# Patient Record
Sex: FEMALE | Race: WHITE | NOT HISPANIC OR LATINO | Employment: OTHER | URBAN - NONMETROPOLITAN AREA
[De-identification: names, ages, dates, MRNs, and addresses within clinical notes are randomized per-mention and may not be internally consistent; named-entity substitution may affect disease eponyms.]

---

## 2017-01-06 ENCOUNTER — OFFICE VISIT (OUTPATIENT)
Dept: FAMILY MEDICINE | Facility: OTHER | Age: 77
End: 2017-01-06
Payer: COMMERCIAL

## 2017-01-06 VITALS
SYSTOLIC BLOOD PRESSURE: 102 MMHG | WEIGHT: 136.5 LBS | OXYGEN SATURATION: 97 % | TEMPERATURE: 97.2 F | BODY MASS INDEX: 21.37 KG/M2 | DIASTOLIC BLOOD PRESSURE: 78 MMHG | HEART RATE: 69 BPM

## 2017-01-06 DIAGNOSIS — Z71.89 ADVANCED DIRECTIVES, COUNSELING/DISCUSSION: Primary | ICD-10-CM

## 2017-01-06 DIAGNOSIS — J01.01 ACUTE RECURRENT MAXILLARY SINUSITIS: ICD-10-CM

## 2017-01-06 PROCEDURE — 99213 OFFICE O/P EST LOW 20 MIN: CPT | Performed by: INTERNAL MEDICINE

## 2017-01-06 RX ORDER — AMLODIPINE BESYLATE 5 MG/1
1 TABLET ORAL DAILY
COMMUNITY
Start: 2016-12-07 | End: 2017-02-28

## 2017-01-06 RX ORDER — AZITHROMYCIN 250 MG/1
TABLET, FILM COATED ORAL
Qty: 6 TABLET | Refills: 0 | Status: SHIPPED | OUTPATIENT
Start: 2017-01-06 | End: 2017-02-17

## 2017-01-06 RX ORDER — LORATADINE 10 MG/1
10 TABLET ORAL DAILY
Qty: 30 TABLET | Refills: 1 | Status: SHIPPED | OUTPATIENT
Start: 2017-01-06 | End: 2017-02-17

## 2017-01-06 NOTE — NURSING NOTE
"Chief Complaint   Patient presents with     Sinus Problem       Initial /78 mmHg  Pulse 69  Temp(Src) 97.2  F (36.2  C) (Temporal)  Wt 136 lb 8 oz (61.916 kg)  SpO2 97% Estimated body mass index is 21.37 kg/(m^2) as calculated from the following:    Height as of 9/23/16: 5' 7\" (1.702 m).    Weight as of this encounter: 136 lb 8 oz (61.916 kg).  BP completed using cuff size: ulises ORTIZ      "

## 2017-01-06 NOTE — PROGRESS NOTES
SUBJECTIVE:                                                    Unique Ward is a 76 year old female who presents to clinic today for the following health issues:    RESPIRATORY SYMPTOMS      Duration: 3 weeks    Description  nasal congestion, facial pain/pressure and gum pain    Severity: mild    Accompanying signs and symptoms: None    History (predisposing factors):  none    Precipitating or alleviating factors: None    Therapies tried and outcome:  rest and fluids         Problem list and histories reviewed & adjusted, as indicated.  Additional history: none      CHIEF COMPLAINT:    The patient is a pleasant 76-year-old female who presents today with a 3 week history of nasal congestion, posterior nasal drainage, and upper maxillary discomfort on the right more so than the left. She has some twitching in her right eye and symptoms consistent with a acute sinus infection. She's had a low-grade fever but has not measured it. She's had no shaking chills. She is taking food and fluids adequately at this time.                         PAST, FAMILY,SOCIAL HISTORY:     Medical  History:   has no past medical history on file.     Surgical History:   has no past surgical history on file.     Social History:   reports that she has quit smoking. She has never used smokeless tobacco. She reports that she does not drink alcohol or use illicit drugs.     Family History:  family history is not on file.            MEDICATIONS  Current Outpatient Prescriptions   Medication Sig Dispense Refill     amLODIPine (NORVASC) 5 MG tablet Take 1 tablet by mouth daily       azithromycin (ZITHROMAX) 250 MG tablet Two tablets first day, then one tablet daily for four days. 6 tablet 0     loratadine (CLARITIN) 10 MG tablet Take 1 tablet (10 mg) by mouth daily 30 tablet 1     MELATONIN PO Take 3 mg by mouth       hydrochlorothiazide (HYDRODIURIL) 25 MG tablet Take 1 tablet (25 mg) by mouth daily 90 tablet 3          --------------------------------------------------------------------------------------------------------------------                          REVIEW OF SYSTEMS:         LUNGS: Pt denies: cough,excess sputum, hemoptysis, or shortness of breath.   HEART: Pt denies: chest pain, arrythmia, syncope, tachy or bradyarrhythmia or excess edema.   GI: Pt denies: nausea, vomitting, diarrhea, constipation, melena, or hematochezia.   NEURO: Pt denies: seizures, strokes, diplopia, weakness, paraesthesias, or paralysis.                          EXAMINATION:          Vital Signs:  B/P: 102/78, T: 97.2, P: 69, R: Data Unavailable, BMI: Body mass index is 21.37 kg/(m^2).   Constitutional: The patient appears to be in no acute distress. The patient appears to be adequately hydrated. No acute respiratory or hemodynamic distress is noted at this time.   LUNGS: clear bilaterally, airflow is brisk, no intercostal retraction or stridor is noted. No coughing is noted during visit.   HEART:  regular without rubs, clicks, gallops, or murmurs. PMI is nondisplaced. Upstrokes are brisk. S1,S2 are heard.   GI: Abdomen is soft, without rebound, guarding or tenderness. Bowel sounds are appropriate. No renal bruits are heard.               ENT: Nasal mucosa is Markedly edematous and erythematous, Yellowexudates are seen. Incompleteobstruction is present. Pharynx is clear of exudates, no significant cervical adenopathy is present. Tympanic membranes show no infection or evidence of perforation. Thyroid is not nodular or enlarged.                        DECISION MAKING:       #1 acute Maxillary sinusitis    recommendZithromax in the usual Z-Hany fashion   Recommend Claritin 10 mg daily  #2 mild insomnia   Recommend at bedtime Benadryl                           FOLLOW UP    I have asked the patient to make an appointment for follow up with me in 1 week if not markedly improved. Otherwise, she'll follow up with her primary care provider    I have  carefully explained the diagnosis and treatment options with the patient. The patient has displayed an understanding of the above, and all subsequent questions were answered.       DO RONDA Dai    Portions of this note were produced using [employer]  Although every attempt at real-time proof reading has been made, occasional grammar/syntax errors may have been missed.

## 2017-02-17 ENCOUNTER — OFFICE VISIT (OUTPATIENT)
Dept: FAMILY MEDICINE | Facility: OTHER | Age: 77
End: 2017-02-17
Payer: COMMERCIAL

## 2017-02-17 VITALS
WEIGHT: 138 LBS | RESPIRATION RATE: 20 BRPM | BODY MASS INDEX: 21.66 KG/M2 | SYSTOLIC BLOOD PRESSURE: 130 MMHG | HEART RATE: 84 BPM | HEIGHT: 67 IN | DIASTOLIC BLOOD PRESSURE: 66 MMHG | TEMPERATURE: 97.8 F | OXYGEN SATURATION: 97 %

## 2017-02-17 DIAGNOSIS — F51.01 PRIMARY INSOMNIA: Primary | ICD-10-CM

## 2017-02-17 PROCEDURE — 99213 OFFICE O/P EST LOW 20 MIN: CPT | Performed by: NURSE PRACTITIONER

## 2017-02-17 RX ORDER — TRAZODONE HYDROCHLORIDE 50 MG/1
25 TABLET, FILM COATED ORAL
Qty: 30 TABLET | Refills: 1 | Status: SHIPPED | OUTPATIENT
Start: 2017-02-17 | End: 2017-02-28

## 2017-02-17 NOTE — NURSING NOTE
"Chief Complaint   Patient presents with     Sleep Problem       Initial /66 (BP Location: Left arm, Cuff Size: Adult Regular)  Pulse 84  Temp 97.8  F (36.6  C) (Tympanic)  Resp 20  Ht 5' 7\" (1.702 m)  Wt 138 lb (62.6 kg)  SpO2 97%  Breastfeeding? No  BMI 21.61 kg/m2 Estimated body mass index is 21.61 kg/(m^2) as calculated from the following:    Height as of this encounter: 5' 7\" (1.702 m).    Weight as of this encounter: 138 lb (62.6 kg).  Medication Reconciliation: complete   ................Ronnie Song LPN,   February 17, 2017,      2:29 PM,   [facility]    "

## 2017-02-17 NOTE — PROGRESS NOTES
SUBJECTIVE:                                                    Unique Ward is a 76 year old female who presents to clinic today for the following health issues:      Insomnia      Duration: 1 year    Description  Frequency of insomnia:  several times a week  Time to fall asleep: 15 minutes  Middle of night awakening:  nightly  Early morning awakening:  occasionally    Accompanying signs and symptoms:  fatigue    History  Similar episodes in past:  YES- x1 year  Previous evaluation/sleep study:  no     Precipitating or alleviating factors:  New stressful situation: no   Caffeine intake after lunchtime: no   OTC decongestants: no   Any new medications: YES- trying Benadryl, not helpful    Therapies tried and outcome: caffeine avoidance, Benadryl, melatonin -  not effective     Has consistent sleep/wake times.  No caffeine use after noon and only 1 cup in the morning.   Falls asleep okay, but wakes often during the night.   Denies specific anxiety symptoms, but has a history of this.   Getting about 2-3 hours of sleep a night only.       Problem list and histories reviewed & adjusted, as indicated.  Additional history: none    Patient Active Problem List   Diagnosis     Essential hypertension     Personal history of urinary tract infection     Advanced directives, counseling/discussion     No past surgical history on file.    Social History   Substance Use Topics     Smoking status: Former Smoker     Smokeless tobacco: Never Used     Alcohol use No     No family history on file.      Current Outpatient Prescriptions   Medication Sig Dispense Refill     traZODone (DESYREL) 50 MG tablet Take 0.5 tablets (25 mg) by mouth nightly as needed for sleep 30 tablet 1     amLODIPine (NORVASC) 5 MG tablet Take 1 tablet by mouth daily       MELATONIN PO Take 3 mg by mouth       hydrochlorothiazide (HYDRODIURIL) 25 MG tablet Take 1 tablet (25 mg) by mouth daily 90 tablet 3     Allergies   Allergen Reactions     No Clinical  "Screening - See Comments      Eye drop antibiotic     Codeine      Gentamicin Hives     Per Unique this medication gives hives on arms and legs. Bev Lo LSXO,  ....................  7/15/2014   1:15 PM     Influenza Virus Vaccine H5n1      Paxil [Paroxetine]      Typhoid Vaccine-Strain Ty21a Other (See Comments)     Comment: , Description:      Typhoid Vaccines      BP Readings from Last 3 Encounters:   02/17/17 130/66   01/06/17 102/78   09/23/16 136/68    Wt Readings from Last 3 Encounters:   02/17/17 138 lb (62.6 kg)   01/06/17 136 lb 8 oz (61.9 kg)   09/23/16 138 lb 12.8 oz (63 kg)                    ROS:  C: NEGATIVE for fever, chills, change in weight.  POSITIVE for trouble sleeping as above   E/M: NEGATIVE for ear, mouth and throat problems  R: NEGATIVE for significant cough or SOB  CV: NEGATIVE for chest pain, palpitations or peripheral edema  GI: NEGATIVE for nausea, abdominal pain, heartburn, or change in bowel habits  PSYCHIATRIC: NEGATIVE for changes in mood or affect    OBJECTIVE:                                                    /66 (BP Location: Left arm, Cuff Size: Adult Regular)  Pulse 84  Temp 97.8  F (36.6  C) (Tympanic)  Resp 20  Ht 5' 7\" (1.702 m)  Wt 138 lb (62.6 kg)  SpO2 97%  Breastfeeding? No  BMI 21.61 kg/m2  Body mass index is 21.61 kg/(m^2).  GENERAL: alert, no distress and elderly  NECK: no adenopathy, no asymmetry, masses, or scars and thyroid normal to palpation  RESP: lungs clear to auscultation - no rales, rhonchi or wheezes  CV: regular rate and rhythm, normal S1 S2, no S3 or S4, no murmur, click or rub, no peripheral edema and peripheral pulses strong  ABDOMEN: soft, nontender, no hepatosplenomegaly, no masses and bowel sounds normal  MS: no gross musculoskeletal defects noted, no edema    Diagnostic Test Results:  none      ASSESSMENT/PLAN:                                                        1. Primary insomnia  Discussed good sleep hygiene with her.  Given " her age, I am reluctant to use sleep aids, but she has tried and failed on Melatonin and Benadryl already and would like something.  Will try very low dose Trazodone.  I discussed possible side effects with her.  She has an appointment with her PCP next week for a physical, will check how this is working at that time, assess side effects and continue if this is working well.    - traZODone (DESYREL) 50 MG tablet; Take 0.5 tablets (25 mg) by mouth nightly as needed for sleep  Dispense: 30 tablet; Refill: 1    See Patient Instructions    MAYRA [facility]

## 2017-02-17 NOTE — PATIENT INSTRUCTIONS
Try the Trazodone - take 1/2 tablet 30 minutes before bed.     Follow up with Dr. Lee at your physical to recheck how this is working.  He can refill it if needed.         Patient Education    Trazodone Hydrochloride Oral tablet    Trazodone Hydrochloride Oral tablet, extended release 24 hour  Trazodone Hydrochloride Oral tablet  What is this medicine?  TRAZODONE (TRAZ oh done) is used to treat depression.  This medicine may be used for other purposes; ask your health care provider or pharmacist if you have questions.  What should I tell my health care provider before I take this medicine?  They need to know if you have any of these conditions:    attempted suicide or thinking about it    bipolar disorder    bleeding problems    glaucoma    heart disease, or previous heart attack    irregular heart beat    kidney or liver disease    low levels of sodium in the blood    an unusual or allergic reaction to trazodone, other medicines, foods, dyes or preservatives    pregnant or trying to get pregnant    breast-feeding  How should I use this medicine?  Take this medicine by mouth with a glass of water. Follow the directions on the prescription label. Take this medicine shortly after a meal or a light snack. Take your medicine at regular intervals. Do not take your medicine more often than directed. Do not stop taking this medicine suddenly except upon the advice of your doctor. Stopping this medicine too quickly may cause serious side effects or your condition may worsen.  A special MedGuide will be given to you by the pharmacist with each prescription and refill. Be sure to read this information carefully each time.  Talk to your pediatrician regarding the use of this medicine in children. Special care may be needed.  Overdosage: If you think you have taken too much of this medicine contact a poison control center or emergency room at once.  NOTE: This medicine is only for you. Do not share this medicine with  others.  What if I miss a dose?  If you miss a dose, take it as soon as you can. If it is almost time for your next dose, take only that dose. Do not take double or extra doses.  What may interact with this medicine?  Do not take this medicine with any of the following medications:    cisapride    dofetilide    dronedarone    fluconazole    linezolid    MAOIs like Carbex, Eldepryl, Marplan, Nardil, and Parnate    mesoridazine    methylene blue (injected into a vein)    pimozide    posaconazole    saquinavir    thioridazine    ziprasidone  This medicine may also interact with the following medications:    alcohol    antiviral medicines for HIV or AIDS    aspirin and aspirin-like medicines    barbiturates such as phenobarbital    certain medicines that treat or prevent blood clots like warfarin, enoxaparin, and dalteparin    certain medicines for blood pressure, heart disease, irregular heart beat    certain medicines for depression, anxiety, or psychotic disturbances    certain medicines for migraine headache like almotriptan, eletriptan, frovatriptan, naratriptan, rizatriptan, sumatriptan, zolmitriptan    certain medicines for sleep    digoxin    fentanyl    ketoconazole    lithium    medicines for seizures like carbamazepine and phenytoin    NSAIDS, medicines for pain and inflammation, like ibuprofen or naproxen    rasagiline    supplements like Smith's wort, kava kava, valerian    tramadol    tryptophan  This list may not describe all possible interactions. Give your health care provider a list of all the medicines, herbs, non-prescription drugs, or dietary supplements you use. Also tell them if you smoke, drink alcohol, or use illegal drugs. Some items may interact with your medicine.  What should I watch for while using this medicine?  Tell your doctor if your symptoms do not get better or if they get worse. Visit your doctor or health care professional for regular checks on your progress. Because it may take  several weeks to see the full effects of this medicine, it is important to continue your treatment as prescribed by your doctor.  Patients and their families should watch out for new or worsening thoughts of suicide or depression. Also watch out for sudden changes in feelings such as feeling anxious, agitated, panicky, irritable, hostile, aggressive, impulsive, severely restless, overly excited and hyperactive, or not being able to sleep. If this happens, especially at the beginning of treatment or after a change in dose, call your health care professional.  You may get drowsy or dizzy. Do not drive, use machinery, or do anything that needs mental alertness until you know how this medicine affects you. Do not stand or sit up quickly, especially if you are an older patient. This reduces the risk of dizzy or fainting spells. Alcohol may interfere with the effect of this medicine. Avoid alcoholic drinks.  This medicine may cause dry eyes and blurred vision. If you wear contact lenses you may feel some discomfort. Lubricating drops may help. See your eye doctor if the problem does not go away or is severe.  Your mouth may get dry. Chewing sugarless gum, sucking hard candy and drinking plenty of water may help. Contact your doctor if the problem does not go away or is severe.  What side effects may I notice from receiving this medicine?  Side effects that you should report to your doctor or health care professional as soon as possible:    allergic reactions like skin rash, itching or hives, swelling of the face, lips, or tongue    fast, irregular heartbeat    feeling faint or lightheaded, falls    painful erections or other sexual dysfunction    suicidal thoughts or other mood changes    trembling  Side effects that usually do not require medical attention (report to your doctor or health care professional if they continue or are bothersome):    constipation    headache    muscle aches or pains    nausea,  vomiting    unusually weak or tired  This list may not describe all possible side effects. Call your doctor for medical advice about side effects. You may report side effects to FDA at 3-606-HPG-1082.  Where should I keep my medicine?  Keep out of the reach of children.  Store at room temperature between 15 and 30 degrees C (59 to 86 degrees F). Protect from light. Keep container tightly closed. Throw away any unused medicine after the expiration date.  NOTE:This sheet is a summary. It may not cover all possible information. If you have questions about this medicine, talk to your doctor, pharmacist, or health care provider. Copyright  2016 [employer]            Insomnia  Insomnia refers to a difficulty going to sleep or staying asleep, or both. Insomnia has many causes, including anxiety, stress, depression, chronic pain, sleeping cycles out of balance due to working night shifts or excess napping during the day, and a condition called  sleep apnea . Insomnia can be a side effect from stimulant medicines such as decongestants, asthma inhalers and pills, diet pills, and illegal drugs such as speed, crank, crack, and PCP.  Home Care:  1. Review your medicines with your doctor or pharmacist to find out if they can cause insomnia.  2. Caffeine, smoking and alcohol also affect sleep. Limit your daily use and do not use these before bedtime. Alcohol may make you sleepy at first, but as its effects wear off, you may awaken a few hours later and have trouble returning to sleep.  3. Do not exercise, eat or drink large amounts of liquid within 2 hours of your bedtime.  4. Improve your sleep habits. Have a fixed bed and wake-up time. Try to keep noise, light and heat in your bedroom at a comfortable level. Try using earplugs or eyeshades if needed.   5. Avoid watching TV in bed.  6. If you do not fall asleep within 30 minutes, try to relax by reading or listening to soft music.  7. Limit daytime napping to one 30 minute  period, early in the day.  8. Get regular exercise. Find other ways to lessen your stress level.  9. If a medicine was prescribed to help reset your sleep patterns, take it as directed. Sleeping pills are intended for short-term use, only. If taken for too long, the effect wears off while the risk of physical addiction and psychological dependence increases.  Follow-Up  with your doctor or as directed by our staff if you feel that your insomnia is not responding to the above measures.  Get Prompt Medical Attention  if any of the following occur:    Extreme restlessness or irritability    Confusion or hallucinations (seeing or hearing things that are not there)    Anxiety, depression    Several days without sleeping    7412-8748 [employer]. [address] [place] [address], [place] [address]. All rights reserved. This information is not intended as a substitute for professional medical care. Always follow your healthcare professional's instructions.

## 2017-02-17 NOTE — MR AVS SNAPSHOT
After Visit Summary   2/17/2017    Unique Ward    MRN: [id number]           Patient Information     Date Of Birth          1940        Visit Information        Provider Department      2/17/2017 2:20 PM Sonja Hartley APRN [facility]        Today's Diagnoses     Primary insomnia    -  1      Care Instructions    Try the Trazodone - take 1/2 tablet 30 minutes before bed.     Follow up with Dr. Lee at your physical to recheck how this is working.  He can refill it if needed.         Patient Education    Trazodone Hydrochloride Oral tablet    Trazodone Hydrochloride Oral tablet, extended release 24 hour  Trazodone Hydrochloride Oral tablet  What is this medicine?  TRAZODONE (TRAZ oh done) is used to treat depression.  This medicine may be used for other purposes; ask your health care provider or pharmacist if you have questions.  What should I tell my health care provider before I take this medicine?  They need to know if you have any of these conditions:    attempted suicide or thinking about it    bipolar disorder    bleeding problems    glaucoma    heart disease, or previous heart attack    irregular heart beat    kidney or liver disease    low levels of sodium in the blood    an unusual or allergic reaction to trazodone, other medicines, foods, dyes or preservatives    pregnant or trying to get pregnant    breast-feeding  How should I use this medicine?  Take this medicine by mouth with a glass of water. Follow the directions on the prescription label. Take this medicine shortly after a meal or a light snack. Take your medicine at regular intervals. Do not take your medicine more often than directed. Do not stop taking this medicine suddenly except upon the advice of your doctor. Stopping this medicine too quickly may cause serious side effects or your condition may worsen.  A special MedGuide will be given to you by the pharmacist with each prescription and refill. Be  sure to read this information carefully each time.  Talk to your pediatrician regarding the use of this medicine in children. Special care may be needed.  Overdosage: If you think you have taken too much of this medicine contact a poison control center or emergency room at once.  NOTE: This medicine is only for you. Do not share this medicine with others.  What if I miss a dose?  If you miss a dose, take it as soon as you can. If it is almost time for your next dose, take only that dose. Do not take double or extra doses.  What may interact with this medicine?  Do not take this medicine with any of the following medications:    cisapride    dofetilide    dronedarone    fluconazole    linezolid    MAOIs like Carbex, Eldepryl, Marplan, Nardil, and Parnate    mesoridazine    methylene blue (injected into a vein)    pimozide    posaconazole    saquinavir    thioridazine    ziprasidone  This medicine may also interact with the following medications:    alcohol    antiviral medicines for HIV or AIDS    aspirin and aspirin-like medicines    barbiturates such as phenobarbital    certain medicines that treat or prevent blood clots like warfarin, enoxaparin, and dalteparin    certain medicines for blood pressure, heart disease, irregular heart beat    certain medicines for depression, anxiety, or psychotic disturbances    certain medicines for migraine headache like almotriptan, eletriptan, frovatriptan, naratriptan, rizatriptan, sumatriptan, zolmitriptan    certain medicines for sleep    digoxin    fentanyl    ketoconazole    lithium    medicines for seizures like carbamazepine and phenytoin    NSAIDS, medicines for pain and inflammation, like ibuprofen or naproxen    rasagiline    supplements like Caraway's wort, kava kava, valerian    tramadol    tryptophan  This list may not describe all possible interactions. Give your health care provider a list of all the medicines, herbs, non-prescription drugs, or dietary  supplements you use. Also tell them if you smoke, drink alcohol, or use illegal drugs. Some items may interact with your medicine.  What should I watch for while using this medicine?  Tell your doctor if your symptoms do not get better or if they get worse. Visit your doctor or health care professional for regular checks on your progress. Because it may take several weeks to see the full effects of this medicine, it is important to continue your treatment as prescribed by your doctor.  Patients and their families should watch out for new or worsening thoughts of suicide or depression. Also watch out for sudden changes in feelings such as feeling anxious, agitated, panicky, irritable, hostile, aggressive, impulsive, severely restless, overly excited and hyperactive, or not being able to sleep. If this happens, especially at the beginning of treatment or after a change in dose, call your health care professional.  You may get drowsy or dizzy. Do not drive, use machinery, or do anything that needs mental alertness until you know how this medicine affects you. Do not stand or sit up quickly, especially if you are an older patient. This reduces the risk of dizzy or fainting spells. Alcohol may interfere with the effect of this medicine. Avoid alcoholic drinks.  This medicine may cause dry eyes and blurred vision. If you wear contact lenses you may feel some discomfort. Lubricating drops may help. See your eye doctor if the problem does not go away or is severe.  Your mouth may get dry. Chewing sugarless gum, sucking hard candy and drinking plenty of water may help. Contact your doctor if the problem does not go away or is severe.  What side effects may I notice from receiving this medicine?  Side effects that you should report to your doctor or health care professional as soon as possible:    allergic reactions like skin rash, itching or hives, swelling of the face, lips, or tongue    fast, irregular heartbeat    feeling  faint or lightheaded, falls    painful erections or other sexual dysfunction    suicidal thoughts or other mood changes    trembling  Side effects that usually do not require medical attention (report to your doctor or health care professional if they continue or are bothersome):    constipation    headache    muscle aches or pains    nausea, vomiting    unusually weak or tired  This list may not describe all possible side effects. Call your doctor for medical advice about side effects. You may report side effects to FDA at 5-138-LJD-8345.  Where should I keep my medicine?  Keep out of the reach of children.  Store at room temperature between 15 and 30 degrees C (59 to 86 degrees F). Protect from light. Keep container tightly closed. Throw away any unused medicine after the expiration date.  NOTE:This sheet is a summary. It may not cover all possible information. If you have questions about this medicine, talk to your doctor, pharmacist, or health care provider. Copyright  2016 Gold Standard            Insomnia  Insomnia refers to a difficulty going to sleep or staying asleep, or both. Insomnia has many causes, including anxiety, stress, depression, chronic pain, sleeping cycles out of balance due to working night shifts or excess napping during the day, and a condition called  sleep apnea . Insomnia can be a side effect from stimulant medicines such as decongestants, asthma inhalers and pills, diet pills, and illegal drugs such as speed, crank, crack, and PCP.  Home Care:  1. Review your medicines with your doctor or pharmacist to find out if they can cause insomnia.  2. Caffeine, smoking and alcohol also affect sleep. Limit your daily use and do not use these before bedtime. Alcohol may make you sleepy at first, but as its effects wear off, you may awaken a few hours later and have trouble returning to sleep.  3. Do not exercise, eat or drink large amounts of liquid within 2 hours of your bedtime.  4. Improve your  sleep habits. Have a fixed bed and wake-up time. Try to keep noise, light and heat in your bedroom at a comfortable level. Try using earplugs or eyeshades if needed.   5. Avoid watching TV in bed.  6. If you do not fall asleep within 30 minutes, try to relax by reading or listening to soft music.  7. Limit daytime napping to one 30 minute period, early in the day.  8. Get regular exercise. Find other ways to lessen your stress level.  9. If a medicine was prescribed to help reset your sleep patterns, take it as directed. Sleeping pills are intended for short-term use, only. If taken for too long, the effect wears off while the risk of physical addiction and psychological dependence increases.  Follow-Up  with your doctor or as directed by our staff if you feel that your insomnia is not responding to the above measures.  Get Prompt Medical Attention  if any of the following occur:    Extreme restlessness or irritability    Confusion or hallucinations (seeing or hearing things that are not there)    Anxiety, depression    Several days without sleeping    2253-3942 [employer]. [address] [place] [address]. All rights reserved. This information is not intended as a substitute for professional medical care. Always follow your healthcare professional's instructions.              Follow-ups after your visit        Your next 10 appointments already scheduled     Feb 28, 2017 10:40 AM CST   PHYSICAL with Tyler Lee MD   [facility] ([address] [address] [phone]            Feb 28, 2017 11:20 AM CST   SHORT with Tyler Lee MD   [address] [address]   [phone]              Who to contact     If you have questions or need follow up information about today's clinic visit or your schedule please contact [facility] directly at [phone].  Normal or non-critical lab and imaging results will be communicated to you by [employer], letter or phone within 4 business days after the clinic has received the results. If you do not hear from us within 7 days, please contact the clinic through [employer] or phone. If you have a critical or abnormal lab result, we will notify you by phone as soon as possible.  Submit refill requests through [employer] or call your pharmacy and they will forward the refill request to us. Please allow 3 business days for your refill to be completed.          Additional Information About Your Visit        [employer] Information     [employer] gives you secure access to your electronic health record. If you see a primary care provider, you can also send messages to your care team and make appointments. If you have questions, please call your primary care clinic.  If you do not have a primary care provider, please call [phone] and they will assist you.        Care EveryWhere ID     This is your Care EveryWhere ID. This could be used by other organizations to access your [place] medical records  [id number]        Your Vitals Were     Pulse Temperature Respirations Height Pulse Oximetry Breastfeeding?    84 97.8  F (36.6  C) (Tympanic) 20 5' 7\" (1.702 m) 97% No    BMI (Body Mass Index)                   21.61 kg/m2            Blood Pressure from Last 3 Encounters:   02/17/17 130/66   01/06/17 102/78   09/23/16 136/68    Weight from Last 3 Encounters:   02/17/17 138 lb (62.6 kg)   01/06/17 136 lb 8 oz (61.9 kg)   09/23/16 138 lb 12.8 oz (63 kg)              Today, you had the following     No orders found for display         Today's Medication Changes          These changes are accurate as of: 2/17/17  2:50 PM.  If you have any questions, ask your nurse or doctor.               Start taking these medicines.        Dose/Directions    traZODone 50 MG tablet   Commonly known as:  DESYREL   Used for:  Primary " insomnia   Started by:  Sonja Hartley APRN CNP        Dose:  25 mg   Take 0.5 tablets (25 mg) by mouth nightly as needed for sleep   Quantity:  30 tablet   Refills:  1            Where to get your medicines      These medications were sent [place] [facility] [place] [address]  [address] , [facility] [address]     Phone:  [phone]     traZODone 50 MG tablet                Primary Care Provider Office Phone # Fax #    Kimpanchito Gopi Lee [phone]       [address]   [facility] [address]        Thank you!     Thank you for choosing [facility]  for your care. Our goal is always to provide you with excellent care. Hearing back from our patients is one way we can continue to improve our services. Please take a few minutes to complete the written survey that you may receive in the mail after your visit with us. Thank you!             Your Updated Medication List - Protect others around you: Learn how to safely use, store and throw away your medicines at www.disposemymeds.org.          This list is accurate as of: 2/17/17  2:50 PM.  Always use your most recent med list.                   Brand Name Dispense Instructions for use    amLODIPine 5 MG tablet    NORVASC     Take 1 tablet by mouth daily       hydrochlorothiazide 25 MG tablet    HYDRODIURIL    90 tablet    Take 1 tablet (25 mg) by mouth daily       MELATONIN PO      Take 3 mg by mouth       traZODone 50 MG tablet    DESYREL    30 tablet    Take 0.5 tablets (25 mg) by mouth nightly as needed for sleep

## 2017-02-28 ENCOUNTER — OFFICE VISIT (OUTPATIENT)
Dept: FAMILY MEDICINE | Facility: CLINIC | Age: 77
End: 2017-02-28
Payer: COMMERCIAL

## 2017-02-28 ENCOUNTER — TELEPHONE (OUTPATIENT)
Dept: FAMILY MEDICINE | Facility: CLINIC | Age: 77
End: 2017-02-28

## 2017-02-28 ENCOUNTER — HOSPITAL ENCOUNTER (OUTPATIENT)
Dept: BONE DENSITY | Facility: CLINIC | Age: 77
Discharge: HOME | End: 2017-02-28
Attending: FAMILY MEDICINE | Admitting: FAMILY MEDICINE
Payer: MEDICARE

## 2017-02-28 VITALS
TEMPERATURE: 97.4 F | WEIGHT: 138.2 LBS | OXYGEN SATURATION: 97 % | HEART RATE: 80 BPM | DIASTOLIC BLOOD PRESSURE: 82 MMHG | RESPIRATION RATE: 20 BRPM | SYSTOLIC BLOOD PRESSURE: 138 MMHG | BODY MASS INDEX: 21.65 KG/M2

## 2017-02-28 DIAGNOSIS — I10 ESSENTIAL HYPERTENSION: Primary | ICD-10-CM

## 2017-02-28 DIAGNOSIS — Z13.220 LIPID SCREENING: ICD-10-CM

## 2017-02-28 DIAGNOSIS — E55.9 VITAMIN D DEFICIENCY: ICD-10-CM

## 2017-02-28 DIAGNOSIS — R53.83 FATIGUE, UNSPECIFIED TYPE: ICD-10-CM

## 2017-02-28 DIAGNOSIS — Z53.9 NO SHOW: ICD-10-CM

## 2017-02-28 DIAGNOSIS — M85.832 OTHER SPECIFIED DISORDERS OF BONE DENSITY AND STRUCTURE, LEFT FOREARM: ICD-10-CM

## 2017-02-28 DIAGNOSIS — I10 ESSENTIAL HYPERTENSION: ICD-10-CM

## 2017-02-28 DIAGNOSIS — Z00.00 ANNUAL PHYSICAL EXAM: Primary | ICD-10-CM

## 2017-02-28 DIAGNOSIS — S62.102S: ICD-10-CM

## 2017-02-28 DIAGNOSIS — L60.0 IGTN (INGROWING TOE NAIL): ICD-10-CM

## 2017-02-28 DIAGNOSIS — F51.01 PRIMARY INSOMNIA: ICD-10-CM

## 2017-02-28 PROBLEM — M79.7 FIBROMYALGIA: Status: ACTIVE | Noted: 2017-02-28

## 2017-02-28 PROBLEM — Z85.828 HISTORY OF BASAL CELL CARCINOMA: Status: ACTIVE | Noted: 2017-02-28

## 2017-02-28 PROBLEM — Z80.8 FH: MELANOMA: Status: ACTIVE | Noted: 2017-02-28

## 2017-02-28 LAB
ANION GAP SERPL CALCULATED.3IONS-SCNC: 6 MMOL/L (ref 3–14)
BUN SERPL-MCNC: 13 MG/DL (ref 7–30)
CALCIUM SERPL-MCNC: 9.1 MG/DL (ref 8.5–10.1)
CHLORIDE SERPL-SCNC: 93 MMOL/L (ref 94–109)
CHOLEST SERPL-MCNC: 236 MG/DL
CO2 SERPL-SCNC: 32 MMOL/L (ref 20–32)
CREAT SERPL-MCNC: 0.68 MG/DL (ref 0.52–1.04)
ERYTHROCYTE [DISTWIDTH] IN BLOOD BY AUTOMATED COUNT: 13.6 % (ref 10–15)
GFR SERPL CREATININE-BSD FRML MDRD: 84 ML/MIN/1.7M2
GLUCOSE SERPL-MCNC: 94 MG/DL (ref 70–99)
HCT VFR BLD AUTO: 35.9 % (ref 35–47)
HDLC SERPL-MCNC: 104 MG/DL
HGB BLD-MCNC: 12 G/DL (ref 11.7–15.7)
LDLC SERPL CALC-MCNC: 118 MG/DL
MCH RBC QN AUTO: 29.5 PG (ref 26.5–33)
MCHC RBC AUTO-ENTMCNC: 33.4 G/DL (ref 31.5–36.5)
MCV RBC AUTO: 88 FL (ref 78–100)
NONHDLC SERPL-MCNC: 132 MG/DL
PLATELET # BLD AUTO: 325 10E9/L (ref 150–450)
POTASSIUM SERPL-SCNC: 3.7 MMOL/L (ref 3.4–5.3)
RBC # BLD AUTO: 4.07 10E12/L (ref 3.8–5.2)
SODIUM SERPL-SCNC: 131 MMOL/L (ref 133–144)
TRIGL SERPL-MCNC: 71 MG/DL
TSH SERPL DL<=0.005 MIU/L-ACNC: 2.13 MU/L (ref 0.4–4)
WBC # BLD AUTO: 7.1 10E9/L (ref 4–11)

## 2017-02-28 PROCEDURE — 85027 COMPLETE CBC AUTOMATED: CPT | Performed by: FAMILY MEDICINE

## 2017-02-28 PROCEDURE — 77080 DXA BONE DENSITY AXIAL: CPT

## 2017-02-28 PROCEDURE — 80061 LIPID PANEL: CPT | Performed by: FAMILY MEDICINE

## 2017-02-28 PROCEDURE — 80048 BASIC METABOLIC PNL TOTAL CA: CPT | Performed by: FAMILY MEDICINE

## 2017-02-28 PROCEDURE — 84443 ASSAY THYROID STIM HORMONE: CPT | Performed by: FAMILY MEDICINE

## 2017-02-28 PROCEDURE — 99397 PER PM REEVAL EST PAT 65+ YR: CPT | Mod: 25 | Performed by: FAMILY MEDICINE

## 2017-02-28 PROCEDURE — 36415 COLL VENOUS BLD VENIPUNCTURE: CPT | Performed by: FAMILY MEDICINE

## 2017-02-28 PROCEDURE — 99213 OFFICE O/P EST LOW 20 MIN: CPT | Mod: 25 | Performed by: FAMILY MEDICINE

## 2017-02-28 PROCEDURE — 82306 VITAMIN D 25 HYDROXY: CPT | Performed by: FAMILY MEDICINE

## 2017-02-28 RX ORDER — AMLODIPINE BESYLATE 5 MG/1
5 TABLET ORAL DAILY
Qty: 90 TABLET | Refills: 3 | Status: SHIPPED | OUTPATIENT
Start: 2017-02-28 | End: 2018-01-26

## 2017-02-28 RX ORDER — TRAZODONE HYDROCHLORIDE 50 MG/1
50 TABLET, FILM COATED ORAL
Qty: 30 TABLET | Refills: 1 | COMMUNITY
Start: 2017-02-28 | End: 2017-03-22

## 2017-02-28 RX ORDER — AMLODIPINE BESYLATE 5 MG/1
5 TABLET ORAL DAILY
Qty: 90 TABLET | Refills: 3 | Status: CANCELLED | OUTPATIENT
Start: 2017-02-28

## 2017-02-28 ASSESSMENT — PAIN SCALES - GENERAL: PAINLEVEL: NO PAIN (0)

## 2017-02-28 NOTE — MR AVS SNAPSHOT
After Visit Summary   2/28/2017    Unique Ward    MRN: [id number]           Patient Information     Date Of Birth          1940        Visit Information        Provider Department      2/28/2017 10:40 AM Tyler Lee MD [facility]        Today's Diagnoses     Annual physical exam    -  1    Fracture of wrist, left, sequela        Vitamin D deficiency        Lipid screening        Fatigue, unspecified type        Other specified disorders of bone density and structure, left forearm         Primary insomnia        Essential hypertension        IGTN (ingrowing toe nail)          Care Instructions      Preventive Health Recommendations  Female Ages 65 +    Yearly exam:     See your health care provider every year in order to  o Review health changes.   o Discuss preventive care.    o Review your medicines if your doctor has prescribed any.      You no longer need a yearly Pap test unless you've had an abnormal Pap test in the past 10 years. If you have vaginal symptoms, such as bleeding or discharge, be sure to talk with your provider about a Pap test.      Every 1 to 2 years, have a mammogram.  If you are over 69, talk with your health care provider about whether or not you want to continue having screening mammograms.      Every 10 years, have a colonoscopy. Or, have a yearly FIT test (stool test). These exams will check for colon cancer.       Have a cholesterol test every 5 years, or more often if your doctor advises it.       Have a diabetes test (fasting glucose) every three years. If you are at risk for diabetes, you should have this test more often.       At age 65, have a bone density scan (DEXA) to check for osteoporosis (brittle bone disease).    Shots:    Get a flu shot each year.    Get a tetanus shot every 10 years.    Talk to your doctor about your pneumonia vaccines. There are now two you should receive - Pneumovax (PPSV 23) and Prevnar (PCV 13).    Talk to  your doctor about the shingles vaccine.    Talk to your doctor about the hepatitis B vaccine.    Nutrition:     Eat at least 5 servings of fruits and vegetables each day.      Eat whole-grain bread, whole-wheat pasta and brown rice instead of white grains and rice.      Talk to your provider about Calcium and Vitamin D.     Lifestyle    Exercise at least 150 minutes a week (30 minutes a day, 5 days a week). This will help you control your weight and prevent disease.      Limit alcohol to one drink per day.      No smoking.       Wear sunscreen to prevent skin cancer.       See your dentist twice a year for an exam and cleaning.      See your eye doctor every 1 to 2 years to screen for conditions such as glaucoma, macular degeneration, cataracts, etc         Follow-ups after your visit        Your next 10 appointments already scheduled     Feb 28, 2017 12:30 PM CST   DX HIP/PELVIS/SPINE with PHDX1   [facility] ([facility])    [address] [place] [address]   [phone]           Please do not take any of the following 48 hours prior to your exam: vitamins, calcium tablets, antacids.              Future tests that were ordered for you today     Open Future Orders        Priority Expected Expires Ordered    DX Hip/Pelvis/Spine Routine  2/28/2018 2/28/2017            Who to contact     If you have questions or need follow up information about today's clinic visit or your schedule please contact [facility] directly at [phone].  Normal or non-critical lab and imaging results will be communicated to you by MyChart, letter or phone within 4 business days after the clinic has received the results. If you do not hear from us within 7 days, please contact the clinic through MyChart or phone. If you have a critical or abnormal lab result, we will notify you by phone as soon as possible.  Submit refill requests through [email or web address] or call your pharmacy and they  will forward the refill request to us. Please allow 3 business days for your refill to be completed.          Additional Information About Your Visit        [employer] Information     [employer] gives you secure access to your electronic health record. If you see a primary care provider, you can also send messages to your care team and make appointments. If you have questions, please call your primary care clinic.  If you do not have a primary care provider, please call [phone] and they will assist you.        Care EveryWhere ID     This is your Care EveryWhere ID. This could be used by other organizations to access your [place] medical records  [id number]        Your Vitals Were     Pulse Temperature Respirations Pulse Oximetry BMI (Body Mass Index)       80 97.4  F (36.3  C) (Temporal) 20 97% 21.65 kg/m2        Blood Pressure from Last 3 Encounters:   02/28/17 138/82   02/17/17 130/66   01/06/17 102/78    Weight from Last 3 Encounters:   02/28/17 138 lb 3.2 oz (62.7 kg)   02/17/17 138 lb (62.6 kg)   01/06/17 136 lb 8 oz (61.9 kg)              We Performed the Following     Basic metabolic panel     CBC with platelets     Lipid panel reflex to direct LDL     TSH with free T4 reflex     Vitamin D Deficiency          Today's Medication Changes          These changes are accurate as of: 2/28/17 11:56 AM.  If you have any questions, ask your nurse or doctor.               These medicines have changed or have updated prescriptions.        Dose/Directions    traZODone 50 MG tablet   Commonly known as:  DESYREL   This may have changed:  how much to take   Used for:  Primary insomnia   Changed by:  Tyler Lee MD        Dose:  50 mg   Take 1 tablet (50 mg) by mouth nightly as needed for sleep   Quantity:  30 tablet   Refills:  1            Where to get your medicines      These medications were sent to [facility] - [place], [place] - [address]   [address], [facility] [address]      Phone:  [phone]     amLODIPine 5 MG tablet                Primary Care Provider Office Phone # Fax #    Kimpanchito Gopi Lee [phone]       [address] [place] [address]        Thank you!     Thank you for choosing [facility]  for your care. Our goal is always to provide you with excellent care. Hearing back from our patients is one way we can continue to improve our services. Please take a few minutes to complete the written survey that you may receive in the mail after your visit with us. Thank you!             Your Updated Medication List - Protect others around you: Learn how to safely use, store and throw away your medicines at www.disposemymeds.org.          This list is accurate as of: 2/28/17 11:56 AM.  Always use your most recent med list.                   Brand Name Dispense Instructions for use    amLODIPine 5 MG tablet    NORVASC    90 tablet    Take 1 tablet (5 mg) by mouth daily       hydrochlorothiazide 25 MG tablet    HYDRODIURIL    90 tablet    Take 1 tablet (25 mg) by mouth daily       traZODone 50 MG tablet    DESYREL    30 tablet    Take 1 tablet (50 mg) by mouth nightly as needed for sleep

## 2017-02-28 NOTE — MR AVS SNAPSHOT
After Visit Summary   2/28/2017    Unique Ward    MRN: [id number]           Patient Information     Date Of Birth          1940        Visit Information        Provider Department      2/28/2017 11:20 AM Tyler Lee MD [facility]        Today's Diagnoses     Essential hypertension    -  1    NO SHOW           Follow-ups after your visit        Your next 10 appointments already scheduled     Mar 24, 2017 10:00 AM CDT   LAB with NL LAB [facility]   [facility]    [address] [place] [id number]   [phone]           Patient must bring picture ID.  Patient should be prepared to give a urine specimen  Please do not eat 10-12 hours before your appointment if you are coming in fasting for labs on lipids, cholesterol, or glucose (sugar).  Pregnant women should follow their Care Team instructions. Water with medications is okay. Do not drink coffee or other fluids.   If you have concerns about taking  your medications, please ask at office or if scheduling via [employer], send a message by clicking on Secure Messaging, Message Your Care Team.              Who to contact     If you have questions or need follow up information about today's clinic visit or your schedule please contact [facility] directly at [phone].  Normal or non-critical lab and imaging results will be communicated to you by MyChart, letter or phone within 4 business days after the clinic has received the results. If you do not hear from us within 7 days, please contact the clinic through [employer] or phone. If you have a critical or abnormal lab result, we will notify you by phone as soon as possible.  Submit refill requests through [employer] or call your pharmacy and they will forward the refill request to us. Please allow 3 business days for your refill to be completed.          Additional Information About Your Visit        [facility]  Information     [employer] gives you secure access to your electronic health record. If you see a primary care provider, you can also send messages to your care team and make appointments. If you have questions, please call your primary care clinic.  If you do not have a primary care provider, please call [phone] and they will assist you.        Care EveryWhere ID     This is your Care EveryWhere ID. This could be used by other organizations to access your [place] medical records  [id number]         Blood Pressure from Last 3 Encounters:   02/28/17 138/82   02/17/17 130/66   01/06/17 102/78    Weight from Last 3 Encounters:   02/28/17 138 lb 3.2 oz (62.7 kg)   02/17/17 138 lb (62.6 kg)   01/06/17 136 lb 8 oz (61.9 kg)              Today, you had the following     No orders found for display         Today's Medication Changes          These changes are accurate as of: 2/28/17 11:59 PM.  If you have any questions, ask your nurse or doctor.               These medicines have changed or have updated prescriptions.        Dose/Directions    traZODone 50 MG tablet   Commonly known as:  DESYREL   This may have changed:  how much to take   Used for:  Primary insomnia   Changed by:  Tyler Lee MD        Dose:  50 mg   Take 1 tablet (50 mg) by mouth nightly as needed for sleep   Quantity:  30 tablet   Refills:  1            Where to get your medicines      These medications were sent to [facility] - [facility] [address]  [address], [facility] [address]     Phone:  [phone]     amLODIPine 5 MG tablet                Primary Care Provider Office Phone # Fax #    Tyler Lee [phone]       [address] [place] [address]        Thank you!     Thank you for choosing [facility]  for your care. Our goal is always to provide you with excellent care. Hearing back from our patients is one way we  can continue to improve our services. Please take a few minutes to complete the written survey that you may receive in the mail after your visit with us. Thank you!             Your Updated Medication List - Protect others around you: Learn how to safely use, store and throw away your medicines at www.disposemymeds.org.          This list is accurate as of: 2/28/17 11:59 PM.  Always use your most recent med list.                   Brand Name Dispense Instructions for use    amLODIPine 5 MG tablet    NORVASC    90 tablet    Take 1 tablet (5 mg) by mouth daily       hydrochlorothiazide 25 MG tablet    HYDRODIURIL    90 tablet    Take 1 tablet (25 mg) by mouth daily       traZODone 50 MG tablet    DESYREL    30 tablet    Take 1 tablet (50 mg) by mouth nightly as needed for sleep

## 2017-02-28 NOTE — NURSING NOTE
"Chief Complaint   Patient presents with     Physical       Initial /82 (BP Location: Right arm, Patient Position: Chair, Cuff Size: Adult Regular)  Pulse 80  Temp 97.4  F (36.3  C) (Temporal)  Resp 20  Wt 138 lb 3.2 oz (62.7 kg)  SpO2 97%  BMI 21.65 kg/m2 Estimated body mass index is 21.65 kg/(m^2) as calculated from the following:    Height as of 2/17/17: 5' 7\" (1.702 m).    Weight as of this encounter: 138 lb 3.2 oz (62.7 kg).  Medication Reconciliation: complete   Paulina Vasques CMA     "

## 2017-02-28 NOTE — TELEPHONE ENCOUNTER
Left a message to call the clinic back.  When the patient calls back please relay this message per Dr. Lee.     Labs look good. She does have a low sodium. I think this is coming from her hydrochlorothiazide. If agreeable, she should stop this medication for the next month and then recheck her sodium.     No osteoporosis, but osteopenia. Should take 1000mg vit d, and 500 mg calcium daily, in addition to dietary sources.     If she has any questions please send her back to Dr. Lee's nurse.    Paulina Vasques, CMA

## 2017-02-28 NOTE — PROGRESS NOTES
SUBJECTIVE:                                                            Unique Ward is a 76 year old female who presents for Preventive Visit.    Returns for an annual and a number of items. She has an ingrowing toenail on her right great toe that is bothersome. No swelling or infection. She has ongoing left low back pain. It radiates into the buttock. She has a history of skin cancer and would like a recheck. She's been having more than usual fatigue and some sleep issues. Wondering about increasing her trazodone. She carries a history of vitamin D deficiency, right wrist fracture, abnormal DEXA scan. She does not recall the results of that. She is not measuring her home blood pressures.  Are you in the first 12 months of your Medicare coverage?  No    Physical   Annual:     Getting at least 3 servings of Calcium per day::  NO    Bi-annual eye exam::  NO    Dental care twice a year::  Yes    Sleep apnea or symptoms of sleep apnea::  Daytime drowsiness    Diet::  Low salt and Vegetarian/vegan    Frequency of exercise::  None    Taking medications regularly::  Yes    Medication side effects::  None    Additional concerns today::  YES      COGNITIVE SCREEN  1) Repeat 3 items (Banana, Sunrise, Chair)    2) Clock draw: NORMAL  3) 3 item recall: Recalls 3 objects  Results: NORMAL clock, 1-2 items recalled: COGNITIVE IMPAIRMENT LESS LIKELY    Mini-CogTM Copyright RUBEN Amador. Licensed by the author for use in [facility]; reprinted with permission (flaquito@.[facility]). All rights reserved.        Reviewed and updated as needed this visit by clinical staff  Tobacco  Allergies         Reviewed and updated as needed this visit by Provider        Social History   Substance Use Topics     Smoking status: Former Smoker     Packs/day: 0.75     Years: 10.00     Types: Cigarettes     Start date: 1/1/1955     Quit date: 1/1/1965     Smokeless tobacco: Never Used     Alcohol use No       The patient does not drink >3 drinks  "per day nor >7 drinks per week.            Today's PHQ-2 Score:   PHQ-2 ( 1999 [employer]) 2/27/2017   Q1: Little interest or pleasure in doing things -   Q2: Feeling down, depressed or hopeless -   PHQ-2 Score -   Little interest or pleasure in doing things More than half the days   Feeling down, depressed or hopeless More than half the days   PHQ-2 Score 4       Do you feel safe in your environment - Yes    Do you have a Health Care Directive?: No: Advance care planning reviewed with patient; information given to patient to review.    Current providers sharing in care for this patient include:   Patient Care Team:  Tyler Lee MD as PCP - General (Family Practice)      Hearing impairment: No    Ability to successfully perform activities of daily living: Yes, no assistance needed     Fall risk:  Fallen 2 or more times in the past year?: No  Any fall with injury in the past year?: No    Home safety:  none identified      The following health maintenance items are reviewed in Epic and correct as of today:  Health Maintenance   Topic Date Due     LIPID SCREEN Q5 YR FEMALE (SYSTEM ASSIGNED)  07/12/1984     DEXA SCAN SCREENING (SYSTEM ASSIGNED)  07/12/2004     PNEUMOCOCCAL (1 of 2 - PCV13) 07/12/2004     INFLUENZA VACCINE (SYSTEM ASSIGNED)  09/01/2016     FALL RISK ASSESSMENT  03/23/2017     ADVANCE DIRECTIVE PLANNING Q5 YRS (NO INBASKET)  01/06/2022     TETANUS IMMUNIZATION (SYSTEM ASSIGNED)  06/01/2024              ROS:  Constitutional, HEENT, cardiovascular, pulmonary, gi and gu systems are negative, except as otherwise noted.      OBJECTIVE:                                                            /82 (BP Location: Right arm, Patient Position: Chair, Cuff Size: Adult Regular)  Pulse 80  Temp 97.4  F (36.3  C) (Temporal)  Resp 20  Wt 138 lb 3.2 oz (62.7 kg)  SpO2 97%  BMI 21.65 kg/m2 Estimated body mass index is 21.65 kg/(m^2) as calculated from the following:    Height as of 2/17/17: 5' 7\" (1.702 " m).    Weight as of this encounter: 138 lb 3.2 oz (62.7 kg).  EXAM:   GENERAL: healthy, alert and no distress  EYES: Eyes grossly normal to inspection, PERRL and conjunctivae and sclerae normal  HENT: ear canals and TM's normal, nose and mouth without ulcers or lesions  NECK: no adenopathy, no asymmetry, masses, or scars and thyroid normal to palpation  RESP: lungs clear to auscultation - no rales, rhonchi or wheezes  CV: regular rate and rhythm, normal S1 S2, no S3 or S4, no murmur, click or rub, no peripheral edema and peripheral pulses strong  ABDOMEN: soft, nontender, no hepatosplenomegaly, no masses and bowel sounds normal  MS: no gross musculoskeletal defects noted, no edema  SKIN: no suspicious lesions or rashes  NEURO: Normal strength and tone, mentation intact and speech normal    ASSESSMENT / PLAN:                                                                ICD-10-CM    1. Annual physical exam Z00.00 Lipid panel reflex to direct LDL     CBC with platelets   2. Fracture of wrist, left, sequela S62.102S DX Hip/Pelvis/Spine   3. Vitamin D deficiency E55.9 Vitamin D Deficiency     DX Hip/Pelvis/Spine   4. Lipid screening Z13.220    5. Fatigue, unspecified type R53.83 TSH with free T4 reflex   6. Other specified disorders of bone density and structure, left forearm  M85.832 DX Hip/Pelvis/Spine   7. Primary insomnia F51.01 traZODone (DESYREL) 50 MG tablet   8. Essential hypertension I10 Basic metabolic panel     amLODIPine (NORVASC) 5 MG tablet   9. IGTN (ingrowing toe nail) L60.0       lots of items addressed. Outside of the usual physical exam topics, we will recheck a DEXA scan and vitamin D level. Due to her fatigue and recheck thyroid. We will increase her trazodone to 50 mg nightly. Hopefully with better sleep, her fatigue might improve. Refills provided for her stable high blood pressure, and routine labs ordered. Return any time for ingrown toenail on the right great toe for removal.      End of Life  "Planning:  Patient currently has an advanced directive:     COUNSELING:  Reviewed preventive health counseling, as reflected in patient instructions        Estimated body mass index is 21.65 kg/(m^2) as calculated from the following:    Height as of 2/17/17: 5' 7\" (1.702 m).    Weight as of this encounter: 138 lb 3.2 oz (62.7 kg).     reports that she quit smoking about 52 years ago. Her smoking use included Cigarettes. She started smoking about 62 years ago. She has a 7.50 pack-year smoking history. She has never used smokeless tobacco.      Appropriate preventive services were discussed with this patient, including applicable screening as appropriate for cardiovascular disease, diabetes, osteopenia/osteoporosis, and glaucoma.  As appropriate for age/gender, discussed screening for colorectal cancer, prostate cancer, breast cancer, and cervical cancer. Checklist reviewing preventive services available has been given to the patient.    Reviewed patients plan of care and provided an AVS. The Basic Care Plan (routine screening as documented in Health Maintenance) for Unique meets the Care Plan requirement. This Care Plan has been established and reviewed with the Patient.    Counseling Resources:  ATP IV Guidelines  Pooled Cohorts Equation Calculator  Breast Cancer Risk Calculator  FRAX Risk Assessment  ICSI Preventive Guidelines  Dietary Guidelines for Americans, 2010  USDA's MyPlate  ASA Prophylaxis  Lung CA Screening    Tyler Lee MD  [facility]  "

## 2017-03-01 LAB — DEPRECATED CALCIDIOL+CALCIFEROL SERPL-MC: 30 UG/L (ref 20–75)

## 2017-03-01 NOTE — TELEPHONE ENCOUNTER
Patient called back and info was given. She has a few questions about it and would like to communicate with Dr. Lee via [employer]fe[employer]. She will be sending him a message.  Thank you,  [place]   for [facility]

## 2017-03-13 ENCOUNTER — MYC MEDICAL ADVICE (OUTPATIENT)
Dept: FAMILY MEDICINE | Facility: CLINIC | Age: 77
End: 2017-03-13

## 2017-03-13 DIAGNOSIS — E87.1 LOW SODIUM LEVELS: Primary | ICD-10-CM

## 2017-03-17 NOTE — TELEPHONE ENCOUNTER
i think its her HCTZ, more likely. But she may stop either and lets recheck in 3-4 wks with bmp. thanks

## 2017-03-22 DIAGNOSIS — F51.01 PRIMARY INSOMNIA: ICD-10-CM

## 2017-03-22 NOTE — TELEPHONE ENCOUNTER
Unique is requesting a refill of Trazodone 50mg (one whole tablet nightly)  It looks like it was entered historical on 02/28/2017: therefore pharmacy never received prescription.     Thanks  Isis Pringle [facility]   [phone]

## 2017-03-23 RX ORDER — TRAZODONE HYDROCHLORIDE 50 MG/1
50 TABLET, FILM COATED ORAL
Qty: 30 TABLET | Refills: 1 | Status: SHIPPED | OUTPATIENT
Start: 2017-03-23 | End: 2017-06-29

## 2017-03-24 DIAGNOSIS — E87.1 LOW SODIUM LEVELS: ICD-10-CM

## 2017-03-24 LAB
ANION GAP SERPL CALCULATED.3IONS-SCNC: 7 MMOL/L (ref 3–14)
BUN SERPL-MCNC: 22 MG/DL (ref 7–30)
CALCIUM SERPL-MCNC: 9 MG/DL (ref 8.5–10.1)
CHLORIDE SERPL-SCNC: 101 MMOL/L (ref 94–109)
CO2 SERPL-SCNC: 30 MMOL/L (ref 20–32)
CREAT SERPL-MCNC: 0.78 MG/DL (ref 0.52–1.04)
GFR SERPL CREATININE-BSD FRML MDRD: 71 ML/MIN/1.7M2
GLUCOSE SERPL-MCNC: 127 MG/DL (ref 70–99)
POTASSIUM SERPL-SCNC: 4 MMOL/L (ref 3.4–5.3)
SODIUM SERPL-SCNC: 138 MMOL/L (ref 133–144)

## 2017-03-24 PROCEDURE — 36415 COLL VENOUS BLD VENIPUNCTURE: CPT | Performed by: FAMILY MEDICINE

## 2017-03-24 PROCEDURE — 80048 BASIC METABOLIC PNL TOTAL CA: CPT | Performed by: FAMILY MEDICINE

## 2017-04-28 ENCOUNTER — OFFICE VISIT (OUTPATIENT)
Dept: FAMILY MEDICINE | Facility: OTHER | Age: 77
End: 2017-04-28
Payer: COMMERCIAL

## 2017-04-28 ENCOUNTER — TELEPHONE (OUTPATIENT)
Dept: FAMILY MEDICINE | Facility: OTHER | Age: 77
End: 2017-04-28

## 2017-04-28 VITALS
SYSTOLIC BLOOD PRESSURE: 136 MMHG | HEART RATE: 70 BPM | RESPIRATION RATE: 20 BRPM | DIASTOLIC BLOOD PRESSURE: 70 MMHG | TEMPERATURE: 96.4 F | WEIGHT: 137 LBS | BODY MASS INDEX: 21.46 KG/M2

## 2017-04-28 DIAGNOSIS — B00.9 HSV (HERPES SIMPLEX VIRUS) INFECTION: ICD-10-CM

## 2017-04-28 DIAGNOSIS — R21 RASH AND NONSPECIFIC SKIN ERUPTION: Primary | ICD-10-CM

## 2017-04-28 PROCEDURE — 99213 OFFICE O/P EST LOW 20 MIN: CPT | Performed by: NURSE PRACTITIONER

## 2017-04-28 RX ORDER — SILVER SULFADIAZINE 1 %
CREAM (GRAM) TOPICAL
COMMUNITY
Start: 2017-04-05 | End: 2017-05-31

## 2017-04-28 RX ORDER — TRIAMCINOLONE ACETONIDE 5 MG/G
CREAM TOPICAL
Qty: 30 G | Refills: 0 | Status: SHIPPED | OUTPATIENT
Start: 2017-04-28 | End: 2017-05-31

## 2017-04-28 RX ORDER — ACYCLOVIR 50 MG/G
CREAM TOPICAL
Qty: 5 G | Refills: 3 | Status: SHIPPED | OUTPATIENT
Start: 2017-04-28 | End: 2017-05-31

## 2017-04-28 NOTE — NURSING NOTE
"Chief Complaint   Patient presents with     Derm Problem     many places, bilateral sides of body       Initial /70  Pulse 70  Temp 96.4  F (35.8  C) (Tympanic)  Resp 20  Wt 137 lb (62.1 kg)  Breastfeeding? No  BMI 21.46 kg/m2 Estimated body mass index is 21.46 kg/(m^2) as calculated from the following:    Height as of 2/17/17: 5' 7\" (1.702 m).    Weight as of this encounter: 137 lb (62.1 kg).  Medication Reconciliation: complete   ................Ronnie Song LPN,   April 28, 2017,      7:28 AM,   [facility]    "

## 2017-04-28 NOTE — PATIENT INSTRUCTIONS
Use the creams as prescribed. One is a steroid and one is an antiviral medication.     Follow up if symptoms fail to resolve as expected.

## 2017-04-28 NOTE — MR AVS SNAPSHOT
After Visit Summary   4/28/2017    Unique Ward    MRN: [id number]           Patient Information     Date Of Birth          1940        Visit Information        Provider Department      4/28/2017 7:20 AM Sonja Hartley APRN CNP [facility]        Today's Diagnoses     Rash and nonspecific skin eruption    -  1      Care Instructions    Use the creams as prescribed. One is a steroid and one is an antiviral medication.     Follow up if symptoms fail to resolve as expected.             Follow-ups after your visit        Who to contact     If you have questions or need follow up information about today's clinic visit or your schedule please contact [facility] directly at [phone].  Normal or non-critical lab and imaging results will be communicated to you by MyChart, letter or phone within 4 business days after the clinic has received the results. If you do not hear from us within 7 days, please contact the clinic through [employer] or phone. If you have a critical or abnormal lab result, we will notify you by phone as soon as possible.  Submit refill requests through [employer] or call your pharmacy and they will forward the refill request to us. Please allow 3 business days for your refill to be completed.          Additional Information About Your Visit        MyChart Information     [employer] gives you secure access to your electronic health record. If you see a primary care provider, you can also send messages to your care team and make appointments. If you have questions, please call your primary care clinic.  If you do not have a primary care provider, please call [phone] and they will assist you.        Care EveryWhere ID     This is your Care EveryWhere ID. This could be used by other organizations to access your [place] medical records  [id number]        Your Vitals Were     Pulse Temperature Respirations Breastfeeding? BMI (Body Mass Index)       70 96.4  F  (35.8  C) (Tympanic) 20 No 21.46 kg/m2        Blood Pressure from Last 3 Encounters:   04/28/17 136/70   02/28/17 138/82   02/17/17 130/66    Weight from Last 3 Encounters:   04/28/17 137 lb (62.1 kg)   02/28/17 138 lb 3.2 oz (62.7 kg)   02/17/17 138 lb (62.6 kg)              Today, you had the following     No orders found for display         Today's Medication Changes          These changes are accurate as of: 4/28/17  7:49 AM.  If you have any questions, ask your nurse or doctor.               Start taking these medicines.        Dose/Directions    acyclovir 5 % cream   Commonly known as:  ZOVIRAX   Used for:  Rash and nonspecific skin eruption   Started by:  Sonja Hartley APRN CNP        Apply topically 5 times daily   Quantity:  5 g   Refills:  3       triamcinolone 0.5 % cream   Commonly known as:  KENALOG   Used for:  Rash and nonspecific skin eruption   Started by:  Sonja Hartley APRN CNP        Apply sparingly to affected area three times daily.   Quantity:  30 g   Refills:  0            Where to get your medicines      These medications were sent to [id number] - [place], [place] - [address] [place] [address]  [address] [place] [address]    Hours:  M-F 8:30-6:30; Sat 9-4; closed Sunday Phone:  [phone]     acyclovir 5 % cream    triamcinolone 0.5 % cream                Primary Care Provider Office Phone # Fax #    Tyler Lee [phone]       [address]   [facility] [address]        Thank you!     Thank you for choosing [facility]  for your care. Our goal is always to provide you with excellent care. Hearing back from our patients is one way we can continue to improve our services. Please take a few minutes to complete the written survey that you may receive in the mail after your visit with us. Thank you!             Your Updated Medication List - Protect others around you: Learn how to safely use, store and throw away your  medicines at www.disposemymeds.org.          This list is accurate as of: 4/28/17  7:49 AM.  Always use your most recent med list.                   Brand Name Dispense Instructions for use    acyclovir 5 % cream    ZOVIRAX    5 g    Apply topically 5 times daily       amLODIPine 5 MG tablet    NORVASC    90 tablet    Take 1 tablet (5 mg) by mouth daily       SSD 1 % cream   Generic drug:  silver sulfADIAZINE          traZODone 50 MG tablet    DESYREL    30 tablet    Take 1 tablet (50 mg) by mouth nightly as needed for sleep       triamcinolone 0.5 % cream    KENALOG    30 g    Apply sparingly to affected area three times daily.

## 2017-04-28 NOTE — TELEPHONE ENCOUNTER
I am not aware of any alternative topical treatments.  Can we check with the pharmacy?  She has either herpes simplex or herpes zoster and does not want to take oral medications.     Electronically signed by Sonja Hartley CNP.

## 2017-04-28 NOTE — PROGRESS NOTES
SUBJECTIVE:                                                    Unique Ward is a 76 year old female who presents to clinic today for the following health issues:      Rash      Duration: 1.5-2 weeks    Description  Location: all over, bilaterally  Itching: moderate    Intensity:  moderate    Accompanying signs and symptoms: spreading    History (similar episodes/previous evaluation): sensitive skin, had a fungus 3 yrs ago    Precipitating or alleviating factors:  New exposures:  medication - cream for toe  Recent travel: no      Therapies tried and outcome: hydrocortisone cream -  not effective     She has a rash on her right knee as well as a few other lesions on her right arm and left hand.  Started on the right knee 2 weeks ago.  Getting worse.  It is itchy and mildly painful.      No new exposures, no fevers/chills. She is otherwise feeling well.    No history of chronic skin issues.  She had what sounds like a fungal infection a few years ago, but states this is not similar to that at all.      Problem list and histories reviewed & adjusted, as indicated.  Additional history: none    Patient Active Problem List   Diagnosis     Essential hypertension     Personal history of urinary tract infection     Advanced directives, counseling/discussion     History of basal cell carcinoma- face and scalp     FH: melanoma- son     Dysthymia     Vitamin D deficiency     Fibromyalgia     Past Surgical History:   Procedure Laterality Date     BIOPSY  11/01/2014     COLONOSCOPY  01.01/1979    Two routine,  one for chronic diarrhea     EYE SURGERY  1/1/2014    first    cataracts both eyes.  second a few months later     GENITOURINARY SURGERY       GYN SURGERY  01.01.1977    TL     HEAD & NECK SURGERY  01.01.1997    basal cell X2   Moh's on scalp.  other on bridge of nose     SOFT TISSUE SURGERY  01.01.1990    Ganglion Cyst   Left inner wrist       Social History   Substance Use Topics     Smoking status: Former Smoker      Packs/day: 0.75     Years: 10.00     Types: Cigarettes     Start date: 1/1/1955     Quit date: 1/1/1965     Smokeless tobacco: Never Used     Alcohol use No     Family History   Problem Relation Age of Onset     DIABETES Maternal Grandmother      Coronary Artery Disease Sister      error     Coronary Artery Disease Brother      error     Hypertension Brother      Hyperlipidemia Brother      CEREBROVASCULAR DISEASE Brother      Hypertension Sister      Hypertension Sister      Hyperlipidemia Sister      Other Cancer Sister      cervical     Breast Cancer Sister      Other Cancer Sister      kidney     MENTAL ILLNESS Mother      paranoid/schizophrenic/suicide     MENTAL ILLNESS Sister      ???  Had shock treatments         Current Outpatient Prescriptions   Medication Sig Dispense Refill     SSD 1 % cream        traZODone (DESYREL) 50 MG tablet Take 1 tablet (50 mg) by mouth nightly as needed for sleep 30 tablet 1     amLODIPine (NORVASC) 5 MG tablet Take 1 tablet (5 mg) by mouth daily 90 tablet 3     Allergies   Allergen Reactions     No Clinical Screening - See Comments      Eye drop antibiotic.     Codeine      Gentamicin Hives     Per Unique this medication gives hives on arms and legs. Bev Lo LSXO,  ....................  7/15/2014   1:15 PM     Influenza Virus Vaccine H5n1      Paxil [Paroxetine]      Typhoid Vaccine-Strain Ty21a Other (See Comments)     Comment: , Description:      Typhoid Vaccines      BP Readings from Last 3 Encounters:   04/28/17 136/70   02/28/17 138/82   02/17/17 130/66    Wt Readings from Last 3 Encounters:   04/28/17 137 lb (62.1 kg)   02/28/17 138 lb 3.2 oz (62.7 kg)   02/17/17 138 lb (62.6 kg)                    Reviewed and updated as needed this visit by clinical staff  Tobacco  Allergies  Meds  Med Hx  Surg Hx  Fam Hx  Soc Hx      Reviewed and updated as needed this visit by Provider         ROS:  C: NEGATIVE for fever, chills, change in weight  INTEGUMENTARY/SKIN:  as above   E/M: NEGATIVE for ear, mouth and throat problems  R: NEGATIVE for significant cough or SOB  CV: NEGATIVE for chest pain, palpitations or peripheral edema    OBJECTIVE:                                                    /70  Pulse 70  Temp 96.4  F (35.8  C) (Tympanic)  Resp 20  Wt 137 lb (62.1 kg)  Breastfeeding? No  BMI 21.46 kg/m2  Body mass index is 21.46 kg/(m^2).  GENERAL: healthy, alert and no distress  NECK: no adenopathy, no asymmetry, masses, or scars and thyroid normal to palpation  RESP: lungs clear to auscultation - no rales, rhonchi or wheezes  CV: regular rate and rhythm, normal S1 S2, no S3 or S4, no murmur, click or rub, no peripheral edema and peripheral pulses strong  MS: no gross musculoskeletal defects noted, no edema  SKIN: she has a linear, vesicular rash on her right knee.  No drainage.  She has one small, raised lesion on her left hand. It is not vesicular and looks like dry skin. She has one raised, red papule on her right arm. It is not vesicular.     Diagnostic Test Results:  none      ASSESSMENT/PLAN:                                                        1. Rash and nonspecific skin eruption  I am not certain the etiology of this, but given how the rash looks, it is likely herpes zoster or herpes simplex.  She has lesions on both arms well which make zoster much less likely, but I am not certain these lesions are the same thing as they are very small and don't look similar.  She does not want to take oral therapy as she has reactions to many medications.  Will prescribe topical acyclovir in the even that this is HSV and a topical steroid.  If it is herpes zoster, it should resolve on it's own, and she declines any oral medications today.   - acyclovir (ZOVIRAX) 5 % cream; Apply topically 5 times daily  Dispense: 5 g; Refill: 3  - triamcinolone (KENALOG) 0.5 % cream; Apply sparingly to affected area three times daily.  Dispense: 30 g; Refill: 0    2. HSV (herpes  simplex virus) infection  As above   - acyclovir (ZOVIRAX) 5 % cream; Apply topically 5 times daily  Dispense: 5 g; Refill: 3    FUTURE APPOINTMENTS:       - Follow up if symptoms fail to resolve as expected.   See Patient Instructions    MAYRA Peterson [facility]

## 2017-04-28 NOTE — TELEPHONE ENCOUNTER
PA submitted and DENIED.  Patient will need new Rx.  Pharmacy notified, information scanned.    Nicole Landry XRO/

## 2017-05-01 NOTE — TELEPHONE ENCOUNTER
Capsaisin could be used for pain for shingles but it wouldn't shorten the length of time for the rash itself.  No other meds other than oral treatment for herpes simplex.  ................Ronnie Song LPN,   May 1, 2017,      12:02 PM,   [facility]

## 2017-05-01 NOTE — TELEPHONE ENCOUNTER
Please call patient and advise her no topical medication is covered by insurance.  I would recommend oral Valcyclovir.  Please see if she would like to try that.     Electronically signed by Sonja Hartley CNP.

## 2017-05-03 ENCOUNTER — MYC MEDICAL ADVICE (OUTPATIENT)
Dept: FAMILY MEDICINE | Facility: OTHER | Age: 77
End: 2017-05-03

## 2017-05-03 NOTE — TELEPHONE ENCOUNTER
Left msg informing pt of providers advice on taking an oral med. Stated for pt to either call us or send a msg via [facility].  ................Ronnie Song LPN,   May 3, 2017,      11:58 AM,   [facility]

## 2017-05-04 ENCOUNTER — DOCUMENTATION ONLY (OUTPATIENT)
Dept: OTHER | Facility: CLINIC | Age: 77
End: 2017-05-04

## 2017-05-04 DIAGNOSIS — Z71.89 ADVANCED DIRECTIVES, COUNSELING/DISCUSSION: Chronic | ICD-10-CM

## 2017-05-31 ENCOUNTER — OFFICE VISIT (OUTPATIENT)
Dept: FAMILY MEDICINE | Facility: OTHER | Age: 77
End: 2017-05-31
Payer: COMMERCIAL

## 2017-05-31 VITALS
OXYGEN SATURATION: 94 % | RESPIRATION RATE: 16 BRPM | DIASTOLIC BLOOD PRESSURE: 64 MMHG | TEMPERATURE: 97 F | BODY MASS INDEX: 21.25 KG/M2 | HEART RATE: 72 BPM | SYSTOLIC BLOOD PRESSURE: 132 MMHG | WEIGHT: 135.7 LBS

## 2017-05-31 DIAGNOSIS — M79.7 FIBROMYALGIA: ICD-10-CM

## 2017-05-31 DIAGNOSIS — N30.01 ACUTE CYSTITIS WITH HEMATURIA: ICD-10-CM

## 2017-05-31 DIAGNOSIS — M54.17 LEFT LUMBOSACRAL RADICULOPATHY: ICD-10-CM

## 2017-05-31 DIAGNOSIS — R30.0 DYSURIA: Primary | ICD-10-CM

## 2017-05-31 LAB
ALBUMIN UR-MCNC: ABNORMAL MG/DL
APPEARANCE UR: ABNORMAL
BACTERIA #/AREA URNS HPF: ABNORMAL /HPF
BILIRUB UR QL STRIP: NEGATIVE
COLOR UR AUTO: YELLOW
GLUCOSE UR STRIP-MCNC: NEGATIVE MG/DL
HGB UR QL STRIP: ABNORMAL
KETONES UR STRIP-MCNC: NEGATIVE MG/DL
LEUKOCYTE ESTERASE UR QL STRIP: ABNORMAL
NITRATE UR QL: NEGATIVE
NON-SQ EPI CELLS #/AREA URNS LPF: ABNORMAL /LPF
PH UR STRIP: 6.5 PH (ref 5–7)
RBC #/AREA URNS AUTO: ABNORMAL /HPF (ref 0–2)
SP GR UR STRIP: 1.01 (ref 1–1.03)
URN SPEC COLLECT METH UR: ABNORMAL
UROBILINOGEN UR STRIP-ACNC: 0.2 EU/DL (ref 0.2–1)
WBC #/AREA URNS AUTO: >100 /HPF (ref 0–2)

## 2017-05-31 PROCEDURE — 87088 URINE BACTERIA CULTURE: CPT | Performed by: PHYSICIAN ASSISTANT

## 2017-05-31 PROCEDURE — 99213 OFFICE O/P EST LOW 20 MIN: CPT | Performed by: PHYSICIAN ASSISTANT

## 2017-05-31 PROCEDURE — 81001 URINALYSIS AUTO W/SCOPE: CPT | Performed by: PHYSICIAN ASSISTANT

## 2017-05-31 PROCEDURE — 87186 SC STD MICRODIL/AGAR DIL: CPT | Performed by: PHYSICIAN ASSISTANT

## 2017-05-31 PROCEDURE — 87086 URINE CULTURE/COLONY COUNT: CPT | Performed by: PHYSICIAN ASSISTANT

## 2017-05-31 RX ORDER — SULFAMETHOXAZOLE/TRIMETHOPRIM 800-160 MG
1 TABLET ORAL 2 TIMES DAILY
Qty: 20 TABLET | Refills: 0 | Status: SHIPPED | OUTPATIENT
Start: 2017-05-31 | End: 2017-06-29

## 2017-05-31 ASSESSMENT — PAIN SCALES - GENERAL: PAINLEVEL: NO PAIN (0)

## 2017-05-31 NOTE — NURSING NOTE
"Chief Complaint   Patient presents with     UTI     burning with urination, started last night       Initial /64 (BP Location: Left arm, Patient Position: Chair, Cuff Size: Adult Regular)  Pulse 72  Temp 97  F (36.1  C) (Oral)  Resp 16  Wt 135 lb 11.2 oz (61.6 kg)  SpO2 94%  BMI 21.25 kg/m2 Estimated body mass index is 21.25 kg/(m^2) as calculated from the following:    Height as of 2/17/17: 5' 7\" (1.702 m).    Weight as of this encounter: 135 lb 11.2 oz (61.6 kg).  Medication Reconciliation: complete       Prabha LUCAS LPN      "

## 2017-05-31 NOTE — MR AVS SNAPSHOT
"              After Visit Summary   5/31/2017    Unique Ward    MRN: [id number]           Patient Information     Date Of Birth          1940        Visit Information        Provider Department      5/31/2017 9:40 AM Alan Mcintosh PA-C [facility]        Today's Diagnoses     Dysuria    -  1    Acute cystitis with hematuria        Left lumbosacral radiculopathy        Fibromyalgia           Follow-ups after your visit        Additional Services     PHYSICAL THERAPY REFERRAL       *This therapy referral will be filtered to a centralized scheduling office at [facility] and the patient will receive a call to schedule an appointment at a [place] location most convenient for them. *   [phone]  [facility] provides Physical Therapy evaluation and treatment and many specialty services across the [place] system.  If requesting a specialty program, please choose from the list below.    If you have not heard from the scheduling office within 2 business days, please call [phone] for all locations, with the exception of [place], please call [phone].  Treatment: Evaluation & Treatment  Special Instructions/Modalities: lumbar back pain left radicular  Special Programs: as needed    Please be aware that coverage of these services is subject to the terms and limitations of your health insurance plan.  Call member services at your health plan with any benefit or coverage questions.      **Note to Provider:  If you are referring outside of [place] for the therapy appointment, please list the name of the location in the \"special instructions\" above, print the referral and give to the patient to schedule the appointment.            RHEUMATOLOGY REFERRAL       Your provider has referred you to: FMG: [facility] [phone]   [email or web address]/  FMG: [facility] [phone]   [email or web address]/  UMP: [facility] [phone]   [email or web address]/[email or web address]/[facility]/  [facility] [place] [phone]   [email or web address]    Please be aware that coverage of these services is subject to the terms and limitations of your health insurance plan.  Call member services at your health plan with any benefit or coverage questions.      Please bring the following with you to your appointment:    (1) Any X-Rays, CTs or MRIs which have been performed.  Contact the facility where they were done to arrange for  prior to your scheduled appointment.    (2) List of current medications   (3) This referral request   (4) Any documents/labs given to you for this referral                  Your next 10 appointments already scheduled     Jun 06, 2017 10:20 AM CDT   [facility]    [address] [place] [id number] [phone]           Bring a current list of meds and any records pertaining to this visit.  For Physicals, please bring immunization records and any forms needing to be filled out.  Please arrive 10 minutes early to complete paperwork.              Who to contact     If you have questions or need follow up information about today's clinic visit or your schedule please contact [facility] directly at [phone].  Normal or non-critical lab and imaging results will be communicated to you by MyChart, letter or phone within 4 business days after the clinic has received the results. If you do not hear from us within 7 days, please contact the clinic through MyChart or phone. If you have a critical or abnormal lab result, we will notify you by phone as soon as possible.  Submit refill requests through [place] or call your pharmacy and they will  forward the refill request to us. Please allow 3 business days for your refill to be completed.          Additional Information About Your Visit        [employer] Information     [employer] gives you secure access to your electronic health record. If you see a primary care provider, you can also send messages to your care team and make appointments. If you have questions, please call your primary care clinic.  If you do not have a primary care provider, please call [phone] and they will assist you.        Care EveryWhere ID     This is your Care EveryWhere ID. This could be used by other organizations to access your [facility] medical records  [id number]        Your Vitals Were     Pulse Temperature Respirations Pulse Oximetry BMI (Body Mass Index)       72 97  F (36.1  C) (Oral) 16 94% 21.25 kg/m2        Blood Pressure from Last 3 Encounters:   05/31/17 132/64   04/28/17 136/70   02/28/17 138/82    Weight from Last 3 Encounters:   05/31/17 135 lb 11.2 oz (61.6 kg)   04/28/17 137 lb (62.1 kg)   02/28/17 138 lb 3.2 oz (62.7 kg)              We Performed the Following     *UA reflex to Microscopic and Culture ([place] [facility] (except [place] and [place])     PHYSICAL THERAPY REFERRAL     RHEUMATOLOGY REFERRAL     Urine Culture Aerobic Bacterial     Urine Microscopic          Today's Medication Changes          These changes are accurate as of: 5/31/17 10:11 AM.  If you have any questions, ask your nurse or doctor.               Start taking these medicines.        Dose/Directions    sulfamethoxazole-trimethoprim 800-160 MG per tablet   Commonly known as:  BACTRIM DS/SEPTRA DS   Used for:  Dysuria, Acute cystitis with hematuria   Started by:  Alan Mcintosh PA-C        Dose:  1 tablet   Take 1 tablet by mouth 2 times daily   Quantity:  20 tablet   Refills:  0            Where to get your medicines      These medications were sent to [id number] - [place], [place] - [address] [place] [address],  [place] [address]    Hours:  M-F 8:30-6:30; Sat 9-4; closed Sunday Phone:  [phone]     sulfamethoxazole-trimethoprim 800-160 MG per tablet                Primary Care Provider Office Phone # Fax #    Tyler Lee [phone]       [address] [place] [address]        Thank you!     Thank you for choosing [facility]  for your care. Our goal is always to provide you with excellent care. Hearing back from our patients is one way we can continue to improve our services. Please take a few minutes to complete the written survey that you may receive in the mail after your visit with us. Thank you!             Your Updated Medication List - Protect others around you: Learn how to safely use, store and throw away your medicines at www.disposemymeds.org.          This list is accurate as of: 5/31/17 10:11 AM.  Always use your most recent med list.                   Brand Name Dispense Instructions for use    amLODIPine 5 MG tablet    NORVASC    90 tablet    Take 1 tablet (5 mg) by mouth daily       sulfamethoxazole-trimethoprim 800-160 MG per tablet    BACTRIM DS/SEPTRA DS    20 tablet    Take 1 tablet by mouth 2 times daily       traZODone 50 MG tablet    DESYREL    30 tablet    Take 1 tablet (50 mg) by mouth nightly as needed for sleep

## 2017-05-31 NOTE — PROGRESS NOTES
SUBJECTIVE:                                                    Unique Ward is a 76 year old female who presents to clinic today for the following health issues:      URINARY TRACT SYMPTOMS     Onset: started last night    Description:   Painful urination (Dysuria): no   Blood in urine (Hematuria): no   Delay in urine (Hesitency): no     Intensity: mild, moderate    Progression of Symptoms:  waxing and waning    Accompanying Signs & Symptoms:  Fever/chills: no   Flank pain no   Nausea and vomiting: no   Any vaginal symptoms: none  Abdominal/Pelvic Pain: no    History:   History of frequent UTI's: YES  History of kidney stones: no   Sexually Active: no   Possibility of pregnancy: No    Precipitating factors:   UNK         Therapies Tried and outcome: Increase fluid intake    Problem list and histories reviewed & adjusted, as indicated.  Additional history: as documented    Patient Active Problem List   Diagnosis     Essential hypertension     Personal history of urinary tract infection     Advance Care Planning     History of basal cell carcinoma- face and scalp     FH: melanoma- son     Dysthymia     Vitamin D deficiency     Fibromyalgia     Left lumbosacral radiculopathy     Acute cystitis with hematuria     Past Surgical History:   Procedure Laterality Date     BIOPSY  11/01/2014     COLONOSCOPY  01.01/1979    Two routine,  one for chronic diarrhea     EYE SURGERY  1/1/2014    first    cataracts both eyes.  second a few months later     GENITOURINARY SURGERY       GYN SURGERY  01.01.1977    TL     HEAD & NECK SURGERY  01.01.1997    basal cell X2   Moh's on scalp.  other on bridge of nose     SOFT TISSUE SURGERY  01.01.1990    Ganglion Cyst   Left inner wrist       Social History   Substance Use Topics     Smoking status: Former Smoker     Packs/day: 0.75     Years: 10.00     Types: Cigarettes     Start date: 1/1/1955     Quit date: 1/1/1965     Smokeless tobacco: Never Used     Alcohol use No     Family  History   Problem Relation Age of Onset     DIABETES Maternal Grandmother      Coronary Artery Disease Sister      error     Coronary Artery Disease Brother      error     Hypertension Brother      Hyperlipidemia Brother      CEREBROVASCULAR DISEASE Brother      Hypertension Sister      Hypertension Sister      Hyperlipidemia Sister      Other Cancer Sister      cervical     Breast Cancer Sister      Other Cancer Sister      kidney     MENTAL ILLNESS Mother      paranoid/schizophrenic/suicide     MENTAL ILLNESS Sister      ???  Had shock treatments           Reviewed and updated as needed this visit by clinical staff  Tobacco  Allergies  Med Hx  Surg Hx  Fam Hx  Soc Hx      Reviewed and updated as needed this visit by Provider         ROS:  Constitutional, HEENT, cardiovascular, pulmonary, gi and gu systems are negative, except as otherwise noted.    OBJECTIVE:                                                    /64 (BP Location: Left arm, Patient Position: Chair, Cuff Size: Adult Regular)  Pulse 72  Temp 97  F (36.1  C) (Oral)  Resp 16  Wt 135 lb 11.2 oz (61.6 kg)  SpO2 94%  BMI 21.25 kg/m2  Body mass index is 21.25 kg/(m^2).  GENERAL: healthy, alert and no distress  RESP: lungs clear to auscultation - no rales, rhonchi or wheezes  CV: regular rate and rhythm, normal S1 S2, no S3 or S4, no murmur, click or rub, no peripheral edema and peripheral pulses strong  ABDOMEN: soft, nontender, no hepatosplenomegaly, no masses and bowel sounds normal  MS: no gross musculoskeletal defects noted, no edema  NEURO: Normal strength and tone, mentation intact and speech normal  PSYCH: mentation appears normal, affect normal/bright    Diagnostic Test Results:  Results for orders placed or performed in visit on 05/31/17 (from the past 24 hour(s))   *UA reflex to Microscopic and Culture ([facility] (except [place] and [place])   Result Value Ref Range    Color Urine Yellow     Appearance Urine  Cloudy     Glucose Urine Negative NEG mg/dL    Bilirubin Urine Negative NEG    Ketones Urine Negative NEG mg/dL    Specific Gravity Urine 1.015 1.003 - 1.035    Blood Urine Small (A) NEG    pH Urine 6.5 5.0 - 7.0 pH    Protein Albumin Urine Trace (A) NEG mg/dL    Urobilinogen Urine 0.2 0.2 - 1.0 EU/dL    Nitrite Urine Negative NEG    Leukocyte Esterase Urine Large (A) NEG    Source Unspecified Urine    Urine Microscopic   Result Value Ref Range    WBC Urine >100 (A) 0 - 2 /HPF    RBC Urine 10-25 (A) 0 - 2 /HPF    Squamous Epithelial /LPF Urine Many (A) FEW /LPF    Bacteria Urine Many (A) NEG /HPF        ASSESSMENT/PLAN:                                                    1. Dysuria  2. Acute cystitis with hematuria  Noted and treat as directed - ROV PRN  May need to see urology  - *UA reflex to Microscopic and Culture ([place] and [facility] (except [place] and [place])  - Urine Culture Aerobic Bacterial  - Urine Microscopic  - sulfamethoxazole-trimethoprim (BACTRIM DS/SEPTRA DS) 800-160 MG per tablet; Take 1 tablet by mouth 2 times daily  Dispense: 20 tablet; Refill: 0    3. Left lumbosacral radiculopathy  Reported by history - referral granted  - PHYSICAL THERAPY REFERRAL  - RHEUMATOLOGY REFERRAL    4. Fibromyalgia  Reported historical, referral granted.  - RHEUMATOLOGY REFERRAL    Alan Parsons PA-C  [facility]

## 2017-06-02 LAB
BACTERIA SPEC CULT: ABNORMAL
MICRO REPORT STATUS: ABNORMAL
MICROORGANISM SPEC CULT: ABNORMAL
SPECIMEN SOURCE: ABNORMAL

## 2017-06-06 ENCOUNTER — OFFICE VISIT (OUTPATIENT)
Dept: FAMILY MEDICINE | Facility: CLINIC | Age: 77
End: 2017-06-06
Payer: COMMERCIAL

## 2017-06-06 VITALS
DIASTOLIC BLOOD PRESSURE: 76 MMHG | HEART RATE: 66 BPM | SYSTOLIC BLOOD PRESSURE: 128 MMHG | BODY MASS INDEX: 20.94 KG/M2 | WEIGHT: 133.7 LBS | OXYGEN SATURATION: 99 % | RESPIRATION RATE: 20 BRPM | TEMPERATURE: 98 F

## 2017-06-06 DIAGNOSIS — I10 ESSENTIAL HYPERTENSION: ICD-10-CM

## 2017-06-06 DIAGNOSIS — G47.19 EXCESSIVE DAYTIME SLEEPINESS: ICD-10-CM

## 2017-06-06 DIAGNOSIS — G47.00 INSOMNIA, UNSPECIFIED TYPE: Primary | ICD-10-CM

## 2017-06-06 PROCEDURE — 99213 OFFICE O/P EST LOW 20 MIN: CPT | Performed by: FAMILY MEDICINE

## 2017-06-06 NOTE — MR AVS SNAPSHOT
After Visit Summary   6/6/2017    Unique Ward    MRN: [id number]           Patient Information     Date Of Birth          1940        Visit Information        Provider Department      6/6/2017 10:20 AM Tyler Lee MD [facility]        Today's Diagnoses     Insomnia, unspecified type    -  1    Excessive daytime sleepiness        Essential hypertension           Follow-ups after your visit        Additional Services     SLEEP EVALUATION & MANAGEMENT REFERRAL - ADULT       Please be aware that coverage of these services is subject to the terms and limitations of your health insurance plan.  Call member services at your health plan with any benefit or coverage questions.      Please bring the following to your appointment:    >>   List of current medications   >>   This referral request   >>   Any documents/labs given to you for this referral    [facility]   [phone]  (Age 13 if over 100 lbs and up)                  Your next 10 appointments already scheduled     Jun 28, 2017 11:30 AM CDT   [facility]tho Treatment with Christy Lyon, PT   [facility]    [address] [facility] [id number] [phone]            Jun 30, 2017  8:30 AM CDT   New Sleep Patient with Louie Connors PA-C   [facility] ([facility])    [address] [facility] [id number] [phone]            Jul 05, 2017 11:30 AM CDT   Ortho Treatment with Christy Lyon, PT   [facility]    [address] [facility] [id number] [phone]              Who to contact     If you have questions or need follow up information about today's clinic visit or your schedule please contact [facility] directly at [phone].  Normal or non-critical lab and imaging results will be communicated to you by MyChart, letter or phone within 4 business  days after the clinic has received the results. If you do not hear from us within 7 days, please contact the clinic through Musicshake or phone. If you have a critical or abnormal lab result, we will notify you by phone as soon as possible.  Submit refill requests through Mus[employer]hake or call your pharmacy and they will forward the refill request to us. Please allow 3 business days for your refill to be completed.          Additional Information About Your Visit        YouFetchharcFares Information     Mus[employer] gives you secure access to your electronic health record. If you see a primary care provider, you can also send messages to your care team and make appointments. If you have questions, please call your primary care clinic.  If you do not have a primary care provider, please call [phone] and they will assist you.        Care EveryWhere ID     This is your Care EveryWhere ID. This could be used by other organizations to access your [facility] medical records  [id number]        Your Vitals Were     Pulse Temperature Respirations Pulse Oximetry BMI (Body Mass Index)       66 98  F (36.7  C) (Temporal) 20 99% 20.94 kg/m2        Blood Pressure from Last 3 Encounters:   06/06/17 128/76   05/31/17 132/64   04/28/17 136/70    Weight from Last 3 Encounters:   06/06/17 133 lb 11.2 oz (60.6 kg)   05/31/17 135 lb 11.2 oz (61.6 kg)   04/28/17 137 lb (62.1 kg)               Primary Care Provider Office Phone # Fax #    Kjyo Gopi Lee [phone]       [address]   [facility] [address]        Equal Access to Services     [facility]: Hadii aad ku hadasho Soomaali, waaxda luqadaha, qaybta kaalmada adeegyaisaac, praveen daley. So [facility] [phone].    ATENCIÓN: Si habla español, tiene a reynoso disposición servicios gratuitos de asistencia lingüística. Llame al [phone].    We comply with applicable federal civil rights laws and [place] laws. We do not  discriminate on the basis of race, color, national origin, age, disability sex, sexual orientation or gender identity.            Thank you!     Thank you for choosing [facility]  for your care. Our goal is always to provide you with excellent care. Hearing back from our patients is one way we can continue to improve our services. Please take a few minutes to complete the written survey that you may receive in the mail after your visit with us. Thank you!             Your Updated Medication List - Protect others around you: Learn how to safely use, store and throw away your medicines at www.disposemymeds.org.          This list is accurate as of: 6/6/17 11:59 PM.  Always use your most recent med list.                   Brand Name Dispense Instructions for use Diagnosis    amLODIPine 5 MG tablet    NORVASC    90 tablet    Take 1 tablet (5 mg) by mouth daily    Essential hypertension       sulfamethoxazole-trimethoprim 800-160 MG per tablet    BACTRIM DS/SEPTRA DS    20 tablet    Take 1 tablet by mouth 2 times daily    Dysuria, Acute cystitis with hematuria       traZODone 50 MG tablet    DESYREL    30 tablet    Take 1 tablet (50 mg) by mouth nightly as needed for sleep    Primary insomnia

## 2017-06-06 NOTE — PROGRESS NOTES
SUBJECTIVE:                                                    Unique Ward is a 76 year old female who presents to clinic today for the following health issues:    Chief Complaint   Patient presents with     Hypertension     recheck        Hypertension Follow-up      Outpatient blood pressures are being checked at home.  Results are 130/70's.    Low Salt Diet: low salt       Amount of exercise or physical activity: 2-3 days/week for an average of 30-45 minutes    Problems taking medications regularly: No    Medication side effects: none    Diet: low salt    Returns discussed hypertension. Historically well controlled. Blood pressures at home look good. She also has long-standing insomnia. She has great difficulty falling and staying asleep. This has been managed with trazodone historically, but that is not working well anymore. No apnea. A lot of daytime fatigue. No known snoring.    ROS:  Constitutional, HEENT, cardiovascular, pulmonary, gi and gu systems are negative, except as otherwise noted.    OBJECTIVE:                                                    /76 (BP Location: Left arm, Patient Position: Chair, Cuff Size: Adult Regular)  Pulse 66  Temp 98  F (36.7  C) (Temporal)  Resp 20  Wt 133 lb 11.2 oz (60.6 kg)  SpO2 99%  BMI 20.94 kg/m2  Body mass index is 20.94 kg/(m^2).    GENERAL: healthy, alert and no distress  NECK: no adenopathy, no asymmetry, masses, or scars and thyroid normal to palpation  RESP: lungs clear to auscultation - no rales, rhonchi or wheezes  CV: regular rate and rhythm, normal S1 S2, no S3 or S4, no murmur, click or rub, no peripheral edema and peripheral pulses strong  ABDOMEN: soft, nontender, no hepatosplenomegaly, no masses and bowel sounds normal  MS: no gross musculoskeletal defects noted, no edema    Diagnostic Test Results:  none      ASSESSMENT/PLAN:                                                        ICD-10-CM    1. Insomnia, unspecified type G47.00 SLEEP  EVALUATION & MANAGEMENT REFERRAL - ADULT   2. Excessive daytime sleepiness G47.19 SLEEP EVALUATION & MANAGEMENT REFERRAL - ADULT   3. Essential hypertension I10        Stable hypertension. Continue same medications. Not due for labs yet. Return for recheck in 4-5 months. We'll send sleep referral for management of insomnia. She was urged to strongly consider counseling, instead of medications.    Tyler Lee MD  [facility]

## 2017-06-06 NOTE — NURSING NOTE
"Chief Complaint   Patient presents with     Hypertension     recheck       Initial /76 (BP Location: Left arm, Patient Position: Chair, Cuff Size: Adult Regular)  Pulse 66  Temp 98  F (36.7  C) (Temporal)  Resp 20  Wt 133 lb 11.2 oz (60.6 kg)  SpO2 99%  BMI 20.94 kg/m2 Estimated body mass index is 20.94 kg/(m^2) as calculated from the following:    Height as of 2/17/17: 5' 7\" (1.702 m).    Weight as of this encounter: 133 lb 11.2 oz (60.6 kg).  Medication Reconciliation: complete   Paulina Vasques CMA     "

## 2017-06-07 ENCOUNTER — HOSPITAL ENCOUNTER (OUTPATIENT)
Dept: PHYSICAL THERAPY | Facility: OTHER | Age: 77
Setting detail: THERAPIES SERIES
End: 2017-06-07
Attending: PHYSICIAN ASSISTANT
Payer: MEDICARE

## 2017-06-07 PROCEDURE — 40000718 ZZHC STATISTIC PT DEPARTMENT ORTHO VISIT: Performed by: PHYSICAL THERAPIST

## 2017-06-07 PROCEDURE — 97161 PT EVAL LOW COMPLEX 20 MIN: CPT | Mod: GP | Performed by: PHYSICAL THERAPIST

## 2017-06-07 PROCEDURE — G8978 MOBILITY CURRENT STATUS: HCPCS | Mod: GP,CI | Performed by: PHYSICAL THERAPIST

## 2017-06-07 PROCEDURE — 97530 THERAPEUTIC ACTIVITIES: CPT | Mod: GP | Performed by: PHYSICAL THERAPIST

## 2017-06-07 PROCEDURE — G8979 MOBILITY GOAL STATUS: HCPCS | Mod: GP,CH | Performed by: PHYSICAL THERAPIST

## 2017-06-07 NOTE — PROGRESS NOTES
06/07/17 1430   General Information   Type of Visit Initial OP Ortho PT Evaluation   Start of Care Date 06/07/17   Referring Physician Alan Mcintosh PA-C   Patient/Family Goals Statement Wants to get the ROM back in the L LE and not have the tightness, be able to garden without pain as much as possible.   Orders Evaluate and Treat   Date of Order 05/31/17   Insurance Type Medicare;[employer] Cross   Medical Diagnosis Left lumbosacral radiculopathy   Precautions/Limitations no known precautions/limitations   Weight-Bearing Status - LUE full weight-bearing   Weight-Bearing Status - RUE full weight-bearing   Weight-Bearing Status - LLE full weight-bearing   Weight-Bearing Status - RLE full weight-bearing       Present No   Body Part(s)   Body Part(s) Lumbar Spine/SI   Presentation and Etiology   Pertinent history of current problem (include personal factors and/or comorbidities that impact the POC) About 4-5 years ago she was diagnosed with a pinched nerve in the low back and was doing therapy which really helped.  However she has not been good with compliancy of HEP since and noticing increase in back issues.  Having increased symptoms with carrying heavy objects, bending over, stairs, waking up in the morning she has stiffness, and standing for periods of time.  Having radicular symptoms on the L side lateral leg down to the foot.     Impairments A. Pain   Functional Limitations perform activities of daily living   Symptom Location L side of back down lateral aspect of the L LE.   How/Where did it occur From insidious onset   Onset date of current episode/exacerbation 02/07/17  (Gradually the past year)   Chronicity Recurrent   Pain rating (0-10 point scale) Best (/10);Worst (/10)   Best (/10) 0/10   Worst (/10) 3-4/10   Pain quality C. Aching   Frequency of pain/symptoms C. With activity   Pain/symptoms are: Worse in the morning   Pain/symptoms exacerbated by B. Walking;C. Lifting;D. Carrying;K.  Home tasks;I. Bending   Pain/symptoms eased by K. Other   Pain eased by comment MSM, ice   Progression of symptoms since onset: Unchanged   Prior Level of Function   Prior Level of Function-Mobility Independent   Prior Level of Function-ADLs Independent   Current Level of Function   Current Community Support Family/friend caregiver   Patient role/employment history F. Retired   [facility]   Home/community accessibility 4 steps from ground to the door with a 3 patio variation, 22 steps to basement    Current equipment-Gait/Locomotion None   Current equipment-ADL None   Fall Risk Screen   Fall screen completed by PT   Per patient - Fall 2 or more times in past year? No   Per patient - Fall with injury in past year? Yes   Is patient a fall risk? No   Fall screen comments Fell wall pushing wheelbarrow on hill and it tipped on her.   System Outcome Measures   Outcome Measures Low Back Pain (see [facility] and [place])   Lumbar Spine/SI Objective Findings   Observation No acute distress   Posture Slight scoliosis S curve to thoracic lumbar spine.  Forward head.   Gait/Locomotion Normal   Balance/Proprioception (Single Leg Stance) Challenged standing on R LE.   Flexion ROM WNL   Extension ROM WNL, pain at end range   Right Side Bending ROM WNL   Left Side Bending ROM WNL   Repeated Extension-Standing ROM Negative   Repeated Flexion-Standing ROM Negative   Hip Screen WNL   Transversus Abdominus Strength (Feliciano Leg Lowering-deg) Slight ability to engage.   Hip Flexion (L2) Strength R 5/5, L 4+/5   Hip Abduction Strength R 5/5, L 4+/5   Hip Extension Strength 4/5 B   Knee Flexion Strength 5/5 B   Knee Extension (L3) Strength 5/5 B   Ankle Dorsiflexion (L4) Strength 5/5 B   Hamstring Flexibility Tightness L > R   Hip Flexor Flexibility Tightness L > R   Piriformis Flexibility Tightness L > R   SLR Negative   Alberto Test Negative   Crossover SLR Negative   Repeated Extension Prone Negative   Slump Test  Negative   Segmental Mobility Decreased PA mobility with lumbar spine.  Decreased sacral flexion and extension.   Palpation Increased rightness to L thoracic and lumbar musculature.  Increased QL and Lats dorsi musculature on L.   Planned Therapy Interventions   Planned Therapy Interventions balance training;joint mobilization;manual therapy;neuromuscular re-education;ROM;strengthening;stretching   Planned Therapy Interventions Comment Skilled serivces to improve overall strength and mobility.   Clinical Impression   Criteria for Skilled Therapeutic Interventions Met yes, treatment indicated   PT Diagnosis Low back and L LE pain secondary to weakness of LE, compensation of R LE, and tightness in back slightly due to scoliotic changes.   Influenced by the following impairments Pain, weakness   Functional limitations due to impairments Gardening, walking   Clinical Presentation Stable/Uncomplicated   Clinical Presentation Rationale Pt is a 76 year old female with complaints of back and L LE pain gradually coming on over the year.  Pt states that she is noticing more difficulty with gardening, walking for longer distances, and doing yardwork.  Pt overall healthy lady minus some arthritis.  Pt will benefit from skilled PT services to improve overall strength, mobility, and stability in to return to PLOF.   Clinical Decision Making (Complexity) Low complexity   Therapy Frequency 1 time/week   Predicted Duration of Therapy Intervention (days/wks) 6 weeks   Risk & Benefits of therapy have been explained Yes   Patient, Family & other staff in agreement with plan of care Yes   Education Assessment   Preferred Learning Style Listening;Reading;Demonstration;Pictures/video   Barriers to Learning No barriers   ORTHO GOALS   PT Ortho Eval Goals 1;2;3   Ortho Goal 1   Goal Identifier #1   Goal Description Pt will be able to return to gardening with no pain in the low back using proper body mechanics for 3 days out of the week.    Target Date 07/22/17   Ortho Goal 2   Goal Identifier #2   Goal Description Pt will be able to walk for 1/4 of mile without pain in the low back in order to get onto a more consistent walking program 3 days a week.   Target Date 07/22/17   Ortho Goal 3   Goal Identifier #3   Goal Description Pt will demonstrate compliancy with HEP 5 days a week in order to manage her symptoms independently at home.   Target Date 07/22/17   Total Evaluation Time   Total Evaluation Time 30   Therapy Certification   Certification date from 06/07/17   Certification date to 07/22/17   Medical Diagnosis Left lumbosacral radiculopathy     Thank you for your referral.    Jacquelyn Su, PT, DPT  [facility]  [phone]

## 2017-06-07 NOTE — PROGRESS NOTES
[facility]    OUTPATIENT PHYSICAL THERAPY ORTHOPEDIC EVALUATION  PLAN OF TREATMENT FOR OUTPATIENT REHABILITATION  (COMPLETE FOR INITIAL CLAIMS ONLY)  Patient's Last Name, First Name, M.I.  YOB: 1940  EdUnique  R    Provider s Name:  [facility]   Medical Record No.  [id number]   Start of Care Date:  06/07/17   Onset Date:  02/07/17 (Gradually the past year)   Type:     _X__PT   ___OT   ___SLP Medical Diagnosis:  Left lumbosacral radiculopathy     PT Diagnosis:  Low back and L LE pain secondary to weakness of LE, compensation of R LE, and tightness in back slightly due to scoliotic changes.   Visits from SOC:  1      _________________________________________________________________________________  Plan of Treatment/Functional Goals:  balance training, joint mobilization, manual therapy, neuromuscular re-education, ROM, strengthening, stretching  Skilled serivces to improve overall strength and mobility.        Goals  Goal Identifier: #1  Goal Description: Pt will be able to return to gardening with no pain in the low back using proper body mechanics for 3 days out of the week.  Target Date: 07/22/17    Goal Identifier: #2  Goal Description: Pt will be able to walk for 1/4 of mile without pain in the low back in order to get onto a more consistent walking program 3 days a week.  Target Date: 07/22/17    Goal Identifier: #3  Goal Description: Pt will demonstrate compliancy with HEP 5 days a week in order to manage her symptoms independently at home.  Target Date: 07/22/17    Therapy Frequency:  1 time/week  Predicted Duration of Therapy Intervention:  6 weeks    Jacquelyn Su, PT                 I CERTIFY THE NEED FOR THESE SERVICES FURNISHED UNDER        THIS PLAN OF TREATMENT AND WHILE UNDER MY CARE     (Physician co-signature of this document indicates review and certification of the therapy plan).                       Certification Date From:  06/07/17    Certification Date To:  07/22/17    Referring Provider:  Alan Mcintosh PA-C    Initial Assessment        See Epic Evaluation Start of Care Date: 06/07/17             Thank you for your referral.    Jacquelyn Su, PT, DPT  [facility]  [phone]

## 2017-06-21 ENCOUNTER — HOSPITAL ENCOUNTER (OUTPATIENT)
Dept: PHYSICAL THERAPY | Facility: OTHER | Age: 77
Setting detail: THERAPIES SERIES
End: 2017-06-21
Attending: PHYSICIAN ASSISTANT
Payer: MEDICARE

## 2017-06-21 PROCEDURE — 97110 THERAPEUTIC EXERCISES: CPT | Mod: GP | Performed by: PHYSICAL THERAPIST

## 2017-06-21 PROCEDURE — 40000718 ZZHC STATISTIC PT DEPARTMENT ORTHO VISIT: Performed by: PHYSICAL THERAPIST

## 2017-06-21 NOTE — PROGRESS NOTES
06/21/17 1100   The [facility] STarT Back Screening Tool (  [facility] 01/08/07, Funded by [employer]) Used with permission   1  My back pain has spread down my leg(s) at some time in the last 2 weeks (!) 1   2  I have had pain in the shoulder or neck at some time in the last 2 weeks (!) 1   3  I have only walked short distances because of my back pain 0   4  In the last 2 weeks, I have dressed more slowly than usual because of back pain 0   5  It s not really safe for a person with a condition like mine to be physically active 0   6  Worrying thoughts have been going through my mind a lot of the time 0   7  I feel that my back pain is terrible and it s never going to get any better 0   8  In general I have not enjoyed all the things I used to enjoy 0   9.  Overall, how bothersome has your back pain been in the last 2 weeks? 0.0   The [place] STarT Back Tool Scores    Sub-Score (Q5-9) 0   Total Score (all 9) 2   Risk: LOW

## 2017-06-28 ENCOUNTER — HOSPITAL ENCOUNTER (OUTPATIENT)
Dept: PHYSICAL THERAPY | Facility: OTHER | Age: 77
Setting detail: THERAPIES SERIES
End: 2017-06-28
Attending: PHYSICIAN ASSISTANT
Payer: MEDICARE

## 2017-06-28 PROCEDURE — 97110 THERAPEUTIC EXERCISES: CPT | Mod: GP | Performed by: PHYSICAL THERAPIST

## 2017-06-28 PROCEDURE — 40000718 ZZHC STATISTIC PT DEPARTMENT ORTHO VISIT: Performed by: PHYSICAL THERAPIST

## 2017-06-29 ENCOUNTER — OFFICE VISIT (OUTPATIENT)
Dept: FAMILY MEDICINE | Facility: OTHER | Age: 77
End: 2017-06-29
Payer: COMMERCIAL

## 2017-06-29 VITALS
RESPIRATION RATE: 16 BRPM | HEART RATE: 62 BPM | SYSTOLIC BLOOD PRESSURE: 134 MMHG | DIASTOLIC BLOOD PRESSURE: 70 MMHG | BODY MASS INDEX: 21.61 KG/M2 | WEIGHT: 138 LBS | TEMPERATURE: 97 F

## 2017-06-29 DIAGNOSIS — R82.90 NONSPECIFIC FINDING ON EXAMINATION OF URINE: ICD-10-CM

## 2017-06-29 DIAGNOSIS — R30.0 DYSURIA: ICD-10-CM

## 2017-06-29 DIAGNOSIS — N30.01 ACUTE CYSTITIS WITH HEMATURIA: Primary | ICD-10-CM

## 2017-06-29 LAB
ALBUMIN UR-MCNC: 30 MG/DL
APPEARANCE UR: ABNORMAL
BACTERIA #/AREA URNS HPF: ABNORMAL /HPF
BILIRUB UR QL STRIP: NEGATIVE
COLOR UR AUTO: YELLOW
GLUCOSE UR STRIP-MCNC: NEGATIVE MG/DL
HGB UR QL STRIP: ABNORMAL
KETONES UR STRIP-MCNC: NEGATIVE MG/DL
LEUKOCYTE ESTERASE UR QL STRIP: ABNORMAL
NITRATE UR QL: NEGATIVE
PH UR STRIP: 7 PH (ref 5–7)
RBC #/AREA URNS AUTO: ABNORMAL /HPF (ref 0–2)
SP GR UR STRIP: 1.01 (ref 1–1.03)
URN SPEC COLLECT METH UR: ABNORMAL
UROBILINOGEN UR STRIP-ACNC: 0.2 EU/DL (ref 0.2–1)
WBC #/AREA URNS AUTO: >100 /HPF (ref 0–2)

## 2017-06-29 PROCEDURE — 87086 URINE CULTURE/COLONY COUNT: CPT | Performed by: NURSE PRACTITIONER

## 2017-06-29 PROCEDURE — 99213 OFFICE O/P EST LOW 20 MIN: CPT | Performed by: NURSE PRACTITIONER

## 2017-06-29 PROCEDURE — 81001 URINALYSIS AUTO W/SCOPE: CPT | Performed by: NURSE PRACTITIONER

## 2017-06-29 PROCEDURE — 87088 URINE BACTERIA CULTURE: CPT | Performed by: NURSE PRACTITIONER

## 2017-06-29 PROCEDURE — 87186 SC STD MICRODIL/AGAR DIL: CPT | Performed by: NURSE PRACTITIONER

## 2017-06-29 RX ORDER — SULFAMETHOXAZOLE/TRIMETHOPRIM 800-160 MG
1 TABLET ORAL 2 TIMES DAILY
Qty: 14 TABLET | Refills: 0 | Status: SHIPPED | OUTPATIENT
Start: 2017-06-29 | End: 2017-07-06

## 2017-06-29 NOTE — MR AVS SNAPSHOT
After Visit Summary   6/29/2017    Unique Ward    MRN: [id number]           Patient Information     Date Of Birth          1940        Visit Information        Provider Department      6/29/2017 8:20 AM Sonja Hartley APRN CNP [facility]        Today's Diagnoses     Acute cystitis with hematuria    -  1    Dysuria        Nonspecific finding on examination of urine          Care Instructions    Take the Bactrim as prescribed for 1 week.     If these are happening more often, you may need to see Urology.    Follow up if symptoms fail to resolve as expected.                    Follow-ups after your visit        Your next 10 appointments already scheduled     Jun 30, 2017  8:30 AM CDT   New Sleep Patient with Louie Connors PA-C   [facility] ([facility])    [address] [facility] [id number] [phone]            Jul 05, 2017 11:30 AM CDT   [facility]tho Treatment with Christy Lyon, CARLY   [facility]    [address] [facility] [address] [phone]              Who to contact     If you have questions or need follow up information about today's clinic visit or your schedule please contact [facility] directly at [phone].  Normal or non-critical lab and imaging results will be communicated to you by Netmagic Solutionshart, letter or phone within 4 business days after the clinic has received the results. If you do not hear from us within 7 days, please contact the clinic through [employer] or phone. If you have a critical or abnormal lab result, we will notify you by phone as soon as possible.  Submit refill requests through [employer]Well[employer] or call your pharmacy and they will forward the refill request to us. Please allow 3 business days for your refill to be completed.          Additional Information About Your Visit        Netmagic Solutionsharmyseekit Information     [employer]Wellness gives you secure access to your  electronic health record. If you see a primary care provider, you can also send messages to your care team and make appointments. If you have questions, please call your primary care clinic.  If you do not have a primary care provider, please call [phone] and they will assist you.        Care EveryWhere ID     This is your Care EveryWhere ID. This could be used by other organizations to access your [place] medical records  [id number]        Your Vitals Were     Pulse Temperature Respirations Breastfeeding? BMI (Body Mass Index)       62 97  F (36.1  C) (Tympanic) 16 No 21.61 kg/m2        Blood Pressure from Last 3 Encounters:   06/29/17 134/70   06/06/17 128/76   05/31/17 132/64    Weight from Last 3 Encounters:   06/29/17 138 lb (62.6 kg)   06/06/17 133 lb 11.2 oz (60.6 kg)   05/31/17 135 lb 11.2 oz (61.6 kg)              We Performed the Following     *UA reflex to Microscopic and Culture ([place] [facility] (except [place] and [place])     Urine Culture Aerobic Bacterial     Urine Microscopic          Today's Medication Changes          These changes are accurate as of: 6/29/17  8:47 AM.  If you have any questions, ask your nurse or doctor.               Start taking these medicines.        Dose/Directions    sulfamethoxazole-trimethoprim 800-160 MG per tablet   Commonly known as:  BACTRIM DS/SEPTRA DS   Used for:  Acute cystitis with hematuria   Started by:  Sonja Hartley APRN CNP        Dose:  1 tablet   Take 1 tablet by mouth 2 times daily for 7 days   Quantity:  14 tablet   Refills:  0            Where to get your medicines      These medications were sent to [id number] - [place], [place] - [address] [place] [address]  [address] [place] [address]    Hours:  M-F 8:30-6:30; Sat 9-4; closed Sunday Phone:  [phone]     sulfamethoxazole-trimethoprim 800-160 MG per tablet                Primary Care Provider Office Phone # Fax #    Tyler Lee [phone]        [address] [place] [address]        Equal Access to Services     YONINEGRO GIGI : Hadii aad ku hadsarafranchesca Soomaali, waaxda luqadaha, qaybta kaalmada jacintaoskarisaac, praveen hinsonkenanmeek daley. So [facility] [phone].    ATENCIÓN: Si habla español, tiene a reynoso disposición servicios gratuitos de asistencia lingüística. Llame al [phone].    We comply with applicable federal civil rights laws and [place] laws. We do not discriminate on the basis of race, color, national origin, age, disability sex, sexual orientation or gender identity.            Thank you!     Thank you for choosing [facility]  for your care. Our goal is always to provide you with excellent care. Hearing back from our patients is one way we can continue to improve our services. Please take a few minutes to complete the written survey that you may receive in the mail after your visit with us. Thank you!             Your Updated Medication List - Protect others around you: Learn how to safely use, store and throw away your medicines at www.disposemymeds.org.          This list is accurate as of: 6/29/17  8:47 AM.  Always use your most recent med list.                   Brand Name Dispense Instructions for use Diagnosis    amLODIPine 5 MG tablet    NORVASC    90 tablet    Take 1 tablet (5 mg) by mouth daily    Essential hypertension       sulfamethoxazole-trimethoprim 800-160 MG per tablet    BACTRIM DS/SEPTRA DS    14 tablet    Take 1 tablet by mouth 2 times daily for 7 days    Acute cystitis with hematuria

## 2017-06-29 NOTE — PROGRESS NOTES
SUBJECTIVE:                                                    Unique Ward is a 76 year old female who presents to clinic today for the following health issues:      URINARY TRACT SYMPTOMS      Duration: 4 days    Description  dysuria and urgency    Intensity:  mild    Accompanying signs and symptoms:  Fever/chills: no   Flank pain no   Nausea and vomiting: no   Vaginal symptoms: none  Abdominal/Pelvic Pain: no     History  History of frequent UTI's: YES  History of kidney stones: no   Sexually Active: no     Precipitating or alleviating factors: doesn't drink enough water    Therapies tried and outcome: increase fluid intake   Outcome: helped    Previous UTI 1 month ago.  Culture grew out Klebsiella oxytoca, sensitive to Bactrim- treated with Bactrim and symptoms resolved and started again 4 days ago.       Problem list and histories reviewed & adjusted, as indicated.  Additional history: none    Patient Active Problem List   Diagnosis     Essential hypertension     Personal history of urinary tract infection     Advance Care Planning     History of basal cell carcinoma- face and scalp     FH: melanoma- son     Dysthymia     Vitamin D deficiency     Fibromyalgia     Left lumbosacral radiculopathy     Acute cystitis with hematuria     Past Surgical History:   Procedure Laterality Date     BIOPSY  11/01/2014     COLONOSCOPY  01.01/1979    Two routine,  one for chronic diarrhea     EYE SURGERY  1/1/2014    first    cataracts both eyes.  second a few months later     GENITOURINARY SURGERY       GYN SURGERY  01.01.1977    TL     HEAD & NECK SURGERY  01.01.1997    basal cell X2   Moh's on scalp.  other on bridge of nose     SOFT TISSUE SURGERY  01.01.1990    Ganglion Cyst   Left inner wrist       Social History   Substance Use Topics     Smoking status: Former Smoker     Packs/day: 0.75     Years: 10.00     Types: Cigarettes     Start date: 1/1/1955     Quit date: 1/1/1965     Smokeless tobacco: Never Used      Alcohol use No     Family History   Problem Relation Age of Onset     DIABETES Maternal Grandmother      Coronary Artery Disease Sister      error     Coronary Artery Disease Brother      error     Hypertension Brother      Hyperlipidemia Brother      CEREBROVASCULAR DISEASE Brother      Hypertension Sister      Hypertension Sister      Hyperlipidemia Sister      Other Cancer Sister      cervical     Breast Cancer Sister      Other Cancer Sister      kidney     MENTAL ILLNESS Mother      paranoid/schizophrenic/suicide     MENTAL ILLNESS Sister      ???  Had shock treatments         Current Outpatient Prescriptions   Medication Sig Dispense Refill     amLODIPine (NORVASC) 5 MG tablet Take 1 tablet (5 mg) by mouth daily 90 tablet 3     Allergies   Allergen Reactions     No Clinical Screening - See Comments      Eye drop antibiotic.     Codeine      Gentamicin Hives     Per Unique this medication gives hives on arms and legs. Bev Lo LSXO,  ....................  7/15/2014   1:15 PM     Influenza Virus Vaccine H5n1      Paxil [Paroxetine]      Typhoid Vaccine-Strain Ty21a Other (See Comments)     Comment: , Description:      Typhoid Vaccines      BP Readings from Last 3 Encounters:   06/29/17 134/70   06/06/17 128/76   05/31/17 132/64    Wt Readings from Last 3 Encounters:   06/29/17 138 lb (62.6 kg)   06/06/17 133 lb 11.2 oz (60.6 kg)   05/31/17 135 lb 11.2 oz (61.6 kg)           Reviewed and updated as needed this visit by clinical staff  Tobacco  Allergies  Meds  Soc Hx      Reviewed and updated as needed this visit by Provider         ROS:  C: NEGATIVE for fever, chills, change in weight  E/M: NEGATIVE for ear, mouth and throat problems  R: NEGATIVE for significant cough or SOB  CV: NEGATIVE for chest pain, palpitations or peripheral edema  : as above     OBJECTIVE:     /70  Pulse 62  Temp 97  F (36.1  C) (Tympanic)  Resp 16  Wt 138 lb (62.6 kg)  Breastfeeding? No  BMI 21.61 kg/m2  Body  mass index is 21.61 kg/(m^2).  GENERAL: healthy, alert and no distress  RESP: lungs clear to auscultation - no rales, rhonchi or wheezes  CV: regular rate and rhythm, normal S1 S2, no S3 or S4, no murmur, click or rub, no peripheral edema and peripheral pulses strong  ABDOMEN: soft, nontender, no hepatosplenomegaly, no masses and bowel sounds normal  MS: no gross musculoskeletal defects noted, no edema    Diagnostic Test Results:  Office Visit on 06/29/2017   Component Date Value Ref Range Status     Color Urine 06/29/2017 Yellow   Final     Appearance Urine 06/29/2017 Cloudy   Final     Glucose Urine 06/29/2017 Negative  NEG mg/dL Final     Bilirubin Urine 06/29/2017 Negative  NEG Final     Ketones Urine 06/29/2017 Negative  NEG mg/dL Final     Specific Gravity Urine 06/29/2017 1.015  1.003 - 1.035 Final     Blood Urine 06/29/2017 Moderate* NEG Final     pH Urine 06/29/2017 7.0  5.0 - 7.0 pH Final     Protein Albumin Urine 06/29/2017 30* NEG mg/dL Final     Urobilinogen Urine 06/29/2017 0.2  0.2 - 1.0 EU/dL Final     Nitrite Urine 06/29/2017 Negative  NEG Final     Leukocyte Esterase Urine 06/29/2017 Large* NEG Final     Source 06/29/2017 Midstream Urine   Final     WBC Urine 06/29/2017 >100* 0 - 2 /HPF Final     RBC Urine 06/29/2017 2-5* 0 - 2 /HPF Final     Bacteria Urine 06/29/2017 Moderate* NEG /HPF Final       ASSESSMENT/PLAN:         1. Acute cystitis with hematuria  - sulfamethoxazole-trimethoprim (BACTRIM DS/SEPTRA DS) 800-160 MG per tablet; Take 1 tablet by mouth 2 times daily for 7 days  Dispense: 14 tablet; Refill: 0    2. Dysuria  - *UA reflex to Microscopic and Culture ([place] and [facility] (except [place] and [place])  - Urine Microscopic    3. Nonspecific finding on examination of urine  - Urine Culture Aerobic Bacterial    FUTURE APPOINTMENTS:       - Follow-up for annual visit or as needed  See Patient Instructions    MAYRA [facility]

## 2017-06-29 NOTE — NURSING NOTE
"Chief Complaint   Patient presents with     Urinary Problem     x4 days       Initial /70  Pulse 62  Temp 97  F (36.1  C) (Tympanic)  Resp 16  Wt 138 lb (62.6 kg)  Breastfeeding? No  BMI 21.61 kg/m2 Estimated body mass index is 21.61 kg/(m^2) as calculated from the following:    Height as of 2/17/17: 5' 7\" (1.702 m).    Weight as of this encounter: 138 lb (62.6 kg).  Medication Reconciliation: complete   ................Ronnie Song LPN,   June 29, 2017,      8:37 AM,   [facility]     "

## 2017-06-29 NOTE — PATIENT INSTRUCTIONS
Take the Bactrim as prescribed for 1 week.     If these are happening more often, you may need to see Urology.    Follow up if symptoms fail to resolve as expected.

## 2017-06-30 ENCOUNTER — OFFICE VISIT (OUTPATIENT)
Dept: SLEEP MEDICINE | Facility: CLINIC | Age: 77
End: 2017-06-30
Attending: FAMILY MEDICINE
Payer: COMMERCIAL

## 2017-06-30 VITALS
SYSTOLIC BLOOD PRESSURE: 149 MMHG | WEIGHT: 136 LBS | HEIGHT: 67 IN | DIASTOLIC BLOOD PRESSURE: 67 MMHG | BODY MASS INDEX: 21.35 KG/M2 | HEART RATE: 65 BPM | RESPIRATION RATE: 16 BRPM | OXYGEN SATURATION: 99 %

## 2017-06-30 DIAGNOSIS — G47.19 EXCESSIVE DAYTIME SLEEPINESS: ICD-10-CM

## 2017-06-30 DIAGNOSIS — G47.00 INSOMNIA, UNSPECIFIED TYPE: ICD-10-CM

## 2017-06-30 PROCEDURE — 99204 OFFICE O/P NEW MOD 45 MIN: CPT | Performed by: PHYSICIAN ASSISTANT

## 2017-06-30 NOTE — PROGRESS NOTES
Sleep Consultation:    Date on this visit: 6/30/2017    Unique Ward is sent by Tyler Lee for a sleep consultation regarding insomnia, sleep onset and sleep maintenance.     Primary Physician: Tyler Lee     Unique Ward reports frequent difficulty sleeping for the last 6-7months.     Unique goes to sleep at 9:00 PM during the week. She wakes up at 4:30 AM without an alarm. She falls asleep in varies 2-3 hours sometimes.  Unique has difficulty falling asleep.  She wakes up 2-4 times a night for 30 minutes 2-3 hours  before falling back to sleep.  Unique wakes up to uncertain reasons.  On weekends, Unique goes to sleep at 9:00 PM.  She wakes up at 5:00 AM without an alarm. She falls asleep in 30 minutes-2-3 hours.  Patient gets an average of 4 hours of sleep per night.     Patient does read in bed.     Unique does not do shift work.      Unique does not snore. Patient does not have a regular bed partner. There is report of kicking and poor quality of sleep.  She does not have witnessed apneas. Patient sleeps on her back and side. She has occasional morning dry mouth and restless legs,. Unique has occasional bruxism.    She denies sleep walking, sleep talking and sleep terrors as a child.  Unique has difficulty breathing through her nose, claustrophobia, heartburn and depression.      Unique has gained 0-5 pounds in the last year.  Patient describes themself as a morning person.  She would prefer to go to sleep at 9:00 PM and wake up at 5:00 AM.  Patient's [place] Sleepiness score 0/24 inconsistent with excessive  daytime sleepiness.      Unique naps 1-2 times per week for  minutes, feels refreshed after naps. She takes no inadvertant naps.  She admits closing eyes and dozing while driving. The most recent episode was 2 year(s) ago.  Patient was counseled on the importance of driving while alert, to pull over if drowsy, or nap before getting into the vehicle if sleepy.  She  uses 1 cups/day of coffee. Last caffeine intake is usually before noon.    Allergies:    Allergies   Allergen Reactions     No Clinical Screening - See Comments      Eye drop antibiotic.     Codeine      Gentamicin Hives     Per Unique this medication gives hives on arms and legs. Bev Lo LSXO,  ....................  7/15/2014   1:15 PM     Influenza Virus Vaccine H5n1      Paxil [Paroxetine]      Typhoid Vaccine-Strain Ty21a Other (See Comments)     Comment: , Description:      Typhoid Vaccines        Medications:    Current Outpatient Prescriptions   Medication Sig Dispense Refill     sulfamethoxazole-trimethoprim (BACTRIM DS/SEPTRA DS) 800-160 MG per tablet Take 1 tablet by mouth 2 times daily for 7 days 14 tablet 0     amLODIPine (NORVASC) 5 MG tablet Take 1 tablet (5 mg) by mouth daily 90 tablet 3       Problem List:  Patient Active Problem List    Diagnosis Date Noted     Left lumbosacral radiculopathy 05/31/2017     Priority: Medium     Acute cystitis with hematuria 05/31/2017     Priority: Medium     History of basal cell carcinoma- face and scalp 02/28/2017     Priority: Medium     FH: melanoma- son 02/28/2017     Priority: Medium     Fibromyalgia 02/28/2017     Priority: Medium     Personal history of urinary tract infection 06/18/2016     Priority: Medium     Essential hypertension 03/23/2016     Priority: Medium     Vitamin D deficiency 03/30/2013     Priority: Medium     Dysthymia 04/01/2005     Priority: Medium     Advance Care Planning 01/06/2017     Advance Care Planning 5/4/2017: ACP Facilitation Session:    Unique Ward presented for ACP Facilitation session at a group class. She was accompanied by daughter-in-law. [employer] information provided and resources reviewed. She wishes to give additional consideration to ACP.  She currently has the following questions or concerns about Advance Care Planning: none known. Follow-up meeting: not needed/applicable. Added by Unique  DIONTE Uribe, Advance Care Planning Liaison.  Advance Care Planning 1/6/17: Info Saray Glover MA            Past Medical/Surgical History:  Past Medical History:   Diagnosis Date     Arthritis 01.01.1980     Cancer (H) 01.01.1993     scalp    Basal Cell X2  head      nose bridge 1996     Depressive disorder 01.01.1977    anxiety     Hypertension 03.16.2016     Past Surgical History:   Procedure Laterality Date     BIOPSY  11/01/2014     COLONOSCOPY  01.01/1979    Two routine,  one for chronic diarrhea     EYE SURGERY  1/1/2014    first    cataracts both eyes.  second a few months later     GENITOURINARY SURGERY       GYN SURGERY  01.01.1977    TL     HEAD & NECK SURGERY  01.01.1997    basal cell X2   Moh's on scalp.  other on bridge of nose     SOFT TISSUE SURGERY  01.01.1990    Ganglion Cyst   Left inner wrist       Social History:  Social History     Social History     Marital status:      Spouse name: N/A     Number of children: N/A     Years of education: N/A     Occupational History     Not on file.     Social History Main Topics     Smoking status: Former Smoker     Packs/day: 0.75     Years: 10.00     Types: Cigarettes     Start date: 1/1/1955     Quit date: 1/1/1965     Smokeless tobacco: Never Used     Alcohol use No     Drug use: No     Sexual activity: No     Other Topics Concern     Parent/Sibling W/ Cabg, Mi Or Angioplasty Before 65f 55m? No     Social History Narrative       Family History:  Family History   Problem Relation Age of Onset     DIABETES Maternal Grandmother      Coronary Artery Disease Sister      error     Coronary Artery Disease Brother      error     Hypertension Brother      Hyperlipidemia Brother      CEREBROVASCULAR DISEASE Brother      Hypertension Sister      Hypertension Sister      Hyperlipidemia Sister      Other Cancer Sister      cervical     Breast Cancer Sister      Other Cancer Sister      kidney     MENTAL ILLNESS Mother       "paranoid/schizophrenic/suicide     MENTAL ILLNESS Sister      ???  Had shock treatments       Review of Systems:  A complete review of systems reviewed by me is negative with the exeption of what has been mentioned in the history of present illness.  CONSTITUTIONAL: NEGATIVE for weight gain/loss, fever, chills, sweats or night sweats, drug allergies.  EYES: NEGATIVE for changes in vision, blind spots, double vision.  ENT: NEGATIVE for ear pain, sore throat, sinus pain, post-nasal drip, runny nose, bloody nose  CARDIAC:  POSITIVE for  htn  NEUROLOGIC: NEGATIVE headaches, weakness or numbness in the arms or legs.  DERMATOLOGIC: NEGATIVE for rashes, new moles or change in mole(s)  PULMONARY:  POSITIVE for  SOB with activity  GASTROINTESTINAL: NEGATIVE for nausea or vomitting, loose or watery stools, fat or grease in stools, constipation, abdominal pain, bowel movements black in color or blood noted.  GENITOURINARY:  POSITIVE for  Painful urination due to UTI  MUSCULOSKELETAL:  POSITIVE for  muscle pain, bone or joint pain and swollen joints  ENDOCRINE: NEGATIVE for increased thirst or urination, diabetes.  LYMPHATIC: NEGATIVE for swollen lymph nodes, lumps or bumps in the breasts or nipple discharge.    Physical Examination:  Vitals: /67  Pulse 65  Resp 16  Ht 1.702 m (5' 7\")  Wt 61.7 kg (136 lb)  SpO2 99%  BMI 21.3 kg/m2  BMI= Body mass index is 21.3 kg/(m^2).    Neck Cir (cm): 33 cm    No flowsheet data found.    GENERAL APPEARANCE: healthy, alert and no distress  HENT: nose and mouth without ulcers or lesions, oropharynx clear and normal cephalic/atraumatic  NECK: no adenopathy, no asymmetry, masses, or scars, thyroid normal to palpation and trachea midline and normal to palpation  RESP: lungs clear to auscultation - no rales, rhonchi or wheezes  CV: regular rates and rhythm, normal S1 S2, no S3 or S4 and no murmur, click or rub  MS: extremities normal- no gross deformities noted  PSYCH: mentation " appears normal and affect normal/bright  Mallampati Class: II.  Tonsillar Stage: 1  hidden by pillars.    Impression/Plan:  Sleep onset and sleep maintenance insomnia. She is information on sleep hygiene/Insomnia. She will implement suggestions and follow up as needed. She is also given info about CBTand suggested she see Ty Holden    Literature provided regarding sleep hygiene and insomnia.      She will follow up with me in approximately two weeks after her sleep study has been competed to review the results and discuss plan of care.       Polysomnography reviewed.  Obstructive sleep apnea reviewed.  Complications of untreated sleep apnea were reviewed.    CC: Tyler Lee

## 2017-06-30 NOTE — MR AVS SNAPSHOT
After Visit Summary   6/30/2017    Unique Ward    MRN: [id number]           Patient Information     Date Of Birth          1940        Visit Information        Provider Department      6/30/2017 8:30 AM Louie Connors PA-C [facility]        Today's Diagnoses     Insomnia, unspecified type        Excessive daytime sleepiness          Care Instructions    Insomnia - Stimulus Control  When someone lays awake in bed over many nights, your body can actually learn to be awake in bed, mainly because that is what has happened so many times.  Your body has actually been  conditioned  or trained to be awake during the night because it has happened so often.  To break this habit you should try to follow these steps to improve your insomnia:    Set a strict bedtime and rise time to keep every day of the week, including weekends    Go to bed at the set time, but only if you are sleepy (not tired or fatigued but drowsy)    Don t lay in bed for more than 15-30 minutes if you can t sleep.  Get up and go do something relaxing like reading or watching TV until you get drowsy again     Get up at the same time every day regardless of how much sleep you get    Use the bedroom only for sleep and sex.  Do NOT watch TV, read, use the computer, play on your cell phone or do work while in bed      Do not take naps during the daytime and avoid any situations where you might get drowsy or fall asleep unintentionally especially in the evening.     Stress, tension, and anxiety can be big factors contributing to insomnia.  If you can t relax your mind and body, then you won t be able to sleep.  You would likely benefit from trying some of the relaxation exercises that we ve assembled below.  These are all internet based links.  If you don t have or use a computer, you may benefit from one of the stress management books listed below that you can get at your public library or at a local bookstore  for a relatively low price.      Copy and paste into web browser address window   https://www.youAudiamube.com/watch?v=Mxqc1lkVoKe    Progressive Muscle Relaxation (PMR):     http://www.iGo/progressive-muscle-relaxation-exercise.html     http://studentsupport.[facility]/counseling/resources/self-help/relaxation-and-stress-management/   Deep Breathing Exercises:    http://www.iGo/breathing-awareness.html     Meditation:     wwwDragonWave    www.Green Vision SystemsguidedDataVotemeditation-site.com You may have to pay for some of these resources.    Guided Imagery:    http://www.iGo/guided-imagery-scripts.html     http://Webalo/library/bsexsmahsx-vquybl-sqfttza/    [place] site under construction : http://www.[employer].org/Services/SleepMedicine/Audio/index.htm    Sometimes insomnia can be made worse by our own habits or situation.  You should consider making some of the following changes to help improve your sleep:     If there is excessive noise in your house / neighborhood use a fan or similar device to make a quiet background noise that can drown out other noises    If your room is too bright early in the morning, hang a blanket or extra curtain over the windows to keep the light out or use a sleeping mask to cover your eyes    If your room is too warm during the night, set the temperature in the house down a few degrees for the night    Spend a little time before bedtime trying to relax.  Don t work right up until bedtime.  Take 30-60 minutes to relax and unwind before bedtime with a book or watching TV    Do not drink anything with alcohol or caffeine in them for at least 4-5 hours before bedtime    Do not smoke right before bedtime or during the night    No strenuous exercise for 2-3 hours before bedtime    A common problem for people with insomnia is spending too much time in bed  trying  to sleep.  You really should only be in bed for no more than 7-8  hours per night.  Spending too much time in bed can lead to being awake and having an  overactive  mind.  One effective way to address this problem is reducing how much time you spend in bed each night.  Be careful with driving or other dangerous activities when trying these strategeis however.  We recommend that you follow these steps to improve your insomnia:    Set a new sleep schedule where you reduce the time you spend in bed by 1-2 hours, e.g. Go to bed at 10 and get up at 5    Keep this sleep schedule every day of the week including the weekends for two weeks.    After two weeks you can add 30-60 minutes more time in bed if you have been sleeping more soundly.    If you still aren t sleeping well, reduce the time you spend in bed by another 30-60 but not less than 5 hours per night.    Please keep a log or diary during this time to track your sleep pattern            Follow-ups after your visit        Follow-up notes from your care team     Return in about 3 months (around 9/30/2017).      Your next 10 appointments already scheduled     Jul 05, 2017 11:30 AM CDT   Ortho Treatment with Christy Lyon, PT   [facility]    [address] [facility] [address] [phone]              Who to contact     If you have questions or need follow up information about today's clinic visit or your schedule please contact [facility] directly at [phone].  Normal or non-critical lab and imaging results will be communicated to you by MyChart, letter or phone within 4 business days after the clinic has received the results. If you do not hear from us within 7 days, please contact the clinic through MyChart or phone. If you have a critical or abnormal lab result, we will notify you by phone as soon as possible.  Submit refill requests through [employer] or call your pharmacy and they will forward the refill request to us. Please allow 3 business days for your  "refill to be completed.          Additional Information About Your Visit        [facility] Information     [employer] gives you secure access to your electronic health record. If you see a primary care provider, you can also send messages to your care team and make appointments. If you have questions, please call your primary care clinic.  If you do not have a primary care provider, please call [phone] and they will assist you.        Care EveryWhere ID     This is your Care EveryWhere ID. This could be used by other organizations to access your [facility] medical records  [id number]        Your Vitals Were     Pulse Respirations Height Pulse Oximetry BMI (Body Mass Index)       65 16 1.702 m (5' 7\") 99% 21.3 kg/m2        Blood Pressure from Last 3 Encounters:   06/30/17 149/67   06/29/17 134/70   06/06/17 128/76    Weight from Last 3 Encounters:   06/30/17 61.7 kg (136 lb)   06/29/17 62.6 kg (138 lb)   06/06/17 60.6 kg (133 lb 11.2 oz)              We Performed the Following     SLEEP EVALUATION & MANAGEMENT REFERRAL - ADULT        Primary Care Provider Office Phone # Fax #    Tyler Lee [phone]       [address]   [facility] [address]        Equal Access to Services     ASHA YU : Hadii keiko ku hadasho Soomaali, waaxda luqadaha, qaybta kaalmada adeegyada, waxmatthew anthonyin haymalloryn jacinta daley. So [facility] [phone].    ATENCIÓN: Si habla español, tiene a reynoso disposición servicios gratuitos de asistencia lingüística. Llame al [phone].    We comply with applicable federal civil rights laws and [place] laws. We do not discriminate on the basis of race, color, national origin, age, disability sex, sexual orientation or gender identity.            Thank you!     Thank you for choosing [facility]  for your care. Our goal is always to provide you with excellent care. Hearing back from our patients is one way we can continue to " improve our services. Please take a few minutes to complete the written survey that you may receive in the mail after your visit with us. Thank you!             Your Updated Medication List - Protect others around you: Learn how to safely use, store and throw away your medicines at www.disposemymeds.org.          This list is accurate as of: 6/30/17  9:12 AM.  Always use your most recent med list.                   Brand Name Dispense Instructions for use Diagnosis    amLODIPine 5 MG tablet    NORVASC    90 tablet    Take 1 tablet (5 mg) by mouth daily    Essential hypertension       sulfamethoxazole-trimethoprim 800-160 MG per tablet    BACTRIM DS/SEPTRA DS    14 tablet    Take 1 tablet by mouth 2 times daily for 7 days    Acute cystitis with hematuria

## 2017-07-05 ENCOUNTER — HOSPITAL ENCOUNTER (OUTPATIENT)
Dept: PHYSICAL THERAPY | Facility: OTHER | Age: 77
Setting detail: THERAPIES SERIES
End: 2017-07-05
Attending: PHYSICIAN ASSISTANT
Payer: MEDICARE

## 2017-07-05 PROCEDURE — G8979 MOBILITY GOAL STATUS: HCPCS | Mod: GP,CH | Performed by: PHYSICAL THERAPIST

## 2017-07-05 PROCEDURE — G8978 MOBILITY CURRENT STATUS: HCPCS | Mod: GP,CH | Performed by: PHYSICAL THERAPIST

## 2017-07-05 PROCEDURE — 40000718 ZZHC STATISTIC PT DEPARTMENT ORTHO VISIT: Performed by: PHYSICAL THERAPIST

## 2017-07-05 PROCEDURE — G8980 MOBILITY D/C STATUS: HCPCS | Mod: GP,CH | Performed by: PHYSICAL THERAPIST

## 2017-07-05 PROCEDURE — 97110 THERAPEUTIC EXERCISES: CPT | Mod: GP | Performed by: PHYSICAL THERAPIST

## 2017-07-05 NOTE — PROGRESS NOTES
07/05/17 1100   The [facility] STarT Back Screening Tool (  [facility] 01/08/07, Funded by [employer]) Used with permission   1  My back pain has spread down my leg(s) at some time in the last 2 weeks (!) 1   2  I have had pain in the shoulder or neck at some time in the last 2 weeks (!) 1   3  I have only walked short distances because of my back pain 0   4  In the last 2 weeks, I have dressed more slowly than usual because of back pain 0   5  It s not really safe for a person with a condition like mine to be physically active 0   6  Worrying thoughts have been going through my mind a lot of the time 0   7  I feel that my back pain is terrible and it s never going to get any better 0   8  In general I have not enjoyed all the things I used to enjoy 0   9.  Overall, how bothersome has your back pain been in the last 2 weeks? 0.0   The [place] STarT Back Tool Scores    Sub-Score (Q5-9) 0   Total Score (all 9) 2   Risk: LOW

## 2017-07-05 NOTE — PROGRESS NOTES
07/05/17 1100   Oswestry Disability Index (MAGGIE   [employer] 1980 Version 2.1a, All rights reserved)   Section 1 - Pain intensity 0   Section 2 - Personal care (washing, dressing, etc.)  0   Section 3 - Lifting 1   Section 4 - Walking 2   Section 5 - Sitting 0   Section 6 - Standing 1   Section 7 - Sleeping 0   Section 8 - Sex life (if applicable) -1   Section 9 - Social life 0   Section 10 - Traveling 0   Sum 4   Count 9   Oswestry Score (%) 8.89 %

## 2017-07-10 NOTE — PROGRESS NOTES
Outpatient Physical Therapy Discharge Note     Patient: Unique Ward  : 1940    Beginning/End Dates of Reporting Period:  2017 to 7/10/2017    Referring Provider: troy DALY    Therapy Diagnosis: back pain      Client Self Report:      Objective Measurements:  Objective Measure: MAGGIE 8.89 at eval 17.78  Details: pain level at eval 0-4/10 currently 0-4/10 with atleast 50%less 4/10      patient seen for 4 Rx sessions for instruction in HEP at last Rx completing the following   does supine towel roll extension stretch 5 minutes , right sidelying with pillow to open left trunk  , 90/90 hold 10 x 5 , bridges , SLR right 25 left 25, hip abduction , hands/knees recipical flexion/extension , prone knee flexion 20 goal 30 , biodex x 15 , standing knee flexion and hands/knees recipical movement tolerated better , bosu mod support 3 minutes , , walking forward big steps and backwards/ laterial keeping balance       Outcome Measures (most recent score):  [place] STarT Sub-Score (Q5-9): 0  [place] STarT Total Score (all 9): 2  Oswestry Score (%): 8.89 %    Goals:  Goal Identifier #1   Goal Description Pt will be able to return to gardening with no pain in the low back using proper body mechanics for 3 days out of the week. (has been gardening daily )   Target Date 17   Date Met      Progress:     Goal Identifier #2   Goal Description Pt will be able to walk for 1/4 of mile without pain in the low back in order to get onto a more consistent walking program 3 days a week. (walks dog 2x day  )   Target Date 17   Date Met      Progress:     Goal Identifier #3   Goal Description Pt will demonstrate compliancy with HEP 5 days a week in order to manage her symptoms independently at home. (patient is very compliant with HEP )   Target Date 17   Date Met      Progress:       Progress Toward Goals:   Progress this reporting period: patient is very compliant with HEP and is noting improvement with  improved tolerance to activity and overall decrease in the sharp pains   Expect her to continue doing well on HEP           Plan:  Discharge from therapy.    Discharge:    Reason for Discharge: Patient has met all goals.    Equipment Issued: none    Discharge Plan: Patient to continue home program.

## 2017-07-10 NOTE — ADDENDUM NOTE
Encounter addended by: Christy Lyon, PT on: 7/10/2017  9:17 AM<BR>     Actions taken: Episode resolved, Flowsheet data copied forward, Charge Capture section accepted, Flowsheet accepted, Sign clinical note

## 2017-08-21 ENCOUNTER — OFFICE VISIT (OUTPATIENT)
Dept: FAMILY MEDICINE | Facility: OTHER | Age: 77
End: 2017-08-21
Payer: COMMERCIAL

## 2017-08-21 VITALS
HEART RATE: 60 BPM | BODY MASS INDEX: 22.37 KG/M2 | TEMPERATURE: 96.1 F | RESPIRATION RATE: 16 BRPM | DIASTOLIC BLOOD PRESSURE: 78 MMHG | SYSTOLIC BLOOD PRESSURE: 134 MMHG | WEIGHT: 142.8 LBS

## 2017-08-21 DIAGNOSIS — F34.1 DYSTHYMIA: ICD-10-CM

## 2017-08-21 DIAGNOSIS — R30.0 DYSURIA: Primary | ICD-10-CM

## 2017-08-21 DIAGNOSIS — N30.00 ACUTE CYSTITIS WITHOUT HEMATURIA: ICD-10-CM

## 2017-08-21 DIAGNOSIS — R35.0 URINARY FREQUENCY: ICD-10-CM

## 2017-08-21 LAB
ALBUMIN UR-MCNC: NEGATIVE MG/DL
APPEARANCE UR: CLEAR
BILIRUB UR QL STRIP: NEGATIVE
COLOR UR AUTO: YELLOW
GLUCOSE UR STRIP-MCNC: NEGATIVE MG/DL
HGB UR QL STRIP: NEGATIVE
KETONES UR STRIP-MCNC: NEGATIVE MG/DL
LEUKOCYTE ESTERASE UR QL STRIP: ABNORMAL
NITRATE UR QL: NEGATIVE
NON-SQ EPI CELLS #/AREA URNS LPF: ABNORMAL /LPF
PH UR STRIP: 6 PH (ref 5–7)
RBC #/AREA URNS AUTO: ABNORMAL /HPF
SOURCE: ABNORMAL
SP GR UR STRIP: <=1.005 (ref 1–1.03)
UROBILINOGEN UR STRIP-ACNC: 0.2 EU/DL (ref 0.2–1)
WBC #/AREA URNS AUTO: ABNORMAL /HPF

## 2017-08-21 PROCEDURE — 81001 URINALYSIS AUTO W/SCOPE: CPT | Performed by: PHYSICIAN ASSISTANT

## 2017-08-21 PROCEDURE — 99213 OFFICE O/P EST LOW 20 MIN: CPT | Performed by: PHYSICIAN ASSISTANT

## 2017-08-21 RX ORDER — CIPROFLOXACIN 250 MG/1
250 TABLET, FILM COATED ORAL 2 TIMES DAILY
Qty: 6 TABLET | Refills: 0 | Status: SHIPPED | OUTPATIENT
Start: 2017-08-21 | End: 2018-02-09

## 2017-08-21 ASSESSMENT — PATIENT HEALTH QUESTIONNAIRE - PHQ9: SUM OF ALL RESPONSES TO PHQ QUESTIONS 1-9: 7

## 2017-08-21 ASSESSMENT — PAIN SCALES - GENERAL: PAINLEVEL: NO PAIN (0)

## 2017-08-21 NOTE — NURSING NOTE
"Chief Complaint   Patient presents with     UTI     pain  and frequency with urination, on and off for 1 week       Initial /78 (BP Location: Left arm, Patient Position: Chair, Cuff Size: Adult Regular)  Pulse 60  Temp 96.1  F (35.6  C) (Oral)  Resp 16  Wt 142 lb 12.8 oz (64.8 kg)  BMI 22.37 kg/m2 Estimated body mass index is 22.37 kg/(m^2) as calculated from the following:    Height as of 6/30/17: 5' 7\" (1.702 m).    Weight as of this encounter: 142 lb 12.8 oz (64.8 kg).  Medication Reconciliation: complete       Prabha LUCAS LPN    "

## 2017-08-21 NOTE — PROGRESS NOTES
SUBJECTIVE:   Unique Ward is a 77 year old female who presents to clinic today for the following health issues:      URINARY TRACT SYMPTOMS  Onset: on and off for 1 week    Description:   Painful urination (Dysuria): YES  Blood in urine (Hematuria): no   Delay in urine (Hesitency): no     Intensity: moderate    Progression of Symptoms:  same    Accompanying Signs & Symptoms:  Fever/chills: YES  Flank pain no   Nausea and vomiting: no   Any vaginal symptoms: none  Abdominal/Pelvic Pain: no     History:   History of frequent UTI's: YES  History of kidney stones: no   Sexually Active: no   Possibility of pregnancy: No    Precipitating factors:   Lack of fluid intake    Therapies Tried and outcome: nothing    Problem list and histories reviewed & adjusted, as indicated.  Additional history: as documented    Patient Active Problem List   Diagnosis     Essential hypertension     Personal history of urinary tract infection     Advance Care Planning     History of basal cell carcinoma- face and scalp     FH: melanoma- son     Dysthymia     Vitamin D deficiency     Fibromyalgia     Left lumbosacral radiculopathy     Acute cystitis with hematuria     Past Surgical History:   Procedure Laterality Date     BIOPSY  11/01/2014     COLONOSCOPY  01.01/1979    Two routine,  one for chronic diarrhea     EYE SURGERY  1/1/2014    first    cataracts both eyes.  second a few months later     GENITOURINARY SURGERY       GYN SURGERY  01.01.1977    TL     HEAD & NECK SURGERY  01.01.1997    basal cell X2   Moh's on scalp.  other on bridge of nose     SOFT TISSUE SURGERY  01.01.1990    Ganglion Cyst   Left inner wrist       Social History   Substance Use Topics     Smoking status: Former Smoker     Packs/day: 0.75     Years: 10.00     Types: Cigarettes     Start date: 1/1/1955     Quit date: 1/1/1965     Smokeless tobacco: Never Used     Alcohol use No     Family History   Problem Relation Age of Onset     DIABETES Maternal Grandmother       Coronary Artery Disease Sister      error     Coronary Artery Disease Brother      error     Hypertension Brother      Hyperlipidemia Brother      CEREBROVASCULAR DISEASE Brother      Hypertension Sister      Hypertension Sister      Hyperlipidemia Sister      Other Cancer Sister      cervical     Breast Cancer Sister      Other Cancer Sister      kidney     MENTAL ILLNESS Mother      paranoid/schizophrenic/suicide     MENTAL ILLNESS Sister      ???  Had shock treatments             Reviewed and updated as needed this visit by clinical staffTobacco  Allergies  Meds  Med Hx  Surg Hx  Fam Hx  Soc Hx      Reviewed and updated as needed this visit by Provider         ROS:  Constitutional, HEENT, cardiovascular, pulmonary, GI, , musculoskeletal, neuro, skin, endocrine and psych systems are negative, except as otherwise noted.      OBJECTIVE:   /78 (BP Location: Left arm, Patient Position: Chair, Cuff Size: Adult Regular)  Pulse 60  Temp 96.1  F (35.6  C) (Oral)  Resp 16  Wt 142 lb 12.8 oz (64.8 kg)  BMI 22.37 kg/m2  Body mass index is 22.37 kg/(m^2).  GENERAL: healthy, alert and no distress  NECK: no adenopathy, no asymmetry, masses, or scars and thyroid normal to palpation  RESP: lungs clear to auscultation - no rales, rhonchi or wheezes  CV: regular rate and rhythm, normal S1 S2, no S3 or S4, no murmur, click or rub, no peripheral edema and peripheral pulses strong  ABDOMEN: soft, nontender, no hepatosplenomegaly, no masses and bowel sounds normal  MS: no gross musculoskeletal defects noted, no edema    Diagnostic Test Results:  Results for orders placed or performed in visit on 08/21/17 (from the past 24 hour(s))   *UA reflex to Microscopic and Culture ([place] and [facility] (except [place] and [place])   Result Value Ref Range    Color Urine Yellow     Appearance Urine Clear     Glucose Urine Negative NEG^Negative mg/dL    Bilirubin Urine Negative NEG^Negative    Ketones Urine Negative  NEG^Negative mg/dL    Specific Gravity Urine <=1.005 1.003 - 1.035    Blood Urine Negative NEG^Negative    pH Urine 6.0 5.0 - 7.0 pH    Protein Albumin Urine Negative NEG^Negative mg/dL    Urobilinogen Urine 0.2 0.2 - 1.0 EU/dL    Nitrite Urine Negative NEG^Negative    Leukocyte Esterase Urine Trace (A) NEG^Negative    Source Unspecified Urine    Urine Microscopic   Result Value Ref Range    WBC Urine 2-5 (A) OTO2^O - 2 /HPF    RBC Urine O - 2 OTO2^O - 2 /HPF    Squamous Epithelial /LPF Urine Few FEW^Few /LPF       ASSESSMENT/PLAN:     1. Dysuria  3. Urinary frequency  4. Acute cystitis without hematuria  - *UA reflex to Microscopic and Culture ([place] and [facility] (except [place] and [place])  - Urine Microscopic  - ciprofloxacin (CIPRO) 250 MG tablet; Take 1 tablet (250 mg) by mouth 2 times daily  Dispense: 6 tablet; Refill: 0  - *UA reflex to Microscopic and Culture ([facility] (except [place] and [place]); Future  ROV with PCP of choice soon if this does not resolve.    2. Dysthymia  Noted - Advised ROV with PCP.  - DEPRESSION ACTION PLAN (DAP)  - ciprofloxacin (CIPRO) 250 MG tablet; Take 1 tablet (250 mg) by mouth 2 times daily  Dispense: 6 tablet; Refill: 0  - *UA reflex to Microscopic and Culture ([place] and [facility] (except [place] and [place]); Future    ROV with PCP PRN in the near future.    Alan Parsons PA-C  [facility]

## 2017-08-21 NOTE — MR AVS SNAPSHOT
After Visit Summary   8/21/2017    Unique Ward    MRN: [id number]           Patient Information     Date Of Birth          1940        Visit Information        Provider Department      8/21/2017 8:40 AM Alan Mcintosh PA-C [facility]        Today's Diagnoses     Dysuria    -  1    Dysthymia        Urinary frequency        Acute cystitis without hematuria           Follow-ups after your visit        Future tests that were ordered for you today     Open Future Orders        Priority Expected Expires Ordered    *UA reflex to Microscopic and Culture ([place] and [facility] (except [place] and [place]) Routine 9/4/2017 9/11/2017 8/21/2017            Who to contact     If you have questions or need follow up information about today's clinic visit or your schedule please contact [facility] directly at [phone].  Normal or non-critical lab and imaging results will be communicated to you by [employer], letter or phone within 4 business days after the clinic has received the results. If you do not hear from us within 7 days, please contact the clinic through [employer] or phone. If you have a critical or abnormal lab result, we will notify you by phone as soon as possible.  Submit refill requests through Dashbell or call your pharmacy and they will forward the refill request to us. Please allow 3 business days for your refill to be completed.          Additional Information About Your Visit        MyChart Information     [employer] gives you secure access to your electronic health record. If you see a primary care provider, you can also send messages to your care team and make appointments. If you have questions, please call your primary care clinic.  If you do not have a primary care provider, please call [phone] and they will assist you.        Care EveryWhere ID     This is your Care EveryWhere ID. This could be used by other organizations to access your [place]  medical records  [id number]        Your Vitals Were     Pulse Temperature Respirations BMI (Body Mass Index)          60 96.1  F (35.6  C) (Oral) 16 22.37 kg/m2         Blood Pressure from Last 3 Encounters:   08/21/17 134/78   06/30/17 149/67   06/29/17 134/70    Weight from Last 3 Encounters:   08/21/17 142 lb 12.8 oz (64.8 kg)   06/30/17 136 lb (61.7 kg)   06/29/17 138 lb (62.6 kg)              We Performed the Following     *UA reflex to Microscopic and Culture ([place] [facility] (except [place] and [place])     DEPRESSION ACTION PLAN (DAP)     Urine Microscopic          Today's Medication Changes          These changes are accurate as of: 8/21/17  9:14 AM.  If you have any questions, ask your nurse or doctor.               Start taking these medicines.        Dose/Directions    ciprofloxacin 250 MG tablet   Commonly known as:  CIPRO   Used for:  Urinary frequency, Dysthymia, Dysuria, Acute cystitis without hematuria   Started by:  Alan Mcintosh PA-C        Dose:  250 mg   Take 1 tablet (250 mg) by mouth 2 times daily   Quantity:  6 tablet   Refills:  0            Where to get your medicines      These medications were sent to [id number] - [place], [place] - [address] [place] [id number]    Hours:  M-F 8:30-6:30; Sat 9-4; closed Sunday Phone:  [phone]     ciprofloxacin 250 MG tablet                Primary Care Provider Office Phone # Fax #    Tyler Lee [phone] [address] [place] [address]        Equal Access to Services     [facility] AH: Hadii aad ku hadashfranchesca Somason, waaxda luqadaha, qaybta kaalmada adeolga lidia, praveen daley. So [facility] [phone].    ATENCIÓN: Si habla español, tiene a reynoso disposición servicios gratuitos de asistencia lingüística. Llame al [phone].    We comply with applicable federal civil rights laws and [place] laws. We do not discriminate on the basis of race,  color, national origin, age, disability sex, sexual orientation or gender identity.            Thank you!     Thank you for choosing [facility]  for your care. Our goal is always to provide you with excellent care. Hearing back from our patients is one way we can continue to improve our services. Please take a few minutes to complete the written survey that you may receive in the mail after your visit with us. Thank you!             Your Updated Medication List - Protect others around you: Learn how to safely use, store and throw away your medicines at www.disposemymeds.org.          This list is accurate as of: 8/21/17  9:14 AM.  Always use your most recent med list.                   Brand Name Dispense Instructions for use Diagnosis    amLODIPine 5 MG tablet    NORVASC    90 tablet    Take 1 tablet (5 mg) by mouth daily    Essential hypertension       ciprofloxacin 250 MG tablet    CIPRO    6 tablet    Take 1 tablet (250 mg) by mouth 2 times daily    Urinary frequency, Dysthymia, Dysuria, Acute cystitis without hematuria

## 2017-09-07 DIAGNOSIS — R30.0 DYSURIA: ICD-10-CM

## 2017-09-07 DIAGNOSIS — R35.0 URINARY FREQUENCY: ICD-10-CM

## 2017-09-07 DIAGNOSIS — F34.1 DYSTHYMIA: ICD-10-CM

## 2017-09-07 DIAGNOSIS — N30.00 ACUTE CYSTITIS WITHOUT HEMATURIA: ICD-10-CM

## 2017-09-07 LAB
ALBUMIN UR-MCNC: NEGATIVE MG/DL
APPEARANCE UR: CLEAR
BILIRUB UR QL STRIP: NEGATIVE
COLOR UR AUTO: YELLOW
GLUCOSE UR STRIP-MCNC: NEGATIVE MG/DL
HGB UR QL STRIP: NEGATIVE
KETONES UR STRIP-MCNC: NEGATIVE MG/DL
LEUKOCYTE ESTERASE UR QL STRIP: NEGATIVE
NITRATE UR QL: NEGATIVE
PH UR STRIP: 7.5 PH (ref 5–7)
SOURCE: ABNORMAL
SP GR UR STRIP: 1.01 (ref 1–1.03)
UROBILINOGEN UR STRIP-ACNC: 0.2 EU/DL (ref 0.2–1)

## 2017-09-07 PROCEDURE — 81003 URINALYSIS AUTO W/O SCOPE: CPT | Performed by: PHYSICIAN ASSISTANT

## 2017-10-18 ENCOUNTER — OFFICE VISIT (OUTPATIENT)
Dept: FAMILY MEDICINE | Facility: CLINIC | Age: 77
End: 2017-10-18
Payer: COMMERCIAL

## 2017-10-18 VITALS
DIASTOLIC BLOOD PRESSURE: 72 MMHG | BODY MASS INDEX: 21.46 KG/M2 | WEIGHT: 141.6 LBS | HEART RATE: 71 BPM | TEMPERATURE: 98.3 F | OXYGEN SATURATION: 97 % | SYSTOLIC BLOOD PRESSURE: 126 MMHG | HEIGHT: 68 IN

## 2017-10-18 DIAGNOSIS — R30.0 DYSURIA: Primary | ICD-10-CM

## 2017-10-18 LAB
ALBUMIN UR-MCNC: 30 MG/DL
APPEARANCE UR: ABNORMAL
BACTERIA #/AREA URNS HPF: ABNORMAL /HPF
BILIRUB UR QL STRIP: NEGATIVE
COLOR UR AUTO: YELLOW
GLUCOSE UR STRIP-MCNC: NEGATIVE MG/DL
HGB UR QL STRIP: NEGATIVE
KETONES UR STRIP-MCNC: NEGATIVE MG/DL
LEUKOCYTE ESTERASE UR QL STRIP: ABNORMAL
MUCOUS THREADS #/AREA URNS LPF: PRESENT /LPF
NITRATE UR QL: NEGATIVE
PH UR STRIP: 7 PH (ref 5–7)
RBC #/AREA URNS AUTO: 6 /HPF (ref 0–2)
SOURCE: ABNORMAL
SP GR UR STRIP: 1.01 (ref 1–1.03)
UROBILINOGEN UR STRIP-MCNC: 0 MG/DL (ref 0–2)
WBC #/AREA URNS AUTO: >182 /HPF (ref 0–2)
WBC CLUMPS #/AREA URNS HPF: PRESENT /HPF

## 2017-10-18 PROCEDURE — 99213 OFFICE O/P EST LOW 20 MIN: CPT | Performed by: FAMILY MEDICINE

## 2017-10-18 PROCEDURE — 87086 URINE CULTURE/COLONY COUNT: CPT | Performed by: FAMILY MEDICINE

## 2017-10-18 PROCEDURE — 81001 URINALYSIS AUTO W/SCOPE: CPT | Performed by: FAMILY MEDICINE

## 2017-10-18 RX ORDER — SULFAMETHOXAZOLE/TRIMETHOPRIM 800-160 MG
1 TABLET ORAL 2 TIMES DAILY
Qty: 14 TABLET | Refills: 0 | Status: SHIPPED | OUTPATIENT
Start: 2017-10-18 | End: 2018-02-09

## 2017-10-18 NOTE — MR AVS SNAPSHOT
"              After Visit Summary   10/18/2017    Unique Ward    MRN: [id number]           Patient Information     Date Of Birth          1940        Visit Information        Provider Department      10/18/2017 9:00 AM Faheem Banks MD [facility]        Today's Diagnoses     Dysuria    -  1       Follow-ups after your visit        Who to contact     If you have questions or need follow up information about today's clinic visit or your schedule please contact [facility] directly at [phone].  Normal or non-critical lab and imaging results will be communicated to you by [employer], letter or phone within 4 business days after the clinic has received the results. If you do not hear from us within 7 days, please contact the clinic through [employer] or phone. If you have a critical or abnormal lab result, we will notify you by phone as soon as possible.  Submit refill requests through Meetyl or call your pharmacy and they will forward the refill request to us. Please allow 3 business days for your refill to be completed.          Additional Information About Your Visit        MyChart Information     [employer] gives you secure access to your electronic health record. If you see a primary care provider, you can also send messages to your care team and make appointments. If you have questions, please call your primary care clinic.  If you do not have a primary care provider, please call [phone] and they will assist you.        Care EveryWhere ID     This is your Care EveryWhere ID. This could be used by other organizations to access your [employer] medical records  [id number]        Your Vitals Were     Pulse Temperature Height Pulse Oximetry BMI (Body Mass Index)       71 98.3  F (36.8  C) (Tympanic) 5' 8.1\" (1.73 m) 97% 21.47 kg/m2        Blood Pressure from Last 3 Encounters:   10/18/17 126/72   08/21/17 134/78   06/30/17 149/67    Weight from Last 3 Encounters:   10/18/17 " 141 lb 9.6 oz (64.2 kg)   08/21/17 142 lb 12.8 oz (64.8 kg)   06/30/17 136 lb (61.7 kg)              We Performed the Following     *UA reflex to Microscopic and Culture ([place]; [facility]; [facility]; [facility]; [place]; [place])     Urine Culture Aerobic Bacterial          Today's Medication Changes          These changes are accurate as of: 10/18/17 11:59 PM.  If you have any questions, ask your nurse or doctor.               Start taking these medicines.        Dose/Directions    sulfamethoxazole-trimethoprim 800-160 MG per tablet   Commonly known as:  BACTRIM DS/SEPTRA DS   Used for:  Dysuria   Started by:  Faheem Banks MD        Dose:  1 tablet   Take 1 tablet by mouth 2 times daily   Quantity:  14 tablet   Refills:  0            Where to get your medicines      These medications were sent to [facility] [address]   [address] [place] [address] , [facility] [address]     Phone:  [phone]     sulfamethoxazole-trimethoprim 800-160 MG per tablet                Primary Care Provider Office Phone # Fax #    Ouvz Gopi Lee [phone] [address]   [facility] [address]        Equal Access to Services     [facility] AH: Hadii keiko ku hadasho Soomaali, waaxda luqadaha, qaybta kaalmada adeegyada, waxay idiin haymalloryn jacinta daley. So [facility] [phone].    ATENCIÓN: Si habla español, tiene a reynoso disposición servicios gratuitos de asistencia lingüística. Llame al [phone].    We comply with applicable federal civil rights laws and [place] laws. We do not discriminate on the basis of race, color, national origin, age, disability, sex, sexual orientation, or gender identity.            Thank you!     Thank you for choosing [facility]  for your care. Our goal is always to provide you with excellent care. Hearing back from our patients is one way we can continue to improve our  services. Please take a few minutes to complete the written survey that you may receive in the mail after your visit with us. Thank you!             Your Updated Medication List - Protect others around you: Learn how to safely use, store and throw away your medicines at www.disposemymeds.org.          This list is accurate as of: 10/18/17 11:59 PM.  Always use your most recent med list.                   Brand Name Dispense Instructions for use Diagnosis    amLODIPine 5 MG tablet    NORVASC    90 tablet    Take 1 tablet (5 mg) by mouth daily    Essential hypertension       ciprofloxacin 250 MG tablet    CIPRO    6 tablet    Take 1 tablet (250 mg) by mouth 2 times daily    Urinary frequency, Dysthymia, Dysuria, Acute cystitis without hematuria       sulfamethoxazole-trimethoprim 800-160 MG per tablet    BACTRIM DS/SEPTRA DS    14 tablet    Take 1 tablet by mouth 2 times daily    Dysuria

## 2017-10-18 NOTE — PROGRESS NOTES
SUBJECTIVE:   Unique Ward is a 77 year old female who presents to clinic today for the following health issues:  Patient was recently treated for a UTI 08/21/17 by Gamal DALY.  Patient noticed that her urine was murky and cloudy on Monday. No abd pain or painful urination.  Has a Hx of frequent UTI's.   URINARY TRACT SYMPTOMS  Onset: 10/16/2017    Description:   Painful urination (Dysuria): no   Blood in urine (Hematuria): no   Delay in urine (Hesitency): no     Intensity: none    Progression of Symptoms:  worsening    Accompanying Signs & Symptoms:  Fever/chills: YES  Flank pain no   Nausea and vomiting: no   Any vaginal symptoms: none  Abdominal/Pelvic Pain: no     History:   History of frequent UTI's: YES  History of kidney stones: no   Sexually Active: no   Possibility of pregnancy: No    Precipitating factors:       Therapies Tried and outcome: Increased fluid intake           Problem list and histories reviewed & adjusted, as indicated.  Additional history:         Reviewed and updated as needed this visit by clinical staff     Reviewed and updated as needed this visit by Provider        SUBJECTIVE:  Unique  is a 77 year old female who presents for:  Bev urinary tract infection. She states her urine is cloudy and off smelling. She's noticed this for just a couple days. Was treated for a urinary tract infection 2 months ago. No fevers or chills or back pain.    Past Medical History:   Diagnosis Date     Arthritis 01.01.1980     Cancer (H) 01.01.1993     scalp    Basal Cell X2  head      nose bridge 1996     Depressive disorder 01.01.1977    anxiety     Hypertension 03.16.2016     Past Surgical History:   Procedure Laterality Date     BIOPSY  11/01/2014     COLONOSCOPY  01.01/1979    Two routine,  one for chronic diarrhea     EYE SURGERY  1/1/2014    first    cataracts both eyes.  second a few months later     GENITOURINARY SURGERY       GYN SURGERY  01.01.1977    TL     HEAD & NECK SURGERY   "01.01.1997    basal cell X2   Moh's on scalp.  other on bridge of nose     SOFT TISSUE SURGERY  01.01.1990    Ganglion Cyst   Left inner wrist     Social History   Substance Use Topics     Smoking status: Former Smoker     Packs/day: 0.75     Years: 10.00     Types: Cigarettes     Start date: 1/1/1955     Quit date: 1/1/1965     Smokeless tobacco: Never Used     Alcohol use No     Current Outpatient Prescriptions   Medication Sig Dispense Refill     sulfamethoxazole-trimethoprim (BACTRIM DS/SEPTRA DS) 800-160 MG per tablet Take 1 tablet by mouth 2 times daily 14 tablet 0     amLODIPine (NORVASC) 5 MG tablet Take 1 tablet (5 mg) by mouth daily 90 tablet 3     ciprofloxacin (CIPRO) 250 MG tablet Take 1 tablet (250 mg) by mouth 2 times daily (Patient not taking: Reported on 10/18/2017) 6 tablet 0       REVIEW OF SYSTEMS:   5 point ROS negative except as noted above in HPI, including Gen., Resp, CV, GI &  system review.     OBJECTIVE:  Vitals: /72 (BP Location: Right arm, Patient Position: Chair, Cuff Size: Adult Regular)  Pulse 71  Temp 98.3  F (36.8  C) (Tympanic)  Ht 5' 8.1\" (1.73 m)  Wt 141 lb 9.6 oz (64.2 kg)  SpO2 97%  BMI 21.47 kg/m2  BMI= Body mass index is 21.47 kg/(m^2).  Alert appears in no distress. Abdomen soft bowel sounds present no tenderness. No flank tenderness. Urinalysis shows pyuria.    ASSESSMENT:  UTI    PLAN:  Bactrim DS twice a day for a week. She prefers to stay away from the fluoroquinolones from her readings. Drink extra fluids follow up if not improving.        Faheem Banks MD  [facility]              "

## 2017-10-18 NOTE — NURSING NOTE
"Chief Complaint   Patient presents with     Dysuria     Possible UTI        Initial /72 (BP Location: Right arm, Patient Position: Chair, Cuff Size: Adult Regular)  Pulse 71  Temp 98.3  F (36.8  C) (Tympanic)  Ht 5' 8.1\" (1.73 m)  Wt 141 lb 9.6 oz (64.2 kg)  SpO2 97%  BMI 21.47 kg/m2 Estimated body mass index is 21.47 kg/(m^2) as calculated from the following:    Height as of this encounter: 5' 8.1\" (1.73 m).    Weight as of this encounter: 141 lb 9.6 oz (64.2 kg).  Medication Reconciliation: complete    "

## 2017-10-19 LAB
BACTERIA SPEC CULT: NORMAL
Lab: NORMAL
SPECIMEN SOURCE: NORMAL

## 2018-01-26 ENCOUNTER — MYC MEDICAL ADVICE (OUTPATIENT)
Dept: FAMILY MEDICINE | Facility: CLINIC | Age: 78
End: 2018-01-26

## 2018-01-26 DIAGNOSIS — I10 ESSENTIAL HYPERTENSION: ICD-10-CM

## 2018-01-26 NOTE — TELEPHONE ENCOUNTER
"Requested Prescriptions   Pending Prescriptions Disp Refills     amLODIPine (NORVASC) 5 MG tablet 90 tablet 3     Sig: Take 1 tablet (5 mg) by mouth daily    Calcium Channel Blockers Protocol  Passed    1/26/2018  8:02 AM       Passed - Blood pressure under 140/90    BP Readings from Last 3 Encounters:   10/18/17 126/72   08/21/17 134/78   06/30/17 149/67                Passed - Recent or future visit with authorizing provider    Patient had office visit in the last year or has a visit in the next 30 days with authorizing provider.  See \"Patient Info\" tab in inbasket, or \"Choose Columns\" in Meds & Orders section of the refill encounter.            Passed - Patient is age 18 or older       Passed - No active pregnancy on record       Passed - Normal serum creatinine on file in past 12 months    Recent Labs   Lab Test  03/24/17   1007   CR  0.78            Passed - No positive pregnancy test in past 12 months          Last Written Prescription Date:  2/28/17  Last Fill Quantity: 90,  # refills: 3   Last Office Visit with [facility], P or [facility] prescribing provider:  6/6/17   Future Office Visit:       "

## 2018-01-30 RX ORDER — AMLODIPINE BESYLATE 5 MG/1
5 TABLET ORAL DAILY
Qty: 90 TABLET | Refills: 1 | Status: SHIPPED | OUTPATIENT
Start: 2018-01-30 | End: 2018-08-09

## 2018-01-30 NOTE — TELEPHONE ENCOUNTER
Prescription approved per [facility] Refill Protocol.    Nancy Strickland RN

## 2018-02-09 ENCOUNTER — OFFICE VISIT (OUTPATIENT)
Dept: FAMILY MEDICINE | Facility: OTHER | Age: 78
End: 2018-02-09
Payer: COMMERCIAL

## 2018-02-09 VITALS
RESPIRATION RATE: 16 BRPM | SYSTOLIC BLOOD PRESSURE: 158 MMHG | BODY MASS INDEX: 21.92 KG/M2 | HEART RATE: 78 BPM | OXYGEN SATURATION: 98 % | DIASTOLIC BLOOD PRESSURE: 70 MMHG | WEIGHT: 144.6 LBS | TEMPERATURE: 98.9 F

## 2018-02-09 DIAGNOSIS — R30.0 DYSURIA: Primary | ICD-10-CM

## 2018-02-09 DIAGNOSIS — R82.90 NONSPECIFIC FINDING ON EXAMINATION OF URINE: ICD-10-CM

## 2018-02-09 LAB
ALBUMIN UR-MCNC: NEGATIVE MG/DL
APPEARANCE UR: CLEAR
BACTERIA #/AREA URNS HPF: ABNORMAL /HPF
BILIRUB UR QL STRIP: NEGATIVE
COLOR UR AUTO: YELLOW
GLUCOSE UR STRIP-MCNC: NEGATIVE MG/DL
HGB UR QL STRIP: ABNORMAL
KETONES UR STRIP-MCNC: NEGATIVE MG/DL
LEUKOCYTE ESTERASE UR QL STRIP: ABNORMAL
NITRATE UR QL: NEGATIVE
PH UR STRIP: 6.5 PH (ref 5–7)
RBC #/AREA URNS AUTO: ABNORMAL /HPF
SOURCE: ABNORMAL
SP GR UR STRIP: 1.01 (ref 1–1.03)
UROBILINOGEN UR STRIP-ACNC: 0.2 EU/DL (ref 0.2–1)
WBC #/AREA URNS AUTO: ABNORMAL /HPF

## 2018-02-09 PROCEDURE — 87186 SC STD MICRODIL/AGAR DIL: CPT | Performed by: FAMILY MEDICINE

## 2018-02-09 PROCEDURE — 99213 OFFICE O/P EST LOW 20 MIN: CPT | Performed by: FAMILY MEDICINE

## 2018-02-09 PROCEDURE — 87086 URINE CULTURE/COLONY COUNT: CPT | Performed by: FAMILY MEDICINE

## 2018-02-09 PROCEDURE — 87088 URINE BACTERIA CULTURE: CPT | Performed by: FAMILY MEDICINE

## 2018-02-09 PROCEDURE — 81001 URINALYSIS AUTO W/SCOPE: CPT | Performed by: FAMILY MEDICINE

## 2018-02-09 RX ORDER — CIPROFLOXACIN 250 MG/1
250 TABLET, FILM COATED ORAL 2 TIMES DAILY
Qty: 6 TABLET | Refills: 0 | Status: SHIPPED | OUTPATIENT
Start: 2018-02-09 | End: 2018-08-09

## 2018-02-09 ASSESSMENT — PAIN SCALES - GENERAL: PAINLEVEL: NO PAIN (0)

## 2018-02-09 NOTE — MR AVS SNAPSHOT
After Visit Summary   2/9/2018    Unique Ward    MRN: [id number]           Patient Information     Date Of Birth          1940        Visit Information        Provider Department      2/9/2018 3:30 PM Julito South MD [facility]        Today's Diagnoses     Dysuria    -  1    Nonspecific finding on examination of urine           Follow-ups after your visit        Follow-up notes from your care team     Return if symptoms worsen or fail to improve.      Who to contact     If you have questions or need follow up information about today's clinic visit or your schedule please contact [facility] directly at [phone].  Normal or non-critical lab and imaging results will be communicated to you by [employer], letter or phone within 4 business days after the clinic has received the results. If you do not hear from us within 7 days, please contact the clinic through [employer]One[employer] or phone. If you have a critical or abnormal lab result, we will notify you by phone as soon as possible.  Submit refill requests through [employer] or call your pharmacy and they will forward the refill request to us. Please allow 3 business days for your refill to be completed.          Additional Information About Your Visit        MyChart Information     [employer] gives you secure access to your electronic health record. If you see a primary care provider, you can also send messages to your care team and make appointments. If you have questions, please call your primary care clinic.  If you do not have a primary care provider, please call [phone] and they will assist you.        Care EveryWhere ID     This is your Care EveryWhere ID. This could be used by other organizations to access your [facility] medical records  [id number]        Your Vitals Were     Pulse Temperature Respirations Pulse Oximetry BMI (Body Mass Index)       78 98.9  F (37.2  C) (Temporal) 16 98% 21.92 kg/m2        Blood Pressure  from Last 3 Encounters:   02/09/18 158/70   10/18/17 126/72   08/21/17 134/78    Weight from Last 3 Encounters:   02/09/18 144 lb 9.6 oz (65.6 kg)   10/18/17 141 lb 9.6 oz (64.2 kg)   08/21/17 142 lb 12.8 oz (64.8 kg)              We Performed the Following     *UA reflex to Microscopic and Culture ([place] [facility] (except [place] and [place])     Urine Culture Aerobic Bacterial     Urine Microscopic          Today's Medication Changes          These changes are accurate as of 2/9/18  4:07 PM.  If you have any questions, ask your nurse or doctor.               Start taking these medicines.        Dose/Directions    ciprofloxacin 250 MG tablet   Commonly known as:  CIPRO   Used for:  Dysuria   Started by:  Julito South MD        Dose:  250 mg   Take 1 tablet (250 mg) by mouth 2 times daily   Quantity:  6 tablet   Refills:  0            Where to get your medicines      These medications were sent to [id number] - [place], [place] - [address] [place] [address]  [address] [place] [id number]    Hours:  M-F 8:30-6:30; Sat 9-4; closed Sunday Phone:  [phone]     ciprofloxacin 250 MG tablet                Primary Care Provider Office Phone # Fax #    Kimpanchito Gopi Lee [phone] [address] [place] [address]        Equal Access to Services     ASHA YU AH: Hadii keiko mendoza hadsarao Somason, waaxda luqadaha, qaybta kaalmada feliberto, praveen daley. So [facility] [phone].    ATENCIÓN: Si habla español, tiene a reynoso disposición servicios gratuitos de asistencia lingüística. Subhash al [phone].    We comply with applicable federal civil rights laws and [place] laws. We do not discriminate on the basis of race, color, national origin, age, disability, sex, sexual orientation, or gender identity.            Thank you!     Thank you for choosing [facility]  for your care. Our goal is always to provide you with excellent care. Hearing  back from our patients is one way we can continue to improve our services. Please take a few minutes to complete the written survey that you may receive in the mail after your visit with us. Thank you!             Your Updated Medication List - Protect others around you: Learn how to safely use, store and throw away your medicines at www.disposemymeds.org.          This list is accurate as of 2/9/18  4:07 PM.  Always use your most recent med list.                   Brand Name Dispense Instructions for use Diagnosis    amLODIPine 5 MG tablet    NORVASC    90 tablet    Take 1 tablet (5 mg) by mouth daily    Essential hypertension       ciprofloxacin 250 MG tablet    CIPRO    6 tablet    Take 1 tablet (250 mg) by mouth 2 times daily    Dysuria

## 2018-02-09 NOTE — NURSING NOTE
"Chief Complaint   Patient presents with     UTI       Initial /70 (BP Location: Right arm, Patient Position: Chair, Cuff Size: Adult Regular)  Pulse 78  Temp 98.9  F (37.2  C) (Temporal)  Resp 16  Wt 144 lb 9.6 oz (65.6 kg)  SpO2 98%  BMI 21.92 kg/m2 Estimated body mass index is 21.92 kg/(m^2) as calculated from the following:    Height as of 10/18/17: 5' 8.1\" (1.73 m).    Weight as of this encounter: 144 lb 9.6 oz (65.6 kg).  Medication Reconciliation: complete     Sara Davison MA 2/9/2018  3:37 PM          "

## 2018-02-09 NOTE — PROGRESS NOTES
SUBJECTIVE:   Unique Ward is a 77 year old female who presents to clinic today for the following health issues:        URINARY TRACT SYMPTOMS  Onset: 4 to 5 days    Description:   Painful urination (Dysuria): YES  Blood in urine (Hematuria): no   Delay in urine (Hesitency): no     Intensity: moderate    Progression of Symptoms:  worsening    Accompanying Signs & Symptoms:  Fever/chills: no   Flank pain no   Nausea and vomiting: no   Any vaginal symptoms: none  Abdominal/Pelvic Pain: no     History:   History of frequent UTI's: YES  History of kidney stones: no   Sexually Active: no   Possibility of pregnancy: No    Precipitating factors:   Frequency and current temp elevated more than normal.     Therapies Tried and outcome: Increase fluid intake    Problem list and histories reviewed & adjusted, as indicated.  Additional history: as documented    Patient Active Problem List   Diagnosis     Essential hypertension     Personal history of urinary tract infection     Advance Care Planning     History of basal cell carcinoma- face and scalp     FH: melanoma- son     Dysthymia     Vitamin D deficiency     Fibromyalgia     Left lumbosacral radiculopathy     Acute cystitis with hematuria     Past Surgical History:   Procedure Laterality Date     BIOPSY  11/01/2014     COLONOSCOPY  01.01/1979    Two routine,  one for chronic diarrhea     EYE SURGERY  1/1/2014    first    cataracts both eyes.  second a few months later     GENITOURINARY SURGERY       GYN SURGERY  01.01.1977    TL     HEAD & NECK SURGERY  01.01.1997    basal cell X2   Moh's on scalp.  other on bridge of nose     SOFT TISSUE SURGERY  01.01.1990    Ganglion Cyst   Left inner wrist       Social History   Substance Use Topics     Smoking status: Former Smoker     Packs/day: 0.75     Years: 10.00     Types: Cigarettes     Start date: 1/1/1955     Quit date: 1/1/1965     Smokeless tobacco: Never Used     Alcohol use No     Family History   Problem Relation Age  of Onset     DIABETES Maternal Grandmother      Coronary Artery Disease Sister      error     Coronary Artery Disease Brother      error     Hypertension Brother      Hyperlipidemia Brother      CEREBROVASCULAR DISEASE Brother      Hypertension Sister      Hypertension Sister      Hyperlipidemia Sister      Other Cancer Sister      cervical     Breast Cancer Sister      Other Cancer Sister      kidney     MENTAL ILLNESS Mother      paranoid/schizophrenic/suicide     MENTAL ILLNESS Sister      ???  Had shock treatments         Current Outpatient Prescriptions   Medication Sig Dispense Refill     amLODIPine (NORVASC) 5 MG tablet Take 1 tablet (5 mg) by mouth daily 90 tablet 1     Allergies   Allergen Reactions     No Clinical Screening - See Comments      Eye drop antibiotic.     Codeine      Gentamicin Hives     Per Unique this medication gives hives on arms and legs. Bev Lo LSXO,  ....................  7/15/2014   1:15 PM     Influenza Virus Vaccine H5n1      Paxil [Paroxetine]      Typhoid Vaccine-Strain Ty21a Other (See Comments)     Comment: , Description:      Typhoid Vaccines      Recent Labs   Lab Test  03/24/17   1007  02/28/17   1151  03/09/15 10/28/14 10/25/14   LDL   --   118*   --    --    --    --    HDL   --   104   --    --    --    --    TRIG   --   71   --    --    --    --    ALT   --    --    --    --   49  46   CR  0.78  0.68   < >   --   0.80  0.90   GFRESTIMATED  71  84   < >   --   >60  >60   GFRESTBLACK  86  >90   GFR Calc     < >   --   >60  >60   POTASSIUM  4.0  3.7   < >   --   4.3  4.1   TSH   --   2.13   --   1.94   --    --     < > = values in this interval not displayed.      BP Readings from Last 3 Encounters:   02/09/18 158/70   10/18/17 126/72   08/21/17 134/78    Wt Readings from Last 3 Encounters:   02/09/18 144 lb 9.6 oz (65.6 kg)   10/18/17 141 lb 9.6 oz (64.2 kg)   08/21/17 142 lb 12.8 oz (64.8 kg)                  Labs reviewed in EPIC    Reviewed  and updated as needed this visit by clinical staff  Tobacco  Allergies  Meds  Med Hx  Surg Hx  Fam Hx  Soc Hx      Reviewed and updated as needed this visit by Provider  Meds         ROS:  C: NEGATIVE for fever, chills, change in weight  E/M: NEGATIVE for ear, mouth and throat problems  R: NEGATIVE for significant cough or SOB  CV: NEGATIVE for chest pain, palpitations or peripheral edema  : dysuria, incontinence and urinary tract infection  ROS otherwise negative    OBJECTIVE:     /70 (BP Location: Right arm, Patient Position: Chair, Cuff Size: Adult Regular)  Pulse 78  Temp 98.9  F (37.2  C) (Temporal)  Resp 16  Wt 144 lb 9.6 oz (65.6 kg)  SpO2 98%  BMI 21.92 kg/m2  Body mass index is 21.92 kg/(m^2).  GENERAL: healthy, alert and no distress  NECK: no adenopathy, no asymmetry, masses, or scars and thyroid normal to palpation  RESP: lungs clear to auscultation - no rales, rhonchi or wheezes  CV: regular rate and rhythm, normal S1 S2, no S3 or S4, no murmur, click or rub, no peripheral edema and peripheral pulses strong  ABDOMEN: soft, nontender, no hepatosplenomegaly, no masses and bowel sounds normal  MS: no gross musculoskeletal defects noted, no edema    Diagnostic Test Results:  Results for orders placed or performed in visit on 02/09/18 (from the past 24 hour(s))   *UA reflex to Microscopic and Culture ([place] [facility] (except [place] and [place])   Result Value Ref Range    Color Urine Yellow     Appearance Urine Clear     Glucose Urine Negative NEG^Negative mg/dL    Bilirubin Urine Negative NEG^Negative    Ketones Urine Negative NEG^Negative mg/dL    Specific Gravity Urine 1.010 1.003 - 1.035    Blood Urine Trace (A) NEG^Negative    pH Urine 6.5 5.0 - 7.0 pH    Protein Albumin Urine Negative NEG^Negative mg/dL    Urobilinogen Urine 0.2 0.2 - 1.0 EU/dL    Nitrite Urine Negative NEG^Negative    Leukocyte Esterase Urine Moderate (A) NEG^Negative    Source Unspecified Urine     Urine Microscopic   Result Value Ref Range    WBC Urine 5-10 (A) OTO2^O - 2 /HPF    RBC Urine O - 2 OTO2^O - 2 /HPF    Bacteria Urine Few (A) NEG^Negative /HPF       ASSESSMENT/PLAN:       1. Dysuria  Recurrent UTI. Prior Urine Cx E. Coli. Will start Cipro. Patient was instructed to drink plenty of fluids, urinate frequently and to contact the office promptly should she notice fever greater than 102, increase in discomfort, skin rash, lack of improvement after 2 days of treatment, or the appearance of new symptoms.   - *UA reflex to Microscopic and Culture ([place] and [facility] (except [place] and [place])  - Urine Microscopic  - ciprofloxacin (CIPRO) 250 MG tablet; Take 1 tablet (250 mg) by mouth 2 times daily  Dispense: 6 tablet; Refill: 0    2. Nonspecific finding on examination of urine  - Urine Culture Aerobic Bacterial    FUTURE APPOINTMENTS:       - Make appointment with nurse to check blood pressure in 2-3 weeks    Julito South MD  [facility]

## 2018-02-11 LAB
BACTERIA SPEC CULT: ABNORMAL
Lab: ABNORMAL
SPECIMEN SOURCE: ABNORMAL

## 2018-07-18 ENCOUNTER — TELEPHONE (OUTPATIENT)
Dept: FAMILY MEDICINE | Facility: OTHER | Age: 78
End: 2018-07-18

## 2018-07-18 NOTE — TELEPHONE ENCOUNTER
Panel Management Review      Patient has the following on her problem list:       Composite cancer screening  Chart review shows that this patient is due/due soon for the following   Summary:    Patient is due/failing the following:   Blood pressure check, depression questions    Action needed:   Patient needs to do PHQ9. and Patient needs nurse only appointment.    Type of outreach:    Sent [employer] message.    Questions for provider review:    None                                                                                                                                    Prabha LUCAS LPN       Chart routed to Prabha LUCAS LPN

## 2018-08-09 ENCOUNTER — OFFICE VISIT (OUTPATIENT)
Dept: FAMILY MEDICINE | Facility: OTHER | Age: 78
End: 2018-08-09
Payer: COMMERCIAL

## 2018-08-09 VITALS
OXYGEN SATURATION: 99 % | TEMPERATURE: 97 F | RESPIRATION RATE: 18 BRPM | DIASTOLIC BLOOD PRESSURE: 72 MMHG | WEIGHT: 132 LBS | HEIGHT: 68 IN | BODY MASS INDEX: 20 KG/M2 | SYSTOLIC BLOOD PRESSURE: 126 MMHG | HEART RATE: 72 BPM

## 2018-08-09 DIAGNOSIS — I10 ESSENTIAL HYPERTENSION: Primary | ICD-10-CM

## 2018-08-09 PROCEDURE — 99213 OFFICE O/P EST LOW 20 MIN: CPT | Performed by: NURSE PRACTITIONER

## 2018-08-09 RX ORDER — AMLODIPINE BESYLATE 5 MG/1
5 TABLET ORAL DAILY
Qty: 90 TABLET | Refills: 3 | Status: SHIPPED | OUTPATIENT
Start: 2018-08-09 | End: 2019-02-14

## 2018-08-09 NOTE — LETTER
My Depression Action Plan  Name: Unique Ward   Date of Birth 1940  Date: 8/9/2018    My doctor: Tyler Lee   My clinic: [place] [address] [facility] [address] [phone]          GREEN    ZONE   Good Control    What it looks like:     Things are going generally well. You have normal up s and down s. You may even feel depressed from time to time, but bad moods usually last less than a day.   What you need to do:  1. Continue to care for yourself (see self care plan)  2. Check your depression survival kit and update it as needed  3. Follow your physician s recommendations including any medication.  4. Do not stop taking medication unless you consult with your physician first.           YELLOW         ZONE Getting Worse    What it looks like:     Depression is starting to interfere with your life.     It may be hard to get out of bed; you may be starting to isolate yourself from others.    Symptoms of depression are starting to last most all day and this has happened for several days.     You may have suicidal thoughts but they are not constant.   What you need to do:     1. Call your care team, your response to treatment will improve if you keep your care team informed of your progress. Yellow periods are signs an adjustment may need to be made.     2. Continue your self-care, even if you have to fake it!    3. Talk to someone in your support network    4. Open up your depression survival kit           RED    ZONE Medical Alert - Get Help    What it looks like:     Depression is seriously interfering with your life.     You may experience these or other symptoms: You can t get out of bed most days, can t work or engage in other necessary activities, you have trouble taking care of basic hygiene, or basic responsibilities, thoughts of suicide or death that will not go away, self-injurious behavior.     What you need to do:  1. Call your care team and request  a same-day appointment. If they are not available (weekends or after hours) call your local crisis line, emergency room or 911.            Depression Self Care Plan / Survival Kit    Self-Care for Depression  Here s the deal. Your body and mind are really not as separate as most people think.  What you do and think affects how you feel and how you feel influences what you do and think. This means if you do things that people who feel good do, it will help you feel better.  Sometimes this is all it takes.  There is also a place for medication and therapy depending on how severe your depression is, so be sure to consult with your medical provider and/ or Behavioral Health Consultant if your symptoms are worsening or not improving.     In order to better manage my stress, I will:    Exercise  Get some form of exercise, every day. This will help reduce pain and release endorphins, the  feel good  chemicals in your brain. This is almost as good as taking antidepressants!  This is not the same as joining a gym and then never going! (they count on that by the way ) It can be as simple as just going for a walk or doing some gardening, anything that will get you moving.      Hygiene   Maintain good hygiene (Get out of bed in the morning, Make your bed, Brush your teeth, Take a shower, and Get dressed like you were going to work, even if you are unemployed).  If your clothes don't fit try to get ones that do.    Diet  I will strive to eat foods that are good for me, drink plenty of water, and avoid excessive sugar, caffeine, alcohol, and other mood-altering substances.  Some foods that are helpful in depression are: complex carbohydrates, B vitamins, flaxseed, fish or fish oil, fresh fruits and vegetables.    Psychotherapy  I agree to participate in Individual Therapy (if recommended).    Medication  If prescribed medications, I agree to take them.  Missing doses can result in serious side effects.  I understand that drinking  alcohol, or other illicit drug use, may cause potential side effects.  I will not stop my medication abruptly without first discussing it with my provider.    Staying Connected With Others  I will stay in touch with my friends, family members, and my primary care provider/team.    Use your imagination  Be creative.  We all have a creative side; it doesn t matter if it s oil painting, sand castles, or mud pies! This will also kick up the endorphins.    Witness Beauty  (AKA stop and smell the roses) Take a look outside, even in mid-winter. Notice colors, textures. Watch the squirrels and birds.     Service to others  Be of service to others.  There is always someone else in need.  By helping others we can  get out of ourselves  and remember the really important things.  This also provides opportunities for practicing all the other parts of the program.    Humor  Laugh and be silly!  Adjust your TV habits for less news and crime-drama and more comedy.    Control your stress  Try breathing deep, massage therapy, biofeedback, and meditation. Find time to relax each day.     My support system    Clinic Contact:  Phone number:    Contact 1:  Phone number:    Contact 2:  Phone number:    Yazdanism/:  Phone number:    Therapist:  Phone number:    Local crisis center:    Phone number:    Other community support:  Phone number:

## 2018-08-09 NOTE — PROGRESS NOTES
SUBJECTIVE:   Unique Ward is a 78 year old female who presents to clinic today for the following health issues:      Hypertension Follow-up      Outpatient blood pressures are not being checked.    Low Salt Diet: low salt    Refill needed today: YES        Problem list and histories reviewed & adjusted, as indicated.  Additional history: as documented    Patient Active Problem List   Diagnosis     Essential hypertension     Personal history of urinary tract infection     Advance Care Planning     History of basal cell carcinoma- face and scalp     FH: melanoma- son     Dysthymia     Vitamin D deficiency     Fibromyalgia     Left lumbosacral radiculopathy     Acute cystitis with hematuria     Past Surgical History:   Procedure Laterality Date     BIOPSY  11/01/2014     COLONOSCOPY  01.01/1979    Two routine,  one for chronic diarrhea     EYE SURGERY  1/1/2014    first    cataracts both eyes.  second a few months later     GENITOURINARY SURGERY       GYN SURGERY  01.01.1977    TL     HEAD & NECK SURGERY  01.01.1997    basal cell X2   Moh's on scalp.  other on bridge of nose     SOFT TISSUE SURGERY  01.01.1990    Ganglion Cyst   Left inner wrist       Social History   Substance Use Topics     Smoking status: Former Smoker     Packs/day: 0.75     Years: 10.00     Types: Cigarettes     Start date: 1/1/1955     Quit date: 1/1/1965     Smokeless tobacco: Never Used     Alcohol use No     Family History   Problem Relation Age of Onset     Diabetes Maternal Grandmother      Coronary Artery Disease Sister      error     Coronary Artery Disease Brother      error     Hypertension Brother      Hyperlipidemia Brother      Cerebrovascular Disease Brother      Hypertension Sister      Hypertension Sister      Hyperlipidemia Sister      Other Cancer Sister      cervical     Breast Cancer Sister      Other Cancer Sister      kidney     Mental Illness Mother      paranoid/schizophrenic/suicide     Mental Illness Sister      ???   "Had shock treatments         Current Outpatient Prescriptions   Medication Sig Dispense Refill     amLODIPine (NORVASC) 5 MG tablet Take 1 tablet (5 mg) by mouth daily 90 tablet 1     Allergies   Allergen Reactions     No Clinical Screening - See Comments      Eye drop antibiotic.     Codeine      Gentamicin Hives     Per Unique this medication gives hives on arms and legs. Bev Lo LSXO,  ....................  7/15/2014   1:15 PM     Influenza Virus Vaccine H5n1      Paxil [Paroxetine]      Typhoid Vaccine-Strain Ty21a Other (See Comments)     Comment: , Description:      Typhoid Vaccines      BP Readings from Last 3 Encounters:   08/09/18 126/72   02/09/18 158/70   10/18/17 126/72    Wt Readings from Last 3 Encounters:   08/09/18 132 lb (59.9 kg)   02/09/18 144 lb 9.6 oz (65.6 kg)   10/18/17 141 lb 9.6 oz (64.2 kg)                    Reviewed and updated as needed this visit by clinical staff  Tobacco  Allergies  Meds  Med Hx  Surg Hx  Fam Hx  Soc Hx      Reviewed and updated as needed this visit by Provider         ROS:  Constitutional, HEENT, cardiovascular, pulmonary, gi and gu systems are negative, except as otherwise noted.    OBJECTIVE:     /72  Pulse 72  Temp 97  F (36.1  C) (Tympanic)  Resp 18  Ht 5' 7.5\" (1.715 m)  Wt 132 lb (59.9 kg)  LMP  (LMP Unknown)  SpO2 99%  Breastfeeding? No  BMI 20.37 kg/m2  Body mass index is 20.37 kg/(m^2).  GENERAL: healthy, alert and no distress  NECK: no adenopathy, no asymmetry, masses, or scars and thyroid normal to palpation  RESP: lungs clear to auscultation - no rales, rhonchi or wheezes  CV: regular rate and rhythm, normal S1 S2, no S3 or S4, no murmur, click or rub, no peripheral edema and peripheral pulses strong  ABDOMEN: soft, nontender, no hepatosplenomegaly, no masses and bowel sounds normal  MS: no gross musculoskeletal defects noted, no edema    Diagnostic Test Results:  none     ASSESSMENT/PLAN:     Hypertension; " controlled   Associated with the following complications:    None   Plan:  No changes in the patient's current treatment plan  - amLODIPine (NORVASC) 5 MG tablet; Take 1 tablet (5 mg) by mouth daily  Dispense: 90 tablet; Refill: 3    FUTURE APPOINTMENTS:       - Follow-up for annual visit or as needed  See Patient Instructions    MAYRA Peterson [facility]

## 2018-08-09 NOTE — MR AVS SNAPSHOT
"              After Visit Summary   8/9/2018    Unique Ward    MRN: [id number]           Patient Information     Date Of Birth          1940        Visit Information        Provider Department      8/9/2018 8:40 AM Sonja Hartley APRN CNP [facility]        Today's Diagnoses     Essential hypertension    -  1      Care Instructions    I refilled your medication for 1 year.               Follow-ups after your visit        Who to contact     If you have questions or need follow up information about today's clinic visit or your schedule please contact [facility] directly at [phone].  Normal or non-critical lab and imaging results will be communicated to you by [employer]ful Movers[employer], letter or phone within 4 business days after the clinic has received the results. If you do not hear from us within 7 days, please contact the clinic through [employer] or phone. If you have a critical or abnormal lab result, we will notify you by phone as soon as possible.  Submit refill requests through [employer] or call your pharmacy and they will forward the refill request to us. Please allow 3 business days for your refill to be completed.          Additional Information About Your Visit        MyChart Information     [employer] gives you secure access to your electronic health record. If you see a primary care provider, you can also send messages to your care team and make appointments. If you have questions, please call your primary care clinic.  If you do not have a primary care provider, please call [phone] and they will assist you.        Care EveryWhere ID     This is your Care EveryWhere ID. This could be used by other organizations to access your [facility] medical records  [id number]        Your Vitals Were     Pulse Temperature Respirations Height Last Period Pulse Oximetry    72 97  F (36.1  C) (Tympanic) 18 5' 7.5\" (1.715 m) (LMP Unknown) 99%    Breastfeeding? BMI (Body Mass Index)                No " 20.37 kg/m2           Blood Pressure from Last 3 Encounters:   08/09/18 126/72   02/09/18 158/70   10/18/17 126/72    Weight from Last 3 Encounters:   08/09/18 132 lb (59.9 kg)   02/09/18 144 lb 9.6 oz (65.6 kg)   10/18/17 141 lb 9.6 oz (64.2 kg)              Today, you had the following     No orders found for display         Where to get your medicines      These medications were sent to [facility] - [place], [place] - [address]  [address], [facility] [address]     Phone:  [phone]     amLODIPine 5 MG tablet          Primary Care Provider Office Phone # Fax #    Kimpanchito Gopi Lee [phone]       [address] [place] [address]        Equal Access to Services     : Hadii keiko mendoza hadasho Soomaali, waaxda luqadaha, qaybta kaalmada adeegyada, praveen savage . So [facility] [phone].    ATENCIÓN: Si habla español, tiene a reynoso disposición servicios gratuitos de asistencia lingüística. Ciriloame al [phone].    We comply with applicable federal civil rights laws and [place] laws. We do not discriminate on the basis of race, color, national origin, age, disability, sex, sexual orientation, or gender identity.            Thank you!     Thank you for choosing [facility]  for your care. Our goal is always to provide you with excellent care. Hearing back from our patients is one way we can continue to improve our services. Please take a few minutes to complete the written survey that you may receive in the mail after your visit with us. Thank you!             Your Updated Medication List - Protect others around you: Learn how to safely use, store and throw away your medicines at www.disposemymeds.org.          This list is accurate as of 8/9/18  9:00 AM.  Always use your most recent med list.                   Brand Name Dispense Instructions for use Diagnosis    amLODIPine 5 MG tablet    NORVASC    90 tablet     Take 1 tablet (5 mg) by mouth daily    Essential hypertension

## 2018-08-10 ASSESSMENT — PATIENT HEALTH QUESTIONNAIRE - PHQ9: SUM OF ALL RESPONSES TO PHQ QUESTIONS 1-9: 1

## 2018-08-16 ENCOUNTER — OFFICE VISIT (OUTPATIENT)
Dept: FAMILY MEDICINE | Facility: CLINIC | Age: 78
End: 2018-08-16
Payer: COMMERCIAL

## 2018-08-16 VITALS
HEART RATE: 60 BPM | TEMPERATURE: 97 F | DIASTOLIC BLOOD PRESSURE: 70 MMHG | RESPIRATION RATE: 16 BRPM | OXYGEN SATURATION: 95 % | SYSTOLIC BLOOD PRESSURE: 136 MMHG | WEIGHT: 133.4 LBS | BODY MASS INDEX: 20.59 KG/M2

## 2018-08-16 DIAGNOSIS — H61.23 BILATERAL IMPACTED CERUMEN: ICD-10-CM

## 2018-08-16 DIAGNOSIS — N39.0 URINARY TRACT INFECTION WITHOUT HEMATURIA, SITE UNSPECIFIED: Primary | ICD-10-CM

## 2018-08-16 LAB
ALBUMIN UR-MCNC: NEGATIVE MG/DL
APPEARANCE UR: ABNORMAL
BACTERIA #/AREA URNS HPF: ABNORMAL /HPF
BILIRUB UR QL STRIP: NEGATIVE
COLOR UR AUTO: YELLOW
GLUCOSE UR STRIP-MCNC: NEGATIVE MG/DL
HGB UR QL STRIP: ABNORMAL
KETONES UR STRIP-MCNC: NEGATIVE MG/DL
LEUKOCYTE ESTERASE UR QL STRIP: ABNORMAL
NITRATE UR QL: NEGATIVE
PH UR STRIP: 8 PH (ref 5–7)
RBC #/AREA URNS AUTO: 2 /HPF (ref 0–2)
SOURCE: ABNORMAL
SP GR UR STRIP: 1 (ref 1–1.03)
UROBILINOGEN UR STRIP-MCNC: 0 MG/DL (ref 0–2)
WBC #/AREA URNS AUTO: >182 /HPF (ref 0–5)
WBC CLUMPS #/AREA URNS HPF: PRESENT /HPF

## 2018-08-16 PROCEDURE — 87086 URINE CULTURE/COLONY COUNT: CPT | Performed by: FAMILY MEDICINE

## 2018-08-16 PROCEDURE — 81001 URINALYSIS AUTO W/SCOPE: CPT | Performed by: FAMILY MEDICINE

## 2018-08-16 PROCEDURE — 99213 OFFICE O/P EST LOW 20 MIN: CPT | Mod: 25 | Performed by: FAMILY MEDICINE

## 2018-08-16 PROCEDURE — 87088 URINE BACTERIA CULTURE: CPT | Performed by: FAMILY MEDICINE

## 2018-08-16 PROCEDURE — 69209 REMOVE IMPACTED EAR WAX UNI: CPT | Performed by: FAMILY MEDICINE

## 2018-08-16 PROCEDURE — 87186 SC STD MICRODIL/AGAR DIL: CPT | Performed by: FAMILY MEDICINE

## 2018-08-16 RX ORDER — SULFAMETHOXAZOLE/TRIMETHOPRIM 800-160 MG
1 TABLET ORAL 2 TIMES DAILY
Qty: 10 TABLET | Refills: 0 | Status: SHIPPED | OUTPATIENT
Start: 2018-08-16 | End: 2019-02-04

## 2018-08-16 ASSESSMENT — PAIN SCALES - GENERAL: PAINLEVEL: NO PAIN (0)

## 2018-08-16 NOTE — PROGRESS NOTES
SUBJECTIVE:   Unique Ward is a 78 year old female who presents to clinic today for the following health issues:    URINARY TRACT SYMPTOMS  Onset: 2 days    Description:   Painful urination (Dysuria): no   Blood in urine (Hematuria): no   Delay in urine (Hesitency): YES    Intensity: mild    Progression of Symptoms:  same    Accompanying Signs & Symptoms:  Fever/chills: no   Flank pain no   Nausea and vomiting: no   Any vaginal symptoms: none  Abdominal/Pelvic Pain: no     History:   History of frequent UTI's: YES  History of kidney stones: no   Sexually Active: no   Possibility of pregnancy: No    Precipitating factors:   none    Therapies Tried and outcome: Increase fluid intake    Nursing notes above reviewed and confirmed with patient.  Known to have recurrent UTI, the last UTI was in February 2018.  Been feeling a lot of pressure in the bladder with some urgency in the last 2 days.  No dysuria or obvious hematuria.  Also has urine frequency but she been drinking more water.  No abnormal vaginal discharge.  No lower back pain.  No nausea, vomiting, diarrhea or constipation.  No fever or chills.  No other concern.    Problem list and histories reviewed & adjusted, as indicated.  Additional history: as documented    Current Outpatient Prescriptions   Medication Sig Dispense Refill     amLODIPine (NORVASC) 5 MG tablet Take 1 tablet (5 mg) by mouth daily 90 tablet 3     sulfamethoxazole-trimethoprim (BACTRIM DS/SEPTRA DS) 800-160 MG per tablet Take 1 tablet by mouth 2 times daily for 5 days 10 tablet 0     Allergies   Allergen Reactions     No Clinical Screening - See Comments      Eye drop antibiotic.     Codeine Other (See Comments)     Comment: , Description:      Gentamicin Hives     Per Unique this medication gives hives on arms and legs. Bev Lo LSXO,  ....................  7/15/2014   1:15 PM     Influenza Vaccines Other (See Comments)     Comment: , Description:      Influenza Virus Vaccine  H5n1      Paxil [Paroxetine] Other (See Comments)     Comment: , Description:      Typhoid Vaccine-Strain Ty21a Other (See Comments)     Comment: , Description:   Comment: , Description:      Typhoid Vaccines        Reviewed and updated as needed this visit by clinical staff  Tobacco  Allergies  Soc Hx      Reviewed and updated as needed this visit by Provider         ROS:  Constitutional, HEENT, cardiovascular, pulmonary, gi and gu systems are negative, except as otherwise noted.    OBJECTIVE:     /70 (BP Location: Right arm, Patient Position: Sitting, Cuff Size: Adult Regular)  Pulse 60  Temp 97  F (36.1  C) (Temporal)  Resp 16  Wt 133 lb 6.4 oz (60.5 kg)  LMP  (LMP Unknown)  SpO2 95%  Breastfeeding? No  BMI 20.59 kg/m2  Body mass index is 20.59 kg/(m^2).   GENERAL: healthy, alert and no distress  HEENT: TMs are impacted with cerumen bilaterally.  Nares are noncongested.  Oropharynx is pink and moist.  No tonsillar hypertrophy, exudate or erythema.  No tender with palpation to the sinuses.  RESP: lungs clear to auscultation - no rales, rhonchi or wheezes  CV: regular rate and rhythm, no murmur, no peripheral edema and peripheral pulses strong  ABDOMEN: soft, no CVA tenderness and bowel sounds normal.  Mild tender with palpation to the suprapelvic area with no guarding or rebound.      Diagnostic Test Results:  Results for orders placed or performed in visit on 08/16/18 (from the past 24 hour(s))   *UA reflex to Microscopic and Culture ([place]; [facility]; [facility]; [facility]; [facility]; [facility]; [place]; [place])   Result Value Ref [place]    Color Urine Yellow     Appearance Urine Slightly Cloudy     Glucose Urine Negative NEG^Negative mg/dL    Bilirubin Urine Negative NEG^Negative    Ketones Urine Negative NEG^Negative mg/dL    Specific Gravity Urine 1.003 1.003 - 1.035    Blood Urine Moderate (A) NEG^Negative    pH Urine 8.0 (H) 5.0 - 7.0 pH    Protein Albumin Urine  Negative NEG^Negative mg/dL    Urobilinogen mg/dL 0.0 0.0 - 2.0 mg/dL    Nitrite Urine Negative NEG^Negative    Leukocyte Esterase Urine Large (A) NEG^Negative    Source Unspecified Urine     RBC Urine 2 0 - 2 /HPF    WBC Urine >182 (H) 0 - 5 /HPF    WBC Clumps Present (A) NEG^Negative /HPF    Bacteria Urine Few (A) NEG^Negative /HPF       ASSESSMENT/PLAN:     1. Urinary tract infection without hematuria, site unspecified  No sign of pyelonephritis.  Tolerates PO intake well.  Discussed with her about the nature of the condition. Encourage to drink a lot of water.  Start Bactrim twice a days for 5 days.  She responded well to the Bactrim in the past..  Pending urine culture - will call her for the results.  Call in if symptoms persist or worse.  All of her questions were answered.    - *UA reflex to Microscopic and Culture ([place]; [facility]; [facility]; [facility]; [facility]; [facility]; [place]; [place])  - Urine Culture Aerobic Bacterial  - sulfamethoxazole-trimethoprim (BACTRIM DS/SEPTRA DS) 800-160 MG per tablet; Take 1 tablet by mouth 2 times daily for 5 days  Dispense: 10 tablet; Refill: 0    2. Bilateral impacted cerumen  Discussed with Unique about the nature of the condition.  Both ears were successfully irrigated in the office today with no complication.  She tolerated the procedure well.  Avoid Q-Tip.  Inform her that her ear canal is irritated from the irrigation.  Call in if increase pain, develop drainage or if has any concern.      - HC REMOVAL IMPACTED CERUMEN IRRIGATION/LVG UNILAT      Lashell Cameron Mai, MD  [facility]

## 2018-08-16 NOTE — NURSING NOTE
Chief Complaint   Patient presents with     UTI     x2 days     Health Maintenance Due   Topic Date Due     PNEUMOCOCCAL (1 of 2 - PCV13) 07/12/2004     Sailaja Huertas, CMA

## 2018-08-16 NOTE — MR AVS SNAPSHOT
After Visit Summary   8/16/2018    Unique Ward    MRN: [id number]           Patient Information     Date Of Birth          1940        Visit Information        Provider Department      8/16/2018 9:00 AM Lashell Orellana MD [facility]        Today's Diagnoses     Urinary tract infection without hematuria, site unspecified    -  1    Bilateral impacted cerumen           Follow-ups after your visit        Follow-up notes from your care team     Return if symptoms worsen or fail to improve.      Who to contact     If you have questions or need follow up information about today's clinic visit or your schedule please contact [facility] directly at [phone].  Normal or non-critical lab and imaging results will be communicated to you by MyChart, letter or phone within 4 business days after the clinic has received the results. If you do not hear from us within 7 days, please contact the clinic through [employer] or phone. If you have a critical or abnormal lab result, we will notify you by phone as soon as possible.  Submit refill requests through [employer] or call your pharmacy and they will forward the refill request to us. Please allow 3 business days for your refill to be completed.          Additional Information About Your Visit        MyChart Information     [employer] gives you secure access to your electronic health record. If you see a primary care provider, you can also send messages to your care team and make appointments. If you have questions, please call your primary care clinic.  If you do not have a primary care provider, please call [phone] and they will assist you.        Care EveryWhere ID     This is your Care EveryWhere ID. This could be used by other organizations to access your [facility] medical records  [id number]        Your Vitals Were     Pulse Temperature Respirations Last Period Pulse Oximetry Breastfeeding?    60 97  F (36.1  C) (Temporal) 16  (LMP Unknown) 95% No    BMI (Body Mass Index)                   20.59 kg/m2            Blood Pressure from Last 3 Encounters:   08/16/18 136/70   08/09/18 126/72   02/09/18 158/70    Weight from Last 3 Encounters:   08/16/18 133 lb 6.4 oz (60.5 kg)   08/09/18 132 lb (59.9 kg)   02/09/18 144 lb 9.6 oz (65.6 kg)              We Performed the Following     *UA reflex to Microscopic and Culture ([place]; [facility]; [facility]; [facility]; [facility]; [facility]; [place]; [place])     HC REMOVAL IMPACTED CERUMEN IRRIGATION/LVG UNILAT     Urine Culture Aerobic Bacterial          Today's Medication Changes          These changes are accurate as of 8/16/18 12:33 PM.  If you have any questions, ask your nurse or doctor.               Start taking these medicines.        Dose/Directions    sulfamethoxazole-trimethoprim 800-160 MG per tablet   Commonly known as:  BACTRIM DS/SEPTRA DS   Used for:  Urinary tract infection without hematuria, site unspecified   Started by:  Lashell Orellana MD        Dose:  1 tablet   Take 1 tablet by mouth 2 times daily for 5 days   Quantity:  10 tablet   Refills:  0            Where to get your medicines      These medications were sent to [id number] - [place] - [address] [place] [address]  [address] [place] [id number]    Hours:  M-F 8:30-6:30; Sat 9-4; closed Sunday Phone:  [phone]     sulfamethoxazole-trimethoprim 800-160 MG per tablet                Primary Care Provider Office Phone # Fax #    Tyler Lee [phone] [address] [place] [address]        Equal Access to Services     [facility] GIGI AH: Lucie Delgado, johnathan luseveriano, qabobta kaalmada feliberto, praveen daley. So [facility] [phone].    ATENCIÓN: Si habla español, tiene a reynoso disposición servicios gratuitos de asistencia lingüística. Llame al [phone].    We comply with applicable federal civil rights laws and  [place] laws. We do not discriminate on the basis of race, color, national origin, age, disability, sex, sexual orientation, or gender identity.            Thank you!     Thank you for choosing [facility]  for your care. Our goal is always to provide you with excellent care. Hearing back from our patients is one way we can continue to improve our services. Please take a few minutes to complete the written survey that you may receive in the mail after your visit with us. Thank you!             Your Updated Medication List - Protect others around you: Learn how to safely use, store and throw away your medicines at www.disposemymeds.org.          This list is accurate as of 8/16/18 12:33 PM.  Always use your most recent med list.                   Brand Name Dispense Instructions for use Diagnosis    amLODIPine 5 MG tablet    NORVASC    90 tablet    Take 1 tablet (5 mg) by mouth daily    Essential hypertension       sulfamethoxazole-trimethoprim 800-160 MG per tablet    BACTRIM DS/SEPTRA DS    10 tablet    Take 1 tablet by mouth 2 times daily for 5 days    Urinary tract infection without hematuria, site unspecified

## 2018-08-18 LAB
BACTERIA SPEC CULT: ABNORMAL
Lab: ABNORMAL
SPECIMEN SOURCE: ABNORMAL

## 2018-09-07 ENCOUNTER — MYC MEDICAL ADVICE (OUTPATIENT)
Dept: FAMILY MEDICINE | Facility: OTHER | Age: 78
End: 2018-09-07

## 2018-09-07 ENCOUNTER — TELEPHONE (OUTPATIENT)
Dept: FAMILY MEDICINE | Facility: CLINIC | Age: 78
End: 2018-09-07

## 2018-09-07 ENCOUNTER — TELEPHONE (OUTPATIENT)
Dept: FAMILY MEDICINE | Facility: OTHER | Age: 78
End: 2018-09-07

## 2018-09-07 ENCOUNTER — OFFICE VISIT (OUTPATIENT)
Dept: OBGYN | Facility: OTHER | Age: 78
End: 2018-09-07
Payer: COMMERCIAL

## 2018-09-07 VITALS
BODY MASS INDEX: 21.02 KG/M2 | SYSTOLIC BLOOD PRESSURE: 122 MMHG | WEIGHT: 136.2 LBS | HEART RATE: 60 BPM | DIASTOLIC BLOOD PRESSURE: 80 MMHG

## 2018-09-07 DIAGNOSIS — R39.9 UTI SYMPTOMS: ICD-10-CM

## 2018-09-07 DIAGNOSIS — N30.01 ACUTE CYSTITIS WITH HEMATURIA: Primary | ICD-10-CM

## 2018-09-07 DIAGNOSIS — R82.90 NONSPECIFIC FINDING ON EXAMINATION OF URINE: ICD-10-CM

## 2018-09-07 LAB
ALBUMIN UR-MCNC: 30 MG/DL
APPEARANCE UR: CLEAR
BACTERIA #/AREA URNS HPF: ABNORMAL /HPF
BILIRUB UR QL STRIP: NEGATIVE
COLOR UR AUTO: YELLOW
GLUCOSE UR STRIP-MCNC: NEGATIVE MG/DL
HGB UR QL STRIP: ABNORMAL
KETONES UR STRIP-MCNC: NEGATIVE MG/DL
LEUKOCYTE ESTERASE UR QL STRIP: ABNORMAL
NITRATE UR QL: POSITIVE
PH UR STRIP: 8 PH (ref 5–7)
RBC #/AREA URNS AUTO: ABNORMAL /HPF
SOURCE: ABNORMAL
SP GR UR STRIP: 1.01 (ref 1–1.03)
UROBILINOGEN UR STRIP-ACNC: 0.2 EU/DL (ref 0.2–1)
WBC #/AREA URNS AUTO: ABNORMAL /HPF
WBC CLUMPS #/AREA URNS HPF: PRESENT /HPF

## 2018-09-07 PROCEDURE — 81001 URINALYSIS AUTO W/SCOPE: CPT | Performed by: OBSTETRICS & GYNECOLOGY

## 2018-09-07 PROCEDURE — 99213 OFFICE O/P EST LOW 20 MIN: CPT | Performed by: OBSTETRICS & GYNECOLOGY

## 2018-09-07 PROCEDURE — 87086 URINE CULTURE/COLONY COUNT: CPT | Performed by: OBSTETRICS & GYNECOLOGY

## 2018-09-07 RX ORDER — NITROFURANTOIN 25; 75 MG/1; MG/1
100 CAPSULE ORAL 2 TIMES DAILY
Qty: 14 CAPSULE | Refills: 0 | Status: SHIPPED | OUTPATIENT
Start: 2018-09-07 | End: 2019-01-11

## 2018-09-07 NOTE — MR AVS SNAPSHOT
After Visit Summary   9/7/2018    Unique Ward    MRN: [id number]           Patient Information     Date Of Birth          1940        Visit Information        Provider Department      9/7/2018 2:30 PM Kaley Rodriguez MD [facility]        Today's Diagnoses     Acute cystitis with hematuria    -  1    Nonspecific finding on examination of urine        UTI symptoms           Follow-ups after your visit        Follow-up notes from your care team     Return if symptoms worsen or fail to improve.      Future tests that were ordered for you today     Open Future Orders        Priority Expected Expires Ordered    *UA reflex to Microscopic and Culture ([place] and [facility] (except [place] and [place]) Routine  11/7/2018 9/7/2018            Who to contact     If you have questions or need follow up information about today's clinic visit or your schedule please contact [facility] directly at [phone].  Normal or non-critical lab and imaging results will be communicated to you by MyChart, letter or phone within 4 business days after the clinic has received the results. If you do not hear from us within 7 days, please contact the clinic through [employer] or phone. If you have a critical or abnormal lab result, we will notify you by phone as soon as possible.  Submit refill requests through [employer] or call your pharmacy and they will forward the refill request to us. Please allow 3 business days for your refill to be completed.          Additional Information About Your Visit        MyChart Information     [employer] gives you secure access to your electronic health record. If you see a primary care provider, you can also send messages to your care team and make appointments. If you have questions, please call your primary care clinic.  If you do not have a primary care provider, please call [phone] and they will assist you.        Care EveryWhere ID     This  is your Care EveryWhere ID. This could be used by other organizations to access your [facility] medical records  [id number]        Your Vitals Were     Pulse Last Period BMI (Body Mass Index)             60 (LMP Unknown) 21.02 kg/m2          Blood Pressure from Last 3 Encounters:   09/07/18 122/80   08/16/18 136/70   08/09/18 126/72    Weight from Last 3 Encounters:   09/07/18 136 lb 3.2 oz (61.8 kg)   08/16/18 133 lb 6.4 oz (60.5 kg)   08/09/18 132 lb (59.9 kg)              We Performed the Following     *UA reflex to Microscopic and Culture ([place] and [facility] (except [place] and [place])     Urine Culture Aerobic Bacterial     Urine Microscopic          Today's Medication Changes          These changes are accurate as of 9/7/18  3:00 PM.  If you have any questions, ask your nurse or doctor.               Start taking these medicines.        Dose/Directions    nitroFURantoin (macrocrystal-monohydrate) 100 MG capsule   Commonly known as:  MACROBID   Used for:  UTI symptoms   Started by:  Kaley Rodriguez MD        Dose:  100 mg   Take 1 capsule (100 mg) by mouth 2 times daily   Quantity:  14 capsule   Refills:  0            Where to get your medicines      These medications were sent to [id number] - [place], [place] - [address] [place] [address]  [address] [place] [id number]    Hours:  M-F 8:30-6:30; Sat 9-4; closed Sunday Phone:  [phone]     nitroFURantoin (macrocrystal-monohydrate) 100 MG capsule                Primary Care Provider Office Phone # Fax #    Tyler Lee [phone] [address] [place] [address]        Equal Access to Services     [facility] GIGI AH: Hadii keiko Delgado, wamartinda luqadaha, qaybta kaalmada adeegyada, praveen daley. So [facility] [phone].    ATENCIÓN: Si habla español, tiene a reynoso disposición servicios gratuitos de asistencia lingüística. Llame al [phone].    We comply with applicable federal  civil rights laws and [place] laws. We do not discriminate on the basis of race, color, national origin, age, disability, sex, sexual orientation, or gender identity.            Thank you!     Thank you for choosing [facility]  for your care. Our goal is always to provide you with excellent care. Hearing back from our patients is one way we can continue to improve our services. Please take a few minutes to complete the written survey that you may receive in the mail after your visit with us. Thank you!             Your Updated Medication List - Protect others around you: Learn how to safely use, store and throw away your medicines at www.disposemymeds.org.          This list is accurate as of 9/7/18  3:00 PM.  Always use your most recent med list.                   Brand Name Dispense Instructions for use Diagnosis    amLODIPine 5 MG tablet    NORVASC    90 tablet    Take 1 tablet (5 mg) by mouth daily    Essential hypertension       nitroFURantoin (macrocrystal-monohydrate) 100 MG capsule    MACROBID    14 capsule    Take 1 capsule (100 mg) by mouth 2 times daily    UTI symptoms

## 2018-09-07 NOTE — TELEPHONE ENCOUNTER
LMTC, when patient calls back please see if she would like to see our OB Dr Kaley Rodriguez instead of seeing Alan Mcintosh. She is scheduled with Alan Mcintosh today 9/7/2018 at 4:20  [employer] message has also been sent   Thanks  Saray Ospina RT (R)

## 2018-09-07 NOTE — PROGRESS NOTES
SUBJECTIVE:                                                   Unique Ward is a 78 year old female who presents to clinic today for the following health issue(s):  Patient presents with:  Urinary Problem: lower abdominal fullness, denies dysuria, denies hematuria      HPI:  This is a breonna 78 y.o.  who thinks she may have a uti because of lower pelvic fullness.  Otherwise, without symptoms. No f/c/n/v/d. No back pain.    No LMP recorded (lmp unknown). Patient is postmenopausal..   Patient is not sexually active, .  Using menopause for contraception.    reports that she quit smoking about 53 years ago. Her smoking use included Cigarettes. She started smoking about 63 years ago. She has a 7.50 pack-year smoking history. She has never used smokeless tobacco.    STD testing offered?  Declined - not sexually active      Problem list and histories reviewed & adjusted, as indicated.  Additional history: as documented.    Patient Active Problem List   Diagnosis     Essential hypertension     Personal history of urinary tract infection     Advance Care Planning     History of basal cell carcinoma- face and scalp     FH: melanoma- son     Dysthymia     Vitamin D deficiency     Fibromyalgia     Left lumbosacral radiculopathy     Acute cystitis with hematuria     Past Surgical History:   Procedure Laterality Date     BIOPSY  2014     COLONOSCOPY      Two routine,  one for chronic diarrhea     EYE SURGERY  2014    first    cataracts both eyes.  second a few months later     GENITOURINARY SURGERY       GYN SURGERY  1977    TL     HEAD & NECK SURGERY  1997    basal cell X2   Moh's on scalp.  other on bridge of nose     SOFT TISSUE SURGERY  1990    Ganglion Cyst   Left inner wrist      Social History   Substance Use Topics     Smoking status: Former Smoker     Packs/day: 0.75     Years: 10.00     Types: Cigarettes     Start date: 1955     Quit date: 1965     Smokeless  tobacco: Never Used     Alcohol use No      Problem (# of Occurrences) Relation (Name,Age of Onset)    Breast Cancer (1) Sister (2 sisters    #3 & 4)    Cerebrovascular Disease (1) Brother (error)    Coronary Artery Disease (2) Sister (error): error, Brother (error): error    Diabetes (1) Maternal Grandmother (Denise)    Hyperlipidemia (2) Brother (error), Sister (#4)    Hypertension (3) Brother (error), Sister (2 sisters   #1 & 3), Sister (2 sisters   #4 & 5)    Mental Illness (2) Mother (mother): paranoid/schizophrenic/suicide, Sister (# 1): ???  Had shock treatments    Other Cancer (2) Sister (#4): cervical, Sister (#2): kidney            Current Outpatient Prescriptions   Medication Sig     amLODIPine (NORVASC) 5 MG tablet Take 1 tablet (5 mg) by mouth daily     nitroFURantoin, macrocrystal-monohydrate, (MACROBID) 100 MG capsule Take 1 capsule (100 mg) by mouth 2 times daily     No current facility-administered medications for this visit.      Allergies   Allergen Reactions     No Clinical Screening - See Comments      Eye drop antibiotic.     Codeine Other (See Comments)     Comment: , Description:      Gentamicin Hives     Per Unique this medication gives hives on arms and legs. Bev Lo LSXO,  ....................  7/15/2014   1:15 PM     Influenza Vaccines Other (See Comments)     Comment: , Description:      Influenza Virus Vaccine H5n1      Paxil [Paroxetine] Other (See Comments)     Comment: , Description:      Typhoid Vaccine-Strain Ty21a Other (See Comments)     Comment: , Description:   Comment: , Description:      Typhoid Vaccines        ROS:  12 point review of systems negative other than symptoms noted below.    OBJECTIVE:     /80 (BP Location: Right arm, Cuff Size: Adult Regular)  Pulse 60  Wt 136 lb 3.2 oz (61.8 kg)  LMP  (LMP Unknown)  BMI 21.02 kg/m2  Body mass index is 21.02 kg/(m^2).    Exam:  Well appearing female appearing stated age  Remainder of exam  deferred    In-Clinic Test Results:  Results for orders placed or performed in visit on 09/07/18 (from the past 24 hour(s))   *UA reflex to Microscopic and Culture ([place] and [facility] (except [place] and [place])   Result Value Ref Range    Color Urine Yellow     Appearance Urine Clear     Glucose Urine Negative NEG^Negative mg/dL    Bilirubin Urine Negative NEG^Negative    Ketones Urine Negative NEG^Negative mg/dL    Specific Gravity Urine 1.015 1.003 - 1.035    Blood Urine Small (A) NEG^Negative    pH Urine 8.0 (H) 5.0 - 7.0 pH    Protein Albumin Urine 30 (A) NEG^Negative mg/dL    Urobilinogen Urine 0.2 0.2 - 1.0 EU/dL    Nitrite Urine Positive (A) NEG^Negative    Leukocyte Esterase Urine Large (A) NEG^Negative    Source Unspecified Urine    Urine Microscopic   Result Value Ref Range    WBC Urine  (A) OTO5^0 - 5 /HPF    RBC Urine 5-10 (A) OTO2^O - 2 /HPF    WBC Clumps Present (A) NEG^Negative /HPF    Bacteria Urine Many (A) NEG^Negative /HPF       ASSESSMENT/PLAN:                                                        ICD-10-CM    1. Acute cystitis with hematuria N30.01    2. Nonspecific finding on examination of urine R82.90 Urine Culture Aerobic Bacterial   3. UTI symptoms R39.9 *UA reflex to Microscopic and Culture ([place] and [facility] (except [place] and [place])     Urine Microscopic     nitroFURantoin, macrocrystal-monohydrate, (MACROBID) 100 MG capsule     *UA reflex to Microscopic and Culture ([facility] (except [facility])     Review of urine indicates uti. rx for macrobid sent. ASHLEE in 2 weeks. RTC prn.    I spent 15 minutes with patient, greater than one half devoted to coordination of care for diagnosis and plan above.        Kaley Rodriguez MD  [employer]

## 2018-09-08 LAB
BACTERIA SPEC CULT: NORMAL
Lab: NORMAL
SPECIMEN SOURCE: NORMAL

## 2018-09-21 DIAGNOSIS — R82.90 NONSPECIFIC FINDING ON EXAMINATION OF URINE: Primary | ICD-10-CM

## 2018-09-21 DIAGNOSIS — R39.9 UTI SYMPTOMS: ICD-10-CM

## 2018-09-21 LAB
ALBUMIN UR-MCNC: NEGATIVE MG/DL
APPEARANCE UR: ABNORMAL
BACTERIA #/AREA URNS HPF: ABNORMAL /HPF
BILIRUB UR QL STRIP: NEGATIVE
COLOR UR AUTO: YELLOW
GLUCOSE UR STRIP-MCNC: NEGATIVE MG/DL
HGB UR QL STRIP: ABNORMAL
KETONES UR STRIP-MCNC: NEGATIVE MG/DL
LEUKOCYTE ESTERASE UR QL STRIP: ABNORMAL
NITRATE UR QL: NEGATIVE
PH UR STRIP: 7 PH (ref 5–7)
RBC #/AREA URNS AUTO: ABNORMAL /HPF
SOURCE: ABNORMAL
SP GR UR STRIP: 1.02 (ref 1–1.03)
UROBILINOGEN UR STRIP-ACNC: 0.2 EU/DL (ref 0.2–1)
WBC #/AREA URNS AUTO: >100 /HPF

## 2018-09-21 PROCEDURE — 81001 URINALYSIS AUTO W/SCOPE: CPT | Performed by: OBSTETRICS & GYNECOLOGY

## 2018-09-21 PROCEDURE — 87086 URINE CULTURE/COLONY COUNT: CPT | Performed by: OBSTETRICS & GYNECOLOGY

## 2018-09-21 PROCEDURE — 87088 URINE BACTERIA CULTURE: CPT | Performed by: OBSTETRICS & GYNECOLOGY

## 2018-09-21 PROCEDURE — 87186 SC STD MICRODIL/AGAR DIL: CPT | Performed by: OBSTETRICS & GYNECOLOGY

## 2018-09-23 LAB
BACTERIA SPEC CULT: ABNORMAL
Lab: ABNORMAL
SPECIMEN SOURCE: ABNORMAL

## 2018-09-24 ENCOUNTER — MYC MEDICAL ADVICE (OUTPATIENT)
Dept: OBGYN | Facility: CLINIC | Age: 78
End: 2018-09-24

## 2018-09-24 RX ORDER — AMOXICILLIN 250 MG/1
250 CAPSULE ORAL 3 TIMES DAILY
Qty: 21 CAPSULE | Refills: 0 | Status: SHIPPED | OUTPATIENT
Start: 2018-09-24 | End: 2019-01-11

## 2018-09-26 NOTE — TELEPHONE ENCOUNTER
Called and spoke with PT to see if she started the medication, PT has and then was informed to let us know if there is anything else that is needed.  Tanika Grimes, CMA

## 2018-09-27 ENCOUNTER — TELEPHONE (OUTPATIENT)
Dept: OBGYN | Facility: OTHER | Age: 78
End: 2018-09-27

## 2018-09-27 DIAGNOSIS — R39.9 UTI SYMPTOMS: Primary | ICD-10-CM

## 2018-09-27 NOTE — TELEPHONE ENCOUNTER
Spoke with pt.  Let her know to make a lab only appt when she is done with her second round of abx for UTI to give another UA for test of cure.  Advised pt to complete the full course of abx and drink lots of water.  Pt verbalized understanding and agreed to plan.    Charlene Markham RN

## 2018-09-27 NOTE — TELEPHONE ENCOUNTER
Attempted to reach pt to relay result note below to her.    Notes Recorded by Kaley Rodriguez MD on 9/24/2018 at 9:47 AM  Charlene jocy's urine is still infected.  I am giving her a different antibiotic but she needs a test of cure urine again with culture.  Please follow up on this and make sure she sees the [employer] message.  Thanks,    Left message for pt to return call to clinic.  Placed test of cure orders for a UA when pt is finished with her abx.    Charlene Markham RN

## 2018-10-04 DIAGNOSIS — R82.90 NONSPECIFIC FINDING ON EXAMINATION OF URINE: Primary | ICD-10-CM

## 2018-10-04 DIAGNOSIS — R39.9 UTI SYMPTOMS: ICD-10-CM

## 2018-10-04 LAB
ALBUMIN UR-MCNC: NEGATIVE MG/DL
APPEARANCE UR: ABNORMAL
BACTERIA #/AREA URNS HPF: ABNORMAL /HPF
BILIRUB UR QL STRIP: NEGATIVE
COLOR UR AUTO: YELLOW
GLUCOSE UR STRIP-MCNC: NEGATIVE MG/DL
HGB UR QL STRIP: ABNORMAL
KETONES UR STRIP-MCNC: NEGATIVE MG/DL
LEUKOCYTE ESTERASE UR QL STRIP: ABNORMAL
NITRATE UR QL: NEGATIVE
PH UR STRIP: 6.5 PH (ref 5–7)
RBC #/AREA URNS AUTO: ABNORMAL /HPF
SOURCE: ABNORMAL
SP GR UR STRIP: 1.01 (ref 1–1.03)
UROBILINOGEN UR STRIP-ACNC: 0.2 EU/DL (ref 0.2–1)
WBC #/AREA URNS AUTO: >100 /HPF

## 2018-10-04 PROCEDURE — 81001 URINALYSIS AUTO W/SCOPE: CPT | Performed by: OBSTETRICS & GYNECOLOGY

## 2018-10-04 PROCEDURE — 87086 URINE CULTURE/COLONY COUNT: CPT | Performed by: OBSTETRICS & GYNECOLOGY

## 2018-10-05 LAB
BACTERIA SPEC CULT: NORMAL
Lab: NORMAL
SPECIMEN SOURCE: NORMAL

## 2019-01-11 ENCOUNTER — OFFICE VISIT (OUTPATIENT)
Dept: FAMILY MEDICINE | Facility: OTHER | Age: 79
End: 2019-01-11
Payer: COMMERCIAL

## 2019-01-11 VITALS
WEIGHT: 132.9 LBS | RESPIRATION RATE: 20 BRPM | DIASTOLIC BLOOD PRESSURE: 64 MMHG | TEMPERATURE: 96.4 F | OXYGEN SATURATION: 96 % | HEART RATE: 72 BPM | SYSTOLIC BLOOD PRESSURE: 112 MMHG | BODY MASS INDEX: 21.36 KG/M2 | HEIGHT: 66 IN

## 2019-01-11 DIAGNOSIS — R30.0 DYSURIA: Primary | ICD-10-CM

## 2019-01-11 LAB
ALBUMIN UR-MCNC: NEGATIVE MG/DL
APPEARANCE UR: CLEAR
BACTERIA #/AREA URNS HPF: ABNORMAL /HPF
BILIRUB UR QL STRIP: NEGATIVE
COLOR UR AUTO: YELLOW
GLUCOSE UR STRIP-MCNC: NEGATIVE MG/DL
HGB UR QL STRIP: NEGATIVE
KETONES UR STRIP-MCNC: NEGATIVE MG/DL
LEUKOCYTE ESTERASE UR QL STRIP: ABNORMAL
NITRATE UR QL: NEGATIVE
NON-SQ EPI CELLS #/AREA URNS LPF: ABNORMAL /LPF
PH UR STRIP: 7 PH (ref 5–7)
RBC #/AREA URNS AUTO: ABNORMAL /HPF
SOURCE: ABNORMAL
SP GR UR STRIP: 1.01 (ref 1–1.03)
UROBILINOGEN UR STRIP-ACNC: 0.2 EU/DL (ref 0.2–1)
WBC #/AREA URNS AUTO: ABNORMAL /HPF

## 2019-01-11 PROCEDURE — 87186 SC STD MICRODIL/AGAR DIL: CPT | Performed by: PHYSICIAN ASSISTANT

## 2019-01-11 PROCEDURE — 87088 URINE BACTERIA CULTURE: CPT | Performed by: PHYSICIAN ASSISTANT

## 2019-01-11 PROCEDURE — 81001 URINALYSIS AUTO W/SCOPE: CPT | Performed by: PHYSICIAN ASSISTANT

## 2019-01-11 PROCEDURE — 87086 URINE CULTURE/COLONY COUNT: CPT | Performed by: PHYSICIAN ASSISTANT

## 2019-01-11 PROCEDURE — 99213 OFFICE O/P EST LOW 20 MIN: CPT | Performed by: PHYSICIAN ASSISTANT

## 2019-01-11 RX ORDER — SULFAMETHOXAZOLE/TRIMETHOPRIM 800-160 MG
1 TABLET ORAL 2 TIMES DAILY
Qty: 6 TABLET | Refills: 0 | Status: SHIPPED | OUTPATIENT
Start: 2019-01-11 | End: 2019-02-04

## 2019-01-11 ASSESSMENT — MIFFLIN-ST. JEOR: SCORE: 1098.55

## 2019-01-11 ASSESSMENT — PAIN SCALES - GENERAL: PAINLEVEL: NO PAIN (0)

## 2019-01-11 ASSESSMENT — PATIENT HEALTH QUESTIONNAIRE - PHQ9: SUM OF ALL RESPONSES TO PHQ QUESTIONS 1-9: 3

## 2019-01-11 NOTE — PROGRESS NOTES
"  SUBJECTIVE:   Unique Ward is a 78 year old female who presents to clinic today for the following health issues:      URINARY TRACT SYMPTOMS  Onset: 1 week    Description:   Painful urination (Dysuria): YES  Blood in urine (Hematuria): no   Delay in urine (Hesitency): no     Intensity: moderate    Progression of Symptoms:  same    Accompanying Signs & Symptoms:  Fever/chills: YES  Flank pain no   Nausea and vomiting: no   Any vaginal symptoms: none  Abdominal/Pelvic Pain: no     History:   History of frequent UTI's: YES  History of kidney stones: no   Sexually Active: no   Possibility of pregnancy: No    Precipitating factors:   Not drinking enough water    Therapies Tried and outcome: nothing    Patient is a 78 year old female who present with 1 week of pain urination. Patient said that she has noticed her urine is murky and that she has had more difficulty getting things going when trying to urinate over the past week. She also reports low energy and some constipation. Patient has history of UTIs, we will check urine today and treat accordingly.     Problem list and histories reviewed & adjusted, as indicated.  Additional history: as documented    Reviewed and updated as needed this visit by clinical staff  Tobacco  Allergies  Meds  Med Hx  Surg Hx  Fam Hx  Soc Hx      Reviewed and updated as needed this visit by Provider         ROS:  Constitutional, HEENT, cardiovascular, pulmonary, gi and gu systems are negative, except as otherwise noted.    OBJECTIVE:     /64 (BP Location: Left arm, Patient Position: Chair, Cuff Size: Adult Regular)   Pulse 72   Temp 96.4  F (35.8  C) (Oral)   Resp 20   Ht 1.683 m (5' 6.25\")   Wt 60.3 kg (132 lb 14.4 oz)   LMP  (LMP Unknown)   SpO2 96%   BMI 21.29 kg/m    Body mass index is 21.29 kg/m .  GENERAL: healthy, alert and no distress  RESP: lungs clear to auscultation - no rales, rhonchi or wheezes  CV: regular rate and rhythm, normal S1 S2, no S3 or S4, no " murmur, click or rub, no peripheral edema and peripheral pulses strong  ABDOMEN: soft, nontender, no hepatosplenomegaly, no masses and bowel sounds normal  MS: no gross musculoskeletal defects noted, no edema  NEURO: Normal strength and tone, mentation intact and speech normal  PSYCH: mentation appears normal, affect normal/bright    Diagnostic Test Results:  Results for orders placed or performed in visit on 01/11/19   *UA reflex to Microscopic and Culture ([place] and [facility] (except [place] and [place])   Result Value Ref Range    Color Urine Yellow     Appearance Urine Clear     Glucose Urine Negative NEG^Negative mg/dL    Bilirubin Urine Negative NEG^Negative    Ketones Urine Negative NEG^Negative mg/dL    Specific Gravity Urine 1.015 1.003 - 1.035    Blood Urine Negative NEG^Negative    pH Urine 7.0 5.0 - 7.0 pH    Protein Albumin Urine Negative NEG^Negative mg/dL    Urobilinogen Urine 0.2 0.2 - 1.0 EU/dL    Nitrite Urine Negative NEG^Negative    Leukocyte Esterase Urine Moderate (A) NEG^Negative    Source Unspecified Urine    Urine Microscopic   Result Value Ref Range    WBC Urine 25-50 (A) OTO5^0 - 5 /HPF    RBC Urine 2-5 (A) OTO2^O - 2 /HPF    Squamous Epithelial /LPF Urine Few FEW^Few /LPF    Bacteria Urine Many (A) NEG^Negative /HPF   Urine Culture Aerobic Bacterial   Result Value Ref Range    Specimen Description Unspecified Urine     Special Requests Specimen received in preservative     Culture Micro >100,000 colonies/mL  Escherichia coli   (A)        Susceptibility    Escherichia coli - TIA     AMPICILLIN >=32 Resistant ug/mL     CEFAZOLIN* <=4 Sensitive ug/mL      * Cefazolin TIA breakpoints are for the treatment of uncomplicated urinary tract infections.  For the treatment of systemic infections, please contact the laboratory for additional testing.     CEFOXITIN <=4 Sensitive ug/mL     CEFTAZIDIME <=1 Sensitive ug/mL     CEFTRIAXONE <=1 Sensitive ug/mL     CIPROFLOXACIN <=0.25 Sensitive  ug/mL     GENTAMICIN <=1 Sensitive ug/mL     LEVOFLOXACIN <=0.12 Sensitive ug/mL     NITROFURANTOIN 64 Intermediate ug/mL     TOBRAMYCIN <=1 Sensitive ug/mL     Trimethoprim/Sulfa <=1/19 Sensitive ug/mL     AMPICILLIN/SULBACTAM 16 Intermediate ug/mL     Piperacillin/Tazo <=4 Sensitive ug/mL     CEFEPIME <=1 Sensitive ug/mL       ASSESSMENT/PLAN:     1. Dysuria  Discussed with patient evidence for infection and started patient on bactrim for next 3 days. She will call clinic if not improving as expected. Encouraged good hygiene and adequate fluid intake. If constipation persists patient may try stool softeners or fiber supplementation.   - *UA reflex to Microscopic and Culture ([place] and [facility] (except [place] and [place])  - sulfamethoxazole-trimethoprim (BACTRIM DS/SEPTRA DS) 800-160 MG tablet; Take 1 tablet by mouth 2 times daily for 3 days  Dispense: 6 tablet; Refill: 0  - Urine Microscopic  - Urine Culture Aerobic Bacterial    Follow up with clinic as needed or sooner if conditions change, worsen or fail to improve as expected.      Jabari Moore PA-C  [facility]

## 2019-01-11 NOTE — NURSING NOTE
Health Maintenance Due   Topic Date Due     ZOSTER IMMUNIZATION (1 of 2) 07/12/1989     INFLUENZA VACCINE (1) 09/01/2018     PHQ-9 Q6 MONTHS  02/09/2019     Prabha LUCAS LPN

## 2019-01-13 LAB
BACTERIA SPEC CULT: ABNORMAL
Lab: ABNORMAL
SPECIMEN SOURCE: ABNORMAL

## 2019-01-14 ENCOUNTER — TELEPHONE (OUTPATIENT)
Dept: FAMILY MEDICINE | Facility: OTHER | Age: 79
End: 2019-01-14

## 2019-01-14 NOTE — TELEPHONE ENCOUNTER
I left a call back message.    I am calling with the following per Jabari Moore PA-C:the culture returned with growth of E. Coli. This is a common cause of urinary tract infections. Please check to make sure that her symptoms have improved. Encourage regular water consumption and wiping front to back after urinating.

## 2019-01-14 NOTE — TELEPHONE ENCOUNTER
----- Message from Jabari Moore PA-C sent at 1/13/2019  9:52 AM CST -----  Please call with results. Please inform patient that the culture returned with growth of E. Coli. This is a common cause of urinary tract infections. Please check to make sure that her symptoms have improved. Encourage regular water consumption and wiping front to back after urinating.      Jabari Moore PA-C

## 2019-01-15 ENCOUNTER — OFFICE VISIT (OUTPATIENT)
Dept: FAMILY MEDICINE | Facility: OTHER | Age: 79
End: 2019-01-15
Payer: COMMERCIAL

## 2019-01-15 VITALS
RESPIRATION RATE: 18 BRPM | OXYGEN SATURATION: 94 % | TEMPERATURE: 98 F | HEIGHT: 66 IN | WEIGHT: 135 LBS | HEART RATE: 69 BPM | BODY MASS INDEX: 21.69 KG/M2 | SYSTOLIC BLOOD PRESSURE: 120 MMHG | DIASTOLIC BLOOD PRESSURE: 70 MMHG

## 2019-01-15 DIAGNOSIS — E03.9 HYPOTHYROIDISM, UNSPECIFIED TYPE: ICD-10-CM

## 2019-01-15 DIAGNOSIS — N30.01 ACUTE CYSTITIS WITH HEMATURIA: ICD-10-CM

## 2019-01-15 DIAGNOSIS — Z86.2 HISTORY OF ANEMIA: ICD-10-CM

## 2019-01-15 DIAGNOSIS — E55.9 VITAMIN D DEFICIENCY: ICD-10-CM

## 2019-01-15 DIAGNOSIS — Z00.00 ANNUAL PHYSICAL EXAM: ICD-10-CM

## 2019-01-15 DIAGNOSIS — N39.0 RECURRENT UTI: ICD-10-CM

## 2019-01-15 DIAGNOSIS — I10 ESSENTIAL HYPERTENSION: Primary | ICD-10-CM

## 2019-01-15 DIAGNOSIS — R79.89 LOW TSH LEVEL: ICD-10-CM

## 2019-01-15 LAB
ANION GAP SERPL CALCULATED.3IONS-SCNC: 6 MMOL/L (ref 3–14)
BUN SERPL-MCNC: 17 MG/DL (ref 7–30)
CALCIUM SERPL-MCNC: 8.8 MG/DL (ref 8.5–10.1)
CHLORIDE SERPL-SCNC: 101 MMOL/L (ref 94–109)
CO2 SERPL-SCNC: 27 MMOL/L (ref 20–32)
CREAT SERPL-MCNC: 0.87 MG/DL (ref 0.52–1.04)
DEPRECATED CALCIDIOL+CALCIFEROL SERPL-MC: 27 UG/L (ref 20–75)
ERYTHROCYTE [DISTWIDTH] IN BLOOD BY AUTOMATED COUNT: 13.5 % (ref 10–15)
GFR SERPL CREATININE-BSD FRML MDRD: 63 ML/MIN/{1.73_M2}
GLUCOSE SERPL-MCNC: 91 MG/DL (ref 70–99)
HCT VFR BLD AUTO: 38.6 % (ref 35–47)
HGB BLD-MCNC: 12.7 G/DL (ref 11.7–15.7)
IRON SATN MFR SERPL: 45 % (ref 15–46)
IRON SERPL-MCNC: 162 UG/DL (ref 35–180)
MCH RBC QN AUTO: 29.7 PG (ref 26.5–33)
MCHC RBC AUTO-ENTMCNC: 32.9 G/DL (ref 31.5–36.5)
MCV RBC AUTO: 90 FL (ref 78–100)
PLATELET # BLD AUTO: 306 10E9/L (ref 150–450)
POTASSIUM SERPL-SCNC: 4.3 MMOL/L (ref 3.4–5.3)
RBC # BLD AUTO: 4.28 10E12/L (ref 3.8–5.2)
SODIUM SERPL-SCNC: 134 MMOL/L (ref 133–144)
TIBC SERPL-MCNC: 363 UG/DL (ref 240–430)
TSH SERPL DL<=0.005 MIU/L-ACNC: 1.55 MU/L (ref 0.4–4)
WBC # BLD AUTO: 4.5 10E9/L (ref 4–11)

## 2019-01-15 PROCEDURE — 85027 COMPLETE CBC AUTOMATED: CPT | Performed by: FAMILY MEDICINE

## 2019-01-15 PROCEDURE — 82306 VITAMIN D 25 HYDROXY: CPT | Performed by: FAMILY MEDICINE

## 2019-01-15 PROCEDURE — 84443 ASSAY THYROID STIM HORMONE: CPT | Performed by: FAMILY MEDICINE

## 2019-01-15 PROCEDURE — 83550 IRON BINDING TEST: CPT | Performed by: FAMILY MEDICINE

## 2019-01-15 PROCEDURE — 83540 ASSAY OF IRON: CPT | Performed by: FAMILY MEDICINE

## 2019-01-15 PROCEDURE — 36415 COLL VENOUS BLD VENIPUNCTURE: CPT | Performed by: FAMILY MEDICINE

## 2019-01-15 PROCEDURE — G0438 PPPS, INITIAL VISIT: HCPCS | Performed by: FAMILY MEDICINE

## 2019-01-15 PROCEDURE — 99213 OFFICE O/P EST LOW 20 MIN: CPT | Mod: 25 | Performed by: FAMILY MEDICINE

## 2019-01-15 PROCEDURE — 80048 BASIC METABOLIC PNL TOTAL CA: CPT | Performed by: FAMILY MEDICINE

## 2019-01-15 ASSESSMENT — ENCOUNTER SYMPTOMS
PALPITATIONS: 0
SORE THROAT: 0
DYSURIA: 0
HEADACHES: 0
SHORTNESS OF BREATH: 0
HEMATURIA: 0
ABDOMINAL PAIN: 0
NERVOUS/ANXIOUS: 0
DIZZINESS: 0
HEMATOCHEZIA: 0
ARTHRALGIAS: 1
MYALGIAS: 1
HEARTBURN: 0
JOINT SWELLING: 1
NAUSEA: 0
CHILLS: 0
WEAKNESS: 0
COUGH: 0
BREAST MASS: 0
DIARRHEA: 0
FEVER: 0
EYE PAIN: 0
CONSTIPATION: 0
PARESTHESIAS: 0
FREQUENCY: 0

## 2019-01-15 ASSESSMENT — PAIN SCALES - GENERAL: PAINLEVEL: NO PAIN (0)

## 2019-01-15 ASSESSMENT — MIFFLIN-ST. JEOR: SCORE: 1108.08

## 2019-01-15 ASSESSMENT — ACTIVITIES OF DAILY LIVING (ADL): CURRENT_FUNCTION: NO ASSISTANCE NEEDED

## 2019-01-15 NOTE — PROGRESS NOTES
"SUBJECTIVE:   Unique Ward is a 78 year old female who presents for Preventive Visit.    Feeling fatigued, usually every winter. Has history of hypothyroid, not recently checked. Also history of anemia, but no dyspnea noted. Just more napping. Dysregulated sleep. Only 2-3 hrs at a time. Saw sleep last year. Not helpful. Thumbs also cathcing bilaterally.  Are you in the first 12 months of your Medicare coverage?  No    Annual Wellness Visit     In general, how would you rate your overall health?  Good    Frequency of exercise:  None    Do you usually eat at least 4 servings of fruit and vegetables a day, include whole grains    & fiber and avoid regularly eating high fat or \"junk\" foods?  Yes    Taking medications regularly:  Yes    Medication side effects:  None    Ability to successfully perform activities of daily living:  No assistance needed    Home Safety:  Lack of grab bars in the bathroom    Hearing Impairment:  Difficult to understand a speaker at a public meeting or [employer] service    In the past 6 months, have you been bothered by leaking of urine? Yes    In general, how would you rate your overall mental or emotional health?  Good    PHQ-2 Total Score: 2    Additional concerns today:  Yes    Do you feel safe in your environment? Yes    Do you have a Health Care Directive? Yes: Patient states has Advance Directive and will bring in a copy to clinic.      Fall risk    Cognitive Screening   1) Repeat 3 items (Leader, Season, Table)    2) Clock draw: NORMAL  3) 3 item recall: Recalls 3 objects  Results: 3 items recalled: COGNITIVE IMPAIRMENT LESS LIKELY    Mini-CogTM Copyright RUBEN Amador. Licensed by the author for use in [facility]; reprinted with permission (flaquito@.[email or web address]). All rights reserved.      Do you have sleep apnea, excessive snoring or daytime drowsiness?: no    Reviewed and updated as needed this visit by clinical staff  Tobacco  Allergies  Meds         Reviewed and updated as " needed this visit by Provider        Social History     Tobacco Use     Smoking status: Former Smoker     Packs/day: 0.75     Years: 10.00     Pack years: 7.50     Types: Cigarettes     Start date: 1955     Last attempt to quit: 1965     Years since quittin.0     Smokeless tobacco: Never Used   Substance Use Topics     Alcohol use: No     Alcohol/week: 0.0 oz       Alcohol Use 1/15/2019   If you drink alcohol do you typically have greater than 3 drinks per day OR greater than 7 drinks per week? Not Applicable   No flowsheet data found.            Current providers sharing in care for this patient include:   Patient Care Team:  Tyler Lee MD as PCP - General (Family Practice)  Lashell Orellana MD as PCP - Assigned PCP    The following health maintenance items are reviewed in Epic and correct as of today:  Health Maintenance   Topic Date Due     ZOSTER IMMUNIZATION (1 of 2) 1989     INFLUENZA VACCINE (1) 2018     PHQ-9 Q6 MONTHS  2019     FALL RISK ASSESSMENT  2019     LIPID SCREEN Q5 YR FEMALE (SYSTEM ASSIGNED)  2022     ADVANCE DIRECTIVE PLANNING Q5 YRS  2022     DTAP/TDAP/TD IMMUNIZATION (3 - Td) 2024     DEXA SCAN SCREENING (SYSTEM ASSIGNED)  Completed     DEPRESSION ACTION PLAN  Completed     IPV IMMUNIZATION  Aged Out     MENINGITIS IMMUNIZATION  Aged Out           Review of Systems   Constitutional: Negative for chills and fever.   HENT: Negative for congestion, ear pain, hearing loss and sore throat.    Eyes: Negative for pain and visual disturbance.   Respiratory: Negative for cough and shortness of breath.    Cardiovascular: Negative for chest pain, palpitations and peripheral edema.   Gastrointestinal: Negative for abdominal pain, constipation, diarrhea, heartburn, hematochezia and nausea.   Breasts:  Negative for tenderness, breast mass and discharge.   Genitourinary: Negative for dysuria, frequency, genital sores, hematuria, pelvic pain,  "urgency, vaginal bleeding and vaginal discharge.   Musculoskeletal: Positive for arthralgias, joint swelling and myalgias.   Skin: Negative for rash.   Neurological: Negative for dizziness, weakness, headaches and paresthesias.   Psychiatric/Behavioral: Negative for mood changes. The patient is not nervous/anxious.          OBJECTIVE:   /70   Pulse 69   Temp 98  F (36.7  C) (Temporal)   Resp 18   Ht 1.683 m (5' 6.25\")   Wt 61.2 kg (135 lb)   LMP  (LMP Unknown)   SpO2 94%   BMI 21.63 kg/m   Estimated body mass index is 21.63 kg/m  as calculated from the following:    Height as of this encounter: 1.683 m (5' 6.25\").    Weight as of this encounter: 61.2 kg (135 lb).  Physical Exam  GENERAL: healthy, alert and no distress  EYES: Eyes grossly normal to inspection, PERRL and conjunctivae and sclerae normal  HENT: ear canals and TM's normal, nose and mouth without ulcers or lesions  NECK: no adenopathy, no asymmetry, masses, or scars and thyroid normal to palpation  RESP: lungs clear to auscultation - no rales, rhonchi or wheezes  CV: regular rate and rhythm, normal S1 S2, no S3 or S4, no murmur, click or rub, no peripheral edema, and peripheral pulses strong  ABDOMEN: soft, nontender, no hepatosplenomegaly, no masses and bowel sounds normal  MS: no gross musculoskeletal defects noted, no edema  SKIN: no suspicious lesions or rashes  NEURO: Normal strength and tone, mentation intact and speech normal  PSYCH: mentation appears normal, affect normal/bright  Thumbs bilaterally have some catching in the flexor mechanism laterally  Diagnostic Test Results:  none     ASSESSMENT / PLAN:       ICD-10-CM    1. Essential hypertension I10 Basic metabolic panel   2. Vitamin D deficiency E55.9 Vitamin D Deficiency   3. History of anemia Z86.2 CBC with platelets     Iron and iron binding capacity   4. Low TSH level R79.89    5. Hypothyroidism, unspecified type E03.9 TSH with free T4 reflex   6. Acute cystitis with " "hematuria N30.01    7. Recurrent UTI N39.0 **UA reflex to Microscopic FUTURE anytime   8. Annual physical exam Z00.00      Fatigue likely a combination of disrupted sleep and some dysthymia plus mild seasonal affective disorder.  May use a light.  We will check vitamin D, CBC, thyroid.  Discussed her relatively recurrent cystitis, may call for a trial of vaginal estrogen if desired.  Other routine annual topics covered.  Declines flu vaccine.  Stable hypertension.    End of Life Planning:  Patient currently has an advanced directive: Yes.  Practitioner is supportive of decision.    COUNSELING:  Reviewed preventive health counseling, as reflected in patient instructions    BP Readings from Last 1 Encounters:   01/15/19 120/70     Estimated body mass index is 21.63 kg/m  as calculated from the following:    Height as of this encounter: 1.683 m (5' 6.25\").    Weight as of this encounter: 61.2 kg (135 lb).           reports that she quit smoking about 54 years ago. Her smoking use included cigarettes. She started smoking about 64 years ago. She has a 7.50 pack-year smoking history. she has never used smokeless tobacco.      Appropriate preventive services were discussed with this patient, including applicable screening as appropriate for cardiovascular disease, diabetes, osteopenia/osteoporosis, and glaucoma.  As appropriate for age/gender, discussed screening for colorectal cancer, prostate cancer, breast cancer, and cervical cancer. Checklist reviewing preventive services available has been given to the patient.    Reviewed patients plan of care and provided an AVS. The Basic Care Plan (routine screening as documented in Health Maintenance) for Unique meets the Care Plan requirement. This Care Plan has been established and reviewed with the Patient.    Counseling Resources:  ATP IV Guidelines  Pooled Cohorts Equation Calculator  Breast Cancer Risk Calculator  FRAX Risk Assessment  ICSI Preventive Guidelines  Dietary " Guidelines for Americans, 2010  USDA's MyPlate  ASA Prophylaxis  Lung CA Screening    Tyler Lee MD  [facility]

## 2019-01-15 NOTE — PATIENT INSTRUCTIONS
Preventive Health Recommendations    See your health care provider every year to    Review health changes.     Discuss preventive care.      Review your medicines if your doctor has prescribed any.      You no longer need a yearly Pap test unless you've had an abnormal Pap test in the past 10 years. If you have vaginal symptoms, such as bleeding or discharge, be sure to talk with your provider about a Pap test.      Every 1 to 2 years, have a mammogram.  If you are over 69, talk with your health care provider about whether or not you want to continue having screening mammograms.      Every 10 years, have a colonoscopy. Or, have a yearly FIT test (stool test). These exams will check for colon cancer.       Have a cholesterol test every 5 years, or more often if your doctor advises it.       Have a diabetes test (fasting glucose) every three years. If you are at risk for diabetes, you should have this test more often.       At age 65, have a bone density scan (DEXA) to check for osteoporosis (brittle bone disease).    Shots:    Get a flu shot each year.    Get a tetanus shot every 10 years.    Talk to your doctor about your pneumonia vaccines. There are now two you should receive - Pneumovax (PPSV 23) and Prevnar (PCV 13).    Talk to your pharmacist about the shingles vaccine.    Talk to your doctor about the hepatitis B vaccine.    Nutrition:     Eat at least 5 servings of fruits and vegetables each day.      Eat whole-grain bread, whole-wheat pasta and brown rice instead of white grains and rice.      Get adequate about Calcium and Vitamin D.     Lifestyle    Exercise at least 150 minutes a week (30 minutes a day, 5 days a week). This will help you control your weight and prevent disease.      Limit alcohol to one drink per day.      No smoking.       Wear sunscreen to prevent skin cancer.       See your dentist twice a year for an exam and cleaning.      See your eye doctor every 1 to 2 years to screen for  conditions such as glaucoma, macular degeneration, cataracts, etc.    Personalized Prevention Plan  You are due for the preventive services outlined below.  Your care team is available to assist you in scheduling these services.  If you have already completed any of these items, please share that information with your care team to update in your medical record.    Health Maintenance Due   Topic Date Due     Zoster (Chicken Pox) Vaccine (1 of 2) 07/12/1989     Flu Vaccine (1) 09/01/2018

## 2019-02-04 ENCOUNTER — OFFICE VISIT (OUTPATIENT)
Dept: FAMILY MEDICINE | Facility: OTHER | Age: 79
End: 2019-02-04
Payer: COMMERCIAL

## 2019-02-04 VITALS
HEART RATE: 64 BPM | BODY MASS INDEX: 21.49 KG/M2 | OXYGEN SATURATION: 99 % | DIASTOLIC BLOOD PRESSURE: 70 MMHG | SYSTOLIC BLOOD PRESSURE: 134 MMHG | RESPIRATION RATE: 16 BRPM | WEIGHT: 134.13 LBS | TEMPERATURE: 97.3 F

## 2019-02-04 DIAGNOSIS — I10 ESSENTIAL HYPERTENSION: ICD-10-CM

## 2019-02-04 DIAGNOSIS — J01.00 ACUTE NON-RECURRENT MAXILLARY SINUSITIS: Primary | ICD-10-CM

## 2019-02-04 PROCEDURE — 99213 OFFICE O/P EST LOW 20 MIN: CPT | Performed by: INTERNAL MEDICINE

## 2019-02-04 RX ORDER — AZITHROMYCIN 250 MG/1
TABLET, FILM COATED ORAL
Qty: 6 TABLET | Refills: 0 | Status: SHIPPED | OUTPATIENT
Start: 2019-02-04 | End: 2019-04-23

## 2019-02-04 NOTE — PROGRESS NOTES
"gmed    SUBJECTIVE:   Unique Ward is a 78 year old female who presents to clinic today for the following health issues:  RESPIRATORY SYMPTOMS      Duration: 10 days     Description  nasal congestion, rhinorrhea, sore throat, facial pain/pressure, fever, chills, headache, fatigue/malaise and off balance     Severity: moderate    Accompanying signs and symptoms: None    History (predisposing factors):  Had one a year ago     Precipitating or alleviating factors: None    Therapies tried and outcome:  rest and fluids oral decongestant antihistamine OTC NSAID used benadryl twice.     HPI:   Patient presents with 2 week history of rhinorrhea, sinus pressure, and mild dull persistent headache/facial pain. Patient is unable to specifically localize her pain, pointing to the frontal and maxillary sinuses, the temporal area, and the jaw.  Patient denies pain with chewing or facial movements. She denies vision changes. She is unable to note anything that worsens or improves her pain. She denies sore throat and ear pain but states that her ears do feel \"plugged\".    PMHx:  Patient Active Problem List   Diagnosis     Essential hypertension     Personal history of urinary tract infection     Advance Care Planning     History of basal cell carcinoma- face and scalp     FH: melanoma- son     Dysthymia     Vitamin D deficiency     Fibromyalgia     Left lumbosacral radiculopathy     Acute cystitis with hematuria     Past Surgical History:   Procedure Laterality Date     BIOPSY  11/01/2014     COLONOSCOPY  01.01/1979    Two routine,  one for chronic diarrhea     EYE SURGERY  1/1/2014    first    cataracts both eyes.  second a few months later     GENITOURINARY SURGERY       GYN SURGERY  01.01.1977    TL     HEAD & NECK SURGERY  01.01.1997    basal cell X2   Moh's on scalp.  other on bridge of nose     SOFT TISSUE SURGERY  01.01.1990    Ganglion Cyst   Left inner wrist       Social History     Tobacco Use     Smoking status: Former " Smoker     Packs/day: 0.75     Years: 10.00     Pack years: 7.50     Types: Cigarettes     Start date: 1955     Last attempt to quit: 1965     Years since quittin.1     Smokeless tobacco: Never Used   Substance Use Topics     Alcohol use: No     Alcohol/week: 0.0 oz     Family History   Problem Relation Age of Onset     Diabetes Maternal Grandmother      Coronary Artery Disease Sister         error     Coronary Artery Disease Brother         error     Hypertension Brother      Hyperlipidemia Brother      Cerebrovascular Disease Brother      Hypertension Sister      Hypertension Sister      Hyperlipidemia Sister      Other Cancer Sister         cervical     Breast Cancer Sister      Other Cancer Sister         kidney     Mental Illness Mother         paranoid/schizophrenic/suicide     Mental Illness Sister         ???  Had shock treatments         Current Outpatient Medications   Medication Sig Dispense Refill     amLODIPine (NORVASC) 5 MG tablet Take 1 tablet (5 mg) by mouth daily 90 tablet 3     Allergies   Allergen Reactions     No Clinical Screening - See Comments      Eye drop antibiotic.     Codeine Other (See Comments)     Comment: , Description:      Gentamicin Hives     Per Unique this medication gives hives on arms and legs. Bev Lo LSXO,  ....................  7/15/2014   1:15 PM     Influenza Vaccines Other (See Comments)     Comment: , Description:      Influenza Virus Vaccine H5n1      Paxil [Paroxetine] Other (See Comments)     Comment: , Description:      Typhoid Vaccine-Strain Ty21a Other (See Comments)     Comment: , Description:   Comment: , Description:      Typhoid Vaccines      Recent Labs   Lab Test 01/15/19  1018 17  1007 17  1151  10/28/14 10/25/14   LDL  --   --  118*  --   --   --    HDL  --   --  104  --   --   --    TRIG  --   --  71  --   --   --    ALT  --   --   --   --  49 46   CR 0.87 0.78 0.68   < > 0.80 0.90   GFRESTIMATED 63 71 84   < > >60 >60  "  GFRESTBLACK 73 86 >90   GFR Calc     < > >60 >60   POTASSIUM 4.3 4.0 3.7   < > 4.3 4.1   TSH 1.55  --  2.13   < >  --   --     < > = values in this interval not displayed.      BP Readings from Last 3 Encounters:   02/04/19 134/70   01/15/19 120/70   01/11/19 112/64    Wt Readings from Last 3 Encounters:   02/04/19 60.8 kg (134 lb 2 oz)   01/15/19 61.2 kg (135 lb)   01/11/19 60.3 kg (132 lb 14.4 oz)          Reviewed and updated as needed this visit by clinical staff  Tobacco  Allergies  Meds       Reviewed and updated as needed this visit by Provider       ROS:  CONSTITUTIONAL: NEGATIVE for fever, chills, change in weight.  INTEGUMENTARY/SKIN: NEGATIVE for worrisome rashes, moles or lesions.  EYES: NEGATIVE for vision changes or irritation.  HENT/MOUTH: POSITIVE for pain/pressure over frontal/maxillary sinuses and mild persistent dull headache. POSITIVE for mild pain over temporal area and bilateral jaw. NEGATIVE for pain with chewing or facial movements.  POSITIVE for \"plugged\" sensation in both ears but no pain. NEGATIVE for mouth and throat problems.  RESP: NEGATIVE for significant cough or SOB.  CV: NEGATIVE for chest pain, palpitations or peripheral edema  GI: NEGATIVE for nausea, abdominal pain, heartburn, or change in bowel habits  NEURO: NEGATIVE for weakness, dizziness or paresthesias  PSYCHIATRIC: NEGATIVE for changes in mood or affect    OBJECTIVE:     /70 (BP Location: Right arm, Patient Position: Sitting, Cuff Size: Adult Regular)   Pulse 64   Temp 97.3  F (36.3  C) (Temporal)   Resp 16   Wt 60.8 kg (134 lb 2 oz)   LMP  (LMP Unknown)   SpO2 99%   Breastfeeding? No   BMI 21.49 kg/m    Body mass index is 21.49 kg/m .    GENERAL: healthy, alert and no distress  EYES: Eyes grossly normal to inspection, PERRL and conjunctivae and sclerae normal.  HENT: Ears with bilateral cerumen impaction, unable to visualize TMs. Nose and mouth without ulcers or lesions. Oropharynx is " erythematous with some post-nasal drip. No pain with palpation of the temporal arteries or the sinuses. No visible pulsation of temporal arteries.  NECK: no adenopathy, no asymmetry, masses, or scars and thyroid normal to palpation  RESP: lungs clear to auscultation - no rales, rhonchi or wheezes  CV: regular rate and rhythm, normal S1 S2, no S3 or S4, no murmur, click or rub, no peripheral edema and peripheral pulses strong  NEURO: Normal strength and tone, mentation intact and speech normal  PSYCH: mentation appears normal, affect normal/bright    ASSESSMENT/PLAN:     1. Acute non-recurrent maxillary sinusitis  Tylenol, rest, fluids  - azithromycin (ZITHROMAX) 250 MG tablet; Take 2 tablets (500 mg) by mouth daily for 1 day, THEN 1 tablet (250 mg) daily for 4 days.  Dispense: 6 tablet; Refill: 0    2. Essential hypertension  Continue Norvasc                               FOLLOW UP   I have asked the patient to make an appointment for followup with me in 4 months        I have carefully explained the diagnosis and treatment options to the patient.  The patient has displayed an understanding of the above, and all subsequent questions were answered.            Chico Quiroz, [facility]

## 2019-02-14 DIAGNOSIS — I10 ESSENTIAL HYPERTENSION: ICD-10-CM

## 2019-02-14 NOTE — TELEPHONE ENCOUNTER
Reason for Call:  Medication or medication refill:    Do you use a [facility]?  Name of the pharmacy and phone number for the current request:  [facility] [place] - [phone]    Name of the medication requested: BP medication     Other request: Can you send prescription to [facility] in [place] instead of [facility]? Unique says she is done with them, it's a big hassle. Unique has about 2 weeks left. Please call pt and advise on this.     Can we leave a detailed message on this number? YES    Phone number patient can be reached at: Home number on file [phone] (home)    Best Time: anytime    Call taken on 2/14/2019 at 2:03 PM by Mirlande Degroot

## 2019-02-15 RX ORDER — AMLODIPINE BESYLATE 5 MG/1
5 TABLET ORAL DAILY
Qty: 90 TABLET | Refills: 2 | Status: SHIPPED | OUTPATIENT
Start: 2019-02-15 | End: 2019-11-25

## 2019-03-22 DIAGNOSIS — R30.0 DYSURIA: Primary | ICD-10-CM

## 2019-03-22 DIAGNOSIS — N39.0 RECURRENT UTI: ICD-10-CM

## 2019-03-22 LAB
ALBUMIN UR-MCNC: NEGATIVE MG/DL
APPEARANCE UR: CLEAR
BACTERIA #/AREA URNS HPF: ABNORMAL /HPF
BILIRUB UR QL STRIP: NEGATIVE
COLOR UR AUTO: YELLOW
GLUCOSE UR STRIP-MCNC: NEGATIVE MG/DL
HGB UR QL STRIP: NEGATIVE
KETONES UR STRIP-MCNC: NEGATIVE MG/DL
LEUKOCYTE ESTERASE UR QL STRIP: ABNORMAL
NITRATE UR QL: NEGATIVE
PH UR STRIP: 7 PH (ref 5–7)
RBC #/AREA URNS AUTO: ABNORMAL /HPF
SOURCE: ABNORMAL
SP GR UR STRIP: 1.01 (ref 1–1.03)
UROBILINOGEN UR STRIP-ACNC: 0.2 EU/DL (ref 0.2–1)
WBC #/AREA URNS AUTO: ABNORMAL /HPF

## 2019-03-22 PROCEDURE — 81001 URINALYSIS AUTO W/SCOPE: CPT | Performed by: FAMILY MEDICINE

## 2019-03-22 RX ORDER — AMOXICILLIN 500 MG/1
500 CAPSULE ORAL 2 TIMES DAILY
Qty: 14 CAPSULE | Refills: 0 | Status: SHIPPED | OUTPATIENT
Start: 2019-03-22 | End: 2019-04-23

## 2019-04-14 ENCOUNTER — APPOINTMENT (OUTPATIENT)
Dept: GENERAL RADIOLOGY | Facility: CLINIC | Age: 79
End: 2019-04-14
Attending: EMERGENCY MEDICINE
Payer: MEDICARE

## 2019-04-14 ENCOUNTER — HOSPITAL ENCOUNTER (EMERGENCY)
Facility: CLINIC | Age: 79
Discharge: HOME | End: 2019-04-14
Attending: EMERGENCY MEDICINE | Admitting: EMERGENCY MEDICINE
Payer: MEDICARE

## 2019-04-14 VITALS
WEIGHT: 136 LBS | TEMPERATURE: 98.8 F | OXYGEN SATURATION: 96 % | HEART RATE: 69 BPM | BODY MASS INDEX: 21.79 KG/M2 | RESPIRATION RATE: 17 BRPM | SYSTOLIC BLOOD PRESSURE: 125 MMHG | DIASTOLIC BLOOD PRESSURE: 85 MMHG

## 2019-04-14 DIAGNOSIS — I48.0 PAROXYSMAL ATRIAL FIBRILLATION (H): ICD-10-CM

## 2019-04-14 LAB
ALBUMIN SERPL-MCNC: 3.6 G/DL (ref 3.4–5)
ALP SERPL-CCNC: 45 U/L (ref 40–150)
ALT SERPL W P-5'-P-CCNC: 19 U/L (ref 0–50)
ANION GAP SERPL CALCULATED.3IONS-SCNC: 6 MMOL/L (ref 3–14)
AST SERPL W P-5'-P-CCNC: 30 U/L (ref 0–45)
BASOPHILS # BLD AUTO: 0.1 10E9/L (ref 0–0.2)
BASOPHILS NFR BLD AUTO: 1.1 %
BILIRUB SERPL-MCNC: 0.3 MG/DL (ref 0.2–1.3)
BUN SERPL-MCNC: 23 MG/DL (ref 7–30)
CALCIUM SERPL-MCNC: 8.6 MG/DL (ref 8.5–10.1)
CHLORIDE SERPL-SCNC: 102 MMOL/L (ref 94–109)
CO2 SERPL-SCNC: 27 MMOL/L (ref 20–32)
CREAT SERPL-MCNC: 0.67 MG/DL (ref 0.52–1.04)
D DIMER PPP FEU-MCNC: 0.4 UG/ML FEU (ref 0–0.5)
DIFFERENTIAL METHOD BLD: NORMAL
EOSINOPHIL NFR BLD AUTO: 1.5 %
ERYTHROCYTE [DISTWIDTH] IN BLOOD BY AUTOMATED COUNT: 13.1 % (ref 10–15)
GFR SERPL CREATININE-BSD FRML MDRD: 83 ML/MIN/{1.73_M2}
GLUCOSE SERPL-MCNC: 190 MG/DL (ref 70–99)
HCT VFR BLD AUTO: 36.6 % (ref 35–47)
HGB BLD-MCNC: 12.2 G/DL (ref 11.7–15.7)
IMM GRANULOCYTES # BLD: 0 10E9/L (ref 0–0.4)
IMM GRANULOCYTES NFR BLD: 0.2 %
LYMPHOCYTES # BLD AUTO: 1.9 10E9/L (ref 0.8–5.3)
LYMPHOCYTES NFR BLD AUTO: 35.4 %
MCH RBC QN AUTO: 29.7 PG (ref 26.5–33)
MCHC RBC AUTO-ENTMCNC: 33.3 G/DL (ref 31.5–36.5)
MCV RBC AUTO: 89 FL (ref 78–100)
MONOCYTES # BLD AUTO: 0.4 10E9/L (ref 0–1.3)
MONOCYTES NFR BLD AUTO: 7.8 %
NEUTROPHILS # BLD AUTO: 2.9 10E9/L (ref 1.6–8.3)
NEUTROPHILS NFR BLD AUTO: 54 %
NRBC # BLD AUTO: 0 10*3/UL
NRBC BLD AUTO-RTO: 0 /100
NT-PROBNP SERPL-MCNC: 158 PG/ML (ref 0–1800)
PLATELET # BLD AUTO: 290 10E9/L (ref 150–450)
POTASSIUM SERPL-SCNC: 3.8 MMOL/L (ref 3.4–5.3)
PROT SERPL-MCNC: 6.9 G/DL (ref 6.8–8.8)
RBC # BLD AUTO: 4.11 10E12/L (ref 3.8–5.2)
SODIUM SERPL-SCNC: 135 MMOL/L (ref 133–144)
TROPONIN I SERPL-MCNC: <0.015 UG/L (ref 0–0.04)
TSH SERPL DL<=0.005 MIU/L-ACNC: 1.78 MU/L (ref 0.4–4)
WBC # BLD AUTO: 5.3 10E9/L (ref 4–11)

## 2019-04-14 PROCEDURE — 84443 ASSAY THYROID STIM HORMONE: CPT | Performed by: EMERGENCY MEDICINE

## 2019-04-14 PROCEDURE — 85379 FIBRIN DEGRADATION QUANT: CPT | Performed by: EMERGENCY MEDICINE

## 2019-04-14 PROCEDURE — 93010 ELECTROCARDIOGRAM REPORT: CPT | Mod: 76 | Performed by: EMERGENCY MEDICINE

## 2019-04-14 PROCEDURE — 93005 ELECTROCARDIOGRAM TRACING: CPT | Performed by: EMERGENCY MEDICINE

## 2019-04-14 PROCEDURE — 83880 ASSAY OF NATRIURETIC PEPTIDE: CPT | Performed by: EMERGENCY MEDICINE

## 2019-04-14 PROCEDURE — 99285 EMERGENCY DEPT VISIT HI MDM: CPT | Mod: 25 | Performed by: EMERGENCY MEDICINE

## 2019-04-14 PROCEDURE — 84484 ASSAY OF TROPONIN QUANT: CPT | Performed by: EMERGENCY MEDICINE

## 2019-04-14 PROCEDURE — 93005 ELECTROCARDIOGRAM TRACING: CPT | Mod: 76 | Performed by: EMERGENCY MEDICINE

## 2019-04-14 PROCEDURE — 93010 ELECTROCARDIOGRAM REPORT: CPT | Mod: Z6 | Performed by: EMERGENCY MEDICINE

## 2019-04-14 PROCEDURE — 71046 X-RAY EXAM CHEST 2 VIEWS: CPT

## 2019-04-14 PROCEDURE — 80053 COMPREHEN METABOLIC PANEL: CPT | Performed by: EMERGENCY MEDICINE

## 2019-04-14 PROCEDURE — 85025 COMPLETE CBC W/AUTO DIFF WBC: CPT | Performed by: EMERGENCY MEDICINE

## 2019-04-14 NOTE — ED PROVIDER NOTES
"  History     Chief Complaint   Patient presents with     Tachycardia     The history is provided by the patient.     Unique Ward is a 78 year old female who presents to the emergency department with concerns of tachycardia and an irregular heart beat. The patient ate some chinese food for lunch today and then went home to take a nap. When she woke up from her nap, she immediately noticed that her heart was racing. She checked her pulse and measured her heart rate at 130. The patient has a slight headache, but no other pain anywhere. She notes that she is feeling fatigued today. Patient denies any chest pain, soreness of breath, fever, nausea, vomiting, or diarrhea. She had a sinus infection two months ago, but has not been sick with a cough or cold since then. The patient says she feels \"bits and pieces\" of an irregular heart beat, but it subsides after a short amount of time. She typically gets this sensation when laying down to go to bed at night. Her brother has a history of atrial fibrillation. Patient does not take a daily Aspirin. The only medication she is on is Amlodipine and has not had any recent changes in her dosage. Patient denies any pain or swelling in her legs. Normal bowel and bladder. Patient is not a smoker and does not drink alcohol. She has no history of thyroid problems.     Allergies:  Allergies   Allergen Reactions     No Clinical Screening - See Comments      Eye drop antibiotic.     Codeine Other (See Comments)     Comment: , Description:      Gentamicin Hives     Per Unique this medication gives hives on arms and legs. Bev Lo LSXO,  ....................  7/15/2014   1:15 PM     Influenza Vaccines Other (See Comments)     Comment: , Description:      Influenza Virus Vaccine H5n1      Paxil [Paroxetine] Other (See Comments)     Comment: , Description:      Typhoid Vaccine-Strain Ty21a Other (See Comments)     Comment: , Description:   Comment: , Description:      Typhoid " Vaccines        Problem List:    Patient Active Problem List    Diagnosis Date Noted     Left lumbosacral radiculopathy 05/31/2017     Priority: Medium     Acute cystitis with hematuria 05/31/2017     Priority: Medium     History of basal cell carcinoma- face and scalp 02/28/2017     Priority: Medium     FH: melanoma- son 02/28/2017     Priority: Medium     Fibromyalgia 02/28/2017     Priority: Medium     Advance Care Planning 01/06/2017     Priority: Medium     Advance Care Planning 5/4/2017: ACP Facilitation Session:    Unique Ward presented for ACP Facilitation session at a group class. She was accompanied by daughter-in-law. [employer] information provided and resources reviewed. She wishes to give additional consideration to ACP.  She currently has the following questions or concerns about Advance Care Planning: none known. Follow-up meeting: not needed/applicable. Added by Unique Uribe RN, Advance Care Planning Liaison.  Advance Care Planning 1/6/17: Info given   Saray Greer MA         Personal history of urinary tract infection 06/18/2016     Priority: Medium     Essential hypertension 03/23/2016     Priority: Medium     Vitamin D deficiency 03/30/2013     Priority: Medium     Dysthymia 04/01/2005     Priority: Medium        Past Medical History:    Past Medical History:   Diagnosis Date     Arthritis 01.01.1980     Cancer (H) 01.01.1993     scalp     Depressive disorder 01.01.1977     Hypertension 03.16.2016       Past Surgical History:    Past Surgical History:   Procedure Laterality Date     BIOPSY  11/01/2014     COLONOSCOPY  01.01/1979    Two routine,  one for chronic diarrhea     EYE SURGERY  1/1/2014    first    cataracts both eyes.  second a few months later     GENITOURINARY SURGERY       GYN SURGERY  01.01.1977    TL     HEAD & NECK SURGERY  01.01.1997    basal cell X2   Moh's on scalp.  other on bridge of nose     SOFT TISSUE SURGERY  01.01.1990    Ganglion Cyst   Left inner wrist        Family History:    Family History   Problem Relation Age of Onset     Diabetes Maternal Grandmother      Coronary Artery Disease Sister         error     Coronary Artery Disease Brother         error     Hypertension Brother      Hyperlipidemia Brother      Cerebrovascular Disease Brother      Hypertension Sister      Hypertension Sister      Hyperlipidemia Sister      Other Cancer Sister         cervical     Breast Cancer Sister      Other Cancer Sister         kidney     Mental Illness Mother         paranoid/schizophrenic/suicide     Mental Illness Sister         ???  Had shock treatments       Social History:  Marital Status:   [2]  Social History     Tobacco Use     Smoking status: Former Smoker     Packs/day: 0.75     Years: 10.00     Pack years: 7.50     Types: Cigarettes     Start date: 1955     Last attempt to quit: 1965     Years since quittin.3     Smokeless tobacco: Never Used   Substance Use Topics     Alcohol use: No     Alcohol/week: 0.0 oz     Drug use: No        Medications:      amLODIPine (NORVASC) 5 MG tablet     Review of Systems   All other ROS reviewed and are negative or non-contributory except as stated in HPI.    Physical Exam   BP: 151/85  Pulse: 114  Heart Rate: 116  Temp: 98.8  F (37.1  C)  Resp: 16  Weight: 61.7 kg (136 lb)  SpO2: 98 %      Physical Exam   Constitutional: She appears well-developed and well-nourished.   Tall, thin, healthy-appearing older female lying in the bed   HENT:   Head: Normocephalic.   Right Ear: External ear normal.   Left Ear: External ear normal.   Nose: Nose normal.   Mouth/Throat: Oropharynx is clear and moist.   Eyes: Pupils are equal, round, and reactive to light. Conjunctivae and EOM are normal.   Neck: Normal range of motion. Neck supple.   Cardiovascular: Normal heart sounds and intact distal pulses.   Irregularly irregular, tachycardia   Pulmonary/Chest: Effort normal and breath sounds normal.   Abdominal: Soft. There is no  tenderness.   Musculoskeletal: Normal range of motion. She exhibits no edema or tenderness.   Neurological: She is alert. She exhibits normal muscle tone.   Skin: Skin is warm and dry. She is not diaphoretic.   Psychiatric: She has a normal mood and affect. Her behavior is normal.   Vitals reviewed.      ED Course (with Medical Decision Making)    Pt seen and examined by me.  RN and EPIC notes reviewed.      Patient with sensation of heart racing, palpitations.  On exam, she is healthy-appearing.  She looks to have an irregularly irregular with tachycardia.  I believe she is in rapid A. fib.    EKG was done prior to my examination of the patient.  This did show evidence for atrial fibrillation with a rate of 113, occasional ventricular beats.  Old EKG with no atrial fibrillation.    Although patient is just feeling these sensations right now, I cannot be sure that she has not had a rate controlled atrial fibrillation before this.  I am going to check basic labs, thyroid.  Labs are normal except for elevated glucose.  Chest x-ray was also done which is unremarkable.    While patient was in the ED, she was noted to have slowing of her heart rate and what appears to be sinus rhythm with occasional ventricular beats on the monitor.  Second EKG was done.  Her EKG shows possible atrial fibrillation versus undifferentiated irregular heart rate with PACs/PVCs.  At this point, she is rate controlled.  She has good blood pressure.  I recommend that she start on a baby aspirin.  I am going to send a message to her primary care provider.  She may need echocardiogram and repeat EKG.  In addition, I would like her to get a fasting blood glucose and hemoglobin A1c.  Appointment made for 4/23.  If she has any worsening, changes or concerns return at anytime to the ED.        Procedures               EKG Interpretation:      Interpreted by Peggy oK  Time reviewed: 1532  Symptoms at time of EKG: Palpitations  Rhythm: atrial  fibrillation - rapid  Rate: 113  Axis: Normal  Ectopy: premature ventricular contractions (infrequent)  Conduction: Poor R wave progression  ST Segments/ T Waves: Non-specific ST-T wave changes  Q Waves: none  Comparison to prior: Previous EKG with normal sinus rhythm    Clinical Impression: Atrial fibrillation with rapid ventricular rate, not previously seen           EKG Interpretation:      Interpreted by Peggy Ko  Time reviewed:1630   Symptoms at time of EKG: None   Rhythm: Possible atrial fibrillation  Rate: 75  Axis: Normal  Ectopy: None and Premature ventricular contractions (infrequent)  Conduction: Poor R wave progression  ST Segments/ T Waves: No ST-T wave changes  Q Waves: None  Comparison to prior: Now with slower rate, possible P waves seen    Clinical Impression: non-specific EKG    Results for orders placed or performed during the hospital encounter of 04/14/19 (from the past 24 hour(s))   CBC with platelets differential   Result Value Ref Range    WBC 5.3 4.0 - 11.0 10e9/L    RBC Count 4.11 3.8 - 5.2 10e12/L    Hemoglobin 12.2 11.7 - 15.7 g/dL    Hematocrit 36.6 35.0 - 47.0 %    MCV 89 78 - 100 fl    MCH 29.7 26.5 - 33.0 pg    MCHC 33.3 31.5 - 36.5 g/dL    RDW 13.1 10.0 - 15.0 %    Platelet Count 290 150 - 450 10e9/L    Diff Method Automated Method     % Neutrophils 54.0 %    % Lymphocytes 35.4 %    % Monocytes 7.8 %    % Eosinophils 1.5 %    % Basophils 1.1 %    % Immature Granulocytes 0.2 %    Nucleated RBCs 0 0 /100    Absolute Neutrophil 2.9 1.6 - 8.3 10e9/L    Absolute Lymphocytes 1.9 0.8 - 5.3 10e9/L    Absolute Monocytes 0.4 0.0 - 1.3 10e9/L    Absolute Basophils 0.1 0.0 - 0.2 10e9/L    Abs Immature Granulocytes 0.0 0 - 0.4 10e9/L    Absolute Nucleated RBC 0.0    D dimer quantitative   Result Value Ref Range    D Dimer 0.4 0.0 - 0.50 ug/ml FEU   Comprehensive metabolic panel   Result Value Ref Range    Sodium 135 133 - 144 mmol/L    Potassium 3.8 3.4 - 5.3 mmol/L    Chloride 102 94 -  109 mmol/L    Carbon Dioxide 27 20 - 32 mmol/L    Anion Gap 6 3 - 14 mmol/L    Glucose 190 (H) 70 - 99 mg/dL    Urea Nitrogen 23 7 - 30 mg/dL    Creatinine 0.67 0.52 - 1.04 mg/dL    GFR Estimate 83 >60 mL/min/[1.73_m2]    GFR Estimate If Black >90 >60 mL/min/[1.73_m2]    Calcium 8.6 8.5 - 10.1 mg/dL    Bilirubin Total 0.3 0.2 - 1.3 mg/dL    Albumin 3.6 3.4 - 5.0 g/dL    Protein Total 6.9 6.8 - 8.8 g/dL    Alkaline Phosphatase 45 40 - 150 U/L    ALT 19 0 - 50 U/L    AST 30 0 - 45 U/L   Troponin I   Result Value Ref Range    Troponin I ES <0.015 0.000 - 0.045 ug/L   TSH with free T4 reflex   Result Value Ref Range    TSH 1.78 0.40 - 4.00 mU/L   Nt probnp inpatient (BNP)   Result Value Ref Range    N-Terminal Pro BNP Inpatient 158 0 - 1,800 pg/mL   XR Chest 2 Views    Narrative    CHEST TWO VIEWS  4/14/2019 4:09 PM     HISTORY: Palpitations.    COMPARISON: None.      Impression    IMPRESSION: No evidence for active cardiopulmonary disease. There is  some apical thickening bilaterally and some mild prominence of  interstitial markings. No discrete infiltrates or effusions. Scoliosis  noted.     KAE SALES MD       Medications - No data to display    Assessments & Plan   I have reviewed the findings, diagnosis, plan and need for follow up with the patient.          Medication List      ASK your doctor about these medications    amoxicillin 500 MG capsule  Commonly known as:  AMOXIL  500 mg, Oral, 2 TIMES DAILY  Ask about: Should I take this medication?     azithromycin 250 MG tablet  Commonly known as:  ZITHROMAX  Take 2 tablets (500 mg) by mouth daily for 1 day, THEN 1 tablet (250 mg) daily for 4 days.  Start taking on:  2/4/2019  Ask about: Should I take this medication?            Final diagnoses:   Paroxysmal atrial fibrillation (H)     Disposition: Patient discharged home in stable condition.  Plan as above.  Return for concerns.       This document serves as a record of services personally performed by Stefani  Peggy ALEXANDRE. It was created on their behalf by Janett Campos, a trained medical scribe. The creation of this record is based on the provider's personal observations and the statements of the patient. This document has been checked and approved by the attending provider.  Note: Chart documentation done in part with Dragon Voice Recognition software. Although reviewed after completion, some word and grammatical errors may remain.    4/14/2019   [facility] EMERGENCY DEPARTMENT     Peggy Ko MD  04/15/19 0019

## 2019-04-14 NOTE — DISCHARGE INSTRUCTIONS
I recommend starting baby aspirin daily.    Follow-up for recheck of your blood sugar and a repeat EKG at your appointment with Dr. Lee.    Return at any time for concerns.    I hope your dentist appointment goes well!!

## 2019-04-14 NOTE — ED TRIAGE NOTES
"Presents with complaints of rapid heart rate. States she felt ok before she ate chinese food and took a nap. States she awoke with a heart rate of 130. \"It has never been that high before\". Reports she checked her pulse because she didn't feel right.  "

## 2019-04-14 NOTE — ED AVS SNAPSHOT
[facility] Emergency Department  [address] [place] [address]  Phone:  [phone]  Fax:  [phone]                                    Unique Ward   MRN: [id number]    Department:  [facility] Emergency Department   Date of Visit:  4/14/2019           After Visit Summary Signature Page    I have received my discharge instructions, and my questions have been answered. I have discussed any challenges I see with this plan with the nurse or doctor.    ..........................................................................................................................................  Patient/Patient Representative Signature      ..........................................................................................................................................  Patient Representative Print Name and Relationship to Patient    ..................................................               ................................................  Date                                   Time    ..........................................................................................................................................  Reviewed by Signature/Title    ...................................................              ..............................................  Date                                               Time          22EPIC Rev 08/18

## 2019-04-23 ENCOUNTER — OFFICE VISIT (OUTPATIENT)
Dept: FAMILY MEDICINE | Facility: OTHER | Age: 79
End: 2019-04-23
Payer: COMMERCIAL

## 2019-04-23 VITALS
BODY MASS INDEX: 21.47 KG/M2 | HEART RATE: 72 BPM | SYSTOLIC BLOOD PRESSURE: 140 MMHG | RESPIRATION RATE: 18 BRPM | OXYGEN SATURATION: 97 % | TEMPERATURE: 97.3 F | DIASTOLIC BLOOD PRESSURE: 82 MMHG | WEIGHT: 134 LBS

## 2019-04-23 DIAGNOSIS — R73.9 HYPERGLYCEMIA: ICD-10-CM

## 2019-04-23 DIAGNOSIS — I48.0 PAROXYSMAL ATRIAL FIBRILLATION (H): Primary | ICD-10-CM

## 2019-04-23 LAB
GLUCOSE SERPL-MCNC: 87 MG/DL (ref 70–99)
HBA1C MFR BLD: 5.7 % (ref 0–5.6)

## 2019-04-23 PROCEDURE — 99214 OFFICE O/P EST MOD 30 MIN: CPT | Performed by: FAMILY MEDICINE

## 2019-04-23 PROCEDURE — 36415 COLL VENOUS BLD VENIPUNCTURE: CPT | Performed by: FAMILY MEDICINE

## 2019-04-23 PROCEDURE — 83036 HEMOGLOBIN GLYCOSYLATED A1C: CPT | Performed by: FAMILY MEDICINE

## 2019-04-23 PROCEDURE — 82947 ASSAY GLUCOSE BLOOD QUANT: CPT | Performed by: FAMILY MEDICINE

## 2019-04-23 PROCEDURE — 93000 ELECTROCARDIOGRAM COMPLETE: CPT | Performed by: FAMILY MEDICINE

## 2019-04-23 RX ORDER — ASPIRIN 81 MG/1
81 TABLET ORAL DAILY
COMMUNITY
End: 2019-06-05

## 2019-04-23 ASSESSMENT — PAIN SCALES - GENERAL: PAINLEVEL: NO PAIN (0)

## 2019-04-23 NOTE — PROGRESS NOTES
SUBJECTIVE:                                                      Chief Complaint   Patient presents with     Hospital F/U     ER follow up          ED/UC Followup:    Facility:  [facility]  Date of visit: 4/14/2019  Reason for visit: Paroxysmal atrial fibrillation   Current Status: rain Calhoun is a 78 year old female who comes to clinic noted atrial fibrillation.  She has had no recurrence.  Sent back to me from the ED for follow-up.  She feels well.    ROS:  Constitutional, HEENT, cardiovascular, pulmonary, gi and gu systems are negative, except as otherwise noted.    OBJECTIVE:                                                    /82   Pulse 72   Temp 97.3  F (36.3  C) (Temporal)   Resp 18   Wt 60.8 kg (134 lb)   LMP  (LMP Unknown)   SpO2 97%   BMI 21.47 kg/m    Body mass index is 21.47 kg/m .    GENERAL: healthy, alert and no distress  NECK: no adenopathy, no asymmetry, masses, or scars and thyroid normal to palpation  RESP: lungs clear to auscultation - no rales, rhonchi or wheezes  CV: regular rate and rhythm, normal S1 S2, no S3 or S4, no murmur, click or rub, no peripheral edema and peripheral pulses strong  ABDOMEN: soft, nontender, no hepatosplenomegaly, no masses and bowel sounds normal  MS: no gross musculoskeletal defects noted, no edema    EKG shows normal sinus rhythm with a rate of 72     ASSESSMENT/PLAN:                                                        ICD-10-CM    1. Paroxysmal atrial fibrillation (H) I48.0 EKG 12-lead complete w/read - Clinics     Echocardiogram Complete   2. Hyperglycemia R73.9 Hemoglobin A1c     Glucose       Back in sinus rhythm.  She does know her symptoms.  She will call if it recurs.  May need a Holter at that time.  Discussed blood thinners, but she would like to stay on aspirin.  Chads score of 2 for age and hypertension.  Check echo.  See how she does over the next month and return             Tyler Lee MD  [facility]

## 2019-04-26 ENCOUNTER — HOSPITAL ENCOUNTER (OUTPATIENT)
Dept: CARDIOLOGY | Facility: CLINIC | Age: 79
Discharge: HOME | End: 2019-04-26
Attending: FAMILY MEDICINE | Admitting: FAMILY MEDICINE
Payer: MEDICARE

## 2019-04-26 DIAGNOSIS — I48.0 PAROXYSMAL ATRIAL FIBRILLATION (H): ICD-10-CM

## 2019-04-26 PROCEDURE — 93306 TTE W/DOPPLER COMPLETE: CPT | Mod: 26 | Performed by: INTERNAL MEDICINE

## 2019-04-26 PROCEDURE — 93306 TTE W/DOPPLER COMPLETE: CPT

## 2019-05-06 ENCOUNTER — MYC MEDICAL ADVICE (OUTPATIENT)
Dept: FAMILY MEDICINE | Facility: OTHER | Age: 79
End: 2019-05-06

## 2019-05-06 DIAGNOSIS — R93.1 ABNORMAL ECHOCARDIOGRAM: Primary | ICD-10-CM

## 2019-05-24 ENCOUNTER — DOCUMENTATION ONLY (OUTPATIENT)
Dept: LAB | Facility: OTHER | Age: 79
End: 2019-05-24

## 2019-05-24 DIAGNOSIS — Z87.440 PERSONAL HISTORY OF URINARY TRACT INFECTION: Primary | ICD-10-CM

## 2019-05-24 DIAGNOSIS — Z87.440 PERSONAL HISTORY OF URINARY TRACT INFECTION: ICD-10-CM

## 2019-05-24 DIAGNOSIS — R82.90 NONSPECIFIC FINDING ON EXAMINATION OF URINE: Primary | ICD-10-CM

## 2019-05-24 DIAGNOSIS — R30.0 DYSURIA: ICD-10-CM

## 2019-05-24 LAB
ALBUMIN UR-MCNC: NEGATIVE MG/DL
APPEARANCE UR: CLEAR
BACTERIA #/AREA URNS HPF: ABNORMAL /HPF
BILIRUB UR QL STRIP: NEGATIVE
COLOR UR AUTO: YELLOW
GLUCOSE UR STRIP-MCNC: NEGATIVE MG/DL
HGB UR QL STRIP: ABNORMAL
KETONES UR STRIP-MCNC: NEGATIVE MG/DL
LEUKOCYTE ESTERASE UR QL STRIP: ABNORMAL
NITRATE UR QL: NEGATIVE
PH UR STRIP: 7.5 PH (ref 5–7)
RBC #/AREA URNS AUTO: ABNORMAL /HPF
SOURCE: ABNORMAL
SP GR UR STRIP: 1.01 (ref 1–1.03)
UROBILINOGEN UR STRIP-ACNC: 0.2 EU/DL (ref 0.2–1)
WBC #/AREA URNS AUTO: ABNORMAL /HPF

## 2019-05-24 PROCEDURE — 87086 URINE CULTURE/COLONY COUNT: CPT | Performed by: FAMILY MEDICINE

## 2019-05-24 PROCEDURE — 87186 SC STD MICRODIL/AGAR DIL: CPT | Performed by: FAMILY MEDICINE

## 2019-05-24 PROCEDURE — 81001 URINALYSIS AUTO W/SCOPE: CPT | Performed by: FAMILY MEDICINE

## 2019-05-24 PROCEDURE — 87088 URINE BACTERIA CULTURE: CPT | Performed by: FAMILY MEDICINE

## 2019-05-24 RX ORDER — AMOXICILLIN 500 MG/1
500 CAPSULE ORAL 2 TIMES DAILY
Qty: 14 CAPSULE | Refills: 0 | Status: SHIPPED | OUTPATIENT
Start: 2019-05-24 | End: 2019-06-25

## 2019-05-24 NOTE — PROGRESS NOTES
Patient came in with a urine but there are no orders for her. She said there should be. Please review and future what ua order is needed.  Thank you!  [facility]

## 2019-05-26 LAB
BACTERIA SPEC CULT: ABNORMAL
Lab: ABNORMAL
SPECIMEN SOURCE: ABNORMAL

## 2019-06-05 ENCOUNTER — OFFICE VISIT (OUTPATIENT)
Dept: CARDIOLOGY | Facility: CLINIC | Age: 79
End: 2019-06-05
Payer: COMMERCIAL

## 2019-06-05 ENCOUNTER — TELEPHONE (OUTPATIENT)
Dept: CARDIOLOGY | Facility: CLINIC | Age: 79
End: 2019-06-05

## 2019-06-05 VITALS
BODY MASS INDEX: 21.41 KG/M2 | HEIGHT: 66 IN | HEART RATE: 72 BPM | DIASTOLIC BLOOD PRESSURE: 70 MMHG | SYSTOLIC BLOOD PRESSURE: 136 MMHG | OXYGEN SATURATION: 98 % | WEIGHT: 133.2 LBS

## 2019-06-05 DIAGNOSIS — I48.0 PAROXYSMAL ATRIAL FIBRILLATION (H): Primary | ICD-10-CM

## 2019-06-05 PROCEDURE — 99204 OFFICE O/P NEW MOD 45 MIN: CPT | Performed by: INTERNAL MEDICINE

## 2019-06-05 RX ORDER — WARFARIN SODIUM 5 MG/1
5 TABLET ORAL DAILY
Qty: 30 TABLET | Refills: 11 | Status: SHIPPED | OUTPATIENT
Start: 2019-06-05 | End: 2019-06-20

## 2019-06-05 ASSESSMENT — MIFFLIN-ST. JEOR: SCORE: 1099.91

## 2019-06-05 NOTE — LETTER
"6/5/2019    Tyler Lee MD  [address] [place] [address]    RE: Unique Ward       Dear Colleague,    I had the pleasure of seeing Unique Ward in the [facility].    CARDIOLOGY CONSULT    REASON FOR CONSULT: paroxysmal atrial fibrillation    PRIMARY CARE PHYSICIAN:  Tyler Lee    HISTORY OF PRESENT ILLNESS:  Ms. Ward is a pleasant 78-year-old woman with past medical history significant for hypertension who presents for the evaluation of new onset paroxysmal atrial fibrillation.  She was recently seen in the emergency department on April 14, 2019 with symptoms of palpitations.  She states that she had gone to [place] that morning and was in her usual state of health.  She got takeout Chinese for lunch and laid down to take a nap.  She woke up with symptoms of heart racing and palpitations.  She checked her pulse and her heart rate was 130.  She went to the emergency department and she was noted to be in atrial fibrillation with RVR.  She spontaneously converted to normal sinus rhythm with occasional PVCs.  She was instructed to take an aspirin.  An echocardiogram was obtained which demonstrated normal LV function.  She does have moderate tricuspid regurgitation without other significant valve abnormalities.    In follow-up today, Unique states that she has not had any further episodes similar to what brought her to the emergency department.  She does continue to note palpitations that occur occasionally and are \"mild\".  These have been long-standing and have been attributed to premature beats.  She lives in her own home and also manages the yard.  She denies any exertional chest pain, chest discomfort or shortness of breath.  She denies any orthopnea, PND or lower extremity edema.  She denies any lightheadedness or dizziness.  She is otherwise without complaints.    PAST MEDICAL HISTORY:  1.  Paroxysmal atrial fibrillation  2.  Hypertension  3.  Arthritis  4.  " Depression and anxiety    MEDICATIONS:  Current Outpatient Medications   Medication     amLODIPine (NORVASC) 5 MG tablet     cholecalciferol (VITAMIN D3) 5000 units TABS tablet     magnesium 250 MG tablet     rivaroxaban ANTICOAGULANT (XARELTO) 20 MG TABS tablet     No current facility-administered medications for this visit.        ALLERGIES:  Allergies   Allergen Reactions     No Clinical Screening - See Comments      Eye drop antibiotic.     Codeine Other (See Comments)     Comment: , Description:      Gentamicin Hives     Per Unique this medication gives hives on arms and legs. Bev Lo LSXO,  ....................  7/15/2014   1:15 PM     Influenza Vaccines Other (See Comments)     Comment: , Description:      Influenza Virus Vaccine H5n1      Paxil [Paroxetine] Other (See Comments)     Comment: , Description:      Typhoid Vaccine-Strain Ty21a Other (See Comments)     Comment: , Description:   Comment: , Description:      Typhoid Vaccines        SOCIAL HISTORY:  Prior tobacco abuse.  No significant alcohol.    FAMILY HISTORY:  Brother with atrial fibrillation    REVIEW OF SYSTEMS:  A complete review of systems was obtained and the pertinent positives outlined in the history of present illness above.  The remainder of systems is negative.    PHYSICAL EXAM:      BP: 136/70 Pulse: 72     SpO2: 98 %      Vital Signs with Ranges  Pulse:  [72] 72  BP: (136)/(70) 136/70  SpO2:  [98 %] 98 %  133 lbs 3.2 oz    Constitutional: awake, alert, no distress  Eyes: PERRL, sclera nonicteric  ENT: trachea midline  Respiratory: Clear to auscultation bilaterally   Cardiovascular: Irregular consistent with ventricular ectopy without murmurs rubs or gallops, no JVD  GI: nondistended, nontender, bowel sounds present  Lymph/Hematologic: no lymphadenopathy  Skin: dry, no rash  Musculoskeletal: good muscle tone, strength 5/5 in upper and lower extremities, no edema bilaterally  Neurologic: no focal deficits  Neuropsychiatric:  appropriate affact    DATA:  Echocardiogram report dated April 26, 2019: Normal LV function with an ejection fraction of 55 to 60%, moderate right atrial dilatation and moderate tricuspid regurgitation.    ECGs dated April 23, 2019: Reviewed, initial rhythm was atrial fibrillation with rapid ventricular rate, subsequent ECGs with normal sinus rhythm with occasional PACs and PVCs    ASSESSMENT:  1.  Paroxysmal atrial fibrillation: Currently in sinus rhythm with PACs/PACs.  She has a CHADSVASC score of 4.  2.  Hypertension  3.  Moderate tricuspid regurgitation     PLAN/RECOMMENDATIONS:  1.  We reviewed the pathophysiology of atrial fibrillation, treatment options and thromboembolic risk.  She has a chads vas score of 4 and anticoagulation is indicated.  We reviewed the options of Coumadin versus the NOACS; she would like to start Xarelto 20 mg p.o. daily.  2.  We reviewed rate versus rhythm control strategy and a rate control approach would be reasonable.  I have suggested starting metoprolol both for her paroxysmal atrial fibrillation along with her frequent PACs and PVCs.  She is hesitant to start an anticoagulant along with another new medication.  We will defer starting a beta-blocker to her follow-up appointment.  3.  Plan for 2 weeks follow-up at which time we can start a beta-blocker.  If symptoms become more frequent, would suggest an event monitor to further assess atrial fibrillation burden.    Christin Sanchez MD  Cardiology - [facility]  Pager:  [phone]  June 5, 2019    Thank you for allowing me to participate in the care of your patient.      Sincerely,     Christin Sanchez MD     [facility]    cc:   Tyler Lee MD  [address] [place] [address] [place] [address]

## 2019-06-05 NOTE — PROGRESS NOTES
"CARDIOLOGY CONSULT    REASON FOR CONSULT: paroxysmal atrial fibrillation    PRIMARY CARE PHYSICIAN:  Tyler Lee    HISTORY OF PRESENT ILLNESS:  Ms. Ward is a pleasant 78-year-old woman with past medical history significant for hypertension who presents for the evaluation of new onset paroxysmal atrial fibrillation.  She was recently seen in the emergency department on April 14, 2019 with symptoms of palpitations.  She states that she had gone to [employer] that morning and was in her usual state of health.  She got takeout Chinese for lunch and laid down to take a nap.  She woke up with symptoms of heart racing and palpitations.  She checked her pulse and her heart rate was 130.  She went to the emergency department and she was noted to be in atrial fibrillation with RVR.  She spontaneously converted to normal sinus rhythm with occasional PVCs.  She was instructed to take an aspirin.  An echocardiogram was obtained which demonstrated normal LV function.  She does have moderate tricuspid regurgitation without other significant valve abnormalities.    In follow-up today, Unique states that she has not had any further episodes similar to what brought her to the emergency department.  She does continue to note palpitations that occur occasionally and are \"mild\".  These have been long-standing and have been attributed to premature beats.  She lives in her own home and also manages the yard.  She denies any exertional chest pain, chest discomfort or shortness of breath.  She denies any orthopnea, PND or lower extremity edema.  She denies any lightheadedness or dizziness.  She is otherwise without complaints.    PAST MEDICAL HISTORY:  1.  Paroxysmal atrial fibrillation  2.  Hypertension  3.  Arthritis  4.  Depression and anxiety    MEDICATIONS:  Current Outpatient Medications   Medication     amLODIPine (NORVASC) 5 MG tablet     cholecalciferol (VITAMIN D3) 5000 units TABS tablet     magnesium 250 MG tablet     " rivaroxaban ANTICOAGULANT (XARELTO) 20 MG TABS tablet     No current facility-administered medications for this visit.        ALLERGIES:  Allergies   Allergen Reactions     No Clinical Screening - See Comments      Eye drop antibiotic.     Codeine Other (See Comments)     Comment: , Description:      Gentamicin Hives     Per Unique this medication gives hives on arms and legs. Bev Lo LSXO,  ....................  7/15/2014   1:15 PM     Influenza Vaccines Other (See Comments)     Comment: , Description:      Influenza Virus Vaccine H5n1      Paxil [Paroxetine] Other (See Comments)     Comment: , Description:      Typhoid Vaccine-Strain Ty21a Other (See Comments)     Comment: , Description:   Comment: , Description:      Typhoid Vaccines        SOCIAL HISTORY:  Prior tobacco abuse.  No significant alcohol.    FAMILY HISTORY:  Brother with atrial fibrillation    REVIEW OF SYSTEMS:  A complete review of systems was obtained and the pertinent positives outlined in the history of present illness above.  The remainder of systems is negative.    PHYSICAL EXAM:      BP: 136/70 Pulse: 72     SpO2: 98 %      Vital Signs with Ranges  Pulse:  [72] 72  BP: (136)/(70) 136/70  SpO2:  [98 %] 98 %  133 lbs 3.2 oz    Constitutional: awake, alert, no distress  Eyes: PERRL, sclera nonicteric  ENT: trachea midline  Respiratory: Clear to auscultation bilaterally   Cardiovascular: Irregular consistent with ventricular ectopy without murmurs rubs or gallops, no JVD  GI: nondistended, nontender, bowel sounds present  Lymph/Hematologic: no lymphadenopathy  Skin: dry, no rash  Musculoskeletal: good muscle tone, strength 5/5 in upper and lower extremities, no edema bilaterally  Neurologic: no focal deficits  Neuropsychiatric: appropriate affact    DATA:  Echocardiogram report dated April 26, 2019: Normal LV function with an ejection fraction of 55 to 60%, moderate right atrial dilatation and moderate tricuspid regurgitation.    ECGs  dated April 23, 2019: Reviewed, initial rhythm was atrial fibrillation with rapid ventricular rate, subsequent ECGs with normal sinus rhythm with occasional PACs and PVCs    ASSESSMENT:  1.  Paroxysmal atrial fibrillation: Currently in sinus rhythm with PACs/PACs.  She has a CHADSVASC score of 4.  2.  Hypertension  3.  Moderate tricuspid regurgitation     PLAN/RECOMMENDATIONS:  1.  We reviewed the pathophysiology of atrial fibrillation, treatment options and thromboembolic risk.  She has a chads vas score of 4 and anticoagulation is indicated.  We reviewed the options of Coumadin versus the NOACS; she would like to start Xarelto 20 mg p.o. daily.  2.  We reviewed rate versus rhythm control strategy and a rate control approach would be reasonable.  I have suggested starting metoprolol both for her paroxysmal atrial fibrillation along with her frequent PACs and PVCs.  She is hesitant to start an anticoagulant along with another new medication.  We will defer starting a beta-blocker to her follow-up appointment.  3.  Plan for 2 weeks follow-up at which time we can start a beta-blocker.  If symptoms become more frequent, would suggest an event monitor to further assess atrial fibrillation burden.    Christin Sanchez MD  Cardiology - [facility]  Pager:  [phone]  June 5, 2019

## 2019-06-05 NOTE — LETTER
"6/5/2019    Tyler Lee MD  [address] [place] [address]    RE: Unique Ward       Dear Colleague,    I had the pleasure of seeing Unique Ward in the [facility].    CARDIOLOGY CONSULT    REASON FOR CONSULT: paroxysmal atrial fibrillation    PRIMARY CARE PHYSICIAN:  Tyler Lee    HISTORY OF PRESENT ILLNESS:  Ms. Ward is a pleasant 78-year-old woman with past medical history significant for hypertension who presents for the evaluation of new onset paroxysmal atrial fibrillation.  She was recently seen in the emergency department on April 14, 2019 with symptoms of palpitations.  She states that she had gone to [employer] that morning and was in her usual state of health.  She got takeout Chinese for lunch and laid down to take a nap.  She woke up with symptoms of heart racing and palpitations.  She checked her pulse and her heart rate was 130.  She went to the emergency department and she was noted to be in atrial fibrillation with RVR.  She spontaneously converted to normal sinus rhythm with occasional PVCs.  She was instructed to take an aspirin.  An echocardiogram was obtained which demonstrated normal LV function.  She does have moderate tricuspid regurgitation without other significant valve abnormalities.    In follow-up today, Unique states that she has not had any further episodes similar to what brought her to the emergency department.  She does continue to note palpitations that occur occasionally and are \"mild\".  These have been long-standing and have been attributed to premature beats.  She lives in her own home and also manages the yard.  She denies any exertional chest pain, chest discomfort or shortness of breath.  She denies any orthopnea, PND or lower extremity edema.  She denies any lightheadedness or dizziness.  She is otherwise without complaints.    PAST MEDICAL HISTORY:  1.  Paroxysmal atrial fibrillation  2.  Hypertension  3.  Arthritis  4.  " Depression and anxiety    MEDICATIONS:  Current Outpatient Medications   Medication     amLODIPine (NORVASC) 5 MG tablet     cholecalciferol (VITAMIN D3) 5000 units TABS tablet     magnesium 250 MG tablet     rivaroxaban ANTICOAGULANT (XARELTO) 20 MG TABS tablet     No current facility-administered medications for this visit.        ALLERGIES:  Allergies   Allergen Reactions     No Clinical Screening - See Comments      Eye drop antibiotic.     Codeine Other (See Comments)     Comment: , Description:      Gentamicin Hives     Per Unique this medication gives hives on arms and legs. Bev Lo LSXO,  ....................  7/15/2014   1:15 PM     Influenza Vaccines Other (See Comments)     Comment: , Description:      Influenza Virus Vaccine H5n1      Paxil [Paroxetine] Other (See Comments)     Comment: , Description:      Typhoid Vaccine-Strain Ty21a Other (See Comments)     Comment: , Description:   Comment: , Description:      Typhoid Vaccines        SOCIAL HISTORY:  Prior tobacco abuse.  No significant alcohol.    FAMILY HISTORY:  Brother with atrial fibrillation    REVIEW OF SYSTEMS:  A complete review of systems was obtained and the pertinent positives outlined in the history of present illness above.  The remainder of systems is negative.    PHYSICAL EXAM:      BP: 136/70 Pulse: 72     SpO2: 98 %      Vital Signs with Ranges  Pulse:  [72] 72  BP: (136)/(70) 136/70  SpO2:  [98 %] 98 %  133 lbs 3.2 oz    Constitutional: awake, alert, no distress  Eyes: PERRL, sclera nonicteric  ENT: trachea midline  Respiratory: Clear to auscultation bilaterally   Cardiovascular: Irregular consistent with ventricular ectopy without murmurs rubs or gallops, no JVD  GI: nondistended, nontender, bowel sounds present  Lymph/Hematologic: no lymphadenopathy  Skin: dry, no rash  Musculoskeletal: good muscle tone, strength 5/5 in upper and lower extremities, no edema bilaterally  Neurologic: no focal deficits  Neuropsychiatric:  appropriate affact    DATA:  Echocardiogram report dated April 26, 2019: Normal LV function with an ejection fraction of 55 to 60%, moderate right atrial dilatation and moderate tricuspid regurgitation.    ECGs dated April 23, 2019: Reviewed, initial rhythm was atrial fibrillation with rapid ventricular rate, subsequent ECGs with normal sinus rhythm with occasional PACs and PVCs    ASSESSMENT:  1.  Paroxysmal atrial fibrillation: Currently in sinus rhythm with PACs/PACs.  She has a CHADSVASC score of 4.  2.  Hypertension  3.  Moderate tricuspid regurgitation     PLAN/RECOMMENDATIONS:  1.  We reviewed the pathophysiology of atrial fibrillation, treatment options and thromboembolic risk.  She has a chads vas score of 4 and anticoagulation is indicated.  We reviewed the options of Coumadin versus the NOACS; she would like to start Xarelto 20 mg p.o. daily.  2.  We reviewed rate versus rhythm control strategy and a rate control approach would be reasonable.  I have suggested starting metoprolol both for her paroxysmal atrial fibrillation along with her frequent PACs and PVCs.  She is hesitant to start an anticoagulant along with another new medication.  We will defer starting a beta-blocker to her follow-up appointment.  3.  Plan for 2 weeks follow-up at which time we can start a beta-blocker.  If symptoms become more frequent, would suggest an event monitor to further assess atrial fibrillation burden.    Christin Sanchez MD  Cardiology - [facility]  Pager:  [phone]  June 5, 2019    Thank you for allowing me to participate in the care of your patient.    Sincerely,     Christin Sanchez MD     [facility]

## 2019-06-05 NOTE — TELEPHONE ENCOUNTER
Pt was called and scheduled with the [facility] for Friday 6/7. She will try to  the RX tonight. I let her know that she should take one tablet (5 mg) in the evenings. She reported understanding  Neelima Higgins RN

## 2019-06-05 NOTE — TELEPHONE ENCOUNTER
Patient called stating xeralto would cost her $431 dollars a month. Patient is willing to take coumadin. Writer huddled with Dr. Sanchez, will discontinue xeralto and start coumadin 5mg and get patient set up to be seen in coumadin clinic. Writer will message coumadin nurses.

## 2019-06-07 ENCOUNTER — ANTICOAGULATION THERAPY VISIT (OUTPATIENT)
Dept: ANTICOAGULATION | Facility: OTHER | Age: 79
End: 2019-06-07
Payer: COMMERCIAL

## 2019-06-07 DIAGNOSIS — I48.91 ATRIAL FIBRILLATION (H): ICD-10-CM

## 2019-06-07 LAB — INR POINT OF CARE: 1 (ref 0.86–1.14)

## 2019-06-07 PROCEDURE — 36416 COLLJ CAPILLARY BLOOD SPEC: CPT

## 2019-06-07 PROCEDURE — 99207 ZZC NO CHARGE NURSE ONLY: CPT

## 2019-06-07 PROCEDURE — 85610 PROTHROMBIN TIME: CPT | Mod: QW

## 2019-06-07 NOTE — PROGRESS NOTES
ANTICOAGULATION INITIAL CLINIC VISIT    Patient Name:  Unique Ward  Date:  2019  Referred by: Dr Jesus MD  Contact Type:  Face to Face    SUBJECTIVE:  Coumadin education was completed today.  Topics covered include:  -Introduction to coumadin  -Proper Administration  -INR Testing  -Sign/Symptoms of Bleeding  -Signs/Symptoms of Clot Formation or Stroke  -Dietary Intake of Vitamin K  -Drug Interactions  -Anticoagulation Identification (bracelet, necklace or wallet card)  -Future Surgery  -Effects of Alcohol, Tobacco, and Exercise on Coumadin    Coumadin Education Booklet and Coumadin Identification Wallet Card were given to the patient.    Patient Findings     Comments:   New consult for A-fib.         Clinical Outcomes     Comments:   New consult for A-fib.           OBJECTIVE    INR Protime   Date Value Ref Range Status   2019 1.0 0.86 - 1.14 Final       ASSESSMENT / PLAN  INR assessment SUB    Recheck INR In: 3 DAYS    INR Location Clinic      Anticoagulation Summary  As of 2019    INR goal:   2.0-3.0   TTR:   --   INR used for dosin.0! (2019)   Warfarin maintenance plan:   No maintenance plan   Full warfarin instructions:   6/7: 7.5 mg; 6/8: 7.5 mg; 6/9: 7.5 mg; Otherwise No maintenance plan   Next INR check:   6/10/2019   Target end date:       Indications    Atrial fibrillation (H) [I48.91]             Anticoagulation Episode Summary     INR check location:       Preferred lab:       Send INR reminders to:   TON ELIZALDE    Comments:         Anticoagulation Care Providers     Provider Role Specialty Phone number    Tyler Lee MD [facility] [phone]            See the Encounter Report to view Anticoagulation Flowsheet and Dosing Calendar (Go to Encounters tab in chart review, and find the Anticoagulation Therapy Visit)    Dosage adjustment made based on physician directed care plan.    Veronika Campbell RN

## 2019-06-10 ENCOUNTER — ANTICOAGULATION THERAPY VISIT (OUTPATIENT)
Dept: ANTICOAGULATION | Facility: OTHER | Age: 79
End: 2019-06-10
Payer: COMMERCIAL

## 2019-06-10 DIAGNOSIS — I48.91 ATRIAL FIBRILLATION (H): ICD-10-CM

## 2019-06-10 LAB — INR POINT OF CARE: 1.5 (ref 0.86–1.14)

## 2019-06-10 PROCEDURE — 36416 COLLJ CAPILLARY BLOOD SPEC: CPT

## 2019-06-10 PROCEDURE — 85610 PROTHROMBIN TIME: CPT | Mod: QW

## 2019-06-10 PROCEDURE — 99207 ZZC NO CHARGE NURSE ONLY: CPT

## 2019-06-10 NOTE — PROGRESS NOTES
ANTICOAGULATION FOLLOW-UP CLINIC VISIT    Patient Name:  Unique Ward  Date:  6/10/2019  Contact Type:  Face to Face    SUBJECTIVE:  Patient Findings     Comments:   New start.         Clinical Outcomes     Comments:   New start.            OBJECTIVE    INR Protime   Date Value Ref Range Status   06/10/2019 1.5 (A) 0.86 - 1.14 Final       ASSESSMENT / PLAN  INR assessment SUB    Recheck INR In: 3 DAYS    INR Location Clinic      Anticoagulation Summary  As of 6/10/2019    INR goal:   2.0-3.0   TTR:   --   INR used for dosin.5! (6/10/2019)   Warfarin maintenance plan:   No maintenance plan   Full warfarin instructions:   6/10: 10 mg; : 7.5 mg; : 10 mg; Otherwise No maintenance plan   Next INR check:   2019   Target end date:       Indications    Atrial fibrillation (H) [I48.91]             Anticoagulation Episode Summary     INR check location:       Preferred lab:       Send INR reminders to:   TON ELIZALDE    Comments:         Anticoagulation Care Providers     Provider Role Specialty Phone number    Tyler Lee MD [facility] [phone]            See the Encounter Report to view Anticoagulation Flowsheet and Dosing Calendar (Go to Encounters tab in chart review, and find the Anticoagulation Therapy Visit)    Dosage adjustment made based on physician directed care plan.    Veronika Campbell RN

## 2019-06-13 ENCOUNTER — ANTICOAGULATION THERAPY VISIT (OUTPATIENT)
Dept: ANTICOAGULATION | Facility: OTHER | Age: 79
End: 2019-06-13
Payer: COMMERCIAL

## 2019-06-13 DIAGNOSIS — I48.91 ATRIAL FIBRILLATION (H): ICD-10-CM

## 2019-06-13 LAB — INR POINT OF CARE: 2.3 (ref 0.86–1.14)

## 2019-06-13 PROCEDURE — 99207 ZZC NO CHARGE NURSE ONLY: CPT

## 2019-06-13 PROCEDURE — 36416 COLLJ CAPILLARY BLOOD SPEC: CPT

## 2019-06-13 PROCEDURE — 85610 PROTHROMBIN TIME: CPT | Mod: QW

## 2019-06-13 NOTE — PROGRESS NOTES
ANTICOAGULATION FOLLOW-UP CLINIC VISIT    Patient Name:  Unique Ward  Date:  2019  Contact Type:  Face to Face    SUBJECTIVE:  Patient Findings     Comments:   New start    The patient was assessed for diet, medication, and activity level changes, missed or extra doses, bruising or bleeding, with no problem findings.          Clinical Outcomes     Negatives:   Major bleeding event, Thromboembolic event, Anticoagulation-related hospital admission, Anticoagulation-related ED visit, Anticoagulation-related fatality    Comments:   New start    The patient was assessed for diet, medication, and activity level changes, missed or extra doses, bruising or bleeding, with no problem findings.             OBJECTIVE    INR Protime   Date Value Ref Range Status   2019 2.3 (A) 0.86 - 1.14 Final       ASSESSMENT / PLAN  INR assessment THER    Recheck INR In: 1 WEEK    INR Location Clinic      Anticoagulation Summary  As of 2019    INR goal:   2.0-3.0   TTR:   --   INR used for dosin.3 (2019)   Warfarin maintenance plan:   10 mg (5 mg x 2) every Mon, Fri; 7.5 mg (5 mg x 1.5) all other days   Full warfarin instructions:   10 mg every Mon, Fri; 7.5 mg all other days   Weekly warfarin total:   57.5 mg   Plan last modified:   Veronika Campbell RN (2019)   Next INR check:   2019   Target end date:       Indications    Atrial fibrillation (H) [I48.91]             Anticoagulation Episode Summary     INR check location:       Preferred lab:       Send INR reminders to:   TON ELIZALDE    Comments:         Anticoagulation Care Providers     Provider Role Specialty Phone number    Tyler Lee MD [facility] [phone]            See the Encounter Report to view Anticoagulation Flowsheet and Dosing Calendar (Go to Encounters tab in chart review, and find the Anticoagulation Therapy Visit)    Dosage adjustment made based on physician directed care plan.    Veronika Campbell RN

## 2019-06-20 ENCOUNTER — ANTICOAGULATION THERAPY VISIT (OUTPATIENT)
Dept: ANTICOAGULATION | Facility: OTHER | Age: 79
End: 2019-06-20
Payer: COMMERCIAL

## 2019-06-20 DIAGNOSIS — I48.91 ATRIAL FIBRILLATION (H): ICD-10-CM

## 2019-06-20 DIAGNOSIS — I48.0 PAROXYSMAL ATRIAL FIBRILLATION (H): ICD-10-CM

## 2019-06-20 LAB — INR POINT OF CARE: 2.5 (ref 0.86–1.14)

## 2019-06-20 PROCEDURE — 99207 ZZC NO CHARGE NURSE ONLY: CPT

## 2019-06-20 PROCEDURE — 36416 COLLJ CAPILLARY BLOOD SPEC: CPT

## 2019-06-20 PROCEDURE — 85610 PROTHROMBIN TIME: CPT | Mod: QW

## 2019-06-20 RX ORDER — WARFARIN SODIUM 5 MG/1
TABLET ORAL
Qty: 145 TABLET | Refills: 0 | Status: SHIPPED | OUTPATIENT
Start: 2019-06-20 | End: 2019-09-11

## 2019-06-20 NOTE — PROGRESS NOTES
ANTICOAGULATION FOLLOW-UP CLINIC VISIT    Patient Name:  Unique Ward  Date:  2019  Contact Type:  Face to Face    SUBJECTIVE:  Patient Findings     Comments:   The patient was assessed for diet, medication, and activity level changes, missed or extra doses, bruising or bleeding, with no problem findings.          Clinical Outcomes     Negatives:   Major bleeding event, Thromboembolic event, Anticoagulation-related hospital admission, Anticoagulation-related ED visit, Anticoagulation-related fatality    Comments:   The patient was assessed for diet, medication, and activity level changes, missed or extra doses, bruising or bleeding, with no problem findings.             OBJECTIVE    INR Protime   Date Value Ref Range Status   2019 2.5 (A) 0.86 - 1.14 Final       ASSESSMENT / PLAN  INR assessment THER    Recheck INR In: 10 DAYS    INR Location Clinic      Anticoagulation Summary  As of 2019    INR goal:   2.0-3.0   TTR:   100.0 % (3 d)   INR used for dosin.5 (2019)   Warfarin maintenance plan:   10 mg (5 mg x 2) every Mon, Fri; 7.5 mg (5 mg x 1.5) all other days   Full warfarin instructions:   10 mg every Mon, Fri; 7.5 mg all other days   Weekly warfarin total:   57.5 mg   No change documented:   Veronika Campbell, RN   Plan last modified:   Veronika Campbell RN (2019)   Next INR check:   2019   Target end date:       Indications    Atrial fibrillation (H) [I48.91]             Anticoagulation Episode Summary     INR check location:       Preferred lab:       Send INR reminders to:   TON ELIZALDE    Comments:         Anticoagulation Care Providers     Provider Role Specialty Phone number    Tyler Lee MD [facility] [phone]            See the Encounter Report to view Anticoagulation Flowsheet and Dosing Calendar (Go to Encounters tab in chart review, and find the Anticoagulation Therapy Visit)    Dosage adjustment made based on physician directed care  plan.    Veronika Campbell RN

## 2019-06-25 ENCOUNTER — HOSPITAL ENCOUNTER (OUTPATIENT)
Dept: CARDIOLOGY | Facility: CLINIC | Age: 79
Discharge: HOME | End: 2019-06-25
Attending: NURSE PRACTITIONER | Admitting: NURSE PRACTITIONER
Payer: MEDICARE

## 2019-06-25 ENCOUNTER — OFFICE VISIT (OUTPATIENT)
Dept: CARDIOLOGY | Facility: CLINIC | Age: 79
End: 2019-06-25
Payer: COMMERCIAL

## 2019-06-25 VITALS
SYSTOLIC BLOOD PRESSURE: 134 MMHG | DIASTOLIC BLOOD PRESSURE: 72 MMHG | BODY MASS INDEX: 21.29 KG/M2 | HEART RATE: 55 BPM | OXYGEN SATURATION: 97 % | WEIGHT: 132.5 LBS | HEIGHT: 66 IN

## 2019-06-25 DIAGNOSIS — I36.1 NON-RHEUMATIC TRICUSPID VALVE INSUFFICIENCY: ICD-10-CM

## 2019-06-25 DIAGNOSIS — I48.91 ATRIAL FIBRILLATION, UNSPECIFIED TYPE (H): Primary | ICD-10-CM

## 2019-06-25 PROCEDURE — 93005 ELECTROCARDIOGRAM TRACING: CPT | Performed by: REHABILITATION PRACTITIONER

## 2019-06-25 PROCEDURE — 93010 ELECTROCARDIOGRAM REPORT: CPT | Performed by: NURSE PRACTITIONER

## 2019-06-25 PROCEDURE — 99214 OFFICE O/P EST MOD 30 MIN: CPT | Mod: 25 | Performed by: NURSE PRACTITIONER

## 2019-06-25 ASSESSMENT — MIFFLIN-ST. JEOR: SCORE: 1096.74

## 2019-06-25 NOTE — PROGRESS NOTES
"Cardiology Clinic Progress Note  Unique Ward MRN# [id number]   YOB: 1940 Age: 78 year old          Assessment and Plan:     1. Paroxysmal atrial fibrillation    [id number] score of 4 (age, gender, hypertension)    Anticoagulated with Coumadin     EKG showed sinus bradycardia at 55 bpm    Will not initiate beta blocker due to bradycardia and patient preference    Follow up in 1 year or sooner if needed    2. Hypertension    Well controlled    Continue amlodipine 5 mg daily    3. Moderate tricuspid regurgitation    Repeat echocardiogram in 1 year         History of Presenting Illness:    Unique Ward is a very pleasant 78 year old patient of Dr. Sanchez who presents today for a follow up for new onset paroxysmal atrial fibrillation. She has a past medical history notable for hypertension, arthritis, depression and anxiety.     She presented to the ED at [facility] on 4/14/19 with palpitations. She was found to the in atrial fibrillation with RVR and spontaneously converted to normal sinus rhythm with occasional PVCs. An echocardiogram showed normal LV function with moderate tricuspid regurgitation. She was instructed to take aspirin.    On June 5, 2019 she was evaluated by Dr. Sanchez and anticoagulation was recommended given [id number] score of 4. Initially, Xarelto was prescribed, but was cost prohibitive and she was then started on Warfarin.    Her EKG today showed sinus bradycardia at 55 bpm. She denies recurrent symptoms or palpations and states the \"extra beats\" have decreased as well. She is hesitant to start a beta blocker due to side effect or fatigue.                Review of Systems:   Review of Systems:  Skin:  Negative     Eyes:  Positive for glasses  ENT:  Negative    Respiratory:  Negative    Cardiovascular:  Negative for;chest pain;edema;lightheadedness;dizziness Positive for;palpitations;fatigue  Gastroenterology: Negative    Genitourinary:  Negative  " "  Musculoskeletal:  Positive for arthritis;joint pain  Neurologic:  Negative    Psychiatric:  Negative    Heme/Lymph/Imm:  Positive for allergies  Endocrine:  Negative              Physical Exam:     Vitals: /72 (BP Location: Right arm, Patient Position: Fowlers, Cuff Size: Adult Regular)   Pulse 55   Ht 1.683 m (5' 6.25\")   Wt 60.1 kg (132 lb 8 oz)   LMP  (LMP Unknown)   SpO2 97%   BMI 21.22 kg/m    Constitutional:  cooperative;well developed thin      Skin:  warm and dry to the touch        Head:  normocephalic        Eyes:  pupils equal and round        ENT:  dentition good        Neck:  no stiffness        Chest:  clear to auscultation;normal symmetry        Cardiac: regular rhythm;normal S1 and S2     no presence of murmur            Abdomen:  abdomen soft        Vascular: pulses full and equal                                      Extremities and Back:  no edema        Neurological:  no gross motor deficits               Medications:     Current Outpatient Medications   Medication Sig Dispense Refill     amLODIPine (NORVASC) 5 MG tablet Take 1 tablet (5 mg) by mouth daily 90 tablet 2     cholecalciferol (VITAMIN D3) 5000 units TABS tablet Take by mouth daily       magnesium 250 MG tablet Take 1 tablet by mouth daily       warfarin (COUMADIN) 5 MG tablet Take 10 mg Mon, Fri and 7.5 mg all other days or as directed by the coumadin clinic. 145 tablet 0       Family History   Problem Relation Age of Onset     Diabetes Maternal Grandmother      Coronary Artery Disease Sister         error     Coronary Artery Disease Brother         error     Hypertension Brother      Hyperlipidemia Brother      Cerebrovascular Disease Brother      Hypertension Sister      Hypertension Sister      Hyperlipidemia Sister      Other Cancer Sister         cervical     Breast Cancer Sister      Other Cancer Sister         kidney     Mental Illness Mother         paranoid/schizophrenic/suicide     Mental Illness Sister         " ???  Had shock treatments       Social History     Socioeconomic History     Marital status:      Spouse name: Not on file     Number of children: Not on file     Years of education: Not on file     Highest education level: Not on file   Occupational History     Not on file   Social Needs     Financial resource strain: Not on file     Food insecurity:     Worry: Not on file     Inability: Not on file     Transportation needs:     Medical: Not on file     Non-medical: Not on file   Tobacco Use     Smoking status: Former Smoker     Packs/day: 0.75     Years: 10.00     Pack years: 7.50     Types: Cigarettes     Start date: 1955     Last attempt to quit: 1965     Years since quittin.5     Smokeless tobacco: Never Used   Substance and Sexual Activity     Alcohol use: No     Alcohol/week: 0.0 oz     Drug use: No     Sexual activity: Never   Lifestyle     Physical activity:     Days per week: Not on file     Minutes per session: Not on file     Stress: Not on file   Relationships     Social connections:     Talks on phone: Not on file     Gets together: Not on file     Attends Anabaptist service: Not on file     Active member of club or organization: Not on file     Attends meetings of clubs or organizations: Not on file     Relationship status: Not on file     Intimate partner violence:     Fear of current or ex partner: Not on file     Emotionally abused: Not on file     Physically abused: Not on file     Forced sexual activity: Not on file   Other Topics Concern     Parent/sibling w/ CABG, MI or angioplasty before 65F 55M? No   Social History Narrative     Not on file            Past Medical History:     Past Medical History:   Diagnosis Date     Arthritis 1980     Atrial fibrillation (H) 2019     Cancer (H) 1993     scalp    Basal Cell X2  head      nose bridge 1996     Depressive disorder 1977    anxiety     Hypertension 2016              Past Surgical History:     Past  Surgical History:   Procedure Laterality Date     BIOPSY  11/01/2014     COLONOSCOPY  01.01/1979    Two routine,  one for chronic diarrhea     EYE SURGERY  1/1/2014    first    cataracts both eyes.  second a few months later     GENITOURINARY SURGERY       GYN SURGERY  01.01.1977    TL     HEAD & NECK SURGERY  01.01.1997    basal cell X2   Moh's on scalp.  other on bridge of nose     SOFT TISSUE SURGERY  01.01.1990    Ganglion Cyst   Left inner wrist              Allergies:   No clinical screening - see comments; Codeine; Gentamicin; Influenza vaccines; Influenza virus vaccine h5n1; Paxil [paroxetine]; Typhoid vaccine-strain ty21a; and Typhoid vaccines       Data:   All laboratory data reviewed:    Recent Labs   Lab Test 04/14/19  1600 01/15/19  1018 02/28/17  1151   LDL  --   --  118*   HDL  --   --  104   NHDL  --   --  132*   CHOL  --   --  236*   TRIG  --   --  71   TSH 1.78 1.55 2.13   IRON  --  162  --    FEB  --  363  --    IRONSAT  --  45  --        Lab Results   Component Value Date    WBC 5.3 04/14/2019    RBC 4.11 04/14/2019    HGB 12.2 04/14/2019    HCT 36.6 04/14/2019    MCV 89 04/14/2019    MCH 29.7 04/14/2019    MCHC 33.3 04/14/2019    RDW 13.1 04/14/2019     04/14/2019       Lab Results   Component Value Date     04/14/2019    POTASSIUM 3.8 04/14/2019    CHLORIDE 102 04/14/2019    CO2 27 04/14/2019    ANIONGAP 6 04/14/2019    GLC 87 04/23/2019    BUN 23 04/14/2019    CR 0.67 04/14/2019    GFRESTIMATED 83 04/14/2019    GFRESTBLACK >90 04/14/2019    GLORIA 8.6 04/14/2019      Lab Results   Component Value Date    AST 30 04/14/2019    ALT 19 04/14/2019       Lab Results   Component Value Date    A1C 5.7 (H) 04/23/2019       Lab Results   Component Value Date    INR 2.5 (A) 06/20/2019    INR 2.3 (A) 06/13/2019         ANDREI CASTRO, APRN, CNP  [facility] [place]

## 2019-06-25 NOTE — LETTER
"6/25/2019    Tyler Lee MD  [address] [place] [address]    RE: Unique Ward       Dear Colleague,    I had the pleasure of seeing Unique Ward in the [facility].    Cardiology Clinic Progress Note  Unique Ward MRN# [id number]   YOB: 1940 Age: 78 year old          Assessment and Plan:     1. Paroxysmal atrial fibrillation    [id number] score of 4 (age, gender, hypertension)    Anticoagulated with Coumadin     EKG showed sinus bradycardia at 55 bpm    Will not initiate beta blocker due to bradycardia and patient preference    Follow up in 1 year or sooner if needed    2. Hypertension    Well controlled    Continue amlodipine 5 mg daily    3. Moderate tricuspid regurgitation    Repeat echocardiogram in 1 year         History of Presenting Illness:    Unique Ward is a very pleasant 78 year old patient of Dr. Sanchez who presents today for a follow up for new onset paroxysmal atrial fibrillation. She has a past medical history notable for hypertension, arthritis, depression and anxiety.     She presented to the ED at [facility] on 4/14/19 with palpitations. She was found to the in atrial fibrillation with RVR and spontaneously converted to normal sinus rhythm with occasional PVCs. An echocardiogram showed normal LV function with moderate tricuspid regurgitation. She was instructed to take aspirin.    On June 5, 2019 she was evaluated by Dr. Sanchez and anticoagulation was recommended given [id number] score of 4. Initially, Xarelto was prescribed, but was cost prohibitive and she was then started on Warfarin.    Her EKG today showed sinus bradycardia at 55 bpm. She denies recurrent symptoms or palpations and states the \"extra beats\" have decreased as well. She is hesitant to start a beta blocker due to side effect or fatigue.                Review of Systems:   Review of Systems:  Skin:  Negative     Eyes:  Positive for glasses  ENT:  " "Negative    Respiratory:  Negative    Cardiovascular:  Negative for;chest pain;edema;lightheadedness;dizziness Positive for;palpitations;fatigue  Gastroenterology: Negative    Genitourinary:  Negative    Musculoskeletal:  Positive for arthritis;joint pain  Neurologic:  Negative    Psychiatric:  Negative    Heme/Lymph/Imm:  Positive for allergies  Endocrine:  Negative              Physical Exam:     Vitals: /72 (BP Location: Right arm, Patient Position: Fowlers, Cuff Size: Adult Regular)   Pulse 55   Ht 1.683 m (5' 6.25\")   Wt 60.1 kg (132 lb 8 oz)   LMP  (LMP Unknown)   SpO2 97%   BMI 21.22 kg/m     Constitutional:  cooperative;well developed thin      Skin:  warm and dry to the touch        Head:  normocephalic        Eyes:  pupils equal and round        ENT:  dentition good        Neck:  no stiffness        Chest:  clear to auscultation;normal symmetry        Cardiac: regular rhythm;normal S1 and S2     no presence of murmur            Abdomen:  abdomen soft        Vascular: pulses full and equal                                      Extremities and Back:  no edema        Neurological:  no gross motor deficits               Medications:     Current Outpatient Medications   Medication Sig Dispense Refill     amLODIPine (NORVASC) 5 MG tablet Take 1 tablet (5 mg) by mouth daily 90 tablet 2     cholecalciferol (VITAMIN D3) 5000 units TABS tablet Take by mouth daily       magnesium 250 MG tablet Take 1 tablet by mouth daily       warfarin (COUMADIN) 5 MG tablet Take 10 mg Mon, Fri and 7.5 mg all other days or as directed by the coumadin clinic. 145 tablet 0       Family History   Problem Relation Age of Onset     Diabetes Maternal Grandmother      Coronary Artery Disease Sister         error     Coronary Artery Disease Brother         error     Hypertension Brother      Hyperlipidemia Brother      Cerebrovascular Disease Brother      Hypertension Sister      Hypertension Sister      Hyperlipidemia Sister  "     Other Cancer Sister         cervical     Breast Cancer Sister      Other Cancer Sister         kidney     Mental Illness Mother         paranoid/schizophrenic/suicide     Mental Illness Sister         ???  Had shock treatments       Social History     Socioeconomic History     Marital status:      Spouse name: Not on file     Number of children: Not on file     Years of education: Not on file     Highest education level: Not on file   Occupational History     Not on file   Social Needs     Financial resource strain: Not on file     Food insecurity:     Worry: Not on file     Inability: Not on file     Transportation needs:     Medical: Not on file     Non-medical: Not on file   Tobacco Use     Smoking status: Former Smoker     Packs/day: 0.75     Years: 10.00     Pack years: 7.50     Types: Cigarettes     Start date: 1955     Last attempt to quit: 1965     Years since quittin.5     Smokeless tobacco: Never Used   Substance and Sexual Activity     Alcohol use: No     Alcohol/week: 0.0 oz     Drug use: No     Sexual activity: Never   Lifestyle     Physical activity:     Days per week: Not on file     Minutes per session: Not on file     Stress: Not on file   Relationships     Social connections:     Talks on phone: Not on file     Gets together: Not on file     Attends Rastafari service: Not on file     Active member of club or organization: Not on file     Attends meetings of clubs or organizations: Not on file     Relationship status: Not on file     Intimate partner violence:     Fear of current or ex partner: Not on file     Emotionally abused: Not on file     Physically abused: Not on file     Forced sexual activity: Not on file   Other Topics Concern     Parent/sibling w/ CABG, MI or angioplasty before 65F 55M? No   Social History Narrative     Not on file            Past Medical History:     Past Medical History:   Diagnosis Date     Arthritis 1980     Atrial fibrillation (H)  6/7/2019     Cancer (H) 01.01.1993     scalp    Basal Cell X2  head      nose bridge 1996     Depressive disorder 01.01.1977    anxiety     Hypertension 03.16.2016              Past Surgical History:     Past Surgical History:   Procedure Laterality Date     BIOPSY  11/01/2014     COLONOSCOPY  01.01/1979    Two routine,  one for chronic diarrhea     EYE SURGERY  1/1/2014    first    cataracts both eyes.  second a few months later     GENITOURINARY SURGERY       GYN SURGERY  01.01.1977    TL     HEAD & NECK SURGERY  01.01.1997    basal cell X2   Moh's on scalp.  other on bridge of nose     SOFT TISSUE SURGERY  01.01.1990    Ganglion Cyst   Left inner wrist              Allergies:   No clinical screening - see comments; Codeine; Gentamicin; Influenza vaccines; Influenza virus vaccine h5n1; Paxil [paroxetine]; Typhoid vaccine-strain ty21a; and Typhoid vaccines       Data:   All laboratory data reviewed:    Recent Labs   Lab Test 04/14/19  1600 01/15/19  1018 02/28/17  1151   LDL  --   --  118*   HDL  --   --  104   NHDL  --   --  132*   CHOL  --   --  236*   TRIG  --   --  71   TSH 1.78 1.55 2.13   IRON  --  162  --    FEB  --  363  --    IRONSAT  --  45  --        Lab Results   Component Value Date    WBC 5.3 04/14/2019    RBC 4.11 04/14/2019    HGB 12.2 04/14/2019    HCT 36.6 04/14/2019    MCV 89 04/14/2019    MCH 29.7 04/14/2019    MCHC 33.3 04/14/2019    RDW 13.1 04/14/2019     04/14/2019       Lab Results   Component Value Date     04/14/2019    POTASSIUM 3.8 04/14/2019    CHLORIDE 102 04/14/2019    CO2 27 04/14/2019    ANIONGAP 6 04/14/2019    GLC 87 04/23/2019    BUN 23 04/14/2019    CR 0.67 04/14/2019    GFRESTIMATED 83 04/14/2019    GFRESTBLACK >90 04/14/2019    GLORIA 8.6 04/14/2019      Lab Results   Component Value Date    AST 30 04/14/2019    ALT 19 04/14/2019       Lab Results   Component Value Date    A1C 5.7 (H) 04/23/2019       Lab Results   Component Value Date    INR 2.5 (A) 06/20/2019     INR 2.3 (A) 06/13/2019         MAYRA POLK, CNP  [facility]    Thank you for allowing me to participate in the care of your patient.      Sincerely,     MAYRA POLK CNP     [facility]    cc:   No referring provider defined for this encounter.

## 2019-06-25 NOTE — PATIENT INSTRUCTIONS
TODAY'S RECOMMENDATIONS    1. Will not add beta blocker due to low heart rate    2. Follow up in 1 year with echocardiogram    3. Call if recurrent symptoms and can always be seen sooner    If you have questions or concerns please call clinic at [phone] for DIONTE Lucas.    Please call [phone] for scheduling.      It was a pleasure seeing you today!

## 2019-06-25 NOTE — LETTER
"6/25/2019    Tyler Lee MD  [address] [place] [address]    RE: Unique Ward       Dear Colleague,    I had the pleasure of seeing Unique Ward in the [facility].    Cardiology Clinic Progress Note  Unique Ward MRN# [id number]   YOB: 1940 Age: 78 year old          Assessment and Plan:     1. Paroxysmal atrial fibrillation    [id number] score of 4 (age, gender, hypertension)    Anticoagulated with Coumadin     EKG showed sinus bradycardia at 55 bpm    Will not initiate beta blocker due to bradycardia and patient preference    Follow up in 1 year or sooner if needed    2. Hypertension    Well controlled    Continue amlodipine 5 mg daily    3. Moderate tricuspid regurgitation    Repeat echocardiogram in 1 year         History of Presenting Illness:    Unique Ward is a very pleasant 78 year old patient of Dr. Sanchez who presents today for a follow up for new onset paroxysmal atrial fibrillation. She has a past medical history notable for hypertension, arthritis, depression and anxiety.     She presented to the ED at [facility] on 4/14/19 with palpitations. She was found to the in atrial fibrillation with RVR and spontaneously converted to normal sinus rhythm with occasional PVCs. An echocardiogram showed normal LV function with moderate tricuspid regurgitation. She was instructed to take aspirin.    On June 5, 2019 she was evaluated by Dr. Sanchez and anticoagulation was recommended given [id number] score of 4. Initially, Xarelto was prescribed, but was cost prohibitive and she was then started on Warfarin.    Her EKG today showed sinus bradycardia at 55 bpm. She denies recurrent symptoms or palpations and states the \"extra beats\" have decreased as well. She is hesitant to start a beta blocker due to side effect or fatigue.                Review of Systems:   Review of Systems:  Skin:  Negative     Eyes:  Positive for glasses  ENT:  " "Negative    Respiratory:  Negative    Cardiovascular:  Negative for;chest pain;edema;lightheadedness;dizziness Positive for;palpitations;fatigue  Gastroenterology: Negative    Genitourinary:  Negative    Musculoskeletal:  Positive for arthritis;joint pain  Neurologic:  Negative    Psychiatric:  Negative    Heme/Lymph/Imm:  Positive for allergies  Endocrine:  Negative              Physical Exam:     Vitals: /72 (BP Location: Right arm, Patient Position: Fowlers, Cuff Size: Adult Regular)   Pulse 55   Ht 1.683 m (5' 6.25\")   Wt 60.1 kg (132 lb 8 oz)   LMP  (LMP Unknown)   SpO2 97%   BMI 21.22 kg/m     Constitutional:  cooperative;well developed thin      Skin:  warm and dry to the touch        Head:  normocephalic        Eyes:  pupils equal and round        ENT:  dentition good        Neck:  no stiffness        Chest:  clear to auscultation;normal symmetry        Cardiac: regular rhythm;normal S1 and S2     no presence of murmur            Abdomen:  abdomen soft        Vascular: pulses full and equal                                      Extremities and Back:  no edema        Neurological:  no gross motor deficits               Medications:     Current Outpatient Medications   Medication Sig Dispense Refill     amLODIPine (NORVASC) 5 MG tablet Take 1 tablet (5 mg) by mouth daily 90 tablet 2     cholecalciferol (VITAMIN D3) 5000 units TABS tablet Take by mouth daily       magnesium 250 MG tablet Take 1 tablet by mouth daily       warfarin (COUMADIN) 5 MG tablet Take 10 mg Mon, Fri and 7.5 mg all other days or as directed by the coumadin clinic. 145 tablet 0       Family History   Problem Relation Age of Onset     Diabetes Maternal Grandmother      Coronary Artery Disease Sister         error     Coronary Artery Disease Brother         error     Hypertension Brother      Hyperlipidemia Brother      Cerebrovascular Disease Brother      Hypertension Sister      Hypertension Sister      Hyperlipidemia Sister  "     Other Cancer Sister         cervical     Breast Cancer Sister      Other Cancer Sister         kidney     Mental Illness Mother         paranoid/schizophrenic/suicide     Mental Illness Sister         ???  Had shock treatments       Social History     Socioeconomic History     Marital status:      Spouse name: Not on file     Number of children: Not on file     Years of education: Not on file     Highest education level: Not on file   Occupational History     Not on file   Social Needs     Financial resource strain: Not on file     Food insecurity:     Worry: Not on file     Inability: Not on file     Transportation needs:     Medical: Not on file     Non-medical: Not on file   Tobacco Use     Smoking status: Former Smoker     Packs/day: 0.75     Years: 10.00     Pack years: 7.50     Types: Cigarettes     Start date: 1955     Last attempt to quit: 1965     Years since quittin.5     Smokeless tobacco: Never Used   Substance and Sexual Activity     Alcohol use: No     Alcohol/week: 0.0 oz     Drug use: No     Sexual activity: Never   Lifestyle     Physical activity:     Days per week: Not on file     Minutes per session: Not on file     Stress: Not on file   Relationships     Social connections:     Talks on phone: Not on file     Gets together: Not on file     Attends [employer]ism service: Not on file     Active member of club or organization: Not on file     Attends meetings of clubs or organizations: Not on file     Relationship status: Not on file     Intimate partner violence:     Fear of current or ex partner: Not on file     Emotionally abused: Not on file     Physically abused: Not on file     Forced sexual activity: Not on file   Other Topics Concern     Parent/sibling w/ CABG, MI or angioplasty before 65F 55M? No   Social History Narrative     Not on file            Past Medical History:     Past Medical History:   Diagnosis Date     Arthritis 1980     Atrial fibrillation (H)  6/7/2019     Cancer (H) 01.01.1993     scalp    Basal Cell X2  head      nose bridge 1996     Depressive disorder 01.01.1977    anxiety     Hypertension 03.16.2016              Past Surgical History:     Past Surgical History:   Procedure Laterality Date     BIOPSY  11/01/2014     COLONOSCOPY  01.01/1979    Two routine,  one for chronic diarrhea     EYE SURGERY  1/1/2014    first    cataracts both eyes.  second a few months later     GENITOURINARY SURGERY       GYN SURGERY  01.01.1977    TL     HEAD & NECK SURGERY  01.01.1997    basal cell X2   Moh's on scalp.  other on bridge of nose     SOFT TISSUE SURGERY  01.01.1990    Ganglion Cyst   Left inner wrist              Allergies:   No clinical screening - see comments; Codeine; Gentamicin; Influenza vaccines; Influenza virus vaccine h5n1; Paxil [paroxetine]; Typhoid vaccine-strain ty21a; and Typhoid vaccines       Data:   All laboratory data reviewed:    Recent Labs   Lab Test 04/14/19  1600 01/15/19  1018 02/28/17  1151   LDL  --   --  118*   HDL  --   --  104   NHDL  --   --  132*   CHOL  --   --  236*   TRIG  --   --  71   TSH 1.78 1.55 2.13   IRON  --  162  --    FEB  --  363  --    IRONSAT  --  45  --        Lab Results   Component Value Date    WBC 5.3 04/14/2019    RBC 4.11 04/14/2019    HGB 12.2 04/14/2019    HCT 36.6 04/14/2019    MCV 89 04/14/2019    MCH 29.7 04/14/2019    MCHC 33.3 04/14/2019    RDW 13.1 04/14/2019     04/14/2019       Lab Results   Component Value Date     04/14/2019    POTASSIUM 3.8 04/14/2019    CHLORIDE 102 04/14/2019    CO2 27 04/14/2019    ANIONGAP 6 04/14/2019    GLC 87 04/23/2019    BUN 23 04/14/2019    CR 0.67 04/14/2019    GFRESTIMATED 83 04/14/2019    GFRESTBLACK >90 04/14/2019    GLORIA 8.6 04/14/2019      Lab Results   Component Value Date    AST 30 04/14/2019    ALT 19 04/14/2019       Lab Results   Component Value Date    A1C 5.7 (H) 04/23/2019       Lab Results   Component Value Date    INR 2.5 (A) 06/20/2019     INR 2.3 (A) 06/13/2019           Thank you for allowing me to participate in the care of your patient.    Sincerely,     MAYRA POLK [facility]

## 2019-07-01 ENCOUNTER — ANTICOAGULATION THERAPY VISIT (OUTPATIENT)
Dept: ANTICOAGULATION | Facility: OTHER | Age: 79
End: 2019-07-01
Payer: COMMERCIAL

## 2019-07-01 DIAGNOSIS — I48.91 ATRIAL FIBRILLATION, UNSPECIFIED TYPE (H): ICD-10-CM

## 2019-07-01 LAB — INR POINT OF CARE: 2.2 (ref 0.86–1.14)

## 2019-07-01 PROCEDURE — 85610 PROTHROMBIN TIME: CPT | Mod: QW

## 2019-07-01 PROCEDURE — 99207 ZZC NO CHARGE NURSE ONLY: CPT

## 2019-07-01 PROCEDURE — 36416 COLLJ CAPILLARY BLOOD SPEC: CPT

## 2019-07-01 NOTE — PROGRESS NOTES
ANTICOAGULATION FOLLOW-UP CLINIC VISIT    Patient Name:  Unique Ward  Date:  2019  Contact Type:  Face to Face    SUBJECTIVE:  Patient Findings     Comments:   The patient was assessed for diet, medication, and activity level changes, missed or extra doses, bruising or bleeding, with no problem findings.          Clinical Outcomes     Negatives:   Major bleeding event, Thromboembolic event, Anticoagulation-related hospital admission, Anticoagulation-related ED visit, Anticoagulation-related fatality    Comments:   The patient was assessed for diet, medication, and activity level changes, missed or extra doses, bruising or bleeding, with no problem findings.             OBJECTIVE    INR Protime   Date Value Ref Range Status   2019 2.2 (A) 0.86 - 1.14 Final       ASSESSMENT / PLAN  INR assessment THER    Recheck INR In: 2 WEEKS    INR Location Clinic      Anticoagulation Summary  As of 2019    INR goal:   2.0-3.0   TTR:   100.0 % (2 wk)   INR used for dosin.2 (2019)   Warfarin maintenance plan:   10 mg (5 mg x 2) every Mon, Fri; 7.5 mg (5 mg x 1.5) all other days   Full warfarin instructions:   10 mg every Mon, Fri; 7.5 mg all other days   Weekly warfarin total:   57.5 mg   No change documented:   Veronika Campbell, RN   Plan last modified:   Veronika Campbell RN (2019)   Next INR check:   7/15/2019   Target end date:       Indications    Atrial fibrillation (H) [I48.91]             Anticoagulation Episode Summary     INR check location:       Preferred lab:       Send INR reminders to:   [facility]    Comments:   5 mg tabs, PM dose, print out      Anticoagulation Care Providers     Provider Role Specialty Phone number    Tyler Lee MD [facility] [phone]            See the Encounter Report to view Anticoagulation Flowsheet and Dosing Calendar (Go to Encounters tab in chart review, and find the Anticoagulation Therapy Visit)    Dosage adjustment  made based on physician directed care plan.    Veronika Campbell RN

## 2019-07-15 ENCOUNTER — ANTICOAGULATION THERAPY VISIT (OUTPATIENT)
Dept: ANTICOAGULATION | Facility: OTHER | Age: 79
End: 2019-07-15
Payer: COMMERCIAL

## 2019-07-15 DIAGNOSIS — I48.91 ATRIAL FIBRILLATION, UNSPECIFIED TYPE (H): ICD-10-CM

## 2019-07-15 LAB — INR POINT OF CARE: 2.1 (ref 0.86–1.14)

## 2019-07-15 PROCEDURE — 85610 PROTHROMBIN TIME: CPT | Mod: QW

## 2019-07-15 PROCEDURE — 36416 COLLJ CAPILLARY BLOOD SPEC: CPT

## 2019-07-15 PROCEDURE — 99207 ZZC NO CHARGE NURSE ONLY: CPT

## 2019-07-15 NOTE — PROGRESS NOTES
ANTICOAGULATION FOLLOW-UP CLINIC VISIT    Patient Name:  Unique Ward  Date:  7/15/2019  Contact Type:  Face to Face    SUBJECTIVE:  Patient Findings     Comments:   Pt would like to eat more greens. I have increased her dose slightly due to this and will recheck in 2 weeks. Veronika Campbell RN, BSN          Clinical Outcomes     Negatives:   Major bleeding event, Thromboembolic event, Anticoagulation-related hospital admission, Anticoagulation-related ED visit, Anticoagulation-related fatality    Comments:   Pt would like to eat more greens. I have increased her dose slightly due to this and will recheck in 2 weeks. Veronika Campbell RN, BSN             OBJECTIVE    INR Protime   Date Value Ref Range Status   07/15/2019 2.1 (A) 0.86 - 1.14 Final       ASSESSMENT / PLAN  INR assessment THER    Recheck INR In: 2 WEEKS    INR Location Clinic      Anticoagulation Summary  As of 7/15/2019    INR goal:   2.0-3.0   TTR:   100.0 % (4 wk)   INR used for dosin.1 (7/15/2019)   Warfarin maintenance plan:   10 mg (5 mg x 2) every Mon, Wed, Fri; 7.5 mg (5 mg x 1.5) all other days   Full warfarin instructions:   10 mg every Mon, Wed, Fri; 7.5 mg all other days   Weekly warfarin total:   60 mg   Plan last modified:   Veronika Campbell RN (7/15/2019)   Next INR check:   2019   Target end date:       Indications    Atrial fibrillation (H) [I48.91]             Anticoagulation Episode Summary     INR check location:       Preferred lab:       Send INR reminders to:   [facility]    Comments:   5 mg tabs, PM dose, print out      Anticoagulation Care Providers     Provider Role Specialty Phone number    Tyler Lee MD [facility] [phone]            See the Encounter Report to view Anticoagulation Flowsheet and Dosing Calendar (Go to Encounters tab in chart review, and find the Anticoagulation Therapy Visit)    Dosage adjustment made based on physician directed care plan.    Veronika Campbell,  RN

## 2019-07-29 ENCOUNTER — ANTICOAGULATION THERAPY VISIT (OUTPATIENT)
Dept: ANTICOAGULATION | Facility: OTHER | Age: 79
End: 2019-07-29
Payer: COMMERCIAL

## 2019-07-29 DIAGNOSIS — I48.91 ATRIAL FIBRILLATION, UNSPECIFIED TYPE (H): ICD-10-CM

## 2019-07-29 LAB — INR POINT OF CARE: 2.8 (ref 0.86–1.14)

## 2019-07-29 PROCEDURE — 85610 PROTHROMBIN TIME: CPT | Mod: QW

## 2019-07-29 PROCEDURE — 99207 ZZC NO CHARGE NURSE ONLY: CPT

## 2019-07-29 PROCEDURE — 36416 COLLJ CAPILLARY BLOOD SPEC: CPT

## 2019-07-29 NOTE — PROGRESS NOTES
ANTICOAGULATION FOLLOW-UP CLINIC VISIT    Patient Name:  Unique Ward  Date:  2019  Contact Type:  Face to Face    SUBJECTIVE:  Patient Findings     Comments:   The patient was assessed for diet, medication, and activity level changes, missed or extra doses, bruising or bleeding, with no problem findings.          Clinical Outcomes     Negatives:   Major bleeding event, Thromboembolic event, Anticoagulation-related hospital admission, Anticoagulation-related ED visit, Anticoagulation-related fatality    Comments:   The patient was assessed for diet, medication, and activity level changes, missed or extra doses, bruising or bleeding, with no problem findings.             OBJECTIVE    INR Protime   Date Value Ref Range Status   2019 2.8 (A) 0.86 - 1.14 Final       ASSESSMENT / PLAN  INR assessment THER    Recheck INR In: 3 WEEKS    INR Location Clinic      Anticoagulation Summary  As of 2019    INR goal:   2.0-3.0   TTR:   100.0 % (1.4 mo)   INR used for dosin.8 (2019)   Warfarin maintenance plan:   10 mg (5 mg x 2) every Mon, Wed, Fri; 7.5 mg (5 mg x 1.5) all other days   Full warfarin instructions:   10 mg every Mon, Wed, Fri; 7.5 mg all other days   Weekly warfarin total:   60 mg   No change documented:   Veronika Campbell, RN   Plan last modified:   Veronika Campbell RN (7/15/2019)   Next INR check:   2019   Target end date:       Indications    Atrial fibrillation (H) [I48.91]             Anticoagulation Episode Summary     INR check location:       Preferred lab:       Send INR reminders to:   [facility]    Comments:   5 mg tabs, PM dose, print out      Anticoagulation Care Providers     Provider Role Specialty Phone number    Tyler Lee MD [facility] [phone]            See the Encounter Report to view Anticoagulation Flowsheet and Dosing Calendar (Go to Encounters tab in chart review, and find the Anticoagulation Therapy Visit)    Dosage  adjustment made based on physician directed care plan.    Veronika Campbell RN

## 2019-08-19 ENCOUNTER — ANTICOAGULATION THERAPY VISIT (OUTPATIENT)
Dept: ANTICOAGULATION | Facility: OTHER | Age: 79
End: 2019-08-19
Payer: COMMERCIAL

## 2019-08-19 DIAGNOSIS — I48.91 ATRIAL FIBRILLATION, UNSPECIFIED TYPE (H): ICD-10-CM

## 2019-08-19 LAB — INR POINT OF CARE: 2.1 (ref 0.86–1.14)

## 2019-08-19 PROCEDURE — 99207 ZZC NO CHARGE NURSE ONLY: CPT

## 2019-08-19 PROCEDURE — 85610 PROTHROMBIN TIME: CPT | Mod: QW

## 2019-08-19 PROCEDURE — 36416 COLLJ CAPILLARY BLOOD SPEC: CPT

## 2019-08-19 NOTE — PROGRESS NOTES
ANTICOAGULATION FOLLOW-UP CLINIC VISIT    Patient Name:  Unique Ward  Date:  2019  Contact Type:  Face to Face    SUBJECTIVE:  Patient Findings     Comments:   The patient was assessed for diet, medication, and activity level changes, missed or extra doses, bruising or bleeding, with no problem findings.          Clinical Outcomes     Negatives:   Major bleeding event, Thromboembolic event, Anticoagulation-related hospital admission, Anticoagulation-related ED visit, Anticoagulation-related fatality    Comments:   The patient was assessed for diet, medication, and activity level changes, missed or extra doses, bruising or bleeding, with no problem findings.             OBJECTIVE    INR Protime   Date Value Ref Range Status   2019 2.1 (A) 0.86 - 1.14 Final       ASSESSMENT / PLAN  INR assessment THER    Recheck INR In: 4 WEEKS    INR Location Clinic      Anticoagulation Summary  As of 2019    INR goal:   2.0-3.0   TTR:   100.0 % (2.1 mo)   INR used for dosin.1 (2019)   Warfarin maintenance plan:   10 mg (5 mg x 2) every Mon, Wed, Fri; 7.5 mg (5 mg x 1.5) all other days   Full warfarin instructions:   10 mg every Mon, Wed, Fri; 7.5 mg all other days   Weekly warfarin total:   60 mg   No change documented:   Veronika Campbell, RN   Plan last modified:   Veronika Campbell RN (7/15/2019)   Next INR check:   2019   Target end date:       Indications    Atrial fibrillation (H) [I48.91]             Anticoagulation Episode Summary     INR check location:       Preferred lab:       Send INR reminders to:   [facility]    Comments:   5 mg tabs, PM dose, print out      Anticoagulation Care Providers     Provider Role Specialty Phone number    Tyler Lee MD [facility] [phone]            See the Encounter Report to view Anticoagulation Flowsheet and Dosing Calendar (Go to Encounters tab in chart review, and find the Anticoagulation Therapy Visit)    Dosage  adjustment made based on physician directed care plan.    Veronika Campbell RN

## 2019-09-11 ENCOUNTER — MYC REFILL (OUTPATIENT)
Dept: FAMILY MEDICINE | Facility: OTHER | Age: 79
End: 2019-09-11

## 2019-09-11 DIAGNOSIS — I48.0 PAROXYSMAL ATRIAL FIBRILLATION (H): ICD-10-CM

## 2019-09-11 RX ORDER — WARFARIN SODIUM 5 MG/1
TABLET ORAL
Qty: 145 TABLET | Refills: 0 | Status: SHIPPED | OUTPATIENT
Start: 2019-09-11 | End: 2019-12-09

## 2019-09-12 ENCOUNTER — MYC MEDICAL ADVICE (OUTPATIENT)
Dept: FAMILY MEDICINE | Facility: OTHER | Age: 79
End: 2019-09-12

## 2019-09-19 ENCOUNTER — ANTICOAGULATION THERAPY VISIT (OUTPATIENT)
Dept: ANTICOAGULATION | Facility: OTHER | Age: 79
End: 2019-09-19
Payer: COMMERCIAL

## 2019-09-19 DIAGNOSIS — I48.91 ATRIAL FIBRILLATION, UNSPECIFIED TYPE (H): ICD-10-CM

## 2019-09-19 LAB — INR POINT OF CARE: 2.3 (ref 0.86–1.14)

## 2019-09-19 PROCEDURE — 99207 ZZC NO CHARGE NURSE ONLY: CPT

## 2019-09-19 PROCEDURE — 85610 PROTHROMBIN TIME: CPT | Mod: QW

## 2019-09-19 PROCEDURE — 36416 COLLJ CAPILLARY BLOOD SPEC: CPT

## 2019-09-19 NOTE — PROGRESS NOTES
ANTICOAGULATION FOLLOW-UP CLINIC VISIT    Patient Name:  Unique Ward  Date:  2019  Contact Type:  Face to Face    SUBJECTIVE:  Patient Findings     Comments:   The patient was assessed for diet, medication, and activity level changes, missed or extra doses, bruising or bleeding, with no problem findings.          Clinical Outcomes     Negatives:   Major bleeding event, Thromboembolic event, Anticoagulation-related hospital admission, Anticoagulation-related ED visit, Anticoagulation-related fatality    Comments:   The patient was assessed for diet, medication, and activity level changes, missed or extra doses, bruising or bleeding, with no problem findings.             OBJECTIVE    INR Protime   Date Value Ref Range Status   2019 2.3 (A) 0.86 - 1.14 Final       ASSESSMENT / PLAN  INR assessment THER    Recheck INR In: 4 WEEKS    INR Location Clinic      Anticoagulation Summary  As of 2019    INR goal:   2.0-3.0   TTR:   100.0 % (3.1 mo)   INR used for dosin.3 (2019)   Warfarin maintenance plan:   10 mg (5 mg x 2) every Mon, Wed, Fri; 7.5 mg (5 mg x 1.5) all other days   Full warfarin instructions:   10 mg every Mon, Wed, Fri; 7.5 mg all other days   Weekly warfarin total:   60 mg   No change documented:   Veronika Campbell, RN   Plan last modified:   Veronika Campbell RN (7/15/2019)   Next INR check:   10/14/2019   Target end date:       Indications    Atrial fibrillation (H) [I48.91]             Anticoagulation Episode Summary     INR check location:       Preferred lab:       Send INR reminders to:   [facility]    Comments:   5 mg tabs, PM dose, print out      Anticoagulation Care Providers     Provider Role Specialty Phone number    Tyler Lee MD [facility] [phone]            See the Encounter Report to view Anticoagulation Flowsheet and Dosing Calendar (Go to Encounters tab in chart review, and find the Anticoagulation Therapy Visit)    Dosage  adjustment made based on physician directed care plan.    Veronika Campbell RN

## 2019-10-14 ENCOUNTER — ANTICOAGULATION THERAPY VISIT (OUTPATIENT)
Dept: ANTICOAGULATION | Facility: OTHER | Age: 79
End: 2019-10-14
Payer: COMMERCIAL

## 2019-10-14 DIAGNOSIS — I48.91 ATRIAL FIBRILLATION, UNSPECIFIED TYPE (H): ICD-10-CM

## 2019-10-14 LAB — INR POINT OF CARE: 2 (ref 0.86–1.14)

## 2019-10-14 PROCEDURE — 36416 COLLJ CAPILLARY BLOOD SPEC: CPT

## 2019-10-14 PROCEDURE — 99207 ZZC NO CHARGE NURSE ONLY: CPT

## 2019-10-14 PROCEDURE — 85610 PROTHROMBIN TIME: CPT | Mod: QW

## 2019-10-14 NOTE — PROGRESS NOTES
ANTICOAGULATION FOLLOW-UP CLINIC VISIT    Patient Name:  Unique Ward  Date:  10/14/2019  Contact Type:  Face to Face    SUBJECTIVE:  Patient Findings     Comments:   The patient was assessed for diet, medication, and activity level changes, missed or extra doses, bruising or bleeding, with no problem findings.          Clinical Outcomes     Negatives:   Major bleeding event, Thromboembolic event, Anticoagulation-related hospital admission, Anticoagulation-related ED visit, Anticoagulation-related fatality    Comments:   The patient was assessed for diet, medication, and activity level changes, missed or extra doses, bruising or bleeding, with no problem findings.             OBJECTIVE    INR Protime   Date Value Ref Range Status   10/14/2019 2.0 (A) 0.86 - 1.14 Final       ASSESSMENT / PLAN  INR assessment THER    Recheck INR In: 5 WEEKS    INR Location Clinic      Anticoagulation Summary  As of 10/14/2019    INR goal:   2.0-3.0   TTR:   100.0 % (4 mo)   INR used for dosin.0 (10/14/2019)   Warfarin maintenance plan:   10 mg (5 mg x 2) every Mon, Wed, Fri; 7.5 mg (5 mg x 1.5) all other days   Full warfarin instructions:   10 mg every Mon, Wed, Fri; 7.5 mg all other days   Weekly warfarin total:   60 mg   No change documented:   Veronika Campbell, RN   Plan last modified:   Veronika Campbell RN (7/15/2019)   Next INR check:   2019   Target end date:       Indications    Atrial fibrillation (H) [I48.91]             Anticoagulation Episode Summary     INR check location:       Preferred lab:       Send INR reminders to:   [facility]    Comments:   5 mg tabs, PM dose, print out      Anticoagulation Care Providers     Provider Role Specialty Phone number    Tyler Lee MD [facility] [phone]            See the Encounter Report to view Anticoagulation Flowsheet and Dosing Calendar (Go to Encounters tab in chart review, and find the Anticoagulation Therapy Visit)    Dosage  adjustment made based on physician directed care plan.    Veronika Campbell RN

## 2019-10-30 ENCOUNTER — NURSE TRIAGE (OUTPATIENT)
Dept: FAMILY MEDICINE | Facility: CLINIC | Age: 79
End: 2019-10-30

## 2019-10-30 ENCOUNTER — OFFICE VISIT (OUTPATIENT)
Dept: FAMILY MEDICINE | Facility: OTHER | Age: 79
End: 2019-10-30
Payer: COMMERCIAL

## 2019-10-30 VITALS
WEIGHT: 135.2 LBS | BODY MASS INDEX: 21.66 KG/M2 | HEART RATE: 74 BPM | OXYGEN SATURATION: 97 % | SYSTOLIC BLOOD PRESSURE: 126 MMHG | RESPIRATION RATE: 18 BRPM | TEMPERATURE: 99.7 F | DIASTOLIC BLOOD PRESSURE: 74 MMHG

## 2019-10-30 DIAGNOSIS — H66.003 NON-RECURRENT ACUTE SUPPURATIVE OTITIS MEDIA OF BOTH EARS WITHOUT SPONTANEOUS RUPTURE OF TYMPANIC MEMBRANES: ICD-10-CM

## 2019-10-30 DIAGNOSIS — I10 ESSENTIAL HYPERTENSION: ICD-10-CM

## 2019-10-30 DIAGNOSIS — I48.0 PAROXYSMAL ATRIAL FIBRILLATION (H): Primary | ICD-10-CM

## 2019-10-30 DIAGNOSIS — N39.0 URINARY TRACT INFECTION WITHOUT HEMATURIA, SITE UNSPECIFIED: ICD-10-CM

## 2019-10-30 LAB
ALBUMIN UR-MCNC: NEGATIVE MG/DL
APPEARANCE UR: CLEAR
BACTERIA #/AREA URNS HPF: ABNORMAL /HPF
BILIRUB UR QL STRIP: NEGATIVE
COLOR UR AUTO: YELLOW
GLUCOSE UR STRIP-MCNC: NEGATIVE MG/DL
HGB UR QL STRIP: ABNORMAL
KETONES UR STRIP-MCNC: NEGATIVE MG/DL
LEUKOCYTE ESTERASE UR QL STRIP: ABNORMAL
NITRATE UR QL: POSITIVE
PH UR STRIP: 7 PH (ref 5–7)
RBC #/AREA URNS AUTO: ABNORMAL /HPF
SOURCE: ABNORMAL
SP GR UR STRIP: 1.01 (ref 1–1.03)
UROBILINOGEN UR STRIP-ACNC: 0.2 EU/DL (ref 0.2–1)
WBC #/AREA URNS AUTO: ABNORMAL /HPF

## 2019-10-30 PROCEDURE — 81001 URINALYSIS AUTO W/SCOPE: CPT | Performed by: INTERNAL MEDICINE

## 2019-10-30 PROCEDURE — 99214 OFFICE O/P EST MOD 30 MIN: CPT | Performed by: INTERNAL MEDICINE

## 2019-10-30 PROCEDURE — 87088 URINE BACTERIA CULTURE: CPT | Performed by: INTERNAL MEDICINE

## 2019-10-30 PROCEDURE — 87186 SC STD MICRODIL/AGAR DIL: CPT | Performed by: INTERNAL MEDICINE

## 2019-10-30 PROCEDURE — 87086 URINE CULTURE/COLONY COUNT: CPT | Performed by: INTERNAL MEDICINE

## 2019-10-30 RX ORDER — CEFUROXIME AXETIL 250 MG/1
500 TABLET ORAL 2 TIMES DAILY
Qty: 14 TABLET | Refills: 0 | Status: SHIPPED | OUTPATIENT
Start: 2019-10-30 | End: 2020-01-15

## 2019-10-30 RX ORDER — MECLIZINE HYDROCHLORIDE 25 MG/1
25 TABLET ORAL 3 TIMES DAILY PRN
Qty: 21 TABLET | Refills: 0 | Status: SHIPPED | OUTPATIENT
Start: 2019-10-30 | End: 2021-08-04

## 2019-10-30 ASSESSMENT — PAIN SCALES - GENERAL: PAINLEVEL: NO PAIN (0)

## 2019-10-30 ASSESSMENT — PATIENT HEALTH QUESTIONNAIRE - PHQ9: SUM OF ALL RESPONSES TO PHQ QUESTIONS 1-9: 0

## 2019-10-30 NOTE — PROGRESS NOTES
Subjective     Unique Ward is a 79 year old female who presents to clinic today for the following health issues:    HPI   Chief Complaint   Patient presents with     Dizziness     started 12pm today- was triaged      UTI     1 day                     Chief Complaint         The patient is a pleasant 79-year-old female who presents today with intermittent dizziness that started earlier today and a 1 day history of frequency, urgency and hesitancy of urination.  She also has some dysuria.  She denies any hematuria or bowel changes.  With respect to her dizziness, she describes a very clear vertigo.  She is had no lightheadedness.  She does have a history of paroxysmal atrial fibrillation and notes that she can tell when this occurs and it has not been associated with her current vertiginous symptoms.  She also has a history of hypertension which is generally controlled with the use of amlodipine.  She is on chronic anticoagulation and any antibiotics for her symptoms of urinary infection should be ordered such that there is minimal interaction.                       PAST, FAMILY,SOCIAL HISTORY:     Medical  History:   has a past medical history of Arthritis (01.01.1980), Atrial fibrillation (H) (6/7/2019), Cancer (H) (01.01.1993     scalp), Depressive disorder (01.01.1977), and Hypertension (03.16.2016).     Surgical History:   has a past surgical history that includes biopsy (11/01/2014); colonoscopy (01.01/1979); Eye surgery (1/1/2014    first); Abdomen surgery; GYN surgery (01.01.1977); Head and neck surgery (01.01.1997); and Soft tissue surgery (01.01.1990).     Social History:   reports that she quit smoking about 54 years ago. Her smoking use included cigarettes. She started smoking about 64 years ago. She has a 7.50 pack-year smoking history. She has never used smokeless tobacco. She reports that she does not drink alcohol or use drugs.     Family History:  family history includes Breast Cancer in her  sister; Cerebrovascular Disease in her brother; Coronary Artery Disease in her brother and sister; Diabetes in her maternal grandmother; Hyperlipidemia in her brother and sister; Hypertension in her brother, sister, and sister; Mental Illness in her mother and sister; Other Cancer in her sister and sister.            MEDICATIONS  Current Outpatient Medications   Medication Sig Dispense Refill     cefuroxime (CEFTIN) 250 MG tablet Take 2 tablets (500 mg) by mouth 2 times daily 14 tablet 0     cholecalciferol (VITAMIN D3) 5000 units TABS tablet Take by mouth daily       magnesium 250 MG tablet Take 1 tablet by mouth daily       meclizine (ANTIVERT) 25 MG tablet Take 1 tablet (25 mg) by mouth 3 times daily as needed for dizziness 21 tablet 0     warfarin ANTICOAGULANT (COUMADIN ANTICOAGULANT) 5 MG tablet Take 10 mg Mon, Wed, Fri and 7.5 mg all other days or as directed by the coumadin clinic. 145 tablet 0     amLODIPine (NORVASC) 5 MG tablet TAKE 1 TABLET BY MOUTH ONCE DAILY 90 tablet 3         --------------------------------------------------------------------------------------------------------------------                              Review of Systems       LUNGS: Pt denies: cough, excess sputum, hemoptysis, or shortness of breath.   HEART: Pt denies: chest pain, arrhythmia, syncope, tachy or bradyarrhythmia.   GI: Pt denies: nausea, vomiting, diarrhea, constipation, melena, or hematochezia.   NEURO: Pt denies: seizures, strokes, diplopia, weakness, paraesthesias, or paralysis.   SKIN: Pt denies: itching, rashes, discoloration, or specific lesions of concern. Denies recent hair loss.   PSYCH: The patient denies significant depression, anxiety, mood imbalance. Specifically denies any suicidal ideation.                                     Examination    /74 (BP Location: Right arm, Patient Position: Sitting, Cuff Size: Adult Regular)   Pulse 74   Temp 99.7  F (37.6  C) (Temporal)   Resp 18   Wt 61.3 kg (135  lb 3.2 oz)   LMP  (LMP Unknown)   SpO2 97%   BMI 21.66 kg/m       Constitutional: The patient appears to be in no acute distress. The patient appears to be adequately hydrated. No acute respiratory or hemodynamic distress is noted at this time.   LUNGS: clear bilaterally, airflow is brisk, no intercostal retraction or stridor is noted. No coughing is noted during visit.   HEART:  regular without rubs, clicks, gallops, or murmurs. PMI is nondisplaced. Upstrokes are brisk. S1,S2 are heard.   GI: Abdomen is soft, without rebound, guarding or tenderness. Bowel sounds are appropriate. No renal bruits are heard.   NEURO: Pt is alert and appropriate. No neurologic lateralization is noted. Cranial nerves 2-12 are intact. Peripheral sensory and motor function are grossly normal.    SKIN:  warm and dry. No erythema, or rashes are noted. No specific lesions of concern are noted.    PSYCH: The patient appears grossly appropriate. Maintains good eye contact, does not have any jittery or atypical motion. Displays appropriate affect.   URINARY: Ivan's punch is negative.  Moderate suprapubic tenderness is noted with palpation.   ENT: Pharynx is non-erythemous, minimal PND, no significant nasal obstruction, TM's are quite red and retracted, hearing intact bilaterally. No carotid bruits are heard. No JVD seen. Thyroid is not nodular or enlarged.  Significant nystagmus is noted with head motion.  Patient is unable to stand with her eyes closed.                                        Decision Making    1. Paroxysmal atrial fibrillation (H)  Continue anticoagulation      2. Essential hypertension  Currently controlled, continue amlodipine    3. Urinary tract infection without hematuria, site unspecified  Await cultures, start Ceftin.  Increase fluid intake/output  - UA reflex to Microscopic and Culture  - cefuroxime (CEFTIN) 250 MG tablet; Take 2 tablets (500 mg) by mouth 2 times daily  Dispense: 14 tablet; Refill: 0  - Urine  Culture Aerobic Bacterial    4. Non-recurrent acute suppurative otitis media of both ears without spontaneous rupture of tympanic membranes  Antibiotic as above.  Meclizine for vertiginous symptoms to be used as needed  - cefuroxime (CEFTIN) 250 MG tablet; Take 2 tablets (500 mg) by mouth 2 times daily  Dispense: 14 tablet; Refill: 0  - meclizine (ANTIVERT) 25 MG tablet; Take 1 tablet (25 mg) by mouth 3 times daily as needed for dizziness  Dispense: 21 tablet; Refill: 0                           FOLLOW UP   I have asked the patient to make an appointment for followup with me in 1 week if not markedly improved.  Patient will follow-up with her regular provider for her routine health care and maintenance        I have carefully explained the diagnosis and treatment options to the patient.  The patient has displayed an understanding of the above, and all subsequent questions were answered.      DO RONDA Dai    Portions of this note were produced using [employer]  Although every attempt at real-time proof reading has been made, occasional grammar/syntax errors may have been missed.

## 2019-10-30 NOTE — TELEPHONE ENCOUNTER
Reason for call:  Patient reporting a symptom    Symptom or request: Patient has been very dizzy today and isn't sure why. No history of this. Has been drinking a lot of water to try to make it better and it is not going away. Is scheduled for an appt tomorrow.     Duration (how long have symptoms been present): 2 hours    Have you been treated for this before? No    Additional comments:     Phone Number patient can be reached at:  Home number on file [phone] (home)    Best Time:  any    Can we leave a detailed message on this number:  YES    Call taken on 10/30/2019 at 1:42 PM by Hortencia South CNA

## 2019-10-30 NOTE — TELEPHONE ENCOUNTER
"  Answer Assessment - Initial Assessment Questions  1. DESCRIPTION: \"Describe your dizziness.\"      Lightheaded, \" flakey\"  2. LIGHTHEADED: \"Do you feel lightheaded?\" (e.g., somewhat faint, woozy, weak upon standing)      Yes weak when standing  3. VERTIGO: \"Do you feel like either you or the room is spinning or tilting?\" (i.e. vertigo)      no  4. SEVERITY: \"How bad is it?\"  \"Do you feel like you are going to faint?\" \"Can you stand and walk?\"    - MILD - walking normally    - MODERATE - interferes with normal activities (e.g., work, school)     - SEVERE - unable to stand, requires support to walk, feels like passing out now.       Not faint but feels like she is unsteady  5. ONSET:  \"When did the dizziness begin?\"      2 hours  6. AGGRAVATING FACTORS: \"Does anything make it worse?\" (e.g., standing, change in head position)      When she first gets up  7. HEART RATE: \"Can you tell me your heart rate?\" \"How many beats in 15 seconds?\"  (Note: not all patients can do this)        64 irregular always irregular  8. CAUSE: \"What do you think is causing the dizziness?\"      unsure  9. RECURRENT SYMPTOM: \"Have you had dizziness before?\" If so, ask: \"When was the last time?\" \"What happened that time?\"        10. OTHER SYMPTOMS: \"Do you have any other symptoms?\" (e.g., fever, chest pain, vomiting, diarrhea, bleeding)        Burning with urine, murky  11. PREGNANCY: \"Is there any chance you are pregnant?\" \"When was your last menstrual period?\"        no    Protocols used: DIZZINESS-A-OH    "

## 2019-10-30 NOTE — TELEPHONE ENCOUNTER
"Patient reports being light headed and dizzy for the past 2 hours.  States she feels \"stumbly\". When up walking. She is drinking fluids and voiding and has eaten.  She reports her urine as being \"murky and burns when she is dry\".  She denies fever or any other symptoms. Has a history of being diagnosed with A fib 6 months ago. Pulse is 64 irregular, but she reports it is always irregular. Patient has an appointment tomorrow morning.  Appointment rescheduled for today. Patient aware.  Laura Shirley RN BSN    "

## 2019-11-01 LAB
BACTERIA SPEC CULT: ABNORMAL
Lab: ABNORMAL
SPECIMEN SOURCE: ABNORMAL

## 2019-11-05 NOTE — RESULT ENCOUNTER NOTE
Dear Unique, your recent test results are attached.    The urine sample demonstrates E. coli.  It is sensitive to the antibiotic upon which you are started.  Please repeat the urine sample when you are done with the antibiotics.  Feel free to contact me via the office or My Chart if you have any questions regarding the above.  Sincerely,  Chico Quiroz DO FACOI

## 2019-11-18 ENCOUNTER — ANTICOAGULATION THERAPY VISIT (OUTPATIENT)
Dept: ANTICOAGULATION | Facility: OTHER | Age: 79
End: 2019-11-18
Payer: COMMERCIAL

## 2019-11-18 DIAGNOSIS — I48.91 ATRIAL FIBRILLATION, UNSPECIFIED TYPE (H): ICD-10-CM

## 2019-11-18 LAB — INR POINT OF CARE: 3.7 (ref 0.86–1.14)

## 2019-11-18 PROCEDURE — 85610 PROTHROMBIN TIME: CPT | Mod: QW

## 2019-11-18 PROCEDURE — 99207 ZZC NO CHARGE NURSE ONLY: CPT

## 2019-11-18 PROCEDURE — 36416 COLLJ CAPILLARY BLOOD SPEC: CPT

## 2019-11-18 NOTE — PROGRESS NOTES
ANTICOAGULATION FOLLOW-UP CLINIC VISIT    Patient Name:  Unique Ward  Date:  11/18/2019  Contact Type:  Face to Face    SUBJECTIVE:  Patient Findings     Positives:   Change in diet/appetite (Pt was eating spinach most days and has now just switched to lettuce - she will add the spinach back in.)             OBJECTIVE    INR Protime   Date Value Ref Range Status   11/18/2019 3.7 (A) 0.86 - 1.14 Final       ASSESSMENT / PLAN  INR assessment SUPRA    Recheck INR In: 2 WEEKS    INR Location Clinic      Anticoagulation Summary  As of 11/18/2019    INR goal:   2.0-3.0   TTR:   90.6 % (5.1 mo)   INR used for dosing:   3.7! (11/18/2019)   Warfarin maintenance plan:   10 mg (5 mg x 2) every Mon, Wed, Fri; 7.5 mg (5 mg x 1.5) all other days   Full warfarin instructions:   11/18: 5 mg; Otherwise 10 mg every Mon, Wed, Fri; 7.5 mg all other days   Weekly warfarin total:   60 mg   Plan last modified:   Veronika Campbell RN (7/15/2019)   Next INR check:   12/3/2019   Priority:   High   Target end date:       Indications    Atrial fibrillation (H) [I48.91]             Anticoagulation Episode Summary     INR check location:       Preferred lab:       Send INR reminders to:   [facility]    Comments:   5 mg tabs, PM dose, print out      Anticoagulation Care Providers     Provider Role Specialty Phone number    Tyler Lee MD [facility] [phone]            See the Encounter Report to view Anticoagulation Flowsheet and Dosing Calendar (Go to Encounters tab in chart review, and find the Anticoagulation Therapy Visit)    Dosage adjustment made based on physician directed care plan.    Veronika Campbell RN

## 2019-11-25 DIAGNOSIS — I10 ESSENTIAL HYPERTENSION: ICD-10-CM

## 2019-11-25 RX ORDER — AMLODIPINE BESYLATE 5 MG/1
TABLET ORAL
Qty: 90 TABLET | Refills: 3 | Status: SHIPPED | OUTPATIENT
Start: 2019-11-25 | End: 2020-09-24

## 2019-11-25 NOTE — TELEPHONE ENCOUNTER
"Amlodipine  Last Written Prescription Date:  02/15/2019  Last Fill Quantity: 90,  # refills: 2   Last office visit: 10/30/2019 with prescribing provider:     Future Office Visit:      Requested Prescriptions   Pending Prescriptions Disp Refills     amLODIPine (NORVASC) 5 MG tablet [Pharmacy Med Name: AMLODIPINE 5MG TAB]  0     Sig: TAKE 1 TABLET BY MOUTH ONCE DAILY       Calcium Channel Blockers Protocol  Passed - 11/25/2019  5:30 AM        Passed - Blood pressure under 140/90 in past 12 months     BP Readings from Last 3 Encounters:   10/30/19 126/74   06/25/19 134/72   06/05/19 136/70                 Passed - Recent (12 mo) or future (30 days) visit within the authorizing provider's specialty     Patient has had an office visit with the authorizing provider or a provider within the authorizing providers department within the previous 12 mos or has a future within next 30 days. See \"Patient Info\" tab in inbasket, or \"Choose Columns\" in Meds & Orders section of the refill encounter.              Passed - Medication is active on med list        Passed - Patient is age 18 or older        Passed - No active pregnancy on record        Passed - Normal serum creatinine on file in past 12 months     Recent Labs   Lab Test 04/14/19  1600   CR 0.67             Passed - No positive pregnancy test in past 12 months          "

## 2019-12-03 ENCOUNTER — ANTICOAGULATION THERAPY VISIT (OUTPATIENT)
Dept: ANTICOAGULATION | Facility: OTHER | Age: 79
End: 2019-12-03
Payer: COMMERCIAL

## 2019-12-03 DIAGNOSIS — I48.91 ATRIAL FIBRILLATION, UNSPECIFIED TYPE (H): ICD-10-CM

## 2019-12-03 LAB — INR POINT OF CARE: 3.6 (ref 0.86–1.14)

## 2019-12-03 PROCEDURE — 85610 PROTHROMBIN TIME: CPT | Mod: QW

## 2019-12-03 PROCEDURE — 36416 COLLJ CAPILLARY BLOOD SPEC: CPT

## 2019-12-03 PROCEDURE — 99207 ZZC NO CHARGE NURSE ONLY: CPT

## 2019-12-03 NOTE — PROGRESS NOTES
ANTICOAGULATION FOLLOW-UP CLINIC VISIT    Patient Name:  Unique Ward  Date:  12/3/2019  Contact Type:  Face to Face    SUBJECTIVE:  Patient Findings     Comments:   I was unable to identify a reason for supratherapeutic INR.          Clinical Outcomes     Comments:   I was unable to identify a reason for supratherapeutic INR.             OBJECTIVE    INR Protime   Date Value Ref Range Status   12/03/2019 3.6 (A) 0.86 - 1.14 Final       ASSESSMENT / PLAN  INR assessment SUPRA    Recheck INR In: 6 DAYS    INR Location Clinic      Anticoagulation Summary  As of 12/3/2019    INR goal:   2.0-3.0   TTR:   82.6 % (5.6 mo)   INR used for dosing:   3.6! (12/3/2019)   Warfarin maintenance plan:   10 mg (5 mg x 2) every Mon, Fri; 7.5 mg (5 mg x 1.5) all other days   Full warfarin instructions:   12/3: 2.5 mg; Otherwise 10 mg every Mon, Fri; 7.5 mg all other days   Weekly warfarin total:   57.5 mg   Plan last modified:   Veronika Campbell RN (12/3/2019)   Next INR check:   12/9/2019   Priority:   High   Target end date:       Indications    Atrial fibrillation (H) [I48.91]             Anticoagulation Episode Summary     INR check location:       Preferred lab:       Send INR reminders to:   [facility]    Comments:   5 mg tabs, PM dose, print out      Anticoagulation Care Providers     Provider Role Specialty Phone number    Tyler Lee MD [facility] [phone]            See the Encounter Report to view Anticoagulation Flowsheet and Dosing Calendar (Go to Encounters tab in chart review, and find the Anticoagulation Therapy Visit)    Dosage adjustment made based on physician directed care plan.    Veronika Campbell RN

## 2019-12-09 DIAGNOSIS — I48.0 PAROXYSMAL ATRIAL FIBRILLATION (H): ICD-10-CM

## 2019-12-09 RX ORDER — WARFARIN SODIUM 5 MG/1
TABLET ORAL
Qty: 145 TABLET | Refills: 0 | Status: SHIPPED | OUTPATIENT
Start: 2019-12-09 | End: 2020-02-20

## 2019-12-12 ENCOUNTER — ANTICOAGULATION THERAPY VISIT (OUTPATIENT)
Dept: ANTICOAGULATION | Facility: OTHER | Age: 79
End: 2019-12-12
Payer: COMMERCIAL

## 2019-12-12 DIAGNOSIS — I48.91 ATRIAL FIBRILLATION, UNSPECIFIED TYPE (H): ICD-10-CM

## 2019-12-12 LAB — INR POINT OF CARE: 2.5 (ref 0.86–1.14)

## 2019-12-12 PROCEDURE — 99207 ZZC NO CHARGE NURSE ONLY: CPT

## 2019-12-12 PROCEDURE — 85610 PROTHROMBIN TIME: CPT | Mod: QW

## 2019-12-12 PROCEDURE — 36416 COLLJ CAPILLARY BLOOD SPEC: CPT

## 2019-12-12 NOTE — PROGRESS NOTES
ANTICOAGULATION FOLLOW-UP CLINIC VISIT    Patient Name:  Unique Ward  Date:  2019  Contact Type:  Face to Face    SUBJECTIVE:  Patient Findings     Comments:   The patient was assessed for diet, medication, and activity level changes, missed or extra doses, bruising or bleeding, with no problem findings.          Clinical Outcomes     Negatives:   Major bleeding event, Thromboembolic event, Anticoagulation-related hospital admission, Anticoagulation-related ED visit, Anticoagulation-related fatality    Comments:   The patient was assessed for diet, medication, and activity level changes, missed or extra doses, bruising or bleeding, with no problem findings.             OBJECTIVE    INR Protime   Date Value Ref Range Status   2019 2.5 (A) 0.86 - 1.14 Final       ASSESSMENT / PLAN  INR assessment THER    Recheck INR In: 2 WEEKS    INR Location Clinic      Anticoagulation Summary  As of 2019    INR goal:   2.0-3.0   TTR:   80.7 % (5.9 mo)   INR used for dosin.5 (2019)   Warfarin maintenance plan:   10 mg (5 mg x 2) every Mon, Fri; 7.5 mg (5 mg x 1.5) all other days   Full warfarin instructions:   10 mg every Mon, Fri; 7.5 mg all other days   Weekly warfarin total:   57.5 mg   No change documented:   Veronika Campbell, RN   Plan last modified:   Veronika Campbell RN (12/3/2019)   Next INR check:   2019   Priority:   High   Target end date:       Indications    Atrial fibrillation (H) [I48.91]             Anticoagulation Episode Summary     INR check location:       Preferred lab:       Send INR reminders to:   [facility]    Comments:   5 mg tabs, PM dose, print out      Anticoagulation Care Providers     Provider Role Specialty Phone number    Tyler Lee MD [facility] [phone]            See the Encounter Report to view Anticoagulation Flowsheet and Dosing Calendar (Go to Encounters tab in chart review, and find the Anticoagulation Therapy  Visit)    Dosage adjustment made based on physician directed care plan.    Veronika Campbell RN

## 2019-12-26 ENCOUNTER — ANTICOAGULATION THERAPY VISIT (OUTPATIENT)
Dept: ANTICOAGULATION | Facility: OTHER | Age: 79
End: 2019-12-26
Payer: COMMERCIAL

## 2019-12-26 DIAGNOSIS — I48.91 ATRIAL FIBRILLATION, UNSPECIFIED TYPE (H): ICD-10-CM

## 2019-12-26 LAB — INR POINT OF CARE: 2.1 (ref 0.86–1.14)

## 2019-12-26 PROCEDURE — 99207 ZZC NO CHARGE NURSE ONLY: CPT

## 2019-12-26 PROCEDURE — 85610 PROTHROMBIN TIME: CPT | Mod: QW

## 2019-12-26 PROCEDURE — 36416 COLLJ CAPILLARY BLOOD SPEC: CPT

## 2019-12-26 NOTE — PROGRESS NOTES
ANTICOAGULATION FOLLOW-UP CLINIC VISIT    Patient Name:  Unique Ward  Date:  2019  Contact Type:  Face to Face    SUBJECTIVE:  Patient Findings     Comments:   The patient was assessed for diet, medication, and activity level changes, missed or extra doses, bruising or bleeding, with no problem findings.          Clinical Outcomes     Negatives:   Major bleeding event, Thromboembolic event, Anticoagulation-related hospital admission, Anticoagulation-related ED visit, Anticoagulation-related fatality    Comments:   The patient was assessed for diet, medication, and activity level changes, missed or extra doses, bruising or bleeding, with no problem findings.             OBJECTIVE    INR Protime   Date Value Ref Range Status   2019 2.1 (A) 0.86 - 1.14 Final       ASSESSMENT / PLAN  INR assessment THER    Recheck INR In: 3 WEEKS    INR Location Clinic      Anticoagulation Summary  As of 2019    INR goal:   2.0-3.0   TTR:   82.1 % (6.4 mo)   INR used for dosin.1 (2019)   Warfarin maintenance plan:   10 mg (5 mg x 2) every Mon, Fri; 7.5 mg (5 mg x 1.5) all other days   Full warfarin instructions:   10 mg every Mon, Fri; 7.5 mg all other days   Weekly warfarin total:   57.5 mg   No change documented:   Veronika Campbell, RN   Plan last modified:   Veronika Campbell RN (12/3/2019)   Next INR check:   2020   Priority:   Maintenance   Target end date:       Indications    Atrial fibrillation (H) [I48.91]             Anticoagulation Episode Summary     INR check location:       Preferred lab:       Send INR reminders to:   [facility]    Comments:   5 mg tabs, PM dose, print out      Anticoagulation Care Providers     Provider Role Specialty Phone number    Tyler Lee MD [facility] [phone]            See the Encounter Report to view Anticoagulation Flowsheet and Dosing Calendar (Go to Encounters tab in chart review, and find the Anticoagulation  Therapy Visit)    Dosage adjustment made based on physician directed care plan.    Veronika Campbell RN

## 2020-01-15 ENCOUNTER — OFFICE VISIT (OUTPATIENT)
Dept: FAMILY MEDICINE | Facility: OTHER | Age: 80
End: 2020-01-15
Payer: COMMERCIAL

## 2020-01-15 VITALS
RESPIRATION RATE: 14 BRPM | SYSTOLIC BLOOD PRESSURE: 126 MMHG | WEIGHT: 137.1 LBS | HEART RATE: 88 BPM | OXYGEN SATURATION: 98 % | TEMPERATURE: 98.2 F | BODY MASS INDEX: 21.96 KG/M2 | DIASTOLIC BLOOD PRESSURE: 68 MMHG

## 2020-01-15 DIAGNOSIS — R30.0 DYSURIA: ICD-10-CM

## 2020-01-15 DIAGNOSIS — N39.0 URINARY TRACT INFECTION WITHOUT HEMATURIA, SITE UNSPECIFIED: Primary | ICD-10-CM

## 2020-01-15 LAB
ALBUMIN UR-MCNC: NEGATIVE MG/DL
APPEARANCE UR: CLEAR
BACTERIA #/AREA URNS HPF: ABNORMAL /HPF
BILIRUB UR QL STRIP: NEGATIVE
COLOR UR AUTO: YELLOW
GLUCOSE UR STRIP-MCNC: NEGATIVE MG/DL
HGB UR QL STRIP: ABNORMAL
KETONES UR STRIP-MCNC: NEGATIVE MG/DL
LEUKOCYTE ESTERASE UR QL STRIP: ABNORMAL
NITRATE UR QL: NEGATIVE
PH UR STRIP: 7 PH (ref 5–7)
RBC #/AREA URNS AUTO: ABNORMAL /HPF
SOURCE: ABNORMAL
SP GR UR STRIP: 1.01 (ref 1–1.03)
UROBILINOGEN UR STRIP-ACNC: 0.2 EU/DL (ref 0.2–1)
WBC #/AREA URNS AUTO: ABNORMAL /HPF
WBC CLUMPS #/AREA URNS HPF: PRESENT /HPF

## 2020-01-15 PROCEDURE — 87088 URINE BACTERIA CULTURE: CPT | Performed by: NURSE PRACTITIONER

## 2020-01-15 PROCEDURE — 87186 SC STD MICRODIL/AGAR DIL: CPT | Performed by: NURSE PRACTITIONER

## 2020-01-15 PROCEDURE — 87086 URINE CULTURE/COLONY COUNT: CPT | Performed by: NURSE PRACTITIONER

## 2020-01-15 PROCEDURE — 81001 URINALYSIS AUTO W/SCOPE: CPT | Performed by: NURSE PRACTITIONER

## 2020-01-15 PROCEDURE — 99213 OFFICE O/P EST LOW 20 MIN: CPT | Performed by: NURSE PRACTITIONER

## 2020-01-15 RX ORDER — CEFUROXIME AXETIL 250 MG/1
500 TABLET ORAL 2 TIMES DAILY
Qty: 14 TABLET | Refills: 0 | Status: SHIPPED | OUTPATIENT
Start: 2020-01-15 | End: 2020-02-12

## 2020-01-15 ASSESSMENT — PAIN SCALES - GENERAL: PAINLEVEL: NO PAIN (0)

## 2020-01-15 NOTE — LETTER
January 20, 2020      Unique Ward  [id number] [address] [place] [id number]        Dear ,    We are writing to inform you of your test results.    Your urine culture grew out bacteria that should be covered by the antibiotic I gave you.  Please finish that prescription and follow up if your symptoms do not resolve.         Resulted Orders   UA reflex to Microscopic   Result Value Ref Range    Color Urine Yellow     Appearance Urine Clear     Glucose Urine Negative NEG^Negative mg/dL    Bilirubin Urine Negative NEG^Negative    Ketones Urine Negative NEG^Negative mg/dL    Specific Gravity Urine 1.015 1.003 - 1.035    Blood Urine Trace (A) NEG^Negative    pH Urine 7.0 5.0 - 7.0 pH    Protein Albumin Urine Negative NEG^Negative mg/dL    Urobilinogen Urine 0.2 0.2 - 1.0 EU/dL    Nitrite Urine Negative NEG^Negative    Leukocyte Esterase Urine Moderate (A) NEG^Negative    Source Unspecified Urine    Urine Microscopic   Result Value Ref Range    WBC Urine 5-10 (A) OTO5^0 - 5 /HPF    RBC Urine O - 2 OTO2^O - 2 /HPF    WBC Clumps Present (A) NEG^Negative /HPF    Bacteria Urine Few (A) NEG^Negative /HPF   Urine Culture Aerobic Bacterial   Result Value Ref Range    Specimen Description Midstream Urine     Special Requests Specimen received in preservative     Culture Micro 10,000 to 50,000 colonies/mL  Escherichia coli   (A)        If you have any questions or concerns, please call the clinic at the number listed above.       Sincerely,        MAYRA Peterson CNP

## 2020-01-15 NOTE — NURSING NOTE
Health Maintenance Due   Topic Date Due     ZOSTER IMMUNIZATION (1 of 2) 07/12/1989     PNEUMOCOCCAL IMMUNIZATION 65+ LOW/MEDIUM RISK (1 of 2 - PCV13) 07/12/2004     INFLUENZA VACCINE (1) 09/01/2019     MEDICARE ANNUAL WELLNESS VISIT  01/15/2020     Maryam Ivan LPN........1/15/2020 9:10 AM

## 2020-01-15 NOTE — PATIENT INSTRUCTIONS
Ceftin twice a day for 7 days.     A urine culture is pending and they will only call if you need a different antibiotic.  If you don't hear from us, finish all the antibiotics you were given.      Follow up if symptoms fail to resolve as expected.     If you want a shingles vaccine check at the pharmacy, it will be cheaper there for you because of Medicare rules.

## 2020-01-15 NOTE — PROGRESS NOTES
Subjective     Unique Ward is a 79 year old female who presents to clinic today for the following health issues:    HPI   URINARY TRACT SYMPTOMS  Onset: x1 day     Description:   Painful urination (Dysuria): YES           Frequency: YES  Blood in urine (Hematuria): no   Delay in urine (Hesitency): no     Intensity: mild    Progression of Symptoms:  worsening    Accompanying Signs & Symptoms:  Fever/chills: YES -   Flank pain no   Nausea and vomiting: no   Any vaginal symptoms: none  Abdominal/Pelvic Pain: no     History:   History of frequent UTI's: YES  History of kidney stones: no   Sexually Active: no   Possibility of pregnancy: No    Therapies Tried and outcome: Increase fluid intake      Review of Systems   ROS COMP: Constitutional, HEENT, cardiovascular, pulmonary, gi and gu systems are negative, except as otherwise noted.      Objective    /68 (BP Location: Left arm, Patient Position: Sitting, Cuff Size: Adult Regular)   Pulse 88   Temp 98.2  F (36.8  C) (Temporal)   Resp 14   Wt 62.2 kg (137 lb 1.6 oz)   LMP  (LMP Unknown)   SpO2 98%   Breastfeeding No   BMI 21.96 kg/m    Body mass index is 21.96 kg/m .  Physical Exam   GENERAL: healthy, alert and no distress  NECK: no adenopathy, no asymmetry, masses, or scars and thyroid normal to palpation  RESP: lungs clear to auscultation - no rales, rhonchi or wheezes  CV: regular rate and rhythm, normal S1 S2, no S3 or S4, no murmur, click or rub, no peripheral edema and peripheral pulses strong  ABDOMEN: soft, nontender, no hepatosplenomegaly, no masses and bowel sounds normal  MS: no gross musculoskeletal defects noted, no edema    Diagnostic Test Results:  Results for orders placed or performed in visit on 01/15/20 (from the past 24 hour(s))   UA reflex to Microscopic   Result Value Ref Range    Color Urine Yellow     Appearance Urine Clear     Glucose Urine Negative NEG^Negative mg/dL    Bilirubin Urine Negative NEG^Negative    Ketones Urine  Negative NEG^Negative mg/dL    Specific Gravity Urine 1.015 1.003 - 1.035    Blood Urine Trace (A) NEG^Negative    pH Urine 7.0 5.0 - 7.0 pH    Protein Albumin Urine Negative NEG^Negative mg/dL    Urobilinogen Urine 0.2 0.2 - 1.0 EU/dL    Nitrite Urine Negative NEG^Negative    Leukocyte Esterase Urine Moderate (A) NEG^Negative    Source Unspecified Urine    Urine Microscopic   Result Value Ref Range    WBC Urine 5-10 (A) OTO5^0 - 5 /HPF    RBC Urine O - 2 OTO2^O - 2 /HPF    WBC Clumps Present (A) NEG^Negative /HPF    Bacteria Urine Few (A) NEG^Negative /HPF           Assessment & Plan     1. Urinary tract infection without hematuria, site unspecified  - cefuroxime (CEFTIN) 250 MG tablet; Take 2 tablets (500 mg) by mouth 2 times daily  Dispense: 14 tablet; Refill: 0  - Urine Culture Aerobic Bacterial    2. Dysuria  - UA reflex to Microscopic  - Urine Microscopic       See Patient Instructions    She is getting her INR checked this week.     Return in about 2 weeks (around 1/29/2020) for Recheck only if not improving.    MAYRA Peterson [facility]

## 2020-01-16 LAB
BACTERIA SPEC CULT: ABNORMAL
Lab: ABNORMAL
SPECIMEN SOURCE: ABNORMAL

## 2020-01-23 ENCOUNTER — ANTICOAGULATION THERAPY VISIT (OUTPATIENT)
Dept: ANTICOAGULATION | Facility: OTHER | Age: 80
End: 2020-01-23
Payer: COMMERCIAL

## 2020-01-23 DIAGNOSIS — I48.91 ATRIAL FIBRILLATION, UNSPECIFIED TYPE (H): ICD-10-CM

## 2020-01-23 LAB — INR POINT OF CARE: 2.4 (ref 0.86–1.14)

## 2020-01-23 PROCEDURE — 36416 COLLJ CAPILLARY BLOOD SPEC: CPT

## 2020-01-23 PROCEDURE — 99207 ZZC NO CHARGE NURSE ONLY: CPT

## 2020-01-23 PROCEDURE — 85610 PROTHROMBIN TIME: CPT | Mod: QW

## 2020-01-23 NOTE — PROGRESS NOTES
ANTICOAGULATION FOLLOW-UP CLINIC VISIT    Patient Name:  Unique Ward  Date:  2020  Contact Type:  Face to Face    SUBJECTIVE:  Patient Findings     Comments:   The patient was assessed for diet, medication, and activity level changes, missed or extra doses, bruising or bleeding, with no problem findings.          Clinical Outcomes     Negatives:   Major bleeding event, Thromboembolic event, Anticoagulation-related hospital admission, Anticoagulation-related ED visit, Anticoagulation-related fatality    Comments:   The patient was assessed for diet, medication, and activity level changes, missed or extra doses, bruising or bleeding, with no problem findings.             OBJECTIVE    INR Protime   Date Value Ref Range Status   2020 2.4 (A) 0.86 - 1.14 Final       ASSESSMENT / PLAN  INR assessment THER    Recheck INR In: 4 WEEKS    INR Location Clinic      Anticoagulation Summary  As of 2020    INR goal:   2.0-3.0   TTR:   84.4 % (7.3 mo)   INR used for dosin.4 (2020)   Warfarin maintenance plan:   10 mg (5 mg x 2) every Mon, Fri; 7.5 mg (5 mg x 1.5) all other days   Full warfarin instructions:   10 mg every Mon, Fri; 7.5 mg all other days   Weekly warfarin total:   57.5 mg   Plan last modified:   Veronika Campbell RN (2020)   Next INR check:   2020   Priority:   Maintenance   Target end date:       Indications    Atrial fibrillation (H) [I48.91]             Anticoagulation Episode Summary     INR check location:       Preferred lab:       Send INR reminders to:   [facility]    Comments:   5 mg tabs, PM dose, print out      Anticoagulation Care Providers     Provider Role Specialty Phone number    Tyler Lee MD [facility] [phone]            See the Encounter Report to view Anticoagulation Flowsheet and Dosing Calendar (Go to Encounters tab in chart review, and find the Anticoagulation Therapy Visit)    Dosage adjustment made based on  physician directed care plan.    Veronika Campbell RN

## 2020-02-12 ENCOUNTER — OFFICE VISIT (OUTPATIENT)
Dept: FAMILY MEDICINE | Facility: OTHER | Age: 80
End: 2020-02-12
Payer: COMMERCIAL

## 2020-02-12 VITALS
HEIGHT: 67 IN | BODY MASS INDEX: 21.03 KG/M2 | HEART RATE: 70 BPM | OXYGEN SATURATION: 100 % | RESPIRATION RATE: 16 BRPM | TEMPERATURE: 97.9 F | WEIGHT: 134 LBS | DIASTOLIC BLOOD PRESSURE: 70 MMHG | SYSTOLIC BLOOD PRESSURE: 122 MMHG

## 2020-02-12 DIAGNOSIS — I48.0 PAROXYSMAL ATRIAL FIBRILLATION (H): ICD-10-CM

## 2020-02-12 DIAGNOSIS — H66.003 NON-RECURRENT ACUTE SUPPURATIVE OTITIS MEDIA OF BOTH EARS WITHOUT SPONTANEOUS RUPTURE OF TYMPANIC MEMBRANES: Primary | ICD-10-CM

## 2020-02-12 PROCEDURE — 99213 OFFICE O/P EST LOW 20 MIN: CPT | Performed by: INTERNAL MEDICINE

## 2020-02-12 RX ORDER — DOXYCYCLINE 100 MG/1
100 CAPSULE ORAL 2 TIMES DAILY
Qty: 14 CAPSULE | Refills: 0 | Status: ON HOLD | OUTPATIENT
Start: 2020-02-12 | End: 2020-03-31

## 2020-02-12 ASSESSMENT — MIFFLIN-ST. JEOR: SCORE: 1110.45

## 2020-02-12 ASSESSMENT — PAIN SCALES - GENERAL: PAINLEVEL: NO PAIN (0)

## 2020-02-12 NOTE — PROGRESS NOTES
Subjective     Unique Ward is a 79 year old female who presents to clinic today for the following health issues:    HPI   RESPIRATORY SYMPTOMS      Duration: 1 week    Description  nasal congestion, cough, headache and jaw pain    History (predisposing factors):  [facility]ick [facility] at [employer]  [facility]    Precipitating or alleviating factors: None    Therapies tried and outcome:  Tylenol, is on warfarin, cannot take over the counter meds                      Chief Complaint         The patient is a pleasant 79-year-old female who presents today with nasal congestion, frontal headache, posterior nasal drainage, and ear pressure for the last week.  She was recently treated with Ceftin for urinary tract infection and those symptoms have resolved completely.  She notes that she has been with sick children and they had similar symptoms.  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  She is taking food and fluid adequately at this time.  She is on chronic anticoagulation for atrial fibrillation and is using warfarin.                       PAST, FAMILY,SOCIAL HISTORY:     Medical  History:   has a past medical history of Arthritis (01.01.1980), Atrial fibrillation (H) (6/7/2019), Cancer (H) (01.01.1993     scalp), Depressive disorder (01.01.1977), and Hypertension (03.16.2016).     Surgical History:   has a past surgical history that includes biopsy (11/01/2014); colonoscopy (01.01/1979); Eye surgery (1/1/2014    first); Abdomen surgery; GYN surgery (01.01.1977); Head and neck surgery (01.01.1997); and Soft tissue surgery (01.01.1990).     Social History:   reports that she quit smoking about 55 years ago. Her smoking use included cigarettes. She started smoking about 65 years ago. She has a 7.50 pack-year smoking history. She has never used smokeless tobacco. She reports that she does not drink alcohol or use drugs.     Family History:  family history includes Breast Cancer in her sister; Cerebrovascular Disease in her  "brother; Coronary Artery Disease in her brother and sister; Diabetes in her maternal grandmother; Hyperlipidemia in her brother and sister; Hypertension in her brother, sister, and sister; Mental Illness in her mother and sister; Other Cancer in her sister and sister.            MEDICATIONS  Current Outpatient Medications   Medication Sig Dispense Refill     amLODIPine (NORVASC) 5 MG tablet TAKE 1 TABLET BY MOUTH ONCE DAILY 90 tablet 3     cholecalciferol (VITAMIN D3) 5000 units TABS tablet Take by mouth daily       doxycycline hyclate (VIBRAMYCIN) 100 MG capsule Take 1 capsule (100 mg) by mouth 2 times daily 14 capsule 0     magnesium 250 MG tablet Take 1 tablet by mouth daily       meclizine (ANTIVERT) 25 MG tablet Take 1 tablet (25 mg) by mouth 3 times daily as needed for dizziness 21 tablet 0     warfarin ANTICOAGULANT (COUMADIN ANTICOAGULANT) 5 MG tablet Take 10 mg Mon, Wed, Fri and 7.5 mg all other days or as directed by the coumadin clinic. 145 tablet 0         --------------------------------------------------------------------------------------------------------------------                              Review of Systems       LUNGS: Pt denies: cough, excess sputum, hemoptysis, or shortness of breath.   HEART: Pt denies: chest pain, sensed arrhythmia, syncope, tachy or bradyarrhythmia.   GI: Pt denies: nausea, vomiting, diarrhea, constipation, melena, or hematochezia.   NEURO: Pt denies: seizures, strokes, diplopia, weakness, paraesthesias, or paralysis.   SKIN: Pt denies: itching, rashes, discoloration, or specific lesions of concern. Denies recent hair loss.   PSYCH: The patient denies significant depression, anxiety, mood imbalance. Specifically denies any suicidal ideation.                                     Examination    /70 (BP Location: Right arm, Patient Position: Sitting, Cuff Size: Adult Regular)   Pulse 70   Temp 97.9  F (36.6  C) (Temporal)   Resp 16   Ht 1.702 m (5' 7\")   Wt 60.8 kg " (134 lb)   LMP  (LMP Unknown)   SpO2 100%   BMI 20.99 kg/m       Constitutional: The patient appears to be in no acute distress. The patient appears to be adequately hydrated. No acute respiratory or hemodynamic distress is noted at this time.   LUNGS: clear bilaterally, airflow is brisk, no intercostal retraction or stridor is noted. No coughing is noted during visit.   HEART: Irregularly irregular without rubs, clicks, gallops, or murmurs. PMI is nondisplaced. Upstrokes are brisk. S1,S2 are heard.   GI: Abdomen is soft, without rebound, guarding or tenderness. Bowel sounds are appropriate. No renal bruits are heard.   NEURO: Pt is alert and appropriate. No neurologic lateralization is noted. Cranial nerves 2-12 are intact. Peripheral sensory and motor function are grossly normal.    SKIN:  warm and dry. No erythema, or rashes are noted. No specific lesions of concern are noted.    PSYCH: The patient appears grossly appropriate. Maintains good eye contact, does not have any jittery or atypical motion. Displays appropriate affect.   ENT: Pharynx is mildly-erythemous, moderate/yellow PND, there is significant nasal obstruction, TM's are red and retracted, hearing intact bilaterally. No carotid bruits are heard. No JVD seen. Thyroid is not nodular or enlarged.                                           Decision Making    1. Non-recurrent acute suppurative otitis media of both ears without spontaneous rupture of tympanic membranes  Recommend over-the-counter antihistamine such as Claritin, Zyrtec, Allegra.  Avoid decongestants  - doxycycline hyclate (VIBRAMYCIN) 100 MG capsule; Take 1 capsule (100 mg) by mouth 2 times daily  Dispense: 14 capsule; Refill: 0    2. Paroxysmal atrial fibrillation (H)  Continue anticoagulation                           FOLLOW UP   I have asked the patient to make an appointment for followup with me in 1 week if not markedly improved.          Chico Quiroz,  FACOI    Portions of  this note were produced using [employer]  Although every attempt at real-time proof reading has been made, occasional grammar/syntax errors may have been missed.

## 2020-02-20 ENCOUNTER — ANTICOAGULATION THERAPY VISIT (OUTPATIENT)
Dept: ANTICOAGULATION | Facility: OTHER | Age: 80
End: 2020-02-20
Payer: COMMERCIAL

## 2020-02-20 DIAGNOSIS — I48.0 PAROXYSMAL ATRIAL FIBRILLATION (H): ICD-10-CM

## 2020-02-20 DIAGNOSIS — I48.91 ATRIAL FIBRILLATION, UNSPECIFIED TYPE (H): ICD-10-CM

## 2020-02-20 LAB — INR POINT OF CARE: 2.6 (ref 0.86–1.14)

## 2020-02-20 PROCEDURE — 36416 COLLJ CAPILLARY BLOOD SPEC: CPT

## 2020-02-20 PROCEDURE — 85610 PROTHROMBIN TIME: CPT | Mod: QW

## 2020-02-20 PROCEDURE — 99207 ZZC NO CHARGE NURSE ONLY: CPT

## 2020-02-20 RX ORDER — WARFARIN SODIUM 5 MG/1
TABLET ORAL
Qty: 145 TABLET | Refills: 1 | Status: SHIPPED | OUTPATIENT
Start: 2020-02-20 | End: 2020-08-31

## 2020-02-20 NOTE — PROGRESS NOTES
ANTICOAGULATION FOLLOW-UP CLINIC VISIT    Patient Name:  Unique Ward  Date:  2020  Contact Type:  Face to Face    SUBJECTIVE:  Patient Findings     Comments:   Pt would like to start some Fish Oil. She will start this today and then recheck in 2 weeks as this may affect her INR.           Clinical Outcomes     Negatives:   Major bleeding event, Thromboembolic event, Anticoagulation-related hospital admission, Anticoagulation-related ED visit, Anticoagulation-related fatality    Comments:   Pt would like to start some Fish Oil. She will start this today and then recheck in 2 weeks as this may affect her INR.              OBJECTIVE    INR Protime   Date Value Ref Range Status   2020 2.6 (A) 0.86 - 1.14 Final       ASSESSMENT / PLAN  INR assessment THER    Recheck INR In: 2 WEEKS    INR Location Clinic      Anticoagulation Summary  As of 2020    INR goal:   2.0-3.0   TTR:   86.2 % (8.3 mo)   INR used for dosin.6 (2020)   Warfarin maintenance plan:   10 mg (5 mg x 2) every Mon, Fri; 7.5 mg (5 mg x 1.5) all other days   Full warfarin instructions:   10 mg every Mon, Fri; 7.5 mg all other days   Weekly warfarin total:   57.5 mg   No change documented:   Veronika Campbell, RN   Plan last modified:   Veronika Campbell RN (2020)   Next INR check:   3/5/2020   Priority:   Maintenance   Target end date:       Indications    Atrial fibrillation (H) [I48.91]             Anticoagulation Episode Summary     INR check location:       Preferred lab:       Send INR reminders to:   ALFREDITO SAVANNAHGRACE MELENDEZ    Comments:   5 mg tabs, PM dose, print out      Anticoagulation Care Providers     Provider Role Specialty Phone number    Tyler Lee MD [facility] [phone]            See the Encounter Report to view Anticoagulation Flowsheet and Dosing Calendar (Go to Encounters tab in chart review, and find the Anticoagulation Therapy Visit)    Dosage adjustment made based on physician  directed care plan.    Veronika Campbell RN

## 2020-03-10 ENCOUNTER — HEALTH MAINTENANCE LETTER (OUTPATIENT)
Age: 80
End: 2020-03-10

## 2020-03-16 ENCOUNTER — ANTICOAGULATION THERAPY VISIT (OUTPATIENT)
Dept: ANTICOAGULATION | Facility: OTHER | Age: 80
End: 2020-03-16
Payer: COMMERCIAL

## 2020-03-16 DIAGNOSIS — Z79.01 LONG TERM CURRENT USE OF ANTICOAGULANT THERAPY: Primary | ICD-10-CM

## 2020-03-16 DIAGNOSIS — I48.91 ATRIAL FIBRILLATION, UNSPECIFIED TYPE (H): ICD-10-CM

## 2020-03-16 LAB — INR POINT OF CARE: 2.5 (ref 0.86–1.14)

## 2020-03-16 PROCEDURE — 99207 ZZC NO CHARGE NURSE ONLY: CPT

## 2020-03-16 PROCEDURE — 85610 PROTHROMBIN TIME: CPT | Mod: QW

## 2020-03-16 PROCEDURE — 36416 COLLJ CAPILLARY BLOOD SPEC: CPT

## 2020-03-16 NOTE — PROGRESS NOTES
ANTICOAGULATION FOLLOW-UP CLINIC VISIT    Patient Name:  Unique Ward  Date:  3/16/2020  Contact Type:  Face to Face    SUBJECTIVE:  Patient Findings     Comments:   The patient was assessed for diet, medication, and activity level changes, missed or extra doses, bruising or bleeding, with no problem findings.          Clinical Outcomes     Negatives:   Major bleeding event, Thromboembolic event, Anticoagulation-related hospital admission, Anticoagulation-related ED visit, Anticoagulation-related fatality    Comments:   The patient was assessed for diet, medication, and activity level changes, missed or extra doses, bruising or bleeding, with no problem findings.             OBJECTIVE    INR Protime   Date Value Ref Range Status   2020 2.5 (A) 0.86 - 1.14 Final       ASSESSMENT / PLAN  INR assessment THER    Recheck INR In: 6 WEEKS    INR Location Clinic      Anticoagulation Summary  As of 3/16/2020    INR goal:   2.0-3.0   TTR:   87.4 % (9.1 mo)   INR used for dosin.5 (3/16/2020)   Warfarin maintenance plan:   10 mg (5 mg x 2) every Mon, Fri; 7.5 mg (5 mg x 1.5) all other days   Full warfarin instructions:   10 mg every Mon, Fri; 7.5 mg all other days   Weekly warfarin total:   57.5 mg   No change documented:   Veronika Campbell, RN   Plan last modified:   Veronika Campbell RN (2020)   Next INR check:   2020   Priority:   Maintenance   Target end date:       Indications    Atrial fibrillation (H) [I48.91]             Anticoagulation Episode Summary     INR check location:       Preferred lab:       Send INR reminders to:   [facility]    Comments:   5 mg tabs, PM dose, print out      Anticoagulation Care Providers     Provider Role Specialty Phone number    Tyler Lee MD [facility] [phone]            See the Encounter Report to view Anticoagulation Flowsheet and Dosing Calendar (Go to Encounters tab in chart review, and find the Anticoagulation Therapy  Visit)    Dosage adjustment made based on physician directed care plan.    Veronika Campbell RN

## 2020-03-30 ENCOUNTER — NURSE TRIAGE (OUTPATIENT)
Dept: FAMILY MEDICINE | Facility: CLINIC | Age: 80
End: 2020-03-30

## 2020-03-30 ENCOUNTER — HOSPITAL ENCOUNTER (EMERGENCY)
Facility: CLINIC | Age: 80
Discharge: HOME | End: 2020-03-30
Attending: EMERGENCY MEDICINE | Admitting: EMERGENCY MEDICINE
Payer: MEDICARE

## 2020-03-30 ENCOUNTER — APPOINTMENT (OUTPATIENT)
Dept: GENERAL RADIOLOGY | Facility: CLINIC | Age: 80
End: 2020-03-30
Attending: EMERGENCY MEDICINE
Payer: MEDICARE

## 2020-03-30 VITALS
TEMPERATURE: 98.6 F | DIASTOLIC BLOOD PRESSURE: 70 MMHG | SYSTOLIC BLOOD PRESSURE: 171 MMHG | WEIGHT: 140 LBS | OXYGEN SATURATION: 85 % | BODY MASS INDEX: 21.93 KG/M2 | RESPIRATION RATE: 11 BRPM | HEART RATE: 36 BPM

## 2020-03-30 DIAGNOSIS — I44.2 HEART BLOCK AV COMPLETE (H): ICD-10-CM

## 2020-03-30 LAB
ALBUMIN SERPL-MCNC: 3.5 G/DL (ref 3.4–5)
ALP SERPL-CCNC: 46 U/L (ref 40–150)
ALT SERPL W P-5'-P-CCNC: 27 U/L (ref 0–50)
ANION GAP SERPL CALCULATED.3IONS-SCNC: 6 MMOL/L (ref 3–14)
AST SERPL W P-5'-P-CCNC: 39 U/L (ref 0–45)
BASOPHILS # BLD AUTO: 0.1 10E9/L (ref 0–0.2)
BASOPHILS NFR BLD AUTO: 1.3 %
BILIRUB SERPL-MCNC: 0.2 MG/DL (ref 0.2–1.3)
BUN SERPL-MCNC: 28 MG/DL (ref 7–30)
CALCIUM SERPL-MCNC: 8.3 MG/DL (ref 8.5–10.1)
CHLORIDE SERPL-SCNC: 104 MMOL/L (ref 94–109)
CO2 SERPL-SCNC: 25 MMOL/L (ref 20–32)
CREAT SERPL-MCNC: 0.75 MG/DL (ref 0.52–1.04)
DIFFERENTIAL METHOD BLD: ABNORMAL
EOSINOPHIL NFR BLD AUTO: 2.2 %
ERYTHROCYTE [DISTWIDTH] IN BLOOD BY AUTOMATED COUNT: 13.5 % (ref 10–15)
GFR SERPL CREATININE-BSD FRML MDRD: 75 ML/MIN/{1.73_M2}
GLUCOSE SERPL-MCNC: 109 MG/DL (ref 70–99)
HCT VFR BLD AUTO: 34.2 % (ref 35–47)
HGB BLD-MCNC: 11.4 G/DL (ref 11.7–15.7)
IMM GRANULOCYTES # BLD: 0 10E9/L (ref 0–0.4)
IMM GRANULOCYTES NFR BLD: 0.2 %
INR PPP: 2.07 (ref 0.86–1.14)
LYMPHOCYTES # BLD AUTO: 1.5 10E9/L (ref 0.8–5.3)
LYMPHOCYTES NFR BLD AUTO: 31.4 %
MAGNESIUM SERPL-MCNC: 2.1 MG/DL (ref 1.6–2.3)
MCH RBC QN AUTO: 30 PG (ref 26.5–33)
MCHC RBC AUTO-ENTMCNC: 33.3 G/DL (ref 31.5–36.5)
MCV RBC AUTO: 90 FL (ref 78–100)
MONOCYTES # BLD AUTO: 0.5 10E9/L (ref 0–1.3)
MONOCYTES NFR BLD AUTO: 9.7 %
NEUTROPHILS # BLD AUTO: 2.6 10E9/L (ref 1.6–8.3)
NEUTROPHILS NFR BLD AUTO: 55.2 %
NRBC # BLD AUTO: 0 10*3/UL
NRBC BLD AUTO-RTO: 0 /100
PLATELET # BLD AUTO: 274 10E9/L (ref 150–450)
POTASSIUM SERPL-SCNC: 3.8 MMOL/L (ref 3.4–5.3)
PROT SERPL-MCNC: 6.7 G/DL (ref 6.8–8.8)
RBC # BLD AUTO: 3.8 10E12/L (ref 3.8–5.2)
SODIUM SERPL-SCNC: 135 MMOL/L (ref 133–144)
TROPONIN I SERPL-MCNC: <0.015 UG/L (ref 0–0.04)
WBC # BLD AUTO: 4.7 10E9/L (ref 4–11)

## 2020-03-30 PROCEDURE — 93010 ELECTROCARDIOGRAM REPORT: CPT | Mod: Z6 | Performed by: EMERGENCY MEDICINE

## 2020-03-30 PROCEDURE — 84484 ASSAY OF TROPONIN QUANT: CPT | Performed by: EMERGENCY MEDICINE

## 2020-03-30 PROCEDURE — 83735 ASSAY OF MAGNESIUM: CPT | Performed by: EMERGENCY MEDICINE

## 2020-03-30 PROCEDURE — 93005 ELECTROCARDIOGRAM TRACING: CPT | Performed by: EMERGENCY MEDICINE

## 2020-03-30 PROCEDURE — 71046 X-RAY EXAM CHEST 2 VIEWS: CPT | Mod: TC

## 2020-03-30 PROCEDURE — 80053 COMPREHEN METABOLIC PANEL: CPT | Performed by: EMERGENCY MEDICINE

## 2020-03-30 PROCEDURE — 99285 EMERGENCY DEPT VISIT HI MDM: CPT | Mod: 25 | Performed by: EMERGENCY MEDICINE

## 2020-03-30 PROCEDURE — 85025 COMPLETE CBC W/AUTO DIFF WBC: CPT | Performed by: EMERGENCY MEDICINE

## 2020-03-30 PROCEDURE — 85610 PROTHROMBIN TIME: CPT | Performed by: EMERGENCY MEDICINE

## 2020-03-30 NOTE — ED PROVIDER NOTES
History     Chief Complaint   Patient presents with     Bradycardia     The history is provided by the patient.     Unique Ward is a 79 year old female who presents to the emergency department for bradycardia. Patient reports having a low heart rate that started around 1445 today. She states she was on her way to her mailbox when her legs felt heavy so she went back in her house and laid down, checked her pulse and it was at 40.  She states this has happened in the past so she usually takes slow deep breath and it goes away on its own, today it did not go away with the deep breathing. She states she felt ok when she got up this morning. She denies any chest pain, dizziness, fever, chills, cold, cough, nausea or swelling in her legs. Patient states she only has shortness of breath when she has a low heart rate when going on stairs. Patient reports having palpitations when her heart rate is too fast or too slow. She did not take her amlodipine yet today.  She is not on any other blood pressure/rate control medications.  She is on Coumadin for history of atrial fibrillation.  She denies any new medications or changes to her medications.     Allergies:  Allergies   Allergen Reactions     No Clinical Screening - See Comments      Eye drop antibiotic.     Codeine Other (See Comments)     Comment: , Description:      Gentamicin Hives     Per Unique this medication gives hives on arms and legs. Bev Lo LSXO,  ....................  7/15/2014   1:15 PM     Influenza Vaccines Other (See Comments)     Comment: , Description:      Influenza Virus Vaccine H5n1      Paxil [Paroxetine] Other (See Comments)     Comment: , Description:      Typhoid Vaccine-Strain Ty21a Other (See Comments)     Comment: , Description:   Comment: , Description:      Typhoid Vaccines        Problem List:    Patient Active Problem List    Diagnosis Date Noted     Atrial fibrillation (H) 06/07/2019     Priority: Medium     Left lumbosacral  radiculopathy 05/31/2017     Priority: Medium     Acute cystitis with hematuria 05/31/2017     Priority: Medium     History of basal cell carcinoma- face and scalp 02/28/2017     Priority: Medium     FH: melanoma- son 02/28/2017     Priority: Medium     Fibromyalgia 02/28/2017     Priority: Medium     Advance Care Planning 01/06/2017     Priority: Medium     Advance Care Planning 5/4/2017: ACP Facilitation Session:    Unique GUZMAN Ward presented for ACP Facilitation session at a group class. She was accompanied by daughter-in-law. [employer] information provided and resources reviewed. She wishes to give additional consideration to ACP.  She currently has the following questions or concerns about Advance Care Planning: none known. Follow-up meeting: not needed/applicable. Added by Unique Uribe RN, Advance Care Planning Liaison.  Advance Care Planning 1/6/17: Info given   Saray Greer MA         Personal history of urinary tract infection 06/18/2016     Priority: Medium     Essential hypertension 03/23/2016     Priority: Medium     Vitamin D deficiency 03/30/2013     Priority: Medium     Dysthymia 04/01/2005     Priority: Medium        Past Medical History:    Past Medical History:   Diagnosis Date     Arthritis 01.01.1980     Atrial fibrillation (H) 6/7/2019     Cancer (H) 01.01.1993     scalp     Depressive disorder 01.01.1977     Hypertension 03.16.2016       Past Surgical History:    Past Surgical History:   Procedure Laterality Date     BIOPSY  11/01/2014     COLONOSCOPY  01.01/1979    Two routine,  one for chronic diarrhea     EYE SURGERY  1/1/2014    first    cataracts both eyes.  second a few months later     GENITOURINARY SURGERY       GYN SURGERY  01.01.1977    TL     HEAD & NECK SURGERY  01.01.1997    basal cell X2   Moh's on scalp.  other on bridge of nose     SOFT TISSUE SURGERY  01.01.1990    Ganglion Cyst   Left inner wrist       Family History:    Family History   Problem Relation Age of  Onset     Diabetes Maternal Grandmother      Coronary Artery Disease Sister         error     Coronary Artery Disease Brother         error     Hypertension Brother      Hyperlipidemia Brother      Cerebrovascular Disease Brother      Hypertension Sister      Hypertension Sister      Hyperlipidemia Sister      Other Cancer Sister         cervical     Breast Cancer Sister      Other Cancer Sister         kidney     Mental Illness Mother         paranoid/schizophrenic/suicide     Mental Illness Sister         ???  Had shock treatments       Social History:  Marital Status:   [2]  Social History     Tobacco Use     Smoking status: Former Smoker     Packs/day: 0.75     Years: 10.00     Pack years: 7.50     Types: Cigarettes     Start date: 1955     Last attempt to quit: 1965     Years since quittin.2     Smokeless tobacco: Never Used   Substance Use Topics     Alcohol use: No     Alcohol/week: 0.0 standard drinks     Drug use: No        Medications:    amLODIPine (NORVASC) 5 MG tablet  cholecalciferol (VITAMIN D3) 5000 units TABS tablet  doxycycline hyclate (VIBRAMYCIN) 100 MG capsule  magnesium 250 MG tablet  meclizine (ANTIVERT) 25 MG tablet  warfarin ANTICOAGULANT (COUMADIN ANTICOAGULANT) 5 MG PO tablet          Review of Systems   All other systems reviewed and are negative.      Physical Exam   BP: (!) 175/82  Pulse: (!) 46  Heart Rate: (!) 45  Temp: 98.6  F (37  C)  Resp: 16  Weight: 63.5 kg (140 lb)  SpO2: 96 %      Physical Exam  Vitals signs and nursing note reviewed.   Constitutional:       Appearance: Normal appearance. She is well-developed.      Comments: Very pleasant, healthy-appearing female sitting in the bed   HENT:      Head: Normocephalic.      Nose: Nose normal.      Mouth/Throat:      Mouth: Mucous membranes are moist.      Pharynx: Oropharynx is clear.   Eyes:      General: No scleral icterus.     Extraocular Movements: Extraocular movements intact.      Conjunctiva/sclera:  Conjunctivae normal.   Neck:      Musculoskeletal: Normal range of motion and neck supple.   Cardiovascular:      Rate and Rhythm: Regular rhythm. Bradycardia present.      Pulses: Normal pulses.      Heart sounds: Normal heart sounds.   Pulmonary:      Effort: Pulmonary effort is normal.      Breath sounds: Normal breath sounds.   Abdominal:      Palpations: Abdomen is soft.      Tenderness: There is no abdominal tenderness.   Musculoskeletal: Normal range of motion.      Right lower leg: No edema.      Left lower leg: No edema.   Skin:     General: Skin is warm and dry.      Coloration: Skin is not pale.   Neurological:      General: No focal deficit present.      Mental Status: She is alert and oriented to person, place, and time.      Motor: No abnormal muscle tone.   Psychiatric:         Mood and Affect: Mood normal.         Behavior: Behavior normal.         ED Course (with Medical Decision Making)    Pt seen and examined by me.  RN and EPIC notes reviewed.      Patient with symptoms of bradycardia.  No chest pain, syncope, shortness of breath.  She does feel a little weak/fatigued and has heavy legs.  EKG was done which showed significant bradycardia.  On the monitor she has heart rates in the 30s to 40s.  EKG with possible third-degree heart block with some captured beats.      I spoke with Dr. Rodas, cardiology at [facility].  He was able to review the EKG and believes this is third-degree heart block.  As we are in the midst of a Covid-19 pandemic, he gave the option to have the patient transferred to [facility] and await pacemaker placement tomorrow versus returning home and have an outpatient procedure for placement of a pacemaker tomorrow.  I spoke with the patient and she is very happy to return home.  She can have her daughter take her.  She was hypertensive in the ED and I asked her to take her amlodipine.  This did start to improve her blood pressure.  She is to hold her Coumadin  tonight and be n.p.o. after midnight.  If she has any worsening, changes or concerns overnight she should return to the ED at any time.  The cardiology  clinic will call her tomorrow to set up the procedure.        Procedures               EKG Interpretation:      Interpreted by Peggy Ko MD  Time reviewed: 1830  Symptoms at time of EKG: Weakness  Rhythm: 3 degree AV block  Rate: 45  Axis: Normal  Ectopy: none  Conduction: normal  ST Segments/ T Waves: No ST-T wave changes and No acute ischemic changes  Q Waves: none  Comparison to prior: Previous EKG with sinus bradycardia and prior to that atrial fibrillation    Clinical Impression: 3rd degree AV block    Results for orders placed or performed during the hospital encounter of 03/30/20 (from the past 24 hour(s))   CBC with platelets differential   Result Value Ref Range    WBC 4.7 4.0 - 11.0 10e9/L    RBC Count 3.80 3.8 - 5.2 10e12/L    Hemoglobin 11.4 (L) 11.7 - 15.7 g/dL    Hematocrit 34.2 (L) 35.0 - 47.0 %    MCV 90 78 - 100 fl    MCH 30.0 26.5 - 33.0 pg    MCHC 33.3 31.5 - 36.5 g/dL    RDW 13.5 10.0 - 15.0 %    Platelet Count 274 150 - 450 10e9/L    Diff Method Automated Method     % Neutrophils 55.2 %    % Lymphocytes 31.4 %    % Monocytes 9.7 %    % Eosinophils 2.2 %    % Basophils 1.3 %    % Immature Granulocytes 0.2 %    Nucleated RBCs 0 0 /100    Absolute Neutrophil 2.6 1.6 - 8.3 10e9/L    Absolute Lymphocytes 1.5 0.8 - 5.3 10e9/L    Absolute Monocytes 0.5 0.0 - 1.3 10e9/L    Absolute Basophils 0.1 0.0 - 0.2 10e9/L    Abs Immature Granulocytes 0.0 0 - 0.4 10e9/L    Absolute Nucleated RBC 0.0    Comprehensive metabolic panel   Result Value Ref Range    Sodium 135 133 - 144 mmol/L    Potassium 3.8 3.4 - 5.3 mmol/L    Chloride 104 94 - 109 mmol/L    Carbon Dioxide 25 20 - 32 mmol/L    Anion Gap 6 3 - 14 mmol/L    Glucose 109 (H) 70 - 99 mg/dL    Urea Nitrogen 28 7 - 30 mg/dL    Creatinine 0.75 0.52 - 1.04 mg/dL    GFR Estimate 75 >60 mL/min/[1.73_m2]     GFR Estimate If Black 87 >60 mL/min/[1.73_m2]    Calcium 8.3 (L) 8.5 - 10.1 mg/dL    Bilirubin Total 0.2 0.2 - 1.3 mg/dL    Albumin 3.5 3.4 - 5.0 g/dL    Protein Total 6.7 (L) 6.8 - 8.8 g/dL    Alkaline Phosphatase 46 40 - 150 U/L    ALT 27 0 - 50 U/L    AST 39 0 - 45 U/L   Magnesium   Result Value Ref Range    Magnesium 2.1 1.6 - 2.3 mg/dL   Troponin I   Result Value Ref Range    Troponin I ES <0.015 0.000 - 0.045 ug/L   INR   Result Value Ref Range    INR 2.07 (H) 0.86 - 1.14   Chest XR,  PA & LAT    Narrative    CHEST TWO VIEWS  3/30/2020 7:27 PM     HISTORY:  Palpitations.    COMPARISON: 4/14/2019.      Impression    IMPRESSION: Thoracic curve, convex right. Mild cardiac enlargement has  increased since the previous exam. Pulmonary vascularity is within  normal limits. Aortic calcification. Lungs clear. No pneumothorax. No  pleural effusions.     LILI LIND MD       Medications - No data to display    Assessments & Plan     I have reviewed the findings, diagnosis, plan and need for follow up with the patient.    Discharge Medication List as of 3/30/2020  7:55 PM          Final diagnoses:   Heart block AV complete (H)     Disposition: Patient discharged home in stable condition.  Plan as above.  Return for concerns.     This document serves as a record of services personally performed by Peggy Ko MD. It was created on their behalf by Consuelo Gill, a trained medical scribe. The creation of this record is based on the provider's personal observations and the statements of the patient. This document has been checked and approved by the attending provider.    Note: Chart documentation done in part with Dragon Voice Recognition software. Although reviewed after completion, some word and grammatical errors may remain.    3/30/2020   [facility] EMERGENCY DEPARTMENT     Peggy Ko MD  03/31/20 0052

## 2020-03-30 NOTE — TELEPHONE ENCOUNTER
"S-(situation): C/O very slow heart rate. RN had pt check while she had her on the phone  And it was 44 beats per minute. She said her legs started to feel weak when she went out to get her mail so she turned around and went back into the house and laid down.     B-(background): Hx of A. Fib 4/2019. \" My heart rate was in t he 130s then,but this is the opposite. It is real slow. I can feel the extra beats , too. My eyes hurt - like a pain behind t hem.     A-(assessment): bradycardiac of unknown etiology     R-(recommendations):  She needs to be seen in the ER. RN suggested she call 911 as she lives alone. She does NOT want to do that. \" I only live 2 miles away from the Hospital I will pull over If I feel faint. Then I will call 911.\"     RN notified the ER of her coming. I told pt to drive up under that car port and NOT park her car so she can go right in and hand her keys to the  and ask him to part her car for her. She said she will do that.   DIONTE Hobson      Reason for Disposition    Heart beating very slowly (e.g., < 50 / minute) (EXCEPTION: athlete)    Answer Assessment - Initial Assessment Questions  1. DESCRIPTION: \"Please describe your heart rate or heart beat that you are having\" (e.g., fast/slow, regular/irregular, skipped or extra beats, \"palpitations\")      C/O slow heart rate, running in the 40's and 50's. I tried walking out to the mailbox and I could feel my heart palpitations. Then I decided NOT to go as my legs felt weak. Driveway 1/5 mile long.  I went home and laid down for 15 minutes. She felt better then she got up and started doing things around the house. Then she felt OK.   2. ONSET: \"When did it start?\" (Minutes, hours or days)       Started at 2:45 PM   3. DURATION: \"How long does it last\" (e.g., seconds, minutes, hours)      IT is still doing it. \" MY heart is beating irregularly. It puts in an extra beat. \" Y  4. PATTERN \"Does it come and go, or has it been constant since " "it started?\"  Since it has started , it has been constantly low.     \"Does it get worse with exertion?\"   \"Are you feeling it now?\"      Feeling OK, Just feels really tired behind my eyes.   5. TAP: \"Using your hand, can you tap out what you are feeling on a chair or table in front of you, so that I can hear?\" (Note: not all patients can do this)         Hard for pt to do and nurse to hear over the phone.   6. HEART RATE: \"Can you tell me your heart rate?\" \"How many beats in 15 seconds?\"  (Note: not all patients can do this)        11 beats in 15 seconds = 44 beats per minute.   7. RECURRENT SYMPTOM: \"Have you ever had this before?\" If so, ask: \"When was the last time?\" and \"What happened that time?\"        HER LAST EPISODE OF LOW HEART RATE was in this past year. Dx last April with A. Fib. Heart rate was 130.    8. CAUSE: \"What do you think is causing the palpitations?\"      Etiology unknown.   9. CARDIAC HISTORY: \"Do you have any history of heart disease?\" (e.g., heart attack, angina, bypass surgery, angioplasty, arrhythmia)       Just Hx of A. Fib. April 2019  Pre-diabetic.   10. OTHER SYMPTOMS: \"Do you have any other symptoms?\" (e.g., dizziness, chest pain, sweating, difficulty breathing)        The first time it happened she was walking up her steps and she was SOB.    11. PREGNANCY: \"Is there any chance you are pregnant?\" \"When was your last menstrual period?\"        NA    Protocols used: HEART RATE AND HEARTBEAT [id number]    "

## 2020-03-30 NOTE — ED AVS SNAPSHOT
[facility] Emergency Department  [address] [place] [address]  Phone:  [phone]  Fax:  [phone]                                    Unique Ward   MRN: [id number]    Department:  [facility] Emergency Department   Date of Visit:  3/30/2020           After Visit Summary Signature Page    I have received my discharge instructions, and my questions have been answered. I have discussed any challenges I see with this plan with the nurse or doctor.    ..........................................................................................................................................  Patient/Patient Representative Signature      ..........................................................................................................................................  Patient Representative Print Name and Relationship to Patient    ..................................................               ................................................  Date                                   Time    ..........................................................................................................................................  Reviewed by Signature/Title    ...................................................              ..............................................  Date                                               Time          22EPIC Rev 08/18

## 2020-03-30 NOTE — TELEPHONE ENCOUNTER
Reason for call:  Patient reporting a symptom    Symptom or request: Pulse has been low 40s up to low 50s for the last 45 minutes.  She would like to talk to a nurse about this.  This happened before and she was told to call back if it ever happened again.  She asks that a nurse call her at home.    Duration (how long have symptoms been present): 45 minutes    Have you been treated for this before? Yes    Additional comments: please call    Phone Number patient can be reached at:  Home number on file [phone] (home)    Best Time:  any    Can we leave a detailed message on this number:  YES    Call taken on 3/30/2020 at 3:35 PM by Doug Goddard

## 2020-03-31 ENCOUNTER — HOSPITAL ENCOUNTER (OUTPATIENT)
Facility: CLINIC | Age: 80
Setting detail: OBSERVATION
Discharge: HOME | End: 2020-04-01
Admitting: INTERNAL MEDICINE
Payer: MEDICARE

## 2020-03-31 ENCOUNTER — SURGERY (OUTPATIENT)
Age: 80
End: 2020-03-31
Payer: COMMERCIAL

## 2020-03-31 DIAGNOSIS — Z95.0 PACEMAKER: Primary | ICD-10-CM

## 2020-03-31 DIAGNOSIS — I44.2 CHB (COMPLETE HEART BLOCK) (H): ICD-10-CM

## 2020-03-31 DIAGNOSIS — I44.2 CHB (COMPLETE HEART BLOCK) (H): Primary | ICD-10-CM

## 2020-03-31 LAB — INR BLD: 1.8 (ref 0.86–1.14)

## 2020-03-31 PROCEDURE — 25000132 ZZH RX MED GY IP 250 OP 250 PS 637

## 2020-03-31 PROCEDURE — 27210794 ZZH OR GENERAL SUPPLY STERILE: Performed by: INTERNAL MEDICINE

## 2020-03-31 PROCEDURE — 25800029 ZZH RX IP 258 OP 250: Performed by: INTERNAL MEDICINE

## 2020-03-31 PROCEDURE — 40000235 ZZH STATISTIC TELEMETRY

## 2020-03-31 PROCEDURE — 99152 MOD SED SAME PHYS/QHP 5/>YRS: CPT | Mod: 59 | Performed by: INTERNAL MEDICINE

## 2020-03-31 PROCEDURE — G0378 HOSPITAL OBSERVATION PER HR: HCPCS

## 2020-03-31 PROCEDURE — C1785 PMKR, DUAL, RATE-RESP: HCPCS | Performed by: INTERNAL MEDICINE

## 2020-03-31 PROCEDURE — 99152 MOD SED SAME PHYS/QHP 5/>YRS: CPT | Performed by: INTERNAL MEDICINE

## 2020-03-31 PROCEDURE — 33208 INSRT HEART PM ATRIAL & VENT: CPT | Mod: KX | Performed by: INTERNAL MEDICINE

## 2020-03-31 PROCEDURE — C1898 LEAD, PMKR, OTHER THAN TRANS: HCPCS | Performed by: INTERNAL MEDICINE

## 2020-03-31 PROCEDURE — 25000128 H RX IP 250 OP 636: Performed by: INTERNAL MEDICINE

## 2020-03-31 PROCEDURE — 80048 BASIC METABOLIC PNL TOTAL CA: CPT | Performed by: INTERNAL MEDICINE

## 2020-03-31 PROCEDURE — 99153 MOD SED SAME PHYS/QHP EA: CPT | Performed by: INTERNAL MEDICINE

## 2020-03-31 PROCEDURE — 40000852 ZZH STATISTIC HEART CATH LAB OR EP LAB

## 2020-03-31 PROCEDURE — 36415 COLL VENOUS BLD VENIPUNCTURE: CPT

## 2020-03-31 PROCEDURE — 85610 PROTHROMBIN TIME: CPT | Mod: QW | Performed by: INTERNAL MEDICINE

## 2020-03-31 PROCEDURE — C1894 INTRO/SHEATH, NON-LASER: HCPCS | Performed by: INTERNAL MEDICINE

## 2020-03-31 PROCEDURE — 25000125 ZZHC RX 250: Performed by: INTERNAL MEDICINE

## 2020-03-31 PROCEDURE — 99215 OFFICE O/P EST HI 40 MIN: CPT | Mod: 57 | Performed by: INTERNAL MEDICINE

## 2020-03-31 DEVICE — IMP LEAD PACING BIPOLAR CAPSUREFIX NOVUS 45CM 5076-45: Type: IMPLANTABLE DEVICE | Status: FUNCTIONAL

## 2020-03-31 DEVICE — PACEMAKER AZURE MRI XT DR: Type: IMPLANTABLE DEVICE | Status: FUNCTIONAL

## 2020-03-31 DEVICE — IMP LEAD PACING BIPOLAR CAPSUREFIX NOVUS 52CM 5076-52: Type: IMPLANTABLE DEVICE | Status: FUNCTIONAL

## 2020-03-31 RX ORDER — ACETAMINOPHEN 325 MG/1
650 TABLET ORAL EVERY 4 HOURS PRN
Status: DISCONTINUED | OUTPATIENT
Start: 2020-03-31 | End: 2020-04-01 | Stop reason: HOSPADM

## 2020-03-31 RX ORDER — NALOXONE HYDROCHLORIDE 0.4 MG/ML
.1-.4 INJECTION, SOLUTION INTRAMUSCULAR; INTRAVENOUS; SUBCUTANEOUS
Status: DISCONTINUED | OUTPATIENT
Start: 2020-03-31 | End: 2020-04-01 | Stop reason: HOSPADM

## 2020-03-31 RX ORDER — CEFAZOLIN SODIUM 1 G/3ML
1 INJECTION, POWDER, FOR SOLUTION INTRAMUSCULAR; INTRAVENOUS EVERY 8 HOURS
Status: DISCONTINUED | OUTPATIENT
Start: 2020-03-31 | End: 2020-04-01 | Stop reason: HOSPADM

## 2020-03-31 RX ORDER — OXYCODONE AND ACETAMINOPHEN 5; 325 MG/1; MG/1
1 TABLET ORAL EVERY 4 HOURS PRN
Status: DISCONTINUED | OUTPATIENT
Start: 2020-03-31 | End: 2020-04-01 | Stop reason: HOSPADM

## 2020-03-31 RX ORDER — CEFAZOLIN SODIUM 2 G/100ML
2 INJECTION, SOLUTION INTRAVENOUS
Status: CANCELLED | OUTPATIENT
Start: 2020-03-31

## 2020-03-31 RX ORDER — BUPIVACAINE HYDROCHLORIDE 2.5 MG/ML
INJECTION, SOLUTION EPIDURAL; INFILTRATION; INTRACAUDAL
Status: DISCONTINUED | OUTPATIENT
Start: 2020-03-31 | End: 2020-03-31 | Stop reason: HOSPADM

## 2020-03-31 RX ORDER — CEFAZOLIN SODIUM 1 G/3ML
1 INJECTION, POWDER, FOR SOLUTION INTRAMUSCULAR; INTRAVENOUS
Status: DISCONTINUED | OUTPATIENT
Start: 2020-03-31 | End: 2020-03-31 | Stop reason: HOSPADM

## 2020-03-31 RX ORDER — LIDOCAINE 40 MG/G
CREAM TOPICAL
Status: CANCELLED | OUTPATIENT
Start: 2020-03-31

## 2020-03-31 RX ORDER — LIDOCAINE 40 MG/G
CREAM TOPICAL
Status: DISCONTINUED | OUTPATIENT
Start: 2020-03-31 | End: 2020-03-31 | Stop reason: HOSPADM

## 2020-03-31 RX ORDER — HEPARIN SODIUM 200 [USP'U]/100ML
100-500 INJECTION, SOLUTION INTRAVENOUS CONTINUOUS PRN
Status: DISCONTINUED | OUTPATIENT
Start: 2020-03-31 | End: 2020-03-31 | Stop reason: HOSPADM

## 2020-03-31 RX ORDER — CEFAZOLIN SODIUM 2 G/100ML
2 INJECTION, SOLUTION INTRAVENOUS
Status: COMPLETED | OUTPATIENT
Start: 2020-03-31 | End: 2020-03-31

## 2020-03-31 RX ORDER — SODIUM CHLORIDE 450 MG/100ML
INJECTION, SOLUTION INTRAVENOUS CONTINUOUS
Status: DISCONTINUED | OUTPATIENT
Start: 2020-03-31 | End: 2020-03-31 | Stop reason: HOSPADM

## 2020-03-31 RX ORDER — HEPARIN SODIUM 200 [USP'U]/100ML
100-600 INJECTION, SOLUTION INTRAVENOUS CONTINUOUS PRN
Status: DISCONTINUED | OUTPATIENT
Start: 2020-03-31 | End: 2020-03-31 | Stop reason: HOSPADM

## 2020-03-31 RX ORDER — ACETAMINOPHEN 325 MG/1
TABLET ORAL
Status: COMPLETED
Start: 2020-03-31 | End: 2020-03-31

## 2020-03-31 RX ORDER — DOBUTAMINE HYDROCHLORIDE 200 MG/100ML
5-40 INJECTION INTRAVENOUS CONTINUOUS PRN
Status: DISCONTINUED | OUTPATIENT
Start: 2020-03-31 | End: 2020-03-31 | Stop reason: HOSPADM

## 2020-03-31 RX ORDER — SODIUM CHLORIDE 450 MG/100ML
INJECTION, SOLUTION INTRAVENOUS CONTINUOUS
Status: CANCELLED | OUTPATIENT
Start: 2020-03-31

## 2020-03-31 RX ORDER — FENTANYL CITRATE 50 UG/ML
INJECTION, SOLUTION INTRAMUSCULAR; INTRAVENOUS
Status: DISCONTINUED | OUTPATIENT
Start: 2020-03-31 | End: 2020-03-31 | Stop reason: HOSPADM

## 2020-03-31 RX ADMIN — BUPIVACAINE HYDROCHLORIDE 10 ML: 2.5 INJECTION, SOLUTION EPIDURAL; INFILTRATION; INTRACAUDAL; PERINEURAL at 13:52

## 2020-03-31 RX ADMIN — LIDOCAINE HYDROCHLORIDE 10 ML: 10 INJECTION, SOLUTION EPIDURAL; INFILTRATION; INTRACAUDAL; PERINEURAL at 13:52

## 2020-03-31 RX ADMIN — SODIUM CHLORIDE: 4.5 INJECTION, SOLUTION INTRAVENOUS at 12:04

## 2020-03-31 RX ADMIN — MIDAZOLAM 1 MG: 1 INJECTION INTRAMUSCULAR; INTRAVENOUS at 13:36

## 2020-03-31 RX ADMIN — CEFAZOLIN 1 G: 1 INJECTION, POWDER, FOR SOLUTION INTRAMUSCULAR; INTRAVENOUS at 20:04

## 2020-03-31 RX ADMIN — BACITRACIN 25000 UNITS: 5000 INJECTION, POWDER, FOR SOLUTION INTRAMUSCULAR at 14:10

## 2020-03-31 RX ADMIN — FENTANYL CITRATE 50 MCG: 50 INJECTION, SOLUTION INTRAMUSCULAR; INTRAVENOUS at 13:36

## 2020-03-31 RX ADMIN — CEFAZOLIN SODIUM 2 G: 2 INJECTION, SOLUTION INTRAVENOUS at 12:32

## 2020-03-31 RX ADMIN — ACETAMINOPHEN 650 MG: 325 TABLET, FILM COATED ORAL at 17:37

## 2020-03-31 ASSESSMENT — MIFFLIN-ST. JEOR
SCORE: 1135.39
SCORE: 1110.44

## 2020-03-31 NOTE — PROGRESS NOTES
1550- report given to [facility] RN and patient tansfered via wheelchair to room 253.

## 2020-03-31 NOTE — PROGRESS NOTES
1225  Skin prep completed.      Dr. Clark wants patient to stay overnight for monitoring post PPM.  Patient agreed.  Aaliyah called her daughter Juliette to let her know.

## 2020-03-31 NOTE — PROGRESS NOTES
Called patient with pre-procedure instructions for device implant:     Anticoagulation: Warfarin. Patient held dosing yesterday. Dr. Clark aware.   Oral diabetes meds: NA  Insulin: NA  Diuretic: NA  Contrast allergy: No known   Pt informed to be NPO at midnight  (if procedure scheduled 1pm or later, may have clear liquid breakfast before 8am)    Instructed pt to shower the morning of the procedure, and then put on a clean shirt in order to help prevent infection.     Pt has post-procedure transportation and 24 hours monitoring set up.   Pt aware of no driving for 24 hours post procedure due to sedation.     Pt aware of arrival time around 1100 on 03/31/2020 and location. Pt verbalized understanding of instructions.

## 2020-03-31 NOTE — PLAN OF CARE
Admitted Observation status for left pacemaker placement due to third degree heart block. VSS. RA. IV SL.CMS intact. Dressing CDI. Minimal pain, improved with tylenol. Voiding. Up independently in room. Plan for Xray in AM and than discharge home tomorrow.

## 2020-03-31 NOTE — PROGRESS NOTES
Care Suites Post Procedure Note    Patient Information  Name: Unique Ward  Age: 79 year old    Post Procedure  Procedure: PPM  Time patient returned to Care Suites: 1441  Concerns/abnormal assessment: None.  Left pacemaker site flat, soft, no swelling.  Dressing CDI.  C/O mild ache.    If abnormal assessment, provider notified: N/A  Plan/Other: Recover per protocol.  [facility] at 1600.    Mirlande Bueno RN     0754  Hand off report given to Isis Garcia RN

## 2020-03-31 NOTE — DISCHARGE INSTRUCTIONS
Hold your Coumadin tonight.    Do not eat any food or drink any beverages except water after midnight.  Stop drinking any water 2 hours before your procedure.    The cardiology group will call you tomorrow.  I spoke with Dr. Rodas, and he will send a message to his partner about you.  They will see you down at [facility] and give you directions.    Be sure to be careful when you are getting up and walking as sometimes you may feel dizzy with a slow heart rate.    If you have any concerns return at any time.    I hope that this all goes very smoothly!!!  It was very nice to see you again!

## 2020-03-31 NOTE — PROGRESS NOTES
Care Suites Admission Nursing Note    Patient Information  Name: Unique Ward  Age: 79 year old  Reason for admission: pacer  Care Suites arrival time: 1130    Patient Admission/Assessment   Pre-procedure assessment complete: Yes  If abnormal assessment/labs, provider notified:no  NPO: Yes  Medications held per instructions/orders: Yes  Consent:pending  If applicable, pregnancy test status: declined  Patient oriented to room: Yes  Education/questions answered: Yes  Plan/other: review labs and proceed    Discharge Planning  Accompanied by: self  Discharge name/phone number: Juliette scott - [phone]  Discharge location home  Abraham Mclean RN

## 2020-03-31 NOTE — CONSULTS
"Electrophysiology/ Cardiology- Consult Note         H&P and Plan:     Reason for consult: Symptomatic complete AV block.    History of present illness: 79-year-old lady with a history of depression, osteoarthritis, hypertension and paroxysmal atrial fibrillation, who presents with complete AV block and was referred for permanent pacemaker plantation.    Patient presented yesterday to the ED with complaints of fatigue and slow heart rates.  EKG done yesterday revealed complete AV block.  She denied lightheadedness, near-syncope or syncope.  She was not using any AV node agents.  Case was discussed with my colleague Dr. Rodas, who recommended pacemaker implantation.    Today, she informs she continues to do well.  She denies any symptoms such as chest pain or shortness of breath.  Labs done yesterday were within normal limits.  INR today is 1.8.    Previous studies:   -ECG: Normal sinus rhythm and complete AV block.  -Echocardiogram (04/2019): Normal LV function.  EF estimated at 55-60%.  There was moderate TR.     Plan:  1.  Complete AV block.  No reversible cause of identified.  Patient also has history of A. fib, and will require AV node agents in the future.  I agree with Dr. Rodas assessment and pacemaker recommendation.    I explained the procedure details.  Understand there is a 1-2% risk complications here procedure.  Consent was signed.    Pacemaker will be implanted today.  She will stay overnight for observation.    Erich Clark MD    Physical Exam:  Vitals: BP (!) 154/48 (BP Location: Left arm)   Pulse (!) 41   Temp 98  F (36.7  C) (Oral)   Resp 14   Ht 1.727 m (5' 8\")   Wt 61.7 kg (136 lb)   LMP  (LMP Unknown)   BMI 20.68 kg/m      No intake or output data in the 24 hours ending 03/31/20 1205  Vitals:    03/31/20 1138   Weight: 61.7 kg (136 lb)       Constitutional:  AAO x3.  Pt is in NAD.  HEAD: Normocephalic.  SKIN: Skin normal color, texture and turgor with no lesions or eruptions.  Eyes: PERRL, " EOMI.  ENT:  Supple, normal JVP. No lymphadenopathy or thyroid enlargement.  Chest:  CTAB.  Cardiac:  RRR, normal  S1 and S2.  No murmurs rubs or gallop.    Abdomen:  Normal BS.  Soft, non-tender and non-distended.  No rebound or guarding.    Extremities:  Pedious pulses palpable B/L.  No LE edema noticed.   Neurological: Strength and sensation grossly symmetric and intact throughout.         Review of Systems:  Complete review of system is otherwise negative with the exception of what was described above.     CURRENT MEDICATIONS:    ceFAZolin  2 g Intravenous Pre-Op/Pre-procedure x 1 dose     sodium chloride (PF)  3 mL Intracatheter Q8H     PRN Meds: lidocaine 4%, lidocaine (buffered or not buffered), sodium chloride (PF)    ALLERGIES     Allergies   Allergen Reactions     No Clinical Screening - See Comments      Eye drop antibiotic.     Codeine Other (See Comments)     Comment: , Description:      Gentamicin Hives     Per [facility] this medication gives hives on arms and legs. Bev Lo LSXO,  ....................  7/15/2014   1:15 PM     Influenza Vaccines Other (See Comments)     Comment: , Description:      Influenza Virus Vaccine H5n1      Paxil [Paroxetine] Other (See Comments)     Comment: , Description:      Typhoid Vaccine-Strain Ty21a Other (See Comments)     Comment: , Description:   Comment: , Description:      Typhoid Vaccines        PAST MEDICAL HISTORY:  Past Medical History:   Diagnosis Date     Arthritis 01.01.1980     Atrial fibrillation (H) 6/7/2019     Cancer (H) 01.01.1993     scalp    Basal Cell X2  head      nose bridge 1996     Depressive disorder 01.01.1977    anxiety     Hypertension 03.16.2016       PAST SURGICAL HISTORY:  Past Surgical History:   Procedure Laterality Date     BIOPSY  11/01/2014     COLONOSCOPY  01.01/1979    Two routine,  one for chronic diarrhea     EYE SURGERY  1/1/2014    first    cataracts both eyes.  second a few months later     GENITOURINARY SURGERY        GYN SURGERY  1977    TL     HEAD & NECK SURGERY  1997    basal cell X2   Moh's on scalp.  other on bridge of nose     SOFT TISSUE SURGERY  1990    Ganglion Cyst   Left inner wrist       FAMILY HISTORY:  Family History   Problem Relation Age of Onset     Diabetes Maternal Grandmother      Coronary Artery Disease Sister         error     Coronary Artery Disease Brother         error     Hypertension Brother      Hyperlipidemia Brother      Cerebrovascular Disease Brother      Hypertension Sister      Hypertension Sister      Hyperlipidemia Sister      Other Cancer Sister         cervical     Breast Cancer Sister      Other Cancer Sister         kidney     Mental Illness Mother         paranoid/schizophrenic/suicide     Mental Illness Sister         ???  Had shock treatments       SOCIAL HISTORY:  Social History     Socioeconomic History     Marital status:      Spouse name: Not on file     Number of children: Not on file     Years of education: Not on file     Highest education level: Not on file   Occupational History     Not on file   Social Needs     Financial resource strain: Not on file     Food insecurity     Worry: Not on file     Inability: Not on file     Transportation needs     Medical: Not on file     Non-medical: Not on file   Tobacco Use     Smoking status: Former Smoker     Packs/day: 0.75     Years: 10.00     Pack years: 7.50     Types: Cigarettes     Start date: 1955     Last attempt to quit: 1965     Years since quittin.2     Smokeless tobacco: Never Used   Substance and Sexual Activity     Alcohol use: No     Alcohol/week: 0.0 standard drinks     Drug use: No     Sexual activity: Not Currently   Lifestyle     Physical activity     Days per week: Not on file     Minutes per session: Not on file     Stress: Not on file   Relationships     Social connections     Talks on phone: Not on file     Gets together: Not on file     Attends Restoration service: Not on file      Active member of club or organization: Not on file     Attends meetings of clubs or organizations: Not on file     Relationship status: Not on file     Intimate partner violence     Fear of current or ex partner: Not on file     Emotionally abused: Not on file     Physically abused: Not on file     Forced sexual activity: Not on file   Other Topics Concern     Parent/sibling w/ CABG, MI or angioplasty before 65F 55M? No   Social History Narrative     Not on file         Recent Lab Results:  Recent Labs   Lab 03/30/20  1919 03/30/20  1837   WBC  --  4.7   HGB  --  11.4*   MCV  --  90   PLT  --  274   INR 2.07*  --    NA  --  135   POTASSIUM  --  3.8   CHLORIDE  --  104   CO2  --  25   BUN  --  28   CR  --  0.75   ANIONGAP  --  6   GLORIA  --  8.3*   GLC  --  109*   ALBUMIN  --  3.5   PROTTOTAL  --  6.7*   BILITOTAL  --  0.2   ALKPHOS  --  46   ALT  --  27   AST  --  39   TROPI  --  <0.015

## 2020-03-31 NOTE — PROVIDER NOTIFICATION
MD Notification    Notified Person: MD    Notified Person Name: Eduardo    Notification Date/Time: 3/31/20 1715    Notification Interaction: text paged    Purpose of Notification: Admission observation/outpatient order    Orders Received:  Provider placed order promptly    Comments:

## 2020-03-31 NOTE — PROVIDER NOTIFICATION
MD Notification    Notified Person: MD    Notified Person Name: Eduardo    Notification Date/Time: 1600    Notification Interaction: Paged    Purpose of Notification: Resuming warfarin tonight or tomorrow    Orders Received: provider called back and plan to restart tomorrow.    Comments:

## 2020-03-31 NOTE — PROGRESS NOTES
PATIENT WELLNESS SCREENING    Step 1: Answer all screening questions 1-3.    1. In the last month, have you been in contact with someone who was confirmed or suspected to have Coronavirus/COVID-19? No    2. Do you have the following symptoms?   Fever? No   Cough? No   Shortness of breath? No   Skin rash? No    3. Have you traveled internationally in the last month? No   If so, where? no    Step 2: Refer to logic grid below for actions    NO SYMPTOM(S)    ACTIONS:  1. Standard rooming process  2. Provider to assess per normal protocol    POSITIVE SYMPTOM(S)  If positive for ANY of the following symptoms: fever, cough, shortness of breath, rash    ACTIONS  1. Mask patient  2. Room patient as soon as possible  3. Don appropriate PPE when entering room  4. Provider evaluation

## 2020-03-31 NOTE — PRE-PROCEDURE
GENERAL PRE-PROCEDURE:   Procedure:  PPM implant  Date/Time:  3/31/2020 1:09 PM    Written consent obtained?: Yes    Risks and benefits: Risks, benefits and alternatives were discussed    Consent given by:  Patient  Patient states understanding of procedure being performed: Yes    Patient's understanding of procedure matches consent: Yes    Procedure consent matches procedure scheduled: Yes    Expected level of sedation:  Moderate  Appropriately NPO:  Yes  ASA Class:  Class 3- Severe systemic disease, definite functional limitations  Mallampati  :  Grade 2- soft palate, base of uvula, tonsillar pillars, and portion of posterior pharyngeal wall visible  Lungs:  Lungs clear with good breath sounds bilaterally  Heart:  Normal heart sounds and rate  History & Physical reviewed:  History and physical reviewed and no updates needed  Statement of review:  I have reviewed the lab findings, diagnostic data, medications, and the plan for sedation

## 2020-04-01 ENCOUNTER — APPOINTMENT (OUTPATIENT)
Dept: CARDIOLOGY | Facility: CLINIC | Age: 80
End: 2020-04-01
Payer: MEDICARE

## 2020-04-01 ENCOUNTER — APPOINTMENT (OUTPATIENT)
Dept: GENERAL RADIOLOGY | Facility: CLINIC | Age: 80
End: 2020-04-01
Attending: INTERNAL MEDICINE
Payer: MEDICARE

## 2020-04-01 VITALS
OXYGEN SATURATION: 100 % | WEIGHT: 130.9 LBS | HEIGHT: 68 IN | BODY MASS INDEX: 19.84 KG/M2 | SYSTOLIC BLOOD PRESSURE: 125 MMHG | DIASTOLIC BLOOD PRESSURE: 63 MMHG | RESPIRATION RATE: 16 BRPM | TEMPERATURE: 98.5 F | HEART RATE: 64 BPM

## 2020-04-01 DIAGNOSIS — Z95.0 CARDIAC PACEMAKER IN SITU: Primary | ICD-10-CM

## 2020-04-01 DIAGNOSIS — I44.2 CHB (COMPLETE HEART BLOCK) (H): ICD-10-CM

## 2020-04-01 PROCEDURE — 25000128 H RX IP 250 OP 636: Performed by: INTERNAL MEDICINE

## 2020-04-01 PROCEDURE — 40000986 XR CHEST 2 VW

## 2020-04-01 PROCEDURE — 93005 ELECTROCARDIOGRAM TRACING: CPT

## 2020-04-01 PROCEDURE — 93010 ELECTROCARDIOGRAM REPORT: CPT | Performed by: INTERNAL MEDICINE

## 2020-04-01 PROCEDURE — G0378 HOSPITAL OBSERVATION PER HR: HCPCS

## 2020-04-01 PROCEDURE — 40000065 ZZH STATISTIC EKG NON-CHARGEABLE

## 2020-04-01 PROCEDURE — 40000986 CARDIAC DEVICE CHECK - INPATIENT

## 2020-04-01 PROCEDURE — 99024 POSTOP FOLLOW-UP VISIT: CPT | Performed by: INTERNAL MEDICINE

## 2020-04-01 PROCEDURE — 25000132 ZZH RX MED GY IP 250 OP 250 PS 637: Mod: GY | Performed by: INTERNAL MEDICINE

## 2020-04-01 RX ORDER — MECLIZINE HYDROCHLORIDE 25 MG/1
25 TABLET ORAL 3 TIMES DAILY PRN
Status: DISCONTINUED | OUTPATIENT
Start: 2020-04-01 | End: 2020-04-01 | Stop reason: HOSPADM

## 2020-04-01 RX ORDER — NALOXONE HYDROCHLORIDE 0.4 MG/ML
.1-.4 INJECTION, SOLUTION INTRAMUSCULAR; INTRAVENOUS; SUBCUTANEOUS
Status: CANCELLED | OUTPATIENT
Start: 2020-04-01

## 2020-04-01 RX ORDER — OXYCODONE AND ACETAMINOPHEN 5; 325 MG/1; MG/1
1 TABLET ORAL EVERY 4 HOURS PRN
Status: CANCELLED | OUTPATIENT
Start: 2020-04-01

## 2020-04-01 RX ORDER — AMLODIPINE BESYLATE 5 MG/1
5 TABLET ORAL DAILY
Status: DISCONTINUED | OUTPATIENT
Start: 2020-04-01 | End: 2020-04-01 | Stop reason: HOSPADM

## 2020-04-01 RX ADMIN — ACETAMINOPHEN 650 MG: 325 TABLET, FILM COATED ORAL at 05:40

## 2020-04-01 RX ADMIN — ACETAMINOPHEN 650 MG: 325 TABLET, FILM COATED ORAL at 00:03

## 2020-04-01 RX ADMIN — CEFAZOLIN 1 G: 1 INJECTION, POWDER, FOR SOLUTION INTRAMUSCULAR; INTRAVENOUS at 05:35

## 2020-04-01 RX ADMIN — AMLODIPINE BESYLATE 5 MG: 5 TABLET ORAL at 08:39

## 2020-04-01 ASSESSMENT — MIFFLIN-ST. JEOR: SCORE: 1112.26

## 2020-04-01 NOTE — DISCHARGE SUMMARY
"EP- Cardiology Progress Note           Assessment and Plan:     79-yo F with Hx of depression, OA, HTN and paroxysmal atrial fibrillation, who presents with complete AV block.  No reversible causes were identified and she underwent PPM implantation yesterday.    Surgical wound is well approximated.  No hematoma noticed. Device interrogation showed good lead parameters.  Chest Xray confirmed good lead position and ruled out pneumothorax.       PLAN  1. Device care. Follow up in device clinic in 7-10 days.  2. Afib.  JESÚS.   3. Embolic prevention.  CHADS Vasc of 4.  Resume coumadin. Check INR in 2-3 days.    May go home today.      Physical Exam:  Vitals: /63 (BP Location: Right arm)   Pulse 64   Temp 98.5  F (36.9  C) (Oral)   Resp 16   Ht 1.727 m (5' 8\")   Wt 59.4 kg (130 lb 14.4 oz)   LMP  (LMP Unknown)   SpO2 100%   BMI 19.90 kg/m        Intake/Output Summary (Last 24 hours) at 4/1/2020 0852  Last data filed at 4/1/2020 0845  Gross per 24 hour   Intake 840 ml   Output --   Net 840 ml     Vitals:    03/31/20 1138 03/31/20 1552 04/01/20 0542   Weight: 61.7 kg (136 lb) 59.2 kg (130 lb 8 oz) 59.4 kg (130 lb 14.4 oz)       Constitutional:  AAO x3.  Pt is in NAD.  HEAD: Normocephalic.  SKIN: Skin normal color, texture and turgor with no lesions or eruptions.  Eyes: PERRL, EOMI.  ENT:  Supple, normal JVP. No lymphadenopathy or thyroid enlargement.  Chest:  CTAB  Cardiac: RRR, normal  S1 and S2.  No murmurs rubs or gallop.    Abdomen:  Normal BS.  Soft, non-tender and non-distended.  No rebound or guarding.    Extremities:  Pedious pulses palpable B/L.  No LE edema noticed.   Neurological: Strength and sensation grossly symmetric and intact throughout.                            Review of Systems:   As per subjective, otherwise 5 systems reviewed and negative.         Medications:          amLODIPine  5 mg Oral Daily     ceFAZolin  1 g Intravenous Q8H     PRN Meds: acetaminophen, HOLD MEDICATION, HOLD " MEDICATION, HOLD MEDICATION, meclizine, naloxone, naloxone, oxyCODONE-acetaminophen             Data:     Recent Labs   Lab 03/31/20  1157 03/30/20  1919 03/30/20  1837   WBC  --   --  4.7   HGB  --   --  11.4*   MCV  --   --  90   PLT  --   --  274   INR 1.8* 2.07*  --    NA  --   --  135   POTASSIUM  --   --  3.8   CHLORIDE  --   --  104   CO2  --   --  25   BUN  --   --  28   CR  --   --  0.75   ANIONGAP  --   --  6   GLORIA  --   --  8.3*   GLC  --   --  109*   ALBUMIN  --   --  3.5   PROTTOTAL  --   --  6.7*   BILITOTAL  --   --  0.2   ALKPHOS  --   --  46   ALT  --   --  27   AST  --   --  39   TROPI  --   --  <0.015

## 2020-04-01 NOTE — DISCHARGE INSTRUCTIONS
Discharge Instructions for Pacemaker Implantation  You have had a procedure to insert a pacemaker. Once inside your body, this small electronic device helps keep your heart from beating too slowly. A pacemaker can t fix existing heart problems. But it can help you feel better and have more energy. As you recover, follow all of the instructions you are given, including those below.  Activity    Follow the instructions you are given about limiting your activity.    Do not raise your arm on the incision side above shoulder level for 3 weeks. This gives the device lead wires time to attach securely inside your heart.    Ask your doctor when you can expect to return to work.    You can still exercise. It s good for your body and your heart. Talk with your doctor about an exercise plan.  Other Precautions    Follow your doctor's directions carefully for wound care. You may remove the outer dressing in 3 - 4 days (on Saturday, 4/4). Leave the steri-strips in place; instructions for these will be given at your 1 week follow-up. Never put any creams, lotions, or products like peroxide on an incision unless your doctor tells you to.     You may shower once the outer dressing has been removed.     Before you receive any treatment, tell all health care providers (including your dentist) that you have a pacemaker.    You will be given an ID card that contains information about your pacemaker. Always carry this card with you. You can show this card if your pacemaker sets off a metal detector. You should also show it to avoid screening with a hand-held security wand.    Keep your cell phone away from your pacemaker. Don t carry the phone in your shirt pocket, even when it s turned off.    Avoid strong magnets. Examples are those used in MRIs or in hand-held security wands.    Avoid strong electrical fields. Examples are those made by radio transmitting towers,  ham  radios, and heavy-duty electrical equipment.    Avoid leaning  over the open mak of a running car. A running engine creates an electrical field. Most household and yard appliances will not cause any problems. If you use any large power tools, such as an industrial , talk with your doctor.   Follow-Up    Follow up in the device clinic as scheduled. You have a remote device check scheduled for 4/10/20.  Your device check will be done remotely due to the current recommendations with the Coronavirus.  A device tech from the clinic will reach out to you to assist in sending a remote transmission.  Please call [phone] on the day of the transmission if you have any questions.      Make regular follow-up appointments with your device clinic. They will check the pacemaker to make sure it s working properly.  When to Call Your Device Clinic [phone]  Call your doctor immediately if you have any of the following:    Dizziness    Chest pain    Lack of energy    Fainting spells    Twitching chest muscles    Rapid pulse or pounding heartbeat    Shortness of breath    Pain around your pacemaker    Fever above 100.4 F (38 C) or other signs of infection (redness, swelling, drainage, or warmth at the incision site)    Hiccups that won t stop     3580-8037 [employer]. [address] [place] [address]. All rights reserved. This information is not intended as a substitute for professional medical care. Always follow your healthcare professional's instructions.    [facility] in [place]: [phone]  Device Clinic (Monday to Friday, 8am-4pm): [phone]

## 2020-04-01 NOTE — PROGRESS NOTES
Writer contacted device clinic at [facility] and clinic will contact patient regarding follow up.  Bedside RN will communicated to patient.  AVS updated with contact info for [facility] Device clinic.    Salina Garza RN  Care Coordinator  [facility]  [phone]

## 2020-04-01 NOTE — PROGRESS NOTES
Device Discharge      Per Dr. Clark's note from rounding:   Dressing:  Clean, dry, and intact  Chest XR reviewed:  Yes  Pneumo present?:  No  Lead Measurements per Device Rep:  WNL    Called patient and reviewed following information over the phone:     Education Provided:  Avoid raising left arm above the level of the shoulder for 3 weeks.  Do not shower until outer occlusive dressing has been removed.  Remove outer dressing in 3 - 4 days.  Leave steri-strips in place until they fall off or per instructions at 10 day follow-up.   Call Device Clinic with increased swelling, drainage, or fever > 101 degrees.   Follow-up with the Device Clinic as scheduled.     DIONTE Naranjo

## 2020-04-01 NOTE — PLAN OF CARE
VSS. Sating well on RA. Pt complained of some mild pain to incision, given tylenol PRN. Pt up independently in room. HR Vpaced.

## 2020-04-01 NOTE — PLAN OF CARE
VSS. Tele shows av and v paced. Pt denies any CP or SOB. Discharge instructions, medication indications/side effects, and follow up instructions discussed w/ pt. Handouts given. All questions answered. All pt belongings are accounted for and sent w/ pt. Pt left floor via wheelchair and was driven home by daughter.

## 2020-04-01 NOTE — PROGRESS NOTES
Case Management:  MD order for observation noted.  Explained observation status to patient and gave brochure.      Salina Garza RN  Care Coordinator  [facility]  [phone]

## 2020-04-02 ENCOUNTER — ANTICOAGULATION THERAPY VISIT (OUTPATIENT)
Dept: ANTICOAGULATION | Facility: OTHER | Age: 80
End: 2020-04-02

## 2020-04-02 DIAGNOSIS — I48.91 ATRIAL FIBRILLATION, UNSPECIFIED TYPE (H): ICD-10-CM

## 2020-04-02 DIAGNOSIS — Z79.01 LONG TERM CURRENT USE OF ANTICOAGULANT THERAPY: ICD-10-CM

## 2020-04-02 LAB
CAPILLARY BLOOD COLLECTION: NORMAL
INR BLD: 1.1 (ref 0.86–1.14)
INTERPRETATION ECG - MUSE: NORMAL

## 2020-04-02 PROCEDURE — 36416 COLLJ CAPILLARY BLOOD SPEC: CPT | Performed by: FAMILY MEDICINE

## 2020-04-02 PROCEDURE — 85610 PROTHROMBIN TIME: CPT | Mod: QW | Performed by: FAMILY MEDICINE

## 2020-04-02 PROCEDURE — 99207 ZZC NO CHARGE NURSE ONLY: CPT | Performed by: FAMILY MEDICINE

## 2020-04-02 NOTE — PROGRESS NOTES
ANTICOAGULATION FOLLOW-UP CLINIC VISIT    Patient Name:  Unique Ward  Date:  2020  Contact Type:  Telephone    SUBJECTIVE:  Patient Findings     Positives:   Emergency department visit (3/30/2020), Missed doses (Held due to a procedure)    Comments:             Clinical Outcomes     Comments:                OBJECTIVE    INR Point of Care   Date Value Ref Range Status   2020 1.1 0.86 - 1.14 Final     Comment:     This test is intended for monitoring Coumadin therapy.  Results are not   accurate in patients with prolonged INR due to factor deficiency.         ASSESSMENT / PLAN  INR assessment SUB    Recheck INR In: 2 WEEKS    INR Location Outside lab      Anticoagulation Summary  As of 2020    INR goal:   2.0-3.0   TTR:   87.3 % (9.7 mo)   INR used for dosin.1! (2020)   Warfarin maintenance plan:   10 mg (5 mg x 2) every Mon, Fri; 7.5 mg (5 mg x 1.5) all other days   Full warfarin instructions:   : 10 mg; Otherwise 10 mg every Mon, Fri; 7.5 mg all other days   Weekly warfarin total:   57.5 mg   Plan last modified:   Veronika Campbell RN (2020)   Next INR check:   2020   Priority:   Maintenance   Target end date:       Indications    Atrial fibrillation (H) [I48.91]             Anticoagulation Episode Summary     INR check location:       Preferred lab:       Send INR reminders to:   [facility] [place]    Comments:   5 mg tabs, PM dose, print out      Anticoagulation Care Providers     Provider Role Specialty Phone number    Tyler Lee MD [facility] [phone]            See the Encounter Report to view Anticoagulation Flowsheet and Dosing Calendar (Go to Encounters tab in chart review, and find the Anticoagulation Therapy Visit)    Dosage adjustment made based on physician directed care plan.    Veronika Campbell RN

## 2020-04-03 ENCOUNTER — TELEPHONE (OUTPATIENT)
Dept: FAMILY MEDICINE | Facility: CLINIC | Age: 80
End: 2020-04-03

## 2020-04-03 NOTE — TELEPHONE ENCOUNTER
"Hospital/TCU/ED for chronic condition Discharge Protocol    \"Hi, my name is Montserrat Lyon RN, a registered nurse, and I am calling from [facility].  I am calling to follow up and see how things are going for you after your recent emergency visit/hospital/TCU stay.\"    Tell me how you are doing now that you are home?\" Feeling OK now. I felt off earlier today but feeling OK now.\"      Discharge Instructions    \"Let's review your discharge instructions.  What is/are the follow-up recommendations?  Pt. Response: Follow up with PCP    \"Has an appointment with your primary care provider been scheduled?\"   wants to call back    \"When you see the provider, I would recommend that you bring your medications with you.\"    Medications    \"Tell me what changed about your medicines when you discharged?\"    Changes to chronic meds?    0-1    \"What questions do you have about your medications?\"    None     New diagnoses of heart failure, COPD, diabetes, or MI?    No          On warfarin: \"Were you given any recommendations for follow-up with the anticoagulation clinic?\" Yes - Anticoagulation clinic appointment is already scheduled at appropriate interval    Post Discharge Medication Reconciliation Status: discharge medications reconciled, continue medications without change.    Was MTM referral placed (*Make sure to put transitions as reason for referral)?   No    Call Summary    \"What questions or concerns do you have about your recent visit and your follow-up care?\"     none    \"If you have questions or things don't continue to improve, we encourage you contact us through the main clinic number (give number).  Even if the clinic is not open, triage nurses are available 24/7 to help you.     We would like you to know that our clinic has extended hours (provide information).  We also have urgent care (provide details on closest location and hours/contact info)\"      \"Thank you for your time and take care!\"    Montserrat " Sonali BSN, RN  [facility]

## 2020-04-03 NOTE — TELEPHONE ENCOUNTER
Patient called to schedule an appointment for a hospital follow-up or appeared on a report showing that they were recently discharged from the hospital.    [facility]  Discharged date: 04-  Reason for hospital admission:  Chb (Complete Heart Block) (H), Pacemaker  Does patient have future appointment scheduled with provider? NO  Date of future appointment:  na      This information will be used to help the care team plan for the patients upcoming visit.  The triage RN may determine that a follow up call is necessary and reach out to the patient via the phone number listed in the chart.     Please route this message on routine priority to the Triage RN pool.

## 2020-04-05 ENCOUNTER — APPOINTMENT (OUTPATIENT)
Dept: GENERAL RADIOLOGY | Facility: CLINIC | Age: 80
End: 2020-04-05
Attending: EMERGENCY MEDICINE
Payer: MEDICARE

## 2020-04-05 ENCOUNTER — NURSE TRIAGE (OUTPATIENT)
Dept: NURSING | Facility: CLINIC | Age: 80
End: 2020-04-05

## 2020-04-05 ENCOUNTER — HOSPITAL ENCOUNTER (EMERGENCY)
Facility: CLINIC | Age: 80
Discharge: HOME | End: 2020-04-05
Attending: EMERGENCY MEDICINE | Admitting: EMERGENCY MEDICINE
Payer: MEDICARE

## 2020-04-05 VITALS
TEMPERATURE: 98 F | RESPIRATION RATE: 18 BRPM | DIASTOLIC BLOOD PRESSURE: 97 MMHG | OXYGEN SATURATION: 98 % | SYSTOLIC BLOOD PRESSURE: 184 MMHG

## 2020-04-05 DIAGNOSIS — Z95.0 S/P PLACEMENT OF CARDIAC PACEMAKER: ICD-10-CM

## 2020-04-05 DIAGNOSIS — N30.01 ACUTE CYSTITIS WITH HEMATURIA: ICD-10-CM

## 2020-04-05 LAB
ALBUMIN UR-MCNC: 30 MG/DL
APPEARANCE UR: ABNORMAL
BACTERIA #/AREA URNS HPF: ABNORMAL /HPF
BILIRUB UR QL STRIP: NEGATIVE
COLOR UR AUTO: YELLOW
GLUCOSE UR STRIP-MCNC: NEGATIVE MG/DL
HGB UR QL STRIP: ABNORMAL
KETONES UR STRIP-MCNC: NEGATIVE MG/DL
LEUKOCYTE ESTERASE UR QL STRIP: ABNORMAL
MUCOUS THREADS #/AREA URNS LPF: PRESENT /LPF
NITRATE UR QL: NEGATIVE
PH UR STRIP: 7 PH (ref 5–7)
RBC #/AREA URNS AUTO: 2 /HPF (ref 0–2)
SOURCE: ABNORMAL
SP GR UR STRIP: 1.01 (ref 1–1.03)
UROBILINOGEN UR STRIP-MCNC: 0 MG/DL (ref 0–2)
WBC #/AREA URNS AUTO: 421 /HPF (ref 0–5)
WBC CLUMPS #/AREA URNS HPF: PRESENT /HPF

## 2020-04-05 PROCEDURE — 99284 EMERGENCY DEPT VISIT MOD MDM: CPT | Mod: 25 | Performed by: EMERGENCY MEDICINE

## 2020-04-05 PROCEDURE — 87088 URINE BACTERIA CULTURE: CPT | Performed by: EMERGENCY MEDICINE

## 2020-04-05 PROCEDURE — 87086 URINE CULTURE/COLONY COUNT: CPT | Performed by: EMERGENCY MEDICINE

## 2020-04-05 PROCEDURE — 87186 SC STD MICRODIL/AGAR DIL: CPT | Performed by: EMERGENCY MEDICINE

## 2020-04-05 PROCEDURE — 71046 X-RAY EXAM CHEST 2 VIEWS: CPT | Mod: TC

## 2020-04-05 PROCEDURE — 99284 EMERGENCY DEPT VISIT MOD MDM: CPT | Mod: Z6 | Performed by: EMERGENCY MEDICINE

## 2020-04-05 PROCEDURE — 25000132 ZZH RX MED GY IP 250 OP 250 PS 637: Performed by: EMERGENCY MEDICINE

## 2020-04-05 PROCEDURE — 81001 URINALYSIS AUTO W/SCOPE: CPT | Performed by: EMERGENCY MEDICINE

## 2020-04-05 RX ORDER — CEFUROXIME AXETIL 250 MG/1
500 TABLET ORAL ONCE
Status: COMPLETED | OUTPATIENT
Start: 2020-04-05 | End: 2020-04-05

## 2020-04-05 RX ORDER — CEFUROXIME AXETIL 500 MG/1
500 TABLET ORAL 2 TIMES DAILY
Qty: 13 TABLET | Refills: 0 | Status: SHIPPED | OUTPATIENT
Start: 2020-04-06 | End: 2020-04-08

## 2020-04-05 RX ADMIN — CEFUROXIME AXETIL 500 MG: 250 TABLET ORAL at 18:30

## 2020-04-05 ASSESSMENT — ENCOUNTER SYMPTOMS
DIARRHEA: 0
DYSURIA: 1
FREQUENCY: 1
CHILLS: 0
ABDOMINAL PAIN: 0
PALPITATIONS: 0
SHORTNESS OF BREATH: 0
FEVER: 0
FLANK PAIN: 0
DIZZINESS: 0
NAUSEA: 0
FACIAL SWELLING: 0
DIAPHORESIS: 0
CHEST TIGHTNESS: 0
WHEEZING: 0
VOMITING: 0

## 2020-04-05 NOTE — DISCHARGE INSTRUCTIONS
Your urine shows evidence of a urinary tract infection.  You were given a dose of antibiotics in the ER tonight, so you may start your antibiotic prescription tomorrow morning.  This was sent to [facility] for you to .  Take this medication twice a day for 1 week.    Follow-up with your cardiologist as initially indicated to follow your pacemaker.  The site looks good today, there were no signs of infection and it appears to be healing well    Call and follow-up with your primary provider if you continue to have symptoms, or if you have any worsening symptoms.  Return immediately to the ER if you have any difficulty breathing, any chest pain, develop a fever, or have any new or concerning symptoms

## 2020-04-05 NOTE — ED PROVIDER NOTES
"  History     Chief Complaint   Patient presents with     Rule out Urinary Tract Infection     HPI  Unique Ward is a 79 year old female who presents to the ER for concern of recurrent UTI.  She states that she had frequent urinary tract infections in the past, and has developed symptoms that are similar to previous.  She has burning with urination, frequency, and urgency.  This is been ongoing for the last day.  Denies running a fever.  She denies any abdominal pain, nausea, vomiting.  She does not have any flank pain.  He initially called nurse triage line hoping to get an antibiotic for this, and they directed her to the ER to be seen.  She brought her own urine sample from home today.    And is also status post pacemaker placement on 3/31/2020.  She is asking if I can look at the site as \"I am not sure what this is supposed to look like\".  She states that it is not painful, no drainage noted, she just wants somebody to look at it.  She does have follow-up with cardiology, and is currently following their recommendations on resuming her Coumadin that she has been on for atrial fibrillation.  She notes occasional\"awareness of my breathing\" when she was walking up a hill earlier today.  Does not describe it as a shortness of breath, but continues to say that \"she is aware of her breathing\" and feels like she is not taking a full inspiration. She denies any chest pain, she is not short of breath at rest, does not have any shortness of breath or difficulty breathing when lying flat.  She also notes some postnasal drainage that has been going on for the last few days.  She states that this has happened to her every year around this time, but this year it seems to be ongoing for a few extra days.  She has been outside doing chores frequently, but has not been around any sick contacts    Allergies:  Allergies   Allergen Reactions     No Clinical Screening - See Comments      Eye drop antibiotic.     Codeine Other (See " Comments)     Comment: , Description:      Gentamicin Hives     Per Unique this medication gives hives on arms and legs. Bev DRISCOLL Wally LSXO,  ....................  7/15/2014   1:15 PM     Influenza Vaccines Other (See Comments)     Comment: , Description:      Influenza Virus Vaccine H5n1      Paxil [Paroxetine] Other (See Comments)     Comment: , Description:      Typhoid Vaccine-Strain Ty21a Other (See Comments)     Comment: , Description:   Comment: , Description:      Typhoid Vaccines        Problem List:    Patient Active Problem List    Diagnosis Date Noted     CHB (complete heart block) (H) 03/31/2020     Priority: Medium     Added automatically from request for surgery [id number]       Pacemaker 03/31/2020     Priority: Medium     Atrial fibrillation (H) 06/07/2019     Priority: Medium     Left lumbosacral radiculopathy 05/31/2017     Priority: Medium     Acute cystitis with hematuria 05/31/2017     Priority: Medium     History of basal cell carcinoma- face and scalp 02/28/2017     Priority: Medium     FH: melanoma- son 02/28/2017     Priority: Medium     Fibromyalgia 02/28/2017     Priority: Medium     Advance Care Planning 01/06/2017     Priority: Medium     Advance Care Planning 5/4/2017: ACP Facilitation Session:    Unique Ward presented for ACP Facilitation session at a group class. She was accompanied by daughter-in-law. [employer] information provided and resources reviewed. She wishes to give additional consideration to ACP.  She currently has the following questions or concerns about Advance Care Planning: none known. Follow-up meeting: not needed/applicable. Added by Unique Uribe RN, Advance Care Planning Liaison.  Advance Care Planning 1/6/17: Info given   Saray Greer MA         Personal history of urinary tract infection 06/18/2016     Priority: Medium     Essential hypertension 03/23/2016     Priority: Medium     Vitamin D deficiency 03/30/2013     Priority: Medium      Dysthymia 2005     Priority: Medium        Past Medical History:    Past Medical History:   Diagnosis Date     Arthritis 1980     Atrial fibrillation (H) 2019     Cancer (H) 1993     scalp     Depressive disorder 1977     Hypertension 2016       Past Surgical History:    Past Surgical History:   Procedure Laterality Date     BIOPSY  2014     COLONOSCOPY      Two routine,  one for chronic diarrhea     EP PACEMAKER N/A 3/31/2020    Procedure: EP Pacemaker;  Surgeon: Erich Clark MD;  Location:  [facility]     EYE SURGERY  2014    first    cataracts both eyes.  second a few months later     GENITOURINARY SURGERY       GYN SURGERY  1977    TL     HEAD & NECK SURGERY  1997    basal cell X2   Moh's on scalp.  other on bridge of nose     SOFT TISSUE SURGERY  1990    Ganglion Cyst   Left inner wrist       Family History:    Family History   Problem Relation Age of Onset     Diabetes Maternal Grandmother      Coronary Artery Disease Sister         error     Coronary Artery Disease Brother         error     Hypertension Brother      Hyperlipidemia Brother      Cerebrovascular Disease Brother      Hypertension Sister      Hypertension Sister      Hyperlipidemia Sister      Other Cancer Sister         cervical     Breast Cancer Sister      Other Cancer Sister         kidney     Mental Illness Mother         paranoid/schizophrenic/suicide     Mental Illness Sister         ???  Had shock treatments       Social History:  Marital Status:   [2]  Social History     Tobacco Use     Smoking status: Former Smoker     Packs/day: 0.75     Years: 10.00     Pack years: 7.50     Types: Cigarettes     Start date: 1955     Last attempt to quit: 1965     Years since quittin.2     Smokeless tobacco: Never Used   Substance Use Topics     Alcohol use: No     Alcohol/week: 0.0 standard drinks     Drug use: No        Medications:     amLODIPine (NORVASC) 5 MG tablet  cholecalciferol (VITAMIN D3) 5000 units TABS tablet  magnesium 250 MG tablet  meclizine (ANTIVERT) 25 MG tablet  warfarin ANTICOAGULANT (COUMADIN ANTICOAGULANT) 5 MG PO tablet          Review of Systems   Constitutional: Negative for chills, diaphoresis and fever.   HENT: Negative for ear discharge, ear pain and facial swelling.    Respiratory: Negative for chest tightness, shortness of breath and wheezing.    Cardiovascular: Negative for chest pain, palpitations and leg swelling.   Gastrointestinal: Negative for abdominal pain, diarrhea, nausea and vomiting.   Genitourinary: Positive for dysuria, frequency and urgency. Negative for flank pain.   Neurological: Negative for dizziness and syncope.       Physical Exam          Physical Exam  Vitals signs and nursing note reviewed.   Constitutional:       General: She is not in acute distress.     Appearance: Normal appearance. She is not diaphoretic.   HENT:      Head: Normocephalic and atraumatic.      Right Ear: Tympanic membrane normal.      Left Ear: Ear canal normal. There is impacted cerumen.      Mouth/Throat:      Mouth: Mucous membranes are moist.      Pharynx: Oropharynx is clear. No oropharyngeal exudate.   Eyes:      Extraocular Movements: Extraocular movements intact.      Pupils: Pupils are equal, round, and reactive to light.   Neck:      Musculoskeletal: Normal range of motion and neck supple.   Cardiovascular:      Rate and Rhythm: Normal rate and regular rhythm.      Pulses: Normal pulses.   Pulmonary:      Effort: Pulmonary effort is normal. No respiratory distress.      Breath sounds: Normal breath sounds. No stridor. No wheezing or rhonchi.   Chest:          Comments: Area over left anterior chest wall and scribed by [place] above is yellow/green distant with healing ecchymosis.  Has linear incision as drawn above which is intact, with Steri-Strips overlying.  There is no tenderness of the area, no fluctuance, no  induration, no surrounding erythema.  There is no drainage under the Steri-Strips, incision appears intact.  Abdominal:      Palpations: Abdomen is soft.      Tenderness: There is no abdominal tenderness.   Musculoskeletal: Normal range of motion.   Skin:     General: Skin is warm and dry.   Neurological:      General: No focal deficit present.      Mental Status: She is alert and oriented to person, place, and time.   Psychiatric:         Mood and Affect: Mood normal.         Behavior: Behavior normal.         ED Course        Procedures               Critical Care time:  none               Results for orders placed or performed during the hospital encounter of 04/05/20 (from the past 24 hour(s))   UA reflex to Microscopic and Culture    Specimen: Midstream Urine   Result Value Ref Range    Color Urine Yellow     Appearance Urine Cloudy     Glucose Urine Negative NEG^Negative mg/dL    Bilirubin Urine Negative NEG^Negative    Ketones Urine Negative NEG^Negative mg/dL    Specific Gravity Urine 1.009 1.003 - 1.035    Blood Urine Large (A) NEG^Negative    pH Urine 7.0 5.0 - 7.0 pH    Protein Albumin Urine 30 (A) NEG^Negative mg/dL    Urobilinogen mg/dL 0.0 0.0 - 2.0 mg/dL    Nitrite Urine Negative NEG^Negative    Leukocyte Esterase Urine Large (A) NEG^Negative    Source Midstream Urine     RBC Urine 2 0 - 2 /HPF    WBC Urine 421 (H) 0 - 5 /HPF    WBC Clumps Present (A) NEG^Negative /HPF    Bacteria Urine Many (A) NEG^Negative /HPF    Mucous Urine Present (A) NEG^Negative /LPF   XR Chest 2 Views    Narrative    CHEST TWO VIEW   4/5/2020 5:54 PM     HISTORY: SOB, status post pacemaker placement.    COMPARISON: Chest x-rays dated 4/1/2020.    FINDINGS:  The lungs are persistently hyperaerated but are otherwise  grossly clear. Dual channel pacemaker and leads are stable in  appearance. Mild cardiac silhouette enlargement is stable. Mediastinum  and pulmonary vascularity are grossly within normal limits and are  unchanged.  No acute fracture, pneumothorax, or significant pleural  fluid collection is identified.      Impression    IMPRESSION: Findings of probable COPD are again noted. No other  evidence of acute cardiopulmonary disease is seen.       Medications   cefuroxime (CEFTIN) tablet 500 mg (has no administration in time range)       Assessments & Plan (with Medical Decision Making)  Dotty is an 79-year-old female with past medical history of recurrent UTIs, atrial fibrillation on long-term anticoagulation, 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  She has been having symptoms for the last day and try to call her primary provider to get a prescription for an antibiotic to treat, they recommended she come to the ER.  She had mentioned to the nurse triage line about occasional shortness of breath with exertion after her recent pacemaker placement, but she endorses today that she has very mild if any symptoms, and appears to be in no distress.  Strain physical exam as above  Pacemaker site was examined, appears to be healing well.  Chest x-ray was obtained and shows pacemaker in appropriate place but otherwise no significant changes.  Her urine sample that she provided from home shows evidence of urinary tract infection.  Per chart review and past urine cultures, see that she frequently grows pansensitive E. coli.  For her last UTI she was treated with Ceftin due to being on Coumadin at that time, to prevent major interactions, and she states that she tolerated that well.  We will give her a dose of Ceftin here in the ER and send prescription to her pharmacy to be picked up tomorrow to be continued twice daily for 7 days.  She understands and agrees.  Discussed reasons to return to the ER, otherwise recommended she follow-up closely with cardiology, Coumadin clinic, and her primary provider as she had previously planned.  Again she  is agreeable and is discharged in no acute distress.     I have reviewed the nursing notes.    I have reviewed the findings, diagnosis, plan and need for follow up with the patient.       New Prescriptions    CEFUROXIME (CEFTIN) 500 MG TABLET    Take 1 tablet (500 mg) by mouth 2 times daily for 7 days       Final diagnoses:   Acute cystitis with hematuria   S/P placement of cardiac pacemaker       4/5/2020   [facility] EMERGENCY DEPARTMENT     Daylin De Dios DO  04/05/20 [id number]

## 2020-04-05 NOTE — TELEPHONE ENCOUNTER
"\"I have a little shortness of breath on stairs, difficulty getting a really deep breath since last evening. Mucousy feeling in the top of my chest, heavy feeling. No pain. Occasional cough. Hx sinus infection 1 mo ago, I still have post nasal drip (mild) in the morning.\"  No nasal drainage. Phlegm is white or clear.Today cough was nonproductive.    \"I developed a UTI today. SX: burning, urgency with urination. Urine is cloudy. HX of recurrent UTIs. I am on Coumadin so they are careful what they give me. Rec'd pacemaker on Tuesday @ [facility].\"   PCP Dr Lee @ [facility]  Access to the internet: [email or web address] Reminder: but it does not treat >75 yrs old.   She has already obtained a urine specimen. (has containers)  Triaged to a disposition of See PCP within 24 hrs. UC in [place] closed. She intends to go to [place] ER.  ER notified @ 4:02pm.     Reason for Disposition    [1] MILD longstanding difficulty breathing AND [2]  SAME as normal    Age > 50 years    Additional Information    Negative: [1] Breathing stopped AND [2] hasn't returned    Negative: Choking on something    Negative: Severe difficulty breathing (e.g., struggling for each breath, speaks in single words)    Negative: Bluish (or gray) lips or face now    Negative: Difficult to awaken or acting confused (e.g., disoriented, slurred speech)    Negative: Passed out (i.e., lost consciousness, collapsed and was not responding)    Negative: Wheezing started suddenly after medicine, an allergic food or bee sting    Negative: Stridor    Negative: Slow, shallow and weak breathing    Negative: Sounds like a life-threatening emergency to the triager    Negative: Chest pain    Negative: [1] Wheezing (high pitched whistling sound) AND [2] previous asthma attacks or use of asthma medicines    Negative: [1] Difficulty breathing AND [2] only from stuffy or runny nose    Negative: [1] Difficulty breathing AND [2] only present when coughing    Negative: [1] MODERATE " "difficulty breathing (e.g., speaks in phrases, SOB even at rest, pulse 100-120) AND [2] NEW-onset or WORSE than normal    Negative: Wheezing can be heard across the room    Negative: Drooling or spitting out saliva (because can't swallow)    Negative: History of prior \"blood clot\" in leg or lungs (i.e., deep vein thrombosis, pulmonary embolism)    Negative: History of inherited increased risk of blood clots (e.g., Factor 5 Leiden, Anti-thrombin 3, Protein C or Protein S deficiency, Prothrombin mutation)    Negative: Recent illness requiring prolonged bedrest (i.e., immobilization)    Negative: Hip or leg fracture in past 2 months (e.g., had cast on leg or ankle)    Negative: Major surgery in the past month    Negative: Recent long-distance travel with prolonged time in car, bus, plane, or train (i.e., within past 2 weeks; 6 or  more hours duration)    Negative: Extra heart beats OR irregular heart beating   (i.e., \"palpitations\")    Negative: Fever > 103 F (39.4 C)    Negative: [1] Fever > 101 F (38.3 C) AND [2] age > 60    Negative: [1] Fever > 100.0 F (37.8 C) AND [2] bedridden (e.g., nursing home patient, CVA, chronic illness, recovering from surgery)    Negative: [1] Fever > 100.0 F (37.8 C) AND [2] diabetes mellitus or weak immune system (e.g., HIV positive, cancer chemo, splenectomy, chronic steroids)    Negative: [1] Periods where breathing stops and then resumes normally AND [2] bedridden (e.g., nursing home patient, CVA)    Negative: Pregnant or postpartum (< 1 month since delivery)    Negative: Patient sounds very sick or weak to the triager    Negative: [1] MILD difficulty breathing (e.g., minimal/no SOB at rest, SOB with walking, pulse <100) AND [2] NEW-onset or WORSE than normal    Negative: [1] Longstanding difficulty breathing (e.g., CHF, COPD, emphysema) AND [2] WORSE than normal    Negative: [1] Longstanding difficulty breathing AND [2] not responding to usual therapy    Negative: [1] Continuous " (nonstop) coughing AND [2] keeps from working or sleeping    Negative: [1] MODERATE longstanding difficulty breathing (e.g., speaks in phrases, SOB even at rest, pulse 100-120) AND [2] SAME as normal    Negative: Shock suspected (e.g., cold/pale/clammy skin, too weak to stand, low BP, rapid pulse)    Negative: Sounds like a life-threatening emergency to the triager    Negative: Followed a genital area injury    Negative: Taking antibiotic for urinary tract infection (UTI)    Negative: Pregnant    Negative: Postpartum < 1 month    Negative: Vomiting    Negative: [1] Unable to urinate (or only a few drops) > 4 hours AND [2] bladder feels very full (e.g., palpable bladder or strong urge to urinate)    Negative: Patient sounds very sick or weak to the triager    Negative: Fever > 100.5 F (38.1 C)    Negative: [1] SEVERE pain with urination  (e.g., excruciating) AND [2] not improved after 2 hours of pain medicine and Sitz bath    Negative: Side (flank) or lower back pain present    Negative: Diabetes mellitus or weak immune system (e.g., HIV positive, cancer chemotherapy, transplant patient)    Negative: Bedridden (e.g., nursing home patient, CVA, chronic illness, recovering from surgery)    Negative: Artificial heart valve or artificial joint    Negative: Unusual vaginal discharge (e.g., bad smelling, yellow, green, or foamy-white)    Negative: Patient is worried about sexually transmitted disease (STD)    Negative: Possibility of pregnancy    Negative: Blood in urine (red, pink, or tea-colored)    Protocols used: BREATHING DIFFICULTY-A-AH, URINATION PAIN - FEMALE-A-AH

## 2020-04-05 NOTE — ED AVS SNAPSHOT
[facility]  [address] [place] [address]  Phone:  [phone]  Fax:  [phone]                                    Unique Ward   MRN: [id number]    Department:  [facility]   Date of Visit:  4/5/2020           After Visit Summary Signature Page    I have received my discharge instructions, and my questions have been answered. I have discussed any challenges I see with this plan with the nurse or doctor.    ..........................................................................................................................................  Patient/Patient Representative Signature      ..........................................................................................................................................  Patient Representative Print Name and Relationship to Patient    ..................................................               ................................................  Date                                   Time    ..........................................................................................................................................  Reviewed by Signature/Title    ...................................................              ..............................................  Date                                               Time          22EPIC Rev 08/18

## 2020-04-08 ENCOUNTER — TELEPHONE (OUTPATIENT)
Dept: EMERGENCY MEDICINE | Facility: CLINIC | Age: 80
End: 2020-04-08

## 2020-04-08 DIAGNOSIS — N39.0 URINARY TRACT INFECTION: ICD-10-CM

## 2020-04-08 LAB
BACTERIA SPEC CULT: ABNORMAL
Lab: ABNORMAL
SPECIMEN SOURCE: ABNORMAL

## 2020-04-08 RX ORDER — CIPROFLOXACIN 500 MG/1
500 TABLET, FILM COATED ORAL 2 TIMES DAILY
Qty: 14 TABLET | Refills: 0 | Status: SHIPPED | OUTPATIENT
Start: 2020-04-08 | End: 2020-04-15

## 2020-04-08 NOTE — TELEPHONE ENCOUNTER
"[facility] Emergency Department Lab result notification [Adult-Female]    [place] ED lab result protocol used  Urine culture    Reason for call  Notify of lab results, assess symptoms,  review ED providers recommendations/discharge instructions (if necessary) and advise per ED lab result f/u protocol    Lab Result (including Rx patient on, if applicable)  Emergency Dept/Urgent Care discharge antibiotic prescribed: Cefuroxime (Ceftin) 500 mg PO tablet, 1 tablet (500 mg) by mouth 2 times daily for 7 days   #1. Bacteria, >100,000 colonies/mL Escherichia coli, is [RESISTANT] to antibiotic.   Change in treatment as per [facility] ED Lab result protocol.  Information table from ED Provider visit on 4/5/2020  Symptoms reported at ED visit (Chief complaint, HPI)  Rule out Urinary Tract Infection     HPI  Unique Ward is a 79 year old female who presents to the ER for concern of recurrent UTI.  She states that she had frequent urinary tract infections in the past, and has developed symptoms that are similar to previous.  She has burning with urination, frequency, and urgency.  This is been ongoing for the last day.  Denies running a fever.  She denies any abdominal pain, nausea, vomiting.  She does not have any flank pain.  He initially called nurse triage line hoping to get an antibiotic for this, and they directed her to the ER to be seen.  She brought her own urine sample from home today.     And is also status post pacemaker placement on 3/31/2020.  She is asking if I can look at the site as \"I am not sure what this is supposed to look like\".  She states that it is not painful, no drainage noted, she just wants somebody to look at it.  She does have follow-up with cardiology, and is currently following their recommendations on resuming her Coumadin that she has been on for atrial fibrillation.  She notes occasional\"awareness of my breathing\" when she was walking up a hill earlier today.  Does not " "describe it as a shortness of breath, but continues to say that \"she is aware of her breathing\" and feels like she is not taking a full inspiration. She denies any chest pain, she is not short of breath at rest, does not have any shortness of breath or difficulty breathing when lying flat.  She also notes some postnasal drainage that has been going on for the last few days.  She states that this has happened to her every year around this time, but this year it seems to be ongoing for a few extra days.  She has been outside doing chores frequently, but has not been around any sick contacts     Significant Medical hx, if applicable (i.e. CKD, diabetes) status post pacemaker placement on 3/31/2020.    Allergies Allergies   Allergen Reactions     No Clinical Screening - See Comments      Eye drop antibiotic.     Codeine Other (See Comments)     Comment: , Description:      Gentamicin Hives     Per Unique this medication gives hives on arms and legs. Bev Lo LSXO,  ....................  7/15/2014   1:15 PM     Influenza Vaccines Other (See Comments)     Comment: , Description:      Influenza Virus Vaccine H5n1      Paxil [Paroxetine] Other (See Comments)     Comment: , Description:      Typhoid Vaccine-Strain Ty21a Other (See Comments)     Comment: , Description:   Comment: , Description:      Typhoid Vaccines       Weight, if applicable Wt Readings from Last 2 Encounters:   04/01/20 59.4 kg (130 lb 14.4 oz)   03/30/20 63.5 kg (140 lb)      Coumadin/Warfarin [Yes /No] Yes   Creatinine Level (mg/dl) Creatinine   Date Value Ref Range Status   03/30/2020 0.75 0.52 - 1.04 mg/dL Final      Creatinine clearance (ml/min), if applicable Serum creatinine: 0.75 mg/dL 03/30/20 1837  Estimated creatinine clearance: 56.1 mL/min   Pregnant (Yes/No/NA) no   Breastfeeding (Yes/No/NA) no   ED providers Impression and Plan (applicable information) Dotty is an 79-year-old female with past medical history of recurrent UTIs, atrial " fibrillation on long-term anticoagulation, 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  She has been having symptoms for the last day and try to call her primary provider to get a prescription for an antibiotic to treat, they recommended she come to the ER.  She had mentioned to the nurse triage line about occasional shortness of breath with exertion after her recent pacemaker placement, but she endorses today that she has very mild if any symptoms, and appears to be in no distress.  Strain physical exam as above  Pacemaker site was examined, appears to be healing well.  Chest x-ray was obtained and shows pacemaker in appropriate place but otherwise no significant changes.  Her urine sample that she provided from home shows evidence of urinary tract infection.  Per chart review and past urine cultures, see that she frequently grows pansensitive E. coli.  For her last UTI she was treated with Ceftin due to being on Coumadin at that time, to prevent major interactions, and she states that she tolerated that well.  We will give her a dose of Ceftin here in the ER and send prescription to her pharmacy to be picked up tomorrow to be continued twice daily for 7 days.  She understands and agrees.  Discussed reasons to return to the ER, otherwise recommended she follow-up closely with cardiology, Coumadin clinic, and her primary provider as she had previously planned.  Again she is agreeable and is discharged in no acute distress.   ED diagnosis Acute cystitis with hematuria   S/P placement of cardiac pacemaker      ED provider Daylin De Dios,       RN Assessment (Patient s current Symptoms), include time called.  [Insert Left message here if message left]  11:05AM: Spoke with patient. She states she is doing well. Denies any further frequency, urgency or pain with urination. No blood noted in the urine. Taking in  "fluids well. States she had some diarrhea this morning \"but I had some dietary indiscretions yesterday.\" Denies any fever, abdominal pain, back pain, nausea or vomiting. Denies any breathing difficulty, \"I'm using a cool humidifier and I think that helps.\"     RN Recommendations/Instructions per [facility] ED lab result protocol  Patient notified of lab result and treatment recommendations.  Rx for Ciprofloxacin (Cipro) 500 mg tablet, 1 tablet (500 mg) by mouth 2 times daily for 7 days sent to [Pharmacy - [facility] in [place]].  RN reviewed information about UTI's.  Advised to continue to increase fluids, wipe front to back after urinating.  Advised to stop Ceftin and start Ciprofloxacin.  Advised to contact her PCP/clinic today to discuss with them if she should be adjusting her Warfarin dosing while on this antibiotic.  The patient is comfortable with this information and has no further questions.      Please Contact your PCP clinic or return to the Emergency department if your:    Symptoms worsen or other concerning symptom's.    PCP follow-up Questions asked: YES       [RN Name]  Pratima Marie RN  [employer] RN  Lung Nodule and ED Lab Result RN  Epic pool (ED late result f/u RN): P [id number]  [facility] INCIDENTAL RADIOLOGY F/U NURSES: P [id number]  # [phone]    Copy of Lab result   Urine Culture Aerobic Bacterial [[id number]] (Order [id number])   Order Requisition     Urine Culture Aerobic Bacterial (Order #[id number]) on 4/5/20    Exam Information     Exam Date  Exam Time  Accession #  Results     4/5/20   5:46 PM  [id number]     Component Results     Specimen Information:  Unspecified Urine          Component  Collected  Lab    Specimen Description  04/05/2020  5:46 PM  225    Unspecified Urine    Special Requests  04/05/2020  5:46 PM  225    Specimen received in preservative    Culture Micro Abnormal    04/05/2020  5:46 PM  225    >100,000 colonies/mL   Escherichia coli   This isolate does not meet the " criteria of an ESBL . A different resistance   mechanism may be present.    Susceptibility     Escherichia coli     Antibiotic  Interpretation  Sensitivity  Method  Status    AMPICILLIN  Resistant  >=32  ug/mL  TIA  Final    AMPICILLIN/SULBACTAM  Resistant  >=32  ug/mL  TIA  Final    CEFAZOLIN  Resistant  >=64  ug/mL  TIA  Final     Cefazolin TIA breakpoints are for the treatment of uncomplicated urinary tract    infections.  For the treatment of systemic infections, please contact the   laboratory for additional testing.    CEFEPIME  Sensitive  <=0.25  ug/mL  TIA  Final    CEFOXITIN  Resistant  >=64  ug/mL  TIA  Final    CEFTAZIDIME  Resistant  16.0  ug/mL  TIA  Final    CEFTRIAXONE  Resistant  8.0  ug/mL  TIA  Final    CIPROFLOXACIN  Sensitive  <=0.25  ug/mL  TIA  Final    GENTAMICIN  Sensitive  <=1  ug/mL  TIA  Final    LEVOFLOXACIN  Sensitive  <=0.12  ug/mL  TIA  Final    NITROFURANTOIN  Sensitive  <=16  ug/mL  TIA  Final    Piperacillin/Tazo  Sensitive  <=4  ug/mL  TIA  Final    TOBRAMYCIN  Sensitive  <=1  ug/mL  TIA  Final    Trimethoprim/Sulfa  Sensitive  <=1/19  ug/mL  TIA  Final

## 2020-04-10 ENCOUNTER — TELEPHONE (OUTPATIENT)
Dept: CARDIOLOGY | Facility: CLINIC | Age: 80
End: 2020-04-10

## 2020-04-10 ENCOUNTER — ANTICOAGULATION THERAPY VISIT (OUTPATIENT)
Dept: ANTICOAGULATION | Facility: OTHER | Age: 80
End: 2020-04-10

## 2020-04-10 DIAGNOSIS — I48.91 ATRIAL FIBRILLATION, UNSPECIFIED TYPE (H): ICD-10-CM

## 2020-04-10 DIAGNOSIS — Z79.01 LONG TERM CURRENT USE OF ANTICOAGULANT THERAPY: ICD-10-CM

## 2020-04-10 LAB
CAPILLARY BLOOD COLLECTION: NORMAL
INR BLD: 2.2 (ref 0.86–1.14)

## 2020-04-10 PROCEDURE — 36416 COLLJ CAPILLARY BLOOD SPEC: CPT | Performed by: FAMILY MEDICINE

## 2020-04-10 PROCEDURE — 99207 ZZC NO CHARGE NURSE ONLY: CPT | Performed by: FAMILY MEDICINE

## 2020-04-10 PROCEDURE — 85610 PROTHROMBIN TIME: CPT | Mod: QW | Performed by: FAMILY MEDICINE

## 2020-04-10 NOTE — TELEPHONE ENCOUNTER
Called patient to review 7-10 post pacemaker instructions. Asked to call clinic back for further instructions.

## 2020-04-14 ENCOUNTER — ANCILLARY PROCEDURE (OUTPATIENT)
Dept: CARDIOLOGY | Facility: CLINIC | Age: 80
End: 2020-04-14
Attending: INTERNAL MEDICINE
Payer: COMMERCIAL

## 2020-04-14 DIAGNOSIS — Z95.0 CARDIAC PACEMAKER IN SITU: ICD-10-CM

## 2020-04-14 DIAGNOSIS — I44.2 CHB (COMPLETE HEART BLOCK) (H): ICD-10-CM

## 2020-04-14 PROCEDURE — 93296 REM INTERROG EVL PM/IDS: CPT | Performed by: INTERNAL MEDICINE

## 2020-04-14 PROCEDURE — 93294 REM INTERROG EVL PM/LDLS PM: CPT | Performed by: INTERNAL MEDICINE

## 2020-04-15 DIAGNOSIS — Z95.0 CARDIAC PACEMAKER IN SITU: Primary | ICD-10-CM

## 2020-04-16 ENCOUNTER — ANTICOAGULATION THERAPY VISIT (OUTPATIENT)
Dept: ANTICOAGULATION | Facility: OTHER | Age: 80
End: 2020-04-16

## 2020-04-16 DIAGNOSIS — I48.91 ATRIAL FIBRILLATION, UNSPECIFIED TYPE (H): ICD-10-CM

## 2020-04-16 DIAGNOSIS — Z79.01 LONG TERM CURRENT USE OF ANTICOAGULANT THERAPY: ICD-10-CM

## 2020-04-16 LAB
CAPILLARY BLOOD COLLECTION: NORMAL
INR BLD: 2.2 (ref 0.86–1.14)
MDC_IDC_LEAD_IMPLANT_DT: NORMAL
MDC_IDC_LEAD_IMPLANT_DT: NORMAL
MDC_IDC_LEAD_LOCATION: NORMAL
MDC_IDC_LEAD_LOCATION: NORMAL
MDC_IDC_LEAD_LOCATION_DETAIL_1: NORMAL
MDC_IDC_LEAD_LOCATION_DETAIL_1: NORMAL
MDC_IDC_LEAD_MFG: NORMAL
MDC_IDC_LEAD_MFG: NORMAL
MDC_IDC_LEAD_MODEL: NORMAL
MDC_IDC_LEAD_MODEL: NORMAL
MDC_IDC_LEAD_POLARITY_TYPE: NORMAL
MDC_IDC_LEAD_POLARITY_TYPE: NORMAL
MDC_IDC_LEAD_SERIAL: NORMAL
MDC_IDC_LEAD_SERIAL: NORMAL
MDC_IDC_MSMT_BATTERY_DTM: NORMAL
MDC_IDC_MSMT_BATTERY_REMAINING_LONGEVITY: 115 MO
MDC_IDC_MSMT_BATTERY_RRT_TRIGGER: 2.62
MDC_IDC_MSMT_BATTERY_STATUS: NORMAL
MDC_IDC_MSMT_BATTERY_VOLTAGE: 3.18 V
MDC_IDC_MSMT_LEADCHNL_RA_IMPEDANCE_VALUE: 285 OHM
MDC_IDC_MSMT_LEADCHNL_RA_IMPEDANCE_VALUE: 418 OHM
MDC_IDC_MSMT_LEADCHNL_RA_SENSING_INTR_AMPL: 1.25 MV
MDC_IDC_MSMT_LEADCHNL_RA_SENSING_INTR_AMPL: 1.25 MV
MDC_IDC_MSMT_LEADCHNL_RV_IMPEDANCE_VALUE: 418 OHM
MDC_IDC_MSMT_LEADCHNL_RV_IMPEDANCE_VALUE: 570 OHM
MDC_IDC_MSMT_LEADCHNL_RV_PACING_THRESHOLD_AMPLITUDE: 0.75 V
MDC_IDC_MSMT_LEADCHNL_RV_PACING_THRESHOLD_PULSEWIDTH: 0.4 MS
MDC_IDC_MSMT_LEADCHNL_RV_SENSING_INTR_AMPL: 14.88 MV
MDC_IDC_MSMT_LEADCHNL_RV_SENSING_INTR_AMPL: 14.88 MV
MDC_IDC_PG_IMPLANT_DTM: NORMAL
MDC_IDC_PG_MFG: NORMAL
MDC_IDC_PG_MODEL: NORMAL
MDC_IDC_PG_SERIAL: NORMAL
MDC_IDC_PG_TYPE: NORMAL
MDC_IDC_SESS_CLINIC_NAME: NORMAL
MDC_IDC_SESS_DTM: NORMAL
MDC_IDC_SESS_TYPE: NORMAL
MDC_IDC_SET_BRADY_AT_MODE_SWITCH_RATE: 171 {BEATS}/MIN
MDC_IDC_SET_BRADY_HYSTRATE: NORMAL
MDC_IDC_SET_BRADY_LOWRATE: 60 {BEATS}/MIN
MDC_IDC_SET_BRADY_MAX_SENSOR_RATE: 130 {BEATS}/MIN
MDC_IDC_SET_BRADY_MAX_TRACKING_RATE: 130 {BEATS}/MIN
MDC_IDC_SET_BRADY_MODE: NORMAL
MDC_IDC_SET_BRADY_PAV_DELAY_LOW: 180 MS
MDC_IDC_SET_BRADY_SAV_DELAY_LOW: 150 MS
MDC_IDC_SET_LEADCHNL_RA_PACING_AMPLITUDE: 3.5 V
MDC_IDC_SET_LEADCHNL_RA_PACING_ANODE_ELECTRODE_1: NORMAL
MDC_IDC_SET_LEADCHNL_RA_PACING_ANODE_LOCATION_1: NORMAL
MDC_IDC_SET_LEADCHNL_RA_PACING_CAPTURE_MODE: NORMAL
MDC_IDC_SET_LEADCHNL_RA_PACING_CATHODE_ELECTRODE_1: NORMAL
MDC_IDC_SET_LEADCHNL_RA_PACING_CATHODE_LOCATION_1: NORMAL
MDC_IDC_SET_LEADCHNL_RA_PACING_POLARITY: NORMAL
MDC_IDC_SET_LEADCHNL_RA_PACING_PULSEWIDTH: 0.4 MS
MDC_IDC_SET_LEADCHNL_RA_SENSING_ANODE_ELECTRODE_1: NORMAL
MDC_IDC_SET_LEADCHNL_RA_SENSING_ANODE_LOCATION_1: NORMAL
MDC_IDC_SET_LEADCHNL_RA_SENSING_CATHODE_ELECTRODE_1: NORMAL
MDC_IDC_SET_LEADCHNL_RA_SENSING_CATHODE_LOCATION_1: NORMAL
MDC_IDC_SET_LEADCHNL_RA_SENSING_POLARITY: NORMAL
MDC_IDC_SET_LEADCHNL_RA_SENSING_SENSITIVITY: 0.3 MV
MDC_IDC_SET_LEADCHNL_RV_PACING_AMPLITUDE: 2 V
MDC_IDC_SET_LEADCHNL_RV_PACING_ANODE_ELECTRODE_1: NORMAL
MDC_IDC_SET_LEADCHNL_RV_PACING_ANODE_LOCATION_1: NORMAL
MDC_IDC_SET_LEADCHNL_RV_PACING_CAPTURE_MODE: NORMAL
MDC_IDC_SET_LEADCHNL_RV_PACING_CATHODE_ELECTRODE_1: NORMAL
MDC_IDC_SET_LEADCHNL_RV_PACING_CATHODE_LOCATION_1: NORMAL
MDC_IDC_SET_LEADCHNL_RV_PACING_POLARITY: NORMAL
MDC_IDC_SET_LEADCHNL_RV_PACING_PULSEWIDTH: 0.4 MS
MDC_IDC_SET_LEADCHNL_RV_SENSING_ANODE_ELECTRODE_1: NORMAL
MDC_IDC_SET_LEADCHNL_RV_SENSING_ANODE_LOCATION_1: NORMAL
MDC_IDC_SET_LEADCHNL_RV_SENSING_CATHODE_ELECTRODE_1: NORMAL
MDC_IDC_SET_LEADCHNL_RV_SENSING_CATHODE_LOCATION_1: NORMAL
MDC_IDC_SET_LEADCHNL_RV_SENSING_POLARITY: NORMAL
MDC_IDC_SET_LEADCHNL_RV_SENSING_SENSITIVITY: 0.9 MV
MDC_IDC_SET_ZONE_DETECTION_INTERVAL: 350 MS
MDC_IDC_SET_ZONE_DETECTION_INTERVAL: 400 MS
MDC_IDC_SET_ZONE_TYPE: NORMAL
MDC_IDC_STAT_BRADY_AP_VP_PERCENT: 65.91 %
MDC_IDC_STAT_BRADY_AP_VS_PERCENT: 0.33 %
MDC_IDC_STAT_BRADY_AS_VP_PERCENT: 32.1 %
MDC_IDC_STAT_BRADY_AS_VS_PERCENT: 1.66 %
MDC_IDC_STAT_BRADY_DTM_END: NORMAL
MDC_IDC_STAT_BRADY_DTM_START: NORMAL
MDC_IDC_STAT_BRADY_RA_PERCENT_PACED: 65.73 %
MDC_IDC_STAT_BRADY_RV_PERCENT_PACED: 98 %
MDC_IDC_STAT_EPISODE_RECENT_COUNT: 0
MDC_IDC_STAT_EPISODE_RECENT_COUNT_DTM_END: NORMAL
MDC_IDC_STAT_EPISODE_RECENT_COUNT_DTM_START: NORMAL
MDC_IDC_STAT_EPISODE_TOTAL_COUNT: 0
MDC_IDC_STAT_EPISODE_TOTAL_COUNT: 0
MDC_IDC_STAT_EPISODE_TOTAL_COUNT: 1
MDC_IDC_STAT_EPISODE_TOTAL_COUNT_DTM_END: NORMAL
MDC_IDC_STAT_EPISODE_TOTAL_COUNT_DTM_START: NORMAL
MDC_IDC_STAT_EPISODE_TYPE: NORMAL

## 2020-04-16 PROCEDURE — 99207 ZZC NO CHARGE NURSE ONLY: CPT | Performed by: FAMILY MEDICINE

## 2020-04-16 PROCEDURE — 85610 PROTHROMBIN TIME: CPT | Mod: QW | Performed by: FAMILY MEDICINE

## 2020-04-16 PROCEDURE — 36416 COLLJ CAPILLARY BLOOD SPEC: CPT | Performed by: FAMILY MEDICINE

## 2020-04-16 NOTE — PROGRESS NOTES
ANTICOAGULATION FOLLOW-UP CLINIC VISIT    Patient Name:  Unique Ward  Date:  2020  Contact Type:  Telephone/ LM with dosing instructions    SUBJECTIVE:  Patient Findings     Comments:   I left a detailed voicemail with the orders below. I have also requested a call back if there have been any missed doses, concerns, illness, fever, or if there have been any changes in medications, activity level, or diet          Clinical Outcomes     Negatives:   Major bleeding event, Thromboembolic event, Anticoagulation-related hospital admission, Anticoagulation-related ED visit, Anticoagulation-related fatality    Comments:   I left a detailed voicemail with the orders below. I have also requested a call back if there have been any missed doses, concerns, illness, fever, or if there have been any changes in medications, activity level, or diet             OBJECTIVE    INR Point of Care   Date Value Ref Range Status   2020 2.2 (H) 0.86 - 1.14 Final     Comment:     This test is intended for monitoring Coumadin therapy.  Results are not   accurate in patients with prolonged INR due to factor deficiency.         ASSESSMENT / PLAN  INR assessment THER    Recheck INR In: 4 WEEKS    INR Location Outside lab      Anticoagulation Summary  As of 2020    INR goal:   2.0-3.0   TTR:   85.8 % (10.1 mo)   INR used for dosin.2 (2020)   Warfarin maintenance plan:   10 mg (5 mg x 2) every Mon, Fri; 7.5 mg (5 mg x 1.5) all other days   Full warfarin instructions:   10 mg every Mon, Fri; 7.5 mg all other days   Weekly warfarin total:   57.5 mg   No change documented:   Veronika Campbell RN   Plan last modified:   Veronika Campbell RN (2020)   Next INR check:   2020   Priority:   Maintenance   Target end date:       Indications    Atrial fibrillation (H) [I48.91]             Anticoagulation Episode Summary     INR check location:       Preferred lab:       Send INR reminders to:   [facility]     Comments:   5 mg tabs, PM dose, print out      Anticoagulation Care Providers     Provider Role Specialty Phone number    Tyler Lee MD [facility] [phone]            See the Encounter Report to view Anticoagulation Flowsheet and Dosing Calendar (Go to Encounters tab in chart review, and find the Anticoagulation Therapy Visit)    Dosage adjustment made based on physician directed care plan.    Veronika Campbell RN

## 2020-04-23 ENCOUNTER — TELEPHONE (OUTPATIENT)
Dept: CARDIOLOGY | Facility: CLINIC | Age: 80
End: 2020-04-23

## 2020-04-23 DIAGNOSIS — I44.2 CHB (COMPLETE HEART BLOCK) (H): ICD-10-CM

## 2020-04-23 DIAGNOSIS — Z95.0 PACEMAKER: Primary | ICD-10-CM

## 2020-05-14 ENCOUNTER — ANCILLARY PROCEDURE (OUTPATIENT)
Dept: CARDIOLOGY | Facility: CLINIC | Age: 80
End: 2020-05-14
Attending: INTERNAL MEDICINE
Payer: COMMERCIAL

## 2020-05-14 DIAGNOSIS — I44.2 CHB (COMPLETE HEART BLOCK) (H): ICD-10-CM

## 2020-05-14 DIAGNOSIS — Z95.0 PACEMAKER: ICD-10-CM

## 2020-05-14 PROCEDURE — G2066 INTER DEVC REMOTE 30D: HCPCS | Performed by: INTERNAL MEDICINE

## 2020-05-14 PROCEDURE — 93298 REM INTERROG DEV EVAL SCRMS: CPT | Performed by: INTERNAL MEDICINE

## 2020-05-19 ENCOUNTER — ANTICOAGULATION THERAPY VISIT (OUTPATIENT)
Dept: ANTICOAGULATION | Facility: CLINIC | Age: 80
End: 2020-05-19

## 2020-05-19 DIAGNOSIS — Z79.01 LONG TERM CURRENT USE OF ANTICOAGULANT THERAPY: ICD-10-CM

## 2020-05-19 DIAGNOSIS — I48.91 ATRIAL FIBRILLATION, UNSPECIFIED TYPE (H): ICD-10-CM

## 2020-05-19 LAB
CAPILLARY BLOOD COLLECTION: NORMAL
INR BLD: 2.3 (ref 0.86–1.14)

## 2020-05-19 PROCEDURE — 36416 COLLJ CAPILLARY BLOOD SPEC: CPT | Performed by: FAMILY MEDICINE

## 2020-05-19 PROCEDURE — 85610 PROTHROMBIN TIME: CPT | Mod: QW | Performed by: FAMILY MEDICINE

## 2020-05-19 PROCEDURE — 99207 ZZC NO CHARGE NURSE ONLY: CPT | Performed by: FAMILY MEDICINE

## 2020-05-19 NOTE — PROGRESS NOTES
ANTICOAGULATION FOLLOW-UP CLINIC VISIT    Patient Name:  Unique Ward  Date:  2020  Contact Type:  Telephone    SUBJECTIVE:  Patient Findings     Comments:   The patient was assessed for diet, medication, and activity level changes, missed or extra doses, bruising or bleeding, with no problem findings.  Neelima Higgins RN          Clinical Outcomes     Negatives:   Major bleeding event, Thromboembolic event, Anticoagulation-related hospital admission, Anticoagulation-related ED visit, Anticoagulation-related fatality    Comments:   The patient was assessed for diet, medication, and activity level changes, missed or extra doses, bruising or bleeding, with no problem findings.  Neelima Higgins RN             OBJECTIVE    Recent labs: (last 7 days)     20  0925   INR 2.3*       ASSESSMENT / PLAN  INR assessment THER    Recheck INR In: 6 WEEKS    INR Location Outside lab      Anticoagulation Summary  As of 2020    INR goal:   2.0-3.0   TTR:   87.2 % (11.2 mo)   INR used for dosin.3 (2020)   Warfarin maintenance plan:   10 mg (5 mg x 2) every Mon, Fri; 7.5 mg (5 mg x 1.5) all other days   Full warfarin instructions:   10 mg every Mon, Fri; 7.5 mg all other days   Weekly warfarin total:   57.5 mg   No change documented:   Neelima Higgins RN   Plan last modified:   Veronika Campbell RN (2020)   Next INR check:   2020   Priority:   Maintenance   Target end date:       Indications    Atrial fibrillation (H) [I48.91]             Anticoagulation Episode Summary     INR check location:       Preferred lab:       Send INR reminders to:   [facility]    Comments:   5 mg tabs, PM dose, print out      Anticoagulation Care Providers     Provider Role Specialty Phone number    Tyler Lee MD [facility] [phone]            See the Encounter Report to view Anticoagulation Flowsheet and Dosing Calendar (Go to Encounters tab in chart review, and find  the Anticoagulation Therapy Visit)    Dosage adjustment made based on physician directed care plan.    Neelima Higgins RN

## 2020-05-21 LAB
MDC_IDC_EPISODE_DTM: NORMAL
MDC_IDC_EPISODE_DURATION: 152 S
MDC_IDC_EPISODE_DURATION: 1596 S
MDC_IDC_EPISODE_DURATION: 312 S
MDC_IDC_EPISODE_DURATION: 445 S
MDC_IDC_EPISODE_DURATION: 721 S
MDC_IDC_EPISODE_DURATION: 7867 S
MDC_IDC_EPISODE_DURATION: 854 S
MDC_IDC_EPISODE_DURATION: 9 S
MDC_IDC_EPISODE_DURATION: 945 S
MDC_IDC_EPISODE_ID: 10
MDC_IDC_EPISODE_ID: 2
MDC_IDC_EPISODE_ID: 3
MDC_IDC_EPISODE_ID: 4
MDC_IDC_EPISODE_ID: 5
MDC_IDC_EPISODE_ID: 6
MDC_IDC_EPISODE_ID: 7
MDC_IDC_EPISODE_ID: 8
MDC_IDC_EPISODE_ID: 9
MDC_IDC_EPISODE_TYPE: NORMAL
MDC_IDC_LEAD_IMPLANT_DT: NORMAL
MDC_IDC_LEAD_IMPLANT_DT: NORMAL
MDC_IDC_LEAD_LOCATION: NORMAL
MDC_IDC_LEAD_LOCATION: NORMAL
MDC_IDC_LEAD_LOCATION_DETAIL_1: NORMAL
MDC_IDC_LEAD_LOCATION_DETAIL_1: NORMAL
MDC_IDC_LEAD_MFG: NORMAL
MDC_IDC_LEAD_MFG: NORMAL
MDC_IDC_LEAD_MODEL: NORMAL
MDC_IDC_LEAD_MODEL: NORMAL
MDC_IDC_LEAD_POLARITY_TYPE: NORMAL
MDC_IDC_LEAD_POLARITY_TYPE: NORMAL
MDC_IDC_LEAD_SERIAL: NORMAL
MDC_IDC_LEAD_SERIAL: NORMAL
MDC_IDC_MSMT_BATTERY_DTM: NORMAL
MDC_IDC_MSMT_BATTERY_REMAINING_LONGEVITY: 96 MO
MDC_IDC_MSMT_BATTERY_RRT_TRIGGER: 2.62
MDC_IDC_MSMT_BATTERY_STATUS: NORMAL
MDC_IDC_MSMT_BATTERY_VOLTAGE: 3.16 V
MDC_IDC_MSMT_LEADCHNL_RA_IMPEDANCE_VALUE: 285 OHM
MDC_IDC_MSMT_LEADCHNL_RA_IMPEDANCE_VALUE: 342 OHM
MDC_IDC_MSMT_LEADCHNL_RA_PACING_THRESHOLD_AMPLITUDE: 2 V
MDC_IDC_MSMT_LEADCHNL_RA_PACING_THRESHOLD_PULSEWIDTH: 0.4 MS
MDC_IDC_MSMT_LEADCHNL_RA_SENSING_INTR_AMPL: 0.88 MV
MDC_IDC_MSMT_LEADCHNL_RA_SENSING_INTR_AMPL: 0.88 MV
MDC_IDC_MSMT_LEADCHNL_RV_IMPEDANCE_VALUE: 437 OHM
MDC_IDC_MSMT_LEADCHNL_RV_IMPEDANCE_VALUE: 551 OHM
MDC_IDC_MSMT_LEADCHNL_RV_PACING_THRESHOLD_AMPLITUDE: 0.88 V
MDC_IDC_MSMT_LEADCHNL_RV_PACING_THRESHOLD_PULSEWIDTH: 0.4 MS
MDC_IDC_MSMT_LEADCHNL_RV_SENSING_INTR_AMPL: 2.38 MV
MDC_IDC_MSMT_LEADCHNL_RV_SENSING_INTR_AMPL: 2.38 MV
MDC_IDC_PG_IMPLANT_DTM: NORMAL
MDC_IDC_PG_MFG: NORMAL
MDC_IDC_PG_MODEL: NORMAL
MDC_IDC_PG_SERIAL: NORMAL
MDC_IDC_PG_TYPE: NORMAL
MDC_IDC_SESS_CLINIC_NAME: NORMAL
MDC_IDC_SESS_DTM: NORMAL
MDC_IDC_SESS_TYPE: NORMAL
MDC_IDC_SET_BRADY_AT_MODE_SWITCH_RATE: 171 {BEATS}/MIN
MDC_IDC_SET_BRADY_HYSTRATE: NORMAL
MDC_IDC_SET_BRADY_LOWRATE: 60 {BEATS}/MIN
MDC_IDC_SET_BRADY_MAX_SENSOR_RATE: 130 {BEATS}/MIN
MDC_IDC_SET_BRADY_MAX_TRACKING_RATE: 130 {BEATS}/MIN
MDC_IDC_SET_BRADY_MODE: NORMAL
MDC_IDC_SET_BRADY_PAV_DELAY_LOW: 180 MS
MDC_IDC_SET_BRADY_SAV_DELAY_LOW: 150 MS
MDC_IDC_SET_LEADCHNL_RA_PACING_AMPLITUDE: 4 V
MDC_IDC_SET_LEADCHNL_RA_PACING_ANODE_ELECTRODE_1: NORMAL
MDC_IDC_SET_LEADCHNL_RA_PACING_ANODE_LOCATION_1: NORMAL
MDC_IDC_SET_LEADCHNL_RA_PACING_CAPTURE_MODE: NORMAL
MDC_IDC_SET_LEADCHNL_RA_PACING_CATHODE_ELECTRODE_1: NORMAL
MDC_IDC_SET_LEADCHNL_RA_PACING_CATHODE_LOCATION_1: NORMAL
MDC_IDC_SET_LEADCHNL_RA_PACING_POLARITY: NORMAL
MDC_IDC_SET_LEADCHNL_RA_PACING_PULSEWIDTH: 0.4 MS
MDC_IDC_SET_LEADCHNL_RA_SENSING_ANODE_ELECTRODE_1: NORMAL
MDC_IDC_SET_LEADCHNL_RA_SENSING_ANODE_LOCATION_1: NORMAL
MDC_IDC_SET_LEADCHNL_RA_SENSING_CATHODE_ELECTRODE_1: NORMAL
MDC_IDC_SET_LEADCHNL_RA_SENSING_CATHODE_LOCATION_1: NORMAL
MDC_IDC_SET_LEADCHNL_RA_SENSING_POLARITY: NORMAL
MDC_IDC_SET_LEADCHNL_RA_SENSING_SENSITIVITY: 0.3 MV
MDC_IDC_SET_LEADCHNL_RV_PACING_AMPLITUDE: 2 V
MDC_IDC_SET_LEADCHNL_RV_PACING_ANODE_ELECTRODE_1: NORMAL
MDC_IDC_SET_LEADCHNL_RV_PACING_ANODE_LOCATION_1: NORMAL
MDC_IDC_SET_LEADCHNL_RV_PACING_CAPTURE_MODE: NORMAL
MDC_IDC_SET_LEADCHNL_RV_PACING_CATHODE_ELECTRODE_1: NORMAL
MDC_IDC_SET_LEADCHNL_RV_PACING_CATHODE_LOCATION_1: NORMAL
MDC_IDC_SET_LEADCHNL_RV_PACING_POLARITY: NORMAL
MDC_IDC_SET_LEADCHNL_RV_PACING_PULSEWIDTH: 0.4 MS
MDC_IDC_SET_LEADCHNL_RV_SENSING_ANODE_ELECTRODE_1: NORMAL
MDC_IDC_SET_LEADCHNL_RV_SENSING_ANODE_LOCATION_1: NORMAL
MDC_IDC_SET_LEADCHNL_RV_SENSING_CATHODE_ELECTRODE_1: NORMAL
MDC_IDC_SET_LEADCHNL_RV_SENSING_CATHODE_LOCATION_1: NORMAL
MDC_IDC_SET_LEADCHNL_RV_SENSING_POLARITY: NORMAL
MDC_IDC_SET_LEADCHNL_RV_SENSING_SENSITIVITY: 0.9 MV
MDC_IDC_SET_ZONE_DETECTION_INTERVAL: 350 MS
MDC_IDC_SET_ZONE_DETECTION_INTERVAL: 400 MS
MDC_IDC_SET_ZONE_TYPE: NORMAL
MDC_IDC_STAT_BRADY_AP_VP_PERCENT: 70.65 %
MDC_IDC_STAT_BRADY_AP_VS_PERCENT: 1 %
MDC_IDC_STAT_BRADY_AS_VP_PERCENT: 24.6 %
MDC_IDC_STAT_BRADY_AS_VS_PERCENT: 3.75 %
MDC_IDC_STAT_BRADY_DTM_END: NORMAL
MDC_IDC_STAT_BRADY_DTM_START: NORMAL
MDC_IDC_STAT_BRADY_RA_PERCENT_PACED: 70.87 %
MDC_IDC_STAT_BRADY_RV_PERCENT_PACED: 95.19 %
MDC_IDC_STAT_EPISODE_RECENT_COUNT: 0
MDC_IDC_STAT_EPISODE_RECENT_COUNT: 0
MDC_IDC_STAT_EPISODE_RECENT_COUNT: 9
MDC_IDC_STAT_EPISODE_RECENT_COUNT_DTM_END: NORMAL
MDC_IDC_STAT_EPISODE_RECENT_COUNT_DTM_START: NORMAL
MDC_IDC_STAT_EPISODE_TOTAL_COUNT: 0
MDC_IDC_STAT_EPISODE_TOTAL_COUNT: 0
MDC_IDC_STAT_EPISODE_TOTAL_COUNT: 10
MDC_IDC_STAT_EPISODE_TOTAL_COUNT_DTM_END: NORMAL
MDC_IDC_STAT_EPISODE_TOTAL_COUNT_DTM_START: NORMAL
MDC_IDC_STAT_EPISODE_TYPE: NORMAL

## 2020-06-04 ENCOUNTER — NURSE TRIAGE (OUTPATIENT)
Dept: NURSING | Facility: CLINIC | Age: 80
End: 2020-06-04

## 2020-06-04 ENCOUNTER — APPOINTMENT (OUTPATIENT)
Dept: CT IMAGING | Facility: CLINIC | Age: 80
End: 2020-06-04
Attending: EMERGENCY MEDICINE
Payer: MEDICARE

## 2020-06-04 ENCOUNTER — HOSPITAL ENCOUNTER (EMERGENCY)
Facility: CLINIC | Age: 80
Discharge: HOME | End: 2020-06-04
Attending: EMERGENCY MEDICINE | Admitting: EMERGENCY MEDICINE
Payer: MEDICARE

## 2020-06-04 ENCOUNTER — ANTICOAGULATION THERAPY VISIT (OUTPATIENT)
Dept: ANTICOAGULATION | Facility: OTHER | Age: 80
End: 2020-06-04
Payer: COMMERCIAL

## 2020-06-04 VITALS
RESPIRATION RATE: 18 BRPM | DIASTOLIC BLOOD PRESSURE: 87 MMHG | SYSTOLIC BLOOD PRESSURE: 171 MMHG | TEMPERATURE: 98.5 F | WEIGHT: 135 LBS | HEART RATE: 68 BPM | OXYGEN SATURATION: 97 % | BODY MASS INDEX: 20.53 KG/M2

## 2020-06-04 DIAGNOSIS — I48.91 ATRIAL FIBRILLATION, UNSPECIFIED TYPE (H): ICD-10-CM

## 2020-06-04 DIAGNOSIS — Z79.01 ANTICOAGULATED ON COUMADIN: ICD-10-CM

## 2020-06-04 DIAGNOSIS — S09.90XA TRAUMATIC INJURY OF HEAD, INITIAL ENCOUNTER: ICD-10-CM

## 2020-06-04 DIAGNOSIS — R79.1 SUBTHERAPEUTIC INTERNATIONAL NORMALIZED RATIO (INR): ICD-10-CM

## 2020-06-04 LAB — INR PPP: 1.78 (ref 0.86–1.14)

## 2020-06-04 PROCEDURE — 99285 EMERGENCY DEPT VISIT HI MDM: CPT | Mod: 25 | Performed by: EMERGENCY MEDICINE

## 2020-06-04 PROCEDURE — 99284 EMERGENCY DEPT VISIT MOD MDM: CPT | Mod: Z6 | Performed by: EMERGENCY MEDICINE

## 2020-06-04 PROCEDURE — 85610 PROTHROMBIN TIME: CPT | Performed by: EMERGENCY MEDICINE

## 2020-06-04 PROCEDURE — 70450 CT HEAD/BRAIN W/O DYE: CPT

## 2020-06-04 PROCEDURE — 99207 ZZC NO CHARGE NURSE ONLY: CPT | Performed by: FAMILY MEDICINE

## 2020-06-04 PROCEDURE — 72125 CT NECK SPINE W/O DYE: CPT

## 2020-06-04 ASSESSMENT — ENCOUNTER SYMPTOMS
EYE PAIN: 0
DIZZINESS: 0
NECK PAIN: 1
VOMITING: 0
HEADACHES: 0
PHOTOPHOBIA: 0

## 2020-06-04 NOTE — ED AVS SNAPSHOT
[facility] Emergency Department  [address] [place] [address]  Phone:  [phone]  Fax:  [phone]                                    Unique Ward   MRN: [id number]    Department:  [facility] Emergency Department   Date of Visit:  6/4/2020           After Visit Summary Signature Page    I have received my discharge instructions, and my questions have been answered. I have discussed any challenges I see with this plan with the nurse or doctor.    ..........................................................................................................................................  Patient/Patient Representative Signature      ..........................................................................................................................................  Patient Representative Print Name and Relationship to Patient    ..................................................               ................................................  Date                                   Time    ..........................................................................................................................................  Reviewed by Signature/Title    ...................................................              ..............................................  Date                                               Time          22EPIC Rev 08/18

## 2020-06-05 ENCOUNTER — TELEPHONE (OUTPATIENT)
Dept: ANTICOAGULATION | Facility: OTHER | Age: 80
End: 2020-06-05

## 2020-06-05 NOTE — DISCHARGE INSTRUCTIONS
Your INR was just below therapeutic level at 1.78. Ideally, your INR will be kept between 2.0-3.0. You should increase your dose of warfarin to 10 mg tonight.  Then resume your normal dosing schedule of 10 mg tomorrow night, 7.5 mg after that, and so on and so forth.  You should contact your warfarin clinic to discuss when you need to follow-up to have your INR checked again    Your scan of your head and neck today looked fine, there were no broken bones or any bleeding in your brain    If you have any aches or pains, you can apply ice to the affected area or take Tylenol per bottle instructions    Return to the ER if you have any new or worsening symptoms

## 2020-06-05 NOTE — PROGRESS NOTES
ANTICOAGULATION FOLLOW-UP CLINIC VISIT    Patient Name:  Unique Ward  Date:  2020  Contact Type:  Telephone    SUBJECTIVE:  Patient Findings     Positives:   Emergency department visit    Comments:   ED notes recommend 10 mg . Pt will resume her maintenance dose and recheck her INR on 20 as previously planned. Patient verbalized understanding and agrees with plan of care. Pt had no further questions or concerns at this time.         Clinical Outcomes     Comments:   ED notes recommend 10 mg . Pt will resume her maintenance dose and recheck her INR on 20 as previously planned. Patient verbalized understanding and agrees with plan of care. Pt had no further questions or concerns at this time.            OBJECTIVE    Recent labs: (last 7 days)     20  2117   INR 1.78*       ASSESSMENT / PLAN  INR assessment SUB    Recheck INR In: 3 WEEKS    INR Location Outside lab      Anticoagulation Summary  As of 2020    INR goal:   2.0-3.0   TTR:   85.8 % (11.8 mo)   INR used for dosin.78! (2020)   Warfarin maintenance plan:   10 mg (5 mg x 2) every Mon, Fri; 7.5 mg (5 mg x 1.5) all other days   Full warfarin instructions:   : 10 mg; Otherwise 10 mg every Mon, Fri; 7.5 mg all other days   Weekly warfarin total:   57.5 mg   Plan last modified:   Veronika Campbell, RN (2020)   Next INR check:   2020   Priority:   Maintenance   Target end date:       Indications    Atrial fibrillation (H) [I48.91]             Anticoagulation Episode Summary     INR check location:       Preferred lab:       Send INR reminders to:   [facility]    Comments:   5 mg tabs, PM dose, print out      Anticoagulation Care Providers     Provider Role Specialty Phone number    Tyler Lee MD [facility] [phone]            See the Encounter Report to view Anticoagulation Flowsheet and Dosing Calendar (Go to Encounters tab in chart review, and find the  Anticoagulation Therapy Visit)    Dosage adjustment made based on physician directed care plan.    Veronika Campbell RN

## 2020-06-05 NOTE — ED PROVIDER NOTES
"  History     Chief Complaint   Patient presents with     Head Injury     HPI  Unique Ward is a 79 year old female who presents to ER after head trauma. Was moving chain link fence and a 6'x6' portion fell on her head. She did not lose consciousness.  She can feel a \"bump\" on her head, but it only hurts to touch.  She is not otherwise having a headache.  She is concerned because she is on anticoagulation, takes warfarin for atrial fibrillation.  Last INR was taken 2 weeks ago, was 2.3.  She has not missed any doses of her warfarin. Has not been vomiting, has not noticed any blood in stool, no blood in her urine.    Allergies:  Allergies   Allergen Reactions     No Clinical Screening - See Comments      Eye drop antibiotic.     Codeine Other (See Comments)     Comment: , Description:      Gentamicin Hives     Per Unique this medication gives hives on arms and legs. Bev Lo LSXO,  ....................  7/15/2014   1:15 PM     Influenza Vaccines Other (See Comments)     Comment: , Description:      Influenza Virus Vaccine H5n1      Paxil [Paroxetine] Other (See Comments)     Comment: , Description:      Typhoid Vaccine-Strain Ty21a Other (See Comments)     Comment: , Description:   Comment: , Description:      Typhoid Vaccines        Problem List:    Patient Active Problem List    Diagnosis Date Noted     CHB (complete heart block) (H) 03/31/2020     Priority: Medium     Added automatically from request for surgery [id number]       Pacemaker 03/31/2020     Priority: Medium     Atrial fibrillation (H) 06/07/2019     Priority: Medium     Left lumbosacral radiculopathy 05/31/2017     Priority: Medium     Acute cystitis with hematuria 05/31/2017     Priority: Medium     History of basal cell carcinoma- face and scalp 02/28/2017     Priority: Medium     FH: melanoma- son 02/28/2017     Priority: Medium     Fibromyalgia 02/28/2017     Priority: Medium     Advance Care Planning 01/06/2017     Priority: Medium     " Advance Care Planning 5/4/2017: ACP Facilitation Session:    Unique THOMAS Ward presented for ACP Facilitation session at a group class. She was accompanied by daughter-in-law. Hon[employer] Choices information provided and resources reviewed. She wishes to give additional consideration to ACP.  She currently has the following questions or concerns about Advance Care Planning: none known. Follow-up meeting: not needed/applicable. Added by Unique Uribe RN, Advance Care Planning Liaison.  Advance Care Planning 1/6/17: Info given   Saray Greer MA         Personal history of urinary tract infection 06/18/2016     Priority: Medium     Essential hypertension 03/23/2016     Priority: Medium     Vitamin D deficiency 03/30/2013     Priority: Medium     Dysthymia 04/01/2005     Priority: Medium        Past Medical History:    Past Medical History:   Diagnosis Date     Arthritis 01.01.1980     Atrial fibrillation (H) 6/7/2019     Cancer (H) 01.01.1993     scalp     Depressive disorder 01.01.1977     Hypertension 03.16.2016       Past Surgical History:    Past Surgical History:   Procedure Laterality Date     BIOPSY  11/01/2014     COLONOSCOPY  01.01/1979    Two routine,  one for chronic diarrhea     EP PACEMAKER N/A 3/31/2020    Procedure: EP Pacemaker;  Surgeon: Erich Clark MD;  Location:  [facility]     EYE SURGERY  1/1/2014    first    cataracts both eyes.  second a few months later     GENITOURINARY SURGERY       GYN SURGERY  01.01.1977    TL     HEAD & NECK SURGERY  01.01.1997    basal cell X2   Moh's on scalp.  other on bridge of nose     SOFT TISSUE SURGERY  01.01.1990    Ganglion Cyst   Left inner wrist       Family History:    Family History   Problem Relation Age of Onset     Diabetes Maternal Grandmother      Coronary Artery Disease Sister         error     Coronary Artery Disease Brother         error     Hypertension Brother      Hyperlipidemia Brother      Cerebrovascular Disease Brother       Hypertension Sister      Hypertension Sister      Hyperlipidemia Sister      Other Cancer Sister         cervical     Breast Cancer Sister      Other Cancer Sister         kidney     Mental Illness Mother         paranoid/schizophrenic/suicide     Mental Illness Sister         ???  Had shock treatments       Social History:  Marital Status:   [2]  Social History     Tobacco Use     Smoking status: Former Smoker     Packs/day: 0.75     Years: 10.00     Pack years: 7.50     Types: Cigarettes     Start date: 1955     Last attempt to quit: 1965     Years since quittin.4     Smokeless tobacco: Never Used   Substance Use Topics     Alcohol use: No     Alcohol/week: 0.0 standard drinks     Drug use: No        Medications:    amLODIPine (NORVASC) 5 MG tablet  cholecalciferol (VITAMIN D3) 5000 units TABS tablet  magnesium 250 MG tablet  meclizine (ANTIVERT) 25 MG tablet  warfarin ANTICOAGULANT (COUMADIN ANTICOAGULANT) 5 MG PO tablet          Review of Systems   Eyes: Negative for photophobia, pain and visual disturbance.   Gastrointestinal: Negative for vomiting.   Musculoskeletal: Positive for neck pain.   Neurological: Negative for dizziness, syncope and headaches.   All other systems reviewed and are negative.      Physical Exam   BP: (!) 157/86  Pulse: 68  Heart Rate: 72  Temp: 98.5  F (36.9  C)  Resp: 18  Weight: 61.2 kg (135 lb)  SpO2: 95 %      Physical Exam  Vitals signs and nursing note reviewed.   Constitutional:       General: She is not in acute distress.     Appearance: Normal appearance.   HENT:      Head:        Right Ear: Tympanic membrane normal.      Left Ear: Tympanic membrane normal.   Eyes:      Extraocular Movements: Extraocular movements intact.      Pupils: Pupils are equal, round, and reactive to light.   Neck:      Musculoskeletal: Normal range of motion. Muscular tenderness present. No neck rigidity.      Comments: Right paraspinal muscle tenderness, no midline or bony  tenderness  Cardiovascular:      Rate and Rhythm: Normal rate.      Pulses: Normal pulses.   Pulmonary:      Effort: Pulmonary effort is normal.      Breath sounds: Normal breath sounds.   Musculoskeletal: Normal range of motion.   Skin:     General: Skin is warm and dry.   Neurological:      General: No focal deficit present.      Mental Status: She is alert.   Psychiatric:         Mood and Affect: Mood normal.         Behavior: Behavior normal.         ED Course        Procedures               Critical Care time:  none               Results for orders placed or performed during the hospital encounter of 06/04/20 (from the past 24 hour(s))   INR   Result Value Ref Range    INR 1.78 (H) 0.86 - 1.14   Cervical spine CT w/o contrast    Narrative    CT CERVICAL SPINE WITHOUT CONTRAST   6/4/2020 9:43 PM     HISTORY: Neck pain s/p head trauma     TECHNIQUE: Axial images of the cervical spine were obtained without  intravenous contrast. Multiplanar reformations were performed.  Radiation dose for this scan was reduced using automated exposure  control, adjustment of the mA and/or kV according to patient size, or  iterative reconstruction technique.     COMPARISON: None.    FINDINGS: Sagittal images demonstrate minimal degenerative  spondylolisthesis at C4-C5 and minimal degenerative retrolisthesis at  C5-C6. Posterior alignment is otherwise normal. There is no evidence  for fracture. Multilevel mild-to-moderate degenerative changes are  present. There is no significant central canal stenosis. Multinodular  thyroid gland incidentally noted.      Impression    IMPRESSION: Degenerative changes. No evidence for fracture.    KAE SALES MD   CT Head w/o Contrast    Narrative    CT SCAN OF THE HEAD WITHOUT CONTRAST   6/4/2020 9:43 PM     HISTORY: Head trauma, anticoagulated    TECHNIQUE:  Axial images of the head and coronal reformations without  IV contrast material.  Radiation dose for this scan was reduced using  automated  exposure control, adjustment of the mA and/or kV according  to patient size, or iterative reconstruction technique.    COMPARISON: 3/16/2016    FINDINGS: There is some cerebral atrophy. Minimal nonspecific white  matter changes are present in both hemispheres without mass effect.  There is no evidence for intracranial hemorrhage, mass effect, acute  infarct, or skull fracture. Visualized paranasal sinuses and mastoid  air cells are clear. Incidental note is made of a small osteoma in the  outer table of the left parietal-occipital bone. This was present on  the 2016 study as well. Small incidental benign-appearing hemangioma  also noted in the left parietal bone.      Impression    IMPRESSION: Chronic changes. No evidence for intracranial hemorrhage  or any acute process.    KAE SALES MD       Medications - No data to display    Assessments & Plan (with Medical Decision Making)  Aaliyah is an 79-year-old female who presents to the ER after head trauma.  She was moving some sections of chain-link fence when one section fell on top of her head.  She noticed a bump on the back of her head, but she says it is only painful to touch.  She does endorse some mild right-sided neck pain.  She is concerned because she takes warfarin for atrial fibrillation.  Has not had any other symptoms.  She drove herself in tonight.  See history and physical exam as above  Well-appearing female, no acute distress.  She does have a small hematoma on her left posterior scalp, but no laceration or active bleeding is present.  She does have right paraspinal muscular tenderness, but no midline or bony tenderness, full range of motion of her neck.  Asked if she would like any medication for pain, she declined and said that the bump only hurts if it is touched.  We will check her INR and will send for imaging  INR is subtherapeutic at 1.78, 2 weeks ago it was 2.3.  Head and cervical spine CT results as above, show chronic changes but no acute  intracranial hemorrhage or vertebral fractures  Informed Aaliyah of the results as above.  Since she has not yet taken her dose of warfarin tonight, I encouraged her to take an increased dose of 10 mg, then continue with her regular dosing of 10 mg tomorrow (Friday), 7.5 mg on the following day, and continuing with her regular schedule.  She should follow-up with her Coumadin clinic to determine when she needs to follow-up.  He understands and agrees.  Discussed reasons to return to the ER, and she is still comfortable with plan of discharge.  Discharge from the ED in stable condition, no acute distress       I have reviewed the nursing notes.    I have reviewed the findings, diagnosis, plan and need for follow up with the patient.       Discharge Medication List as of 6/4/2020 10:12 PM          Final diagnoses:   Traumatic injury of head, initial encounter   Anticoagulated on Coumadin   Subtherapeutic international normalized ratio (INR)       6/4/2020   [facility] EMERGENCY DEPARTMENT     Daylin De Dios DO  06/05/20 0334

## 2020-06-05 NOTE — ED TRIAGE NOTES
Pt had a big chain link fence fall onto her head. Pt did not lose consciousness or there is not break to the skin on the scalp. Pt states hurts to the touch. Pt is on coumadin and wanted to get checked

## 2020-06-05 NOTE — TELEPHONE ENCOUNTER
"She is on coumadin and she had something hit her on the head. A pannel of a dog kennel hit her on the head. It left a small goose egg on the back of her head. She thinks it is under an inch, no bleeding and it didn't knock her off her feet or off balance. She finished what she was doing and took a shower without any problems before calling in. No headache, but her neck is sore. Care advice is to go to the ED to be evaluated.    Kathy Israel RN/ [facility]        Reason for Disposition    Taking Coumadin (warfarin) or other strong blood thinner, or known bleeding disorder (e.g., thrombocytopenia)    Additional Information    Negative: [1] ACUTE NEURO SYMPTOM AND [2] present now  (DEFINITION: difficult to awaken OR confused thinking and talking OR slurred speech OR weakness of arms OR unsteady walking)    Negative: Knocked out (unconscious) > 1 minute    Negative: Seizure (convulsion) occurred  (Exception: prior history of seizures and now alert and without Acute Neuro Symptoms)    Negative: Penetrating head injury (e.g., knife, gun shot wound, metal object)    Negative: [1] Major bleeding (e.g., actively dripping or spurting) AND [2] can't be stopped    Negative: [1] Dangerous mechanism of injury (e.g., MVA, diving, trampoline, contact sports, fall > 10 feet or 3 meters) AND [2] NECK pain AND [3] began < 1 hour after injury    Negative: Sounds like a life-threatening emergency to the triager    Negative: Can't remember what happened (amnesia)    Negative: Vomiting once or more    Negative: [1] Loss of vision or double vision AND [2] present now    Negative: Watery or blood-tinged fluid dripping from the NOSE or EARS now  (Exception: tears from crying or nosebleed from nasal trauma)    Negative: One or two \"black eyes\" (bruising, purple color of eyelids)    Negative: Large swelling or bruise > 2 inches (5 cm)    Negative: Skin is split open or gaping  (or length > 1/2 inch or 12 mm)    Negative: " [1] Bleeding AND [2] won't stop after 10 minutes of direct pressure (using correct technique)    Negative: Sounds like a serious injury to the triager    Negative: [1] ACUTE NEURO SYMPTOM AND [2] now fine  (DEFINITION: difficult to awaken OR confused thinking and talking OR slurred speech OR weakness of arms OR unsteady walking)    Negative: [1] Knocked out (unconscious) < 1 minute AND [2] now fine    Negative: [1] SEVERE headache AND [2] not improved 2 hours after pain medicine/ice packs    Negative: Dangerous injury (e.g., MVA, diving, trampoline, contact sports, fall > 10 feet or 3 meters) or severe blow from hard object (e.g., golf club or baseball bat)    Protocols used: HEAD INJURY-A-AH

## 2020-06-05 NOTE — TELEPHONE ENCOUNTER
Attempted to contact pt regarding INR of 1.78 from 6/4 (ER visit). VM left to call [facility] back to discuss      Per ER note, pt was advised to take 10 mg Thurs then resume normal dose.     Neelima Higgins RN

## 2020-06-15 ENCOUNTER — HOSPITAL ENCOUNTER (EMERGENCY)
Facility: CLINIC | Age: 80
Discharge: HOME | End: 2020-06-15
Attending: NURSE PRACTITIONER | Admitting: NURSE PRACTITIONER
Payer: MEDICARE

## 2020-06-15 VITALS
DIASTOLIC BLOOD PRESSURE: 85 MMHG | HEIGHT: 68 IN | OXYGEN SATURATION: 97 % | SYSTOLIC BLOOD PRESSURE: 166 MMHG | TEMPERATURE: 98.6 F | RESPIRATION RATE: 20 BRPM | BODY MASS INDEX: 20.46 KG/M2 | WEIGHT: 135 LBS

## 2020-06-15 DIAGNOSIS — N39.0 URINARY TRACT INFECTION IN FEMALE: ICD-10-CM

## 2020-06-15 LAB
ALBUMIN UR-MCNC: 30 MG/DL
APPEARANCE UR: ABNORMAL
BACTERIA #/AREA URNS HPF: ABNORMAL /HPF
BILIRUB UR QL STRIP: NEGATIVE
COLOR UR AUTO: YELLOW
GLUCOSE UR STRIP-MCNC: NEGATIVE MG/DL
HGB UR QL STRIP: ABNORMAL
KETONES UR STRIP-MCNC: NEGATIVE MG/DL
LEUKOCYTE ESTERASE UR QL STRIP: ABNORMAL
NITRATE UR QL: POSITIVE
PH UR STRIP: 6 PH (ref 5–7)
RBC #/AREA URNS AUTO: 6 /HPF (ref 0–2)
SOURCE: ABNORMAL
SP GR UR STRIP: 1.01 (ref 1–1.03)
UROBILINOGEN UR STRIP-MCNC: 0 MG/DL (ref 0–2)
WBC #/AREA URNS AUTO: >182 /HPF (ref 0–5)
WBC CLUMPS #/AREA URNS HPF: PRESENT /HPF

## 2020-06-15 PROCEDURE — 81001 URINALYSIS AUTO W/SCOPE: CPT | Performed by: NURSE PRACTITIONER

## 2020-06-15 PROCEDURE — 99283 EMERGENCY DEPT VISIT LOW MDM: CPT

## 2020-06-15 PROCEDURE — 87088 URINE BACTERIA CULTURE: CPT | Performed by: NURSE PRACTITIONER

## 2020-06-15 PROCEDURE — 25000132 ZZH RX MED GY IP 250 OP 250 PS 637: Performed by: NURSE PRACTITIONER

## 2020-06-15 PROCEDURE — 87086 URINE CULTURE/COLONY COUNT: CPT | Performed by: NURSE PRACTITIONER

## 2020-06-15 PROCEDURE — 87186 SC STD MICRODIL/AGAR DIL: CPT | Performed by: NURSE PRACTITIONER

## 2020-06-15 PROCEDURE — 99283 EMERGENCY DEPT VISIT LOW MDM: CPT | Mod: Z6 | Performed by: NURSE PRACTITIONER

## 2020-06-15 RX ORDER — CEFDINIR 300 MG/1
300 CAPSULE ORAL ONCE
Status: COMPLETED | OUTPATIENT
Start: 2020-06-15 | End: 2020-06-15

## 2020-06-15 RX ORDER — CEFDINIR 300 MG/1
300 CAPSULE ORAL 2 TIMES DAILY
Qty: 14 CAPSULE | Refills: 0 | Status: SHIPPED | OUTPATIENT
Start: 2020-06-15 | End: 2020-06-22

## 2020-06-15 RX ADMIN — CEFDINIR 300 MG: 300 CAPSULE ORAL at 20:21

## 2020-06-15 ASSESSMENT — MIFFLIN-ST. JEOR: SCORE: 1130.86

## 2020-06-15 NOTE — ED AVS SNAPSHOT
[facility] Emergency Department  [address] [place] [address]  Phone:  [phone]  Fax:  [phone]                                    Unique Ward   MRN: [id number]    Department:  [facility] Emergency Department   Date of Visit:  6/15/2020           After Visit Summary Signature Page    I have received my discharge instructions, and my questions have been answered. I have discussed any challenges I see with this plan with the nurse or doctor.    ..........................................................................................................................................  Patient/Patient Representative Signature      ..........................................................................................................................................  Patient Representative Print Name and Relationship to Patient    ..................................................               ................................................  Date                                   Time    ..........................................................................................................................................  Reviewed by Signature/Title    ...................................................              ..............................................  Date                                               Time          22EPIC Rev 08/18

## 2020-06-16 NOTE — ED PROVIDER NOTES
"  History     Chief Complaint   Patient presents with     Rule out Urinary Tract Infection     HPI  Unique Ward is a 79 year old female who resents to the emergency room today with a 5-day history of mild dysuria and urinary frequency.  Patient reports a frequent history of UTIs, patient was treated back in April with ciprofloxacin for UTI.  Patient denies any back pain, fever, chills, nausea, vomiting.  Patient reports she is drinking so much water that \"I cannot button my pants\".      Allergies:  Allergies   Allergen Reactions     No Clinical Screening - See Comments      Eye drop antibiotic.     Codeine Other (See Comments)     Comment: , Description:      Gentamicin Hives     Per Unique this medication gives hives on arms and legs. Bev Lo LSXO,  ....................  7/15/2014   1:15 PM     Influenza Vaccines Other (See Comments)     Comment: , Description:      Influenza Virus Vaccine H5n1      Paxil [Paroxetine] Other (See Comments)     Comment: , Description:      Typhoid Vaccine-Strain Ty21a Other (See Comments)     Comment: , Description:   Comment: , Description:      Typhoid Vaccines        Problem List:    Patient Active Problem List    Diagnosis Date Noted     CHB (complete heart block) (H) 03/31/2020     Priority: Medium     Added automatically from request for surgery [id number]       Pacemaker 03/31/2020     Priority: Medium     Atrial fibrillation (H) 06/07/2019     Priority: Medium     Left lumbosacral radiculopathy 05/31/2017     Priority: Medium     Acute cystitis with hematuria 05/31/2017     Priority: Medium     History of basal cell carcinoma- face and scalp 02/28/2017     Priority: Medium     FH: melanoma- son 02/28/2017     Priority: Medium     Fibromyalgia 02/28/2017     Priority: Medium     Advance Care Planning 01/06/2017     Priority: Medium     Advance Care Planning 5/4/2017: ACP Facilitation Session:    Unique Ward presented for ACP Facilitation session at a group class. " She was accompanied by daughter-in-law. Honoring Choices information provided and resources reviewed. She wishes to give additional consideration to ACP.  She currently has the following questions or concerns about Advance Care Planning: none known. Follow-up meeting: not needed/applicable. Added by Unique Uribe RN, Advance Care Planning Liaison.  Advance Care Planning 1/6/17: Info given   Saray Greer MA         Personal history of urinary tract infection 06/18/2016     Priority: Medium     Essential hypertension 03/23/2016     Priority: Medium     Vitamin D deficiency 03/30/2013     Priority: Medium     Dysthymia 04/01/2005     Priority: Medium        Past Medical History:    Past Medical History:   Diagnosis Date     Arthritis 01.01.1980     Atrial fibrillation (H) 6/7/2019     Cancer (H) 01.01.1993     scalp     Depressive disorder 01.01.1977     Hypertension 03.16.2016       Past Surgical History:    Past Surgical History:   Procedure Laterality Date     BIOPSY  11/01/2014     COLONOSCOPY  01.01/1979    Two routine,  one for chronic diarrhea     EP PACEMAKER N/A 3/31/2020    Procedure: EP Pacemaker;  Surgeon: Erich Clark MD;  Location:  [facility]ARD[facility] CATH LAB     EYE SURGERY  1/1/2014    first    cataracts both eyes.  second a few months later     GENITOURINARY SURGERY       GYN SURGERY  01.01.1977    TL     HEAD & NECK SURGERY  01.01.1997    basal cell X2   Moh's on scalp.  other on bridge of nose     SOFT TISSUE SURGERY  01.01.1990    Ganglion Cyst   Left inner wrist       Family History:    Family History   Problem Relation Age of Onset     Diabetes Maternal Grandmother      Coronary Artery Disease Sister         error     Coronary Artery Disease Brother         error     Hypertension Brother      Hyperlipidemia Brother      Cerebrovascular Disease Brother      Hypertension Sister      Hypertension Sister      Hyperlipidemia Sister      Other Cancer Sister         cervical     Breast  "Cancer Sister      Other Cancer Sister         kidney     Mental Illness Mother         paranoid/schizophrenic/suicide     Mental Illness Sister         ???  Had shock treatments       Social History:  Marital Status:   [2]  Social History     Tobacco Use     Smoking status: Former Smoker     Packs/day: 0.75     Years: 10.00     Pack years: 7.50     Types: Cigarettes     Start date: 1955     Last attempt to quit: 1965     Years since quittin.4     Smokeless tobacco: Never Used   Substance Use Topics     Alcohol use: No     Alcohol/week: 0.0 standard drinks     Drug use: No        Medications:    amLODIPine (NORVASC) 5 MG tablet  cefdinir (OMNICEF) 300 MG capsule  cholecalciferol (VITAMIN D3) 5000 units TABS tablet  magnesium 250 MG tablet  meclizine (ANTIVERT) 25 MG tablet  warfarin ANTICOAGULANT (COUMADIN ANTICOAGULANT) 5 MG PO tablet          Review of Systems   All other systems reviewed and are negative.      Physical Exam   BP: (!) 166/85  Heart Rate: 85  Temp: 98.6  F (37  C)  Resp: 20  Height: 172.7 cm (5' 8\")  Weight: 61.2 kg (135 lb)  SpO2: 97 %      Physical Exam  Constitutional:       Appearance: Normal appearance.   HENT:      Head: Normocephalic.      Mouth/Throat:      Mouth: Mucous membranes are moist.   Eyes:      Extraocular Movements: Extraocular movements intact.   Neck:      Musculoskeletal: Normal range of motion.   Cardiovascular:      Rate and Rhythm: Normal rate and regular rhythm.      Pulses: Normal pulses.   Abdominal:      General: Bowel sounds are normal.      Palpations: Abdomen is soft.   Musculoskeletal: Normal range of motion.   Skin:     General: Skin is warm and dry.      Capillary Refill: Capillary refill takes less than 2 seconds.   Neurological:      General: No focal deficit present.      Mental Status: She is alert.   Psychiatric:         Mood and Affect: Mood normal.         ED Course        Procedures      Results for orders placed or performed during the " hospital encounter of 06/15/20 (from the past 24 hour(s))   UA with Microscopic   Result Value Ref Range    Color Urine Yellow     Appearance Urine Cloudy     Glucose Urine Negative NEG^Negative mg/dL    Bilirubin Urine Negative NEG^Negative    Ketones Urine Negative NEG^Negative mg/dL    Specific Gravity Urine 1.012 1.003 - 1.035    Blood Urine Moderate (A) NEG^Negative    pH Urine 6.0 5.0 - 7.0 pH    Protein Albumin Urine 30 (A) NEG^Negative mg/dL    Urobilinogen mg/dL 0.0 0.0 - 2.0 mg/dL    Nitrite Urine Positive (A) NEG^Negative    Leukocyte Esterase Urine Large (A) NEG^Negative    Source Midstream Urine     WBC Urine >182 (H) 0 - 5 /HPF    RBC Urine 6 (H) 0 - 2 /HPF    WBC Clumps Present (A) NEG^Negative /HPF    Bacteria Urine Many (A) NEG^Negative /HPF       Medications   cefdinir (OMNICEF) capsule 300 mg (has no administration in time range)       Assessments & Plan (with Medical Decision Making)  UTI in elderly female  Patient was treated with ciprofloxacin in April, she did have sensitivity to cefepime, given her warfarin use I am going to treat her with Omnicef, she was given a dose here tonight and requested that the remaining prescription be sent to [facility] which she will  tomorrow morning.  Ongoing hydration encouraged, reasons to return to the emergency room discussed in detail which I have a low threshold for given patient's age, patient is agreeable to plan of care and discharged in stable condition.     I have reviewed the nursing notes.    I have reviewed the findings, diagnosis, plan and need for follow up with the patient.    New Prescriptions    CEFDINIR (OMNICEF) 300 MG CAPSULE    Take 1 capsule (300 mg) by mouth 2 times daily for 7 days       Final diagnoses:   Urinary tract infection in female       6/15/2020   [facility] EMERGENCY DEPARTMENT     Valarie Long, MAYRA LINDO  06/15/20 2022

## 2020-06-16 NOTE — RESULT ENCOUNTER NOTE
Emergency Dept/Urgent Care discharge antibiotic (if prescribed): Cefdinir (Omnicef) 300 mg capsule, 1 capsule (300 mg) by mouth 2 times daily for 10 days  Date of Rx (if applicable):  6/15/20  No changes in treatment per Urine culture protocol.

## 2020-06-17 LAB
BACTERIA SPEC CULT: ABNORMAL
Lab: ABNORMAL
SPECIMEN SOURCE: ABNORMAL

## 2020-06-18 ENCOUNTER — ANTICOAGULATION THERAPY VISIT (OUTPATIENT)
Dept: ANTICOAGULATION | Facility: OTHER | Age: 80
End: 2020-06-18
Payer: COMMERCIAL

## 2020-06-18 ENCOUNTER — MYC MEDICAL ADVICE (OUTPATIENT)
Dept: FAMILY MEDICINE | Facility: OTHER | Age: 80
End: 2020-06-18

## 2020-06-18 DIAGNOSIS — I48.91 ATRIAL FIBRILLATION, UNSPECIFIED TYPE (H): ICD-10-CM

## 2020-06-18 DIAGNOSIS — Z79.01 LONG TERM CURRENT USE OF ANTICOAGULANT THERAPY: ICD-10-CM

## 2020-06-18 LAB
CAPILLARY BLOOD COLLECTION: NORMAL
INR BLD: 1.7 (ref 0.86–1.14)

## 2020-06-18 PROCEDURE — 99207 ZZC NO CHARGE NURSE ONLY: CPT | Performed by: FAMILY MEDICINE

## 2020-06-18 PROCEDURE — 85610 PROTHROMBIN TIME: CPT | Mod: QW | Performed by: FAMILY MEDICINE

## 2020-06-18 PROCEDURE — 36416 COLLJ CAPILLARY BLOOD SPEC: CPT | Performed by: FAMILY MEDICINE

## 2020-06-18 NOTE — RESULT ENCOUNTER NOTE
Final Urine Culture Report on 6/17/20  Emergency Dept/Urgent Care discharge antibiotic prescribed: Cefdinir (Omnicef) 300 mg capsule, 1 capsule (300 mg) by mouth 2 times daily for 10 days   #1. Bacteria, >100,000 colonies/mL Escherichia coli, is SUSCEPTIBLE to Antibiotic.    As per [place] ED Lab Result protocol, no change in antibiotic therapy.

## 2020-06-18 NOTE — TELEPHONE ENCOUNTER
Appt scheduled today at 2:15. Patient verbalized understanding and agrees with plan of care. Pt had no further questions or concerns at this time. Veronika Campbell RN, BSN

## 2020-06-18 NOTE — PROGRESS NOTES
ANTICOAGULATION FOLLOW-UP CLINIC VISIT    Patient Name:  Unique Ward  Date:  2020  Contact Type:  Telephone    SUBJECTIVE:  Patient Findings     Positives:   Change in medications (Pt is on an antibiotic for a UTI. We will leave her coumadin dose the same and recheck on  as previously planned. )             OBJECTIVE    Recent labs: (last 7 days)     20  1428   INR 1.7*       ASSESSMENT / PLAN  INR assessment SUB    Recheck INR In: 10 DAYS    INR Location Outside lab      Anticoagulation Summary  As of 2020    INR goal:   2.0-3.0   TTR:   82.5 % (1 y)   INR used for dosin.7! (2020)   Warfarin maintenance plan:   10 mg (5 mg x 2) every Mon, Fri; 7.5 mg (5 mg x 1.5) all other days   Full warfarin instructions:   10 mg every Mon, Fri; 7.5 mg all other days   Weekly warfarin total:   57.5 mg   No change documented:   Veronika Campbell RN   Plan last modified:   Veronika Campbell RN (2020)   Next INR check:   2020   Priority:   Maintenance   Target end date:       Indications    Atrial fibrillation (H) [I48.91]             Anticoagulation Episode Summary     INR check location:       Preferred lab:       Send INR reminders to:   [facility]    Comments:   5 mg tabs, PM dose, print out      Anticoagulation Care Providers     Provider Role Specialty Phone number    Tyelr Lee MD [facility] [phone]            See the Encounter Report to view Anticoagulation Flowsheet and Dosing Calendar (Go to Encounters tab in chart review, and find the Anticoagulation Therapy Visit)    Dosage adjustment made based on physician directed care plan.    Veronika Campbell RN

## 2020-06-30 ENCOUNTER — TELEPHONE (OUTPATIENT)
Dept: ANTICOAGULATION | Facility: CLINIC | Age: 80
End: 2020-06-30

## 2020-06-30 ENCOUNTER — ANTICOAGULATION THERAPY VISIT (OUTPATIENT)
Dept: ANTICOAGULATION | Facility: CLINIC | Age: 80
End: 2020-06-30
Payer: COMMERCIAL

## 2020-06-30 DIAGNOSIS — Z79.01 LONG TERM CURRENT USE OF ANTICOAGULANT THERAPY: ICD-10-CM

## 2020-06-30 DIAGNOSIS — I48.91 ATRIAL FIBRILLATION, UNSPECIFIED TYPE (H): ICD-10-CM

## 2020-06-30 DIAGNOSIS — I48.91 ATRIAL FIBRILLATION, UNSPECIFIED TYPE (H): Primary | ICD-10-CM

## 2020-06-30 LAB
CAPILLARY BLOOD COLLECTION: NORMAL
INR BLD: 2 (ref 0.86–1.14)

## 2020-06-30 PROCEDURE — 36416 COLLJ CAPILLARY BLOOD SPEC: CPT | Performed by: FAMILY MEDICINE

## 2020-06-30 PROCEDURE — 85610 PROTHROMBIN TIME: CPT | Mod: QW | Performed by: FAMILY MEDICINE

## 2020-06-30 PROCEDURE — 99207 ZZC NO CHARGE NURSE ONLY: CPT | Performed by: FAMILY MEDICINE

## 2020-06-30 NOTE — TELEPHONE ENCOUNTER
Please review and renew this patients INR referral, orders pending. Thank you!      Neelima Higgins RN

## 2020-06-30 NOTE — PROGRESS NOTES
ANTICOAGULATION FOLLOW-UP CLINIC VISIT    Patient Name:  Unique Ward  Date:  2020  Contact Type:  Telephone    SUBJECTIVE:  Patient Findings     Comments:   The patient was assessed for diet, medication, and activity level changes, missed or extra doses, bruising or bleeding, with no problem findings.  Neelima Higgins RN          Clinical Outcomes     Comments:   The patient was assessed for diet, medication, and activity level changes, missed or extra doses, bruising or bleeding, with no problem findings.  Neelima Higgins RN             OBJECTIVE    Recent labs: (last 7 days)     20  0942   INR 2.0*       ASSESSMENT / PLAN  INR assessment THER    Recheck INR In: 4 WEEKS    INR Location Outside lab      Anticoagulation Summary  As of 2020    INR goal:   2.0-3.0   TTR:   79.3 % (1 y)   INR used for dosin.0 (2020)   Warfarin maintenance plan:   10 mg (5 mg x 2) every Mon, Fri; 7.5 mg (5 mg x 1.5) all other days   Full warfarin instructions:   10 mg every Mon, Fri; 7.5 mg all other days   Weekly warfarin total:   57.5 mg   No change documented:   Neelima Higgins RN   Plan last modified:   Veronika Campbell RN (2020)   Next INR check:   2020   Priority:   Maintenance   Target end date:       Indications    Atrial fibrillation (H) [I48.91]             Anticoagulation Episode Summary     INR check location:       Preferred lab:       Send INR reminders to:   [facility]    Comments:   5 mg tabs, PM dose, print out      Anticoagulation Care Providers     Provider Role Specialty Phone number    Tyler Lee MD [facility] [phone]            See the Encounter Report to view Anticoagulation Flowsheet and Dosing Calendar (Go to Encounters tab in chart review, and find the Anticoagulation Therapy Visit)    Dosage adjustment made based on physician directed care plan.      Neelima Higgins RN

## 2020-07-14 ENCOUNTER — MYC MEDICAL ADVICE (OUTPATIENT)
Dept: FAMILY MEDICINE | Facility: OTHER | Age: 80
End: 2020-07-14

## 2020-07-28 ENCOUNTER — ANTICOAGULATION THERAPY VISIT (OUTPATIENT)
Dept: ANTICOAGULATION | Facility: CLINIC | Age: 80
End: 2020-07-28
Payer: COMMERCIAL

## 2020-07-28 DIAGNOSIS — Z79.01 LONG TERM CURRENT USE OF ANTICOAGULANT THERAPY: ICD-10-CM

## 2020-07-28 DIAGNOSIS — I48.91 ATRIAL FIBRILLATION, UNSPECIFIED TYPE (H): ICD-10-CM

## 2020-07-28 LAB
CAPILLARY BLOOD COLLECTION: NORMAL
INR BLD: 2 (ref 0.86–1.14)

## 2020-07-28 PROCEDURE — 85610 PROTHROMBIN TIME: CPT | Mod: QW | Performed by: FAMILY MEDICINE

## 2020-07-28 PROCEDURE — 36416 COLLJ CAPILLARY BLOOD SPEC: CPT | Performed by: FAMILY MEDICINE

## 2020-07-28 PROCEDURE — 99207 ZZC NO CHARGE NURSE ONLY: CPT | Performed by: FAMILY MEDICINE

## 2020-07-28 NOTE — PROGRESS NOTES
ANTICOAGULATION FOLLOW-UP CLINIC VISIT    Patient Name:  Unique Ward  Date:  2020  Contact Type:  Telephone    SUBJECTIVE:  Patient Findings     Comments:   The patient was assessed for diet, medication, and activity level changes, missed or extra doses, bruising or bleeding, with no problem findings.  Neelima Higgins RN          Clinical Outcomes     Negatives:   Major bleeding event, Thromboembolic event, Anticoagulation-related hospital admission, Anticoagulation-related ED visit, Anticoagulation-related fatality    Comments:   The patient was assessed for diet, medication, and activity level changes, missed or extra doses, bruising or bleeding, with no problem findings.  Neelima Higgins RN             OBJECTIVE    Recent labs: (last 7 days)     20  0946   INR 2.0*       ASSESSMENT / PLAN  INR assessment THER    Recheck INR In: 6 WEEKS    INR Location Outside lab      Anticoagulation Summary  As of 2020    INR goal:   2.0-3.0   TTR:   79.3 % (1 y)   Prior goal:   2.0-3.0   INR used for dosin.0 (2020)   Warfarin maintenance plan:   10 mg (5 mg x 2) every Mon, Fri; 7.5 mg (5 mg x 1.5) all other days   Full warfarin instructions:   10 mg every Mon, Fri; 7.5 mg all other days   Weekly warfarin total:   57.5 mg   No change documented:   Neelima Higgins RN   Plan last modified:   Veronika Campbell RN (2020)   Next INR check:   2020   Priority:   Maintenance   Target end date:   Indefinite    Indications    Atrial fibrillation (H) [I48.91]  Long term current use of anticoagulant therapy [Z79.01]  Atrial fibrillation  unspecified type (H) [I48.91]             Anticoagulation Episode Summary     INR check location:       Preferred lab:       Send INR reminders to:   [facility] [place]    Comments:   5 mg tabs, PM dose, print out      Anticoagulation Care Providers     Provider Role Specialty Phone number    Tyler Lee MD [facility] [phone]             See the Encounter Report to view Anticoagulation Flowsheet and Dosing Calendar (Go to Encounters tab in chart review, and find the Anticoagulation Therapy Visit)    Dosage adjustment made based on physician directed care plan.      Neelima Higgins RN

## 2020-08-20 ENCOUNTER — ANCILLARY PROCEDURE (OUTPATIENT)
Dept: CARDIOLOGY | Facility: CLINIC | Age: 80
End: 2020-08-20
Attending: INTERNAL MEDICINE
Payer: COMMERCIAL

## 2020-08-20 DIAGNOSIS — I44.2 CHB (COMPLETE HEART BLOCK) (H): Primary | ICD-10-CM

## 2020-08-20 DIAGNOSIS — Z95.0 PACEMAKER: ICD-10-CM

## 2020-08-20 PROCEDURE — 93296 REM INTERROG EVL PM/IDS: CPT | Performed by: INTERNAL MEDICINE

## 2020-08-20 PROCEDURE — 93294 REM INTERROG EVL PM/LDLS PM: CPT | Performed by: INTERNAL MEDICINE

## 2020-08-31 DIAGNOSIS — I48.0 PAROXYSMAL ATRIAL FIBRILLATION (H): ICD-10-CM

## 2020-08-31 RX ORDER — WARFARIN SODIUM 5 MG/1
TABLET ORAL
Qty: 145 TABLET | Refills: 1 | Status: SHIPPED | OUTPATIENT
Start: 2020-08-31 | End: 2021-03-03

## 2020-09-04 LAB
MDC_IDC_LEAD_IMPLANT_DT: NORMAL
MDC_IDC_LEAD_IMPLANT_DT: NORMAL
MDC_IDC_LEAD_LOCATION: NORMAL
MDC_IDC_LEAD_LOCATION: NORMAL
MDC_IDC_LEAD_LOCATION_DETAIL_1: NORMAL
MDC_IDC_LEAD_LOCATION_DETAIL_1: NORMAL
MDC_IDC_LEAD_MFG: NORMAL
MDC_IDC_LEAD_MFG: NORMAL
MDC_IDC_LEAD_MODEL: NORMAL
MDC_IDC_LEAD_MODEL: NORMAL
MDC_IDC_LEAD_POLARITY_TYPE: NORMAL
MDC_IDC_LEAD_POLARITY_TYPE: NORMAL
MDC_IDC_LEAD_SERIAL: NORMAL
MDC_IDC_LEAD_SERIAL: NORMAL
MDC_IDC_MSMT_BATTERY_DTM: NORMAL
MDC_IDC_MSMT_BATTERY_REMAINING_LONGEVITY: 121 MO
MDC_IDC_MSMT_BATTERY_RRT_TRIGGER: 2.62
MDC_IDC_MSMT_BATTERY_STATUS: NORMAL
MDC_IDC_MSMT_BATTERY_VOLTAGE: 3.08 V
MDC_IDC_MSMT_LEADCHNL_RA_IMPEDANCE_VALUE: 285 OHM
MDC_IDC_MSMT_LEADCHNL_RA_IMPEDANCE_VALUE: 342 OHM
MDC_IDC_MSMT_LEADCHNL_RA_PACING_THRESHOLD_AMPLITUDE: 1 V
MDC_IDC_MSMT_LEADCHNL_RA_PACING_THRESHOLD_PULSEWIDTH: 0.4 MS
MDC_IDC_MSMT_LEADCHNL_RA_SENSING_INTR_AMPL: 0.88 MV
MDC_IDC_MSMT_LEADCHNL_RA_SENSING_INTR_AMPL: 0.88 MV
MDC_IDC_MSMT_LEADCHNL_RV_IMPEDANCE_VALUE: 399 OHM
MDC_IDC_MSMT_LEADCHNL_RV_IMPEDANCE_VALUE: 475 OHM
MDC_IDC_MSMT_LEADCHNL_RV_PACING_THRESHOLD_AMPLITUDE: 0.75 V
MDC_IDC_MSMT_LEADCHNL_RV_PACING_THRESHOLD_PULSEWIDTH: 0.4 MS
MDC_IDC_MSMT_LEADCHNL_RV_SENSING_INTR_AMPL: 5.38 MV
MDC_IDC_MSMT_LEADCHNL_RV_SENSING_INTR_AMPL: 5.38 MV
MDC_IDC_PG_IMPLANT_DTM: NORMAL
MDC_IDC_PG_MFG: NORMAL
MDC_IDC_PG_MODEL: NORMAL
MDC_IDC_PG_SERIAL: NORMAL
MDC_IDC_PG_TYPE: NORMAL
MDC_IDC_SESS_CLINIC_NAME: NORMAL
MDC_IDC_SESS_DTM: NORMAL
MDC_IDC_SESS_TYPE: NORMAL
MDC_IDC_SET_BRADY_AT_MODE_SWITCH_RATE: 171 {BEATS}/MIN
MDC_IDC_SET_BRADY_HYSTRATE: NORMAL
MDC_IDC_SET_BRADY_LOWRATE: 60 {BEATS}/MIN
MDC_IDC_SET_BRADY_MAX_SENSOR_RATE: 130 {BEATS}/MIN
MDC_IDC_SET_BRADY_MAX_TRACKING_RATE: 130 {BEATS}/MIN
MDC_IDC_SET_BRADY_MODE: NORMAL
MDC_IDC_SET_BRADY_PAV_DELAY_LOW: 180 MS
MDC_IDC_SET_BRADY_SAV_DELAY_LOW: 150 MS
MDC_IDC_SET_LEADCHNL_RA_PACING_AMPLITUDE: 2.5 V
MDC_IDC_SET_LEADCHNL_RA_PACING_ANODE_ELECTRODE_1: NORMAL
MDC_IDC_SET_LEADCHNL_RA_PACING_ANODE_LOCATION_1: NORMAL
MDC_IDC_SET_LEADCHNL_RA_PACING_CAPTURE_MODE: NORMAL
MDC_IDC_SET_LEADCHNL_RA_PACING_CATHODE_ELECTRODE_1: NORMAL
MDC_IDC_SET_LEADCHNL_RA_PACING_CATHODE_LOCATION_1: NORMAL
MDC_IDC_SET_LEADCHNL_RA_PACING_POLARITY: NORMAL
MDC_IDC_SET_LEADCHNL_RA_PACING_PULSEWIDTH: 0.4 MS
MDC_IDC_SET_LEADCHNL_RA_SENSING_ANODE_ELECTRODE_1: NORMAL
MDC_IDC_SET_LEADCHNL_RA_SENSING_ANODE_LOCATION_1: NORMAL
MDC_IDC_SET_LEADCHNL_RA_SENSING_CATHODE_ELECTRODE_1: NORMAL
MDC_IDC_SET_LEADCHNL_RA_SENSING_CATHODE_LOCATION_1: NORMAL
MDC_IDC_SET_LEADCHNL_RA_SENSING_POLARITY: NORMAL
MDC_IDC_SET_LEADCHNL_RA_SENSING_SENSITIVITY: 0.3 MV
MDC_IDC_SET_LEADCHNL_RV_PACING_AMPLITUDE: 2 V
MDC_IDC_SET_LEADCHNL_RV_PACING_ANODE_ELECTRODE_1: NORMAL
MDC_IDC_SET_LEADCHNL_RV_PACING_ANODE_LOCATION_1: NORMAL
MDC_IDC_SET_LEADCHNL_RV_PACING_CAPTURE_MODE: NORMAL
MDC_IDC_SET_LEADCHNL_RV_PACING_CATHODE_ELECTRODE_1: NORMAL
MDC_IDC_SET_LEADCHNL_RV_PACING_CATHODE_LOCATION_1: NORMAL
MDC_IDC_SET_LEADCHNL_RV_PACING_POLARITY: NORMAL
MDC_IDC_SET_LEADCHNL_RV_PACING_PULSEWIDTH: 0.4 MS
MDC_IDC_SET_LEADCHNL_RV_SENSING_ANODE_ELECTRODE_1: NORMAL
MDC_IDC_SET_LEADCHNL_RV_SENSING_ANODE_LOCATION_1: NORMAL
MDC_IDC_SET_LEADCHNL_RV_SENSING_CATHODE_ELECTRODE_1: NORMAL
MDC_IDC_SET_LEADCHNL_RV_SENSING_CATHODE_LOCATION_1: NORMAL
MDC_IDC_SET_LEADCHNL_RV_SENSING_POLARITY: NORMAL
MDC_IDC_SET_LEADCHNL_RV_SENSING_SENSITIVITY: 0.9 MV
MDC_IDC_SET_ZONE_DETECTION_INTERVAL: 350 MS
MDC_IDC_SET_ZONE_DETECTION_INTERVAL: 400 MS
MDC_IDC_SET_ZONE_TYPE: NORMAL
MDC_IDC_STAT_BRADY_AP_VP_PERCENT: 66.98 %
MDC_IDC_STAT_BRADY_AP_VS_PERCENT: 0.94 %
MDC_IDC_STAT_BRADY_AS_VP_PERCENT: 29.19 %
MDC_IDC_STAT_BRADY_AS_VS_PERCENT: 2.89 %
MDC_IDC_STAT_BRADY_DTM_END: NORMAL
MDC_IDC_STAT_BRADY_DTM_START: NORMAL
MDC_IDC_STAT_BRADY_RA_PERCENT_PACED: 67.32 %
MDC_IDC_STAT_BRADY_RV_PERCENT_PACED: 96.16 %
MDC_IDC_STAT_EPISODE_RECENT_COUNT: 0
MDC_IDC_STAT_EPISODE_RECENT_COUNT_DTM_END: NORMAL
MDC_IDC_STAT_EPISODE_RECENT_COUNT_DTM_START: NORMAL
MDC_IDC_STAT_EPISODE_TOTAL_COUNT: 0
MDC_IDC_STAT_EPISODE_TOTAL_COUNT: 0
MDC_IDC_STAT_EPISODE_TOTAL_COUNT: 10
MDC_IDC_STAT_EPISODE_TOTAL_COUNT_DTM_END: NORMAL
MDC_IDC_STAT_EPISODE_TOTAL_COUNT_DTM_START: NORMAL
MDC_IDC_STAT_EPISODE_TYPE: NORMAL

## 2020-09-08 ENCOUNTER — ANTICOAGULATION THERAPY VISIT (OUTPATIENT)
Dept: ANTICOAGULATION | Facility: CLINIC | Age: 80
End: 2020-09-08
Payer: COMMERCIAL

## 2020-09-08 DIAGNOSIS — I48.91 ATRIAL FIBRILLATION, UNSPECIFIED TYPE (H): ICD-10-CM

## 2020-09-08 DIAGNOSIS — Z79.01 LONG TERM CURRENT USE OF ANTICOAGULANT THERAPY: ICD-10-CM

## 2020-09-08 LAB
CAPILLARY BLOOD COLLECTION: NORMAL
INR BLD: 2.7 (ref 0.86–1.14)

## 2020-09-08 PROCEDURE — 99207 ZZC NO CHARGE NURSE ONLY: CPT | Performed by: FAMILY MEDICINE

## 2020-09-08 PROCEDURE — 36416 COLLJ CAPILLARY BLOOD SPEC: CPT | Performed by: FAMILY MEDICINE

## 2020-09-08 PROCEDURE — 85610 PROTHROMBIN TIME: CPT | Mod: QW | Performed by: FAMILY MEDICINE

## 2020-09-08 NOTE — PROGRESS NOTES
ANTICOAGULATION FOLLOW-UP CLINIC VISIT    Patient Name:  Unique Ward  Date:  2020  Contact Type:  Telephone    SUBJECTIVE:  Patient Findings     Comments:   Left a detailed message on VM with this information, pt is advised to call back if pt has any questions, missed doses or concerns such as symptoms of bleeding, clotting, infection, change in diet or other.          Clinical Outcomes     Comments:   Left a detailed message on VM with this information, pt is advised to call back if pt has any questions, missed doses or concerns such as symptoms of bleeding, clotting, infection, change in diet or other.             OBJECTIVE    Recent labs: (last 7 days)     20  1033   INR 2.7*       ASSESSMENT / PLAN  INR assessment THER    Recheck INR In: 6 WEEKS    INR Location Outside lab      Anticoagulation Summary  As of 2020    INR goal:   2.0-3.0   TTR:   79.3 % (1 y)   INR used for dosin.7 (2020)   Warfarin maintenance plan:   10 mg (5 mg x 2) every Mon, Fri; 7.5 mg (5 mg x 1.5) all other days   Full warfarin instructions:   10 mg every Mon, Fri; 7.5 mg all other days   Weekly warfarin total:   57.5 mg   No change documented:   Oxana Smith, RN   Plan last modified:   Veronika Campbell RN (2020)   Next INR check:   10/20/2020   Priority:   Maintenance   Target end date:   Indefinite    Indications    Atrial fibrillation (H) [I48.91]  Long term current use of anticoagulant therapy [Z79.01]  Atrial fibrillation  unspecified type (H) [I48.91]             Anticoagulation Episode Summary     INR check location:       Preferred lab:       Send INR reminders to:   [facility] [place]    Comments:   5 mg tabs, PM dose, print out      Anticoagulation Care Providers     Provider Role Specialty Phone number    Tyler Lee MD Referring [facility] [phone]            See the Encounter Report to view Anticoagulation Flowsheet and Dosing Calendar (Go to Encounters tab in chart review,  and find the Anticoagulation Therapy Visit)    Dosage adjustment made based on physician directed care plan.    Oxana Smith RN

## 2020-09-09 ENCOUNTER — HOSPITAL ENCOUNTER (OUTPATIENT)
Dept: CARDIOLOGY | Facility: CLINIC | Age: 80
Discharge: HOME | End: 2020-09-09
Attending: NURSE PRACTITIONER | Admitting: NURSE PRACTITIONER
Payer: MEDICARE

## 2020-09-09 DIAGNOSIS — I36.1 NON-RHEUMATIC TRICUSPID VALVE INSUFFICIENCY: ICD-10-CM

## 2020-09-09 DIAGNOSIS — I48.91 ATRIAL FIBRILLATION, UNSPECIFIED TYPE (H): ICD-10-CM

## 2020-09-09 PROCEDURE — 93306 TTE W/DOPPLER COMPLETE: CPT | Mod: 26 | Performed by: INTERNAL MEDICINE

## 2020-09-09 PROCEDURE — 93306 TTE W/DOPPLER COMPLETE: CPT

## 2020-09-11 ENCOUNTER — APPOINTMENT (OUTPATIENT)
Dept: GENERAL RADIOLOGY | Facility: CLINIC | Age: 80
End: 2020-09-11
Attending: PHYSICIAN ASSISTANT
Payer: MEDICARE

## 2020-09-11 ENCOUNTER — HOSPITAL ENCOUNTER (EMERGENCY)
Facility: CLINIC | Age: 80
Discharge: HOME | End: 2020-09-12
Attending: PHYSICIAN ASSISTANT | Admitting: PHYSICIAN ASSISTANT
Payer: MEDICARE

## 2020-09-11 DIAGNOSIS — R42 LIGHTHEADEDNESS: ICD-10-CM

## 2020-09-11 DIAGNOSIS — H61.23 BILATERAL IMPACTED CERUMEN: ICD-10-CM

## 2020-09-11 DIAGNOSIS — N39.0 URINARY TRACT INFECTION: ICD-10-CM

## 2020-09-11 LAB
ALBUMIN SERPL-MCNC: 3.9 G/DL (ref 3.4–5)
ALBUMIN UR-MCNC: NEGATIVE MG/DL
ALP SERPL-CCNC: 41 U/L (ref 40–150)
ALT SERPL W P-5'-P-CCNC: 29 U/L (ref 0–50)
ANION GAP SERPL CALCULATED.3IONS-SCNC: 7 MMOL/L (ref 3–14)
APPEARANCE UR: CLEAR
AST SERPL W P-5'-P-CCNC: 48 U/L (ref 0–45)
BASOPHILS # BLD AUTO: 0.1 10E9/L (ref 0–0.2)
BASOPHILS NFR BLD AUTO: 0.9 %
BILIRUB SERPL-MCNC: 0.4 MG/DL (ref 0.2–1.3)
BILIRUB UR QL STRIP: NEGATIVE
BUN SERPL-MCNC: 20 MG/DL (ref 7–30)
CALCIUM SERPL-MCNC: 9.2 MG/DL (ref 8.5–10.1)
CHLORIDE SERPL-SCNC: 93 MMOL/L (ref 94–109)
CO2 SERPL-SCNC: 27 MMOL/L (ref 20–32)
COLOR UR AUTO: ABNORMAL
CREAT SERPL-MCNC: 0.78 MG/DL (ref 0.52–1.04)
DIFFERENTIAL METHOD BLD: NORMAL
EOSINOPHIL NFR BLD AUTO: 1.1 %
ERYTHROCYTE [DISTWIDTH] IN BLOOD BY AUTOMATED COUNT: 13.5 % (ref 10–15)
GFR SERPL CREATININE-BSD FRML MDRD: 71 ML/MIN/{1.73_M2}
GLUCOSE SERPL-MCNC: 114 MG/DL (ref 70–99)
GLUCOSE UR STRIP-MCNC: NEGATIVE MG/DL
HCT VFR BLD AUTO: 35 % (ref 35–47)
HGB BLD-MCNC: 11.8 G/DL (ref 11.7–15.7)
HGB UR QL STRIP: NEGATIVE
IMM GRANULOCYTES # BLD: 0 10E9/L (ref 0–0.4)
IMM GRANULOCYTES NFR BLD: 0.3 %
KETONES UR STRIP-MCNC: 5 MG/DL
LEUKOCYTE ESTERASE UR QL STRIP: ABNORMAL
LYMPHOCYTES # BLD AUTO: 1.4 10E9/L (ref 0.8–5.3)
LYMPHOCYTES NFR BLD AUTO: 22.2 %
MCH RBC QN AUTO: 29.8 PG (ref 26.5–33)
MCHC RBC AUTO-ENTMCNC: 33.7 G/DL (ref 31.5–36.5)
MCV RBC AUTO: 88 FL (ref 78–100)
MONOCYTES # BLD AUTO: 0.6 10E9/L (ref 0–1.3)
MONOCYTES NFR BLD AUTO: 8.8 %
MUCOUS THREADS #/AREA URNS LPF: PRESENT /LPF
NEUTROPHILS # BLD AUTO: 4.2 10E9/L (ref 1.6–8.3)
NEUTROPHILS NFR BLD AUTO: 66.7 %
NITRATE UR QL: NEGATIVE
NRBC # BLD AUTO: 0 10*3/UL
NRBC BLD AUTO-RTO: 0 /100
NT-PROBNP SERPL-MCNC: 293 PG/ML (ref 0–1800)
PH UR STRIP: 7 PH (ref 5–7)
PLATELET # BLD AUTO: 277 10E9/L (ref 150–450)
POTASSIUM SERPL-SCNC: 3.8 MMOL/L (ref 3.4–5.3)
PROT SERPL-MCNC: 7.5 G/DL (ref 6.8–8.8)
RBC # BLD AUTO: 3.96 10E12/L (ref 3.8–5.2)
RBC #/AREA URNS AUTO: 2 /HPF (ref 0–2)
SODIUM SERPL-SCNC: 127 MMOL/L (ref 133–144)
SOURCE: ABNORMAL
SP GR UR STRIP: 1.01 (ref 1–1.03)
TROPONIN I SERPL-MCNC: <0.015 UG/L (ref 0–0.04)
TSH SERPL DL<=0.005 MIU/L-ACNC: 2.37 MU/L (ref 0.4–4)
UROBILINOGEN UR STRIP-MCNC: 0 MG/DL (ref 0–2)
WBC # BLD AUTO: 6.4 10E9/L (ref 4–11)
WBC #/AREA URNS AUTO: 15 /HPF (ref 0–5)

## 2020-09-11 PROCEDURE — 99285 EMERGENCY DEPT VISIT HI MDM: CPT | Mod: 25 | Performed by: PHYSICIAN ASSISTANT

## 2020-09-11 PROCEDURE — 85025 COMPLETE CBC W/AUTO DIFF WBC: CPT | Performed by: PHYSICIAN ASSISTANT

## 2020-09-11 PROCEDURE — 93005 ELECTROCARDIOGRAM TRACING: CPT | Performed by: PHYSICIAN ASSISTANT

## 2020-09-11 PROCEDURE — 84443 ASSAY THYROID STIM HORMONE: CPT | Performed by: PHYSICIAN ASSISTANT

## 2020-09-11 PROCEDURE — 81001 URINALYSIS AUTO W/SCOPE: CPT | Performed by: PHYSICIAN ASSISTANT

## 2020-09-11 PROCEDURE — 83880 ASSAY OF NATRIURETIC PEPTIDE: CPT | Performed by: PHYSICIAN ASSISTANT

## 2020-09-11 PROCEDURE — 87086 URINE CULTURE/COLONY COUNT: CPT | Performed by: PHYSICIAN ASSISTANT

## 2020-09-11 PROCEDURE — 69210 REMOVE IMPACTED EAR WAX UNI: CPT | Mod: Z6 | Performed by: PHYSICIAN ASSISTANT

## 2020-09-11 PROCEDURE — 84484 ASSAY OF TROPONIN QUANT: CPT | Performed by: PHYSICIAN ASSISTANT

## 2020-09-11 PROCEDURE — 69210 REMOVE IMPACTED EAR WAX UNI: CPT | Performed by: PHYSICIAN ASSISTANT

## 2020-09-11 PROCEDURE — 96365 THER/PROPH/DIAG IV INF INIT: CPT | Mod: 59 | Performed by: PHYSICIAN ASSISTANT

## 2020-09-11 PROCEDURE — 25000128 H RX IP 250 OP 636: Performed by: PHYSICIAN ASSISTANT

## 2020-09-11 PROCEDURE — 80053 COMPREHEN METABOLIC PANEL: CPT | Performed by: PHYSICIAN ASSISTANT

## 2020-09-11 PROCEDURE — 93010 ELECTROCARDIOGRAM REPORT: CPT | Mod: Z6 | Performed by: PHYSICIAN ASSISTANT

## 2020-09-11 PROCEDURE — 71045 X-RAY EXAM CHEST 1 VIEW: CPT | Mod: TC

## 2020-09-11 RX ORDER — CEFTRIAXONE 2 G/1
2 INJECTION, POWDER, FOR SOLUTION INTRAMUSCULAR; INTRAVENOUS ONCE
Status: COMPLETED | OUTPATIENT
Start: 2020-09-11 | End: 2020-09-11

## 2020-09-11 RX ADMIN — CEFTRIAXONE SODIUM 2 G: 2 INJECTION, POWDER, FOR SOLUTION INTRAMUSCULAR; INTRAVENOUS at 23:25

## 2020-09-11 NOTE — ED AVS SNAPSHOT
[facility] Emergency Department  [address] [place] [address]  Phone:  [phone]  Fax:  [phone]                                    Unique Ward   MRN: [id number]    Department:  [facility] Emergency Department   Date of Visit:  9/11/2020           After Visit Summary Signature Page    I have received my discharge instructions, and my questions have been answered. I have discussed any challenges I see with this plan with the nurse or doctor.    ..........................................................................................................................................  Patient/Patient Representative Signature      ..........................................................................................................................................  Patient Representative Print Name and Relationship to Patient    ..................................................               ................................................  Date                                   Time    ..........................................................................................................................................  Reviewed by Signature/Title    ...................................................              ..............................................  Date                                               Time          22EPIC Rev 08/18

## 2020-09-12 VITALS
RESPIRATION RATE: 13 BRPM | HEART RATE: 61 BPM | TEMPERATURE: 97.3 F | SYSTOLIC BLOOD PRESSURE: 183 MMHG | OXYGEN SATURATION: 96 % | DIASTOLIC BLOOD PRESSURE: 93 MMHG

## 2020-09-12 RX ORDER — SULFAMETHOXAZOLE/TRIMETHOPRIM 800-160 MG
1 TABLET ORAL 2 TIMES DAILY
Qty: 6 TABLET | Refills: 0 | Status: SHIPPED | OUTPATIENT
Start: 2020-09-11 | End: 2020-09-14

## 2020-09-12 NOTE — DISCHARGE INSTRUCTIONS
It was a pleasure working with you today!  I hope your condition improves rapidly!     You were treated for a bladder infection today.  Your first dose of antibiotics was given in your IV, and therefore you do not need to  your pill antibiotic until tomorrow.  I sent this to your pharmacy.  Please make sure that you continue to drink 8-10 glasses of water per day to flush out your urinary tract and maintain adequate hydration.  As we discussed, drinking 32 ounces of Gatorade tomorrow would help to maintain your sodium level as well.      Please check your home blood pressure readings 2 times per day and keep a log of your numbers.  I would recommend calling Dr. Lee on Monday to see if you can get a follow-up appointment set up for Wednesday of this upcoming week to recheck your condition.  Please bring the list of blood pressure readings to that appointment to see if your levels are trending on the higher end now.  Please continue all of your regular medications at their regular doses.

## 2020-09-12 NOTE — ED TRIAGE NOTES
Patient been feeling dizzy today and felt this way on Wednesday. Patient states chest feels heavy.

## 2020-09-12 NOTE — RESULT ENCOUNTER NOTE
Emergency Dept/Urgent Care discharge antibiotic (if prescribed): Sulfamethoxazole-Trimethoprim (Bactrim DS, Septra DS) 800-160 mg PO tablet,  1 tablet by mouth 2 times daily for 3 days.  Date of Rx (if applicable):  9/11/20  No changes in treatment per Urine culture protocol.

## 2020-09-12 NOTE — ED PROVIDER NOTES
"  History     Chief Complaint   Patient presents with     Dizziness     The history is provided by the patient.     Unique Ward is a 80 year old female who presents to the emergency department for concerns about dizziness. The patient was active around her house this afternoon when around 1400 she began feeling dizzy and had a \"fuzzy full\" feeling in her lungs that has persisted throughout the day. She states she feels lightheaded and unsteady on her feet. She states her lungs had the \"fuzzy full\" feeling 2 days ago just before a stressful event, and it resolved after the even was over.  She also reports a mild intermittent headache today. When she was laying down in in the ED today she had the sensation of the room spinning. She notes she has a history of a room spinning sensation when tilting her head backwards. She states that she feels short of breath, but knows that she is not becuase her oxygen sats have tracie normal per a home oximeter. She has not tried laying flat today. She has noticed mild swelling in her ankles, but has been on her feet all day. She suspects she wasn't drinking enough water this morning so she drank excess water this afternoon, but it did not help the dizziness. She has been eating normally. She denies any pain, cough, or chest pain. She denies any recent falls or head trauma. She has a pacemaker, but is unsure if it continuous and thinks it activates when her heart rate is below 60 bpm. She a history of a \"heart block.\" She denies any history of strokes or heart failure.     Allergies:  Allergies   Allergen Reactions     No Clinical Screening - See Comments      Eye drop antibiotic.     Codeine Other (See Comments)     Comment: , Description:      Gentamicin Hives     Per Unique this medication gives hives on arms and legs. Bev Lo LSXO,  ....................  7/15/2014   1:15 PM     Influenza Vaccines Other (See Comments)     Comment: , Description:      Influenza Virus " Vaccine H5n1      Paxil [Paroxetine] Other (See Comments)     Comment: , Description:      Typhoid Vaccine-Strain Ty21a Other (See Comments)     Comment: , Description:   Comment: , Description:      Typhoid Vaccines        Problem List:    Patient Active Problem List    Diagnosis Date Noted     Long term current use of anticoagulant therapy 06/30/2020     Priority: Medium     Atrial fibrillation, unspecified type (H) 06/30/2020     Priority: Medium     CHB (complete heart block) (H) 03/31/2020     Priority: Medium     Added automatically from request for surgery [id number]       Pacemaker 03/31/2020     Priority: Medium     Atrial fibrillation (H) 06/07/2019     Priority: Medium     Left lumbosacral radiculopathy 05/31/2017     Priority: Medium     Acute cystitis with hematuria 05/31/2017     Priority: Medium     History of basal cell carcinoma- face and scalp 02/28/2017     Priority: Medium     FH: melanoma- son 02/28/2017     Priority: Medium     Fibromyalgia 02/28/2017     Priority: Medium     Advance Care Planning 01/06/2017     Priority: Medium     Advance Care Planning 5/4/2017: ACP Facilitation Session:    Unique Ward presented for ACP Facilitation session at a group class. She was accompanied by daughter-in-law. [employer] information provided and resources reviewed. She wishes to give additional consideration to ACP.  She currently has the following questions or concerns about Advance Care Planning: none known. Follow-up meeting: not needed/applicable. Added by Unique Uribe RN, Advance Care Planning Liaison.  Advance Care Planning 1/6/17: Info given   Saray Greer MA         Personal history of urinary tract infection 06/18/2016     Priority: Medium     Essential hypertension 03/23/2016     Priority: Medium     Vitamin D deficiency 03/30/2013     Priority: Medium     Dysthymia 04/01/2005     Priority: Medium        Past Medical History:    Past Medical History:   Diagnosis Date     Arthritis  .     Atrial fibrillation (H) 2019     Cancer (H) 1993     scalp     Depressive disorder 1977     Hypertension 2016       Past Surgical History:    Past Surgical History:   Procedure Laterality Date     BIOPSY  2014     COLONOSCOPY      Two routine,  one for chronic diarrhea     EP PACEMAKER N/A 3/31/2020    Procedure: EP Pacemaker;  Surgeon: Erich Clark MD;  Location:  [facility]     EYE SURGERY  2014    first    cataracts both eyes.  second a few months later     GENITOURINARY SURGERY       GYN SURGERY  1977    TL     HEAD & NECK SURGERY  1997    basal cell X2   Moh's on scalp.  other on bridge of nose     SOFT TISSUE SURGERY  1990    Ganglion Cyst   Left inner wrist       Family History:    Family History   Problem Relation Age of Onset     Diabetes Maternal Grandmother      Coronary Artery Disease Sister         error     Coronary Artery Disease Brother         error     Hypertension Brother      Hyperlipidemia Brother      Cerebrovascular Disease Brother      Hypertension Sister      Hypertension Sister      Hyperlipidemia Sister      Other Cancer Sister         cervical     Breast Cancer Sister      Other Cancer Sister         kidney     Mental Illness Mother         paranoid/schizophrenic/suicide     Mental Illness Sister         ???  Had shock treatments       Social History:  Marital Status:   [2]  Social History     Tobacco Use     Smoking status: Former Smoker     Packs/day: 0.75     Years: 10.00     Pack years: 7.50     Types: Cigarettes     Start date: 1955     Last attempt to quit: 1965     Years since quittin.7     Smokeless tobacco: Never Used   Substance Use Topics     Alcohol use: No     Alcohol/week: 0.0 standard drinks     Drug use: No        Medications:    sulfamethoxazole-trimethoprim (BACTRIM DS) 800-160 MG tablet  amLODIPine (NORVASC) 5 MG tablet  cholecalciferol (VITAMIN D3) 5000  "units TABS tablet  magnesium 250 MG tablet  meclizine (ANTIVERT) 25 MG tablet  warfarin ANTICOAGULANT (COUMADIN ANTICOAGULANT) 5 MG tablet          Review of Systems   All other systems reviewed and are negative.      Physical Exam   BP: (!) 180/99  Pulse: 72  Temp: 97.3  F (36.3  C)  Resp: 18  SpO2: 98 %  Lying Orthostatic BP: 182/85  Lying Orthostatic Pulse: 75 bpm  Sitting Orthostatic BP: 180/91  Sitting Orthostatic Pulse: 61 bpm(States laying flat to sitting felt a little dizzy)  Standing Orthostatic BP: 172/87  Standing Orthostatic Pulse: 64 bpm(After laying back down from standing \" the room is turning around\")      Physical Exam  Vitals signs and nursing note reviewed.   Constitutional:       General: She is not in acute distress.     Appearance: Normal appearance. She is not diaphoretic.   HENT:      Head: Normocephalic and atraumatic.      Right Ear: Tympanic membrane normal. There is impacted cerumen.      Left Ear: Tympanic membrane normal. There is impacted cerumen.      Nose: Nose normal. No congestion.      Mouth/Throat:      Mouth: Mucous membranes are moist.      Pharynx: Oropharynx is clear. No oropharyngeal exudate.   Eyes:      General: No visual field deficit or scleral icterus.        Right eye: No discharge.         Left eye: No discharge.      Extraocular Movements: Extraocular movements intact.      Pupils: Pupils are equal, round, and reactive to light.   Neck:      Musculoskeletal: Normal range of motion and neck supple. No neck rigidity or muscular tenderness.   Cardiovascular:      Rate and Rhythm: Normal rate and regular rhythm.      Heart sounds: Normal heart sounds.   Pulmonary:      Effort: Pulmonary effort is normal.      Breath sounds: Normal breath sounds. No wheezing, rhonchi or rales.   Chest:      Chest wall: No tenderness.   Abdominal:      General: Abdomen is flat. Bowel sounds are normal. There is no distension.      Palpations: Abdomen is soft. There is no mass.      " Tenderness: There is no abdominal tenderness. There is no right CVA tenderness, left CVA tenderness, guarding or rebound.      Hernia: No hernia is present.   Musculoskeletal: Normal range of motion.   Lymphadenopathy:      Cervical: No cervical adenopathy.   Skin:     General: Skin is warm and dry.      Findings: No rash.   Neurological:      General: No focal deficit present.      Mental Status: She is alert.      GCS: GCS eye subscore is 4. GCS verbal subscore is 5. GCS motor subscore is 6.      Cranial Nerves: Cranial nerves are intact. No cranial nerve deficit or facial asymmetry.      Sensory: Sensation is intact. No sensory deficit.      Motor: Motor function is intact. No weakness, tremor, atrophy, abnormal muscle tone or pronator drift.      Coordination: Coordination is intact. Romberg sign negative. Coordination normal. Finger-Nose-Finger Test and Heel to Shin Test normal. Rapid alternating movements normal.      Gait: Gait is intact. Gait and tandem walk normal.      Deep Tendon Reflexes:      Reflex Scores:       Tricep reflexes are 2+ on the right side and 2+ on the left side.       Bicep reflexes are 2+ on the right side and 2+ on the left side.       Patellar reflexes are 2+ on the right side and 2+ on the left side.  Psychiatric:         Mood and Affect: Mood normal.         Behavior: Behavior normal.         Thought Content: Thought content normal.         Judgment: Judgment normal.         ED Course        Procedures               EKG Interpretation:      Interpreted by Hesham Linares PA-C  Time reviewed: 2125  Symptoms at time of EKG: none   Rhythm: paced rhythm   Rate: normal  Axis: normal  Ectopy: none  Conduction: normal  ST Segments/ T Waves: No ST-T wave changes  Q Waves: none  Comparison to prior: Unchanged    Clinical Impression: Paced rhythm.  No acute changes.          Critical Care time:  none               Results for orders placed or performed during the hospital encounter of  09/11/20 (from the past 24 hour(s))   CBC with platelets differential   Result Value Ref Range    WBC 6.4 4.0 - 11.0 10e9/L    RBC Count 3.96 3.8 - 5.2 10e12/L    Hemoglobin 11.8 11.7 - 15.7 g/dL    Hematocrit 35.0 35.0 - 47.0 %    MCV 88 78 - 100 fl    MCH 29.8 26.5 - 33.0 pg    MCHC 33.7 31.5 - 36.5 g/dL    RDW 13.5 10.0 - 15.0 %    Platelet Count 277 150 - 450 10e9/L    Diff Method Automated Method     % Neutrophils 66.7 %    % Lymphocytes 22.2 %    % Monocytes 8.8 %    % Eosinophils 1.1 %    % Basophils 0.9 %    % Immature Granulocytes 0.3 %    Nucleated RBCs 0 0 /100    Absolute Neutrophil 4.2 1.6 - 8.3 10e9/L    Absolute Lymphocytes 1.4 0.8 - 5.3 10e9/L    Absolute Monocytes 0.6 0.0 - 1.3 10e9/L    Absolute Basophils 0.1 0.0 - 0.2 10e9/L    Abs Immature Granulocytes 0.0 0 - 0.4 10e9/L    Absolute Nucleated RBC 0.0    Comprehensive metabolic panel   Result Value Ref Range    Sodium 127 (L) 133 - 144 mmol/L    Potassium 3.8 3.4 - 5.3 mmol/L    Chloride 93 (L) 94 - 109 mmol/L    Carbon Dioxide 27 20 - 32 mmol/L    Anion Gap 7 3 - 14 mmol/L    Glucose 114 (H) 70 - 99 mg/dL    Urea Nitrogen 20 7 - 30 mg/dL    Creatinine 0.78 0.52 - 1.04 mg/dL    GFR Estimate 71 >60 mL/min/[1.73_m2]    GFR Estimate If Black 83 >60 mL/min/[1.73_m2]    Calcium 9.2 8.5 - 10.1 mg/dL    Bilirubin Total 0.4 0.2 - 1.3 mg/dL    Albumin 3.9 3.4 - 5.0 g/dL    Protein Total 7.5 6.8 - 8.8 g/dL    Alkaline Phosphatase 41 40 - 150 U/L    ALT 29 0 - 50 U/L    AST 48 (H) 0 - 45 U/L   Troponin I   Result Value Ref Range    Troponin I ES <0.015 0.000 - 0.045 ug/L   TSH with free T4 reflex   Result Value Ref Range    TSH 2.37 0.40 - 4.00 mU/L   UA reflex to Microscopic and Culture    Specimen: Midstream Urine   Result Value Ref Range    Color Urine Straw     Appearance Urine Clear     Glucose Urine Negative NEG^Negative mg/dL    Bilirubin Urine Negative NEG^Negative    Ketones Urine 5 (A) NEG^Negative mg/dL    Specific Gravity Urine 1.006 1.003 -  1.035    Blood Urine Negative NEG^Negative    pH Urine 7.0 5.0 - 7.0 pH    Protein Albumin Urine Negative NEG^Negative mg/dL    Urobilinogen mg/dL 0.0 0.0 - 2.0 mg/dL    Nitrite Urine Negative NEG^Negative    Leukocyte Esterase Urine Small (A) NEG^Negative    Source Midstream Urine     RBC Urine 2 0 - 2 /HPF    WBC Urine 15 (H) 0 - 5 /HPF    Mucous Urine Present (A) NEG^Negative /LPF   Nt probnp inpatient   Result Value Ref Range    N-Terminal Pro BNP Inpatient 293 0 - 1,800 pg/mL   XR Chest Port 1 View    Narrative    EXAM: CHEST SINGLE VIEW PORTABLE  LOCATION: [facility]  DATE/TIME: 9/11/2020 10:07 PM    INDICATION: Dyspnea.  COMPARISON: 04/01/2020.    FINDINGS: The lungs are clear. Normal size cardiac silhouette. Atherosclerotic calcification in the thoracic aorta. Left anterior chest wall cardiac device with lead tips in right atrium and ventricle.      Impression    IMPRESSION: No evidence of active cardiopulmonary disease.        Medications   cefTRIAXone (ROCEPHIN) 2 g vial to attach to  ml bag for ADULTS or NS 50 ml bag for PEDS (0 g Intravenous Stopped 9/11/20 8346)       Assessments & Plan (with Medical Decision Making)     Urinary tract infection  Lightheadedness  Bilateral impacted cerumen     80 year old female presents for evaluation of lightheadedness which she initially described as dizziness, mild intermittent headache, without fever or chills.  Denies any chest pain.  No palpitations.  She tried to drink 3 bottles of water this afternoon hoping this would help her symptoms, but they persisted.  She does have a history of vertigo, but does not feel that she has a spinning sensation with this lightheadedness.  No recent falls.  No recent head trauma.  On exam blood pressure 183/93, temperature 97.3, pulse 61, respiration 13, oxygen saturation 96% on room air.  Patient's exam with bilateral cerumen impaction, but otherwise her exam was completely normal as noted above.   "Neurologically intact.  EKG was performed and this was a paced rhythm without other acute changes.  Orthostatic vital signs performed without acute abnormality.  She did not have any symptoms during this.  Laboratory levels with a hemoglobin of 11.8, white blood cell count of 6400, and platelet count of 277,000.  Comprehensive metabolic panel with a mild elevation in her AST at 48 but her other LFTs were normal.  I am not sure how to explain this.  She does not have any hepatomegaly or right upper quadrant abdominal pain.  No scleral icterus.  Electrolytes with a sodium level of 127.  When I discussed this with her, she states \"that is typical for me \".  I would not expect this level to cause significant symptoms, especially if she does trend to be hyponatremic regardless.  Nonfasting glucose elevated at 114, but this is not consistent with a diabetic level.  Troponin undetectable.  TSH normal at 2.37.  BNP level normal at 293.  Chest x-ray without evidence for infiltrate, pleural effusion, cardiomegaly, or CHF findings.  Urinalysis with small leukocyte esterase, 2 red cells, and 15 white cells.  Concerning for UTI.  When I discussed these findings with the patient, she states \"I get those frequently, and do not get any symptoms now when I get them.  \"We did administer IV Rocephin to cover her immediately.  We will start her on Bactrim DS starting tomorrow for 3 days.  Possible side effects of medication discussed.  Urine culture pending.  The ED tech performed cerumen removal with flushing with water.  I completed the process by using a cerumen spoon to remove 2 rather large cerumen hunks.  He states that she was able to hear better after this.  She did not have any persistence of the lightheadedness here in the ED.  She walked around the department and did not have any symptoms.  No orthostasis.  This certainly could be coming from the UTI.  Her blood pressure has a minor elevation, but not high enough to change " her antihypertensive regimen in the acute setting.  I requested for her to check her home blood pressure readings twice daily and keep a log of them.  I encouraged her to schedule a follow-up appointment with her primary care physician in 5 days to recheck her condition.  She should bring her blood pressure log with her.  Indications for ED return prior to then reviewed with the patient.  She was in agreement with this plan and was suitable for discharge.         I have reviewed the nursing notes.    I have reviewed the findings, diagnosis, plan and need for follow up with the patient.       Discharge Medication List as of 9/12/2020 12:21 AM      START taking these medications    Details   sulfamethoxazole-trimethoprim (BACTRIM DS) 800-160 MG tablet Take 1 tablet by mouth 2 times daily for 3 days, Disp-6 tablet,R-0, E-Prescribe             Final diagnoses:   Urinary tract infection   Lightheadedness   Bilateral impacted cerumen   This document serves as a record of services personally performed by Hesham Linares PA*. It was created on their behalf by Sejal Gonzalez, a trained medical scribe. The creation of this record is based on the provider's personal observations and the statements of the patient. This document has been checked and approved by the attending provider.  Note: Chart documentation done in part with Dragon Voice Recognition software. Although reviewed after completion, some word and grammatical errors may remain.    9/11/2020   Hesham Linares PA-C   [facility] EMERGENCY DEPARTMENT     Hesham Linares PA-C  09/12/20 0048

## 2020-09-13 LAB
BACTERIA SPEC CULT: NO GROWTH
Lab: NORMAL
SPECIMEN SOURCE: NORMAL

## 2020-09-13 NOTE — RESULT ENCOUNTER NOTE
Final urine culture report is NEGATIVE per [facility] Result protocol.    If NEGATIVE result, no change in treatment, per [facility] Result protocol.

## 2020-09-16 ENCOUNTER — VIRTUAL VISIT (OUTPATIENT)
Dept: CARDIOLOGY | Facility: CLINIC | Age: 80
End: 2020-09-16
Payer: COMMERCIAL

## 2020-09-16 DIAGNOSIS — I48.0 PAROXYSMAL ATRIAL FIBRILLATION (H): Primary | ICD-10-CM

## 2020-09-16 PROCEDURE — 99214 OFFICE O/P EST MOD 30 MIN: CPT | Mod: 95 | Performed by: INTERNAL MEDICINE

## 2020-09-16 NOTE — PROGRESS NOTES
"Unique Ward is a 80 year old female who is being evaluated via a billable telephone visit.      The patient has been notified of following:     \"This telephone visit will be conducted via a call between you and your physician/provider. We have found that certain health care needs can be provided without the need for a physical exam.  This service lets us provide the care you need with a short phone conversation.  If a prescription is necessary we can send it directly to your pharmacy.  If lab work is needed we can place an order for that and you can then stop by our lab to have the test done at a later time.    Telephone visits are billed at different rates depending on your insurance coverage. During this emergency period, for some insurers they may be billed the same as an in-person visit.  Please reach out to your insurance provider with any questions.    If during the course of the call the physician/provider feels a telephone visit is not appropriate, you will not be charged for this service.\"    Patient has given verbal consent for Telephone visit?  Yes    What phone number would you like to be contacted at? [phone]    How would you like to obtain your AVS? [facility]    CARDIOLOGY CLINIC VISIT  DATE OF SERVICE:  9/16/2020    PRIMARY CARE PHYSICIAN:  Tyler Lee    HISTORY OF PRESENT ILLNESS:  Ms. Unique Ward is a very pleasant 80 year old patient with PMH significant for paroxysmal atrial fibrillation.  Due to the COVID 19 pandemic this visit is conducted via telephone, with Aaliyah's consent.     In brief review, she presented to the ED at [facility] on 4/14/19 with palpitations. She was found to the in atrial fibrillation with RVR and spontaneously converted to normal sinus rhythm with occasional PVCs. An echocardiogram showed normal LV function with moderate tricuspid regurgitation. Given [id number] score of 4. Initially, Xarelto was prescribed, but was cost prohibitive and she was " then started on Warfarin.  In March, she was admitted to the hospital with complete heart block.  She underwent permanent pacemaker implantation at that time.  She has had regular follow up with device clinic.  A follow up echocardiogram performed last week demonstrated normal LV function, moderate to moderate-severe TR, mild RV dilatation with normal RV function.            In follow up today, Aalyiah reports feeling well overall.  She does note ongoing issues with fatigue which have been a chronic problem for her.  She states she has been experiencing issues with feeling anxious and has shortness of breath at that time.  She states she akua to the ED the other week and wonders if it was all related to anxiety.  Her BP was 180 systolic during the ED visit.  She had similar symptoms while she waited for her echocardiogram and prior to our telephone visit.  She denies any exertional chest pain, chest discomfort or shortness of breath.      PAST MEDICAL HISTORY:  Past Medical History:   Diagnosis Date     Arthritis 01.01.1980     Atrial fibrillation (H) 6/7/2019     Cancer (H) 01.01.1993     scalp    Basal Cell X2  head      nose bridge 1996     Depressive disorder 01.01.1977    anxiety     Hypertension 03.16.2016       MEDICATIONS:  Current Outpatient Medications   Medication     amLODIPine (NORVASC) 5 MG tablet     cholecalciferol (VITAMIN D3) 5000 units TABS tablet     magnesium 250 MG tablet     meclizine (ANTIVERT) 25 MG tablet     warfarin ANTICOAGULANT (COUMADIN ANTICOAGULANT) 5 MG tablet     No current facility-administered medications for this visit.        ALLERGIES:  Allergies   Allergen Reactions     No Clinical Screening - See Comments      Eye drop antibiotic.     Codeine Other (See Comments)     Comment: , Description:      Gentamicin Hives     Per Unique this medication gives hives on arms and legs. Bev Lo LSXO,  ....................  7/15/2014   1:15 PM     Influenza Vaccines Other (See  Comments)     Comment: , Description:      Influenza Virus Vaccine H5n1      Paxil [Paroxetine] Other (See Comments)     Comment: , Description:      Typhoid Vaccine-Strain Ty21a Other (See Comments)     Comment: , Description:   Comment: , Description:      Typhoid Vaccines        SOCIAL HISTORY:  I have reviewed this patient's social history and updated it with pertinent information if needed. Unique Ward  reports that she quit smoking about 55 years ago. Her smoking use included cigarettes. She started smoking about 65 years ago. She has a 7.50 pack-year smoking history. She has never used smokeless tobacco. She reports that she does not drink alcohol or use drugs.    FAMILY HISTORY:  I have reviewed this patient's family history and updated it with pertinent information if needed.   Family History   Problem Relation Age of Onset     Diabetes Maternal Grandmother      Coronary Artery Disease Sister         error     Coronary Artery Disease Brother         error     Hypertension Brother      Hyperlipidemia Brother      Cerebrovascular Disease Brother      Hypertension Sister      Hypertension Sister      Hyperlipidemia Sister      Other Cancer Sister         cervical     Breast Cancer Sister      Other Cancer Sister         kidney     Mental Illness Mother         paranoid/schizophrenic/suicide     Mental Illness Sister         ???  Had shock treatments       REVIEW OF SYSTEMS:  A complete ROS was obtained and the pertinent positives are outlined in the history of present illness above.  The remainder of systems is negative.    PHYSICAL EXAM:  Blood pressure:   Monday 133/69, 122/64  Tuesday 141/80, 135/78  Pulse: Monday 77, 73, 70, 62  Weight: around 135 #                DATA:  Reviewed         ASSESSMENT/PLAN:     1. Paroxysmal atrial fibrillation    [id number] score of 4 (age, gender, hypertension)    Anticoagulated with Coumadin     Follow up in 1 year or sooner if needed     2. Hypertension    Well  controlled    Continue amlodipine 5 mg daily     3. Moderate tricuspid regurgitation    Stable by recent echocardiogram, no symptoms of concern    4.  Complete AV block s/p dual chamber pacemaker       Normal device check; no recurrent atrial fibrillation    5.  Feelings of anxiety       Discussed relaxation techniques.  She was encouraged      To follow up with her primary MD.            Christin Sanchez MD  Cardiology - [facility]  Pager:  [phone]  September 16, 2020        Phone call duration: 13 minutes

## 2020-09-16 NOTE — LETTER
"9/16/2020    Tyler Lee MD  [address] [place] [address]    RE: Unique Ward       Dear Colleague,    I had the pleasure of seeing Unique Ward in the [facility].    Unique Ward is a 80 year old female who is being evaluated via a billable telephone visit.      The patient has been notified of following:     \"This telephone visit will be conducted via a call between you and your physician/provider. We have found that certain health care needs can be provided without the need for a physical exam.  This service lets us provide the care you need with a short phone conversation.  If a prescription is necessary we can send it directly to your pharmacy.  If lab work is needed we can place an order for that and you can then stop by our lab to have the test done at a later time.    Telephone visits are billed at different rates depending on your insurance coverage. During this emergency period, for some insurers they may be billed the same as an in-person visit.  Please reach out to your insurance provider with any questions.    If during the course of the call the physician/provider feels a telephone visit is not appropriate, you will not be charged for this service.\"    Patient has given verbal consent for Telephone visit?  Yes    What phone number would you like to be contacted at? [phone]    How would you like to obtain your AVS? [facility]    CARDIOLOGY CLINIC VISIT  DATE OF SERVICE:  9/16/2020    PRIMARY CARE PHYSICIAN:  Tyler Lee    HISTORY OF PRESENT ILLNESS:  Ms. Unique Ward is a very pleasant 80 year old patient with PMH significant for paroxysmal atrial fibrillation.  Due to the COVID 19 pandemic this visit is conducted via telephone, with Aaliyah's consent.     In brief review, she presented to the ED at [facility] on 4/14/19 with palpitations. She was found to the in atrial fibrillation with RVR and spontaneously converted to normal sinus " rhythm with occasional PVCs. An echocardiogram showed normal LV function with moderate tricuspid regurgitation. Given [id number] score of 4. Initially, Xarelto was prescribed, but was cost prohibitive and she was then started on Warfarin.  In March, she was admitted to the hospital with complete heart block.  She underwent permanent pacemaker implantation at that time.  She has had regular follow up with device clinic.  A follow up echocardiogram performed last week demonstrated normal LV function, moderate to moderate-severe TR, mild RV dilatation with normal RV function.            In follow up today, Aaliyah reports feeling well overall.  She does note ongoing issues with fatigue which have been a chronic problem for her.  She states she has been experiencing issues with feeling anxious and has shortness of breath at that time.  She states she akua to the ED the other week and wonders if it was all related to anxiety.  Her BP was 180 systolic during the ED visit.  She had similar symptoms while she waited for her echocardiogram and prior to our telephone visit.  She denies any exertional chest pain, chest discomfort or shortness of breath.      PAST MEDICAL HISTORY:  Past Medical History:   Diagnosis Date     Arthritis 01.01.1980     Atrial fibrillation (H) 6/7/2019     Cancer (H) 01.01.1993     scalp    Basal Cell X2  head      nose bridge 1996     Depressive disorder 01.01.1977    anxiety     Hypertension 03.16.2016       MEDICATIONS:  Current Outpatient Medications   Medication     amLODIPine (NORVASC) 5 MG tablet     cholecalciferol (VITAMIN D3) 5000 units TABS tablet     magnesium 250 MG tablet     meclizine (ANTIVERT) 25 MG tablet     warfarin ANTICOAGULANT (COUMADIN ANTICOAGULANT) 5 MG tablet     No current facility-administered medications for this visit.        ALLERGIES:  Allergies   Allergen Reactions     No Clinical Screening - See Comments      Eye drop antibiotic.     Codeine Other (See Comments)      Comment: , Description:      Gentamicin Hives     Per Unique this medication gives hives on arms and legs. Bev YAMILA Lo LSXO,  ....................  7/15/2014   1:15 PM     Influenza Vaccines Other (See Comments)     Comment: , Description:      Influenza Virus Vaccine H5n1      Paxil [Paroxetine] Other (See Comments)     Comment: , Description:      Typhoid Vaccine-Strain Ty21a Other (See Comments)     Comment: , Description:   Comment: , Description:      Typhoid Vaccines        SOCIAL HISTORY:  I have reviewed this patient's social history and updated it with pertinent information if needed. Unique Ward  reports that she quit smoking about 55 years ago. Her smoking use included cigarettes. She started smoking about 65 years ago. She has a 7.50 pack-year smoking history. She has never used smokeless tobacco. She reports that she does not drink alcohol or use drugs.    FAMILY HISTORY:  I have reviewed this patient's family history and updated it with pertinent information if needed.   Family History   Problem Relation Age of Onset     Diabetes Maternal Grandmother      Coronary Artery Disease Sister         error     Coronary Artery Disease Brother         error     Hypertension Brother      Hyperlipidemia Brother      Cerebrovascular Disease Brother      Hypertension Sister      Hypertension Sister      Hyperlipidemia Sister      Other Cancer Sister         cervical     Breast Cancer Sister      Other Cancer Sister         kidney     Mental Illness Mother         paranoid/schizophrenic/suicide     Mental Illness Sister         ???  Had shock treatments       REVIEW OF SYSTEMS:  A complete ROS was obtained and the pertinent positives are outlined in the history of present illness above.  The remainder of systems is negative.    PHYSICAL EXAM:  Blood pressure:   Monday 133/69, 122/64  Tuesday 141/80, 135/78  Pulse: Monday 77, 73, 70, 62  Weight: around 135 #                DATA:  Reviewed          ASSESSMENT/PLAN:     1. Paroxysmal atrial fibrillation    [id number] score of 4 (age, gender, hypertension)    Anticoagulated with Coumadin     Follow up in 1 year or sooner if needed     2. Hypertension    Well controlled    Continue amlodipine 5 mg daily     3. Moderate tricuspid regurgitation    Stable by recent echocardiogram, no symptoms of concern    4.  Complete AV block s/p dual chamber pacemaker       Normal device check; no recurrent atrial fibrillation    5.  Feelings of anxiety       Discussed relaxation techniques.  She was encouraged      To follow up with her primary MD.            Christin Sanchez MD  Cardiology - [facility]  Pager:  [phone]  September 16, 2020        Phone call duration: 13 minutes      Thank you for allowing me to participate in the care of your patient.    Sincerely,     Christin Sanchez MD     [facility]

## 2020-09-24 ENCOUNTER — OFFICE VISIT (OUTPATIENT)
Dept: FAMILY MEDICINE | Facility: CLINIC | Age: 80
End: 2020-09-24
Payer: COMMERCIAL

## 2020-09-24 VITALS
SYSTOLIC BLOOD PRESSURE: 138 MMHG | HEIGHT: 67 IN | HEART RATE: 94 BPM | BODY MASS INDEX: 21.35 KG/M2 | RESPIRATION RATE: 20 BRPM | OXYGEN SATURATION: 97 % | WEIGHT: 136 LBS | TEMPERATURE: 97.8 F | DIASTOLIC BLOOD PRESSURE: 78 MMHG

## 2020-09-24 DIAGNOSIS — Z13.1 SCREENING FOR DIABETES MELLITUS: ICD-10-CM

## 2020-09-24 DIAGNOSIS — Z00.00 ANNUAL PHYSICAL EXAM: Primary | ICD-10-CM

## 2020-09-24 DIAGNOSIS — I10 ESSENTIAL HYPERTENSION: ICD-10-CM

## 2020-09-24 DIAGNOSIS — R79.9 ABNORMAL FINDING OF BLOOD CHEMISTRY, UNSPECIFIED: ICD-10-CM

## 2020-09-24 DIAGNOSIS — E87.1 HYPONATREMIA: ICD-10-CM

## 2020-09-24 LAB
HBA1C MFR BLD: 5.4 % (ref 0–5.6)
SODIUM SERPL-SCNC: 132 MMOL/L (ref 133–144)

## 2020-09-24 PROCEDURE — 36415 COLL VENOUS BLD VENIPUNCTURE: CPT | Performed by: FAMILY MEDICINE

## 2020-09-24 PROCEDURE — 83036 HEMOGLOBIN GLYCOSYLATED A1C: CPT | Performed by: FAMILY MEDICINE

## 2020-09-24 PROCEDURE — 99213 OFFICE O/P EST LOW 20 MIN: CPT | Mod: 25 | Performed by: FAMILY MEDICINE

## 2020-09-24 PROCEDURE — 99397 PER PM REEVAL EST PAT 65+ YR: CPT | Performed by: FAMILY MEDICINE

## 2020-09-24 PROCEDURE — 84295 ASSAY OF SERUM SODIUM: CPT | Performed by: FAMILY MEDICINE

## 2020-09-24 RX ORDER — AMLODIPINE BESYLATE 5 MG/1
5 TABLET ORAL DAILY
Qty: 90 TABLET | Refills: 3 | Status: SHIPPED | OUTPATIENT
Start: 2020-09-24 | End: 2021-10-13

## 2020-09-24 ASSESSMENT — ACTIVITIES OF DAILY LIVING (ADL): CURRENT_FUNCTION: NO ASSISTANCE NEEDED

## 2020-09-24 ASSESSMENT — PAIN SCALES - GENERAL: PAINLEVEL: NO PAIN (0)

## 2020-09-24 ASSESSMENT — PATIENT HEALTH QUESTIONNAIRE - PHQ9: SUM OF ALL RESPONSES TO PHQ QUESTIONS 1-9: 7

## 2020-09-24 ASSESSMENT — MIFFLIN-ST. JEOR: SCORE: 1111.34

## 2020-09-24 NOTE — PROGRESS NOTES
SUBJECTIVE:   Unique Ward is a 80 year old female who presents for Preventive Visit.    Lab Results   Component Value Date    A1C 5.7 04/23/2019           Patient has been advised of split billing requirements and indicates understanding: Yes   Are you in the first 12 months of your Medicare coverage?  No    Healthy Habits:    In general, how would you rate your overall health?  Good    Frequency of exercise:  6-7 days/week    Duration of exercise:  15-30 minutes    Taking medications regularly:  Yes    Barriers to taking medications:  Not applicable    Medication side effects:  Not applicable    Ability to successfully perform activities of daily living:  No assistance needed    Home Safety:  No safety concerns identified    Hearing Impairment:  No hearing concerns    In the past 6 months, have you been bothered by leaking of urine? Yes    In general, how would you rate your overall mental or emotional health?  Good      PHQ-2 Total Score:    Additional concerns today:  Yes    Do you feel safe in your environment? Yes    Have you ever done Advance Care Planning? (For example, a Health Directive, POLST, or a discussion with a medical provider or your loved ones about your wishes): No, advance care planning information given to patient to review.  Patient plans to discuss their wishes with loved ones or provider.        Fall risk       Cognitive Screening   1) Repeat 3 items (Leader, Season, Table)    2) Clock draw: NORMAL  3) 3 item recall: Recalls 3 objects  Results: 3 items recalled: COGNITIVE IMPAIRMENT LESS LIKELY    Mini-CogTM Copyright RUBEN Amador. Licensed by the author for use in [facility]; reprinted with permission (flaquito@.[facility]). All rights reserved.      Do you have sleep apnea, excessive snoring or daytime drowsiness?: no    Reviewed and updated as needed this visit by clinical staff  Tobacco  Allergies  Meds         Reviewed and updated as needed this visit by Provider        Social  "History     Tobacco Use     Smoking status: Former Smoker     Packs/day: 0.75     Years: 10.00     Pack years: 7.50     Types: Cigarettes     Start date: 1955     Last attempt to quit: 1965     Years since quittin.7     Smokeless tobacco: Never Used   Substance Use Topics     Alcohol use: No     Alcohol/week: 0.0 standard drinks     If you drink alcohol do you typically have >3 drinks per day or >7 drinks per week? No    No flowsheet data found.            Current providers sharing in care for this patient include:   Patient Care Team:  Tyler Lee MD as PCP - General (Family Practice)  Chico Quiroz DO as Assigned PCP    The following health maintenance items are reviewed in Epic and correct as of today:  Health Maintenance   Topic Date Due     ZOSTER IMMUNIZATION (1 of 2) 1989     PNEUMOCOCCAL IMMUNIZATION 65+ LOW/MEDIUM RISK (1 of 2 - PCV13) 2004     MEDICARE ANNUAL WELLNESS VISIT  01/15/2020     PHQ-9  2020     INFLUENZA VACCINE (1) 2020     FALL RISK ASSESSMENT  10/30/2020     ADVANCE CARE PLANNING  2022     DTAP/TDAP/TD IMMUNIZATION (4 - Td) 2024     DEXA  Completed     DEPRESSION ACTION PLAN  Completed     IPV IMMUNIZATION  Aged Out     MENINGITIS IMMUNIZATION  Aged Out     HEPATITIS B IMMUNIZATION  Aged Out           Review of Systems  Constitutional, HEENT, cardiovascular, pulmonary, gi and gu systems are negative, except as otherwise noted.    OBJECTIVE:   /78   Pulse 94   Temp 97.8  F (36.6  C) (Temporal)   Resp 20   Ht 1.697 m (5' 6.8\")   Wt 61.7 kg (136 lb)   LMP  (LMP Unknown)   SpO2 97%   BMI 21.43 kg/m   Estimated body mass index is 21.43 kg/m  as calculated from the following:    Height as of this encounter: 1.697 m (5' 6.8\").    Weight as of this encounter: 61.7 kg (136 lb).  Physical Exam  GENERAL: healthy, alert and no distress  EYES: Eyes grossly normal to inspection, PERRL and conjunctivae and sclerae " "normal  HENT: ear canals and TM's normal, nose and mouth without ulcers or lesions  NECK: no adenopathy, no asymmetry, masses, or scars and thyroid normal to palpation  RESP: lungs clear to auscultation - no rales, rhonchi or wheezes  CV: regular rate and rhythm, normal S1 S2, no S3 or S4, no murmur, click or rub, no peripheral edema and peripheral pulses strong  ABDOMEN: soft, nontender, no hepatosplenomegaly, no masses and bowel sounds normal  MS: no gross musculoskeletal defects noted, no edema  SKIN: no suspicious lesions or rashes  NEURO: Normal strength and tone, mentation intact and speech normal  PSYCH: mentation appears normal, affect normal/bright    Diagnostic Test Results:  Labs reviewed in Epic    ASSESSMENT / PLAN:       ICD-10-CM    1. Annual physical exam  Z00.00    2. Essential hypertension  I10 amLODIPine (NORVASC) 5 MG tablet     Sodium   3. Hyponatremia  E87.1 Sodium   4. Screening for diabetes mellitus  Z13.1 Hemoglobin A1c   5. Abnormal finding of blood chemistry, unspecified   R79.9 Hemoglobin A1c     Recheck recent low sodium  htn controlled, refilled  Elevated a1c in the past, recheck  Annual topics covered  Fatigue, she thinks it deconditioning and is going back exercise, I wonder about mental health/anxiety  rtc 6mo,     Patient has been advised of split billing requirements and indicates understanding:   COUNSELING:      Estimated body mass index is 21.43 kg/m  as calculated from the following:    Height as of this encounter: 1.697 m (5' 6.8\").    Weight as of this encounter: 61.7 kg (136 lb).        She reports that she quit smoking about 55 years ago. Her smoking use included cigarettes. She started smoking about 65 years ago. She has a 7.50 pack-year smoking history. She has never used smokeless tobacco.      Appropriate preventive services were discussed with this patient, including applicable screening as appropriate for cardiovascular disease, diabetes, osteopenia/osteoporosis, and " glaucoma.  As appropriate for age/gender, discussed screening for colorectal cancer, prostate cancer, breast cancer, and cervical cancer. Checklist reviewing preventive services available has been given to the patient.    Reviewed patients plan of care and provided an AVS. The Basic Care Plan (routine screening as documented in Health Maintenance) for Unique meets the Care Plan requirement. This Care Plan has been established and reviewed with the Patient.    Counseling Resources:  ATP IV Guidelines  Pooled Cohorts Equation Calculator  Breast Cancer Risk Calculator  Breast Cancer: Medication to Reduce Risk  FRAX Risk Assessment  ICSI Preventive Guidelines  Dietary Guidelines for Americans, 2010  USDA's MyPlate  ASA Prophylaxis  Lung CA Screening    Tyler Lee MD  [facility]    Identified Health Risks:

## 2020-10-16 ENCOUNTER — TELEPHONE (OUTPATIENT)
Dept: FAMILY MEDICINE | Facility: CLINIC | Age: 80
End: 2020-10-16

## 2020-10-16 NOTE — TELEPHONE ENCOUNTER
Spoke with Aaliyah and told her to just take 2.5 mg today. To make up her dose today and then just resume her normal dosing. Patient verbalized understanding and agrees with plan of care. Pt had no further questions or concerns at this time. Veronika Campbell RN, BSN

## 2020-10-22 ENCOUNTER — ANTICOAGULATION THERAPY VISIT (OUTPATIENT)
Dept: ANTICOAGULATION | Facility: OTHER | Age: 80
End: 2020-10-22

## 2020-10-22 ENCOUNTER — ANCILLARY PROCEDURE (OUTPATIENT)
Dept: CARDIOLOGY | Facility: CLINIC | Age: 80
End: 2020-10-22
Attending: INTERNAL MEDICINE
Payer: COMMERCIAL

## 2020-10-22 DIAGNOSIS — I44.2 CHB (COMPLETE HEART BLOCK) (H): ICD-10-CM

## 2020-10-22 DIAGNOSIS — Z79.01 LONG TERM CURRENT USE OF ANTICOAGULANT THERAPY: ICD-10-CM

## 2020-10-22 DIAGNOSIS — I48.91 ATRIAL FIBRILLATION, UNSPECIFIED TYPE (H): ICD-10-CM

## 2020-10-22 DIAGNOSIS — Z95.0 PACEMAKER: ICD-10-CM

## 2020-10-22 LAB
CAPILLARY BLOOD COLLECTION: NORMAL
INR BLD: 2.3 (ref 0.86–1.14)

## 2020-10-22 PROCEDURE — 85610 PROTHROMBIN TIME: CPT | Performed by: FAMILY MEDICINE

## 2020-10-22 PROCEDURE — 36416 COLLJ CAPILLARY BLOOD SPEC: CPT | Performed by: FAMILY MEDICINE

## 2020-10-22 PROCEDURE — 99207 PR NO CHARGE NURSE ONLY: CPT | Performed by: FAMILY MEDICINE

## 2020-10-22 PROCEDURE — 93280 PM DEVICE PROGR EVAL DUAL: CPT | Performed by: INTERNAL MEDICINE

## 2020-10-22 NOTE — PROGRESS NOTES
ANTICOAGULATION FOLLOW-UP CLINIC VISIT    Patient Name:  Unique Ward  Date:  10/22/2020  Contact Type:  Telephone    SUBJECTIVE:  Patient Findings     Comments:  The patient was assessed for diet, medication, and activity level changes, missed or extra doses, bruising or bleeding, with no problem findings.          Clinical Outcomes     Negatives:  Major bleeding event, Thromboembolic event, Anticoagulation-related hospital admission, Anticoagulation-related ED visit, Anticoagulation-related fatality    Comments:  The patient was assessed for diet, medication, and activity level changes, missed or extra doses, bruising or bleeding, with no problem findings.             OBJECTIVE    Recent labs: (last 7 days)     10/22/20  0929   INR 2.3*       ASSESSMENT / PLAN  INR assessment THER    Recheck INR In: 6 WEEKS    INR Location Outside lab      Anticoagulation Summary  As of 10/22/2020    INR goal:  2.0-3.0   TTR:  79.3 % (1 y)   INR used for dosin.3 (10/22/2020)   Warfarin maintenance plan:  10 mg (5 mg x 2) every Mon, Fri; 7.5 mg (5 mg x 1.5) all other days   Full warfarin instructions:  10 mg every Mon, Fri; 7.5 mg all other days   Weekly warfarin total:  57.5 mg   No change documented:  Veronika Campbell, RN   Plan last modified:  Veronika Campbell RN (2020)   Next INR check:  12/3/2020   Priority:  Maintenance   Target end date:  Indefinite    Indications    Atrial fibrillation (H) [I48.91]  Long term current use of anticoagulant therapy [Z79.01]  Atrial fibrillation  unspecified type (H) [I48.91]             Anticoagulation Episode Summary     INR check location:      Preferred lab:      Send INR reminders to:  [facility]    Comments:  5 mg tabs, PM dose, print out      Anticoagulation Care Providers     Provider Role Specialty Phone number    Tyler Lee MD Referring [facility] [phone]            See the Encounter Report to view Anticoagulation Flowsheet and Dosing Calendar  (Go to Encounters tab in chart review, and find the Anticoagulation Therapy Visit)    Dosage adjustment made based on physician directed care plan.    Veronika Campbell RN

## 2020-11-10 LAB
MDC_IDC_LEAD_IMPLANT_DT: NORMAL
MDC_IDC_LEAD_IMPLANT_DT: NORMAL
MDC_IDC_LEAD_LOCATION: NORMAL
MDC_IDC_LEAD_LOCATION: NORMAL
MDC_IDC_LEAD_LOCATION_DETAIL_1: NORMAL
MDC_IDC_LEAD_LOCATION_DETAIL_1: NORMAL
MDC_IDC_LEAD_MFG: NORMAL
MDC_IDC_LEAD_MFG: NORMAL
MDC_IDC_LEAD_MODEL: NORMAL
MDC_IDC_LEAD_MODEL: NORMAL
MDC_IDC_LEAD_POLARITY_TYPE: NORMAL
MDC_IDC_LEAD_POLARITY_TYPE: NORMAL
MDC_IDC_LEAD_SERIAL: NORMAL
MDC_IDC_LEAD_SERIAL: NORMAL
MDC_IDC_MSMT_BATTERY_DTM: NORMAL
MDC_IDC_MSMT_BATTERY_REMAINING_LONGEVITY: 134 MO
MDC_IDC_MSMT_BATTERY_RRT_TRIGGER: 2.62
MDC_IDC_MSMT_BATTERY_STATUS: NORMAL
MDC_IDC_MSMT_BATTERY_VOLTAGE: 3.05 V
MDC_IDC_MSMT_LEADCHNL_RA_IMPEDANCE_VALUE: 304 OHM
MDC_IDC_MSMT_LEADCHNL_RA_IMPEDANCE_VALUE: 342 OHM
MDC_IDC_MSMT_LEADCHNL_RA_PACING_THRESHOLD_AMPLITUDE: 0.75 V
MDC_IDC_MSMT_LEADCHNL_RA_PACING_THRESHOLD_PULSEWIDTH: 0.4 MS
MDC_IDC_MSMT_LEADCHNL_RA_SENSING_INTR_AMPL: 0.88 MV
MDC_IDC_MSMT_LEADCHNL_RA_SENSING_INTR_AMPL: 1.75 MV
MDC_IDC_MSMT_LEADCHNL_RV_IMPEDANCE_VALUE: 399 OHM
MDC_IDC_MSMT_LEADCHNL_RV_IMPEDANCE_VALUE: 494 OHM
MDC_IDC_MSMT_LEADCHNL_RV_PACING_THRESHOLD_AMPLITUDE: 0.88 V
MDC_IDC_MSMT_LEADCHNL_RV_PACING_THRESHOLD_PULSEWIDTH: 0.4 MS
MDC_IDC_MSMT_LEADCHNL_RV_SENSING_INTR_AMPL: 13.62 MV
MDC_IDC_MSMT_LEADCHNL_RV_SENSING_INTR_AMPL: 6.25 MV
MDC_IDC_PG_IMPLANT_DTM: NORMAL
MDC_IDC_PG_MFG: NORMAL
MDC_IDC_PG_MODEL: NORMAL
MDC_IDC_PG_SERIAL: NORMAL
MDC_IDC_PG_TYPE: NORMAL
MDC_IDC_SESS_CLINIC_NAME: NORMAL
MDC_IDC_SESS_DTM: NORMAL
MDC_IDC_SESS_TYPE: NORMAL
MDC_IDC_SET_BRADY_AT_MODE_SWITCH_RATE: 171 {BEATS}/MIN
MDC_IDC_SET_BRADY_HYSTRATE: NORMAL
MDC_IDC_SET_BRADY_LOWRATE: 60 {BEATS}/MIN
MDC_IDC_SET_BRADY_MAX_SENSOR_RATE: 130 {BEATS}/MIN
MDC_IDC_SET_BRADY_MAX_TRACKING_RATE: 130 {BEATS}/MIN
MDC_IDC_SET_BRADY_MODE: NORMAL
MDC_IDC_SET_BRADY_PAV_DELAY_HIGH: 140 MS
MDC_IDC_SET_BRADY_PAV_DELAY_LOW: 300 MS
MDC_IDC_SET_BRADY_SAV_DELAY_HIGH: 110 MS
MDC_IDC_SET_BRADY_SAV_DELAY_LOW: 230 MS
MDC_IDC_SET_LEADCHNL_RA_PACING_AMPLITUDE: 1.5 V
MDC_IDC_SET_LEADCHNL_RA_PACING_ANODE_ELECTRODE_1: NORMAL
MDC_IDC_SET_LEADCHNL_RA_PACING_ANODE_LOCATION_1: NORMAL
MDC_IDC_SET_LEADCHNL_RA_PACING_CAPTURE_MODE: NORMAL
MDC_IDC_SET_LEADCHNL_RA_PACING_CATHODE_ELECTRODE_1: NORMAL
MDC_IDC_SET_LEADCHNL_RA_PACING_CATHODE_LOCATION_1: NORMAL
MDC_IDC_SET_LEADCHNL_RA_PACING_POLARITY: NORMAL
MDC_IDC_SET_LEADCHNL_RA_PACING_PULSEWIDTH: 0.4 MS
MDC_IDC_SET_LEADCHNL_RA_SENSING_ANODE_ELECTRODE_1: NORMAL
MDC_IDC_SET_LEADCHNL_RA_SENSING_ANODE_LOCATION_1: NORMAL
MDC_IDC_SET_LEADCHNL_RA_SENSING_CATHODE_ELECTRODE_1: NORMAL
MDC_IDC_SET_LEADCHNL_RA_SENSING_CATHODE_LOCATION_1: NORMAL
MDC_IDC_SET_LEADCHNL_RA_SENSING_POLARITY: NORMAL
MDC_IDC_SET_LEADCHNL_RA_SENSING_SENSITIVITY: 0.3 MV
MDC_IDC_SET_LEADCHNL_RV_PACING_AMPLITUDE: 2 V
MDC_IDC_SET_LEADCHNL_RV_PACING_ANODE_ELECTRODE_1: NORMAL
MDC_IDC_SET_LEADCHNL_RV_PACING_ANODE_LOCATION_1: NORMAL
MDC_IDC_SET_LEADCHNL_RV_PACING_CAPTURE_MODE: NORMAL
MDC_IDC_SET_LEADCHNL_RV_PACING_CATHODE_ELECTRODE_1: NORMAL
MDC_IDC_SET_LEADCHNL_RV_PACING_CATHODE_LOCATION_1: NORMAL
MDC_IDC_SET_LEADCHNL_RV_PACING_POLARITY: NORMAL
MDC_IDC_SET_LEADCHNL_RV_PACING_PULSEWIDTH: 0.4 MS
MDC_IDC_SET_LEADCHNL_RV_SENSING_ANODE_ELECTRODE_1: NORMAL
MDC_IDC_SET_LEADCHNL_RV_SENSING_ANODE_LOCATION_1: NORMAL
MDC_IDC_SET_LEADCHNL_RV_SENSING_CATHODE_ELECTRODE_1: NORMAL
MDC_IDC_SET_LEADCHNL_RV_SENSING_CATHODE_LOCATION_1: NORMAL
MDC_IDC_SET_LEADCHNL_RV_SENSING_POLARITY: NORMAL
MDC_IDC_SET_LEADCHNL_RV_SENSING_SENSITIVITY: 0.9 MV
MDC_IDC_SET_ZONE_DETECTION_INTERVAL: 350 MS
MDC_IDC_SET_ZONE_DETECTION_INTERVAL: 400 MS
MDC_IDC_SET_ZONE_TYPE: NORMAL
MDC_IDC_STAT_AT_BURDEN_PERCENT: 0.2 %
MDC_IDC_STAT_AT_DTM_END: NORMAL
MDC_IDC_STAT_AT_DTM_START: NORMAL
MDC_IDC_STAT_BRADY_AP_VP_PERCENT: 68.23 %
MDC_IDC_STAT_BRADY_AP_VS_PERCENT: 0.85 %
MDC_IDC_STAT_BRADY_AS_VP_PERCENT: 28.11 %
MDC_IDC_STAT_BRADY_AS_VS_PERCENT: 2.8 %
MDC_IDC_STAT_BRADY_DTM_END: NORMAL
MDC_IDC_STAT_BRADY_DTM_START: NORMAL
MDC_IDC_STAT_BRADY_RA_PERCENT_PACED: 68.39 %
MDC_IDC_STAT_BRADY_RV_PERCENT_PACED: 96.32 %
MDC_IDC_STAT_EPISODE_RECENT_COUNT: 0
MDC_IDC_STAT_EPISODE_RECENT_COUNT: 0
MDC_IDC_STAT_EPISODE_RECENT_COUNT: 13
MDC_IDC_STAT_EPISODE_RECENT_COUNT_DTM_END: NORMAL
MDC_IDC_STAT_EPISODE_RECENT_COUNT_DTM_START: NORMAL
MDC_IDC_STAT_EPISODE_TOTAL_COUNT: 0
MDC_IDC_STAT_EPISODE_TOTAL_COUNT: 0
MDC_IDC_STAT_EPISODE_TOTAL_COUNT: 14
MDC_IDC_STAT_EPISODE_TOTAL_COUNT_DTM_END: NORMAL
MDC_IDC_STAT_EPISODE_TOTAL_COUNT_DTM_START: NORMAL
MDC_IDC_STAT_EPISODE_TYPE: NORMAL

## 2020-11-14 ENCOUNTER — HOSPITAL ENCOUNTER (EMERGENCY)
Facility: CLINIC | Age: 80
Discharge: HOME | End: 2020-11-14
Attending: FAMILY MEDICINE | Admitting: FAMILY MEDICINE
Payer: MEDICARE

## 2020-11-14 VITALS
TEMPERATURE: 98.1 F | HEART RATE: 80 BPM | OXYGEN SATURATION: 96 % | RESPIRATION RATE: 16 BRPM | DIASTOLIC BLOOD PRESSURE: 83 MMHG | SYSTOLIC BLOOD PRESSURE: 142 MMHG

## 2020-11-14 DIAGNOSIS — N39.0 RECURRENT URINARY TRACT INFECTION: ICD-10-CM

## 2020-11-14 DIAGNOSIS — R30.0 DYSURIA: ICD-10-CM

## 2020-11-14 DIAGNOSIS — N30.00 ACUTE CYSTITIS WITHOUT HEMATURIA: ICD-10-CM

## 2020-11-14 LAB
ALBUMIN UR-MCNC: 30 MG/DL
APPEARANCE UR: ABNORMAL
BACTERIA #/AREA URNS HPF: ABNORMAL /HPF
BILIRUB UR QL STRIP: NEGATIVE
COLOR UR AUTO: YELLOW
GLUCOSE UR STRIP-MCNC: NEGATIVE MG/DL
HGB UR QL STRIP: ABNORMAL
KETONES UR STRIP-MCNC: NEGATIVE MG/DL
LEUKOCYTE ESTERASE UR QL STRIP: ABNORMAL
NITRATE UR QL: POSITIVE
PH UR STRIP: 6 PH (ref 5–7)
RBC #/AREA URNS AUTO: 9 /HPF (ref 0–2)
SOURCE: ABNORMAL
SP GR UR STRIP: 1.01 (ref 1–1.03)
UROBILINOGEN UR STRIP-MCNC: 0 MG/DL (ref 0–2)
WBC #/AREA URNS AUTO: >182 /HPF (ref 0–5)

## 2020-11-14 PROCEDURE — 87086 URINE CULTURE/COLONY COUNT: CPT | Performed by: FAMILY MEDICINE

## 2020-11-14 PROCEDURE — 87088 URINE BACTERIA CULTURE: CPT | Performed by: FAMILY MEDICINE

## 2020-11-14 PROCEDURE — 99284 EMERGENCY DEPT VISIT MOD MDM: CPT | Performed by: FAMILY MEDICINE

## 2020-11-14 PROCEDURE — 250N000013 HC RX MED GY IP 250 OP 250 PS 637: Performed by: FAMILY MEDICINE

## 2020-11-14 PROCEDURE — 99283 EMERGENCY DEPT VISIT LOW MDM: CPT | Performed by: FAMILY MEDICINE

## 2020-11-14 PROCEDURE — 81001 URINALYSIS AUTO W/SCOPE: CPT | Performed by: FAMILY MEDICINE

## 2020-11-14 PROCEDURE — 87186 SC STD MICRODIL/AGAR DIL: CPT | Performed by: FAMILY MEDICINE

## 2020-11-14 RX ORDER — NITROFURANTOIN 25; 75 MG/1; MG/1
100 CAPSULE ORAL 2 TIMES DAILY
Qty: 14 CAPSULE | Refills: 0 | Status: SHIPPED | OUTPATIENT
Start: 2020-11-14 | End: 2020-12-16

## 2020-11-14 RX ORDER — PHENAZOPYRIDINE HYDROCHLORIDE 100 MG/1
100 TABLET, FILM COATED ORAL ONCE
Status: COMPLETED | OUTPATIENT
Start: 2020-11-14 | End: 2020-11-14

## 2020-11-14 RX ORDER — PHENAZOPYRIDINE HYDROCHLORIDE 200 MG/1
200 TABLET, FILM COATED ORAL 3 TIMES DAILY PRN
Qty: 10 TABLET | Refills: 0 | Status: SHIPPED | OUTPATIENT
Start: 2020-11-14 | End: 2020-11-18

## 2020-11-14 RX ORDER — NITROFURANTOIN 25; 75 MG/1; MG/1
100 CAPSULE ORAL ONCE
Status: COMPLETED | OUTPATIENT
Start: 2020-11-14 | End: 2020-11-14

## 2020-11-14 RX ADMIN — NITROFURANTOIN MONOHYDRATE/MACROCRYSTALLINE 100 MG: 25; 75 CAPSULE ORAL at 20:02

## 2020-11-14 RX ADMIN — PHENAZOPYRIDINE HYDROCHLORIDE 100 MG: 100 TABLET ORAL at 20:02

## 2020-11-14 NOTE — ED AVS SNAPSHOT
[facility]  [address] [place] [address]  Phone: [phone]  Fax: [phone]                                    Unique Ward   MRN: [id number]    Department: [facility]   Date of Visit: 11/14/2020           After Visit Summary Signature Page    I have received my discharge instructions, and my questions have been answered. I have discussed any challenges I see with this plan with the nurse or doctor.    ..........................................................................................................................................  Patient/Patient Representative Signature      ..........................................................................................................................................  Patient Representative Print Name and Relationship to Patient    ..................................................               ................................................  Date                                   Time    ..........................................................................................................................................  Reviewed by Signature/Title    ...................................................              ..............................................  Date                                               Time          22EPIC Rev 08/18

## 2020-11-15 ENCOUNTER — MYC MEDICAL ADVICE (OUTPATIENT)
Dept: FAMILY MEDICINE | Facility: CLINIC | Age: 80
End: 2020-11-15

## 2020-11-15 DIAGNOSIS — E87.1 HYPONATREMIA: Primary | ICD-10-CM

## 2020-11-15 ASSESSMENT — ENCOUNTER SYMPTOMS
FREQUENCY: 1
DYSURIA: 1

## 2020-11-15 NOTE — ED PROVIDER NOTES
History     Chief Complaint   Patient presents with     Rule out Urinary Tract Infection     HPI  Unique Ward is a 80 year old female who presented to the emergency room secondary concerns of likely urinary tract infection.  States she has had symptoms consistent with her prior urine infections for last 2 to 3 days.  She has been drinking increased fluids but has not had resolution of her symptoms.  She stated she contacted her clinic but was told she probably should come to the ER because she has had recurrent episodes of similar symptoms.  Denies any fever or chills.  She denies any nausea or vomiting.  She denies any back pain.  She denies any bloody urine or vaginal bleeding.  She denies any changes to her stools such as diarrhea, constipation, or bloody stools.    Allergies:  Allergies   Allergen Reactions     No Clinical Screening - See Comments      Eye drop antibiotic.     Codeine Other (See Comments)     Comment: , Description:      Gentamicin Hives     Per Unique this medication gives hives on arms and legs. eBv Lo LSXO,  ....................  7/15/2014   1:15 PM     Influenza Vaccines Other (See Comments)     Comment: , Description:      Influenza Virus Vaccine H5n1      Paxil [Paroxetine] Other (See Comments)     Comment: , Description:      Typhoid Vaccine-Strain Ty21a Other (See Comments)     Comment: , Description:   Comment: , Description:      Typhoid Vaccines        Problem List:    Patient Active Problem List    Diagnosis Date Noted     Long term current use of anticoagulant therapy 06/30/2020     Priority: Medium     Atrial fibrillation, unspecified type (H) 06/30/2020     Priority: Medium     CHB (complete heart block) (H) 03/31/2020     Priority: Medium     Added automatically from request for surgery [id number]       Pacemaker 03/31/2020     Priority: Medium     Atrial fibrillation (H) 06/07/2019     Priority: Medium     Left lumbosacral radiculopathy 05/31/2017     Priority:  Medium     Acute cystitis with hematuria 05/31/2017     Priority: Medium     History of basal cell carcinoma- face and scalp 02/28/2017     Priority: Medium     FH: melanoma- son 02/28/2017     Priority: Medium     Fibromyalgia 02/28/2017     Priority: Medium     Advance Care Planning 01/06/2017     Priority: Medium     Advance Care Planning 5/4/2017: ACP Facilitation Session:    Unique GUZMAN Ed presented for ACP Facilitation session at a group class. She was accompanied by daughter-in-law. [employer] information provided and resources reviewed. She wishes to give additional consideration to ACP.  She currently has the following questions or concerns about Advance Care Planning: none known. Follow-up meeting: not needed/applicable. Added by Unique Uribe RN, Advance Care Planning Liaison.  Advance Care Planning 1/6/17: Info given   Saray Greer MA         Personal history of urinary tract infection 06/18/2016     Priority: Medium     Essential hypertension 03/23/2016     Priority: Medium     Vitamin D deficiency 03/30/2013     Priority: Medium     Dysthymia 04/01/2005     Priority: Medium        Past Medical History:    Past Medical History:   Diagnosis Date     Arthritis 01.01.1980     Atrial fibrillation (H) 6/7/2019     Cancer (H) 01.01.1993     scalp     Depressive disorder 01.01.1977     Hypertension 03.16.2016       Past Surgical History:    Past Surgical History:   Procedure Laterality Date     BIOPSY  11/01/2014     COLONOSCOPY  01.01/1979    Two routine,  one for chronic diarrhea     EP PACEMAKER N/A 3/31/2020    Procedure: EP Pacemaker;  Surgeon: Erich Clark MD;  Location:  [facility]     EYE SURGERY  1/1/2014    first    cataracts both eyes.  second a few months later     GENITOURINARY SURGERY       GYN SURGERY  01.01.1977    TL     HEAD & NECK SURGERY  01.01.1997    basal cell X2   Moh's on scalp.  other on bridge of nose     SOFT TISSUE SURGERY  01.01.1990    Ganglion  Cyst   Left inner wrist       Family History:    Family History   Problem Relation Age of Onset     Diabetes Maternal Grandmother      Coronary Artery Disease Sister         error     Coronary Artery Disease Brother         error     Hypertension Brother      Hyperlipidemia Brother      Cerebrovascular Disease Brother      Hypertension Sister      Hypertension Sister      Hyperlipidemia Sister      Other Cancer Sister         cervical     Breast Cancer Sister      Other Cancer Sister         kidney     Mental Illness Mother         paranoid/schizophrenic/suicide     Mental Illness Sister         ???  Had shock treatments       Social History:  Marital Status:   [2]  Social History     Tobacco Use     Smoking status: Former Smoker     Packs/day: 0.75     Years: 10.00     Pack years: 7.50     Types: Cigarettes     Start date: 1955     Quit date: 1965     Years since quittin.9     Smokeless tobacco: Never Used   Substance Use Topics     Alcohol use: No     Alcohol/week: 0.0 standard drinks     Drug use: No        Medications:         nitroFURantoin macrocrystal-monohydrate (MACROBID) 100 MG capsule       phenazopyridine (PYRIDIUM) 200 MG tablet       amLODIPine (NORVASC) 5 MG tablet       cholecalciferol (VITAMIN D3) 5000 units TABS tablet       magnesium 100 MG TABS       meclizine (ANTIVERT) 25 MG tablet       warfarin ANTICOAGULANT (COUMADIN ANTICOAGULANT) 5 MG tablet          Review of Systems   Genitourinary: Positive for dysuria and frequency.   All other systems reviewed and are negative.      Physical Exam   BP: (!) 157/88  Pulse: 78  Temp: 98.1  F (36.7  C)  Resp: 16  SpO2: 97 %      Physical Exam  Vitals signs and nursing note reviewed.   Constitutional:       General: She is not in acute distress.     Appearance: Normal appearance. She is normal weight.   HENT:      Head: Normocephalic and atraumatic.   Eyes:      Extraocular Movements: Extraocular movements intact.       Conjunctiva/sclera: Conjunctivae normal.      Pupils: Pupils are equal, round, and reactive to light.   Neck:      Musculoskeletal: Normal range of motion.   Cardiovascular:      Rate and Rhythm: Normal rate.   Pulmonary:      Effort: Pulmonary effort is normal. No respiratory distress.   Musculoskeletal:      Right lower leg: No edema.      Left lower leg: No edema.   Skin:     Coloration: Skin is not pale.   Neurological:      Mental Status: She is alert and oriented to person, place, and time.   Psychiatric:         Mood and Affect: Mood normal.         Behavior: Behavior normal.         ED Course        Procedures               Critical Care time:  none               Results for orders placed or performed during the hospital encounter of 11/14/20 (from the past 24 hour(s))   Routine UA with microscopic   Result Value Ref Range    Color Urine Yellow     Appearance Urine Cloudy     Glucose Urine Negative NEG^Negative mg/dL    Bilirubin Urine Negative NEG^Negative    Ketones Urine Negative NEG^Negative mg/dL    Specific Gravity Urine 1.013 1.003 - 1.035    Blood Urine Moderate (A) NEG^Negative    pH Urine 6.0 5.0 - 7.0 pH    Protein Albumin Urine 30 (A) NEG^Negative mg/dL    Urobilinogen mg/dL 0.0 0.0 - 2.0 mg/dL    Nitrite Urine Positive (A) NEG^Negative    Leukocyte Esterase Urine Moderate (A) NEG^Negative    Source Midstream Urine     WBC Urine >182 (H) 0 - 5 /HPF    RBC Urine 9 (H) 0 - 2 /HPF    Bacteria Urine Few (A) NEG^Negative /HPF   Urine Culture    Specimen: Midstream Urine   Result Value Ref Range    Specimen Description Midstream Urine     Special Requests Specimen received in preservative     Culture Micro PENDING        Medications   nitroFURantoin macrocrystal-monohydrate (MACROBID) capsule 100 mg (100 mg Oral Given 11/14/20 2002)   phenazopyridine (PYRIDIUM) tablet 100 mg (100 mg Oral Given 11/14/20 2002)       Assessments & Plan (with Medical Decision Making)  Patient with symptoms suggestive of  urinary tract infection.  Urinalysis verifies findings for urinary tract infection.  Patient initiated on a course of nitrofurantoin.  She is instructed appropriate use of this medication.  Patient was also given information on ways of preventing recurrent infection.  Since she has had multiple infections I did suggest she follow-up in our clinic for recheck to make sure that this infection resolves.  If not, she may need consultation with urology given the recurrent UTI episodes.     I have reviewed the nursing notes.    I have reviewed the findings, diagnosis, plan and need for follow up with the patient.       Discharge Medication List as of 11/14/2020  7:37 PM      START taking these medications    Details   nitroFURantoin macrocrystal-monohydrate (MACROBID) 100 MG capsule Take 1 capsule (100 mg) by mouth 2 times daily, Disp-14 capsule, R-0, E-Prescribe      phenazopyridine (PYRIDIUM) 200 MG tablet Take 1 tablet (200 mg) by mouth 3 times daily as needed for pain (will turn your urine orange color), Disp-10 tablet, R-0, E-Prescribe                  I verbally discussed the findings of the evaluation today in the ER. I have verbally discussed with Unique the suggested treatment(s) as described in the discharge instructions and handouts. I have prescribed the above listed medications and instructed her on appropriate use of these medications.      I have verbally suggested she follow-up in her clinic or return to the ER for increased symptoms. See the follow-up recommendations documented  in the after visit summary in this visit's EPIC chart.    Final diagnoses:   Dysuria   Acute cystitis without hematuria   Recurrent urinary tract infection       11/14/2020   [facility] EMERGENCY DEPT     Jared Bowman,   11/15/20 0552

## 2020-11-15 NOTE — DISCHARGE INSTRUCTIONS
Please read and follow the handout(s) instructions. Return, if needed, for increased or worsening symptoms and as directed by the handout(s).    I sent your new medications script(s) to your requested pharmacy. Please increase your fluid intake. Water is best. Blueberries and cranberries may help your infection as well. They possibly make the lining of your bladder slippery so the bacteria can not easily attach themselves to the wall of the bladder making it easier for the water you drink to flush them out.  Eating yogurt once or twice a day during the time you're on the antibiotics can help prevent diarrhea and yeast infections that can be caused by the antibiotics. Yogurt may also help prevent a return of the infection if eating daily. The Pyridium medication can be used as needed for pain with urination.  It can help with the pain/ burning associated with this infection. You should expect to see your urine turn a bright orange color if you use this medication. This is normal for this medication. If you are not improved in the next 3 days you should be rechecked in your clinic. Return to the ER, if needed for increased symptoms, as directed by your handout.    I hope you feel better soon!    Electronically signed, Jared Bowman DO

## 2020-11-16 ENCOUNTER — TELEPHONE (OUTPATIENT)
Dept: ANTICOAGULATION | Facility: OTHER | Age: 80
End: 2020-11-16

## 2020-11-16 LAB
BACTERIA SPEC CULT: ABNORMAL
Lab: ABNORMAL
SPECIMEN SOURCE: ABNORMAL

## 2020-11-16 NOTE — TELEPHONE ENCOUNTER
ANTICOAGULATION  MANAGEMENT: Discharge Review    Unique Ward chart reviewed for anticoagulation continuity of care    Emergency room visit on 11/14/2020 for Dysuria.    Discharge disposition: Home    Results:    No results for input(s): INR, [id number], AXA in the last 168 hours.  Anticoagulation inpatient management:     not applicable     Anticoagulation discharge instructions:     Warfarin dosing: home regimen continued   Bridging: No   INR goal change: No      Medication changes affecting anticoagulation: Yes: Macrobid for 7 days     Additional factors affecting anticoagulation: No    Plan         Left a detailed message for Aaliyah to call back to schedule a lab only appt. Pt also does eat a lot of Vit K so advised she can just increase this while on her antiboitic and check her INR as previously planned on 12/3/2020 if she would like.     No adjustment to Anticoagulation Calendar was required    Veronika Campbell RN

## 2020-12-03 ENCOUNTER — OFFICE VISIT (OUTPATIENT)
Dept: FAMILY MEDICINE | Facility: CLINIC | Age: 80
End: 2020-12-03
Payer: COMMERCIAL

## 2020-12-03 ENCOUNTER — ANTICOAGULATION THERAPY VISIT (OUTPATIENT)
Dept: ANTICOAGULATION | Facility: CLINIC | Age: 80
End: 2020-12-03
Payer: COMMERCIAL

## 2020-12-03 ENCOUNTER — APPOINTMENT (OUTPATIENT)
Dept: LAB | Facility: CLINIC | Age: 80
End: 2020-12-03
Payer: COMMERCIAL

## 2020-12-03 VITALS
OXYGEN SATURATION: 99 % | BODY MASS INDEX: 21.19 KG/M2 | WEIGHT: 134.5 LBS | TEMPERATURE: 98.6 F | DIASTOLIC BLOOD PRESSURE: 80 MMHG | SYSTOLIC BLOOD PRESSURE: 138 MMHG | HEART RATE: 97 BPM | RESPIRATION RATE: 12 BRPM

## 2020-12-03 DIAGNOSIS — N39.0 RECURRENT UTI: ICD-10-CM

## 2020-12-03 DIAGNOSIS — Z79.01 LONG TERM CURRENT USE OF ANTICOAGULANT THERAPY: ICD-10-CM

## 2020-12-03 DIAGNOSIS — I48.91 ATRIAL FIBRILLATION, UNSPECIFIED TYPE (H): ICD-10-CM

## 2020-12-03 DIAGNOSIS — E87.1 HYPONATREMIA: ICD-10-CM

## 2020-12-03 DIAGNOSIS — N30.01 ACUTE CYSTITIS WITH HEMATURIA: Primary | ICD-10-CM

## 2020-12-03 DIAGNOSIS — R30.0 DYSURIA: ICD-10-CM

## 2020-12-03 LAB
ALBUMIN UR-MCNC: NEGATIVE MG/DL
APPEARANCE UR: ABNORMAL
BILIRUB UR QL STRIP: NEGATIVE
COLOR UR AUTO: ABNORMAL
GLUCOSE UR STRIP-MCNC: NEGATIVE MG/DL
HGB UR QL STRIP: ABNORMAL
INR BLD: 1.8 (ref 0.86–1.14)
KETONES UR STRIP-MCNC: NEGATIVE MG/DL
LEUKOCYTE ESTERASE UR QL STRIP: ABNORMAL
MUCOUS THREADS #/AREA URNS LPF: PRESENT /LPF
NITRATE UR QL: POSITIVE
PH UR STRIP: 7 PH (ref 5–7)
RBC #/AREA URNS AUTO: 3 /HPF (ref 0–2)
SODIUM SERPL-SCNC: 134 MMOL/L (ref 133–144)
SOURCE: ABNORMAL
SP GR UR STRIP: 1 (ref 1–1.03)
UROBILINOGEN UR STRIP-MCNC: 4 MG/DL (ref 0–2)
WBC #/AREA URNS AUTO: >182 /HPF (ref 0–5)
WBC CLUMPS #/AREA URNS HPF: PRESENT /HPF

## 2020-12-03 PROCEDURE — 99213 OFFICE O/P EST LOW 20 MIN: CPT | Performed by: PHYSICIAN ASSISTANT

## 2020-12-03 PROCEDURE — 99207 PR NO CHARGE NURSE ONLY: CPT | Performed by: FAMILY MEDICINE

## 2020-12-03 PROCEDURE — 84295 ASSAY OF SERUM SODIUM: CPT | Performed by: FAMILY MEDICINE

## 2020-12-03 PROCEDURE — 87086 URINE CULTURE/COLONY COUNT: CPT | Performed by: PHYSICIAN ASSISTANT

## 2020-12-03 PROCEDURE — 81001 URINALYSIS AUTO W/SCOPE: CPT | Performed by: PHYSICIAN ASSISTANT

## 2020-12-03 PROCEDURE — 36415 COLL VENOUS BLD VENIPUNCTURE: CPT | Performed by: FAMILY MEDICINE

## 2020-12-03 PROCEDURE — 85610 PROTHROMBIN TIME: CPT | Performed by: FAMILY MEDICINE

## 2020-12-03 RX ORDER — CEPHALEXIN 500 MG/1
500 CAPSULE ORAL 2 TIMES DAILY
Qty: 14 CAPSULE | Refills: 0 | Status: SHIPPED | OUTPATIENT
Start: 2020-12-03 | End: 2020-12-10

## 2020-12-03 ASSESSMENT — ENCOUNTER SYMPTOMS
ABDOMINAL PAIN: 0
FLANK PAIN: 0
DYSURIA: 1
HEMATURIA: 0
FEVER: 0
NAUSEA: 0
CHILLS: 0
VOMITING: 0
FATIGUE: 0
FREQUENCY: 1

## 2020-12-03 NOTE — PATIENT INSTRUCTIONS
Take full course of antibiotics- Keflex twice daily for 7 days.  Urine culture pending, we will call you only if culture shows resistance and change of antibiotic is required or if no growth to stop antibiotics and to follow-up with your primary care provider.  May use over the counter AZO pain relief or Uristat (phenazopyridine) for urinary burning but do not use for 24 hours before future urine tests.  Drink plenty of fluids.  May take Tylenol as needed for discomfort.   If you develop any vomiting, high fevers or lower back pain, these can be signs of a kidney infection and you should be seen in urgent care or in the ER.  Please follow up with primary care provider if symptoms return, if you're not improving, worsening or new symptoms or for any adverse reactions to medications.

## 2020-12-03 NOTE — PROGRESS NOTES
Subjective     Unique Ward is a 80 year old female who presents to clinic today for the following health issues:    HPI         Genitourinary - Female  Onset/Duration: since late yesterday afternoon  Description:   Painful urination (Dysuria): YES           Frequency: YES  Blood in urine (Hematuria): no  Delay in urine (Hesitency): no  Intensity: 2-3/10  Progression of Symptoms:  worsening  Accompanying Signs & Symptoms:  Fever/chills: no  Flank pain: no  Nausea and vomiting: no  Vaginal symptoms: none  Abdominal/Pelvic Pain: no  History:   History of frequent UTI s: YES  History of kidney stones: no  Sexually Active: no  Possibility of pregnancy: No  Precipitating or alleviating factors: None  Therapies tried and outcome: Pyridium and increased fluid intake which lessoned symptoms    Aaliyah presents today for evaluation of a presumed UTI. She had a UTI diagnosed in the ED 3 weeks ago and was treated with Macrobid twice daily for 7 days. Her symptoms resolved. Yesterday she noticed a very sudden onset of dysuria and urinary frequency. Symptoms improved significantly with pyridium and she was able to sleep last night. She has not had flank pain, abdominal pain, nausea, vomiting or a fever. She has been incontinent of stool a few times recently.     Review of Systems   Constitutional: Negative for chills, fatigue and fever.   Gastrointestinal: Negative for abdominal pain, nausea and vomiting.   Genitourinary: Positive for dysuria and frequency. Negative for flank pain, genital sores, hematuria, pelvic pain, vaginal discharge and vaginal pain.          Objective    /80 (BP Location: Right arm)   Pulse 97   Temp 98.6  F (37  C) (Temporal)   Resp 12   Wt 61 kg (134 lb 8 oz)   LMP  (LMP Unknown)   SpO2 99%   BMI 21.19 kg/m    Body mass index is 21.19 kg/m .  Physical Exam   GENERAL: healthy, alert and no distress  PSYCH: mentation appears normal, affect normal/bright    Results for orders placed or performed  in visit on 12/03/20   UA reflex to Microscopic ([facility])     Status: Abnormal   Result Value Ref Range    Color Urine Kelly     Appearance Urine Slightly Cloudy     Glucose Urine Negative NEG^Negative mg/dL    Bilirubin Urine Negative NEG^Negative    Ketones Urine Negative NEG^Negative mg/dL    Specific Gravity Urine 1.005 1.003 - 1.035    Blood Urine Small (A) NEG^Negative    pH Urine 7.0 5.0 - 7.0 pH    Protein Albumin Urine Negative NEG^Negative mg/dL    Urobilinogen mg/dL 4.0 (H) 0.0 - 2.0 mg/dL    Nitrite Urine Positive (A) NEG^Negative    Leukocyte Esterase Urine Large (A) NEG^Negative    Source Unspecified Urine     RBC Urine 3 (H) 0 - 2 /HPF    WBC Urine >182 (H) 0 - 5 /HPF    WBC Clumps Present (A) NEG^Negative /HPF    Mucous Urine Present (A) NEG^Negative /LPF   Sodium     Status: None   Result Value Ref Range    Sodium 134 133 - 144 mmol/L   INR point of care     Status: Abnormal   Result Value Ref Range    INR Point of Care 1.8 (H) 0.86 - 1.14           Assessment & Plan     Dysuria  - UA reflex to Microscopic ([facility])  - Urine Culture Aerobic Bacterial    Acute cystitis with hematuria  - cephALEXin (KEFLEX) 500 MG capsule; Take 1 capsule (500 mg) by mouth 2 times daily for 7 days    Recurrent UTI  - UROLOGY ADULT REFERRAL; Future      See urology for follow up of recurrent UTIs. Schedule with INR nurse to adjust Warfrin as needed.    Return in about 1 month (around 1/3/2021) for urology.    JANELL [facility]

## 2020-12-03 NOTE — PROGRESS NOTES
ANTICOAGULATION FOLLOW-UP CLINIC VISIT    Patient Name:  Unique Wadr  Date:  12/3/2020  Contact Type:  Telephone    SUBJECTIVE:  Patient Findings     Positives:  Change in medications (Started on Keflex today)    Comments:  I left a detailed voicemail with the orders below. I have also requested a call back if there have been any missed doses, concerns, illness, fever, or if there have been any changes in medications, activity level, or diet          Clinical Outcomes     Comments:  I left a detailed voicemail with the orders below. I have also requested a call back if there have been any missed doses, concerns, illness, fever, or if there have been any changes in medications, activity level, or diet             OBJECTIVE    Recent labs: (last 7 days)     20  0926   INR 1.8*       ASSESSMENT / PLAN  INR assessment SUB    Recheck INR In: 4 DAYS    INR Location Outside lab      Anticoagulation Summary  As of 12/3/2020    INR goal:  2.0-3.0   TTR:  83.0 % (1 y)   INR used for dosin.8 (12/3/2020)   Warfarin maintenance plan:  10 mg (5 mg x 2) every Mon, Fri; 7.5 mg (5 mg x 1.5) all other days   Full warfarin instructions:  10 mg every Mon, Fri; 7.5 mg all other days   Weekly warfarin total:  57.5 mg   No change documented:  Veronika Campbell RN   Plan last modified:  Veronika Campbell RN (2020)   Next INR check:  2020   Priority:  Maintenance   Target end date:  Indefinite    Indications    Atrial fibrillation (H) [I48.91]  Long term current use of anticoagulant therapy [Z79.01]  Atrial fibrillation  unspecified type (H) [I48.91]             Anticoagulation Episode Summary     INR check location:      Preferred lab:      Send INR reminders to:  [facility]    Comments:  5 mg tabs, PM dose, print out      Anticoagulation Care Providers     Provider Role Specialty Phone number    Tyler eLe MD Referring Family Medicine [phone]            See the Encounter Report to view  Anticoagulation Flowsheet and Dosing Calendar (Go to Encounters tab in chart review, and find the Anticoagulation Therapy Visit)    Dosage adjustment made based on physician directed care plan.    Veronika Campbell RN

## 2020-12-04 LAB
BACTERIA SPEC CULT: NORMAL
Lab: NORMAL
SPECIMEN SOURCE: NORMAL

## 2020-12-04 NOTE — RESULT ENCOUNTER NOTE
Urine culture remains negative with only urogenital francisco, likely contamination.     Amy Sánchez PA-C  [facility]

## 2020-12-07 ENCOUNTER — ANTICOAGULATION THERAPY VISIT (OUTPATIENT)
Dept: ANTICOAGULATION | Facility: CLINIC | Age: 80
End: 2020-12-07

## 2020-12-07 DIAGNOSIS — I48.91 ATRIAL FIBRILLATION, UNSPECIFIED TYPE (H): ICD-10-CM

## 2020-12-07 DIAGNOSIS — Z79.01 LONG TERM CURRENT USE OF ANTICOAGULANT THERAPY: ICD-10-CM

## 2020-12-07 LAB
CAPILLARY BLOOD COLLECTION: NORMAL
INR BLD: 2.9 (ref 0.86–1.14)

## 2020-12-07 PROCEDURE — 99207 PR NO CHARGE NURSE ONLY: CPT | Performed by: FAMILY MEDICINE

## 2020-12-07 PROCEDURE — 36416 COLLJ CAPILLARY BLOOD SPEC: CPT | Performed by: FAMILY MEDICINE

## 2020-12-07 PROCEDURE — 85610 PROTHROMBIN TIME: CPT | Performed by: FAMILY MEDICINE

## 2020-12-07 NOTE — PROGRESS NOTES
ANTICOAGULATION FOLLOW-UP CLINIC VISIT    Patient Name:  Unique Ward  Date:  2020  Contact Type:  Telephone    SUBJECTIVE:  Patient Findings     Positives:  Change in medications (still on keflex for 3 more days)             OBJECTIVE    Recent labs: (last 7 days)     20  1030   INR 2.9*       ASSESSMENT / PLAN  INR assessment THER    Recheck INR In: 2 WEEKS    INR Location Outside lab      Anticoagulation Summary  As of 2020    INR goal:  2.0-3.0   TTR:  83.9 % (1 y)   INR used for dosin.9 (2020)   Warfarin maintenance plan:  10 mg (5 mg x 2) every Mon, Fri; 7.5 mg (5 mg x 1.5) all other days   Full warfarin instructions:  : 7.5 mg; Otherwise 10 mg every Mon, Fri; 7.5 mg all other days   Weekly warfarin total:  57.5 mg   Plan last modified:  Veronika Campbell RN (2020)   Next INR check:  2020   Priority:  Maintenance   Target end date:  Indefinite    Indications    Atrial fibrillation (H) [I48.91]  Long term current use of anticoagulant therapy [Z79.01]  Atrial fibrillation  unspecified type (H) [I48.91]             Anticoagulation Episode Summary     INR check location:      Preferred lab:      Send INR reminders to:  [facility]    Comments:  5 mg tabs, PM dose, print out      Anticoagulation Care Providers     Provider Role Specialty Phone number    Tyler Lee MD Referring Family Medicine [phone]            See the Encounter Report to view Anticoagulation Flowsheet and Dosing Calendar (Go to Encounters tab in chart review, and find the Anticoagulation Therapy Visit)    Dosage adjustment made based on physician directed care plan.    Veronika Campbell RN

## 2020-12-16 NOTE — PROGRESS NOTES
"Unique Ward is a 80 year old female who is being evaluated via a billable video visit.      The patient has been notified of following:     \"This video visit will be conducted via a call between you and your physician/provider. We have found that certain health care needs can be provided without the need for an in-person physical exam.  This service lets us provide the care you need with a video conversation.  If a prescription is necessary we can send it directly to your pharmacy.  If lab work is needed we can place an order for that and you can then stop by our lab to have the test done at a later time.    Video visits are billed at different rates depending on your insurance coverage.  Please reach out to your insurance provider with any questions.    If during the course of the call the physician/provider feels a video visit is not appropriate, you will not be charged for this service.\"    Patient has given verbal consent for Video visit? Yes  How would you like to obtain your AVS? MyChart  If you are dropped from the video visit, the video invite should be resent to: Text to cell phone: [phone]  Will anyone else be joining your video visit? No        Video-Visit Details    S: Patient is a pleasant 80-year-old  female who was seen for a consultation with regard to patient's recurrent urine tract infections.  Patient has had recurrent urine tract infections for a number of years.  The symptoms are usually frequency, urgency, dysuria with lower abdominal pain.  She denies any fever nausea vomiting or any gross hematuria.  He has no history of kidney stones or kidney diseases in the past.  She is not sexually active.  She denies any bowel issues.  Urine culture have shown E. coli and has becoming more resistance to the antibiotics.  Current Outpatient Medications   Medication Sig Dispense Refill     amLODIPine (NORVASC) 5 MG tablet Take 1 tablet (5 mg) by mouth daily 90 tablet 3     cholecalciferol " (VITAMIN D3) 5000 units TABS tablet Take 2,500 mcg by mouth daily        conjugated estrogens (PREMARIN) 0.625 MG/GM vaginal cream Place 1 g vaginally twice a week Use daily first week of use 30 g 11     magnesium 100 MG TABS Take 1 tablet by mouth daily Takes 3 tabs (300mg) daily       meclizine (ANTIVERT) 25 MG tablet Take 1 tablet (25 mg) by mouth 3 times daily as needed for dizziness 21 tablet 0     warfarin ANTICOAGULANT (COUMADIN ANTICOAGULANT) 5 MG tablet Take 10 mg Mon, Fri and 7.5 mg all other days or as directed by the coumadin clinic. 145 tablet 1     Allergies   Allergen Reactions     No Clinical Screening - See Comments      Eye drop antibiotic.     Codeine Other (See Comments)     Comment: , Description:      Gentamicin Hives     Per Unique this medication gives hives on arms and legs. Bev Lo LSXO,  ....................  7/15/2014   1:15 PM     Influenza Vaccines Other (See Comments)     Comment: , Description:      Influenza Virus Vaccine H5n1      Paxil [Paroxetine] Other (See Comments)     Comment: , Description:      Typhoid Vaccine-Strain Ty21a Other (See Comments)     Comment: , Description:   Comment: , Description:      Typhoid Vaccines      Past Medical History:   Diagnosis Date     Arthritis 01.01.1980     Atrial fibrillation (H) 6/7/2019     Cancer (H) 01.01.1993     scalp    Basal Cell X2  head      nose bridge 1996     Depressive disorder 01.01.1977    anxiety     Hypertension 03.16.2016     Past Surgical History:   Procedure Laterality Date     BIOPSY  11/01/2014     COLONOSCOPY  01.01/1979    Two routine,  one for chronic diarrhea     EP PACEMAKER N/A 3/31/2020    Procedure: EP Pacemaker;  Surgeon: Erich Clark MD;  Location:  [facility]     EYE SURGERY  1/1/2014    first    cataracts both eyes.  second a few months later     GENITOURINARY SURGERY       GYN SURGERY  01.01.1977    TL     HEAD & NECK SURGERY  01.01.1997    basal cell X2   Moh's on scalp.   other on bridge of nose     SOFT TISSUE SURGERY  1990    Ganglion Cyst   Left inner wrist      Family History   Problem Relation Age of Onset     Diabetes Maternal Grandmother      Coronary Artery Disease Sister         error     Coronary Artery Disease Brother         error     Hypertension Brother      Hyperlipidemia Brother      Cerebrovascular Disease Brother      Hypertension Sister      Hypertension Sister      Hyperlipidemia Sister      Other Cancer Sister         cervical     Breast Cancer Sister      Other Cancer Sister         kidney     Mental Illness Mother         paranoid/schizophrenic/suicide     Mental Illness Sister         ???  Had shock treatments     Social History     Socioeconomic History     Marital status:      Spouse name: Not on file     Number of children: Not on file     Years of education: Not on file     Highest education level: Not on file   Occupational History     Not on file   Social Needs     Financial resource strain: Not on file     Food insecurity     Worry: Not on file     Inability: Not on file     Transportation needs     Medical: Not on file     Non-medical: Not on file   Tobacco Use     Smoking status: Former Smoker     Packs/day: 0.75     Years: 10.00     Pack years: 7.50     Types: Cigarettes     Start date: 1955     Quit date: 1965     Years since quittin.9     Smokeless tobacco: Never Used   Substance and Sexual Activity     Alcohol use: No     Alcohol/week: 0.0 standard drinks     Drug use: No     Sexual activity: Not Currently   Lifestyle     Physical activity     Days per week: Not on file     Minutes per session: Not on file     Stress: Not on file   Relationships     Social connections     Talks on phone: Not on file     Gets together: Not on file     Attends Protestant service: Not on file     Active member of club or organization: Not on file     Attends meetings of clubs or organizations: Not on file     Relationship status: Not on file      Intimate partner violence     Fear of current or ex partner: Not on file     Emotionally abused: Not on file     Physically abused: Not on file     Forced sexual activity: Not on file   Other Topics Concern     Parent/sibling w/ CABG, MI or angioplasty before 65F 55M? No   Social History Narrative     Not on file       REVIEW OF SYSTEMS  =================  C: NEGATIVE for fever, chills, change in weight  I: NEGATIVE for worrisome rashes, moles or lesions  E/M: NEGATIVE for ear, mouth and throat problems  R: NEGATIVE for significant cough or SHORTNESS OF BREATH  CV:  NEGATIVE for chest pain, palpitations or peripheral edema  GI: NEGATIVE for nausea, abdominal pain, heartburn, or change in bowel habits  NEURO: NEGATIVE numbness/weakness  : see HPI  PSYCH: NEGATIVE depression/anxiety  LYmph: no new enlarged lymph nodes  Ortho: no new trauma/movements      Physical Exam:  GENERAL: Healthy, alert and no distress  EYES: Eyes grossly normal to inspection.  No discharge or erythema, or obvious scleral/conjunctival abnormalities.  RESP: No audible wheeze, cough, or visible cyanosis.  No visible retractions or increased work of breathing.    SKIN: Visible skin clear. No significant rash, abnormal pigmentation or lesions.  NEURO: Cranial nerves grossly intact.  Mentation and speech appropriate for age.  PSYCH: Mentation appears normal, affect normal/bright, judgement and insight intact, normal speech and appearance well-groomed.    Assessment/Plan:   Pleasant 80-year-old  female with history of recurrent urine tract infection for a number of years.  Urine culture usually showed E. Coli.  #1 we will schedule patient for a CT scan stone protocol and cystoscopy next.  Cystoscopy can be done when my clinic is opened and [place].  #2 discussed Premarin usage.  Pros and cons discussed.  Will start patient on Premarin cream.  She can contact us if the cost is too high.         Type of service:  Video Visit    Video  Start Time: 1115  Video End Time: 1130    Originating Location (pt. Location): Home    Distant Location (provider location):  [facility]     Platform used for Video Visit: Malick Lucia MD

## 2020-12-17 ENCOUNTER — VIRTUAL VISIT (OUTPATIENT)
Dept: UROLOGY | Facility: CLINIC | Age: 80
End: 2020-12-17
Attending: PHYSICIAN ASSISTANT
Payer: COMMERCIAL

## 2020-12-17 DIAGNOSIS — N39.0 RECURRENT UTI: ICD-10-CM

## 2020-12-17 PROCEDURE — 99203 OFFICE O/P NEW LOW 30 MIN: CPT | Mod: 95 | Performed by: UROLOGY

## 2020-12-22 ENCOUNTER — HOSPITAL ENCOUNTER (OUTPATIENT)
Dept: CT IMAGING | Facility: CLINIC | Age: 80
Discharge: HOME | End: 2020-12-22
Attending: UROLOGY | Admitting: UROLOGY
Payer: MEDICARE

## 2020-12-22 ENCOUNTER — ANTICOAGULATION THERAPY VISIT (OUTPATIENT)
Dept: ANTICOAGULATION | Facility: CLINIC | Age: 80
End: 2020-12-22

## 2020-12-22 DIAGNOSIS — I48.91 ATRIAL FIBRILLATION, UNSPECIFIED TYPE (H): ICD-10-CM

## 2020-12-22 DIAGNOSIS — Z79.01 LONG TERM CURRENT USE OF ANTICOAGULANT THERAPY: ICD-10-CM

## 2020-12-22 DIAGNOSIS — N39.0 RECURRENT UTI: ICD-10-CM

## 2020-12-22 LAB
CAPILLARY BLOOD COLLECTION: NORMAL
INR BLD: 2.5 (ref 0.86–1.14)

## 2020-12-22 PROCEDURE — 74176 CT ABD & PELVIS W/O CONTRAST: CPT

## 2020-12-22 PROCEDURE — 85610 PROTHROMBIN TIME: CPT | Performed by: FAMILY MEDICINE

## 2020-12-22 PROCEDURE — 36416 COLLJ CAPILLARY BLOOD SPEC: CPT | Performed by: FAMILY MEDICINE

## 2020-12-22 NOTE — PROGRESS NOTES
ANTICOAGULATION MANAGEMENT     Patient Name:  Unique Ward  Date:  2020    ASSESSMENT /SUBJECTIVE:    Today's INR result of 2.5 is therapeutic. Goal INR of 2.0-3.0      Warfarin dose taken: Warfarin taken as instructed    Diet: No new diet changes affecting INR    Medication changes/ interactions: No new medications/supplements affecting INR    Previous INR: Therapeutic     S/S of bleeding or thromboembolism: No    New injury or illness: No    Upcoming surgery, procedure or cardioversion: No    Additional findings: None      PLAN:    Detailed message left for Unique regarding INR result and instructed:     Warfarin Dosing Instructions: Continue your current warfarin dose 10 mg Mon, Fri and 7.5 mg ROW    Instructed patient to follow up no later than: 4 weeks  Left detailed message with recommended recheck date    Education provided: None required      I left a detailed voicemail with the orders reflected in flowsheet. I have also requested a call back if there have been any missed doses, concerns, illness, fever, or if there have been any changes in medications, activity level, or diet    Instructed to call the Anticoagulation Clinic for any changes, questions or concerns. (#[phone])        Neelima Higgins RN      OBJECTIVE:  Recent labs: (last 7 days)     20  1026   INR 2.5*         No question data found.  Anticoagulation Summary  As of 2020    INR goal:  2.0-3.0   TTR:  83.9 % (1 y)   INR used for dosin.5 (2020)   Warfarin maintenance plan:  10 mg (5 mg x 2) every Mon, Fri; 7.5 mg (5 mg x 1.5) all other days   Full warfarin instructions:  10 mg every Mon, Fri; 7.5 mg all other days   Weekly warfarin total:  57.5 mg   No change documented:  Neelima Higgins RN   Plan last modified:  Veronika Campbell RN (2020)   Next INR check:  2021   Priority:  Maintenance   Target end date:  Indefinite    Indications    Atrial fibrillation (H) [I48.91]  Long term current use of  anticoagulant therapy [Z79.01]  Atrial fibrillation  unspecified type (H) [I48.91]             Anticoagulation Episode Summary     INR check location:      Preferred lab:      Send INR reminders to:  [facility]    Comments:  5 mg tabs, PM dose, print out      Anticoagulation Care Providers     Provider Role Specialty Phone number    [facility] MD Referring Family Medicine [phone]

## 2020-12-27 ENCOUNTER — HEALTH MAINTENANCE LETTER (OUTPATIENT)
Age: 80
End: 2020-12-27

## 2020-12-30 ENCOUNTER — MYC MEDICAL ADVICE (OUTPATIENT)
Dept: FAMILY MEDICINE | Facility: CLINIC | Age: 80
End: 2020-12-30

## 2020-12-30 ENCOUNTER — MYC MEDICAL ADVICE (OUTPATIENT)
Dept: UROLOGY | Facility: CLINIC | Age: 80
End: 2020-12-30

## 2020-12-30 DIAGNOSIS — N39.0 RECURRENT UTI: Primary | ICD-10-CM

## 2020-12-31 RX ORDER — ESTRADIOL 0.1 MG/G
2 CREAM VAGINAL
Qty: 42.5 G | Refills: 11 | Status: SHIPPED | OUTPATIENT
Start: 2020-12-31 | End: 2022-02-24

## 2021-01-11 ENCOUNTER — OFFICE VISIT (OUTPATIENT)
Dept: FAMILY MEDICINE | Facility: CLINIC | Age: 81
End: 2021-01-11
Payer: COMMERCIAL

## 2021-01-11 VITALS
HEIGHT: 67 IN | WEIGHT: 139.2 LBS | HEART RATE: 82 BPM | SYSTOLIC BLOOD PRESSURE: 138 MMHG | TEMPERATURE: 99.2 F | OXYGEN SATURATION: 96 % | RESPIRATION RATE: 12 BRPM | BODY MASS INDEX: 21.85 KG/M2 | DIASTOLIC BLOOD PRESSURE: 72 MMHG

## 2021-01-11 DIAGNOSIS — N39.0 RECURRENT UTI: ICD-10-CM

## 2021-01-11 DIAGNOSIS — N30.00 ACUTE CYSTITIS WITHOUT HEMATURIA: Primary | ICD-10-CM

## 2021-01-11 DIAGNOSIS — I48.91 ATRIAL FIBRILLATION, UNSPECIFIED TYPE (H): ICD-10-CM

## 2021-01-11 DIAGNOSIS — R30.0 DYSURIA: Primary | ICD-10-CM

## 2021-01-11 LAB
ALBUMIN UR-MCNC: NEGATIVE MG/DL
APPEARANCE UR: ABNORMAL
BACTERIA #/AREA URNS HPF: ABNORMAL /HPF
BILIRUB UR QL STRIP: NEGATIVE
COLOR UR AUTO: YELLOW
GLUCOSE UR STRIP-MCNC: NEGATIVE MG/DL
HGB UR QL STRIP: NEGATIVE
HYALINE CASTS #/AREA URNS LPF: 3 /LPF (ref 0–2)
KETONES UR STRIP-MCNC: NEGATIVE MG/DL
LEUKOCYTE ESTERASE UR QL STRIP: ABNORMAL
MUCOUS THREADS #/AREA URNS LPF: PRESENT /LPF
NITRATE UR QL: NEGATIVE
PH UR STRIP: 7 PH (ref 5–7)
RBC #/AREA URNS AUTO: 7 /HPF (ref 0–2)
SOURCE: ABNORMAL
SP GR UR STRIP: 1.01 (ref 1–1.03)
SQUAMOUS #/AREA URNS AUTO: 1 /HPF (ref 0–1)
UROBILINOGEN UR STRIP-MCNC: 0 MG/DL (ref 0–2)
WBC #/AREA URNS AUTO: >182 /HPF (ref 0–5)

## 2021-01-11 PROCEDURE — 99214 OFFICE O/P EST MOD 30 MIN: CPT | Performed by: NURSE PRACTITIONER

## 2021-01-11 PROCEDURE — 81001 URINALYSIS AUTO W/SCOPE: CPT | Performed by: NURSE PRACTITIONER

## 2021-01-11 PROCEDURE — 87186 SC STD MICRODIL/AGAR DIL: CPT | Performed by: NURSE PRACTITIONER

## 2021-01-11 PROCEDURE — 87088 URINE BACTERIA CULTURE: CPT | Performed by: NURSE PRACTITIONER

## 2021-01-11 PROCEDURE — 87086 URINE CULTURE/COLONY COUNT: CPT | Performed by: NURSE PRACTITIONER

## 2021-01-11 RX ORDER — NITROFURANTOIN 25; 75 MG/1; MG/1
100 CAPSULE ORAL 2 TIMES DAILY
Qty: 14 CAPSULE | Refills: 0 | Status: SHIPPED | OUTPATIENT
Start: 2021-01-11 | End: 2021-01-18

## 2021-01-11 ASSESSMENT — MIFFLIN-ST. JEOR: SCORE: 1129.04

## 2021-01-11 ASSESSMENT — PAIN SCALES - GENERAL: PAINLEVEL: NO PAIN (0)

## 2021-01-11 NOTE — PROGRESS NOTES
Assessment & Plan     Acute cystitis without hematuria  Reviewed last 3 visits and treatments for UTI to include her visit with Urology and CT imaging.  Will treat with macrobid as she was sensitive to this with UC in the past with e-coli and try to avoid abx that will interact with her coumadin and atrial fibrillation  Also now with 4 abx in past four months.  She is eating yogurt daily.  She has INR check in one week.  Recommend she return to check urine after completion of antibiotics.  did discuss care with Dr. Orellana due to complexity of patient and he is in agreement with treatment plan Follow up in clinic if no improvement, worsening symptoms, or concerns.   - nitroFURantoin macrocrystal-monohydrate (MACROBID) 100 MG capsule; Take 1 capsule (100 mg) by mouth 2 times daily for 7 days  - **UA reflex to Microscopic FUTURE 14d; Future    Recurrent UTI  As above  - **UA reflex to Microscopic FUTURE 14d; Future    Atrial fibrillation, unspecified type (H)  Followed by cardiology.  On coumadin.  Has pacemaker.       Review of external notes as documented above   Independent interpretation of a test performed by another physician/other qualified health care professional (not separately reported) - na  Discussion of management or test interpretation with external physician/other qualified healthcare professional/appropriate source - did discuss care with Dr. Orellana due to complexity of patient and he is in agreement with treatment plan.        35 minutes spent on the date of the encounter doing chart review, history and exam, documentation and further activities as noted above    {Provider  Link to [facility] Help Grid :222297}           No follow-ups on file.    Araceli Crowley NP  [facility]    Joseph     Unique Ward is a 80 year old female who presents to clinic today for the following health issues accompanied by her self:    HPI     Genitourinary - Female  Onset/Duration: 1 week  "  Description:   Painful urination (Dysuria): YES- off and on           Frequency: no  Blood in urine (Hematuria): no  Delay in urine (Hesitency): no  Intensity: mild  Progression of Symptoms:  same and waxing and waning  Accompanying Signs & Symptoms:  Fever/chills: no  Flank pain: no  Nausea and vomiting: no  Vaginal symptoms: none  Abdominal/Pelvic Pain: no  History:   History of frequent UTI s: YES  History of kidney stones: no  Sexually Active: no  Possibility of pregnancy: No  Precipitating or alleviating factors: None  Therapies tried and outcome: Increase fluid intake    Feels symptoms off and on.  Then almost went to ED yesterday.  Has not done urine to make sure infection is gone.  Always feels better on antibiotics the second day.  Denies fever, vomitting, chills, vaginal discharge, flank pain, back pain, bleeding.      Seen in ED 9/11/20 for UTI- treated with rocephin and bactrim culture showed no growth .  11/14/20 seen in ED for UTI.  Treated with macrobid- e- coli- culture sensitive to all abx.  Has office visit 12/3/20 treated with keflex and culture was normal francisco.  Referral to Urology with virtual visit 12/17/20.  CT scan- following with Dr. Lucia- started on topical estrogen.      Next INR the 18th- on coumadin for atrial fibrillation.          Review of Systems   Constitutional, HEENT, cardiovascular, pulmonary, gi and gu systems are negative, except as otherwise noted.      Objective    /72   Pulse 82   Temp 99.2  F (37.3  C)   Resp 12   Ht 1.702 m (5' 7\")   Wt 63.1 kg (139 lb 3.2 oz)   LMP  (LMP Unknown)   SpO2 96%   BMI 21.80 kg/m    Body mass index is 21.8 kg/m .  Physical Exam   GENERAL: healthy, alert and no distress  NECK: no adenopathy, no asymmetry, masses, or scars and thyroid normal to palpation  RESP: lungs clear to auscultation - no rales, rhonchi or wheezes  CV: regular rate and rhythm, normal S1 S2, no S3 or S4, no murmur, click or rub, no peripheral edema and " peripheral pulses strong  ABDOMEN: soft, nontender, no hepatosplenomegaly, no masses and bowel sounds normal  MS: no gross musculoskeletal defects noted, no edema    Results for orders placed or performed in visit on 01/11/21 (from the past 24 hour(s))   *UA reflex to Microscopic and Culture ([facility]; [facility]; [facility]; [facility]; [facility]; [place]; [place])    Specimen: Unspecified Urine   Result Value Ref Range    Color Urine Yellow     Appearance Urine Cloudy     Glucose Urine Negative NEG^Negative mg/dL    Bilirubin Urine Negative NEG^Negative    Ketones Urine Negative NEG^Negative mg/dL    Specific Gravity Urine 1.012 1.003 - 1.035    Blood Urine Negative NEG^Negative    pH Urine 7.0 5.0 - 7.0 pH    Protein Albumin Urine Negative NEG^Negative mg/dL    Urobilinogen mg/dL 0.0 0.0 - 2.0 mg/dL    Nitrite Urine Negative NEG^Negative    Leukocyte Esterase Urine Large (A) NEG^Negative    Source Unspecified Urine     RBC Urine 7 (H) 0 - 2 /HPF    WBC Urine >182 (H) 0 - 5 /HPF    Bacteria Urine Moderate (A) NEG^Negative /HPF    Squamous Epithelial /HPF Urine 1 0 - 1 /HPF    Mucous Urine Present (A) NEG^Negative /LPF    Hyaline Casts 3 (H) 0 - 2 /LPF

## 2021-01-12 LAB
BACTERIA SPEC CULT: ABNORMAL
Lab: ABNORMAL
SPECIMEN SOURCE: ABNORMAL

## 2021-01-18 ENCOUNTER — MYC MEDICAL ADVICE (OUTPATIENT)
Dept: FAMILY MEDICINE | Facility: CLINIC | Age: 81
End: 2021-01-18

## 2021-01-20 ENCOUNTER — ANTICOAGULATION THERAPY VISIT (OUTPATIENT)
Dept: ANTICOAGULATION | Facility: CLINIC | Age: 81
End: 2021-01-20

## 2021-01-20 DIAGNOSIS — I48.91 ATRIAL FIBRILLATION, UNSPECIFIED TYPE (H): ICD-10-CM

## 2021-01-20 DIAGNOSIS — N30.00 ACUTE CYSTITIS WITHOUT HEMATURIA: ICD-10-CM

## 2021-01-20 DIAGNOSIS — N39.0 RECURRENT UTI: ICD-10-CM

## 2021-01-20 DIAGNOSIS — Z79.01 LONG TERM CURRENT USE OF ANTICOAGULANT THERAPY: ICD-10-CM

## 2021-01-20 LAB
ALBUMIN UR-MCNC: NEGATIVE MG/DL
APPEARANCE UR: CLEAR
BILIRUB UR QL STRIP: NEGATIVE
CAPILLARY BLOOD COLLECTION: NORMAL
COLOR UR AUTO: YELLOW
GLUCOSE UR STRIP-MCNC: NEGATIVE MG/DL
HGB UR QL STRIP: NEGATIVE
INR BLD: 4.3 (ref 0.86–1.14)
KETONES UR STRIP-MCNC: NEGATIVE MG/DL
LEUKOCYTE ESTERASE UR QL STRIP: NEGATIVE
NITRATE UR QL: NEGATIVE
PH UR STRIP: 7 PH (ref 5–7)
SOURCE: NORMAL
SP GR UR STRIP: 1.01 (ref 1–1.03)
UROBILINOGEN UR STRIP-MCNC: 0 MG/DL (ref 0–2)

## 2021-01-20 PROCEDURE — 81003 URINALYSIS AUTO W/O SCOPE: CPT | Performed by: NURSE PRACTITIONER

## 2021-01-20 PROCEDURE — 36416 COLLJ CAPILLARY BLOOD SPEC: CPT | Performed by: FAMILY MEDICINE

## 2021-01-20 PROCEDURE — 85610 PROTHROMBIN TIME: CPT | Performed by: FAMILY MEDICINE

## 2021-01-20 NOTE — PROGRESS NOTES
ANTICOAGULATION MANAGEMENT     Patient Name:  Unique Ward  Date:  2021    ASSESSMENT /SUBJECTIVE:    Today's INR result of 4.3 is supratherapeutic. Goal INR of 2.0-3.0      Warfarin dose taken: Warfarin taken as instructed    Diet: Decreased greens/vitamin K in diet; plans to resume previous intake    Medication changes/ interactions: Interaction between Macrobid stopped on 21 and warfarin may be affecting INR    Previous INR: Therapeutic     S/S of bleeding or thromboembolism: No    New injury or illness: No    Upcoming surgery, procedure or cardioversion: No    Additional findings: None      PLAN:    Telephone call with Unique regarding INR result and instructed:     Warfarin Dosing Instructions: Hold today, take 5 mg tomorrow then continue your current warfarin dose of 10 mg Mon, Fri and 7.5 mg all other days    Instructed patient to follow up no later than: 10 days    Lab visit scheduled    Education provided: Importance of consistent vitamin K intake and Potential interaction between warfarin and Macrobid      Aaliyah verbalizes understanding and agrees to warfarin dosing plan.    Instructed to call the Anticoagulation Clinic for any changes, questions or concerns. (#[phone])        Veronika Campbell RN      OBJECTIVE:  Recent labs: (last 7 days)     21  1227   INR 4.3*         INR assessment SUPRA    Recheck INR In: 10 DAYS    INR Location Outside lab      Anticoagulation Summary  As of 2021    INR goal:  2.0-3.0   TTR:  78.1 % (1 y)   INR used for dosin.3 (2021)   Warfarin maintenance plan:  10 mg (5 mg x 2) every Mon, Fri; 7.5 mg (5 mg x 1.5) all other days   Full warfarin instructions:  : Hold; : 5 mg; Otherwise 10 mg every Mon, Fri; 7.5 mg all other days   Weekly warfarin total:  57.5 mg   Plan last modified:  Veronika Campbell RN (2020)   Next INR check:  2021   Priority:  Maintenance   Target end date:  Indefinite    Indications    Atrial fibrillation  (H) [I48.91]  Long term current use of anticoagulant therapy [Z79.01]  Atrial fibrillation  unspecified type (H) [I48.91]             Anticoagulation Episode Summary     INR check location:      Preferred lab:      Send INR reminders to:  [facility]    Comments:  5 mg tabs, PM dose, print out      Anticoagulation Care Providers     Provider Role Specialty Phone number    [facility] MD Referring Family Medicine [phone]

## 2021-02-01 ENCOUNTER — ANTICOAGULATION THERAPY VISIT (OUTPATIENT)
Dept: ANTICOAGULATION | Facility: CLINIC | Age: 81
End: 2021-02-01

## 2021-02-01 DIAGNOSIS — Z79.01 LONG TERM CURRENT USE OF ANTICOAGULANT THERAPY: ICD-10-CM

## 2021-02-01 DIAGNOSIS — I48.91 ATRIAL FIBRILLATION, UNSPECIFIED TYPE (H): ICD-10-CM

## 2021-02-01 LAB
CAPILLARY BLOOD COLLECTION: NORMAL
INR BLD: 3.6 (ref 0.86–1.14)

## 2021-02-01 PROCEDURE — 85610 PROTHROMBIN TIME: CPT | Performed by: FAMILY MEDICINE

## 2021-02-01 PROCEDURE — 36416 COLLJ CAPILLARY BLOOD SPEC: CPT | Performed by: FAMILY MEDICINE

## 2021-02-01 NOTE — PROGRESS NOTES
ANTICOAGULATION MANAGEMENT     Patient Name:  Unique Ward  Date:  2/1/2021    ASSESSMENT /SUBJECTIVE:    Today's INR result of 3.6 is supratherapeutic. Goal INR of 2.0-3.0      Warfarin dose taken: Warfarin taken as instructed    Diet: No new diet changes affecting INR    Medication changes/ interactions: No new medications/supplements affecting INR    Previous INR: Supratherapeutic     S/S of bleeding or thromboembolism: No    New injury or illness: No    Upcoming surgery, procedure or cardioversion: No    Additional findings: None      PLAN:    Detailed message left for Unique regarding INR result and instructed:     Warfarin Dosing Instructions: Take 5 mg today then change your warfarin dose to 10 mg Mon and 7.5 mg all other days  . (4.4 % change)    Instructed patient to follow up no later than: 1 week  Left detailed message with recommended recheck date    Education provided: None required      I left a detailed voicemail with the orders below. I have also requested a call back if there have been any missed doses, concerns, illness, fever, or if there have been any changes in medications, activity level, or diet      Instructed to call the Anticoagulation Clinic for any changes, questions or concerns. (#[phone])        Veronika Campbell, DIONTE      OBJECTIVE:  Recent labs: (last 7 days)     02/01/21  1328   INR 3.6*         No question data found.  Anticoagulation Summary  As of 2/1/2021    INR goal:  2.0-3.0   TTR:  74.8 % (1 y)   INR used for dosing:  3.6 (2/1/2021)   Warfarin maintenance plan:  10 mg (5 mg x 2) every Mon, Fri; 7.5 mg (5 mg x 1.5) all other days   Full warfarin instructions:  2/1: 5 mg; 2/5: 7.5 mg; Otherwise 10 mg every Mon, Fri; 7.5 mg all other days   Weekly warfarin total:  57.5 mg   Plan last modified:  Veronika Campbell, RN (1/23/2020)   Next INR check:  2/8/2021   Priority:  Maintenance   Target end date:  Indefinite    Indications    Atrial fibrillation (H) [I48.91]  Long term  current use of anticoagulant therapy [Z79.01]  Atrial fibrillation  unspecified type (H) [I48.91]             Anticoagulation Episode Summary     INR check location:      Preferred lab:      Send INR reminders to:  [facility]    Comments:  5 mg tabs, PM dose, print out      Anticoagulation Care Providers     Provider Role Specialty Phone number    [facility] Lee MD Referring Family Medicine [phone]

## 2021-02-02 ENCOUNTER — TELEPHONE (OUTPATIENT)
Dept: FAMILY MEDICINE | Facility: CLINIC | Age: 81
End: 2021-02-02

## 2021-02-07 ENCOUNTER — ANCILLARY PROCEDURE (OUTPATIENT)
Dept: CARDIOLOGY | Facility: CLINIC | Age: 81
End: 2021-02-07
Attending: INTERNAL MEDICINE
Payer: COMMERCIAL

## 2021-02-07 PROCEDURE — 93294 REM INTERROG EVL PM/LDLS PM: CPT | Performed by: INTERNAL MEDICINE

## 2021-02-07 PROCEDURE — 93296 REM INTERROG EVL PM/IDS: CPT | Performed by: INTERNAL MEDICINE

## 2021-02-08 ENCOUNTER — ANTICOAGULATION THERAPY VISIT (OUTPATIENT)
Dept: ANTICOAGULATION | Facility: CLINIC | Age: 81
End: 2021-02-08

## 2021-02-08 DIAGNOSIS — I48.91 ATRIAL FIBRILLATION, UNSPECIFIED TYPE (H): ICD-10-CM

## 2021-02-08 DIAGNOSIS — Z79.01 LONG TERM CURRENT USE OF ANTICOAGULANT THERAPY: ICD-10-CM

## 2021-02-08 LAB
CAPILLARY BLOOD COLLECTION: NORMAL
INR BLD: 3.3 (ref 0.86–1.14)

## 2021-02-08 PROCEDURE — 36416 COLLJ CAPILLARY BLOOD SPEC: CPT | Performed by: FAMILY MEDICINE

## 2021-02-08 PROCEDURE — 85610 PROTHROMBIN TIME: CPT | Performed by: FAMILY MEDICINE

## 2021-02-08 NOTE — PROGRESS NOTES
ANTICOAGULATION MANAGEMENT     Patient Name:  Unique Ward  Date:  2/8/2021    ASSESSMENT /SUBJECTIVE:    Today's INR result of 3.3 is supratherapeutic. Goal INR of 2.0-3.0      Warfarin dose taken: Warfarin taken as instructed    Diet: No new diet changes affecting INR    Medication changes/ interactions: No new medications/supplements affecting INR    Previous INR: Supratherapeutic     S/S of bleeding or thromboembolism: No    New injury or illness: No    Upcoming surgery, procedure or cardioversion: No    Additional findings: None      PLAN:    Telephone call with Unique regarding INR result and instructed:     Warfarin Dosing Instructions: Change your warfarin dose to 7.5 mg daily  . (4.5 % change)    Instructed patient to follow up no later than: 10 days  Lab visit scheduled    Education provided: None required      Aaliyah verbalizes understanding and agrees to warfarin dosing plan.    Instructed to call the Anticoagulation Clinic for any changes, questions or concerns. (#[phone])        Veronika Campbell RN      OBJECTIVE:  Recent labs: (last 7 days)     02/08/21  1217   INR 3.3*         No question data found.  Anticoagulation Summary  As of 2/8/2021    INR goal:  2.0-3.0   TTR:  72.9 % (1 y)   INR used for dosing:  3.3 (2/8/2021)   Warfarin maintenance plan:  7.5 mg (5 mg x 1.5) every day   Full warfarin instructions:  7.5 mg every day   Weekly warfarin total:  52.5 mg   Plan last modified:  Veronika Campbell RN (2/8/2021)   Next INR check:  2/17/2021   Priority:  Maintenance   Target end date:  Indefinite    Indications    Atrial fibrillation (H) [I48.91]  Long term current use of anticoagulant therapy [Z79.01]  Atrial fibrillation  unspecified type (H) [I48.91]             Anticoagulation Episode Summary     INR check location:      Preferred lab:      Send INR reminders to:  [facility]    Comments:  5 mg tabs, PM dose, print out      Anticoagulation Care Providers     Provider Role Specialty  Phone number    Tyler Lee MD [facility] [phone]

## 2021-02-09 LAB
MDC_IDC_EPISODE_DTM: NORMAL
MDC_IDC_EPISODE_DURATION: 185 S
MDC_IDC_EPISODE_DURATION: 2286 S
MDC_IDC_EPISODE_DURATION: 24 S
MDC_IDC_EPISODE_DURATION: 271 S
MDC_IDC_EPISODE_DURATION: 64 S
MDC_IDC_EPISODE_DURATION: 68 S
MDC_IDC_EPISODE_DURATION: 68 S
MDC_IDC_EPISODE_DURATION: 79 S
MDC_IDC_EPISODE_DURATION: 8471 S
MDC_IDC_EPISODE_DURATION: NORMAL S
MDC_IDC_EPISODE_DURATION: NORMAL S
MDC_IDC_EPISODE_ID: 15
MDC_IDC_EPISODE_ID: 16
MDC_IDC_EPISODE_ID: 17
MDC_IDC_EPISODE_ID: 18
MDC_IDC_EPISODE_ID: 19
MDC_IDC_EPISODE_ID: 20
MDC_IDC_EPISODE_ID: 21
MDC_IDC_EPISODE_ID: 22
MDC_IDC_EPISODE_ID: 23
MDC_IDC_EPISODE_ID: 24
MDC_IDC_EPISODE_ID: 25
MDC_IDC_EPISODE_TYPE: NORMAL
MDC_IDC_LEAD_IMPLANT_DT: NORMAL
MDC_IDC_LEAD_IMPLANT_DT: NORMAL
MDC_IDC_LEAD_LOCATION: NORMAL
MDC_IDC_LEAD_LOCATION: NORMAL
MDC_IDC_LEAD_LOCATION_DETAIL_1: NORMAL
MDC_IDC_LEAD_LOCATION_DETAIL_1: NORMAL
MDC_IDC_LEAD_MFG: NORMAL
MDC_IDC_LEAD_MFG: NORMAL
MDC_IDC_LEAD_MODEL: NORMAL
MDC_IDC_LEAD_MODEL: NORMAL
MDC_IDC_LEAD_POLARITY_TYPE: NORMAL
MDC_IDC_LEAD_POLARITY_TYPE: NORMAL
MDC_IDC_LEAD_SERIAL: NORMAL
MDC_IDC_LEAD_SERIAL: NORMAL
MDC_IDC_MSMT_BATTERY_DTM: NORMAL
MDC_IDC_MSMT_BATTERY_REMAINING_LONGEVITY: 132 MO
MDC_IDC_MSMT_BATTERY_RRT_TRIGGER: 2.62
MDC_IDC_MSMT_BATTERY_STATUS: NORMAL
MDC_IDC_MSMT_BATTERY_VOLTAGE: 3.03 V
MDC_IDC_MSMT_LEADCHNL_RA_IMPEDANCE_VALUE: 285 OHM
MDC_IDC_MSMT_LEADCHNL_RA_IMPEDANCE_VALUE: 323 OHM
MDC_IDC_MSMT_LEADCHNL_RA_PACING_THRESHOLD_AMPLITUDE: 0.88 V
MDC_IDC_MSMT_LEADCHNL_RA_PACING_THRESHOLD_PULSEWIDTH: 0.4 MS
MDC_IDC_MSMT_LEADCHNL_RA_SENSING_INTR_AMPL: 1.12 MV
MDC_IDC_MSMT_LEADCHNL_RA_SENSING_INTR_AMPL: 1.12 MV
MDC_IDC_MSMT_LEADCHNL_RV_IMPEDANCE_VALUE: 380 OHM
MDC_IDC_MSMT_LEADCHNL_RV_IMPEDANCE_VALUE: 456 OHM
MDC_IDC_MSMT_LEADCHNL_RV_PACING_THRESHOLD_AMPLITUDE: 0.88 V
MDC_IDC_MSMT_LEADCHNL_RV_PACING_THRESHOLD_PULSEWIDTH: 0.4 MS
MDC_IDC_MSMT_LEADCHNL_RV_SENSING_INTR_AMPL: 12.75 MV
MDC_IDC_MSMT_LEADCHNL_RV_SENSING_INTR_AMPL: 12.75 MV
MDC_IDC_PG_IMPLANT_DTM: NORMAL
MDC_IDC_PG_MFG: NORMAL
MDC_IDC_PG_MODEL: NORMAL
MDC_IDC_PG_SERIAL: NORMAL
MDC_IDC_PG_TYPE: NORMAL
MDC_IDC_SESS_CLINIC_NAME: NORMAL
MDC_IDC_SESS_DTM: NORMAL
MDC_IDC_SESS_TYPE: NORMAL
MDC_IDC_SET_BRADY_AT_MODE_SWITCH_RATE: 171 {BEATS}/MIN
MDC_IDC_SET_BRADY_HYSTRATE: NORMAL
MDC_IDC_SET_BRADY_LOWRATE: 60 {BEATS}/MIN
MDC_IDC_SET_BRADY_MAX_SENSOR_RATE: 130 {BEATS}/MIN
MDC_IDC_SET_BRADY_MAX_TRACKING_RATE: 130 {BEATS}/MIN
MDC_IDC_SET_BRADY_MODE: NORMAL
MDC_IDC_SET_BRADY_PAV_DELAY_HIGH: 140 MS
MDC_IDC_SET_BRADY_PAV_DELAY_LOW: 300 MS
MDC_IDC_SET_BRADY_SAV_DELAY_HIGH: 110 MS
MDC_IDC_SET_BRADY_SAV_DELAY_LOW: 230 MS
MDC_IDC_SET_LEADCHNL_RA_PACING_AMPLITUDE: 1.5 V
MDC_IDC_SET_LEADCHNL_RA_PACING_ANODE_ELECTRODE_1: NORMAL
MDC_IDC_SET_LEADCHNL_RA_PACING_ANODE_LOCATION_1: NORMAL
MDC_IDC_SET_LEADCHNL_RA_PACING_CAPTURE_MODE: NORMAL
MDC_IDC_SET_LEADCHNL_RA_PACING_CATHODE_ELECTRODE_1: NORMAL
MDC_IDC_SET_LEADCHNL_RA_PACING_CATHODE_LOCATION_1: NORMAL
MDC_IDC_SET_LEADCHNL_RA_PACING_POLARITY: NORMAL
MDC_IDC_SET_LEADCHNL_RA_PACING_PULSEWIDTH: 0.4 MS
MDC_IDC_SET_LEADCHNL_RA_SENSING_ANODE_ELECTRODE_1: NORMAL
MDC_IDC_SET_LEADCHNL_RA_SENSING_ANODE_LOCATION_1: NORMAL
MDC_IDC_SET_LEADCHNL_RA_SENSING_CATHODE_ELECTRODE_1: NORMAL
MDC_IDC_SET_LEADCHNL_RA_SENSING_CATHODE_LOCATION_1: NORMAL
MDC_IDC_SET_LEADCHNL_RA_SENSING_POLARITY: NORMAL
MDC_IDC_SET_LEADCHNL_RA_SENSING_SENSITIVITY: 0.3 MV
MDC_IDC_SET_LEADCHNL_RV_PACING_AMPLITUDE: 2 V
MDC_IDC_SET_LEADCHNL_RV_PACING_ANODE_ELECTRODE_1: NORMAL
MDC_IDC_SET_LEADCHNL_RV_PACING_ANODE_LOCATION_1: NORMAL
MDC_IDC_SET_LEADCHNL_RV_PACING_CAPTURE_MODE: NORMAL
MDC_IDC_SET_LEADCHNL_RV_PACING_CATHODE_ELECTRODE_1: NORMAL
MDC_IDC_SET_LEADCHNL_RV_PACING_CATHODE_LOCATION_1: NORMAL
MDC_IDC_SET_LEADCHNL_RV_PACING_POLARITY: NORMAL
MDC_IDC_SET_LEADCHNL_RV_PACING_PULSEWIDTH: 0.4 MS
MDC_IDC_SET_LEADCHNL_RV_SENSING_ANODE_ELECTRODE_1: NORMAL
MDC_IDC_SET_LEADCHNL_RV_SENSING_ANODE_LOCATION_1: NORMAL
MDC_IDC_SET_LEADCHNL_RV_SENSING_CATHODE_ELECTRODE_1: NORMAL
MDC_IDC_SET_LEADCHNL_RV_SENSING_CATHODE_LOCATION_1: NORMAL
MDC_IDC_SET_LEADCHNL_RV_SENSING_POLARITY: NORMAL
MDC_IDC_SET_LEADCHNL_RV_SENSING_SENSITIVITY: 0.9 MV
MDC_IDC_SET_ZONE_DETECTION_INTERVAL: 350 MS
MDC_IDC_SET_ZONE_DETECTION_INTERVAL: 400 MS
MDC_IDC_SET_ZONE_TYPE: NORMAL
MDC_IDC_STAT_BRADY_AP_VP_PERCENT: 41.68 %
MDC_IDC_STAT_BRADY_AP_VS_PERCENT: 26.82 %
MDC_IDC_STAT_BRADY_AS_VP_PERCENT: 13.16 %
MDC_IDC_STAT_BRADY_AS_VS_PERCENT: 18.34 %
MDC_IDC_STAT_BRADY_DTM_END: NORMAL
MDC_IDC_STAT_BRADY_DTM_START: NORMAL
MDC_IDC_STAT_BRADY_RA_PERCENT_PACED: 66.78 %
MDC_IDC_STAT_BRADY_RV_PERCENT_PACED: 54.98 %
MDC_IDC_STAT_EPISODE_RECENT_COUNT: 0
MDC_IDC_STAT_EPISODE_RECENT_COUNT: 0
MDC_IDC_STAT_EPISODE_RECENT_COUNT: 11
MDC_IDC_STAT_EPISODE_RECENT_COUNT_DTM_END: NORMAL
MDC_IDC_STAT_EPISODE_RECENT_COUNT_DTM_START: NORMAL
MDC_IDC_STAT_EPISODE_TOTAL_COUNT: 0
MDC_IDC_STAT_EPISODE_TOTAL_COUNT: 0
MDC_IDC_STAT_EPISODE_TOTAL_COUNT: 25
MDC_IDC_STAT_EPISODE_TOTAL_COUNT_DTM_END: NORMAL
MDC_IDC_STAT_EPISODE_TOTAL_COUNT_DTM_START: NORMAL
MDC_IDC_STAT_EPISODE_TYPE: NORMAL

## 2021-02-17 ENCOUNTER — ANTICOAGULATION THERAPY VISIT (OUTPATIENT)
Dept: ANTICOAGULATION | Facility: CLINIC | Age: 81
End: 2021-02-17

## 2021-02-17 DIAGNOSIS — Z79.01 LONG TERM CURRENT USE OF ANTICOAGULANT THERAPY: ICD-10-CM

## 2021-02-17 DIAGNOSIS — I48.91 ATRIAL FIBRILLATION, UNSPECIFIED TYPE (H): ICD-10-CM

## 2021-02-17 LAB — CAPILLARY BLOOD COLLECTION: NORMAL

## 2021-02-17 PROCEDURE — 85610 PROTHROMBIN TIME: CPT | Performed by: FAMILY MEDICINE

## 2021-02-17 PROCEDURE — 36416 COLLJ CAPILLARY BLOOD SPEC: CPT | Performed by: FAMILY MEDICINE

## 2021-02-17 NOTE — PROGRESS NOTES
"ANTICOAGULATION MANAGEMENT     Patient Name:  Unique Ward  Date:  2021    ASSESSMENT /SUBJECTIVE:    Today's INR result of 5.2 is supratherapeutic. Goal INR of 2.0-3.0      Warfarin dose taken: Warfarin taken as instructed    Diet: Decreased greens/vitamin K in diet; plans to resume previous intake    Medication changes/ interactions: No new medications/supplements affecting INR    Previous INR: Supratherapeutic     S/S of bleeding or thromboembolism: No    New injury or illness: No    Upcoming surgery, procedure or cardioversion: No    Additional findings: Pt states she just doesn't \"feel\" well. She hasn't been eating how she normally does - she states she will try and be more consistent with this and get back to \"normal\"      PLAN:    Telephone call with Unique regarding INR result and instructed:     Warfarin Dosing Instructions: Hold today then continue your current warfarin dose of 7.5 mg daily    Instructed patient to follow up no later than: 5 days    Lab visit scheduled    Education provided: Importance of consistent vitamin K intake, Impact of vitamin K foods on INR, Vitamin K content of foods, Target INR goal and significance of current INR result, Importance of therapeutic range and When to seek medical attention/emergency care      Aaliyah verbalizes understanding and agrees to warfarin dosing plan.    Instructed to call the Anticoagulation Clinic for any changes, questions or concerns. (#[phone])        Veronika Campbell RN      OBJECTIVE:  Recent labs: (last 7 days)     21  1216   INR 5.2*         No question data found.  Anticoagulation Summary  As of 2021    INR goal:  2.0-3.0   TTR:  70.5 % (1 y)   INR used for dosin.2 (2021)   Warfarin maintenance plan:  7.5 mg (5 mg x 1.5) every day   Full warfarin instructions:  : Hold; Otherwise 7.5 mg every day   Weekly warfarin total:  52.5 mg   Plan last modified:  Veronika Campbell RN (2021)   Next INR check:  2021 "   Priority:  Maintenance   Target end date:  Indefinite    Indications    Atrial fibrillation (H) [I48.91]  Long term current use of anticoagulant therapy [Z79.01]  Atrial fibrillation  unspecified type (H) [I48.91]             Anticoagulation Episode Summary     INR check location:      Preferred lab:      Send INR reminders to:  [facility]    Comments:  5 mg tabs, PM dose, print out      Anticoagulation Care Providers     Provider Role Specialty Phone number    Tyler Lee MD Referring Family Medicine [phone]

## 2021-02-18 LAB — INR BLD: 5.2 (ref 0.86–1.14)

## 2021-02-22 ENCOUNTER — ANTICOAGULATION THERAPY VISIT (OUTPATIENT)
Dept: ANTICOAGULATION | Facility: CLINIC | Age: 81
End: 2021-02-22

## 2021-02-22 DIAGNOSIS — I48.91 ATRIAL FIBRILLATION, UNSPECIFIED TYPE (H): ICD-10-CM

## 2021-02-22 DIAGNOSIS — Z79.01 LONG TERM CURRENT USE OF ANTICOAGULANT THERAPY: ICD-10-CM

## 2021-02-22 LAB
CAPILLARY BLOOD COLLECTION: NORMAL
INR BLD: 3.6 (ref 0.86–1.14)

## 2021-02-22 PROCEDURE — 85610 PROTHROMBIN TIME: CPT | Performed by: FAMILY MEDICINE

## 2021-02-22 PROCEDURE — 36416 COLLJ CAPILLARY BLOOD SPEC: CPT | Performed by: FAMILY MEDICINE

## 2021-02-22 NOTE — PROGRESS NOTES
ANTICOAGULATION MANAGEMENT     Patient Name:  Unique Ward  Date:  2/22/2021    ASSESSMENT /SUBJECTIVE:    Today's INR result of 3.6 is supratherapeutic. Goal INR of 2.0-3.0      Warfarin dose taken: Warfarin taken as instructed    Diet: No new diet changes affecting INR    Medication changes/ interactions: No new medications/supplements affecting INR    Previous INR: Supratherapeutic     S/S of bleeding or thromboembolism: No    New injury or illness: No    Upcoming surgery, procedure or cardioversion: No    Additional findings: None      PLAN:    Telephone call with Unique regarding INR result and instructed:     Warfarin Dosing Instructions: Change your warfarin dose to 5 mg Mon, Fri and 7.5 mg all other days  . (9.5 % change)    Instructed patient to follow up no later than: 10 days  Lab visit scheduled    Education provided: None required      Aaliyah verbalizes understanding and agrees to warfarin dosing plan.    Instructed to call the Anticoagulation Clinic for any changes, questions or concerns. (#[phone])        Veronika Campbell RN      OBJECTIVE:  Recent labs: (last 7 days)     02/17/21  1216 02/22/21  1205   INR 5.2* 3.6*         INR assessment SUPRA    Recheck INR In: 10 DAYS    INR Location Outside lab      Anticoagulation Summary  As of 2/22/2021    INR goal:  2.0-3.0   TTR:  69.1 % (1 y)   INR used for dosing:  3.6 (2/22/2021)   Warfarin maintenance plan:  5 mg (5 mg x 1) every Mon, Fri; 7.5 mg (5 mg x 1.5) all other days   Full warfarin instructions:  5 mg every Mon, Fri; 7.5 mg all other days   Weekly warfarin total:  47.5 mg   Plan last modified:  Veronika Campbell RN (2/22/2021)   Next INR check:  3/3/2021   Priority:  Maintenance   Target end date:  Indefinite    Indications    Atrial fibrillation (H) [I48.91]  Long term current use of anticoagulant therapy [Z79.01]  Atrial fibrillation  unspecified type (H) [I48.91]             Anticoagulation Episode Summary     INR check location:       Preferred lab:      Send INR reminders to:  [facility]    Comments:  5 mg tabs, PM dose, print out      Anticoagulation Care Providers     Provider Role Specialty Phone number    Tyler Lee MD Referring Family Medicine [phone]

## 2021-03-03 ENCOUNTER — ANTICOAGULATION THERAPY VISIT (OUTPATIENT)
Dept: ANTICOAGULATION | Facility: CLINIC | Age: 81
End: 2021-03-03

## 2021-03-03 DIAGNOSIS — Z79.01 LONG TERM CURRENT USE OF ANTICOAGULANT THERAPY: ICD-10-CM

## 2021-03-03 DIAGNOSIS — I48.91 ATRIAL FIBRILLATION, UNSPECIFIED TYPE (H): ICD-10-CM

## 2021-03-03 DIAGNOSIS — I48.0 PAROXYSMAL ATRIAL FIBRILLATION (H): ICD-10-CM

## 2021-03-03 LAB
CAPILLARY BLOOD COLLECTION: NORMAL
INR BLD: 4.6 (ref 0.86–1.14)

## 2021-03-03 PROCEDURE — 36416 COLLJ CAPILLARY BLOOD SPEC: CPT | Performed by: FAMILY MEDICINE

## 2021-03-03 PROCEDURE — 85610 PROTHROMBIN TIME: CPT | Performed by: FAMILY MEDICINE

## 2021-03-03 RX ORDER — WARFARIN SODIUM 5 MG/1
TABLET ORAL
Qty: 120 TABLET | Refills: 0 | Status: SHIPPED | OUTPATIENT
Start: 2021-03-03 | End: 2021-05-26

## 2021-03-03 NOTE — PROGRESS NOTES
ANTICOAGULATION MANAGEMENT     Patient Name:  Unique Ward  Date:  3/3/2021    ASSESSMENT /SUBJECTIVE:    Today's INR result of 4.6 is supratherapeutic. Goal INR of 2.0-3.0      Warfarin dose taken: Warfarin taken as instructed    Diet: No new diet changes affecting INR    Medication changes/ interactions: No new medications/supplements affecting INR    Previous INR: Supratherapeutic     S/S of bleeding or thromboembolism: No    New injury or illness: No    Upcoming surgery, procedure or cardioversion: No    Additional findings: None      PLAN:    Telephone call with Unique regarding INR result and instructed:     Warfarin Dosing Instructions: hold tonight then change your warfarin dose to 7.5 mg Mon, Wed, Fri and 5 mg ROW  . (10.5 % change)    Instructed patient to follow up no later than: 10 days  Patient offered & declined to schedule next visit    Education provided: Monitoring for bleeding signs and symptoms      Pt verbalizes understanding and agrees to warfarin dosing plan.    Instructed to call the Anticoagulation Clinic for any changes, questions or concerns. (#[phone])        Neelima Higgins RN      OBJECTIVE:  Recent labs: (last 7 days)     21  1348   INR 4.6*         INR assessment SUPRA    Recheck INR In: 10 DAYS    INR Location Outside lab      Anticoagulation Summary  As of 3/3/2021    INR goal:  2.0-3.0   TTR:  66.6 % (1 y)   INR used for dosin.6 (3/3/2021)   Warfarin maintenance plan:  7.5 mg (5 mg x 1.5) every Mon, Wed, Fri; 5 mg (5 mg x 1) all other days   Full warfarin instructions:  3/3: Hold; Otherwise 7.5 mg every Mon, Wed, Fri; 5 mg all other days   Weekly warfarin total:  42.5 mg   Plan last modified:  Neelima Higgins RN (3/3/2021)   Next INR check:  3/12/2021   Priority:  Maintenance   Target end date:  Indefinite    Indications    Atrial fibrillation (H) [I48.91]  Long term current use of anticoagulant therapy [Z79.01]  Atrial fibrillation  unspecified type (H)  [I48.91]             Anticoagulation Episode Summary     INR check location:      Preferred lab:      Send INR reminders to:  [facility]    Comments:  5 mg tabs, PM dose, print out      Anticoagulation Care Providers     Provider Role Specialty Phone number    Tyler Lee MD Referring Family Medicine [phone]

## 2021-03-03 NOTE — PROGRESS NOTES
Anticoagulation Management    Unable to reach pt today.    Today's INR result of 4.6 is supratherapeutic (goal INR of 2.0-3.0).  Result received from: Clinic Lab    Follow up required to confirm warfarin dose taken and assess for changes    VM left to call [facility] back, will try again later as well       Anticoagulation clinic to follow up    Neelima Higgins RN

## 2021-03-11 ENCOUNTER — VIRTUAL VISIT (OUTPATIENT)
Dept: FAMILY MEDICINE | Facility: CLINIC | Age: 81
End: 2021-03-11
Payer: COMMERCIAL

## 2021-03-11 DIAGNOSIS — R30.0 DYSURIA: Primary | ICD-10-CM

## 2021-03-11 PROCEDURE — 99213 OFFICE O/P EST LOW 20 MIN: CPT | Mod: 95 | Performed by: OBSTETRICS & GYNECOLOGY

## 2021-03-11 RX ORDER — NITROFURANTOIN 25; 75 MG/1; MG/1
100 CAPSULE ORAL 2 TIMES DAILY
Qty: 14 CAPSULE | Refills: 0 | Status: SHIPPED | OUTPATIENT
Start: 2021-03-11 | End: 2021-04-22

## 2021-03-11 NOTE — PROGRESS NOTES
Aaliyah is a 80 year old who is being evaluated via a billable video visit.      How would you like to obtain your AVS? MyChart  If the video visit is dropped, the invitation should be resent by: Text to cell phone: [phone]  Will anyone else be joining your video visit? No       Subjective: Unique requested a videoconference consultation today . Due to the current covid-19 coronavirus epidemic we are managing much of our patients' concerns remotely when possible.  We had difficulty establishing her video connection so we changed it to a phone visit today.    History of present : she has dysuria. No hematuria. The urine is cloudy. No fevers. No backaches. She is drinking enough fluids.  However she did cut down on fluid intake recently.    The past medical history and medications and allergies have been reviewed today by me.  .  Past Medical History:   Diagnosis Date     Arthritis 01.01.1980     Atrial fibrillation (H) 6/7/2019     Cancer (H) 01.01.1993     scalp    Basal Cell X2  head      nose bridge 1996     Depressive disorder 01.01.1977    anxiety     Hypertension 03.16.2016     Allergies   Allergen Reactions     No Clinical Screening - See Comments      Eye drop antibiotic.     Codeine Other (See Comments)     Comment: , Description:      Gentamicin Hives     Per Unique this medication gives hives on arms and legs. Bev Lo LSXO,  ....................  7/15/2014   1:15 PM     Influenza Vaccines Other (See Comments)     Comment: , Description:      Influenza Virus Vaccine H5n1      Paxil [Paroxetine] Other (See Comments)     Comment: , Description:      Typhoid Vaccine-Strain Ty21a Other (See Comments)     Comment: , Description:   Comment: , Description:      Typhoid Vaccines      Current Outpatient Medications   Medication Sig Dispense Refill     amLODIPine (NORVASC) 5 MG tablet Take 1 tablet (5 mg) by mouth daily 90 tablet 3     cholecalciferol (VITAMIN D3) 5000 units TABS tablet Take 2,500 mcg  by mouth daily        estradiol (ESTRACE) 0.1 MG/GM vaginal cream Place 2 g vaginally twice a week 42.5 g 11     magnesium 100 MG TABS Take 1 tablet by mouth daily Takes 3 tabs (300mg) daily       warfarin ANTICOAGULANT (COUMADIN) 5 MG tablet TAKE 5 MG ON MONDAY AND FRIDAY AND 7.5 MG ALL OTHER DAYS OR AS DIRECTED BY THE COUMADIN CLINIC 120 tablet 0     meclizine (ANTIVERT) 25 MG tablet Take 1 tablet (25 mg) by mouth 3 times daily as needed for dizziness (Patient not taking: Reported on 3/11/2021) 21 tablet 0     History   Smoking Status     Former Smoker     Packs/day: 0.75     Years: 10.00     Types: Cigarettes     Start date: 1955     Quit date: 1965   Smokeless Tobacco     Never Used     Past Surgical History:   Procedure Laterality Date     BIOPSY  2014     COLONOSCOPY      Two routine,  one for chronic diarrhea     EP PACEMAKER N/A 3/31/2020    Procedure: EP Pacemaker;  Surgeon: Erich Clark MD;  Location: [facility] CARDI[facility] CATH LAB     EYE SURGERY  2014    first    cataracts both eyes.  second a few months later     GENITOURINARY SURGERY       GYN SURGERY  1977    TL     HEAD & NECK SURGERY  1997    basal cell X2   Moh's on scalp.  other on bridge of nose     SOFT TISSUE SURGERY  1990    Ganglion Cyst   Left inner wrist     Social History     Tobacco Use     Smoking status: Former Smoker     Packs/day: 0.75     Years: 10.00     Pack years: 7.50     Types: Cigarettes     Start date: 1955     Quit date: 1965     Years since quittin.2     Smokeless tobacco: Never Used   Substance Use Topics     Alcohol use: No     Alcohol/week: 0.0 standard drinks     Drug use: No     Family History   Problem Relation Age of Onset     Diabetes Maternal Grandmother      Coronary Artery Disease Sister         error     Coronary Artery Disease Brother         error     Hypertension Brother      Hyperlipidemia Brother      Cerebrovascular Disease Brother       Hypertension Sister      Hypertension Sister      Hyperlipidemia Sister      Other Cancer Sister         cervical     Breast Cancer Sister      Other Cancer Sister         kidney     Mental Illness Mother         paranoid/schizophrenic/suicide     Mental Illness Sister         ???  Had shock treatments     ROS : negative except what is mentioned above    Objective:  No video available    Assessment/Plan:  1. Dysuria- she will bring in a urine sample and we will culture it and also I am going to start her empirically on antibiotics because she cannot bring in a sample until tomorrow and by the time we get a report it will be the weekend.  Therefore I will empirically start her on Macrobid just until I get a result of that I can communicate with her.  She likes this plan.      See rx for macrobid. I explained that antibiotics can cause a rash or allergic reaction to develop and so the medication should be stopped if this occurs. Also there is a risk of diarrhea or clostridium difficile pseudomembranous enterocolitis with any antibiotic use so it should be stopped if diarrhea develops and then the clinic should be called so that we have a followup evaluation.      Followup: If the urine is positive for infection then we may possibly  repeat  A UA  in 7 to 10 days afterwards but last time it resolved so well that we may not need to.  It will depend on culture results etc.      Start of call: 1017  hours    Call ended: 1031   hours    Videoconference call and eventually the regular phone call  duration was   14  minutes. Additional  8 minutes spent in charting today.    THOMAS Cristobal MD

## 2021-03-12 ENCOUNTER — ANTICOAGULATION THERAPY VISIT (OUTPATIENT)
Dept: ANTICOAGULATION | Facility: CLINIC | Age: 81
End: 2021-03-12

## 2021-03-12 DIAGNOSIS — R30.0 DYSURIA: ICD-10-CM

## 2021-03-12 DIAGNOSIS — I48.91 ATRIAL FIBRILLATION, UNSPECIFIED TYPE (H): ICD-10-CM

## 2021-03-12 DIAGNOSIS — Z79.01 LONG TERM CURRENT USE OF ANTICOAGULANT THERAPY: ICD-10-CM

## 2021-03-12 LAB
ALBUMIN UR-MCNC: NEGATIVE MG/DL
APPEARANCE UR: ABNORMAL
BACTERIA #/AREA URNS HPF: ABNORMAL /HPF
BILIRUB UR QL STRIP: NEGATIVE
CAPILLARY BLOOD COLLECTION: NORMAL
COLOR UR AUTO: ABNORMAL
GLUCOSE UR STRIP-MCNC: NEGATIVE MG/DL
HGB UR QL STRIP: ABNORMAL
INR BLD: 2.4 (ref 0.86–1.14)
KETONES UR STRIP-MCNC: NEGATIVE MG/DL
LEUKOCYTE ESTERASE UR QL STRIP: ABNORMAL
NITRATE UR QL: NEGATIVE
PH UR STRIP: 8 PH (ref 5–7)
RBC #/AREA URNS AUTO: 3 /HPF (ref 0–2)
SOURCE: ABNORMAL
SP GR UR STRIP: 1 (ref 1–1.03)
SQUAMOUS #/AREA URNS AUTO: <1 /HPF (ref 0–1)
UROBILINOGEN UR STRIP-MCNC: 0 MG/DL (ref 0–2)
WBC #/AREA URNS AUTO: >182 /HPF (ref 0–5)
WBC CLUMPS #/AREA URNS HPF: PRESENT /HPF

## 2021-03-12 PROCEDURE — 87088 URINE BACTERIA CULTURE: CPT | Performed by: OBSTETRICS & GYNECOLOGY

## 2021-03-12 PROCEDURE — 36416 COLLJ CAPILLARY BLOOD SPEC: CPT | Performed by: FAMILY MEDICINE

## 2021-03-12 PROCEDURE — 81001 URINALYSIS AUTO W/SCOPE: CPT | Performed by: OBSTETRICS & GYNECOLOGY

## 2021-03-12 PROCEDURE — 87186 SC STD MICRODIL/AGAR DIL: CPT | Performed by: OBSTETRICS & GYNECOLOGY

## 2021-03-12 PROCEDURE — 87086 URINE CULTURE/COLONY COUNT: CPT | Performed by: OBSTETRICS & GYNECOLOGY

## 2021-03-12 PROCEDURE — 85610 PROTHROMBIN TIME: CPT | Performed by: FAMILY MEDICINE

## 2021-03-12 NOTE — PROGRESS NOTES
ANTICOAGULATION MANAGEMENT     Patient Name:  Unique Ward  Date:  3/12/2021    ASSESSMENT /SUBJECTIVE:    Today's INR result of 2.4 is therapeutic. Goal INR of 2.0-3.0      Warfarin dose taken: Warfarin taken as instructed    Diet: No new diet changes affecting INR    Medication changes/ interactions: No new medications/supplements affecting INR    Previous INR: Supratherapeutic     S/S of bleeding or thromboembolism: No    New injury or illness: No    Upcoming surgery, procedure or cardioversion: No    Additional findings: None      PLAN:    Detailed message left for Unique regarding INR result and instructed:     Warfarin Dosing Instructions: Continue your current warfarin dose 7.5 mg MWF and 5 mg all other days    Instructed patient to follow up no later than: 2 weeks  Left detailed message with recommended recheck date    Education provided: Please call back if any changes to your diet, medications or how you've been taking warfarin      I left a detailed voicemail with the orders below. I have also requested a call back if there have been any missed doses, concerns, illness, fever, or if there have been any changes in medications, activity level, or diet      Instructed to call the Anticoagulation Clinic for any changes, questions or concerns. (#[phone])        Veronika Campbell, DIONTE      OBJECTIVE:  Recent labs: (last 7 days)     21  0950   INR 2.4*         No question data found.  Anticoagulation Summary  As of 3/12/2021    INR goal:  2.0-3.0   TTR:  64.8 % (1 y)   INR used for dosin.4 (3/12/2021)   Warfarin maintenance plan:  7.5 mg (5 mg x 1.5) every Mon, Wed, Fri; 5 mg (5 mg x 1) all other days   Full warfarin instructions:  7.5 mg every Mon, Wed, Fri; 5 mg all other days   Weekly warfarin total:  42.5 mg   No change documented:  Veronika Campbell RN   Plan last modified:  Neelima Higgins RN (3/3/2021)   Next INR check:  3/26/2021   Priority:  Maintenance   Target end date:  Indefinite     Indications    Atrial fibrillation (H) [I48.91]  Long term current use of anticoagulant therapy [Z79.01]  Atrial fibrillation  unspecified type (H) [I48.91]             Anticoagulation Episode Summary     INR check location:      Preferred lab:      Send INR reminders to:  [facility]    Comments:  5 mg tabs, PM dose, print out      Anticoagulation Care Providers     Provider Role Specialty Phone number    Tyler Lee MD Referring Family Medicine [phone]

## 2021-03-13 LAB
BACTERIA SPEC CULT: ABNORMAL
Lab: ABNORMAL
SPECIMEN SOURCE: ABNORMAL

## 2021-03-15 DIAGNOSIS — Z11.59 ENCOUNTER FOR SCREENING FOR OTHER VIRAL DISEASES: ICD-10-CM

## 2021-03-15 NOTE — RESULT ENCOUNTER NOTE
Sonja/team,Please inform Unique/ or caretaker  that this result(s) is/are showing an infection but she is on the right antibiotic- finish it off and then repeat a urine culture- I will place a future order for a urine culture- she should do it several days after she finishes the antibiotic- please help her with a lab time-Thanks. THOMAS Cristobal MD

## 2021-03-22 DIAGNOSIS — R30.0 DYSURIA: ICD-10-CM

## 2021-03-22 PROCEDURE — 87086 URINE CULTURE/COLONY COUNT: CPT | Performed by: OBSTETRICS & GYNECOLOGY

## 2021-03-23 LAB
BACTERIA SPEC CULT: NO GROWTH
Lab: NORMAL
SPECIMEN SOURCE: NORMAL

## 2021-03-23 NOTE — RESULT ENCOUNTER NOTE
Sonja/team,Please inform Unique/ or caretaker  that this result(s) is/are normal.  It is okay to stop the antibiotic at this time.  Thanks. THOMAS Cristobal MD

## 2021-03-26 ENCOUNTER — ANTICOAGULATION THERAPY VISIT (OUTPATIENT)
Dept: ANTICOAGULATION | Facility: CLINIC | Age: 81
End: 2021-03-26

## 2021-03-26 DIAGNOSIS — I48.91 ATRIAL FIBRILLATION, UNSPECIFIED TYPE (H): ICD-10-CM

## 2021-03-26 DIAGNOSIS — Z79.01 LONG TERM CURRENT USE OF ANTICOAGULANT THERAPY: ICD-10-CM

## 2021-03-26 LAB
CAPILLARY BLOOD COLLECTION: NORMAL
INR BLD: 2.3 (ref 0.86–1.14)

## 2021-03-26 PROCEDURE — 85610 PROTHROMBIN TIME: CPT | Performed by: FAMILY MEDICINE

## 2021-03-26 PROCEDURE — 36416 COLLJ CAPILLARY BLOOD SPEC: CPT | Performed by: FAMILY MEDICINE

## 2021-03-26 NOTE — PROGRESS NOTES
ANTICOAGULATION MANAGEMENT     Patient Name:  Unique Ward  Date:  3/26/2021    ASSESSMENT /SUBJECTIVE:    Today's INR result of 2.3 is therapeutic. Goal INR of 2.0-3.0      Warfarin dose taken: Warfarin taken as instructed    Diet: No new diet changes affecting INR    Medication changes/ interactions: No new medications/supplements affecting INR    Previous INR: Therapeutic     S/S of bleeding or thromboembolism: No    New injury or illness: No    Upcoming surgery, procedure or cardioversion: No    Additional findings: None      PLAN:    Detailed message left for Unique regarding INR result and instructed:     Warfarin Dosing Instructions: Continue your current warfarin dose 7.5 mg MWF and 5 mg all other days    Instructed patient to follow up no later than: 3 weeks  Left detailed message with recommended recheck date    Education provided: Please call back if any changes to your diet, medications or how you've been taking warfarin      I left a detailed voicemail with the orders below. I have also requested a call back if there have been any missed doses, concerns, illness, fever, or if there have been any changes in medications, activity level, or diet      Instructed to call the Anticoagulation Clinic for any changes, questions or concerns. (#[phone])        Veronika Campbell, DIONTE      OBJECTIVE:  Recent labs: (last 7 days)     21  1129   INR 2.3*         No question data found.  Anticoagulation Summary  As of 3/26/2021    INR goal:  2.0-3.0   TTR:  64.9 % (1 y)   INR used for dosin.3 (3/26/2021)   Warfarin maintenance plan:  7.5 mg (5 mg x 1.5) every Mon, Wed, Fri; 5 mg (5 mg x 1) all other days   Full warfarin instructions:  7.5 mg every Mon, Wed, Fri; 5 mg all other days   Weekly warfarin total:  42.5 mg   No change documented:  Veronika Campbell RN   Plan last modified:  Neelima Higgins RN (3/3/2021)   Next INR check:  2021   Priority:  Maintenance   Target end date:  Indefinite     Indications    Atrial fibrillation (H) [I48.91]  Long term current use of anticoagulant therapy [Z79.01]  Atrial fibrillation  unspecified type (H) [I48.91]             Anticoagulation Episode Summary     INR check location:      Preferred lab:      Send INR reminders to:  [facility]    Comments:  5 mg tabs, PM dose, print out      Anticoagulation Care Providers     Provider Role Specialty Phone number    Tyler Lee MD Referring Family Medicine [phone]

## 2021-03-30 DIAGNOSIS — Z11.59 ENCOUNTER FOR SCREENING FOR OTHER VIRAL DISEASES: ICD-10-CM

## 2021-03-30 LAB
LABORATORY COMMENT REPORT: NORMAL
SARS-COV-2 RNA RESP QL NAA+PROBE: NEGATIVE
SARS-COV-2 RNA RESP QL NAA+PROBE: NORMAL
SPECIMEN SOURCE: NORMAL
SPECIMEN SOURCE: NORMAL

## 2021-03-30 PROCEDURE — U0005 INFEC AGEN DETEC AMPLI PROBE: HCPCS | Performed by: OPHTHALMOLOGY

## 2021-03-30 PROCEDURE — U0003 INFECTIOUS AGENT DETECTION BY NUCLEIC ACID (DNA OR RNA); SEVERE ACUTE RESPIRATORY SYNDROME CORONAVIRUS 2 (SARS-COV-2) (CORONAVIRUS DISEASE [COVID-19]), AMPLIFIED PROBE TECHNIQUE, MAKING USE OF HIGH THROUGHPUT TECHNOLOGIES AS DESCRIBED BY CMS-2020-01-R: HCPCS | Performed by: OPHTHALMOLOGY

## 2021-04-01 ENCOUNTER — HOSPITAL ENCOUNTER (OUTPATIENT)
Facility: CLINIC | Age: 81
Discharge: HOME | End: 2021-04-01
Attending: OPHTHALMOLOGY | Admitting: OPHTHALMOLOGY
Payer: MEDICARE

## 2021-04-01 PROCEDURE — 66821 AFTER CATARACT LASER SURGERY: CPT | Mod: 50 | Performed by: OPHTHALMOLOGY

## 2021-04-01 PROCEDURE — 250N000009 HC RX 250: Performed by: OPHTHALMOLOGY

## 2021-04-01 RX ORDER — TROPICAMIDE 10 MG/ML
1 SOLUTION/ DROPS OPHTHALMIC ONCE
Status: COMPLETED | OUTPATIENT
Start: 2021-04-01 | End: 2021-04-01

## 2021-04-01 RX ORDER — PROPARACAINE HYDROCHLORIDE 5 MG/ML
1 SOLUTION/ DROPS OPHTHALMIC ONCE
Status: COMPLETED | OUTPATIENT
Start: 2021-04-01 | End: 2021-04-01

## 2021-04-01 RX ORDER — SODIUM CHLORIDE, SODIUM LACTATE, POTASSIUM CHLORIDE, CALCIUM CHLORIDE 600; 310; 30; 20 MG/100ML; MG/100ML; MG/100ML; MG/100ML
INJECTION, SOLUTION INTRAVENOUS CONTINUOUS
Status: CANCELLED | OUTPATIENT
Start: 2021-04-01

## 2021-04-01 RX ADMIN — TROPICAMIDE 1 DROP: 10 SOLUTION/ DROPS OPHTHALMIC at 12:09

## 2021-04-01 RX ADMIN — PROPARACAINE HYDROCHLORIDE 1 DROP: 5 SOLUTION/ DROPS OPHTHALMIC at 12:08

## 2021-04-01 NOTE — OP NOTE
Procedure Date: 2021      PREOPERATIVE DIAGNOSES:  Visually significant posterior capsular opacity, both eyes.      POSTOPERATIVE DIAGNOSES:  Visually significant posterior capsular opacity, both eyes.       PROCEDURES PERFORMED:  YAG posterior capsulotomy, both eyes.      SURGEON OF PRESENT ILLNESS:  Bernardino Chu MD      ANESTHESIA:  Topical.      COMPLICATIONS:  None.      INDICATIONS FOR PROCEDURE:  The patient is an 80-year-old female who was evaluated preoperatively in the Eye Clinic and found to have a visually significant posterior capsular opacity of both eyes.  This opacity was causing a decrease of vision to the 20/30 level in both eyes and was interfering with activities of daily living such as reading and driving.  The patient was deemed a suitable candidate for a YAG posterior capsulotomy of both eyes.  The risks, benefits and alternatives of the procedure were explained to the patient.  All questions were answered and she elected to proceed.      DESCRIPTION OF PROCEDURE:  After informed consent was obtained, the patient was seated in front of the YAG laser.  First, a posterior capsular opening was made on the right eye using a total of 57 shots and 4.8 millijoules per shot.  Next, a posterior capsular opening was made in the left eye using a total of 84 shots and 4.8 millijoules per shot.  The patient tolerated the procedure well and was discharged home without complications.         BERNARDINO CHU MD             D: 2021   T: 2021   MT: DANE      Name:     MAI HAMMOND   MRN:      [id number]        Account:        [id number]   :      1940           Procedure Date: 2021      Document: [id number]

## 2021-04-16 ENCOUNTER — ANTICOAGULATION THERAPY VISIT (OUTPATIENT)
Dept: ANTICOAGULATION | Facility: CLINIC | Age: 81
End: 2021-04-16

## 2021-04-16 ENCOUNTER — APPOINTMENT (OUTPATIENT)
Dept: LAB | Facility: CLINIC | Age: 81
End: 2021-04-16
Payer: COMMERCIAL

## 2021-04-16 DIAGNOSIS — Z79.01 LONG TERM (CURRENT) USE OF ANTICOAGULANTS: Primary | ICD-10-CM

## 2021-04-16 DIAGNOSIS — I48.91 ATRIAL FIBRILLATION, UNSPECIFIED TYPE (H): ICD-10-CM

## 2021-04-16 DIAGNOSIS — Z79.01 LONG TERM CURRENT USE OF ANTICOAGULANT THERAPY: ICD-10-CM

## 2021-04-16 LAB
CAPILLARY BLOOD COLLECTION: NORMAL
INR BLD: 2.5 (ref 0.86–1.14)

## 2021-04-16 PROCEDURE — 36416 COLLJ CAPILLARY BLOOD SPEC: CPT | Performed by: FAMILY MEDICINE

## 2021-04-16 PROCEDURE — 85610 PROTHROMBIN TIME: CPT | Performed by: FAMILY MEDICINE

## 2021-04-16 NOTE — PROGRESS NOTES
ANTICOAGULATION MANAGEMENT     Patient Name:  Unique Ward  Date:  2021    ASSESSMENT /SUBJECTIVE:    Today's INR result of 2.5 is therapeutic. Goal INR of 2.0-3.0      Warfarin dose taken: Warfarin taken as instructed    Diet: pt would like to start eating greens about three times a week, but she is not sure when she will start to incorperate this. She is aware that she should make an appt about a week after starting her new diet.     Medication changes/ interactions: No new medications/supplements affecting INR    Previous INR: Therapeutic     S/S of bleeding or thromboembolism: No    New injury or illness: No    Upcoming surgery, procedure or cardioversion: No    Additional findings: None      PLAN:    Telephone call with Unique regarding INR result and instructed:     Warfarin Dosing Instructions: Continue your current warfarin dose 7.5 mg Mon, Wed, Fri and 5 mg ROW    Instructed patient to follow up no later than: 4 weeks  Patient offered & declined to schedule next visit    Education provided: Importance of consistent vitamin K intake and Vitamin K content of foods      Pt verbalizes understanding and agrees to warfarin dosing plan.    Instructed to call the Anticoagulation Clinic for any changes, questions or concerns. (#[phone])        Neelima Higgins RN      OBJECTIVE:  Recent labs: (last 7 days)     21  1103   INR 2.5*         INR assessment THER    Recheck INR In: 4 WEEKS    INR Location Outside lab      Anticoagulation Summary  As of 2021    INR goal:  2.0-3.0   TTR:  67.3 % (1 y)   INR used for dosin.5 (2021)   Warfarin maintenance plan:  7.5 mg (5 mg x 1.5) every Mon, Wed, Fri; 5 mg (5 mg x 1) all other days   Full warfarin instructions:  7.5 mg every Mon, Wed, Fri; 5 mg all other days   Weekly warfarin total:  42.5 mg   No change documented:  Neelima Higgins RN   Plan last modified:  Neelima Higgins RN (3/3/2021)   Next INR check:  2021   Priority:   Maintenance   Target end date:  Indefinite    Indications    Atrial fibrillation (H) [I48.91]  Long term current use of anticoagulant therapy [Z79.01]  Atrial fibrillation  unspecified type (H) [I48.91]             Anticoagulation Episode Summary     INR check location:      Preferred lab:      Send INR reminders to:  [facility]    Comments:  5 mg tabs, PM dose, print out      Anticoagulation Care Providers     Provider Role Specialty Phone number    Tyler Lee MD Referring Family Medicine [phone]

## 2021-04-22 ENCOUNTER — VIRTUAL VISIT (OUTPATIENT)
Dept: FAMILY MEDICINE | Facility: CLINIC | Age: 81
End: 2021-04-22
Payer: COMMERCIAL

## 2021-04-22 DIAGNOSIS — N30.00 ACUTE CYSTITIS WITHOUT HEMATURIA: Primary | ICD-10-CM

## 2021-04-22 PROCEDURE — 99213 OFFICE O/P EST LOW 20 MIN: CPT | Mod: 95 | Performed by: PHYSICIAN ASSISTANT

## 2021-04-22 RX ORDER — CEPHALEXIN 500 MG/1
500 CAPSULE ORAL 2 TIMES DAILY
Qty: 10 CAPSULE | Refills: 0 | Status: SHIPPED | OUTPATIENT
Start: 2021-04-22 | End: 2021-04-24

## 2021-04-22 ASSESSMENT — PATIENT HEALTH QUESTIONNAIRE - PHQ9: SUM OF ALL RESPONSES TO PHQ QUESTIONS 1-9: 2

## 2021-04-22 NOTE — PROGRESS NOTES
Aaliyah is a 80 year old who is being evaluated via a billable telephone visit.      What phone number would you like to be contacted at? [phone]    How would you like to obtain your AVS? MyChart    Assessment & Plan     Acute cystitis without hematuria  Patient was last treated for UTI in early March 2021 with macrobid. She reports complete resolution of symptoms for several weeks. Current symptoms include cloudy, smelly urine with frequency and discomfort. She says that if she drinks more fluids the discomfort lessens, but never fully goes away. Called in today as she has been experiencing urinary frequency at night which is keeping her up. Discussed empiric treatment with follow up UA and wet prep if symptoms do not improve as expected. Patient was in agreement with this plan.   - cephALEXin (KEFLEX) 500 MG capsule; Take 1 capsule (500 mg) by mouth 2 times daily for 5 days    Return in about 6 months (around 10/22/2021) for Return for scheduled annual checkup with PCP.    JANELL [facility]   Aaliyah is a 80 year old who presents for the following health issues     HPI     Genitourinary - Female  Onset/Duration: 5 days  Description:   Painful urination (Dysuria): no           Frequency: YES  Blood in urine (Hematuria): no  Delay in urine (Hesitency): no  Intensity: mild  Progression of Symptoms:  same  Accompanying Signs & Symptoms:  Fever/chills: no  Flank pain: no  Nausea and vomiting: no  Vaginal symptoms: none  Abdominal/Pelvic Pain: no  History:   History of frequent UTI s: YES  History of kidney stones: no  Sexually Active: no  Possibility of pregnancy: No  Precipitating or alleviating factors: drinking water, helps  Therapies tried and outcome: Increase fluid intake    Patient is an 80 year old female who calls in today with concerns about recurrent UTI. She was last seen virtually by primary care in March for similar symptoms and was found to have UTI.  Denies fever, nausea/vomiting, trouble breathing, chest pain, blood in urine, changes to bowel habits or vaginal discharge.     Review of Systems   Constitutional, HEENT, cardiovascular, pulmonary, gi and gu systems are negative, except as otherwise noted.      Objective           Vitals:  No vitals were obtained today due to virtual visit.    Physical Exam   healthy, alert and no distress  PSYCH: Alert and oriented times 3; coherent speech, normal   rate and volume, able to articulate logical thoughts, able   to abstract reason, no tangential thoughts, no hallucinations   or delusions  Her affect is normal  RESP: No cough, no audible wheezing, able to talk in full sentences  Remainder of exam unable to be completed due to telephone visits            Phone call duration: 5 minutes

## 2021-04-22 NOTE — NURSING NOTE
Health Maintenance Due   Topic Date Due     COVID-19 Vaccine (1) Never done     ZOSTER IMMUNIZATION (1 of 2) Never done     Pneumococcal Vaccine: 65+ Years (1 of 1 - PPSV23) Never done     FALL RISK ASSESSMENT  10/30/2020     Prabha LUCAS LPN

## 2021-04-24 ENCOUNTER — HOSPITAL ENCOUNTER (EMERGENCY)
Facility: CLINIC | Age: 81
Discharge: HOME | End: 2021-04-24
Attending: FAMILY MEDICINE | Admitting: FAMILY MEDICINE
Payer: MEDICARE

## 2021-04-24 VITALS
RESPIRATION RATE: 17 BRPM | OXYGEN SATURATION: 100 % | TEMPERATURE: 99.2 F | DIASTOLIC BLOOD PRESSURE: 74 MMHG | HEART RATE: 65 BPM | SYSTOLIC BLOOD PRESSURE: 125 MMHG

## 2021-04-24 DIAGNOSIS — T78.40XA ALLERGIC REACTION, INITIAL ENCOUNTER: ICD-10-CM

## 2021-04-24 LAB
ANION GAP SERPL CALCULATED.3IONS-SCNC: 9 MMOL/L (ref 3–14)
BASOPHILS # BLD AUTO: 0 10E9/L (ref 0–0.2)
BASOPHILS NFR BLD AUTO: 0.5 %
BUN SERPL-MCNC: 20 MG/DL (ref 7–30)
CALCIUM SERPL-MCNC: 8.4 MG/DL (ref 8.5–10.1)
CHLORIDE SERPL-SCNC: 101 MMOL/L (ref 94–109)
CO2 SERPL-SCNC: 24 MMOL/L (ref 20–32)
CREAT SERPL-MCNC: 0.98 MG/DL (ref 0.52–1.04)
DIFFERENTIAL METHOD BLD: ABNORMAL
EOSINOPHIL # BLD AUTO: 0.1 10E9/L (ref 0–0.7)
EOSINOPHIL NFR BLD AUTO: 1 %
ERYTHROCYTE [DISTWIDTH] IN BLOOD BY AUTOMATED COUNT: 12.7 % (ref 10–15)
GFR SERPL CREATININE-BSD FRML MDRD: 54 ML/MIN/{1.73_M2}
GLUCOSE SERPL-MCNC: 217 MG/DL (ref 70–99)
HCT VFR BLD AUTO: 38.9 % (ref 35–47)
HGB BLD-MCNC: 13 G/DL (ref 11.7–15.7)
IMM GRANULOCYTES # BLD: 0 10E9/L (ref 0–0.4)
IMM GRANULOCYTES NFR BLD: 0.2 %
LYMPHOCYTES # BLD AUTO: 2.5 10E9/L (ref 0.8–5.3)
LYMPHOCYTES NFR BLD AUTO: 29.8 %
MCH RBC QN AUTO: 29.7 PG (ref 26.5–33)
MCHC RBC AUTO-ENTMCNC: 33.4 G/DL (ref 31.5–36.5)
MCV RBC AUTO: 89 FL (ref 78–100)
MONOCYTES # BLD AUTO: 0.3 10E9/L (ref 0–1.3)
MONOCYTES NFR BLD AUTO: 3.7 %
NEUTROPHILS # BLD AUTO: 5.4 10E9/L (ref 1.6–8.3)
NEUTROPHILS NFR BLD AUTO: 64.8 %
NRBC # BLD AUTO: 0 10*3/UL
NRBC BLD AUTO-RTO: 0 /100
PLATELET # BLD AUTO: 98 10E9/L (ref 150–450)
POTASSIUM SERPL-SCNC: 4.1 MMOL/L (ref 3.4–5.3)
RBC # BLD AUTO: 4.37 10E12/L (ref 3.8–5.2)
SODIUM SERPL-SCNC: 134 MMOL/L (ref 133–144)
TROPONIN I SERPL-MCNC: <0.015 UG/L (ref 0–0.04)
WBC # BLD AUTO: 8.3 10E9/L (ref 4–11)

## 2021-04-24 PROCEDURE — 93010 ELECTROCARDIOGRAM REPORT: CPT | Performed by: FAMILY MEDICINE

## 2021-04-24 PROCEDURE — 84484 ASSAY OF TROPONIN QUANT: CPT | Performed by: FAMILY MEDICINE

## 2021-04-24 PROCEDURE — 80048 BASIC METABOLIC PNL TOTAL CA: CPT | Performed by: FAMILY MEDICINE

## 2021-04-24 PROCEDURE — 96375 TX/PRO/DX INJ NEW DRUG ADDON: CPT | Performed by: FAMILY MEDICINE

## 2021-04-24 PROCEDURE — 250N000012 HC RX MED GY IP 250 OP 636 PS 637: Performed by: FAMILY MEDICINE

## 2021-04-24 PROCEDURE — 85025 COMPLETE CBC W/AUTO DIFF WBC: CPT | Performed by: FAMILY MEDICINE

## 2021-04-24 PROCEDURE — 99284 EMERGENCY DEPT VISIT MOD MDM: CPT | Mod: 25 | Performed by: FAMILY MEDICINE

## 2021-04-24 PROCEDURE — 96374 THER/PROPH/DIAG INJ IV PUSH: CPT | Performed by: FAMILY MEDICINE

## 2021-04-24 PROCEDURE — 250N000009 HC RX 250: Performed by: FAMILY MEDICINE

## 2021-04-24 PROCEDURE — 250N000011 HC RX IP 250 OP 636: Performed by: FAMILY MEDICINE

## 2021-04-24 PROCEDURE — 93005 ELECTROCARDIOGRAM TRACING: CPT | Performed by: FAMILY MEDICINE

## 2021-04-24 RX ORDER — METHYLPREDNISOLONE SODIUM SUCCINATE 125 MG/2ML
60 INJECTION, POWDER, LYOPHILIZED, FOR SOLUTION INTRAMUSCULAR; INTRAVENOUS ONCE
Status: COMPLETED | OUTPATIENT
Start: 2021-04-24 | End: 2021-04-24

## 2021-04-24 RX ORDER — NITROFURANTOIN 25; 75 MG/1; MG/1
100 CAPSULE ORAL 2 TIMES DAILY
Qty: 10 CAPSULE | Refills: 0 | Status: SHIPPED | OUTPATIENT
Start: 2021-04-24 | End: 2021-04-29

## 2021-04-24 RX ORDER — PREDNISONE 20 MG/1
40 TABLET ORAL ONCE
Status: COMPLETED | OUTPATIENT
Start: 2021-04-24 | End: 2021-04-24

## 2021-04-24 RX ADMIN — FAMOTIDINE 20 MG: 10 INJECTION INTRAVENOUS at 09:30

## 2021-04-24 RX ADMIN — PREDNISONE 40 MG: 20 TABLET ORAL at 09:30

## 2021-04-24 RX ADMIN — METHYLPREDNISOLONE SODIUM SUCCINATE 62.5 MG: 125 INJECTION, POWDER, FOR SOLUTION INTRAMUSCULAR; INTRAVENOUS at 09:30

## 2021-04-24 NOTE — ED PROVIDER NOTES
History     Chief Complaint   Patient presents with     Allergic Reaction     HPI  Unique Ward is a 80 year old female who presents with concerns of an allergic reaction to Keflex.  Patient was recently started on Keflex for urinary tract infection.  Patient started taking it yesterday.  She is unsure if she had been on it before.  This morning she took another dose at 7:00 and then about an hour later she thought she noticed she was developing a rash and was feeling very itchy.  She thought she might be feeling a little short of breath so she took 25 of Benadryl.  She started to feel even worse after that and so then called the ambulance.  When EMS arrived, they noted that she was very pale and diaphoretic.  Blood pressure initially was 60/40.  Oxygen saturations were normal though.  IV was established and then given some fluids and 50 of IV Benadryl and blood pressure started to improve.  In route though, she started to complain that her breathing was getting worse so she was given some subcu epinephrine.  Upon arrival here, her color is looking a lot better but she is feeling more short of breath.  Patient is complaining of chest pain that started after the epinephrine was given.  Patient does not feel lightheaded or dizzy currently.    Allergies:  Allergies   Allergen Reactions     No Clinical Screening - See Comments      Eye drop antibiotic.     Codeine Other (See Comments)     Comment: , Description:      Gentamicin Hives     Per Unique this medication gives hives on arms and legs. Bev Lo LSXO,  ....................  7/15/2014   1:15 PM     Influenza Vaccines Other (See Comments)     Comment: , Description:      Influenza Virus Vaccine H5n1      Paxil [Paroxetine] Other (See Comments)     Comment: , Description:      Typhoid Vaccine-Strain Ty21a Other (See Comments)     Comment: , Description:   Comment: , Description:      Typhoid Vaccines        Problem List:    Patient Active Problem List     Diagnosis Date Noted     Long term current use of anticoagulant therapy 06/30/2020     Priority: Medium     Atrial fibrillation, unspecified type (H) 06/30/2020     Priority: Medium     CHB (complete heart block) (H) 03/31/2020     Priority: Medium     Added automatically from request for surgery [id number]       Pacemaker 03/31/2020     Priority: Medium     Atrial fibrillation (H) 06/07/2019     Priority: Medium     Left lumbosacral radiculopathy 05/31/2017     Priority: Medium     Acute cystitis with hematuria 05/31/2017     Priority: Medium     History of basal cell carcinoma- face and scalp 02/28/2017     Priority: Medium     FH: melanoma- son 02/28/2017     Priority: Medium     Fibromyalgia 02/28/2017     Priority: Medium     Advance Care Planning 01/06/2017     Priority: Medium     Advance Care Planning 5/4/2017: ACP Facilitation Session:    Unique Ward presented for ACP Facilitation session at a group class. She was accompanied by daughter-in-law. [employer] Cho[employer] information provided and resources reviewed. She wishes to give additional consideration to ACP.  She currently has the following questions or concerns about Advance Care Planning: none known. Follow-up meeting: not needed/applicable. Added by Unique Uribe RN, Advance Care Planning Liaison.  Advance Care Planning 1/6/17: Info given   Saray Greer MA         Personal history of urinary tract infection 06/18/2016     Priority: Medium     Essential hypertension 03/23/2016     Priority: Medium     Vitamin D deficiency 03/30/2013     Priority: Medium     Dysthymia 04/01/2005     Priority: Medium        Past Medical History:    Past Medical History:   Diagnosis Date     Arthritis 01.01.1980     Atrial fibrillation (H) 6/7/2019     Cancer (H) 01.01.1993     scalp     Depressive disorder 01.01.1977     Hypertension 03.16.2016       Past Surgical History:    Past Surgical History:   Procedure Laterality Date     BIOPSY  11/01/2014     COLONOSCOPY       Two routine,  one for chronic diarrhea     EP PACEMAKER N/A 3/31/2020    Procedure: EP Pacemaker;  Surgeon: Erich Clark MD;  Location:  [facility]     EYE SURGERY  2014    first    cataracts both eyes.  second a few months later     GENITOURINARY SURGERY       GYN SURGERY  1977    TL     HEAD & NECK SURGERY  1997    basal cell X2   Moh's on scalp.  other on bridge of nose     LASER YAG CAPSULOTOMY Bilateral 2021    Procedure: CAPSULOTOMY, LENS, USING YAG LASER;  Surgeon: Bernardino Chu MD;  Location: [facility]     SOFT TISSUE SURGERY  1990    Ganglion Cyst   Left inner wrist       Family History:    Family History   Problem Relation Age of Onset     Diabetes Maternal Grandmother      Coronary Artery Disease Sister         error     Coronary Artery Disease Brother         error     Hypertension Brother      Hyperlipidemia Brother      Cerebrovascular Disease Brother      Hypertension Sister      Hypertension Sister      Hyperlipidemia Sister      Other Cancer Sister         cervical     Breast Cancer Sister      Other Cancer Sister         kidney     Mental Illness Mother         paranoid/schizophrenic/suicide     Mental Illness Sister         ???  Had shock treatments       Social History:  Marital Status:  Legally  [3]  Social History     Tobacco Use     Smoking status: Former Smoker     Packs/day: 0.75     Years: 10.00     Pack years: 7.50     Types: Cigarettes     Start date: 1955     Quit date: 1965     Years since quittin.3     Smokeless tobacco: Never Used   Substance Use Topics     Alcohol use: No     Alcohol/week: 0.0 standard drinks     Drug use: No        Medications:    amLODIPine (NORVASC) 5 MG tablet  cephALEXin (KEFLEX) 500 MG capsule  cholecalciferol (VITAMIN D3) 5000 units TABS tablet  estradiol (ESTRACE) 0.1 MG/GM vaginal cream  magnesium 100 MG TABS  meclizine (ANTIVERT) 25 MG tablet  warfarin ANTICOAGULANT  (COUMADIN) 5 MG tablet          Review of Systems   All other systems reviewed and are negative.      Physical Exam          Physical Exam  Vitals signs and nursing note reviewed.   Constitutional:       General: She is not in acute distress.     Appearance: She is well-developed. She is ill-appearing. She is not diaphoretic.   Eyes:      Conjunctiva/sclera: Conjunctivae normal.   Neck:      Musculoskeletal: Normal range of motion and neck supple.   Cardiovascular:      Rate and Rhythm: Normal rate and regular rhythm.      Heart sounds: Normal heart sounds. No murmur. No friction rub. No gallop.    Pulmonary:      Effort: Pulmonary effort is normal. No respiratory distress.      Breath sounds: Normal breath sounds. No wheezing or rales.   Chest:      Chest wall: No tenderness.   Abdominal:      General: Bowel sounds are normal. There is no distension.      Palpations: Abdomen is soft. There is no mass.      Tenderness: There is no abdominal tenderness. There is no guarding.   Musculoskeletal: Normal range of motion.         General: No tenderness.   Skin:     General: Skin is warm and dry.      Findings: No rash.   Neurological:      Mental Status: She is alert and oriented to person, place, and time.   Psychiatric:         Judgment: Judgment normal.         ED Course        Procedures         EKG Interpretation:      Interpreted by Otoniel Moncada  Time reviewed: now   Symptoms at time of EKG: now   Rhythm: normal sinus   Rate: normal  Axis: NORMAL  Ectopy: none  Conduction: normal  ST Segments/ T Waves: No ST-T wave changes  Q Waves: none  Comparison to prior: No old EKG available    Clinical Impression: normal EKG    Results for orders placed or performed during the hospital encounter of 04/24/21 (from the past 24 hour(s))   CBC with platelets differential   Result Value Ref Range    WBC 8.3 4.0 - 11.0 10e9/L    RBC Count 4.37 3.8 - 5.2 10e12/L    Hemoglobin 13.0 11.7 - 15.7 g/dL    Hematocrit 38.9 35.0 -  47.0 %    MCV 89 78 - 100 fl    MCH 29.7 26.5 - 33.0 pg    MCHC 33.4 31.5 - 36.5 g/dL    RDW 12.7 10.0 - 15.0 %    Platelet Count 98 (L) 150 - 450 10e9/L    Diff Method Automated Method     % Neutrophils 64.8 %    % Lymphocytes 29.8 %    % Monocytes 3.7 %    % Eosinophils 1.0 %    % Basophils 0.5 %    % Immature Granulocytes 0.2 %    Nucleated RBCs 0 0 /100    Absolute Neutrophil 5.4 1.6 - 8.3 10e9/L    Absolute Lymphocytes 2.5 0.8 - 5.3 10e9/L    Absolute Monocytes 0.3 0.0 - 1.3 10e9/L    Absolute Eosinophils 0.1 0.0 - 0.7 10e9/L    Absolute Basophils 0.0 0.0 - 0.2 10e9/L    Abs Immature Granulocytes 0.0 0 - 0.4 10e9/L    Absolute Nucleated RBC 0.0    Basic metabolic panel   Result Value Ref Range    Sodium 134 133 - 144 mmol/L    Potassium 4.1 3.4 - 5.3 mmol/L    Chloride 101 94 - 109 mmol/L    Carbon Dioxide 24 20 - 32 mmol/L    Anion Gap 9 3 - 14 mmol/L    Glucose 217 (H) 70 - 99 mg/dL    Urea Nitrogen 20 7 - 30 mg/dL    Creatinine 0.98 0.52 - 1.04 mg/dL    GFR Estimate 54 (L) >60 mL/min/[1.73_m2]    GFR Estimate If Black 62 >60 mL/min/[1.73_m2]    Calcium 8.4 (L) 8.5 - 10.1 mg/dL   Troponin I   Result Value Ref Range    Troponin I ES <0.015 0.000 - 0.045 ug/L       Medications   famotidine (PEPCID) injection 20 mg (20 mg Intravenous Given 4/24/21 0930)   predniSONE (DELTASONE) tablet 40 mg (40 mg Oral Given 4/24/21 0930)   methylPREDNISolone sodium succinate (solu-MEDROL) injection 62.5 mg (62.5 mg Intravenous Given 4/24/21 0930)     9:45 AM: Patient is feeling better, drinking the water has helped with the breathing issue.  She is not feeling shaky.  Blood pressures are continuing to improve and look better.  We will continue to monitor.    11 AM: Patient is doing much better, has been able to eat and drink without any problems, has had no recurrence of a rash or itchiness.  At this point I think patient could have had an allergic reaction to the Keflex.  Would recommend continued use of Benadryl over the next  48 hours to prevent rebound symptoms.  We will change her antibiotic to Macrobid.  Patient will be discharged at this time.    Assessments & Plan (with Medical Decision Making)  Allergic reaction     I have reviewed the nursing notes.    I have reviewed the findings, diagnosis, plan and need for follow up with the patient.              4/24/2021   [facility] EMERGENCY DEPT     Otoniel Moncada MD  04/24/21 0403

## 2021-04-24 NOTE — ED TRIAGE NOTES
"Pt brought to ED via [facility] > EMS stated \"she took her prescribed Keflex this morning > she started to feel itchy > she took some benadryl > then became short of breath > she called us > when we got there she was ghost white with hypotension > started a line, gave her 50mg of benadryl at 0835 IV and 0.5mg of EPI IM.\"   "

## 2021-04-24 NOTE — DISCHARGE INSTRUCTIONS
1.  I want you to take Benadryl every 6 hours for the next 48 hours to prevent any rebound symptoms.  2.  We are going to have you stop the Keflex and we are starting you on Macrobid which you have been on before and tolerated to prevent you from having another reaction.  3.  If your symptoms do worsen, please return to the emergency department.

## 2021-04-27 ENCOUNTER — TELEPHONE (OUTPATIENT)
Dept: ANTICOAGULATION | Facility: CLINIC | Age: 81
End: 2021-04-27

## 2021-04-27 NOTE — TELEPHONE ENCOUNTER
Reason for Call:  Other call back    Detailed comments: Patient calling has questions would like a call back. Thank you     Phone Number Patient can be reached at: Home number on file [phone] (home)    Best Time: ASAP    Can we leave a detailed message on this number? YES    Call taken on 4/27/2021 at 8:35 AM by Kelsey Wheeler

## 2021-04-28 ENCOUNTER — ANTICOAGULATION THERAPY VISIT (OUTPATIENT)
Dept: ANTICOAGULATION | Facility: CLINIC | Age: 81
End: 2021-04-28

## 2021-04-28 ENCOUNTER — OFFICE VISIT (OUTPATIENT)
Dept: INTERNAL MEDICINE | Facility: CLINIC | Age: 81
End: 2021-04-28
Payer: COMMERCIAL

## 2021-04-28 VITALS
WEIGHT: 139.1 LBS | DIASTOLIC BLOOD PRESSURE: 70 MMHG | RESPIRATION RATE: 18 BRPM | TEMPERATURE: 98 F | OXYGEN SATURATION: 99 % | HEART RATE: 80 BPM | BODY MASS INDEX: 21.08 KG/M2 | HEIGHT: 68 IN | SYSTOLIC BLOOD PRESSURE: 118 MMHG

## 2021-04-28 DIAGNOSIS — I44.2 CHB (COMPLETE HEART BLOCK) (H): ICD-10-CM

## 2021-04-28 DIAGNOSIS — R73.09 ELEVATED GLUCOSE: ICD-10-CM

## 2021-04-28 DIAGNOSIS — D69.6 THROMBOCYTOPENIA (H): ICD-10-CM

## 2021-04-28 DIAGNOSIS — I48.91 ATRIAL FIBRILLATION, UNSPECIFIED TYPE (H): ICD-10-CM

## 2021-04-28 DIAGNOSIS — R30.0 DYSURIA: Primary | ICD-10-CM

## 2021-04-28 DIAGNOSIS — Z95.0 PACEMAKER: ICD-10-CM

## 2021-04-28 DIAGNOSIS — Z79.01 LONG TERM CURRENT USE OF ANTICOAGULANT THERAPY: ICD-10-CM

## 2021-04-28 DIAGNOSIS — I48.21 PERMANENT ATRIAL FIBRILLATION (H): ICD-10-CM

## 2021-04-28 LAB
BASOPHILS # BLD AUTO: 0.1 10E9/L (ref 0–0.2)
BASOPHILS NFR BLD AUTO: 0.7 %
DIFFERENTIAL METHOD BLD: NORMAL
EOSINOPHIL # BLD AUTO: 0.2 10E9/L (ref 0–0.7)
EOSINOPHIL NFR BLD AUTO: 2.3 %
ERYTHROCYTE [DISTWIDTH] IN BLOOD BY AUTOMATED COUNT: 12.7 % (ref 10–15)
HBA1C MFR BLD: 5.8 % (ref 0–5.6)
HCT VFR BLD AUTO: 35.6 % (ref 35–47)
HGB BLD-MCNC: 12.1 G/DL (ref 11.7–15.7)
IMM GRANULOCYTES # BLD: 0 10E9/L (ref 0–0.4)
IMM GRANULOCYTES NFR BLD: 0.1 %
INR PPP: 1.61 (ref 0.86–1.14)
LYMPHOCYTES # BLD AUTO: 1.8 10E9/L (ref 0.8–5.3)
LYMPHOCYTES NFR BLD AUTO: 25.7 %
MCH RBC QN AUTO: 30 PG (ref 26.5–33)
MCHC RBC AUTO-ENTMCNC: 34 G/DL (ref 31.5–36.5)
MCV RBC AUTO: 88 FL (ref 78–100)
MONOCYTES # BLD AUTO: 0.6 10E9/L (ref 0–1.3)
MONOCYTES NFR BLD AUTO: 8.5 %
NEUTROPHILS # BLD AUTO: 4.3 10E9/L (ref 1.6–8.3)
NEUTROPHILS NFR BLD AUTO: 62.7 %
NRBC # BLD AUTO: 0 10*3/UL
NRBC BLD AUTO-RTO: 0 /100
PLATELET # BLD AUTO: 279 10E9/L (ref 150–450)
RBC # BLD AUTO: 4.04 10E12/L (ref 3.8–5.2)
WBC # BLD AUTO: 6.8 10E9/L (ref 4–11)

## 2021-04-28 PROCEDURE — 99214 OFFICE O/P EST MOD 30 MIN: CPT | Performed by: INTERNAL MEDICINE

## 2021-04-28 PROCEDURE — 85025 COMPLETE CBC W/AUTO DIFF WBC: CPT | Performed by: INTERNAL MEDICINE

## 2021-04-28 PROCEDURE — 85610 PROTHROMBIN TIME: CPT | Performed by: INTERNAL MEDICINE

## 2021-04-28 PROCEDURE — 83036 HEMOGLOBIN GLYCOSYLATED A1C: CPT | Performed by: INTERNAL MEDICINE

## 2021-04-28 PROCEDURE — 36415 COLL VENOUS BLD VENIPUNCTURE: CPT | Performed by: INTERNAL MEDICINE

## 2021-04-28 ASSESSMENT — PAIN SCALES - GENERAL: PAINLEVEL: NO PAIN (0)

## 2021-04-28 ASSESSMENT — MIFFLIN-ST. JEOR: SCORE: 1144.45

## 2021-04-28 NOTE — PROGRESS NOTES
"Joseph Fischer is a 80 year old who presents for the following health issues     HPI     ED/UC Followup:    Facility:  [facility]  Date of visit: 4/24/21  Reason for visit: allergic reaction to keflex   Current Status: fatigued and winded.         EMR reviewed including:             Complaint, History of Chief Complaint, Corresponding Review of Systems, and Complaint Specific Physical Examination.    #1   Patient recently had a significant allergic reaction to Keflex.  Was started for acute cystitis.  No prior history of cephalosporin or penicillin allergy.  Patient had hypotension with syncope.  No further symptoms.  ER evaluation reviewed in detail with patient.      #2   History of atrial fibrillation with complete heart block.  Pacemaker in place.  Recent interrogations show fully functional pacemaker.  Syncope appears to have a result of venous dilation.  Denies any tachycardia or bradycardia arrhythmia.       Exam:   HEART:  regular without rubs, clicks, gallops, or murmurs. PMI is nondisplaced. Upstrokes are brisk. S1,S2 are heard.      #3   History of relative thrombocytopenia.  Patient has reviewed lab and is concerned regarding platelet count of 90,000.  Suspect secondary to stress reaction.  We will recheck       Exam:   SKIN:  warm and dry. No erythema, or rashes are noted. No specific lesions of concern are noted.   No bruising or petechiae noted.      Vital Signs:   /70 (BP Location: Right arm, Patient Position: Sitting, Cuff Size: Adult Regular)   Pulse 80   Temp 98  F (36.7  C) (Temporal)   Resp 18   Ht 1.727 m (5' 8\")   Wt 63.1 kg (139 lb 1.6 oz)   LMP  (LMP Unknown)   SpO2 99%   BMI 21.15 kg/m               Decision Making    Problem and Complexity     1. Dysuria  Recheck urinary analysis for clearance of infection  - **UA reflex to Microscopic FUTURE anytime; Future  - INR    2. Thrombocytopenia (H)  Check CBC for platelet count  - CBC with platelets and differential    3. " Elevated glucose  Patient did have an early glucose and is concerned regarding possible diabetes.  Requests A1c  - Hemoglobin A1c    4. Permanent atrial fibrillation (H)  Continue anticoagulation    5. Pacemaker  Continue regular surveillance    6. CHB (complete heart block) (H)  Obviously, continue pacemaker          ------------------------------------------------------------------------------------------------------------------------------  Data    4=1/3 5=2/3    1  >3  Specialty (external) notes reviewed:   Emergency medicine notes appreciated  Individual tests ordered (by provider) reviewed:   None  Individual tests ordered (by provider):   Multiple  Independent Historians Interview:   No  2  Review of outside (other providers) Tests:   Yes  3   Verbal Discussion with Specialists:    None    ----------------------------------------------------------------------------------------------------------------------------------  Risk   Prescription drug management:   No                                High risk for toxicity:   Decision regarding surgery:    None   Social determinants of health:   None   Decision regarding hospitalization:     None   Decision to withhold therapy:   None

## 2021-04-28 NOTE — PROGRESS NOTES
ANTICOAGULATION MANAGEMENT     Patient Name:  Unique Ward  Date:  2021    ASSESSMENT /SUBJECTIVE:    Today's INR result of 1.61 is subtherapeutic. Goal INR of 2.0-3.0      Warfarin dose taken: Warfarin taken as instructed    Diet: Increased greens/vitamin K in diet; ongoing change    Medication changes/ interactions: No new medications/supplements affecting INR    Previous INR: Therapeutic     S/S of bleeding or thromboembolism: No    New injury or illness: Yes: Did have an allergic reaction to Keflex prescribed for a UTI    Upcoming surgery, procedure or cardioversion: No    Additional findings: None      PLAN:    Telephone call with Unique regarding INR result and instructed:     Warfarin Dosing Instructions: Take 10 mg today then change your warfarin dose to 5 mg Tues, Thurs, Sat and 7.5 mg all other days  . (5.9 % change)    Instructed patient to follow up no later than: 1 week  Patient offered & declined to schedule next visit    Education provided: Please call back if any changes to your diet, medications or how you've been taking warfarin, Importance of consistent vitamin K intake, Target INR goal and significance of current INR result, Importance of therapeutic range and When to seek medical attention/emergency care      Aaliyah verbalizes understanding and agrees to warfarin dosing plan.    Instructed to call the Anticoagulation Clinic for any changes, questions or concerns. (#[phone])        Veronika Campbell RN      OBJECTIVE:  Recent labs: (last 7 days)     21  1502   INR 1.61*         No question data found.  Anticoagulation Summary  As of 2021    INR goal:  2.0-3.0   TTR:  65.9 % (1 y)   INR used for dosin.61 (2021)   Warfarin maintenance plan:  5 mg (5 mg x 1) every Tue, Thu, Sat; 7.5 mg (5 mg x 1.5) all other days   Full warfarin instructions:  : 10 mg; Otherwise 5 mg every Tue, Thu, Sat; 7.5 mg all other days   Weekly warfarin total:  45 mg   Plan last modified:   Veronika Campbell, RN (4/28/2021)   Next INR check:  5/7/2021   Priority:  Maintenance   Target end date:  Indefinite    Indications    Atrial fibrillation (H) [I48.91]  Long term current use of anticoagulant therapy [Z79.01]  Atrial fibrillation  unspecified type (H) [I48.91]             Anticoagulation Episode Summary     INR check location:      Preferred lab:      Send INR reminders to:  [facility]    Comments:  5 mg tabs, PM dose, print out      Anticoagulation Care Providers     Provider Role Specialty Phone number    Tyler Lee MD Referring Family Medicine [phone]

## 2021-05-07 ENCOUNTER — ANTICOAGULATION THERAPY VISIT (OUTPATIENT)
Dept: ANTICOAGULATION | Facility: CLINIC | Age: 81
End: 2021-05-07

## 2021-05-07 DIAGNOSIS — Z79.01 LONG TERM CURRENT USE OF ANTICOAGULANT THERAPY: ICD-10-CM

## 2021-05-07 DIAGNOSIS — I48.91 ATRIAL FIBRILLATION, UNSPECIFIED TYPE (H): ICD-10-CM

## 2021-05-07 DIAGNOSIS — I48.21 PERMANENT ATRIAL FIBRILLATION (H): ICD-10-CM

## 2021-05-07 LAB — INR BLD: 1.6 (ref 0.86–1.14)

## 2021-05-07 PROCEDURE — 36416 COLLJ CAPILLARY BLOOD SPEC: CPT | Performed by: FAMILY MEDICINE

## 2021-05-07 PROCEDURE — 85610 PROTHROMBIN TIME: CPT | Performed by: FAMILY MEDICINE

## 2021-05-07 NOTE — PROGRESS NOTES
ANTICOAGULATION MANAGEMENT     Patient Name:  Unique Ward  Date:  2021    ASSESSMENT /SUBJECTIVE:    Today's INR result of 1.6 is subtherapeutic. Goal INR of 2.0-3.0      Warfarin dose taken: Warfarin taken as instructed    Diet: Protein supplement/shake increased which maybe affecting INR    Medication changes/ interactions: No new medications/supplements affecting INR    Previous INR: Subtherapeutic     S/S of bleeding or thromboembolism: No    New injury or illness: No    Upcoming surgery, procedure or cardioversion: No    Additional findings: None      PLAN:    Telephone call with Unique regarding INR result and instructed:     Warfarin Dosing Instructions: Change your warfarin dose to 5 mg Thurs and 7.5 mg ROW  . (11.1 % change)    Instructed patient to follow up no later than: 1 week  Patient offered & declined to schedule next visit    Education provided: Please call back if any changes to your diet, medications or how you've been taking warfarin, Importance of consistent vitamin K intake, Impact of vitamin K foods on INR, Vitamin K content of foods, Target INR goal and significance of current INR result and Importance of therapeutic range      Aaliyah verbalizes understanding and agrees to warfarin dosing plan.    Instructed to call the Anticoagulation Clinic for any changes, questions or concerns. (#[phone])        Veronika Campbell RN      OBJECTIVE:  Recent labs: (last 7 days)     21  0904   INR 1.6*         No question data found.  Anticoagulation Summary  As of 2021    INR goal:  2.0-3.0   TTR:  63.4 % (1 y)   INR used for dosin.6 (2021)   Warfarin maintenance plan:  5 mg (5 mg x 1) every Thu; 7.5 mg (5 mg x 1.5) all other days   Full warfarin instructions:  5 mg every Thu; 7.5 mg all other days   Weekly warfarin total:  50 mg   Plan last modified:  Neelima Higgins RN (2021)   Next INR check:  2021   Priority:  Maintenance   Target end date:  Indefinite     Indications    Atrial fibrillation (H) [I48.91]  Long term current use of anticoagulant therapy [Z79.01]  Atrial fibrillation  unspecified type (H) [I48.91]             Anticoagulation Episode Summary     INR check location:      Preferred lab:      Send INR reminders to:  [facility]    Comments:  5 mg tabs, PM dose, print out      Anticoagulation Care Providers     Provider Role Specialty Phone number    Tyler Lee MD Referring Family Medicine [phone]

## 2021-05-07 NOTE — PROGRESS NOTES
Anticoagulation Management    Unable to reach Aaliyah today.    Today's INR result of 1.6 is subtherapeutic (goal INR of 2.0-3.0).  Result received from: Clinic Lab    Follow up required to confirm warfarin dose taken and assess for changes    VM left to call [facility] back. Will try again later.       Anticoagulation clinic to follow up    Neelima Higgins RN

## 2021-05-11 ENCOUNTER — MYC MEDICAL ADVICE (OUTPATIENT)
Dept: FAMILY MEDICINE | Facility: CLINIC | Age: 81
End: 2021-05-11

## 2021-05-12 ENCOUNTER — ANCILLARY PROCEDURE (OUTPATIENT)
Dept: CARDIOLOGY | Facility: CLINIC | Age: 81
End: 2021-05-12
Attending: INTERNAL MEDICINE
Payer: COMMERCIAL

## 2021-05-12 ENCOUNTER — TELEPHONE (OUTPATIENT)
Dept: CARDIOLOGY | Facility: CLINIC | Age: 81
End: 2021-05-12

## 2021-05-12 DIAGNOSIS — Z95.0 CARDIAC PACEMAKER IN SITU: ICD-10-CM

## 2021-05-12 PROCEDURE — 93294 REM INTERROG EVL PM/LDLS PM: CPT | Performed by: INTERNAL MEDICINE

## 2021-05-12 PROCEDURE — 93296 REM INTERROG EVL PM/IDS: CPT | Performed by: INTERNAL MEDICINE

## 2021-05-12 NOTE — TELEPHONE ENCOUNTER
Dr. Sanchez,    FYI: Your patient had an episode of NSVT on today's device check. This is the first documented episode since implant. Episode lasted 12 beats, rates 145-200bpm. Episode occurred 2/22/21 at 540am. EF 55-60% (2020).         [employer] [place] (D) Remote PPM Device Check  AP: 65%    : 43%    Mode: DDDR 60/130  Presenting Rhythm: AP/, AS/  Heart Rate: Adequate rates per histogram    Sensing: stable    Pacing Threshold: stable   Impedance: stable  Battery Status: 10.9 years  Atrial Arrhythmia: Patient in mode switch <1% of the time. No EGMs.    Ventricular Arrhythmia: 1 ventricular high rate. EGM shows Vs>As for NSVT lasting 12 beats, rates 145-200bpm. Episode occurred 2/22/21 at 540am. EF 55-60% (2020). Will notify Dr. Sanchez with findings.     Care Plan: F/u PPM [facility] q 3 months. LM with results. KATHIE Lares

## 2021-05-14 ENCOUNTER — ANTICOAGULATION THERAPY VISIT (OUTPATIENT)
Dept: ANTICOAGULATION | Facility: CLINIC | Age: 81
End: 2021-05-14

## 2021-05-14 DIAGNOSIS — I48.21 PERMANENT ATRIAL FIBRILLATION (H): ICD-10-CM

## 2021-05-14 DIAGNOSIS — I48.91 ATRIAL FIBRILLATION, UNSPECIFIED TYPE (H): ICD-10-CM

## 2021-05-14 DIAGNOSIS — Z79.01 LONG TERM CURRENT USE OF ANTICOAGULANT THERAPY: ICD-10-CM

## 2021-05-14 LAB
CAPILLARY BLOOD COLLECTION: NORMAL
INR BLD: 1.7 (ref 0.86–1.14)

## 2021-05-14 PROCEDURE — 85610 PROTHROMBIN TIME: CPT | Performed by: FAMILY MEDICINE

## 2021-05-14 PROCEDURE — 36416 COLLJ CAPILLARY BLOOD SPEC: CPT | Performed by: FAMILY MEDICINE

## 2021-05-14 NOTE — PROGRESS NOTES
Anticoagulation Management    Unable to reach Aaliyah today.    Today's INR result of 1.7 is subtherapeutic (goal INR of 2.0-3.0).  Result received from: Clinic Lab    Follow up required to confirm warfarin dose taken and assess for changes    Left message to take a booster dose of warfarin,  10  mg tonight. [facility] RN's can be reached for return calls at [phone] (internal staff only). Please do not give this number out to patients or cold transfer. Thank you!         Anticoagulation clinic to follow up    Veronika Campbell RN

## 2021-05-14 NOTE — PROGRESS NOTES
ANTICOAGULATION MANAGEMENT     Patient Name:  Unique Ward  Date:  2021    ASSESSMENT /SUBJECTIVE:    Today's INR result of 1.7 is subtherapeutic. Goal INR of 2.0-3.0      Warfarin dose taken: Warfarin taken as instructed    Diet: No new diet changes affecting INR    Medication changes/ interactions: No new medications/supplements affecting INR    Previous INR: Subtherapeutic     S/S of bleeding or thromboembolism: No    New injury or illness: No    Upcoming surgery, procedure or cardioversion: No    Additional findings: None      PLAN:    Telephone call with Unique regarding INR result and instructed:     Warfarin Dosing Instructions: Change your warfarin dose to 10 gm Fri and 7.5 mg all other days  . (10 % change)    Instructed patient to follow up no later than: 1 week  Lab visit scheduled    Education provided: Please call back if any changes to your diet, medications or how you've been taking warfarin      Aaliyah verbalizes understanding and agrees to warfarin dosing plan.    Instructed to call the Anticoagulation Clinic for any changes, questions or concerns. (#[phone])        Veronika Campbell RN      OBJECTIVE:  Recent labs: (last 7 days)     21  0906   INR 1.7*         No question data found.  Anticoagulation Summary  As of 2021    INR goal:  2.0-3.0   TTR:  61.5 % (1 y)   INR used for dosin.7 (2021)   Warfarin maintenance plan:  10 mg (5 mg x 2) every Fri; 7.5 mg (5 mg x 1.5) all other days   Full warfarin instructions:  10 mg every Fri; 7.5 mg all other days   Weekly warfarin total:  55 mg   Plan last modified:  Veronika Campbell RN (2021)   Next INR check:  2021   Priority:  High   Target end date:  Indefinite    Indications    Atrial fibrillation (H) [I48.91]  Long term current use of anticoagulant therapy [Z79.01]  Atrial fibrillation  unspecified type (H) [I48.91]             Anticoagulation Episode Summary     INR check location:      Preferred lab:      Send  INR reminders to:  [facility]    Comments:  5 mg tabs, PM dose, print out      Anticoagulation Care Providers     Provider Role Specialty Phone number    Tyler Lee MD Referring Family Medicine [phone]

## 2021-05-20 LAB
MDC_IDC_EPISODE_DTM: NORMAL
MDC_IDC_EPISODE_DURATION: 2 S
MDC_IDC_EPISODE_ID: 26
MDC_IDC_EPISODE_TYPE: NORMAL
MDC_IDC_LEAD_IMPLANT_DT: NORMAL
MDC_IDC_LEAD_IMPLANT_DT: NORMAL
MDC_IDC_LEAD_LOCATION: NORMAL
MDC_IDC_LEAD_LOCATION: NORMAL
MDC_IDC_LEAD_LOCATION_DETAIL_1: NORMAL
MDC_IDC_LEAD_LOCATION_DETAIL_1: NORMAL
MDC_IDC_LEAD_MFG: NORMAL
MDC_IDC_LEAD_MFG: NORMAL
MDC_IDC_LEAD_MODEL: NORMAL
MDC_IDC_LEAD_MODEL: NORMAL
MDC_IDC_LEAD_POLARITY_TYPE: NORMAL
MDC_IDC_LEAD_POLARITY_TYPE: NORMAL
MDC_IDC_LEAD_SERIAL: NORMAL
MDC_IDC_LEAD_SERIAL: NORMAL
MDC_IDC_MSMT_BATTERY_DTM: NORMAL
MDC_IDC_MSMT_BATTERY_REMAINING_LONGEVITY: 132 MO
MDC_IDC_MSMT_BATTERY_RRT_TRIGGER: 2.62
MDC_IDC_MSMT_BATTERY_STATUS: NORMAL
MDC_IDC_MSMT_BATTERY_VOLTAGE: 3.02 V
MDC_IDC_MSMT_LEADCHNL_RA_IMPEDANCE_VALUE: 285 OHM
MDC_IDC_MSMT_LEADCHNL_RA_IMPEDANCE_VALUE: 342 OHM
MDC_IDC_MSMT_LEADCHNL_RA_PACING_THRESHOLD_AMPLITUDE: 0.75 V
MDC_IDC_MSMT_LEADCHNL_RA_PACING_THRESHOLD_PULSEWIDTH: 0.4 MS
MDC_IDC_MSMT_LEADCHNL_RA_SENSING_INTR_AMPL: 1.25 MV
MDC_IDC_MSMT_LEADCHNL_RA_SENSING_INTR_AMPL: 1.25 MV
MDC_IDC_MSMT_LEADCHNL_RV_IMPEDANCE_VALUE: 380 OHM
MDC_IDC_MSMT_LEADCHNL_RV_IMPEDANCE_VALUE: 456 OHM
MDC_IDC_MSMT_LEADCHNL_RV_PACING_THRESHOLD_AMPLITUDE: 0.88 V
MDC_IDC_MSMT_LEADCHNL_RV_PACING_THRESHOLD_PULSEWIDTH: 0.4 MS
MDC_IDC_MSMT_LEADCHNL_RV_SENSING_INTR_AMPL: 5 MV
MDC_IDC_MSMT_LEADCHNL_RV_SENSING_INTR_AMPL: 5 MV
MDC_IDC_PG_IMPLANT_DTM: NORMAL
MDC_IDC_PG_MFG: NORMAL
MDC_IDC_PG_MODEL: NORMAL
MDC_IDC_PG_SERIAL: NORMAL
MDC_IDC_PG_TYPE: NORMAL
MDC_IDC_SESS_CLINIC_NAME: NORMAL
MDC_IDC_SESS_DTM: NORMAL
MDC_IDC_SESS_TYPE: NORMAL
MDC_IDC_SET_BRADY_AT_MODE_SWITCH_RATE: 171 {BEATS}/MIN
MDC_IDC_SET_BRADY_HYSTRATE: NORMAL
MDC_IDC_SET_BRADY_LOWRATE: 60 {BEATS}/MIN
MDC_IDC_SET_BRADY_MAX_SENSOR_RATE: 130 {BEATS}/MIN
MDC_IDC_SET_BRADY_MAX_TRACKING_RATE: 130 {BEATS}/MIN
MDC_IDC_SET_BRADY_MODE: NORMAL
MDC_IDC_SET_BRADY_PAV_DELAY_HIGH: 140 MS
MDC_IDC_SET_BRADY_PAV_DELAY_LOW: 300 MS
MDC_IDC_SET_BRADY_SAV_DELAY_HIGH: 110 MS
MDC_IDC_SET_BRADY_SAV_DELAY_LOW: 230 MS
MDC_IDC_SET_LEADCHNL_RA_PACING_AMPLITUDE: 1.5 V
MDC_IDC_SET_LEADCHNL_RA_PACING_ANODE_ELECTRODE_1: NORMAL
MDC_IDC_SET_LEADCHNL_RA_PACING_ANODE_LOCATION_1: NORMAL
MDC_IDC_SET_LEADCHNL_RA_PACING_CAPTURE_MODE: NORMAL
MDC_IDC_SET_LEADCHNL_RA_PACING_CATHODE_ELECTRODE_1: NORMAL
MDC_IDC_SET_LEADCHNL_RA_PACING_CATHODE_LOCATION_1: NORMAL
MDC_IDC_SET_LEADCHNL_RA_PACING_POLARITY: NORMAL
MDC_IDC_SET_LEADCHNL_RA_PACING_PULSEWIDTH: 0.4 MS
MDC_IDC_SET_LEADCHNL_RA_SENSING_ANODE_ELECTRODE_1: NORMAL
MDC_IDC_SET_LEADCHNL_RA_SENSING_ANODE_LOCATION_1: NORMAL
MDC_IDC_SET_LEADCHNL_RA_SENSING_CATHODE_ELECTRODE_1: NORMAL
MDC_IDC_SET_LEADCHNL_RA_SENSING_CATHODE_LOCATION_1: NORMAL
MDC_IDC_SET_LEADCHNL_RA_SENSING_POLARITY: NORMAL
MDC_IDC_SET_LEADCHNL_RA_SENSING_SENSITIVITY: 0.3 MV
MDC_IDC_SET_LEADCHNL_RV_PACING_AMPLITUDE: 2 V
MDC_IDC_SET_LEADCHNL_RV_PACING_ANODE_ELECTRODE_1: NORMAL
MDC_IDC_SET_LEADCHNL_RV_PACING_ANODE_LOCATION_1: NORMAL
MDC_IDC_SET_LEADCHNL_RV_PACING_CAPTURE_MODE: NORMAL
MDC_IDC_SET_LEADCHNL_RV_PACING_CATHODE_ELECTRODE_1: NORMAL
MDC_IDC_SET_LEADCHNL_RV_PACING_CATHODE_LOCATION_1: NORMAL
MDC_IDC_SET_LEADCHNL_RV_PACING_POLARITY: NORMAL
MDC_IDC_SET_LEADCHNL_RV_PACING_PULSEWIDTH: 0.4 MS
MDC_IDC_SET_LEADCHNL_RV_SENSING_ANODE_ELECTRODE_1: NORMAL
MDC_IDC_SET_LEADCHNL_RV_SENSING_ANODE_LOCATION_1: NORMAL
MDC_IDC_SET_LEADCHNL_RV_SENSING_CATHODE_ELECTRODE_1: NORMAL
MDC_IDC_SET_LEADCHNL_RV_SENSING_CATHODE_LOCATION_1: NORMAL
MDC_IDC_SET_LEADCHNL_RV_SENSING_POLARITY: NORMAL
MDC_IDC_SET_LEADCHNL_RV_SENSING_SENSITIVITY: 0.9 MV
MDC_IDC_SET_ZONE_DETECTION_INTERVAL: 350 MS
MDC_IDC_SET_ZONE_DETECTION_INTERVAL: 400 MS
MDC_IDC_SET_ZONE_TYPE: NORMAL
MDC_IDC_STAT_BRADY_AP_VP_PERCENT: 35.92 %
MDC_IDC_STAT_BRADY_AP_VS_PERCENT: 30.88 %
MDC_IDC_STAT_BRADY_AS_VP_PERCENT: 7.27 %
MDC_IDC_STAT_BRADY_AS_VS_PERCENT: 25.93 %
MDC_IDC_STAT_BRADY_DTM_END: NORMAL
MDC_IDC_STAT_BRADY_DTM_START: NORMAL
MDC_IDC_STAT_BRADY_RA_PERCENT_PACED: 65.17 %
MDC_IDC_STAT_BRADY_RV_PERCENT_PACED: 43.19 %
MDC_IDC_STAT_EPISODE_RECENT_COUNT: 0
MDC_IDC_STAT_EPISODE_RECENT_COUNT: 0
MDC_IDC_STAT_EPISODE_RECENT_COUNT: 1
MDC_IDC_STAT_EPISODE_RECENT_COUNT_DTM_END: NORMAL
MDC_IDC_STAT_EPISODE_RECENT_COUNT_DTM_START: NORMAL
MDC_IDC_STAT_EPISODE_TOTAL_COUNT: 0
MDC_IDC_STAT_EPISODE_TOTAL_COUNT: 1
MDC_IDC_STAT_EPISODE_TOTAL_COUNT: 25
MDC_IDC_STAT_EPISODE_TOTAL_COUNT_DTM_END: NORMAL
MDC_IDC_STAT_EPISODE_TOTAL_COUNT_DTM_START: NORMAL
MDC_IDC_STAT_EPISODE_TYPE: NORMAL

## 2021-05-21 ENCOUNTER — ANTICOAGULATION THERAPY VISIT (OUTPATIENT)
Dept: ANTICOAGULATION | Facility: CLINIC | Age: 81
End: 2021-05-21

## 2021-05-21 ENCOUNTER — TELEPHONE (OUTPATIENT)
Dept: ANTICOAGULATION | Facility: CLINIC | Age: 81
End: 2021-05-21

## 2021-05-21 DIAGNOSIS — Z79.01 LONG TERM CURRENT USE OF ANTICOAGULANT THERAPY: ICD-10-CM

## 2021-05-21 DIAGNOSIS — I48.91 ATRIAL FIBRILLATION, UNSPECIFIED TYPE (H): ICD-10-CM

## 2021-05-21 DIAGNOSIS — Z79.01 LONG TERM CURRENT USE OF ANTICOAGULANT THERAPY: Primary | ICD-10-CM

## 2021-05-21 DIAGNOSIS — I48.21 PERMANENT ATRIAL FIBRILLATION (H): ICD-10-CM

## 2021-05-21 LAB
CAPILLARY BLOOD COLLECTION: NORMAL
INR BLD: 2.1 (ref 0.86–1.14)

## 2021-05-21 PROCEDURE — 36416 COLLJ CAPILLARY BLOOD SPEC: CPT | Performed by: FAMILY MEDICINE

## 2021-05-21 PROCEDURE — 85610 PROTHROMBIN TIME: CPT | Performed by: FAMILY MEDICINE

## 2021-05-21 NOTE — TELEPHONE ENCOUNTER
ANTICOAGULATION MANAGEMENT      Unique Ward due for annual renewal of referral to anticoagulation monitoring. Order pended for your review and signature.      ANTICOAGULATION SUMMARY      Warfarin indication(s)     Atrial fibrillation    Heart valve present?  NO       Current goal range   INR: 2.0-3.0     Goal appropriate for indication? Yes, INR 2-3 appropriate for hx of DVT, PE, hypercoagulable state, Afib, LVAD, or bileaflet AVR without risk factors     Current duration of therapy Indefinite/long term therapy   Time in Therapeutic Range (TTR)  (Goal > 60%) 60.1 %       Office visit with referring provider's group within last year yes on 4/28/21       Veronika Campbell RN

## 2021-05-21 NOTE — PROGRESS NOTES
ANTICOAGULATION MANAGEMENT     Patient Name:  Unique Ward  Date:  2021    ASSESSMENT /SUBJECTIVE:    Today's INR result of 2.1 is therapeutic. Goal INR of 2.0-3.0      Warfarin dose taken: Warfarin taken as instructed    Diet: No new diet changes affecting INR    Medication changes/ interactions: No new medications/supplements affecting INR    Previous INR: Subtherapeutic     S/S of bleeding or thromboembolism: No    New injury or illness: No    Upcoming surgery, procedure or cardioversion: No    Additional findings: None      PLAN:    Telephone call with Unique regarding INR result and instructed:     Warfarin Dosing Instructions: Continue your current warfarin dose 10 mg Fri and 7.5 mg all other days    Instructed patient to follow up no later than: 2 weeks  Lab visit scheduled    Education provided: Please call back if any changes to your diet, medications or how you've been taking warfarin      Aaliyah verbalizes understanding and agrees to warfarin dosing plan.    Instructed to call the Anticoagulation Clinic for any changes, questions or concerns. (#[phone])        Veronika Campbell RN      OBJECTIVE:  Recent labs: (last 7 days)     21  0902   INR 2.1*         No question data found.  Anticoagulation Summary  As of 2021    INR goal:  2.0-3.0   TTR:  60.1 % (1 y)   INR used for dosin.1 (2021)   Warfarin maintenance plan:  10 mg (5 mg x 2) every Fri; 7.5 mg (5 mg x 1.5) all other days   Full warfarin instructions:  10 mg every Fri; 7.5 mg all other days   Weekly warfarin total:  55 mg   No change documented:  Veronika Campbell RN   Plan last modified:  Veronika Campbell RN (2021)   Next INR check:  2021   Priority:  High   Target end date:  Indefinite    Indications    Atrial fibrillation (H) [I48.91]  Long term current use of anticoagulant therapy [Z79.01]  Atrial fibrillation  unspecified type (H) [I48.91]             Anticoagulation Episode Summary     INR check location:       Preferred lab:      Send INR reminders to:  [facility]    Comments:  5 mg tabs, PM dose, print out      Anticoagulation Care Providers     Provider Role Specialty Phone number    Tyler Lee MD Referring Family Medicine [phone]

## 2021-05-26 DIAGNOSIS — I48.0 PAROXYSMAL ATRIAL FIBRILLATION (H): ICD-10-CM

## 2021-05-26 RX ORDER — WARFARIN SODIUM 5 MG/1
TABLET ORAL
Qty: 132 TABLET | Refills: 0 | Status: SHIPPED | OUTPATIENT
Start: 2021-05-26 | End: 2021-08-12

## 2021-06-04 ENCOUNTER — ANTICOAGULATION THERAPY VISIT (OUTPATIENT)
Dept: ANTICOAGULATION | Facility: CLINIC | Age: 81
End: 2021-06-04

## 2021-06-04 DIAGNOSIS — Z79.01 LONG TERM CURRENT USE OF ANTICOAGULANT THERAPY: ICD-10-CM

## 2021-06-04 DIAGNOSIS — I48.21 PERMANENT ATRIAL FIBRILLATION (H): ICD-10-CM

## 2021-06-04 DIAGNOSIS — I48.91 ATRIAL FIBRILLATION, UNSPECIFIED TYPE (H): ICD-10-CM

## 2021-06-04 LAB
CAPILLARY BLOOD COLLECTION: NORMAL
INR BLD: 1.8 (ref 0.86–1.14)

## 2021-06-04 PROCEDURE — 36416 COLLJ CAPILLARY BLOOD SPEC: CPT | Performed by: FAMILY MEDICINE

## 2021-06-04 PROCEDURE — 85610 PROTHROMBIN TIME: CPT | Performed by: FAMILY MEDICINE

## 2021-06-04 NOTE — PROGRESS NOTES
Anticoagulation Management    Unable to reach Aaliyah today.    Today's INR result of 1.8 is subtherapeutic (goal INR of 2.0-3.0).  Result received from: Clinic Lab    Follow up required to confirm warfarin dose taken and assess for changes    LM to call [facility] back to discuss.  [place] [facility] RN's can be reached for return calls at [phone] (internal staff only). Please do not give this number out to patients or cold transfer. Thank you!         Anticoagulation clinic to follow up    Veronika Campbell RN

## 2021-06-04 NOTE — PROGRESS NOTES
ANTICOAGULATION MANAGEMENT     Patient Name:  Unique Ward  Date:  2021    ASSESSMENT /SUBJECTIVE:    Today's INR result of 1.8 is subtherapeutic. Goal INR of 2.0-3.0      Warfarin dose taken: Warfarin taken as instructed    Diet: Increase tofu intake may be affecting diet and INR    Medication changes/ interactions: No new medications/supplements affecting INR    Previous INR: Therapeutic     S/S of bleeding or thromboembolism: No    New injury or illness: No    Upcoming surgery, procedure or cardioversion: No    Additional findings: Pt also would like to try starting to drink Kombucha - spoke with Deonna Banks [facility] and this should not affect her INR - pt will be talking to Araceli Keo on  about this as well to see if it would affect her other medicaitons      PLAN:    Warfarin Dosing Instructions: Change your warfarin dose to 10 mg Mon, Fri and 7.5 mg all other days  . (4.5 % change)    Instructed patient to follow up no later than: 1 week  Check at provider office visit    Education provided: Importance of consistent vitamin K intake, Impact of vitamin K foods on INR and Vitamin K content of foods    Telephone call with Unique whom verbalizes understanding and agrees to plan    Instructed to call the Anticoagulation Clinic for any changes, questions or concerns. (#[phone])        Veronika Campbell RN      OBJECTIVE:  Recent labs: (last 7 days)     21  0921   INR 1.8*         INR assessment SUB    Recheck INR In: 1 WEEK    INR Location Outside lab      Anticoagulation Summary  As of 2021    INR goal:  2.0-3.0   TTR:  59.2 % (1 y)   INR used for dosin.8 (2021)   Warfarin maintenance plan:  10 mg (5 mg x 2) every Mon, Fri; 7.5 mg (5 mg x 1.5) all other days   Full warfarin instructions:  10 mg every Mon, Fri; 7.5 mg all other days   Weekly warfarin total:  57.5 mg   Plan last modified:  Veronika Campbell RN (2021)   Next INR check:  2021   Priority:  High   Target end  date:  Indefinite    Indications    Atrial fibrillation (H) [I48.91]  Long term current use of anticoagulant therapy [Z79.01]  Atrial fibrillation  unspecified type (H) [I48.91]             Anticoagulation Episode Summary     INR check location:      Preferred lab:      Send INR reminders to:  [facility]    Comments:  5 mg tabs, PM dose, print out      Anticoagulation Care Providers     Provider Role Specialty Phone number    Tyler Lee MD Referring Family Medicine [phone]

## 2021-06-06 ENCOUNTER — HOSPITAL ENCOUNTER (EMERGENCY)
Facility: CLINIC | Age: 81
Discharge: HOME | End: 2021-06-06
Attending: EMERGENCY MEDICINE | Admitting: EMERGENCY MEDICINE
Payer: MEDICARE

## 2021-06-06 VITALS
DIASTOLIC BLOOD PRESSURE: 86 MMHG | OXYGEN SATURATION: 98 % | WEIGHT: 137 LBS | BODY MASS INDEX: 20.83 KG/M2 | SYSTOLIC BLOOD PRESSURE: 153 MMHG | TEMPERATURE: 98.6 F | RESPIRATION RATE: 18 BRPM
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
## 2021-06-06 DIAGNOSIS — S80.861A TICK BITE OF RIGHT LOWER LEG, INITIAL ENCOUNTER: ICD-10-CM

## 2021-06-06 PROCEDURE — 250N000013 HC RX MED GY IP 250 OP 250 PS 637: Performed by: EMERGENCY MEDICINE

## 2021-06-06 PROCEDURE — 99284 EMERGENCY DEPT VISIT MOD MDM: CPT | Performed by: EMERGENCY MEDICINE

## 2021-06-06 PROCEDURE — 99283 EMERGENCY DEPT VISIT LOW MDM: CPT | Performed by: EMERGENCY MEDICINE

## 2021-06-06 RX ORDER — DOXYCYCLINE HYCLATE 50 MG/1
200 CAPSULE ORAL ONCE
Status: COMPLETED | OUTPATIENT
Start: 2021-06-06 | End: 2021-06-06

## 2021-06-06 RX ADMIN — DOXYCYCLINE HYCLATE 200 MG: 50 CAPSULE ORAL at 14:37

## 2021-06-06 NOTE — DISCHARGE INSTRUCTIONS
The tick was easily removed today.  There is surrounding redness I am going to cover you with 1 dose of doxycycline.  If you have any new or concerning symptoms you can see your doctor or return to the ER.

## 2021-06-06 NOTE — ED PROVIDER NOTES
History     Chief Complaint   Patient presents with     Tick Removal     HPI  Unique Ward is a 80 year old female who presents with concerns for an engorged tick on her right medial thigh.  She noticed it today.  It synaptic that is engorged.  Some surrounding erythema.  No other rashes.  No fever or chills.  Tetanus is up-to-date.  No treatment prior to arrival.    Allergies:  Allergies   Allergen Reactions     Keflex [Cephalexin] Anaphylaxis     No Clinical Screening - See Comments      Eye drop antibiotic.     Codeine Other (See Comments)     Comment: , Description:      Gentamicin Hives     Per Unique this medication gives hives on arms and legs. Bev Lo LSXO,  ....................  7/15/2014   1:15 PM     Influenza Vaccines Other (See Comments)     Comment: , Description:      Influenza Virus Vaccine H5n1      Paxil [Paroxetine] Other (See Comments)     Comment: , Description:      Typhoid Vaccine-Strain Ty21a Other (See Comments)     Comment: , Description:   Comment: , Description:      Typhoid Vaccines        Problem List:    Patient Active Problem List    Diagnosis Date Noted     Long term current use of anticoagulant therapy 06/30/2020     Priority: Medium     Atrial fibrillation, unspecified type (H) 06/30/2020     Priority: Medium     CHB (complete heart block) (H) 03/31/2020     Priority: Medium     Added automatically from request for surgery [id number]       Pacemaker 03/31/2020     Priority: Medium     Atrial fibrillation (H) 06/07/2019     Priority: Medium     Left lumbosacral radiculopathy 05/31/2017     Priority: Medium     Acute cystitis with hematuria 05/31/2017     Priority: Medium     History of basal cell carcinoma- face and scalp 02/28/2017     Priority: Medium     FH: melanoma- son 02/28/2017     Priority: Medium     Fibromyalgia 02/28/2017     Priority: Medium     Advance Care Planning 01/06/2017     Priority: Medium     Advance Care Planning 5/4/2017: ACP Facilitation  Session:    Unique GUZMAN Ed presented for ACP Facilitation session at a group class. She was accompanied by daughter-in-law. [employer] information provided and resources reviewed. She wishes to give additional consideration to ACP.  She currently has the following questions or concerns about Advance Care Planning: none known. Follow-up meeting: not needed/applicable. Added by Unique Uribe RN, Advance Care Planning Liaison.  Advance Care Planning 1/6/17: Info given   Saray Greer MA         Personal history of urinary tract infection 06/18/2016     Priority: Medium     Essential hypertension 03/23/2016     Priority: Medium     Vitamin D deficiency 03/30/2013     Priority: Medium     Dysthymia 04/01/2005     Priority: Medium        Past Medical History:    Past Medical History:   Diagnosis Date     Arthritis 01.01.1980     Atrial fibrillation (H) 6/7/2019     Cancer (H) 01.01.1993     scalp     Depressive disorder 01.01.1977     Hypertension 03.16.2016       Past Surgical History:    Past Surgical History:   Procedure Laterality Date     BIOPSY  11/01/2014     COLONOSCOPY  01.01/1979    Two routine,  one for chronic diarrhea     EP PACEMAKER N/A 3/31/2020    Procedure: EP Pacemaker;  Surgeon: Erich Clark MD;  Location:  [facility]     EYE SURGERY  1/1/2014    first    cataracts both eyes.  second a few months later     GENITOURINARY SURGERY       GYN SURGERY  01.01.1977    TL     HEAD & NECK SURGERY  01.01.1997    basal cell X2   Moh's on scalp.  other on bridge of nose     LASER YAG CAPSULOTOMY Bilateral 4/1/2021    Procedure: CAPSULOTOMY, LENS, USING YAG LASER;  Surgeon: Bernardino Chu MD;  Location:  OR     SOFT TISSUE SURGERY  01.01.1990    Ganglion Cyst   Left inner wrist       Family History:    Family History   Problem Relation Age of Onset     Diabetes Maternal Grandmother         Na     Coronary Artery Disease Sister         error     Coronary Artery Disease Brother          error     Hypertension Brother      Hyperlipidemia Brother      Cerebrovascular Disease Brother      Hypertension Sister      Hypertension Sister      Hyperlipidemia Sister      Other Cancer Sister         cervical     Breast Cancer Sister      Other Cancer Sister         kidney     Mental Illness Mother         paranoid/schizophrenic/suicide     Mental Illness Sister         ???  Had shock treatments     Hyperlipidemia Sister      Other Cancer Sister         Kidney       Social History:  Marital Status:  Legally  [3]  Social History     Tobacco Use     Smoking status: Former Smoker     Packs/day: 0.75     Years: 10.00     Pack years: 7.50     Types: Cigarettes     Start date: 1955     Quit date: 1965     Years since quittin.4     Smokeless tobacco: Never Used   Substance Use Topics     Alcohol use: No     Alcohol/week: 0.0 standard drinks     Drug use: No        Medications:    amLODIPine (NORVASC) 5 MG tablet  cholecalciferol (VITAMIN D3) 5000 units TABS tablet  estradiol (ESTRACE) 0.1 MG/GM vaginal cream  magnesium 100 MG TABS  meclizine (ANTIVERT) 25 MG tablet  warfarin ANTICOAGULANT (COUMADIN) 5 MG tablet          Review of Systems all other systems are reviewed and are negative.    Physical Exam   BP: (!) 153/86  Temp: 98.6  F (37  C)  Resp: 18  Weight: 62.1 kg (137 lb)  SpO2: 98 %      Physical Exam Patient has an engorged attached tick in the medial thigh.  Surrounding erythema.  No fluctuance.  No other rashes are appreciated.    ED Course        Procedures        I was able to easily remove the tick without difficulty.       Critical Care time:  none               No results found for this or any previous visit (from the past 24 hour(s)).    Medications   doxycycline hyclate (VIBRAMYCIN) capsule 200 mg (has no administration in time range)       Assessments & Plan (with Medical Decision Making)   Unique Ward is a 80 year old female who presents with concerns for an engorged  tick on her right medial thigh.  She noticed it today.  It synaptic that is engorged.  Some surrounding erythema.  No other rashes.  No fever or chills.  Tetanus is up-to-date.  No treatment prior to arrival.  On exam she had an engorged tick that was easily removed.  Surrounding erythema.  She is given 1 dose of doxycycline 200 mg.  Information on tick bite and reasons to return for reassessment discussed.  I have reviewed the nursing notes.    I have reviewed the findings, diagnosis, plan and need for follow up with the patient.       New Prescriptions    No medications on file       Final diagnoses:   Tick bite of right lower leg, initial encounter       6/6/2021   [facility] EMERGENCY DEPT     Yomi Stephenson MD  06/06/21 8133

## 2021-06-11 ENCOUNTER — ANTICOAGULATION THERAPY VISIT (OUTPATIENT)
Dept: FAMILY MEDICINE | Facility: CLINIC | Age: 81
End: 2021-06-11

## 2021-06-11 DIAGNOSIS — I48.91 ATRIAL FIBRILLATION (H): ICD-10-CM

## 2021-06-11 DIAGNOSIS — Z79.01 LONG TERM CURRENT USE OF ANTICOAGULANT THERAPY: ICD-10-CM

## 2021-06-11 DIAGNOSIS — I48.91 ATRIAL FIBRILLATION, UNSPECIFIED TYPE (H): ICD-10-CM

## 2021-06-11 LAB
CAPILLARY BLOOD COLLECTION: NORMAL
INR BLD: 1.9 (ref 0.86–1.14)

## 2021-06-11 PROCEDURE — 99207 PR NO CHARGE NURSE ONLY: CPT | Performed by: FAMILY MEDICINE

## 2021-06-11 PROCEDURE — 85610 PROTHROMBIN TIME: CPT | Performed by: FAMILY MEDICINE

## 2021-06-11 PROCEDURE — 36416 COLLJ CAPILLARY BLOOD SPEC: CPT | Performed by: FAMILY MEDICINE

## 2021-06-11 NOTE — PROGRESS NOTES
ANTICOAGULATION FOLLOW-UP CLINIC VISIT    Patient Name:  Unique Ward  Date:  2021  Contact Type:  Telephone  Had a tic bite and was given doxy abx one dose. She recently had dose increase. Will leave one more week at this dose and recheck in one week.    SUBJECTIVE:  Patient Findings     Comments:    Assessed for S/S bleeding, clotting, medication, diet, health, activity and alcohol changes          Clinical Outcomes     Negatives:  Major bleeding event, Thromboembolic event, Anticoagulation-related hospital admission, Anticoagulation-related ED visit, Anticoagulation-related fatality    Comments:    Assessed for S/S bleeding, clotting, medication, diet, health, activity and alcohol changes             OBJECTIVE    Recent labs: (last 7 days)     21  0935   INR 1.9*       ASSESSMENT / PLAN  INR assessment SUB    Recheck INR In: 1 WEEK    INR Location Clinic      Anticoagulation Summary  As of 2021    INR goal:  2.0-3.0   TTR:  59.2 % (1 y)   INR used for dosin.9 (2021)   Warfarin maintenance plan:  10 mg (5 mg x 2) every Mon, Fri; 7.5 mg (5 mg x 1.5) all other days   Full warfarin instructions:  10 mg every Mon, Fri; 7.5 mg all other days   Weekly warfarin total:  57.5 mg   Plan last modified:  Veronika Campbell RN (2021)   Next INR check:  2021   Priority:  High   Target end date:  Indefinite    Indications    Atrial fibrillation (H) [I48.91]  Long term current use of anticoagulant therapy [Z79.01]  Atrial fibrillation  unspecified type (H) [I48.91]             Anticoagulation Episode Summary     INR check location:      Preferred lab:      Send INR reminders to:  [facility]    Comments:  5 mg tabs, PM dose, print out      Anticoagulation Care Providers     Provider Role Specialty Phone number    Tyler Lee MD Referring Family Medicine [phone]            See the Encounter Report to view Anticoagulation Flowsheet and Dosing Calendar (Go to Encounters tab  in chart review, and find the Anticoagulation Therapy Visit)        Roselia Hoskins RN

## 2021-06-18 ENCOUNTER — ANTICOAGULATION THERAPY VISIT (OUTPATIENT)
Dept: ANTICOAGULATION | Facility: CLINIC | Age: 81
End: 2021-06-18

## 2021-06-18 DIAGNOSIS — I48.91 ATRIAL FIBRILLATION, UNSPECIFIED TYPE (H): ICD-10-CM

## 2021-06-18 DIAGNOSIS — I48.91 ATRIAL FIBRILLATION (H): ICD-10-CM

## 2021-06-18 DIAGNOSIS — Z79.01 LONG TERM CURRENT USE OF ANTICOAGULANT THERAPY: ICD-10-CM

## 2021-06-18 LAB
CAPILLARY BLOOD COLLECTION: NORMAL
INR BLD: 2 (ref 0.86–1.14)

## 2021-06-18 PROCEDURE — 36416 COLLJ CAPILLARY BLOOD SPEC: CPT | Performed by: FAMILY MEDICINE

## 2021-06-18 PROCEDURE — 85610 PROTHROMBIN TIME: CPT | Performed by: FAMILY MEDICINE

## 2021-06-18 NOTE — PROGRESS NOTES
ANTICOAGULATION MANAGEMENT     Patient Name:  Unique Ward  Date:  2021    ASSESSMENT /SUBJECTIVE:    Today's INR result of 2.0 is therapeutic. Goal INR of 2.0-3.0      Warfarin dose taken: Warfarin taken as instructed    Diet: No new diet changes identified    Medication changes/ interactions: No new medications/supplements affecting INR    Previous INR: Subtherapeutic     S/S of bleeding or thromboembolism: No    New injury or illness: No    Upcoming surgery, procedure or cardioversion: No    Additional findings: None      PLAN:    Warfarin Dosing Instructions: Continue your current warfarin dose 10 mg MF and 7.5 mg all other days    Instructed patient to follow up no later than: 2 weeks  Contact [phone]  to schedule and with any changes, questions or concerns.     Education provided: Please call back if any changes to your diet, medications or how you've been taking warfarin    Detailed voice message left for Unique with dosing instructions and follow up date.     Instructed to call the Anticoagulation Clinic for any changes, questions or concerns. (#[phone])        Veronika Campbell RN      OBJECTIVE:  Recent labs: (last 7 days)     21  0942   INR 2.0*         No question data found.  Anticoagulation Summary  As of 2021    INR goal:  2.0-3.0   TTR:  59.2 % (1 y)   INR used for dosin.0 (2021)   Warfarin maintenance plan:  10 mg (5 mg x 2) every Mon, Fri; 7.5 mg (5 mg x 1.5) all other days   Full warfarin instructions:  10 mg every Mon, Fri; 7.5 mg all other days   Weekly warfarin total:  57.5 mg   No change documented:  Veronika Campbell RN   Plan last modified:  Veronika Campbell RN (2021)   Next INR check:  2021   Priority:  High   Target end date:  Indefinite    Indications    Atrial fibrillation (H) [I48.91]  Long term current use of anticoagulant therapy [Z79.01]  Atrial fibrillation  unspecified type (H) [I48.91]             Anticoagulation Episode Summary     INR  check location:      Preferred lab:      Send INR reminders to:  [facility]    Comments:  5 mg tabs, PM dose, print out      Anticoagulation Care Providers     Provider Role Specialty Phone number    Tyler Lee MD Referring Family Medicine [phone]

## 2021-07-02 ENCOUNTER — ANTICOAGULATION THERAPY VISIT (OUTPATIENT)
Dept: ANTICOAGULATION | Facility: CLINIC | Age: 81
End: 2021-07-02

## 2021-07-02 DIAGNOSIS — Z79.01 LONG TERM CURRENT USE OF ANTICOAGULANT THERAPY: ICD-10-CM

## 2021-07-02 DIAGNOSIS — I48.91 ATRIAL FIBRILLATION (H): ICD-10-CM

## 2021-07-02 DIAGNOSIS — I48.91 ATRIAL FIBRILLATION, UNSPECIFIED TYPE (H): ICD-10-CM

## 2021-07-02 LAB
CAPILLARY BLOOD COLLECTION: NORMAL
INR BLD: 1.8 (ref 0.86–1.14)

## 2021-07-02 PROCEDURE — 85610 PROTHROMBIN TIME: CPT | Performed by: FAMILY MEDICINE

## 2021-07-02 PROCEDURE — 36416 COLLJ CAPILLARY BLOOD SPEC: CPT | Performed by: FAMILY MEDICINE

## 2021-07-02 NOTE — PROGRESS NOTES
ANTICOAGULATION MANAGEMENT     Unique Ward 80 year old female is on warfarin with subtherapeutic INR result. (Goal INR 2.0-3.0)    Recent labs: (last 7 days)     07/02/21  1022   INR 1.8*       ASSESSMENT     Source(s): Chart Review and Patient/Caregiver Call       Warfarin doses taken: Warfarin taken as instructed    Diet: Decreased greens/vitamin K in diet; plans to resume previous intake    New illness, injury, or hospitalization: No    Medication/supplement changes: None noted    Signs or symptoms of bleeding or clotting: No    Previous INR: Therapeutic last visit; previously outside of goal range    Additional findings: None     PLAN     Recommended plan for temporary change(s) affecting INR     Dosing Instructions:  Increase your warfarin dose (8.7% change) with next INR in 2 weeks       Summary  As of 7/2/2021    Full warfarin instructions:  7.5 mg every Tue, Thu, Sat; 10 mg all other days   Next INR check:  7/16/2021             Telephone call with Unique whom verbalizes understanding and agrees to plan and whom agrees to plan and repeated back plan correctly    Lab visit scheduled    Education provided: Importance of consistent vitamin K intake, Target INR goal and significance of current INR result, Importance of therapeutic range and Importance of following up for INR monitoring at instructed interval    Plan made per [facility] anticoagulation protocol    Veronika Campbell RN  Anticoagulation Clinic  7/2/2021    _______________________________________________________________________     Anticoagulation Episode Summary     Current INR goal:  2.0-3.0   TTR:  58.8 % (1 y)   Target end date:  Indefinite   Send INR reminders to:  [facility]    Indications    Atrial fibrillation (H) [I48.91]  Long term current use of anticoagulant therapy [Z79.01]  Atrial fibrillation  unspecified type (H) [I48.91]           Comments:  5 mg tabs, PM dose, print out         Anticoagulation Care Providers     Provider Role  Specialty Phone number    Tyler Lee MD Referring Family Medicine [phone]

## 2021-07-06 ENCOUNTER — OFFICE VISIT (OUTPATIENT)
Dept: FAMILY MEDICINE | Facility: CLINIC | Age: 81
End: 2021-07-06
Payer: COMMERCIAL

## 2021-07-06 VITALS
RESPIRATION RATE: 14 BRPM | BODY MASS INDEX: 20.53 KG/M2 | TEMPERATURE: 97.6 F | OXYGEN SATURATION: 99 % | SYSTOLIC BLOOD PRESSURE: 167 MMHG | WEIGHT: 135 LBS | HEART RATE: 101 BPM | DIASTOLIC BLOOD PRESSURE: 81 MMHG

## 2021-07-06 DIAGNOSIS — R39.9 UTI SYMPTOMS: ICD-10-CM

## 2021-07-06 DIAGNOSIS — N30.01 ACUTE CYSTITIS WITH HEMATURIA: Primary | ICD-10-CM

## 2021-07-06 DIAGNOSIS — R73.9 BLOOD GLUCOSE ELEVATED: ICD-10-CM

## 2021-07-06 DIAGNOSIS — R73.03 PRE-DIABETES: ICD-10-CM

## 2021-07-06 LAB
ALBUMIN UR-MCNC: NEGATIVE MG/DL
APPEARANCE UR: ABNORMAL
BILIRUB UR QL STRIP: NEGATIVE
COLOR UR AUTO: YELLOW
GLUCOSE UR STRIP-MCNC: NEGATIVE MG/DL
HGB UR QL STRIP: ABNORMAL
KETONES UR STRIP-MCNC: NEGATIVE MG/DL
LEUKOCYTE ESTERASE UR QL STRIP: ABNORMAL
NITRATE UR QL: POSITIVE
PH UR STRIP: 6 PH (ref 5–7)
RBC #/AREA URNS AUTO: 5 /HPF (ref 0–2)
SOURCE: ABNORMAL
SP GR UR STRIP: 1.01 (ref 1–1.03)
UROBILINOGEN UR STRIP-MCNC: 0 MG/DL (ref 0–2)
WBC #/AREA URNS AUTO: >182 /HPF (ref 0–5)
WBC CLUMPS #/AREA URNS HPF: PRESENT /HPF

## 2021-07-06 PROCEDURE — 99214 OFFICE O/P EST MOD 30 MIN: CPT | Performed by: PHYSICIAN ASSISTANT

## 2021-07-06 PROCEDURE — 87186 SC STD MICRODIL/AGAR DIL: CPT | Performed by: PHYSICIAN ASSISTANT

## 2021-07-06 PROCEDURE — 87086 URINE CULTURE/COLONY COUNT: CPT | Performed by: PHYSICIAN ASSISTANT

## 2021-07-06 PROCEDURE — 81001 URINALYSIS AUTO W/SCOPE: CPT | Performed by: PHYSICIAN ASSISTANT

## 2021-07-06 PROCEDURE — 87088 URINE BACTERIA CULTURE: CPT | Performed by: PHYSICIAN ASSISTANT

## 2021-07-06 RX ORDER — CIPROFLOXACIN 500 MG/1
500 TABLET, FILM COATED ORAL 2 TIMES DAILY
Qty: 6 TABLET | Refills: 0 | Status: SHIPPED | OUTPATIENT
Start: 2021-07-06 | End: 2021-07-09

## 2021-07-06 NOTE — PROGRESS NOTES
Assessment & Plan     Acute cystitis with hematuria  - ciprofloxacin (CIPRO) 500 MG tablet; Take 1 tablet (500 mg) by mouth 2 times daily for 3 days  UA with small blood, large leukocytes and positive nitrates.  She was most recently treated with Keflex for UTI and had an anaphylactic reaction requiring an ambulance ride to the emergency room.  Will avoid cephalosporin and penicillin family is for this reason.  Treat with ciprofloxacin twice daily for 3 days.  Discussed checking INR based on recommendations of INR nurse.    UTI symptoms  - Urine Culture Aerobic Bacterial; Future  - UA reflex to Microscopic ([facility]); Future  - UA reflex to Microscopic ([facility])  - Urine Culture Aerobic Bacterial    Pre-diabetes  - AMBULATORY ADULT DIABETES EDUCATOR REFERRAL; Dorothea Fischer was previously diagnosed with prediabetes.  She reduced her sugar intake and her glucose level came back down to normal but she notes that recently she has not been reducing her sugar intake and her glucose is elevated again.  She would like to see nutritionist to discuss dietary and lifestyle modifications to help control her glucose levels.      30 minutes spent on the date of the encounter doing chart review, patient visit and documentation     No follow-ups on file.    Ruby Sánchez PA-C  [facility]    Joseph Fischer is a 80 year old who presents for the following health issues     Patient is also wondering if she takes zinc and trace minerals will it affect her INR  Also wondering about taking amannose and if that is effective in preventing a UTI instead of taking cranberries because she can't take that because she takes warfarin.   HPI     Genitourinary - Female  Onset/Duration: since Saturday evening  Description:   Painful urination (Dysuria): no, but if she doesn't drink a lot of fluids there is pain           Frequency: YES  Blood in urine (Hematuria): no  Delay  in urine (Hesitency): no  Intensity: mild  Progression of Symptoms:  worsening  Accompanying Signs & Symptoms:  Fever/chills: no  Flank pain: no  Nausea and vomiting: no  Vaginal symptoms: none  Abdominal/Pelvic Pain: no  History:   History of frequent UTI s: YES  History of kidney stones: no  Precipitating or alleviating factors: None  Therapies tried and outcome: Increase fluid intake    Aaliyah presents to the clinic today for evaluation of a presumed urinary tract infection.  She notes that if he drinks enough water she does not have pain with urination but she does notice some discomfort if she does not drink enough fluids.  She does have some urinary frequency as well.  She has a history of frequent urinary tract infections, most recently about 2 months ago.  She was treated with Keflex for her last infection.  She notes that she took Keflex when she woke up in the morning.  She then was eating a meal provided to her by [employer] for [employer] and while she was eating it she began to feel flushed and itchy.  She then very quickly had an anaphylactic reaction but was able to call 911 before she blacked out.  She was taken to the emergency room and it was determined that she had an allergic reaction to the Keflex.  She notes that she has felt a little itchy when eating the senior meals before and questions whether the reaction was from Keflex or something in the meal that she ate.    She also notes that her blood sugar has been elevated recently.  She was diagnosed with prediabetes in the past.  She is not interested in taking a medication for this but would like some lifestyle modification education to help control her blood sugar.    Review of Systems   ROS negative except as stated above.        Objective    BP (!) 167/81 (BP Location: Left arm)   Pulse 101   Temp 97.6  F (36.4  C) (Temporal)   Resp 14   Wt 61.2 kg (135 lb)   LMP  (LMP Unknown)   SpO2 99%   BMI 20.53 kg/m    Body mass index is 20.53  kg/m .  Physical Exam   GENERAL: healthy, alert and no distress  NECK: no adenopathy, no asymmetry, masses, or scars and thyroid normal to palpation  RESP: lungs clear to auscultation - no rales, rhonchi or wheezes  CV: regular rate and rhythm, normal S1 S2, no S3 or S4, no murmur, click or rub, no peripheral edema and peripheral pulses strong  ABDOMEN: soft, nontender, no hepatosplenomegaly, no masses and bowel sounds normal  MS: no gross musculoskeletal defects noted, no edema  SKIN: no suspicious lesions or rashes  NEURO: Normal strength and tone, mentation intact and speech normal  BACK: no CVA tenderness, no paralumbar tenderness  PSYCH: mentation appears normal, affect normal/bright    Results for orders placed or performed in visit on 07/06/21   UA reflex to Microscopic ([facility])     Status: Abnormal   Result Value Ref Range    Color Urine Yellow     Appearance Urine Cloudy     Glucose Urine Negative NEG^Negative mg/dL    Bilirubin Urine Negative NEG^Negative    Ketones Urine Negative NEG^Negative mg/dL    Specific Gravity Urine 1.011 1.003 - 1.035    Blood Urine Small (A) NEG^Negative    pH Urine 6.0 5.0 - 7.0 pH    Protein Albumin Urine Negative NEG^Negative mg/dL    Urobilinogen mg/dL 0.0 0.0 - 2.0 mg/dL    Nitrite Urine Positive (A) NEG^Negative    Leukocyte Esterase Urine Large (A) NEG^Negative    Source Unspecified Urine     RBC Urine 5 (H) 0 - 2 /HPF    WBC Urine >182 (H) 0 - 5 /HPF    WBC Clumps Present (A) NEG^Negative /HPF

## 2021-07-07 LAB
BACTERIA SPEC CULT: ABNORMAL
Lab: ABNORMAL
SPECIMEN SOURCE: ABNORMAL

## 2021-07-08 NOTE — RESULT ENCOUNTER NOTE
Urine culture positive with >100,000 colonies/mL of E. coli. Bacteria is sensitive to Cipro and patient is currently taking this. No change necessary.    Amy Sánchez PA-C  [facility]

## 2021-07-19 ENCOUNTER — LAB (OUTPATIENT)
Dept: LAB | Facility: CLINIC | Age: 81
End: 2021-07-19
Payer: COMMERCIAL

## 2021-07-19 ENCOUNTER — ANTICOAGULATION THERAPY VISIT (OUTPATIENT)
Dept: ANTICOAGULATION | Facility: CLINIC | Age: 81
End: 2021-07-19

## 2021-07-19 DIAGNOSIS — I48.91 ATRIAL FIBRILLATION, UNSPECIFIED TYPE (H): ICD-10-CM

## 2021-07-19 DIAGNOSIS — I48.91 ATRIAL FIBRILLATION (H): Primary | ICD-10-CM

## 2021-07-19 DIAGNOSIS — Z79.01 LONG TERM CURRENT USE OF ANTICOAGULANT THERAPY: ICD-10-CM

## 2021-07-19 DIAGNOSIS — I48.91 ATRIAL FIBRILLATION (H): ICD-10-CM

## 2021-07-19 LAB — INR BLD: 1.7 (ref 0.9–1.1)

## 2021-07-19 PROCEDURE — 85610 PROTHROMBIN TIME: CPT

## 2021-07-19 PROCEDURE — 36416 COLLJ CAPILLARY BLOOD SPEC: CPT

## 2021-07-19 NOTE — PROGRESS NOTES
ANTICOAGULATION MANAGEMENT     Unique THOMAS Ward 81 year old female is on warfarin with subtherapeutic INR result. (Goal INR 2.0-3.0)    Recent labs: (last 7 days)     07/19/21  1228   INR 1.7*       ASSESSMENT     Source(s): Chart Review and Patient/Caregiver Call       Warfarin doses taken: Warfarin taken as instructed    Diet: No new diet changes identified    New illness, injury, or hospitalization: No    Medication/supplement changes: None noted    Signs or symptoms of bleeding or clotting: No    Previous INR: Subtherapeutic    Additional findings: None     PLAN     Recommended plan for no diet, medication or health factor changes affecting INR     Dosing Instructions:  Increase your warfarin dose (8% change) with next INR in 1 week       Summary  As of 7/19/2021    Full warfarin instructions:  7.5 mg every Sat; 10 mg all other days   Next INR check:  7/26/2021             Telephone call with Unique who verbalizes understanding and agrees to plan and who agrees to plan and repeated back plan correctly    Lab visit scheduled    Education provided: Please call back if any changes to your diet, medications or how you've been taking warfarin, Target INR goal and significance of current INR result, Importance of therapeutic range and Importance of following up for INR monitoring at instructed interval    Plan made per [facility] anticoagulation protocol    Veronika Campbell, RN  Anticoagulation Clinic  7/19/2021    _______________________________________________________________________     Anticoagulation Episode Summary     Current INR goal:  2.0-3.0   TTR:  54.1 % (1 y)   Target end date:  Indefinite   Send INR reminders to:  [facility] [place]    Indications    Atrial fibrillation (H) [I48.91]  Long term current use of anticoagulant therapy [Z79.01]  Atrial fibrillation  unspecified type (H) [I48.91]           Comments:  5 mg tabs, PM dose, print out         Anticoagulation Care Providers     Provider Role Specialty Phone  number    Tyler Lee MD [facility] [phone]

## 2021-07-23 ENCOUNTER — TELEPHONE (OUTPATIENT)
Dept: FAMILY MEDICINE | Facility: CLINIC | Age: 81
End: 2021-07-23

## 2021-07-23 NOTE — TELEPHONE ENCOUNTER
Please call Aaliyah to discuss what she thinks might be keeping her INR down. Please call her.  Ok to LM

## 2021-07-23 NOTE — TELEPHONE ENCOUNTER
Pt wondering if whole milk greek yogurt would affect her INR, this should not. Pt will be in Monday for INR.     TRENA HuffmanN, RN- Coumadin Clinic RN

## 2021-07-26 ENCOUNTER — ANTICOAGULATION THERAPY VISIT (OUTPATIENT)
Dept: ANTICOAGULATION | Facility: CLINIC | Age: 81
End: 2021-07-26

## 2021-07-26 ENCOUNTER — LAB (OUTPATIENT)
Dept: LAB | Facility: CLINIC | Age: 81
End: 2021-07-26
Payer: COMMERCIAL

## 2021-07-26 DIAGNOSIS — I48.91 ATRIAL FIBRILLATION (H): ICD-10-CM

## 2021-07-26 DIAGNOSIS — Z79.01 LONG TERM CURRENT USE OF ANTICOAGULANT THERAPY: ICD-10-CM

## 2021-07-26 DIAGNOSIS — I48.91 ATRIAL FIBRILLATION (H): Primary | ICD-10-CM

## 2021-07-26 DIAGNOSIS — I48.91 ATRIAL FIBRILLATION, UNSPECIFIED TYPE (H): ICD-10-CM

## 2021-07-26 LAB — INR BLD: 2.4 (ref 0.9–1.1)

## 2021-07-26 PROCEDURE — 36416 COLLJ CAPILLARY BLOOD SPEC: CPT

## 2021-07-26 PROCEDURE — 85610 PROTHROMBIN TIME: CPT

## 2021-07-26 NOTE — PROGRESS NOTES
ANTICOAGULATION MANAGEMENT     Unique Ward 81 year old female is on warfarin with therapeutic INR result. (Goal INR 2.0-3.0)    Recent labs: (last 7 days)     07/26/21  1257   INR 2.4*       ASSESSMENT     Source(s): Chart Review and Patient/Caregiver Call       Warfarin doses taken: Warfarin taken as instructed    Diet: No new diet changes identified    New illness, injury, or hospitalization: No    Medication/supplement changes: None noted    Signs or symptoms of bleeding or clotting: No    Previous INR: Subtherapeutic    Additional findings: None     PLAN     Recommended plan for no diet, medication or health factor changes affecting INR     Dosing Instructions: Continue your current warfarin dose with next INR in 2 weeks       Summary  As of 7/26/2021    Full warfarin instructions:  7.5 mg every Sat; 10 mg all other days   Next INR check:  8/9/2021             Telephone call with Unique who verbalizes understanding and agrees to plan    Lab visit scheduled    Education provided: Target INR goal and significance of current INR result, Importance of therapeutic range, Importance of following up for INR monitoring at instructed interval and Importance of taking warfarin as instructed    Plan made per [facility] anticoagulation protocol    Bev Mancilla, RN  Anticoagulation Clinic  7/26/2021    _______________________________________________________________________     Anticoagulation Episode Summary     Current INR goal:  2.0-3.0   TTR:  53.3 % (1 y)   Target end date:  Indefinite   Send INR reminders to:  [facility]    Indications    Atrial fibrillation (H) [I48.91]  Long term current use of anticoagulant therapy [Z79.01]  Atrial fibrillation  unspecified type (H) [I48.91]           Comments:  5 mg tabs, PM dose, print out         Anticoagulation Care Providers     Provider Role Specialty Phone number    Tyler Lee MD Referring Family Medicine [phone]

## 2021-08-04 ENCOUNTER — MYC REFILL (OUTPATIENT)
Dept: FAMILY MEDICINE | Facility: CLINIC | Age: 81
End: 2021-08-04

## 2021-08-04 DIAGNOSIS — H66.003 NON-RECURRENT ACUTE SUPPURATIVE OTITIS MEDIA OF BOTH EARS WITHOUT SPONTANEOUS RUPTURE OF TYMPANIC MEMBRANES: ICD-10-CM

## 2021-08-06 RX ORDER — MECLIZINE HYDROCHLORIDE 25 MG/1
25 TABLET ORAL 3 TIMES DAILY PRN
Qty: 21 TABLET | Refills: 0 | Status: SHIPPED | OUTPATIENT
Start: 2021-08-06 | End: 2023-06-20

## 2021-08-06 NOTE — TELEPHONE ENCOUNTER
Pending Prescriptions:                       Disp   Refills    meclizine (ANTIVERT) 25 MG tablet          21 tab*0        Sig: Take 1 tablet (25 mg) by mouth 3 times daily as           needed for dizziness      Routing refill request to provider for review/approval because:  A break in medication- last filled 2019    Saray Greer RN on 8/6/2021 at 9:32 AM

## 2021-08-09 ENCOUNTER — LAB (OUTPATIENT)
Dept: LAB | Facility: CLINIC | Age: 81
End: 2021-08-09
Payer: COMMERCIAL

## 2021-08-09 ENCOUNTER — ANTICOAGULATION THERAPY VISIT (OUTPATIENT)
Dept: ANTICOAGULATION | Facility: CLINIC | Age: 81
End: 2021-08-09

## 2021-08-09 DIAGNOSIS — Z79.01 LONG TERM CURRENT USE OF ANTICOAGULANT THERAPY: ICD-10-CM

## 2021-08-09 DIAGNOSIS — I48.91 ATRIAL FIBRILLATION (H): Primary | ICD-10-CM

## 2021-08-09 DIAGNOSIS — I48.91 ATRIAL FIBRILLATION (H): ICD-10-CM

## 2021-08-09 DIAGNOSIS — I48.91 ATRIAL FIBRILLATION, UNSPECIFIED TYPE (H): ICD-10-CM

## 2021-08-09 LAB — INR BLD: 2.9 (ref 0.9–1.1)

## 2021-08-09 PROCEDURE — 85610 PROTHROMBIN TIME: CPT

## 2021-08-09 PROCEDURE — 36416 COLLJ CAPILLARY BLOOD SPEC: CPT

## 2021-08-09 NOTE — PROGRESS NOTES
ANTICOAGULATION MANAGEMENT     Unique Ward 81 year old female is on warfarin with therapeutic INR result. (Goal INR 2.0-3.0)    Recent labs: (last 7 days)     08/09/21  1357   INR 2.9*       ASSESSMENT     Source(s): Chart Review I have attempted to contact this patient by phone with the following results: no answer. VM left asking her to call the [facility] back to discuss.     Warfarin doses taken: Warfarin taken as instructed    Diet: No new diet changes identified    New illness, injury, or hospitalization: No    Medication/supplement changes: None noted    Signs or symptoms of bleeding or clotting: No    Previous INR: Therapeutic last visit; previously outside of goal range    Additional findings: None     PLAN     Recommended plan for no diet, medication or health factor changes affecting INR     Dosing Instructions: Continue your current warfarin dose with next INR in 2 weeks       Summary  As of 8/9/2021    Full warfarin instructions:  7.5 mg every Sat; 10 mg all other days   Next INR check:  8/23/2021             Detailed voice message left for Unique with dosing instructions and follow up date.     Contact [phone]  to schedule and with any changes, questions or concerns.     Education provided: Please call back if any changes to your diet, medications or how you've been taking warfarin    Plan made per [facility] anticoagulation protocol    Veronika Campbell, RN  Anticoagulation Clinic  8/9/2021    _______________________________________________________________________     Anticoagulation Episode Summary     Current INR goal:  2.0-3.0   TTR:  53.3 % (1 y)   Target end date:  Indefinite   Send INR reminders to:  [facility] [place]    Indications    Atrial fibrillation (H) [I48.91]  Long term current use of anticoagulant therapy [Z79.01]  Atrial fibrillation  unspecified type (H) [I48.91]           Comments:  5 mg tabs, PM dose, print out         Anticoagulation Care Providers     Provider Role Specialty Phone  number    Tyler Lee MD [facility] [phone]

## 2021-08-11 ENCOUNTER — LAB (OUTPATIENT)
Dept: LAB | Facility: CLINIC | Age: 81
End: 2021-08-11
Payer: COMMERCIAL

## 2021-08-11 ENCOUNTER — VIRTUAL VISIT (OUTPATIENT)
Dept: FAMILY MEDICINE | Facility: OTHER | Age: 81
End: 2021-08-11
Payer: COMMERCIAL

## 2021-08-11 DIAGNOSIS — N30.01 ACUTE CYSTITIS WITH HEMATURIA: Primary | ICD-10-CM

## 2021-08-11 DIAGNOSIS — L50.9 HIVES: ICD-10-CM

## 2021-08-11 DIAGNOSIS — N30.01 ACUTE CYSTITIS WITH HEMATURIA: ICD-10-CM

## 2021-08-11 LAB
ALBUMIN UR-MCNC: NEGATIVE MG/DL
APPEARANCE UR: ABNORMAL
BACTERIA #/AREA URNS HPF: ABNORMAL /HPF
BILIRUB UR QL STRIP: NEGATIVE
COLOR UR AUTO: YELLOW
GLUCOSE UR STRIP-MCNC: NEGATIVE MG/DL
HGB UR QL STRIP: ABNORMAL
KETONES UR STRIP-MCNC: NEGATIVE MG/DL
LEUKOCYTE ESTERASE UR QL STRIP: ABNORMAL
NITRATE UR QL: NEGATIVE
PH UR STRIP: 7 [PH] (ref 5–7)
RBC URINE: 10 /HPF
SP GR UR STRIP: 1.01 (ref 1–1.03)
SQUAMOUS EPITHELIAL: <1 /HPF
UROBILINOGEN UR STRIP-MCNC: NORMAL MG/DL
WBC CLUMPS #/AREA URNS HPF: PRESENT /HPF
WBC URINE: >182 /HPF

## 2021-08-11 PROCEDURE — 81001 URINALYSIS AUTO W/SCOPE: CPT

## 2021-08-11 PROCEDURE — 87086 URINE CULTURE/COLONY COUNT: CPT

## 2021-08-11 PROCEDURE — 87088 URINE BACTERIA CULTURE: CPT

## 2021-08-11 PROCEDURE — 87186 SC STD MICRODIL/AGAR DIL: CPT

## 2021-08-11 PROCEDURE — 99213 OFFICE O/P EST LOW 20 MIN: CPT | Mod: 95 | Performed by: FAMILY MEDICINE

## 2021-08-11 RX ORDER — SULFAMETHOXAZOLE/TRIMETHOPRIM 800-160 MG
1 TABLET ORAL 2 TIMES DAILY
Qty: 6 TABLET | Refills: 0 | Status: CANCELLED | OUTPATIENT
Start: 2021-08-11 | End: 2021-08-14

## 2021-08-11 RX ORDER — NITROFURANTOIN 25; 75 MG/1; MG/1
100 CAPSULE ORAL 2 TIMES DAILY
Qty: 14 CAPSULE | Refills: 0 | Status: SHIPPED | OUTPATIENT
Start: 2021-08-11 | End: 2021-08-18

## 2021-08-11 ASSESSMENT — PAIN SCALES - GENERAL: PAINLEVEL: NO PAIN (0)

## 2021-08-11 NOTE — PROGRESS NOTES
Aaliyah is a 81 year old who is being evaluated via a billable telephone visit.      What phone number would you like to be contacted at? [phone]  How would you like to obtain your AVS? MyChart    Assessment & Plan       ICD-10-CM    1. Acute cystitis with hematuria  N30.01 UA Macro with Reflex to Micro and Culture - lab collect      Unclear if keflex is allergy as she was itchy in her armpits as she had taken this. But she has had hives from food in the past and not sure it is from keflex and would like to clarify to allow more antibiotic choices. Offered referral to allergy.  As for UTI, needs UA to confirm, will culture if suggestive. macrobid  planned if needed as then does not interact with coumadin and not an allergy.  But strongly encouraged reminders to do her estrogen cream. Plans to [place] on calendar to remember and see if this helps. Discussed lower dose more frequently if she needs to take most every day as getting something is better than nothing. But will try the calendar method first.    Review of the result(s) of each unique test - ua  12 minutes spent on the date of the encounter doing chart review, history and exam, documentation and further activities per the note         No follow-ups on file.    Merly Bravo MD, MD  [facility]   Aaliyah is a 81 year old who presents for the following health issues     HPI     Genitourinary - Female  Onset/Duration: 3-4 days ago   Description:   Painful urination (Dysuria): no           Frequency: YES  Blood in urine (Hematuria): no  Delay in urine (Hesitency): no  Intensity: mild, moderate  Progression of Symptoms:  intermittent  Accompanying Signs & Symptoms:  Fever/chills: no  Flank pain: no  Nausea and vomiting: no  Vaginal symptoms: none  Abdominal/Pelvic Pain: no  History:   History of frequent UTI s: YES  History of kidney stones: no  Sexually Active: no  Possibility of pregnancy: No  Precipitating or alleviating  factors: None  Therapies tried and outcome: Increase fluid intake    Has had recurrent infections and is working with Dr. Lucia, urology. Though has had poor compliance with the estrogen as it was easy to forget.    Review of Systems   Constitutional, HEENT, cardiovascular, pulmonary, GI, , musculoskeletal, neuro, skin, endocrine and psych systems are negative, except as otherwise noted.      Objective           Vitals:  No vitals were obtained today due to virtual visit.    Physical Exam   healthy, alert and no distress  PSYCH: Alert and oriented times 3; coherent speech, normal   rate and volume, able to articulate logical thoughts, able   to abstract reason, no tangential thoughts, no hallucinations   or delusions  Her affect is normal  RESP: No cough, no audible wheezing, able to talk in full sentences  Remainder of exam unable to be completed due to telephone visits    UA: pending            Phone call duration: 7 minutes

## 2021-08-12 ENCOUNTER — TELEPHONE (OUTPATIENT)
Dept: ANTICOAGULATION | Facility: CLINIC | Age: 81
End: 2021-08-12

## 2021-08-12 DIAGNOSIS — I48.0 PAROXYSMAL ATRIAL FIBRILLATION (H): ICD-10-CM

## 2021-08-12 RX ORDER — WARFARIN SODIUM 5 MG/1
TABLET ORAL
Qty: 132 TABLET | Refills: 0 | Status: SHIPPED | OUTPATIENT
Start: 2021-08-12 | End: 2021-10-26

## 2021-08-12 NOTE — TELEPHONE ENCOUNTER
ANTICOAGULATION  MANAGEMENT     Interacting Medication Review    Interacting medication(s): Macrobid with warfarin.    Duration: 7 days (8/11 to 8/18)    Indication: UTI    New medication?: Yes, interaction may increase INR and risk of bleeding       PLAN     Continue current warfarin dose. Recommend to check INR on 8/16    Left a detailed message for Unique    No adjustment to Anticoagulation Calendar was required    Veronika Campbell RN

## 2021-08-13 LAB — BACTERIA UR CULT: ABNORMAL

## 2021-08-16 ENCOUNTER — LAB (OUTPATIENT)
Dept: LAB | Facility: CLINIC | Age: 81
End: 2021-08-16
Payer: COMMERCIAL

## 2021-08-16 ENCOUNTER — ANTICOAGULATION THERAPY VISIT (OUTPATIENT)
Dept: ANTICOAGULATION | Facility: CLINIC | Age: 81
End: 2021-08-16

## 2021-08-16 DIAGNOSIS — Z79.01 LONG TERM CURRENT USE OF ANTICOAGULANT THERAPY: ICD-10-CM

## 2021-08-16 DIAGNOSIS — I48.91 ATRIAL FIBRILLATION (H): ICD-10-CM

## 2021-08-16 DIAGNOSIS — I48.91 ATRIAL FIBRILLATION, UNSPECIFIED TYPE (H): ICD-10-CM

## 2021-08-16 DIAGNOSIS — I48.91 ATRIAL FIBRILLATION (H): Primary | ICD-10-CM

## 2021-08-16 LAB — INR BLD: 3.8 (ref 0.9–1.1)

## 2021-08-16 PROCEDURE — 36416 COLLJ CAPILLARY BLOOD SPEC: CPT

## 2021-08-16 PROCEDURE — 85610 PROTHROMBIN TIME: CPT

## 2021-08-16 NOTE — PROGRESS NOTES
ANTICOAGULATION MANAGEMENT     Unique Ward 81 year old female is on warfarin with supratherapeutic INR result. (Goal INR 2.0-3.0)    Recent labs: (last 7 days)     08/16/21  1237   INR 3.8*       ASSESSMENT     Source(s): Chart Review I have attempted to contact this patient by phone with the following results: no answer. VM left asking her to call the [facility] back to discuss.       Warfarin doses taken: Warfarin taken as instructed    Diet: No new diet changes identified    New illness, injury, or hospitalization: Yes: UTI    Medication/supplement changes: Macrobid 7 day course (dates: 8/11-8/18) which has potential for interaction; increasing INR    Signs or symptoms of bleeding or clotting: No    Previous INR: Therapeutic last 2(+) visits    Additional findings: None     PLAN     Recommended plan for temporary change(s) affecting INR     Dosing Instructions: Hold partially x 2 doses then continue your current warfarin dose with next INR in 2 weeks       Summary  As of 8/16/2021    Full warfarin instructions:  8/16: 5 mg; 8/17: 5 mg; Otherwise 7.5 mg every Sat; 10 mg all other days   Next INR check:  8/30/2021             Detailed voice message left for Unique with dosing instructions and follow up date.     Contact [phone]  to schedule and with any changes, questions or concerns.     Education provided: Please call back if any changes to your diet, medications or how you've been taking warfarin, Target INR goal and significance of current INR result, Importance of therapeutic range and Importance of following up for INR monitoring at instructed interval    Plan made per [facility] anticoagulation protocol    Veronika Campbell, RN  Anticoagulation Clinic  8/16/2021    _______________________________________________________________________     Anticoagulation Episode Summary     Current INR goal:  2.0-3.0   TTR:  51.6 % (1 y)   Target end date:  Indefinite   Send INR reminders to:  [facility]    Indications     Atrial fibrillation (H) [I48.91]  Long term current use of anticoagulant therapy [Z79.01]  Atrial fibrillation  unspecified type (H) [I48.91]           Comments:  5 mg tabs, PM dose, print out         Anticoagulation Care Providers     Provider Role Specialty Phone number    Tyler Lee MD [facility] [phone]

## 2021-08-18 ENCOUNTER — ANCILLARY PROCEDURE (OUTPATIENT)
Dept: CARDIOLOGY | Facility: CLINIC | Age: 81
End: 2021-08-18
Attending: INTERNAL MEDICINE
Payer: COMMERCIAL

## 2021-08-18 DIAGNOSIS — Z95.0 CARDIAC PACEMAKER IN SITU: ICD-10-CM

## 2021-08-18 PROCEDURE — 93294 REM INTERROG EVL PM/LDLS PM: CPT | Performed by: INTERNAL MEDICINE

## 2021-08-18 PROCEDURE — 93296 REM INTERROG EVL PM/IDS: CPT | Performed by: INTERNAL MEDICINE

## 2021-08-24 ENCOUNTER — MYC MEDICAL ADVICE (OUTPATIENT)
Dept: FAMILY MEDICINE | Facility: CLINIC | Age: 81
End: 2021-08-24

## 2021-08-24 LAB
MDC_IDC_EPISODE_DTM: NORMAL
MDC_IDC_EPISODE_DURATION: 1 S
MDC_IDC_EPISODE_DURATION: 164 S
MDC_IDC_EPISODE_DURATION: 165 S
MDC_IDC_EPISODE_DURATION: 260 S
MDC_IDC_EPISODE_DURATION: 84 S
MDC_IDC_EPISODE_ID: 27
MDC_IDC_EPISODE_ID: 28
MDC_IDC_EPISODE_ID: 29
MDC_IDC_EPISODE_ID: 30
MDC_IDC_EPISODE_ID: 31
MDC_IDC_EPISODE_TYPE: NORMAL
MDC_IDC_LEAD_IMPLANT_DT: NORMAL
MDC_IDC_LEAD_IMPLANT_DT: NORMAL
MDC_IDC_LEAD_LOCATION: NORMAL
MDC_IDC_LEAD_LOCATION: NORMAL
MDC_IDC_LEAD_LOCATION_DETAIL_1: NORMAL
MDC_IDC_LEAD_LOCATION_DETAIL_1: NORMAL
MDC_IDC_LEAD_MFG: NORMAL
MDC_IDC_LEAD_MFG: NORMAL
MDC_IDC_LEAD_MODEL: NORMAL
MDC_IDC_LEAD_MODEL: NORMAL
MDC_IDC_LEAD_POLARITY_TYPE: NORMAL
MDC_IDC_LEAD_POLARITY_TYPE: NORMAL
MDC_IDC_LEAD_SERIAL: NORMAL
MDC_IDC_LEAD_SERIAL: NORMAL
MDC_IDC_MSMT_BATTERY_DTM: NORMAL
MDC_IDC_MSMT_BATTERY_REMAINING_LONGEVITY: 129 MO
MDC_IDC_MSMT_BATTERY_RRT_TRIGGER: 2.62
MDC_IDC_MSMT_BATTERY_STATUS: NORMAL
MDC_IDC_MSMT_BATTERY_VOLTAGE: 3.01 V
MDC_IDC_MSMT_LEADCHNL_RA_IMPEDANCE_VALUE: 304 OHM
MDC_IDC_MSMT_LEADCHNL_RA_IMPEDANCE_VALUE: 342 OHM
MDC_IDC_MSMT_LEADCHNL_RA_PACING_THRESHOLD_AMPLITUDE: 0.75 V
MDC_IDC_MSMT_LEADCHNL_RA_PACING_THRESHOLD_PULSEWIDTH: 0.4 MS
MDC_IDC_MSMT_LEADCHNL_RA_SENSING_INTR_AMPL: 1.25 MV
MDC_IDC_MSMT_LEADCHNL_RA_SENSING_INTR_AMPL: 1.25 MV
MDC_IDC_MSMT_LEADCHNL_RV_IMPEDANCE_VALUE: 399 OHM
MDC_IDC_MSMT_LEADCHNL_RV_IMPEDANCE_VALUE: 475 OHM
MDC_IDC_MSMT_LEADCHNL_RV_PACING_THRESHOLD_AMPLITUDE: 1 V
MDC_IDC_MSMT_LEADCHNL_RV_PACING_THRESHOLD_PULSEWIDTH: 0.4 MS
MDC_IDC_MSMT_LEADCHNL_RV_SENSING_INTR_AMPL: 5.38 MV
MDC_IDC_MSMT_LEADCHNL_RV_SENSING_INTR_AMPL: 5.38 MV
MDC_IDC_PG_IMPLANT_DTM: NORMAL
MDC_IDC_PG_MFG: NORMAL
MDC_IDC_PG_MODEL: NORMAL
MDC_IDC_PG_SERIAL: NORMAL
MDC_IDC_PG_TYPE: NORMAL
MDC_IDC_SESS_CLINIC_NAME: NORMAL
MDC_IDC_SESS_DTM: NORMAL
MDC_IDC_SESS_TYPE: NORMAL
MDC_IDC_SET_BRADY_AT_MODE_SWITCH_RATE: 171 {BEATS}/MIN
MDC_IDC_SET_BRADY_HYSTRATE: NORMAL
MDC_IDC_SET_BRADY_LOWRATE: 60 {BEATS}/MIN
MDC_IDC_SET_BRADY_MAX_SENSOR_RATE: 130 {BEATS}/MIN
MDC_IDC_SET_BRADY_MAX_TRACKING_RATE: 130 {BEATS}/MIN
MDC_IDC_SET_BRADY_MODE: NORMAL
MDC_IDC_SET_BRADY_PAV_DELAY_HIGH: 140 MS
MDC_IDC_SET_BRADY_PAV_DELAY_LOW: 300 MS
MDC_IDC_SET_BRADY_SAV_DELAY_HIGH: 110 MS
MDC_IDC_SET_BRADY_SAV_DELAY_LOW: 230 MS
MDC_IDC_SET_LEADCHNL_RA_PACING_AMPLITUDE: 1.5 V
MDC_IDC_SET_LEADCHNL_RA_PACING_ANODE_ELECTRODE_1: NORMAL
MDC_IDC_SET_LEADCHNL_RA_PACING_ANODE_LOCATION_1: NORMAL
MDC_IDC_SET_LEADCHNL_RA_PACING_CAPTURE_MODE: NORMAL
MDC_IDC_SET_LEADCHNL_RA_PACING_CATHODE_ELECTRODE_1: NORMAL
MDC_IDC_SET_LEADCHNL_RA_PACING_CATHODE_LOCATION_1: NORMAL
MDC_IDC_SET_LEADCHNL_RA_PACING_POLARITY: NORMAL
MDC_IDC_SET_LEADCHNL_RA_PACING_PULSEWIDTH: 0.4 MS
MDC_IDC_SET_LEADCHNL_RA_SENSING_ANODE_ELECTRODE_1: NORMAL
MDC_IDC_SET_LEADCHNL_RA_SENSING_ANODE_LOCATION_1: NORMAL
MDC_IDC_SET_LEADCHNL_RA_SENSING_CATHODE_ELECTRODE_1: NORMAL
MDC_IDC_SET_LEADCHNL_RA_SENSING_CATHODE_LOCATION_1: NORMAL
MDC_IDC_SET_LEADCHNL_RA_SENSING_POLARITY: NORMAL
MDC_IDC_SET_LEADCHNL_RA_SENSING_SENSITIVITY: 0.3 MV
MDC_IDC_SET_LEADCHNL_RV_PACING_AMPLITUDE: 2 V
MDC_IDC_SET_LEADCHNL_RV_PACING_ANODE_ELECTRODE_1: NORMAL
MDC_IDC_SET_LEADCHNL_RV_PACING_ANODE_LOCATION_1: NORMAL
MDC_IDC_SET_LEADCHNL_RV_PACING_CAPTURE_MODE: NORMAL
MDC_IDC_SET_LEADCHNL_RV_PACING_CATHODE_ELECTRODE_1: NORMAL
MDC_IDC_SET_LEADCHNL_RV_PACING_CATHODE_LOCATION_1: NORMAL
MDC_IDC_SET_LEADCHNL_RV_PACING_POLARITY: NORMAL
MDC_IDC_SET_LEADCHNL_RV_PACING_PULSEWIDTH: 0.4 MS
MDC_IDC_SET_LEADCHNL_RV_SENSING_ANODE_ELECTRODE_1: NORMAL
MDC_IDC_SET_LEADCHNL_RV_SENSING_ANODE_LOCATION_1: NORMAL
MDC_IDC_SET_LEADCHNL_RV_SENSING_CATHODE_ELECTRODE_1: NORMAL
MDC_IDC_SET_LEADCHNL_RV_SENSING_CATHODE_LOCATION_1: NORMAL
MDC_IDC_SET_LEADCHNL_RV_SENSING_POLARITY: NORMAL
MDC_IDC_SET_LEADCHNL_RV_SENSING_SENSITIVITY: 0.9 MV
MDC_IDC_SET_ZONE_DETECTION_INTERVAL: 350 MS
MDC_IDC_SET_ZONE_DETECTION_INTERVAL: 400 MS
MDC_IDC_SET_ZONE_TYPE: NORMAL
MDC_IDC_STAT_AT_BURDEN_PERCENT: 0 %
MDC_IDC_STAT_AT_DTM_END: NORMAL
MDC_IDC_STAT_AT_DTM_START: NORMAL
MDC_IDC_STAT_BRADY_AP_VP_PERCENT: 47.1 %
MDC_IDC_STAT_BRADY_AP_VS_PERCENT: 23.18 %
MDC_IDC_STAT_BRADY_AS_VP_PERCENT: 11.64 %
MDC_IDC_STAT_BRADY_AS_VS_PERCENT: 18.08 %
MDC_IDC_STAT_BRADY_DTM_END: NORMAL
MDC_IDC_STAT_BRADY_DTM_START: NORMAL
MDC_IDC_STAT_BRADY_RA_PERCENT_PACED: 68.73 %
MDC_IDC_STAT_BRADY_RV_PERCENT_PACED: 58.75 %
MDC_IDC_STAT_EPISODE_RECENT_COUNT: 0
MDC_IDC_STAT_EPISODE_RECENT_COUNT: 1
MDC_IDC_STAT_EPISODE_RECENT_COUNT: 4
MDC_IDC_STAT_EPISODE_RECENT_COUNT_DTM_END: NORMAL
MDC_IDC_STAT_EPISODE_RECENT_COUNT_DTM_START: NORMAL
MDC_IDC_STAT_EPISODE_TOTAL_COUNT: 0
MDC_IDC_STAT_EPISODE_TOTAL_COUNT: 2
MDC_IDC_STAT_EPISODE_TOTAL_COUNT: 29
MDC_IDC_STAT_EPISODE_TOTAL_COUNT_DTM_END: NORMAL
MDC_IDC_STAT_EPISODE_TOTAL_COUNT_DTM_START: NORMAL
MDC_IDC_STAT_EPISODE_TYPE: NORMAL

## 2021-08-30 ENCOUNTER — ANTICOAGULATION THERAPY VISIT (OUTPATIENT)
Dept: ANTICOAGULATION | Facility: CLINIC | Age: 81
End: 2021-08-30

## 2021-08-30 ENCOUNTER — LAB (OUTPATIENT)
Dept: LAB | Facility: CLINIC | Age: 81
End: 2021-08-30
Payer: COMMERCIAL

## 2021-08-30 DIAGNOSIS — I48.91 ATRIAL FIBRILLATION (H): ICD-10-CM

## 2021-08-30 DIAGNOSIS — I48.91 ATRIAL FIBRILLATION (H): Primary | ICD-10-CM

## 2021-08-30 DIAGNOSIS — Z79.01 LONG TERM CURRENT USE OF ANTICOAGULANT THERAPY: ICD-10-CM

## 2021-08-30 DIAGNOSIS — I48.91 ATRIAL FIBRILLATION, UNSPECIFIED TYPE (H): ICD-10-CM

## 2021-08-30 LAB — INR BLD: 2.4 (ref 0.9–1.1)

## 2021-08-30 PROCEDURE — 36416 COLLJ CAPILLARY BLOOD SPEC: CPT

## 2021-08-30 PROCEDURE — 85610 PROTHROMBIN TIME: CPT

## 2021-08-30 NOTE — PROGRESS NOTES
ANTICOAGULATION MANAGEMENT     Unique Ward 81 year old female is on warfarin with therapeutic INR result. (Goal INR 2.0-3.0)    Recent labs: (last 7 days)     08/30/21  1329   INR 2.4*       ASSESSMENT     Source(s): Chart Review and Patient/Caregiver Call       Warfarin doses taken: Warfarin taken as instructed    Diet: No new diet changes identified    New illness, injury, or hospitalization: No    Medication/supplement changes: None noted    Signs or symptoms of bleeding or clotting: No    Previous INR: Supratherapeutic    Additional findings: None     PLAN     Recommended plan for no diet, medication or health factor changes affecting INR     Dosing Instructions: Continue your current warfarin dose with next INR in 2 weeks       Summary  As of 8/30/2021    Full warfarin instructions:  7.5 mg every Sat; 10 mg all other days   Next INR check:  9/13/2021             Telephone call with Unique who verbalizes understanding and agrees to plan    Patient offered & declined to schedule next visit    Education provided: None required    Plan made per [facility] anticoagulation protocol    Oxana Smith RN  Anticoagulation Clinic  8/30/2021    _______________________________________________________________________     Anticoagulation Episode Summary     Current INR goal:  2.0-3.0   TTR:  49.4 % (1 y)   Target end date:  Indefinite   Send INR reminders to:  [facility]    Indications    Atrial fibrillation (H) [I48.91]  Long term current use of anticoagulant therapy [Z79.01]  Atrial fibrillation  unspecified type (H) [I48.91]           Comments:  5 mg tabs, PM dose, print out         Anticoagulation Care Providers     Provider Role Specialty Phone number    Tyler Lee MD Referring Family Medicine [phone]

## 2021-08-31 ENCOUNTER — TELEPHONE (OUTPATIENT)
Dept: EDUCATION SERVICES | Facility: CLINIC | Age: 81
End: 2021-08-31

## 2021-08-31 NOTE — TELEPHONE ENCOUNTER
I returned Aaliyah's call about her upcoming appt on Thursday. I let her know I spoke with Marlys Hilton who she will talk with on Thursday and confirmed that Marlys can discuss both the diabetes meal plan and coumadin meal plan. Aaliyah reports she is having trouble balancing the two. We discussed writing down 3 days of food records to review with Marlys. She has not started testing her BG yet, but reports she did get a blood glucose meter. She will discuss this at the appt as well.    Corina Pathak RN, [facility]

## 2021-09-02 ENCOUNTER — PATIENT OUTREACH (OUTPATIENT)
Dept: EDUCATION SERVICES | Facility: CLINIC | Age: 81
End: 2021-09-02
Payer: COMMERCIAL

## 2021-09-02 DIAGNOSIS — R73.03 PRE-DIABETES: ICD-10-CM

## 2021-09-02 NOTE — PROGRESS NOTES
Called Aaliyah for our telephone visit, she was not aware that Medicare does not cover visits for prediabetes.  Writer provided patient with the phone number for the Health  that is teaching the Medicare Diabetes Prevention Program class coming up in October. Will send patient some materials on prediabetes and Vitamin K.    Marlys Hilton, ARMANDO, LD, CDE

## 2021-09-08 ENCOUNTER — OFFICE VISIT (OUTPATIENT)
Dept: ALLERGY | Facility: OTHER | Age: 81
End: 2021-09-08
Payer: COMMERCIAL

## 2021-09-08 VITALS
HEART RATE: 72 BPM | OXYGEN SATURATION: 95 % | DIASTOLIC BLOOD PRESSURE: 85 MMHG | BODY MASS INDEX: 20.46 KG/M2 | SYSTOLIC BLOOD PRESSURE: 183 MMHG | WEIGHT: 135 LBS | HEIGHT: 68 IN

## 2021-09-08 DIAGNOSIS — J30.0 VASOMOTOR RHINITIS: ICD-10-CM

## 2021-09-08 DIAGNOSIS — T50.905A ADVERSE EFFECT OF DRUG, INITIAL ENCOUNTER: Primary | ICD-10-CM

## 2021-09-08 DIAGNOSIS — T78.49XA OTHER ALLERGY, INITIAL ENCOUNTER: ICD-10-CM

## 2021-09-08 PROCEDURE — 99204 OFFICE O/P NEW MOD 45 MIN: CPT | Mod: 25 | Performed by: ALLERGY & IMMUNOLOGY

## 2021-09-08 PROCEDURE — 86003 ALLG SPEC IGE CRUDE XTRC EA: CPT | Performed by: ALLERGY & IMMUNOLOGY

## 2021-09-08 PROCEDURE — 95004 PERQ TESTS W/ALRGNC XTRCS: CPT | Performed by: ALLERGY & IMMUNOLOGY

## 2021-09-08 PROCEDURE — 36415 COLL VENOUS BLD VENIPUNCTURE: CPT | Performed by: ALLERGY & IMMUNOLOGY

## 2021-09-08 PROCEDURE — 82785 ASSAY OF IGE: CPT | Performed by: ALLERGY & IMMUNOLOGY

## 2021-09-08 RX ORDER — EPINEPHRINE 0.3 MG/.3ML
0.3 INJECTION SUBCUTANEOUS
Qty: 0.6 ML | Refills: 0 | Status: SHIPPED | OUTPATIENT
Start: 2021-09-08 | End: 2023-06-20

## 2021-09-08 RX ORDER — AZELASTINE 1 MG/ML
2 SPRAY, METERED NASAL 2 TIMES DAILY PRN
Qty: 30 ML | Refills: 3 | Status: SHIPPED | OUTPATIENT
Start: 2021-09-08 | End: 2023-06-20

## 2021-09-08 ASSESSMENT — ENCOUNTER SYMPTOMS
EYE ITCHING: 0
SHORTNESS OF BREATH: 0
HEADACHES: 0
VOMITING: 0
ACTIVITY CHANGE: 0
RHINORRHEA: 0
MYALGIAS: 0
WHEEZING: 0
NAUSEA: 0
ARTHRALGIAS: 0
EYE REDNESS: 0
ADENOPATHY: 0
DIARRHEA: 0
SINUS PRESSURE: 0
JOINT SWELLING: 1
CHILLS: 0
COUGH: 0
FACIAL SWELLING: 0
CHEST TIGHTNESS: 0
FEVER: 0

## 2021-09-08 ASSESSMENT — MIFFLIN-ST. JEOR: SCORE: 1120.86

## 2021-09-08 NOTE — PATIENT INSTRUCTIONS
For Keflex testing, set up an appointment with Dr. Gutierrez.  [facility] Dr. Carl Horta [phone]  For flu shot testing, you can either do it at the same time with Dr. Gutierrez, or we can have it here.       For chicken issues, Get the bloodwork done. Continue avoidance, and carry epi pen.      -Use azelastine 2 sprays in each nostril twice a day when necessary.

## 2021-09-08 NOTE — PROGRESS NOTES
SUBJECTIVE:                                                                   Unique Ward is an 81 year old female who presents today to our Allergy Clinic at [facility]; She is being seen in consultation at the request of Merly Bravo MD , for hives evaluation .    In Spring 2021, she had a similar package of food with chicken.  While she was eating, she developed itchiness of the skin.  Took Benadryl, and she got better.    On April 24, she was treated for urinary tract infection and took Keflex.  The ED note says that it was her second dose.  The patient insists that it was her first dose.  1 hour later, she developed generalized itch with rash, shortness of breath, and she took diphenhydramine.  Symptoms got worse.  When EMS came, her BP was 60/40.  She was treated with IV fluids and epinephrine.    Interestingly, the patient states that she also had chicken meal that morning.  She has been avoiding chicken since then, but ate a very small amount of chicken last week with her other problem.    Years ago, she received influenza vaccine and developed facial hives next day that lasted for several days.  Ended up taking prednisone.  She denies having influenza vaccine since then.    She also reports a history of perennial postnasal drainage without seasonal exacerbations.  Has not tried any nasal sprays.        Patient Active Problem List   Diagnosis     Essential hypertension     Personal history of urinary tract infection     Advance Care Planning     History of basal cell carcinoma- face and scalp     FH: melanoma- son     Dysthymia     Vitamin D deficiency     Fibromyalgia     Left lumbosacral radiculopathy     Acute cystitis with hematuria     Atrial fibrillation (H)     CHB (complete heart block) (H)     Pacemaker     Long term current use of anticoagulant therapy     Atrial fibrillation, unspecified type (H)       Past Medical History:   Diagnosis Date     Arthritis 01.01.1980      Atrial fibrillation (H) 6/7/2019     Cancer (H) 01.01.1993     scalp    Basal Cell X2  head      nose bridge 1996     Depressive disorder 01.01.1977    anxiety     Hypertension 03.16.2016      Problem (# of Occurrences) Relation (Name,Age of Onset)    Breast Cancer (1) Sister (2 sisters    #3 & 4)    Cerebrovascular Disease (1) Brother (error)    Coronary Artery Disease (2) Sister (error): error, Brother (error): error    Diabetes (1) Maternal Grandmother (Denise): Na    Hyperlipidemia (3) Brother (error), Sister (#4), Sister (# 1)    Hypertension (3) Brother (error), Sister (2 sisters   #1 & 3), Sister (2 sisters   #4 & 5)    Mental Illness (2) Mother (mother): paranoid/schizophrenic/suicide, Sister (# 1): ???  Had shock treatments    Other Cancer (3) Sister (#4): cervical, Sister (#2): kidney, Sister (# 1): Kidney        Past Surgical History:   Procedure Laterality Date     BIOPSY  11/01/2014     COLONOSCOPY  01.01/1979    Two routine,  one for chronic diarrhea     EP PACEMAKER N/A 3/31/2020    Procedure: EP Pacemaker;  Surgeon: Erich Clark MD;  Location:  [facility]     EYE SURGERY  1/1/2014    first    cataracts both eyes.  second a few months later     GENITOURINARY SURGERY       GYN SURGERY  01.01.1977    TL     HEAD & NECK SURGERY  01.01.1997    basal cell X2   Moh's on scalp.  other on bridge of nose     LASER YAG CAPSULOTOMY Bilateral 4/1/2021    Procedure: CAPSULOTOMY, LENS, USING YAG LASER;  Surgeon: Bernardino Chu MD;  Location: [facility]     SOFT TISSUE SURGERY  01.01.1990    Ganglion Cyst   Left inner wrist     Social History     Socioeconomic History     Marital status: Legally      Spouse name: None     Number of children: None     Years of education: None     Highest education level: None   Occupational History     Comment: Retired   Tobacco Use     Smoking status: Former Smoker     Packs/day: 0.75     Years: 10.00     Pack years: 7.50     Types: Cigarettes      Start date: 1955     Quit date: 1965     Years since quittin.7     Smokeless tobacco: Never Used   Vaping Use     Vaping Use: Never used   Substance and Sexual Activity     Alcohol use: No     Alcohol/week: 0.0 standard drinks     Drug use: No     Sexual activity: Not Currently   Other Topics Concern     Parent/sibling w/ CABG, MI or angioplasty before 65F 55M? No   Social History Narrative    ENVIRONMENTAL HISTORY: [place] The home is heated with a forced air and propane. They does not have central air conditioning. The patient's bedroom is furnished with hard antonia in bedroom.  Pets inside the house include 2 dog(s), 1 bird(s), and 20 chickens. There is history of cockroach or mice infestation. There is/are 0 smokers in the house.  The house does not have a damp basement.      Social Determinants of Health     Financial Resource Strain:      Difficulty of Paying Living Expenses:    Food Insecurity:      Worried About Running Out of Food in the Last Year:      Ran Out of Food in the Last Year:    Transportation Needs:      Lack of Transportation (Medical):      Lack of Transportation (Non-Medical):    Physical Activity:      Days of Exercise per Week:      Minutes of Exercise per Session:    Stress:      Feeling of Stress :    Social Connections:      Frequency of Communication with Friends and Family:      Frequency of Social Gatherings with Friends and Family:      Attends [employer] Services:      Active Member of Clubs or Organizations:      Attends Club or Organization Meetings:      Marital Status:    Intimate Partner Violence:      Fear of Current or Ex-Partner:      Emotionally Abused:      Physically Abused:      Sexually Abused:            Review of Systems   Constitutional: Negative for activity change, chills and fever.   HENT: Positive for postnasal drip. Negative for congestion, dental problem, ear pain, facial swelling, nosebleeds, rhinorrhea,  sinus pressure and sneezing.    Eyes: Negative for redness and itching.   Respiratory: Negative for cough, chest tightness, shortness of breath and wheezing.    Cardiovascular: Negative for chest pain.   Gastrointestinal: Negative for diarrhea, nausea and vomiting.   Musculoskeletal: Positive for joint swelling. Negative for arthralgias and myalgias.   Skin: Negative for rash.   Neurological: Negative for headaches.   Hematological: Negative for adenopathy.   Psychiatric/Behavioral: Negative for behavioral problems and self-injury.           Current Outpatient Medications:      amLODIPine (NORVASC) 5 MG tablet, Take 1 tablet (5 mg) by mouth daily, Disp: 90 tablet, Rfl: 3     azelastine (ASTELIN) 0.1 % nasal spray, Spray 2 sprays into both nostrils 2 times daily as needed for rhinitis, Disp: 30 mL, Rfl: 3     cholecalciferol (VITAMIN D3) 5000 units TABS tablet, Take 2,500 mcg by mouth daily , Disp: , Rfl:      EPINEPHrine (ANY BX GENERIC EQUIV) 0.3 MG/0.3ML injection 2-pack, Inject 0.3 mLs (0.3 mg) into the muscle once as needed for anaphylaxis, Disp: 0.6 mL, Rfl: 0     estradiol (ESTRACE) 0.1 MG/GM vaginal cream, Place 2 g vaginally twice a week, Disp: 42.5 g, Rfl: 11     magnesium 100 MG TABS, Take 1 tablet by mouth daily Takes 3 tabs (300mg) daily, Disp: , Rfl:      meclizine (ANTIVERT) 25 MG tablet, Take 1 tablet (25 mg) by mouth 3 times daily as needed for dizziness, Disp: 21 tablet, Rfl: 0     warfarin ANTICOAGULANT (COUMADIN) 5 MG tablet, Take 7.5 mg Sat and 10 mg all other days or as directed by the coumadin clinic., Disp: 132 tablet, Rfl: 0     Zinc Sulfate (ZINC 15 PO), , Disp: , Rfl:   Immunization History   Administered Date(s) Administered     HEPA 02/18/1998, 11/25/1998     LYMERIX 04/25/2000, 05/23/2000     Poliovirus, inactivated (IPV) 05/31/1990, 07/02/1990, 01/29/1991     TD (ADULT, 7+) 10/13/1995, 01/01/2003, 01/30/2003     TDAP Vaccine (Boostrix) 06/01/2014     Td (Adult), Adsorbed 10/13/1995,  "01/30/2003     Allergies   Allergen Reactions     Keflex [Cephalexin] Anaphylaxis     No Clinical Screening - See Comments      Eye drop antibiotic.     Codeine Other (See Comments)     Comment: , Description:      Gentamicin Hives     Per Unique this medication gives hives on arms and legs. Bev Lo LSXO,  ....................  7/15/2014   1:15 PM     Influenza Vaccines Other (See Comments)     Comment: , Description:      Influenza Virus Vaccine H5n1      Paxil [Paroxetine] Other (See Comments)     Comment: , Description:      Typhoid Vaccine-Strain Ty21a Other (See Comments)     Comment: , Description:   Comment: , Description:      Typhoid Vaccines      OBJECTIVE:                                                                 BP (!) 183/85   Pulse 72   Ht 1.727 m (5' 8\")   Wt 61.2 kg (135 lb)   LMP  (LMP Unknown)   SpO2 95%   BMI 20.53 kg/m          Physical Exam  Vitals and nursing note reviewed.   Constitutional:       Appearance: Normal appearance.   HENT:      Head: Normocephalic and atraumatic.      Right Ear: External ear normal.      Left Ear: External ear normal.      Mouth/Throat:      Mouth: Mucous membranes are moist.      Pharynx: Oropharynx is clear.   Eyes:      General:         Right eye: No discharge.         Left eye: No discharge.   Cardiovascular:      Rate and Rhythm: Normal rate and regular rhythm.   Pulmonary:      Effort: Pulmonary effort is normal. No respiratory distress.      Breath sounds: Normal breath sounds.   Neurological:      Mental Status: She is alert and oriented to person, place, and time.   Psychiatric:         Mood and Affect: Mood normal.         Behavior: Behavior normal.           WORKUP:     ENVIRONMENTAL PERCUTANEOUS SKIN TESTING: ADULT  [facility] 9/8/2021   Consent Y   Ordering Physician Dr. Osei   Interpreting Physician Dr. Osei   Testing Technician    Location Back   Time start:  2:15 PM   Time End:  2:30 PM   Positive Control: " Histatrol*ALK 1 mg/ml 4/4   Negative Control: 50% Glycerin 0   Cat Hair*ALK (10,000 BAU/ml) 0   AP Dog Hair/Dander (1:100 w/v) 0   Dust Mite p. 30,000 AU/ml 0   Dust Mite f. (30,000 AU/ml) 0   Darren (W/F in millimeters) 0   Ji Grass (100,000 BAU/mL) 0   Red Cedar (W/F in millimeters) 0   Maple/[employer] (W/F in millimeters) 0   Hackberry (W/F in millimeters) 0   [place] (W/F in millimeters) 0   Jeffers *ALK (W/F in millimeters) 0   American Elm (W/F in millimeters) 0   Ciales (W/F in millimeters) 0   [place] (W/F in millimeters) 0   Birch Mix (W/F in millimeters) 0   Comanche (W/F in millimeters) 0   Oak (W/F in millimeters) 0   Cocklebur (W/F in millimeters) 0   Hyrum (W/F in millimeters) 0   White [place] (W/F in millimeters) 0   Careless (W/F in millimeters) 0   Nettle (W/F in millimeters) 0   English Plantain (W/F in millimeters) 0   Kochia (W/F in millimeters) 0   Lamb's Quarter (W/F in millimeters) 0   Marshelder (W/F in millimeters) 0   Ragweed Mix* ALK (W/F in millimeters) 0   Russian Thistle (W/F in millimeters) 0   Sagebrush/Mugwort (W/F in millimeters) 0   Sheep Sorrel (W/F in millimeters) 0   Feather Mix* ALK (W/F in millimeters) 0   Penicillium Mix (1:10 w/v) 0   Curvularia spicifera (1:10 w/v) 0   Epicoccum (1:10 w/v) 0   Aspergillus fumigatus (1:10 w/v): 0   Alternaria tenius (1:10 w/v) 0   H. Cladosporium (1:10 w/v) 0   Phoma herbarum (1:10 w/v) 0      FOOD ALLERGEN PERCUTANEOUS SKIN TESTING  [place]  9/8/2021   Consent Y   Ordering Physician Dr. Osei   Interpreting Physician Dr. Osei   Testing Technician    Location Back   Time start:  2:15 PM   Time End:  2:30 PM   Positive Control: Histatrol*ALK 1 mg/ml 4/4   Negative Control: 50% Glycerin**[place] 0   Chicken 1:20 (W/F in millimeters) 4/4          My interpretation: Percutaneous skin puncture testing for aeroallergens performed today was negative with appropriate responses to positive and negative  controls.  SPT for chicken was mildly positive.    ASSESSMENT/PLAN:    Adverse effect of drug, initial encounter  Unfortunately, there is no well-validated commercially available skin test for cephalosporins.  -I referred the patient to Dr. Horta, who specializes in drug allergies, to see if he could assist us with it.  In regards to influenza reaction, we can perform a skin test, and then administer the vaccine depending on the results.  If Dr. Horta sees her, she can also ask if she can be tested for that vaccine at the same time.  If not, we can do it in [place].    - Adult Dermatology Referral    Vasomotor rhinitis    Negative percutaneous skin puncture testing for aeroallergens suggesting a lack of sensitivity to tested environmental/seasonal allergens.  Unable to recommend avoidance measures or allergen immunotherapy.    -Use azelastine 2 sprays in each nostril twice a day when necessary.    - ALLERGY SKIN TESTS,ALLERGENS [38022]  - azelastine (ASTELIN) 0.1 % nasal spray  Dispense: 30 mL; Refill: 3    Other allergy, initial encounter    Skin test mildly positive.  Discussed possibility of false positive results.  -Ordered serum IgE for chicken.  Depending on the results, consider oral food challenge test in the office settings.  -Meanwhile, continue strict avoidance and carry epinephrine autoinjector all the time.  Anaphylaxis action plan was reviewed and provided.    - Allergen chicken IgE  - EPINEPHrine (ANY BX GENERIC EQUIV) 0.3 MG/0.3ML injection 2-pack  Dispense: 0.6 mL; Refill: 0  - IgE       Return in about 6 weeks (around 10/20/2021), or if symptoms worsen or fail to improve.    Thank you for allowing us to participate in the care of this patient. Please feel free to contact us if there are any questions or concerns about the patient.    Disclaimer: This note consists of symbols derived from keyboarding, dictation and/or voice recognition software. As a result, there may be errors in the  script that have gone undetected. Please consider this when interpreting information found in this chart.    Kev Osei MD, FAAAAI, FACAAI  Allergy, Asthma and Immunology     [facility]

## 2021-09-08 NOTE — LETTER
ANAPHYLAXIS ALLERGY PLAN    Name: Unique Ward      :  1940    Allergy to:  Work up for chicken allergy  Weight: 135 lbs 0 oz           Asthma:  No      Do not depend on antihistamines or inhalers (bronchodilators) to treat a severe reaction; USE EPINEPHRINE      MEDICATIONS/DOSES  Epinephrine:  EpiPen/Adrenaclick/Auvi-Q  Epinephrine dose:  0.3 mg IM  Antihistamine:  Zyrtec (Cetirizine)  Antihistamine dose:  20 mg  Other (e.g., inhaler-bronchodilator if wheezing):  none       ANAPHYLAXIS ALLERGY PLAN (Page 2)  Patient:  Unique Ward  :  1940         Electronically signed on 2021 by:  Kev Osei MD  Parent/Guardian Authorization Signature:  ___________________________ Date:    FORM PROVIDED COURTESY OF FOOD ALLERGY RESEARCH & EDUCATION (FARE) ([email or web address]) 2017

## 2021-09-08 NOTE — LETTER
9/8/2021         RE: Unique Ward  [id number] [facility] [address]        Dear Colleague,    Thank you for referring your patient, Unique Ward, to the [facility]. Please see a copy of my visit note below.    SUBJECTIVE:                                                                   Unique Ward is an 81 year old female who presents today to our Allergy Clinic at [facility]; She is being seen in consultation at the request of EdMerly jones MD , for hives evaluation .    In Spring 2021, she had a similar package of food with chicken.  While she was eating, she developed itchiness of the skin.  Took Benadryl, and she got better.    On April 24, she was treated for urinary tract infection and took Keflex.  The ED note says that it was her second dose.  The patient insists that it was her first dose.  1 hour later, she developed generalized itch with rash, shortness of breath, and she took diphenhydramine.  Symptoms got worse.  When EMS came, her BP was 60/40.  She was treated with IV fluids and epinephrine.    Interestingly, the patient states that she also had chicken meal that morning.  She has been avoiding chicken since then, but ate a very small amount of chicken last week with her other problem.    Years ago, she received influenza vaccine and developed facial hives next day that lasted for several days.  Ended up taking prednisone.  She denies having influenza vaccine since then.    She also reports a history of perennial postnasal drainage without seasonal exacerbations.  Has not tried any nasal sprays.        Patient Active Problem List   Diagnosis     Essential hypertension     Personal history of urinary tract infection     Advance Care Planning     History of basal cell carcinoma- face and scalp     FH: melanoma- son     Dysthymia     Vitamin D deficiency     Fibromyalgia     Left lumbosacral radiculopathy     Acute cystitis with hematuria      Atrial fibrillation (H)     CHB (complete heart block) (H)     Pacemaker     Long term current use of anticoagulant therapy     Atrial fibrillation, unspecified type (H)       Past Medical History:   Diagnosis Date     Arthritis 01.01.1980     Atrial fibrillation (H) 6/7/2019     Cancer (H) 01.01.1993     scalp    Basal Cell X2  head      nose bridge 1996     Depressive disorder 01.01.1977    anxiety     Hypertension 03.16.2016      Problem (# of Occurrences) Relation (Name,Age of Onset)    Breast Cancer (1) Sister (2 sisters    #3 & 4)    Cerebrovascular Disease (1) Brother (error)    Coronary Artery Disease (2) Sister (error): error, Brother (error): error    Diabetes (1) Maternal Grandmother (Denise): Na    Hyperlipidemia (3) Brother (error), Sister (#4), Sister (# 1)    Hypertension (3) Brother (error), Sister (2 sisters   #1 & 3), Sister (2 sisters   #4 & 5)    Mental Illness (2) Mother (mother): paranoid/schizophrenic/suicide, Sister (# 1): ???  Had shock treatments    Other Cancer (3) Sister (#4): cervical, Sister (#2): kidney, Sister (# 1): Kidney        Past Surgical History:   Procedure Laterality Date     BIOPSY  11/01/2014     COLONOSCOPY  01.01/1979    Two routine,  one for chronic diarrhea     EP PACEMAKER N/A 3/31/2020    Procedure: EP Pacemaker;  Surgeon: Erich Clark MD;  Location: [facility] CARDIAC CATH LAB     EYE SURGERY  1/1/2014    first    cataracts both eyes.  second a few months later     GENITOURINARY SURGERY       GYN SURGERY  01.01.1977    TL     HEAD & NECK SURGERY  01.01.1997    basal cell X2   Moh's on scalp.  other on bridge of nose     LASER YAG CAPSULOTOMY Bilateral 4/1/2021    Procedure: CAPSULOTOMY, LENS, USING YAG LASER;  Surgeon: Bernardino Chu MD;  Location: [facility] OR     SOFT TISSUE SURGERY  01.01.1990    Ganglion Cyst   Left inner wrist     Social History     Socioeconomic History     Marital status: Legally      Spouse name: None     Number of children:  None     Years of education: None     Highest education level: None   Occupational History     Comment: Retired   Tobacco Use     Smoking status: Former Smoker     Packs/day: 0.75     Years: 10.00     Pack years: 7.50     Types: Cigarettes     Start date: 1955     Quit date: 1965     Years since quittin.7     Smokeless tobacco: Never Used   Vaping Use     Vaping Use: Never used   Substance and Sexual Activity     Alcohol use: No     Alcohol/week: 0.0 standard drinks     Drug use: No     Sexual activity: Not Currently   Other Topics Concern     Parent/sibling w/ CABG, MI or angioplasty before 65F 55M? No   Social History Narrative    ENVIRONMENTAL HISTORY: [place] The home is heated with a forced air and propane. They does not have central air conditioning. The patient's bedroom is furnished with hard antonia in bedroom.  Pets inside the house include 2 dog(s), 1 bird(s), and 20 chickens. There is history of cockroach or mice infestation. There is/are 0 smokers in the house.  The house does not have a damp basement.      Social Determinants of Health     Financial Resource Strain:      Difficulty of Paying Living Expenses:    Food Insecurity:      Worried About Running Out of Food in the Last Year:      Ran Out of Food in the Last Year:    Transportation Needs:      Lack of Transportation (Medical):      Lack of Transportation (Non-Medical):    Physical Activity:      Days of Exercise per Week:      Minutes of Exercise per Session:    Stress:      Feeling of Stress :    Social Connections:      Frequency of Communication with Friends and Family:      Frequency of Social Gatherings with Friends and Family:      Attends Church Services:      Active Member of Clubs or Organizations:      Attends Club or Organization Meetings:      Marital Status:    Intimate Partner Violence:      Fear of Current or Ex-Partner:      Emotionally Abused:      Physically Abused:       Sexually Abused:            Review of Systems   Constitutional: Negative for activity change, chills and fever.   HENT: Positive for postnasal drip. Negative for congestion, dental problem, ear pain, facial swelling, nosebleeds, rhinorrhea, sinus pressure and sneezing.    Eyes: Negative for redness and itching.   Respiratory: Negative for cough, chest tightness, shortness of breath and wheezing.    Cardiovascular: Negative for chest pain.   Gastrointestinal: Negative for diarrhea, nausea and vomiting.   Musculoskeletal: Positive for joint swelling. Negative for arthralgias and myalgias.   Skin: Negative for rash.   Neurological: Negative for headaches.   Hematological: Negative for adenopathy.   Psychiatric/Behavioral: Negative for behavioral problems and self-injury.           Current Outpatient Medications:      amLODIPine (NORVASC) 5 MG tablet, Take 1 tablet (5 mg) by mouth daily, Disp: 90 tablet, Rfl: 3     azelastine (ASTELIN) 0.1 % nasal spray, Spray 2 sprays into both nostrils 2 times daily as needed for rhinitis, Disp: 30 mL, Rfl: 3     cholecalciferol (VITAMIN D3) 5000 units TABS tablet, Take 2,500 mcg by mouth daily , Disp: , Rfl:      EPINEPHrine (ANY BX GENERIC EQUIV) 0.3 MG/0.3ML injection 2-pack, Inject 0.3 mLs (0.3 mg) into the muscle once as needed for anaphylaxis, Disp: 0.6 mL, Rfl: 0     estradiol (ESTRACE) 0.1 MG/GM vaginal cream, Place 2 g vaginally twice a week, Disp: 42.5 g, Rfl: 11     magnesium 100 MG TABS, Take 1 tablet by mouth daily Takes 3 tabs (300mg) daily, Disp: , Rfl:      meclizine (ANTIVERT) 25 MG tablet, Take 1 tablet (25 mg) by mouth 3 times daily as needed for dizziness, Disp: 21 tablet, Rfl: 0     warfarin ANTICOAGULANT (COUMADIN) 5 MG tablet, Take 7.5 mg Sat and 10 mg all other days or as directed by the coumadin clinic., Disp: 132 tablet, Rfl: 0     Zinc Sulfate (ZINC 15 PO), , Disp: , Rfl:   Immunization History   Administered Date(s) Administered     HEPA 02/18/1998,  "11/25/1998     LYMERIX 04/25/2000, 05/23/2000     Poliovirus, inactivated (IPV) 05/31/1990, 07/02/1990, 01/29/1991     TD (ADULT, 7+) 10/13/1995, 01/01/2003, 01/30/2003     TDAP Vaccine (Boostrix) 06/01/2014     Td (Adult), Adsorbed 10/13/1995, 01/30/2003     Allergies   Allergen Reactions     Keflex [Cephalexin] Anaphylaxis     No Clinical Screening - See Comments      Eye drop antibiotic.     Codeine Other (See Comments)     Comment: , Description:      Gentamicin Hives     Per Unique this medication gives hives on arms and legs. Bev Lo LSXO,  ....................  7/15/2014   1:15 PM     Influenza Vaccines Other (See Comments)     Comment: , Description:      Influenza Virus Vaccine H5n1      Paxil [Paroxetine] Other (See Comments)     Comment: , Description:      Typhoid Vaccine-Strain Ty21a Other (See Comments)     Comment: , Description:   Comment: , Description:      Typhoid Vaccines      OBJECTIVE:                                                                 BP (!) 183/85   Pulse 72   Ht 1.727 m (5' 8\")   Wt 61.2 kg (135 lb)   LMP  (LMP Unknown)   SpO2 95%   BMI 20.53 kg/m          Physical Exam  Vitals and nursing note reviewed.   Constitutional:       Appearance: Normal appearance.   HENT:      Head: Normocephalic and atraumatic.      Right Ear: External ear normal.      Left Ear: External ear normal.      Mouth/Throat:      Mouth: Mucous membranes are moist.      Pharynx: Oropharynx is clear.   Eyes:      General:         Right eye: No discharge.         Left eye: No discharge.   Cardiovascular:      Rate and Rhythm: Normal rate and regular rhythm.   Pulmonary:      Effort: Pulmonary effort is normal. No respiratory distress.      Breath sounds: Normal breath sounds.   Neurological:      Mental Status: She is alert and oriented to person, place, and time.   Psychiatric:         Mood and Affect: Mood normal.         Behavior: Behavior normal.           WORKUP:     ENVIRONMENTAL " PERCUTANEOUS SKIN TESTING: ADULT  [facility] [employer] 9/8/2021   Consent Y   Ordering Physician Dr. Osei   Interpreting Physician Dr. Osei   Testing Technician    Location Back   Time start:  2:15 PM   Time End:  2:30 PM   Positive Control: Histatrol*ALK 1 mg/ml 4/4   Negative Control: 50% Glycerin 0   Cat Hair*ALK (10,000 BAU/ml) 0   AP Dog Hair/Dander (1:100 w/v) 0   Dust Mite p. 30,000 AU/ml 0   Dust Mite f. (30,000 AU/ml) 0   Darren (W/F in millimeters) 0   Ji Grass (100,000 BAU/mL) 0   Red Cedar (W/F in millimeters) 0   Maple/[place] (W/F in millimeters) 0   Hackberry (W/F in millimeters) 0   [place] (W/F in millimeters) 0   [place] *ALK (W/F in millimeters) 0   American Elm (W/F in millimeters) 0   [place] (W/F in millimeters) 0   Black [place] (W/F in millimeters) 0   Birch Mix (W/F in millimeters) 0   Wenden (W/F in millimeters) 0   Oak (W/F in millimeters) 0   Cocklebur (W/F in millimeters) 0   Carolina (W/F in millimeters) 0   White [place] (W/F in millimeters) 0   Careless (W/F in millimeters) 0   Nettle (W/F in millimeters) 0   English Plantain (W/F in millimeters) 0   Kochia (W/F in millimeters) 0   Lamb's Quarter (W/F in millimeters) 0   Marshelder (W/F in millimeters) 0   Ragweed Mix* ALK (W/F in millimeters) 0   Russian Thistle (W/F in millimeters) 0   Sagebrush/Mugwort (W/F in millimeters) 0   Sheep Sorrel (W/F in millimeters) 0   Feather Mix* ALK (W/F in millimeters) 0   Penicillium Mix (1:10 w/v) 0   Curvularia spicifera (1:10 w/v) 0   Epicoccum (1:10 w/v) 0   Aspergillus fumigatus (1:10 w/v): 0   Alternaria tenius (1:10 w/v) 0   H. Cladosporium (1:10 w/v) 0   Phoma herbarum (1:10 w/v) 0      FOOD ALLERGEN PERCUTANEOUS SKIN TESTING  [facility] [employer]  9/8/2021   Consent Y   Ordering Physician Dr. Osei   Interpreting Physician Dr. Osei   Testing Technician    Location Back   Time start:  2:15 PM   Time End:  2:30 PM   Positive Control: Histatrol*ALK 1 mg/ml 4/4   Negative  Control: 50% Glycerin**[place] 0   Chicken 1:20 (W/F in millimeters) 4/4          My interpretation: Percutaneous skin puncture testing for aeroallergens performed today was negative with appropriate responses to positive and negative controls.  SPT for chicken was mildly positive.    ASSESSMENT/PLAN:    Adverse effect of drug, initial encounter  Unfortunately, there is no well-validated commercially available skin test for cephalosporins.  -I referred the patient to Dr. Horta, who specializes in drug allergies, to see if he could assist us with it.  In regards to influenza reaction, we can perform a skin test, and then administer the vaccine depending on the results.  If Dr. Horta sees her, she can also ask if she can be tested for that vaccine at the same time.  If not, we can do it in [place].    - Adult Dermatology Referral    Vasomotor rhinitis    Negative percutaneous skin puncture testing for aeroallergens suggesting a lack of sensitivity to tested environmental/seasonal allergens.  Unable to recommend avoidance measures or allergen immunotherapy.    -Use azelastine 2 sprays in each nostril twice a day when necessary.    - ALLERGY SKIN TESTS,ALLERGENS [68916]  - azelastine (ASTELIN) 0.1 % nasal spray  Dispense: 30 mL; Refill: 3    Other allergy, initial encounter    Skin test mildly positive.  Discussed possibility of false positive results.  -Ordered serum IgE for chicken.  Depending on the results, consider oral food challenge test in the office settings.  -Meanwhile, continue strict avoidance and carry epinephrine autoinjector all the time.  Anaphylaxis action plan was reviewed and provided.    - Allergen chicken IgE  - EPINEPHrine (ANY BX GENERIC EQUIV) 0.3 MG/0.3ML injection 2-pack  Dispense: 0.6 mL; Refill: 0  - IgE       Return in about 6 weeks (around 10/20/2021), or if symptoms worsen or fail to improve.    Thank you for allowing us to participate in the care of this patient. Please  feel free to contact us if there are any questions or concerns about the patient.    Disclaimer: This note consists of symbols derived from keyboarding, dictation and/or voice recognition software. As a result, there may be errors in the script that have gone undetected. Please consider this when interpreting information found in this chart.    Kev Osei MD, FAAAAI, FACAAI  Allergy, Asthma and Immunology     [facility]       Again, thank you for allowing me to participate in the care of your patient.        Sincerely,        Kev Osei MD

## 2021-09-09 LAB
CHICKEN MEAT IGE QN: <0.1 KU(A)/L
IGE SERPL-ACNC: 91 KU/L (ref 0–114)

## 2021-09-09 NOTE — RESULT ENCOUNTER NOTE
[employer] message sent:     Chicken IgE is negative.  Skin test for chicken was positive.  -I recommend to consider oral food challenge test in the office settings with chicken meat.

## 2021-09-13 ENCOUNTER — TELEPHONE (OUTPATIENT)
Dept: ALLERGY | Facility: CLINIC | Age: 81
End: 2021-09-13

## 2021-09-13 ENCOUNTER — LAB (OUTPATIENT)
Dept: LAB | Facility: CLINIC | Age: 81
End: 2021-09-13
Payer: COMMERCIAL

## 2021-09-13 ENCOUNTER — ANTICOAGULATION THERAPY VISIT (OUTPATIENT)
Dept: ANTICOAGULATION | Facility: CLINIC | Age: 81
End: 2021-09-13

## 2021-09-13 DIAGNOSIS — Z79.01 LONG TERM CURRENT USE OF ANTICOAGULANT THERAPY: ICD-10-CM

## 2021-09-13 DIAGNOSIS — I48.91 ATRIAL FIBRILLATION, UNSPECIFIED TYPE (H): ICD-10-CM

## 2021-09-13 DIAGNOSIS — I48.91 ATRIAL FIBRILLATION (H): ICD-10-CM

## 2021-09-13 DIAGNOSIS — I48.91 ATRIAL FIBRILLATION (H): Primary | ICD-10-CM

## 2021-09-13 LAB — INR BLD: 4.1 (ref 0.9–1.1)

## 2021-09-13 PROCEDURE — 85610 PROTHROMBIN TIME: CPT

## 2021-09-13 PROCEDURE — 36416 COLLJ CAPILLARY BLOOD SPEC: CPT

## 2021-09-13 NOTE — PROGRESS NOTES
ANTICOAGULATION MANAGEMENT     Unique GUZMAN Ward 81 year old female is on warfarin with supratherapeutic INR result. (Goal INR 2.0-3.0)    Recent labs: (last 7 days)     09/13/21  1257   INR 4.1*       ASSESSMENT     Source(s): Chart Review and Patient/Caregiver Call       Warfarin doses taken: Warfarin taken as instructed    Diet: Decreased greens/vitamin K in diet; plans to resume previous intake    New illness, injury, or hospitalization: No    Medication/supplement changes: None noted    Signs or symptoms of bleeding or clotting: No    Previous INR: Therapeutic last visit; previously outside of goal range    Additional findings: None     PLAN     Recommended plan for temporary change(s) affecting INR     Dosing Instructions: Hold dose then continue your current warfarin dose with next INR in 8 days       Summary  As of 9/13/2021    Full warfarin instructions:  9/13: Hold; Otherwise 7.5 mg every Sat; 10 mg all other days   Next INR check:  9/21/2021             Telephone call with Unique who verbalizes understanding and agrees to plan and who agrees to plan and repeated back plan correctly    Lab visit scheduled    Education provided: Please call back if any changes to your diet, medications or how you've been taking warfarin, Importance of consistent vitamin K intake, Impact of vitamin K foods on INR, Vitamin K content of foods, Goal range and significance of current result and Importance of therapeutic range    Plan made per [facility] anticoagulation protocol    Veronika Campbell RN  Anticoagulation Clinic  9/13/2021    _______________________________________________________________________     Anticoagulation Episode Summary     Current INR goal:  2.0-3.0   TTR:  46.9 % (1 y)   Target end date:  Indefinite   Send INR reminders to:  [facility]    Indications    Atrial fibrillation (H) [I48.91]  Long term current use of anticoagulant therapy [Z79.01]  Atrial fibrillation  unspecified type (H) [I48.91]            Comments:  5 mg tabs, PM dose, print out         Anticoagulation Care Providers     Provider Role Specialty Phone number    Tyler Lee MD Referring Family Medicine [phone]

## 2021-09-13 NOTE — TELEPHONE ENCOUNTER
[facility] Call Center    Phone Message    May a detailed message be left on voicemail: yes     Reason for Call: Appointment Intake    Referring Provider Name: Dr Kev Osei  Diagnosis and/or Symptoms: Adverse effect of drug, initial encounter, evaluation for keflex allergy. New referral needs review, thanks!    Action Taken: Message routed to:  [facility] Allergy    Travel Screening: Not Applicable

## 2021-09-20 ENCOUNTER — LAB (OUTPATIENT)
Dept: LAB | Facility: CLINIC | Age: 81
End: 2021-09-20
Payer: COMMERCIAL

## 2021-09-20 ENCOUNTER — TELEPHONE (OUTPATIENT)
Dept: ANTICOAGULATION | Facility: CLINIC | Age: 81
End: 2021-09-20

## 2021-09-20 ENCOUNTER — VIRTUAL VISIT (OUTPATIENT)
Dept: FAMILY MEDICINE | Facility: CLINIC | Age: 81
End: 2021-09-20
Payer: COMMERCIAL

## 2021-09-20 DIAGNOSIS — R82.90 CLOUDY URINE: ICD-10-CM

## 2021-09-20 DIAGNOSIS — I48.91 ATRIAL FIBRILLATION, UNSPECIFIED TYPE (H): ICD-10-CM

## 2021-09-20 DIAGNOSIS — N30.01 ACUTE CYSTITIS WITH HEMATURIA: Primary | ICD-10-CM

## 2021-09-20 DIAGNOSIS — Z79.01 LONG TERM CURRENT USE OF ANTICOAGULANT THERAPY: ICD-10-CM

## 2021-09-20 DIAGNOSIS — Z87.440 PERSONAL HISTORY OF URINARY TRACT INFECTION: ICD-10-CM

## 2021-09-20 LAB
ALBUMIN UR-MCNC: 30 MG/DL
APPEARANCE UR: ABNORMAL
BACTERIA #/AREA URNS HPF: ABNORMAL /HPF
BILIRUB UR QL STRIP: NEGATIVE
COLOR UR AUTO: ABNORMAL
GLUCOSE UR STRIP-MCNC: NEGATIVE MG/DL
HGB UR QL STRIP: ABNORMAL
KETONES UR STRIP-MCNC: 5 MG/DL
LEUKOCYTE ESTERASE UR QL STRIP: ABNORMAL
MUCOUS THREADS #/AREA URNS LPF: PRESENT /LPF
NITRATE UR QL: POSITIVE
PH UR STRIP: 5 [PH] (ref 5–7)
RBC URINE: 4 /HPF
SP GR UR STRIP: 1.02 (ref 1–1.03)
UROBILINOGEN UR STRIP-MCNC: NORMAL MG/DL
WBC CLUMPS #/AREA URNS HPF: PRESENT /HPF
WBC URINE: >182 /HPF

## 2021-09-20 PROCEDURE — 99213 OFFICE O/P EST LOW 20 MIN: CPT | Mod: 95 | Performed by: FAMILY MEDICINE

## 2021-09-20 PROCEDURE — 87088 URINE BACTERIA CULTURE: CPT

## 2021-09-20 PROCEDURE — 87186 SC STD MICRODIL/AGAR DIL: CPT

## 2021-09-20 PROCEDURE — 87086 URINE CULTURE/COLONY COUNT: CPT

## 2021-09-20 PROCEDURE — 81001 URINALYSIS AUTO W/SCOPE: CPT

## 2021-09-20 RX ORDER — NITROFURANTOIN 25; 75 MG/1; MG/1
100 CAPSULE ORAL 2 TIMES DAILY
Qty: 14 CAPSULE | Refills: 0 | Status: SHIPPED | OUTPATIENT
Start: 2021-09-20 | End: 2021-09-27

## 2021-09-20 RX ORDER — CIPROFLOXACIN 250 MG/1
250 TABLET, FILM COATED ORAL 2 TIMES DAILY
Status: CANCELLED | OUTPATIENT
Start: 2021-09-20

## 2021-09-20 NOTE — PROGRESS NOTES
Aaliyah is a 81 year old who is being evaluated via a billable telephone visit.      Telephone visit performed in lieu of an in-person visit due to current concerns regarding social distancing and keeping people safe.  Physician and patient agreed this was appropriate for concerns addressed.     What phone number would you like to be contacted at? [phone]  How would you like to obtain your AVS? MyChart    Assessment & Plan       ICD-10-CM    1. Acute cystitis with hematuria  N30.01 nitroFURantoin macrocrystal-monohydrate (MACROBID) 100 MG capsule   2. Cloudy urine  R82.90 UA Macro with Reflex to Micro and Culture - lab collect   3. Atrial fibrillation, unspecified type (H)  I48.91    4. Personal history of urinary tract infection  Z87.440    5. Long term current use of anticoagulant therapy  Z79.01       See urine results in Epic, abnormal UA.   See orders for Macrobid for UTI. She has used this well in the past and last UA showed sensitivity to this.   She is on Coumadin, so need to monitor for interaction with INR, although slightly lower risk with Macrobid. She also has a history of borderline renal function in April, although normal prior to that.   Will await urine culture results and may need to adjust therapy based on results.     18 minutes spent on the date of the encounter doing chart review, history and exam, documentation and further activities per the note      No follow-ups on file.    Jeri Ferris MD  [facility]   Aaliyah is a 81 year old who presents for the following health issues     HPI     Genitourinary - Female  Onset/Duration: x7 days  Description:   Painful urination (Dysuria): no           Frequency: no  Blood in urine (Hematuria): no but it is cloudy  Delay in urine (Hesitency): no  Intensity: mild  Progression of Symptoms:  same  Accompanying Signs & Symptoms:  Fever/chills: no  Flank pain: no  Nausea and vomiting: no  Vaginal symptoms:  none  Abdominal/Pelvic Pain: no  History:   History of frequent UTI s: YES  History of kidney stones: no  Sexually Active: no  Possibility of pregnancy: No  Precipitating or alleviating factors: increased fluids  Therapies tried and outcome: Increase fluid intake    Has a history of UTIs. Last one was August, also July. Wondering if it just didn't clear up. No back pain or fever, but some burning if not drinking enough water. Trying to stay well hydrated.     She is also on Coumadin for a.fib, follows with coumadin clinic. Last INR 1 week ago was slightly high.     Review of Systems   Constitutional, HEENT, cardiovascular, pulmonary, gi and gu systems are negative, except as otherwise noted.      Objective    Vitals - Patient Reported  Weight (Patient Reported): 59 kg (130 lb)  Pain Score: No Pain (0)        Physical Exam   healthy, alert and no distress  PSYCH: Alert and oriented times 3; coherent speech, normal   rate and volume, able to articulate logical thoughts, able   to abstract reason, no tangential thoughts, no hallucinations   or delusions  Her affect is normal and pleasant  RESP: No cough, no audible wheezing, able to talk in full sentences  Remainder of exam unable to be completed due to telephone visits    Orders Placed This Encounter   Procedures     UA Macro with Reflex to Micro and Culture - lab collect            Phone call duration: 3 minutes

## 2021-09-20 NOTE — NURSING NOTE
Health Maintenance Due   Topic Date Due     ANNUAL REVIEW OF HM ORDERS  Never done     COVID-19 Vaccine (1) Never done     ZOSTER IMMUNIZATION (1 of 2) Never done     Pneumococcal Vaccine: 65+ Years (1 of 1 - PPSV23) Never done     INFLUENZA VACCINE (1) 09/01/2021     MEDICARE ANNUAL WELLNESS VISIT  09/24/2021     Vitals were not completed due to virtual appointment.    Sailaja Huertas, [facility]

## 2021-09-20 NOTE — RESULT ENCOUNTER NOTE
Aaliyah, you do have a bladder infection and I sent over the same antibiotics you had in August, because the culture at that time showed it was the right medication and it's the safest thing you can take for your kidneys and your blood thinner. Hopefully this will work well. Please drink plenty of water each day, try for 50 ounces or more.   Jeri Ferris MD

## 2021-09-20 NOTE — TELEPHONE ENCOUNTER
ANTICOAGULATION  MANAGEMENT     Interacting Medication Review    Interacting medication(s): Macrobid with warfarin.    Duration: 7 days (9/20 to 9/27)    Indication: UTI    New medication?: Yes, interaction may increase INR and risk of bleeding       PLAN     Continue current warfarin dose. Recommend to check INR on 9/21/21 as previously planned    Patient was not contacted    No adjustment to Anticoagulation Calendar was required    Veronika Campbell RN

## 2021-09-21 ENCOUNTER — ANTICOAGULATION THERAPY VISIT (OUTPATIENT)
Dept: ANTICOAGULATION | Facility: CLINIC | Age: 81
End: 2021-09-21

## 2021-09-21 ENCOUNTER — LAB (OUTPATIENT)
Dept: LAB | Facility: CLINIC | Age: 81
End: 2021-09-21
Payer: COMMERCIAL

## 2021-09-21 DIAGNOSIS — Z79.01 LONG TERM CURRENT USE OF ANTICOAGULANT THERAPY: ICD-10-CM

## 2021-09-21 DIAGNOSIS — I48.91 ATRIAL FIBRILLATION (H): ICD-10-CM

## 2021-09-21 DIAGNOSIS — I48.91 ATRIAL FIBRILLATION, UNSPECIFIED TYPE (H): ICD-10-CM

## 2021-09-21 DIAGNOSIS — I48.91 ATRIAL FIBRILLATION (H): Primary | ICD-10-CM

## 2021-09-21 LAB — INR BLD: 2.7 (ref 0.9–1.1)

## 2021-09-21 PROCEDURE — 36416 COLLJ CAPILLARY BLOOD SPEC: CPT

## 2021-09-21 PROCEDURE — 85610 PROTHROMBIN TIME: CPT

## 2021-09-21 NOTE — PROGRESS NOTES
ANTICOAGULATION MANAGEMENT     Unique Ward 81 year old female is on warfarin with therapeutic INR result. (Goal INR 2.0-3.0)    Recent labs: (last 7 days)     09/21/21  1201   INR 2.7*       ASSESSMENT     Source(s): Chart Review       Warfarin doses taken: Warfarin taken as instructed    Diet: No new diet changes identified    New illness, injury, or hospitalization: Yes: being treated for cystitis    Medication/supplement changes: Macrobid 7 day course (dates: 9/20-9/27) No interaction anticipated    Signs or symptoms of bleeding or clotting: No    Previous INR: Supratherapeutic    Additional findings: Recommended to have assigned days of the week for greens intake     PLAN     Recommended plan for no diet, medication or health factor changes affecting INR     Dosing Instructions: Continue your current warfarin dose with next INR in 2 weeks       Summary  As of 9/21/2021    Full warfarin instructions:  7.5 mg every Sat; 10 mg all other days   Next INR check:  10/5/2021             Telephone call with Unique who verbalizes understanding and agrees to plan    Patient offered & declined to schedule next visit    Education provided: Please call back if any changes to your diet, medications or how you've been taking warfarin, Importance of consistent vitamin K intake, Impact of vitamin K foods on INR and Vitamin K content of foods    Plan made per [facility] anticoagulation protocol    Bev Mancilla, RN  Anticoagulation Clinic  9/21/2021    _______________________________________________________________________     Anticoagulation Episode Summary     Current INR goal:  2.0-3.0   TTR:  45.2 % (1 y)   Target end date:  Indefinite   Send INR reminders to:  [facility]    Indications    Atrial fibrillation (H) [I48.91]  Long term current use of anticoagulant therapy [Z79.01]  Atrial fibrillation  unspecified type (H) [I48.91]           Comments:  5 mg tabs, PM dose, print out         Anticoagulation Care Providers      Provider Role Specialty Phone number    Tyler Lee MD Referring Family Medicine [phone]

## 2021-09-22 ENCOUNTER — TELEPHONE (OUTPATIENT)
Dept: DERMATOLOGY | Facility: CLINIC | Age: 81
End: 2021-09-22

## 2021-09-22 LAB — BACTERIA UR CULT: ABNORMAL

## 2021-09-23 ENCOUNTER — ANTICOAGULATION THERAPY VISIT (OUTPATIENT)
Dept: ANTICOAGULATION | Facility: CLINIC | Age: 81
End: 2021-09-23

## 2021-09-23 DIAGNOSIS — I48.91 ATRIAL FIBRILLATION (H): Primary | ICD-10-CM

## 2021-09-23 DIAGNOSIS — N30.01 ACUTE CYSTITIS WITH HEMATURIA: ICD-10-CM

## 2021-09-23 DIAGNOSIS — Z79.01 LONG TERM CURRENT USE OF ANTICOAGULANT THERAPY: ICD-10-CM

## 2021-09-23 DIAGNOSIS — I48.91 ATRIAL FIBRILLATION, UNSPECIFIED TYPE (H): ICD-10-CM

## 2021-09-23 RX ORDER — SULFAMETHOXAZOLE/TRIMETHOPRIM 800-160 MG
1 TABLET ORAL 2 TIMES DAILY
Qty: 14 TABLET | Refills: 0 | Status: SHIPPED | OUTPATIENT
Start: 2021-09-23 | End: 2021-09-30

## 2021-09-23 NOTE — RESULT ENCOUNTER NOTE
Coumadin clinic: I'd like her to start Bactrim for her UTI, and I recommend she drop her Coumadin dose down by about 25% and monitor her INR closely, every other day while she's on the Bactrim to avoid supratherapeutic INR and bleeding risk.   Can you please call her and address dosing change and set up monitoring appointment.  MD Pratima Marshe, your infection is only mildly sensitive to the antibiotics I gave you. This means it may not clear up completely. However, the other antibiotics that are going to work may cause trouble for you because of your allergies and your Coumadin. We can use Bactrim, but it does cause your Coumadin dose to go high, so we'll have to monitor you very closely while you're on this antibiotics and have your INR checked to make sure you don't bleed. I'm going to put you on this new antibiotics and notify the coumadin clinic. I would like you to drop your dose of Coumadin down starting tomorrow as well, so that your INR doesn't go too high. I'm going to ask the Coumadin nurse to call you and discuss dosing.   Jeri Ferris MD

## 2021-09-23 NOTE — PROGRESS NOTES
Anticoagulation Management    Spoke with Aaliyah per providers request and advised of lower dose of warfarin today.    Lab result note from 9/22/21:Coumadin clinic: I'd like her to start Bactrim for her UTI, and I recommend she drop her Coumadin dose down by about 25% and monitor her INR closely, every other day while she's on the Bactrim to avoid supratherapeutic INR and bleeding risk.   Can you please call her and address dosing change and set up monitoring appointment.  Jeri Ferris MD     Consulted with Pharm D., Deonna EATON and advised to take 7.5 mg tonight and recheck tomorrow (9/24/21).  Anticoag calendar updated to reflect ~25% dose reduction due to Bactrim interaction.    Routing to provider for review, pharmacy pended as Bactrim has not been sent in yet.    Bev Mancilla RN

## 2021-09-24 ENCOUNTER — LAB (OUTPATIENT)
Dept: LAB | Facility: CLINIC | Age: 81
End: 2021-09-24
Payer: COMMERCIAL

## 2021-09-24 ENCOUNTER — ANTICOAGULATION THERAPY VISIT (OUTPATIENT)
Dept: ANTICOAGULATION | Facility: CLINIC | Age: 81
End: 2021-09-24

## 2021-09-24 DIAGNOSIS — I48.91 ATRIAL FIBRILLATION (H): ICD-10-CM

## 2021-09-24 DIAGNOSIS — I48.91 ATRIAL FIBRILLATION, UNSPECIFIED TYPE (H): ICD-10-CM

## 2021-09-24 DIAGNOSIS — Z79.01 LONG TERM CURRENT USE OF ANTICOAGULANT THERAPY: ICD-10-CM

## 2021-09-24 DIAGNOSIS — I48.91 ATRIAL FIBRILLATION (H): Primary | ICD-10-CM

## 2021-09-24 LAB — INR BLD: 3.7 (ref 0.9–1.1)

## 2021-09-24 PROCEDURE — 85610 PROTHROMBIN TIME: CPT

## 2021-09-24 PROCEDURE — 36416 COLLJ CAPILLARY BLOOD SPEC: CPT

## 2021-09-24 NOTE — PROGRESS NOTES
Chart reviewed with [facility] RN at time of encounter.  On Sulfa/trimethoprim (Bactrim) with known drug interaction.    Patient is [id number] = 4 (HTN, Age++, Female) and no HX TIA/CVA, low risk with short term therapy interruption    Advise more aggressive warfarin dose decrease over weekend to avoid INR moving further out of range supra therapeutically:  Hold warfarin today, 2.5mg Saturday, 5mg Sunday    Recheck INR Monday.    Deonna Banks, PharmD BCACP  Anticoagulation Clinical Pharmacist

## 2021-09-24 NOTE — PROGRESS NOTES
ANTICOAGULATION MANAGEMENT     Unique Ward 81 year old female is on warfarin with supratherapeutic INR result. (Goal INR 2.0-3.0)    Recent labs: (last 7 days)     09/24/21  1152   INR 3.7*       ASSESSMENT     Source(s): Chart Review and Patient/Caregiver Call       Warfarin doses taken: Warfarin taken as instructed    Diet: No new diet changes identified    New illness, injury, or hospitalization: No    Medication/supplement changes: Bactrim 7 day course (dates: 9/23/21-9/30/21) which has potential for interaction; increasing INR    Signs or symptoms of bleeding or clotting: No    Previous INR: Supratherapeutic    Additional findings: None     PLAN     Recommended plan for temporary change(s) affecting INR     Dosing Instructions: Hold today, 2.5 mg Saturday, 5 mg Sunday with next INR in 3 days       Summary  As of 9/24/2021    Full warfarin instructions:  9/24: Hold; 9/25: 2.5 mg; 9/26: 5 mg; Otherwise 5 mg every Sat; 7.5 mg all other days   Next INR check:  9/27/2021             Telephone call with Unique who agrees to plan and repeated back plan correctly    Lab visit scheduled    Education provided: Goal range and significance of current result, Importance of therapeutic range, Importance of following up at instructed interval, Importance of taking warfarin as instructed, Monitoring for bleeding signs and symptoms and When to seek medical attention/emergency care    Plan made with [facility] Pharmacist Deonna Mancilla, RN  Anticoagulation Clinic  9/24/2021    _______________________________________________________________________     Anticoagulation Episode Summary     Current INR goal:  2.0-3.0   TTR:  44.6 % (1 y)   Target end date:  Indefinite   Send INR reminders to:  [facility]    Indications    Atrial fibrillation (H) [I48.91]  Long term current use of anticoagulant therapy [Z79.01]  Atrial fibrillation  unspecified type (H) [I48.91]           Comments:  5 mg tabs, PM dose,  print out         Anticoagulation Care Providers     Provider Role Specialty Phone number    Tyler Lee MD Referring Family Medicine [phone]

## 2021-09-27 ENCOUNTER — ANTICOAGULATION THERAPY VISIT (OUTPATIENT)
Dept: ANTICOAGULATION | Facility: CLINIC | Age: 81
End: 2021-09-27

## 2021-09-27 ENCOUNTER — LAB (OUTPATIENT)
Dept: LAB | Facility: CLINIC | Age: 81
End: 2021-09-27
Payer: COMMERCIAL

## 2021-09-27 DIAGNOSIS — I48.91 ATRIAL FIBRILLATION (H): Primary | ICD-10-CM

## 2021-09-27 DIAGNOSIS — I48.91 ATRIAL FIBRILLATION (H): ICD-10-CM

## 2021-09-27 DIAGNOSIS — I48.91 ATRIAL FIBRILLATION, UNSPECIFIED TYPE (H): ICD-10-CM

## 2021-09-27 DIAGNOSIS — Z79.01 LONG TERM CURRENT USE OF ANTICOAGULANT THERAPY: ICD-10-CM

## 2021-09-27 LAB — INR BLD: 1.8 (ref 0.9–1.1)

## 2021-09-27 PROCEDURE — 85610 PROTHROMBIN TIME: CPT

## 2021-09-27 PROCEDURE — 36416 COLLJ CAPILLARY BLOOD SPEC: CPT

## 2021-09-27 NOTE — PROGRESS NOTES
I have attempted to contact this patient regarding their INR of 1.8. I've left a message to return a call to the coumadin clinic, and will try again later.    [place] RN's can be reached for return calls at [phone] (internal staff only). Please do not give this number out to patients or cold transfer. Thank you!

## 2021-09-27 NOTE — PROGRESS NOTES
ANTICOAGULATION MANAGEMENT     Unique Ward 81 year old female is on warfarin with subtherapeutic INR result. (Goal INR 2.0-3.0)    Recent labs: (last 7 days)     09/27/21  1227   INR 1.8*       ASSESSMENT     Source(s): Chart Review and Patient/Caregiver Call       Warfarin doses taken: Warfarin taken as instructed    Diet: No new diet changes identified    New illness, injury, or hospitalization: No    Medication/supplement changes: Bactrim thru Thursday which can raise INR    Signs or symptoms of bleeding or clotting: No    Previous INR: Supratherapeutic    Additional findings: None     PLAN     Recommended plan for temporary change(s) affecting INR     Dosing Instructions: Take 7.5 mg daily with next INR in 3 days       Summary  As of 9/27/2021    Full warfarin instructions:  10 mg every Fri; 7.5 mg all other days   Next INR check:  9/30/2021             Telephone call with Unique who verbalizes understanding and agrees to plan    Lab visit scheduled    Education provided: Monitoring for bleeding signs and symptoms and Monitoring for clotting signs and symptoms    Plan made per [facility] anticoagulation protocol    Oxana Smith RN  Anticoagulation Clinic  9/27/2021    _______________________________________________________________________     Anticoagulation Episode Summary     Current INR goal:  2.0-3.0   TTR:  44.2 % (1 y)   Target end date:  Indefinite   Send INR reminders to:  [facility]    Indications    Atrial fibrillation (H) [I48.91]  Long term current use of anticoagulant therapy [Z79.01]  Atrial fibrillation  unspecified type (H) [I48.91]           Comments:  5 mg tabs, PM dose         Anticoagulation Care Providers     Provider Role Specialty Phone number    Tyler Lee MD Referring Family Medicine [phone]

## 2021-09-28 ENCOUNTER — MYC MEDICAL ADVICE (OUTPATIENT)
Dept: FAMILY MEDICINE | Facility: CLINIC | Age: 81
End: 2021-09-28

## 2021-09-30 ENCOUNTER — LAB (OUTPATIENT)
Dept: LAB | Facility: CLINIC | Age: 81
End: 2021-09-30
Payer: COMMERCIAL

## 2021-09-30 ENCOUNTER — TELEPHONE (OUTPATIENT)
Dept: FAMILY MEDICINE | Facility: CLINIC | Age: 81
End: 2021-09-30

## 2021-09-30 ENCOUNTER — ANTICOAGULATION THERAPY VISIT (OUTPATIENT)
Dept: ANTICOAGULATION | Facility: CLINIC | Age: 81
End: 2021-09-30

## 2021-09-30 DIAGNOSIS — I48.91 ATRIAL FIBRILLATION, UNSPECIFIED TYPE (H): ICD-10-CM

## 2021-09-30 DIAGNOSIS — I48.91 ATRIAL FIBRILLATION (H): Primary | ICD-10-CM

## 2021-09-30 DIAGNOSIS — Z79.01 LONG TERM CURRENT USE OF ANTICOAGULANT THERAPY: ICD-10-CM

## 2021-09-30 DIAGNOSIS — I48.91 ATRIAL FIBRILLATION (H): ICD-10-CM

## 2021-09-30 LAB — INR BLD: 2.4 (ref 0.9–1.1)

## 2021-09-30 PROCEDURE — 85610 PROTHROMBIN TIME: CPT

## 2021-09-30 PROCEDURE — 36416 COLLJ CAPILLARY BLOOD SPEC: CPT

## 2021-09-30 NOTE — PROGRESS NOTES
ANTICOAGULATION MANAGEMENT     Unique THOMAS Ed 81 year old female is on warfarin with therapeutic INR result. (Goal INR 2.0-3.0)    Recent labs: (last 7 days)     09/30/21  1109   INR 2.4*       ASSESSMENT     Source(s): Chart Review and Patient/Caregiver Call       Warfarin doses taken: Warfarin taken as instructed    Diet: No new diet changes identified    New illness, injury, or hospitalization: No    Medication/supplement changes: Bactrim last dose today which has potential for interaction; increasing INR    Signs or symptoms of bleeding or clotting: No    Previous INR: Subtherapeutic    Additional findings: None     PLAN     Recommended plan for temporary change(s) affecting INR     Dosing Instructions:  Increase your warfarin dose (4.5% change) with next INR in 4 days       Summary  As of 9/30/2021    Full warfarin instructions:  10/3: 10 mg; Otherwise 10 mg every Fri; 7.5 mg all other days   Next INR check:  10/4/2021             Telephone call with Unique who verbalizes understanding and agrees to plan and who agrees to plan and repeated back plan correctly    Lab visit scheduled    Education provided: Please call back if any changes to your diet, medications or how you've been taking warfarin, Goal range and significance of current result and Importance of therapeutic range    Plan made per [facility] anticoagulation protocol    Veronika Campbell, RN  Anticoagulation Clinic  9/30/2021    _______________________________________________________________________     Anticoagulation Episode Summary     Current INR goal:  2.0-3.0   TTR:  44.0 % (1 y)   Target end date:  Indefinite   [facility]    Indications    Atrial fibrillation (H) [I48.91]  Long term current use of anticoagulant therapy [Z79.01]  Atrial fibrillation  unspecified type (H) [I48.91]           Comments:  5 mg tabs, PM dose         Anticoagulation Care Providers     Provider Role Specialty Phone number    Tyler Lee  MD [facility]op[facility] [phone]

## 2021-10-04 ENCOUNTER — ANTICOAGULATION THERAPY VISIT (OUTPATIENT)
Dept: ANTICOAGULATION | Facility: CLINIC | Age: 81
End: 2021-10-04

## 2021-10-04 ENCOUNTER — LAB (OUTPATIENT)
Dept: LAB | Facility: CLINIC | Age: 81
End: 2021-10-04
Payer: COMMERCIAL

## 2021-10-04 DIAGNOSIS — I48.91 ATRIAL FIBRILLATION, UNSPECIFIED TYPE (H): ICD-10-CM

## 2021-10-04 DIAGNOSIS — I48.91 ATRIAL FIBRILLATION (H): Primary | ICD-10-CM

## 2021-10-04 DIAGNOSIS — I48.91 ATRIAL FIBRILLATION (H): ICD-10-CM

## 2021-10-04 DIAGNOSIS — Z79.01 LONG TERM CURRENT USE OF ANTICOAGULANT THERAPY: ICD-10-CM

## 2021-10-04 LAB — INR BLD: 2.4 (ref 0.9–1.1)

## 2021-10-04 PROCEDURE — 36416 COLLJ CAPILLARY BLOOD SPEC: CPT

## 2021-10-04 PROCEDURE — 85610 PROTHROMBIN TIME: CPT

## 2021-10-04 NOTE — PROGRESS NOTES
ANTICOAGULATION MANAGEMENT     Unique Ward 81 year old female is on warfarin with therapeutic INR result. (Goal INR 2.0-3.0)    Recent labs: (last 7 days)     10/04/21  0903   INR 2.4*       ASSESSMENT     Source(s): Chart Review and Patient/Caregiver Call       Warfarin doses taken: Warfarin taken as instructed    Diet: No new diet changes identified    New illness, injury, or hospitalization: No    Medication/supplement changes: None noted    Signs or symptoms of bleeding or clotting: No    Previous INR: Therapeutic last visit; previously outside of goal range    Additional findings: None     PLAN     Recommended plan for no diet, medication or health factor changes affecting INR     Dosing Instructions:  Increase your warfarin dose (4.3% change) with next INR in 4 days       Summary  As of 10/4/2021    Full warfarin instructions:  10 mg every Mon, Wed, Fri; 7.5 mg all other days   Next INR check:  10/8/2021             Telephone call with Unique who verbalizes understanding and agrees to plan and who agrees to plan and repeated back plan correctly    Patient offered & declined to schedule next visit    Education provided: Please call back if any changes to your diet, medications or how you've been taking warfarin, Goal range and significance of current result and Importance of therapeutic range    Plan made per [facility] anticoagulation protocol    Veronika Campbell, RN  Anticoagulation Clinic  10/4/2021    _______________________________________________________________________     Anticoagulation Episode Summary     Current INR goal:  2.0-3.0   TTR:  43.9 % (1 y)   Target end date:  Indefinite   Send INR reminders to:  [facility] [place]    Indications    Atrial fibrillation (H) [I48.91]  Long term current use of anticoagulant therapy [Z79.01]  Atrial fibrillation  unspecified type (H) [I48.91]           Comments:  5 mg tabs, PM dose         Anticoagulation Care Providers     Provider Role Specialty Phone number     Tyler Lee MD [facility] [phone]

## 2021-10-08 ENCOUNTER — ANTICOAGULATION THERAPY VISIT (OUTPATIENT)
Dept: ANTICOAGULATION | Facility: CLINIC | Age: 81
End: 2021-10-08

## 2021-10-08 ENCOUNTER — LAB (OUTPATIENT)
Dept: LAB | Facility: CLINIC | Age: 81
End: 2021-10-08
Payer: COMMERCIAL

## 2021-10-08 DIAGNOSIS — Z79.01 LONG TERM CURRENT USE OF ANTICOAGULANT THERAPY: ICD-10-CM

## 2021-10-08 DIAGNOSIS — I48.91 ATRIAL FIBRILLATION (H): ICD-10-CM

## 2021-10-08 DIAGNOSIS — I48.91 ATRIAL FIBRILLATION (H): Primary | ICD-10-CM

## 2021-10-08 DIAGNOSIS — I48.91 ATRIAL FIBRILLATION, UNSPECIFIED TYPE (H): ICD-10-CM

## 2021-10-08 LAB — INR BLD: 2.7 (ref 0.9–1.1)

## 2021-10-08 PROCEDURE — 36415 COLL VENOUS BLD VENIPUNCTURE: CPT

## 2021-10-08 PROCEDURE — 85610 PROTHROMBIN TIME: CPT

## 2021-10-08 NOTE — PROGRESS NOTES
Anticoagulation Management    Unable to reach Aaliyah today.    Today's INR result of 2.7 is therapeutic (goal INR of 2.0-3.0).  Result received from: Clinic Lab    Follow up required to confirm warfarin dose taken and assess for changes    No instructions provided. Unable to leave voicemail.     [facility] RN's can be reached for return calls at [phone] (internal staff only). Please do not give this number out to patients or cold transfer. Thank you!         Anticoagulation clinic to follow up    Veronika Campbell RN

## 2021-10-08 NOTE — PROGRESS NOTES
ANTICOAGULATION MANAGEMENT     Unique Ward 81 year old female is on warfarin with therapeutic INR result. (Goal INR 2.0-3.0)    Recent labs: (last 7 days)     10/08/21  1123   INR 2.7*       ASSESSMENT     Source(s): Chart Review and Patient/Caregiver Call       Warfarin doses taken: Warfarin taken as instructed    Diet: No new diet changes identified    New illness, injury, or hospitalization: No    Medication/supplement changes: None noted    Signs or symptoms of bleeding or clotting: No    Previous INR: Therapeutic last 2(+) visits    Additional findings: None     PLAN     Recommended plan for no diet, medication or health factor changes affecting INR     Dosing Instructions: Continue your current warfarin dose with next INR in 10 days       Summary  As of 10/8/2021    Full warfarin instructions:  10 mg every Mon, Wed, Fri; 7.5 mg all other days   Next INR check:  10/18/2021             Telephone call with Unique who verbalizes understanding and agrees to plan and who agrees to plan and repeated back plan correctly    Patient offered & declined to schedule next visit    Education provided: Please call back if any changes to your diet, medications or how you've been taking warfarin    Plan made per [facility] anticoagulation protocol    Veronika Campbell, RN  Anticoagulation Clinic  10/8/2021    _______________________________________________________________________     Anticoagulation Episode Summary     Current INR goal:  2.0-3.0   TTR:  44.0 % (1 y)   Target end date:  Indefinite   [facility]    Indications    Atrial fibrillation (H) [I48.91]  Long term current use of anticoagulant therapy [Z79.01]  Atrial fibrillation  unspecified type (H) [I48.91]           Comments:  5 mg tabs, PM dose         Anticoagulation Care Providers     Provider Role Specialty Phone number    Tyler Lee MD Referring Family Medicine [phone]

## 2021-10-09 ENCOUNTER — HEALTH MAINTENANCE LETTER (OUTPATIENT)
Age: 81
End: 2021-10-09

## 2021-10-12 NOTE — PROGRESS NOTES
CARDIOLOGY CLINIC VISIT  DATE OF SERVICE:  October 13, 2021    PRIMARY CARE PHYSICIAN:  Tyler Lee    HISTORY OF PRESENT ILLNESS:  Ms. Unique Ward is a very pleasant 80 year old patient with PMH significant for paroxysmal atrial fibrillation.  She was last seen by me virtually in 9/2020 and presents today for follow up.     In brief review, she presented to the ED at [facility] on 4/14/19 with palpitations. She was found to the in atrial fibrillation with RVR and spontaneously converted to normal sinus rhythm with occasional PVCs. An echocardiogram showed normal LV function with moderate tricuspid regurgitation. Given [id number] score of 4. Initially, Xarelto was prescribed, but was cost prohibitive and she was then started on Warfarin.  In March 2020, she was admitted to the hospital with complete heart block.  She underwent permanent pacemaker implantation at that time.  She has had regular follow up with device clinic.  A follow up echocardiogram performed last week demonstrated normal LV function, moderate to moderate-severe TR, mild RV dilatation with normal RV function.    In follow up today, Aaliyah reports feeling well overall.  She denies any exertional chest pain, chest discomfort or shortness of breath.  She denies any heart palpitations.  She is without cardiovascular complaints.                 PAST MEDICAL HISTORY:  Past Medical History:   Diagnosis Date     Arthritis 01.01.1980     Atrial fibrillation (H) 6/7/2019     Cancer (H) 01.01.1993     scalp    Basal Cell X2  head      nose bridge 1996     Depressive disorder 01.01.1977    anxiety     Hypertension 03.16.2016       MEDICATIONS:  Current Outpatient Medications   Medication     amLODIPine (NORVASC) 5 MG tablet     azelastine (ASTELIN) 0.1 % nasal spray     cholecalciferol (VITAMIN D3) 5000 units TABS tablet     EPINEPHrine (ANY BX GENERIC EQUIV) 0.3 MG/0.3ML injection 2-pack     estradiol (ESTRACE) 0.1 MG/GM vaginal cream      magnesium 100 MG TABS     meclizine (ANTIVERT) 25 MG tablet     warfarin ANTICOAGULANT (COUMADIN) 5 MG tablet     Zinc Sulfate (ZINC 15 PO)     No current facility-administered medications for this visit.       ALLERGIES:  Allergies   Allergen Reactions     Keflex [Cephalexin] Anaphylaxis     No Clinical Screening - See Comments      Eye drop antibiotic.     Codeine Other (See Comments)     Comment: , Description:      Gentamicin Hives     Per Unique this medication gives hives on arms and legs. Bev Lo LSXO,  ....................  7/15/2014   1:15 PM     Influenza Vaccines Other (See Comments)     Comment: , Description:      Influenza Virus Vaccine H5n1      Paxil [Paroxetine] Other (See Comments)     Comment: , Description:      Typhoid Vaccine-Strain Ty21a Other (See Comments)     Comment: , Description:   Comment: , Description:      Typhoid Vaccines        SOCIAL HISTORY:  I have reviewed this patient's social history and updated it with pertinent information if needed. Unique Ward  reports that she quit smoking about 56 years ago. Her smoking use included cigarettes. She started smoking about 66 years ago. She has a 7.50 pack-year smoking history. She has never used smokeless tobacco. She reports that she does not drink alcohol and does not use drugs.    FAMILY HISTORY:  I have reviewed this patient's family history and updated it with pertinent information if needed.   Family History   Problem Relation Age of Onset     Diabetes Maternal Grandmother         Na     Coronary Artery Disease Sister         error     Coronary Artery Disease Brother         error     Hypertension Brother      Hyperlipidemia Brother      Cerebrovascular Disease Brother      Hypertension Sister      Hypertension Sister      Hyperlipidemia Sister      Other Cancer Sister         cervical     Breast Cancer Sister      Other Cancer Sister         kidney     Mental Illness Mother         paranoid/schizophrenic/suicide      Mental Illness Sister         ???  Had shock treatments     Hyperlipidemia Sister      Other Cancer Sister         Kidney       REVIEW OF SYSTEMS:  A complete ROS was obtained and the pertinent positives are outlined in the history of present illness above.  The remainder of systems is negative.    PHYSICAL EXAM:                     Vital Signs with Ranges     0 lbs 0 oz    Constitutional: awake, alert, no distress  Eyes: PERRL, sclera nonicteric  ENT: trachea midline  Respiratory: CTAB  Cardiovascular: RRR no m/r/g, no JVD  GI: nondistended, nontender, bowel sounds present  Lymph/Hematologic: no lymphadenopathy  Skin: dry, no rash  Musculoskeletal: good muscle tone, strength 5/5 in upper and lower extremities  Neurologic: no focal deficits  Neuropsychiatric: appropriate affact        ASSESSMENT:     1. Paroxysmal atrial fibrillation    [id number] score of 4 (age, gender, hypertension)    Anticoagulated with Coumadin     Follow up in 1 year or sooner if needed     2. Hypertension    Mildly elevated     3. Moderate tricuspid regurgitation    Stable by recent echocardiogram, no symptoms of concern     4.  Complete AV block s/p dual chamber pacemaker       Normal device check; no recurrent atrial fibrillation     5.  Feelings of anxiety       Improved         RECOMMENDATIONS:  1.  Increase norvasc to 10 mg daily.  2.  Plan for follow up in 12 months with an echocardiogram prior to that visit to reassess TRJosé Miguel Sanchez MD [facility]  Cardiology - [facility] Heart  October 13, 2021

## 2021-10-13 ENCOUNTER — OFFICE VISIT (OUTPATIENT)
Dept: CARDIOLOGY | Facility: CLINIC | Age: 81
End: 2021-10-13
Payer: COMMERCIAL

## 2021-10-13 VITALS
HEART RATE: 76 BPM | HEIGHT: 68 IN | WEIGHT: 138 LBS | OXYGEN SATURATION: 98 % | SYSTOLIC BLOOD PRESSURE: 140 MMHG | BODY MASS INDEX: 20.92 KG/M2 | DIASTOLIC BLOOD PRESSURE: 82 MMHG

## 2021-10-13 DIAGNOSIS — I10 ESSENTIAL HYPERTENSION: ICD-10-CM

## 2021-10-13 PROCEDURE — 99214 OFFICE O/P EST MOD 30 MIN: CPT | Performed by: INTERNAL MEDICINE

## 2021-10-13 RX ORDER — AMLODIPINE BESYLATE 10 MG/1
10 TABLET ORAL DAILY
Qty: 30 TABLET | Refills: 11 | Status: SHIPPED | OUTPATIENT
Start: 2021-10-13 | End: 2022-10-26

## 2021-10-13 ASSESSMENT — MIFFLIN-ST. JEOR: SCORE: 1134.46

## 2021-10-13 NOTE — LETTER
10/13/2021    Tyler Lee MD  [address] [place] [address]    RE: Unique Ward       Dear Colleague,    I had the pleasure of seeing Unique Ward in the [facility].    CARDIOLOGY CLINIC VISIT  DATE OF SERVICE:  October 13, 2021    PRIMARY CARE PHYSICIAN:  Tyler Lee    HISTORY OF PRESENT ILLNESS:  Ms. Unique Ward is a very pleasant 80 year old patient with PMH significant for paroxysmal atrial fibrillation.  She was last seen by me virtually in 9/2020 and presents today for follow up.     In brief review, she presented [facility] the ED [facility] on 4/14/19 with palpitations. She was found to the in atrial fibrillation with RVR and spontaneously converted to normal sinus rhythm with occasional PVCs. An echocardiogram showed normal LV function with moderate tricuspid regurgitation. Given [id number] score of 4. Initially, Xarelto was prescribed, but was cost prohibitive and she was then started on Warfarin.  In March 2020, she was admitted to the hospital with complete heart block.  She underwent permanent pacemaker implantation at that time.  She has had regular follow up with device clinic.  A follow up echocardiogram performed last week demonstrated normal LV function, moderate to moderate-severe TR, mild RV dilatation with normal RV function.    In follow up today, Aaliyah reports feeling well overall.  She denies any exertional chest pain, chest discomfort or shortness of breath.  She denies any heart palpitations.  She is without cardiovascular complaints.                 PAST MEDICAL HISTORY:  Past Medical History:   Diagnosis Date     Arthritis 01.01.1980     Atrial fibrillation (H) 6/7/2019     Cancer (H) 01.01.1993     scalp    Basal Cell X2  head      nose bridge 1996     Depressive disorder 01.01.1977    anxiety     Hypertension 03.16.2016       MEDICATIONS:  Current Outpatient Medications   Medication     amLODIPine  (NORVASC) 5 MG tablet     azelastine (ASTELIN) 0.1 % nasal spray     cholecalciferol (VITAMIN D3) 5000 units TABS tablet     EPINEPHrine (ANY BX GENERIC EQUIV) 0.3 MG/0.3ML injection 2-pack     estradiol (ESTRACE) 0.1 MG/GM vaginal cream     magnesium 100 MG TABS     meclizine (ANTIVERT) 25 MG tablet     warfarin ANTICOAGULANT (COUMADIN) 5 MG tablet     Zinc Sulfate (ZINC 15 PO)     No current facility-administered medications for this visit.       ALLERGIES:  Allergies   Allergen Reactions     Keflex [Cephalexin] Anaphylaxis     No Clinical Screening - See Comments      Eye drop antibiotic.     Codeine Other (See Comments)     Comment: , Description:      Gentamicin Hives     Per Unique this medication gives hives on arms and legs. Bev Lo LSXO,  ....................  7/15/2014   1:15 PM     Influenza Vaccines Other (See Comments)     Comment: , Description:      Influenza Virus Vaccine H5n1      Paxil [Paroxetine] Other (See Comments)     Comment: , Description:      Typhoid Vaccine-Strain Ty21a Other (See Comments)     Comment: , Description:   Comment: , Description:      Typhoid Vaccines        SOCIAL HISTORY:  I have reviewed this patient's social history and updated it with pertinent information if needed. Unique Ward  reports that she quit smoking about 56 years ago. Her smoking use included cigarettes. She started smoking about 66 years ago. She has a 7.50 pack-year smoking history. She has never used smokeless tobacco. She reports that she does not drink alcohol and does not use drugs.    FAMILY HISTORY:  I have reviewed this patient's family history and updated it with pertinent information if needed.   Family History   Problem Relation Age of Onset     Diabetes Maternal Grandmother         Na     Coronary Artery Disease Sister         error     Coronary Artery Disease Brother         error     Hypertension Brother      Hyperlipidemia Brother      Cerebrovascular Disease Brother       Hypertension Sister      Hypertension Sister      Hyperlipidemia Sister      Other Cancer Sister         cervical     Breast Cancer Sister      Other Cancer Sister         kidney     Mental Illness Mother         paranoid/schizophrenic/suicide     Mental Illness Sister         ???  Had shock treatments     Hyperlipidemia Sister      Other Cancer Sister         Kidney       REVIEW OF SYSTEMS:  A complete ROS was obtained and the pertinent positives are outlined in the history of present illness above.  The remainder of systems is negative.    PHYSICAL EXAM:                     Vital Signs with Ranges     0 lbs 0 oz    Constitutional: awake, alert, no distress  Eyes: PERRL, sclera nonicteric  ENT: trachea midline  Respiratory: CTAB  Cardiovascular: RRR no m/r/g, no JVD  GI: nondistended, nontender, bowel sounds present  Lymph/Hematologic: no lymphadenopathy  Skin: dry, no rash  Musculoskeletal: good muscle tone, strength 5/5 in upper and lower extremities  Neurologic: no focal deficits  Neuropsychiatric: appropriate affact        ASSESSMENT:     1. Paroxysmal atrial fibrillation    [id number] score of 4 (age, gender, hypertension)    Anticoagulated with Coumadin     Follow up in 1 year or sooner if needed     2. Hypertension    Mildly elevated     3. Moderate tricuspid regurgitation    Stable by recent echocardiogram, no symptoms of concern     4.  Complete AV block s/p dual chamber pacemaker       Normal device check; no recurrent atrial fibrillation     5.  Feelings of anxiety       Improved         RECOMMENDATIONS:  1.  Increase norvasc to 10 mg daily.  2.  Plan for follow up in 12 months with an echocardiogram prior to that visit to reassess TR.      Christin Sanchez MD [facility]  Cardiology - [facility] Heart  October 13, 2021      Thank you for allowing me to participate in the care of your patient.      Sincerely,     Christin Sanchez MD     [facility]  cc:   No  referring provider defined for this encounter.

## 2021-10-13 NOTE — TELEPHONE ENCOUNTER
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

## 2021-10-13 NOTE — PATIENT INSTRUCTIONS
-Increase amlodipine to 10 mg daily  -Check BP at home once a day and keep a log.  If BP's consistently (over half) >140/90, call Dr. Sanchez's office.  -Follow up in 12 months with Dr. Sanchez with an echocardiogram prior to that visit

## 2021-10-14 ENCOUNTER — MYC MEDICAL ADVICE (OUTPATIENT)
Dept: CARDIOLOGY | Facility: CLINIC | Age: 81
End: 2021-10-14

## 2021-10-15 ENCOUNTER — TELEPHONE (OUTPATIENT)
Dept: FAMILY MEDICINE | Facility: CLINIC | Age: 81
End: 2021-10-15

## 2021-10-15 ENCOUNTER — ANTICOAGULATION THERAPY VISIT (OUTPATIENT)
Dept: ANTICOAGULATION | Facility: CLINIC | Age: 81
End: 2021-10-15

## 2021-10-15 ENCOUNTER — LAB (OUTPATIENT)
Dept: LAB | Facility: CLINIC | Age: 81
End: 2021-10-15
Payer: COMMERCIAL

## 2021-10-15 DIAGNOSIS — I48.91 ATRIAL FIBRILLATION, UNSPECIFIED TYPE (H): ICD-10-CM

## 2021-10-15 DIAGNOSIS — I48.91 ATRIAL FIBRILLATION (H): ICD-10-CM

## 2021-10-15 DIAGNOSIS — Z79.01 LONG TERM CURRENT USE OF ANTICOAGULANT THERAPY: ICD-10-CM

## 2021-10-15 DIAGNOSIS — I48.91 ATRIAL FIBRILLATION (H): Primary | ICD-10-CM

## 2021-10-15 LAB — INR BLD: 1.9 (ref 0.9–1.1)

## 2021-10-15 PROCEDURE — 85610 PROTHROMBIN TIME: CPT

## 2021-10-15 PROCEDURE — 36416 COLLJ CAPILLARY BLOOD SPEC: CPT

## 2021-10-15 NOTE — TELEPHONE ENCOUNTER
Anticoagulation Management    Discussed INR home monitoring program with Unique Ward reviewing:      Elibigility requirements: >= 3 months of anticoagulation therapy, indication for chronic anticoagulation and order from provider    Required testing frequency (q1-2 weeks)    Home meters, testing supplies, meter training, and reporting of INR results done through an outside company. Patient would be contacted by home monitoring company to review insurance coverage with home monitoring company prior to enrolling.    [facility] would continue to receive and manage INR results.    Home monitoring application may take several weeks and must continue to follow up with recommended INR monitoring in clinic until receives monitor and training completed.     Home monitoring terms reviewed with patient      Patient agrees to frequency of testing as directed by referring provider ( weekly or biweekly) Yes    Testing to be performed during business hours of [facility] Yes    Patient agrees they have the skill (or a designated caregiver) necessary to perform the self test Yes    Patient agrees to report all INR results to INR home monitoring company Yes    Patient agrees to have additional INR test in clinic if a home result is critical Yes    Patient agrees to schedule an INR test at a [facility] clinic yearly for technique observation and quality check of INR results with their home meter Yes    Patient agrees to use a [facility] approved service provider and device for home monitoring Yes    [facility]    Referring provider: Dr. Tyler Lee    Referring providers Clinic Fax number [phone]  Unique Ward is interested home INR monitoring and requests order be submitted.

## 2021-10-15 NOTE — PROGRESS NOTES
ANTICOAGULATION MANAGEMENT     Unique GUZMAN Ward 81 year old female is on warfarin with subtherapeutic INR result. (Goal INR 2.0-3.0)    Recent labs: (last 7 days)     10/15/21  1114   INR 1.9*       ASSESSMENT     Source(s): Chart Review and Patient/Caregiver Call       Warfarin doses taken: Warfarin taken as instructed    Diet: Decreased greens/vitamin K in diet; plans to resume previous intake    New illness, injury, or hospitalization: No    Medication/supplement changes: None noted    Signs or symptoms of bleeding or clotting: No    Previous INR: Therapeutic last 2(+) visits    Additional findings: Patient would like to begin the process of using a home monitor.  Per patient, cardiology advised patient she could use a home monitor.     PLAN     Recommended plan for no diet, medication or health factor changes affecting INR     Dosing Instructions:  Increase your warfarin dose (4.2% change) with next INR in 2 weeks       Summary  As of 10/15/2021    Full warfarin instructions:  10 mg every Mon, Wed, Fri; 7.5 mg all other days   Next INR check:  10/29/2021             Telephone call with Unique who agrees to plan and repeated back plan correctly    Lab visit scheduled    Education provided: Please call back if any changes to your diet, medications or how you've been taking warfarin    Plan made per [facility] anticoagulation protocol    Bev Mancilla, RN  Anticoagulation Clinic  10/15/2021    _______________________________________________________________________     Anticoagulation Episode Summary     Current INR goal:  2.0-3.0   TTR:  43.7 % (1 y)   Target end date:  Indefinite   [facility]    Indications    Atrial fibrillation (H) [I48.91]  Long term current use of anticoagulant therapy [Z79.01]  Atrial fibrillation  unspecified type (H) [I48.91]           Comments:  5 mg tabs, PM dose         Anticoagulation Care Providers     Provider Role Specialty Phone number    Tyler Lee  MD [facility] [phone]

## 2021-10-15 NOTE — TELEPHONE ENCOUNTER
Christin Sanchez MD  [facility] Heart Team 4 1 minute ago (11:26 AM)     CB    There is some data that suggests this is a good time to take BP medication.  She is more than welcome to make this change.   She may take it with her coumadin; no compatibility concerns.     Thanks,   Harmony Sanchez    Message text      Responded to patient via [employer].

## 2021-10-26 DIAGNOSIS — I48.0 PAROXYSMAL ATRIAL FIBRILLATION (H): ICD-10-CM

## 2021-10-26 RX ORDER — WARFARIN SODIUM 5 MG/1
TABLET ORAL
Qty: 160 TABLET | Refills: 0 | Status: SHIPPED | OUTPATIENT
Start: 2021-10-26 | End: 2022-01-25

## 2021-10-29 ENCOUNTER — ANTICOAGULATION THERAPY VISIT (OUTPATIENT)
Dept: ANTICOAGULATION | Facility: CLINIC | Age: 81
End: 2021-10-29

## 2021-10-29 ENCOUNTER — LAB (OUTPATIENT)
Dept: LAB | Facility: CLINIC | Age: 81
End: 2021-10-29
Payer: COMMERCIAL

## 2021-10-29 DIAGNOSIS — Z79.01 LONG TERM CURRENT USE OF ANTICOAGULANT THERAPY: ICD-10-CM

## 2021-10-29 DIAGNOSIS — I48.91 ATRIAL FIBRILLATION, UNSPECIFIED TYPE (H): ICD-10-CM

## 2021-10-29 DIAGNOSIS — I48.91 ATRIAL FIBRILLATION (H): Primary | ICD-10-CM

## 2021-10-29 DIAGNOSIS — I48.91 ATRIAL FIBRILLATION (H): ICD-10-CM

## 2021-10-29 LAB — INR BLD: 3.1 (ref 0.9–1.1)

## 2021-10-29 PROCEDURE — 85610 PROTHROMBIN TIME: CPT

## 2021-10-29 PROCEDURE — 36416 COLLJ CAPILLARY BLOOD SPEC: CPT

## 2021-10-29 NOTE — PROGRESS NOTES
ANTICOAGULATION MANAGEMENT     Unique Ward 81 year old female is on warfarin with supratherapeutic INR result. (Goal INR 2.0-3.0)    Recent labs: (last 7 days)     10/29/21  0909   INR 3.1*       ASSESSMENT     Source(s): Chart Review and Patient/Caregiver Call       Warfarin doses taken: Less warfarin taken than planned which may be affecting INR    Diet: Decreased greens/vitamin K in diet; plans to resume previous intake    New illness, injury, or hospitalization: No    Medication/supplement changes: None noted    Signs or symptoms of bleeding or clotting: No    Previous INR: Subtherapeutic    Additional findings: None     PLAN     Recommended plan for temporary change(s) affecting INR     Dosing Instructions: Partial hold then continue your current warfarin dose with next INR in 2 weeks       Summary  As of 10/29/2021    Full warfarin instructions:  10/29: 7.5 mg; Otherwise 7.5 mg every Sun, Tue, Thu; 10 mg all other days   Next INR check:  11/12/2021             Telephone call with Unique who verbalizes understanding and agrees to plan    Patient offered & declined to schedule next visit    Education provided: Importance of consistent vitamin K intake    Plan made per [facility] anticoagulation protocol    Shi Bennett, RN  Anticoagulation Clinic  10/29/2021    _______________________________________________________________________     Anticoagulation Episode Summary     Current INR goal:  2.0-3.0   TTR:  43.1 % (1 y)   Target end date:  Indefinite   Send INR reminders to:  [facility]    Indications    Atrial fibrillation (H) [I48.91]  Long term current use of anticoagulant therapy [Z79.01]  Atrial fibrillation  unspecified type (H) [I48.91]           Comments:  5 mg tabs, PM dose         Anticoagulation Care Providers     Provider Role Specialty Phone number    Tyler Lee MD Referring Family Medicine [phone]

## 2021-10-29 NOTE — PROGRESS NOTES
Anticoagulation Management    Unable to reach Aaliyah today.    Today's INR result of 3.1 is supratherapeutic (goal INR of 2.0-3.0).  Result received from: Clinic Lab    Follow up required to confirm warfarin dose taken and assess for changes    [facility]      Anticoagulation clinic to follow up    Shi Bennett RN  [phone]

## 2021-11-04 ENCOUNTER — MYC MEDICAL ADVICE (OUTPATIENT)
Dept: FAMILY MEDICINE | Facility: CLINIC | Age: 81
End: 2021-11-04

## 2021-11-05 NOTE — TELEPHONE ENCOUNTER
Call pt. Needs in person visit. Has had almost monthly UTI's. If no other symptoms I will see her virtually, but needs in person follow-up - pre-scheduled for a couple weeks out.   MAYRA Oden CNP

## 2021-11-05 NOTE — TELEPHONE ENCOUNTER
LM for the patient to return call to the clinic. If she calls back please transfer to any triage RN. Please triage if patient is having any symptoms. If she is not, Gladys will do a virtual appointment with her. The patient is due for an in person visit. Please schedule a few weeks out.       PATRICIO Gabriel, RN, JOELN  [place] [place]  November 5, 2021

## 2021-11-07 ENCOUNTER — E-VISIT (OUTPATIENT)
Dept: URGENT CARE | Facility: CLINIC | Age: 81
End: 2021-11-07
Payer: COMMERCIAL

## 2021-11-07 DIAGNOSIS — R30.0 DIFFICULT OR PAINFUL URINATION: Primary | ICD-10-CM

## 2021-11-07 PROCEDURE — 99421 OL DIG E/M SVC 5-10 MIN: CPT | Performed by: NURSE PRACTITIONER

## 2021-11-08 ENCOUNTER — LAB (OUTPATIENT)
Dept: LAB | Facility: CLINIC | Age: 81
End: 2021-11-08
Payer: COMMERCIAL

## 2021-11-08 ENCOUNTER — TELEPHONE (OUTPATIENT)
Dept: URGENT CARE | Facility: URGENT CARE | Age: 81
End: 2021-11-08

## 2021-11-08 DIAGNOSIS — R30.0 DIFFICULT OR PAINFUL URINATION: ICD-10-CM

## 2021-11-08 DIAGNOSIS — N30.01 ACUTE CYSTITIS WITH HEMATURIA: Primary | ICD-10-CM

## 2021-11-08 LAB
ALBUMIN UR-MCNC: NEGATIVE MG/DL
APPEARANCE UR: ABNORMAL
BACTERIA #/AREA URNS HPF: ABNORMAL /HPF
BILIRUB UR QL STRIP: NEGATIVE
COLOR UR AUTO: YELLOW
GLUCOSE UR STRIP-MCNC: NEGATIVE MG/DL
HGB UR QL STRIP: NEGATIVE
KETONES UR STRIP-MCNC: NEGATIVE MG/DL
LEUKOCYTE ESTERASE UR QL STRIP: ABNORMAL
NITRATE UR QL: NEGATIVE
PH UR STRIP: 7 [PH] (ref 5–7)
RBC URINE: 0 /HPF
SP GR UR STRIP: 1.01 (ref 1–1.03)
SQUAMOUS EPITHELIAL: 1 /HPF
UROBILINOGEN UR STRIP-MCNC: NORMAL MG/DL
WBC CLUMPS #/AREA URNS HPF: PRESENT /HPF
WBC URINE: >182 /HPF
YEAST #/AREA URNS HPF: ABNORMAL /HPF

## 2021-11-08 PROCEDURE — 87186 SC STD MICRODIL/AGAR DIL: CPT

## 2021-11-08 PROCEDURE — 81001 URINALYSIS AUTO W/SCOPE: CPT

## 2021-11-08 PROCEDURE — 87086 URINE CULTURE/COLONY COUNT: CPT

## 2021-11-08 RX ORDER — GRANULES FOR ORAL 3 G/1
3 POWDER ORAL ONCE
Qty: 1 PACKET | Refills: 0 | Status: SHIPPED | OUTPATIENT
Start: 2021-11-08 | End: 2021-11-08

## 2021-11-08 NOTE — PATIENT INSTRUCTIONS
Dear Unique Ward,     After reviewing your responses, I would like you to come in for a urine test to make sure we treat you correctly. This urine test is to evaluate you for a possible urinary tract infection, and should be scheduled for today or tomorrow. Schedule a Lab Only appointment here.     Lab appointments are not available at most locations on the weekends. If no Lab Only appointment is available, you should be seen in any of our convenient Walk-in or Urgent Care Centers, which can be found on our website here.     You will receive instructions with your results in [facility] once they are available.     If your symptoms worsen, you develop pain in your back or stomach, develop fevers, or are not improving in 5 days, please contact your primary care provider for an appointment or visit a Walk-in or Urgent Care Center to be seen.     Thanks again for choosing us as your health care partner,     Deonna Odell, CNP

## 2021-11-08 NOTE — CONFIDENTIAL NOTE
RN informed Aaliyah that her RX was sent to [facility] in [place]. AUSTIN Thompson, sent the script today, as he said you have a UTI. It is a one time medication . If you are not better by Thursday AM. You need to let us know. ........................DIONTE Hobson

## 2021-11-09 ENCOUNTER — TELEPHONE (OUTPATIENT)
Dept: FAMILY MEDICINE | Facility: CLINIC | Age: 81
End: 2021-11-09
Payer: COMMERCIAL

## 2021-11-09 DIAGNOSIS — R30.0 DYSURIA: Primary | ICD-10-CM

## 2021-11-09 LAB — BACTERIA UR CULT: ABNORMAL

## 2021-11-09 NOTE — TELEPHONE ENCOUNTER
Patient calling, she completed an E-visit and was prescribed a medication but did not pick it up at the pharmacy because it was to expensive ($70).    Patient is requesting a new Rx be sent to pharmacy ( [facility])    Please call patient to let her know.    Nicole Landry XRO/

## 2021-11-10 ENCOUNTER — TELEPHONE (OUTPATIENT)
Dept: ANTICOAGULATION | Facility: CLINIC | Age: 81
End: 2021-11-10
Payer: COMMERCIAL

## 2021-11-10 DIAGNOSIS — N39.0 ACUTE UTI: Primary | ICD-10-CM

## 2021-11-10 RX ORDER — CIPROFLOXACIN 500 MG/1
500 TABLET, FILM COATED ORAL 2 TIMES DAILY
Qty: 14 TABLET | Refills: 0 | Status: SHIPPED | OUTPATIENT
Start: 2021-11-10 | End: 2021-11-17

## 2021-11-10 NOTE — TELEPHONE ENCOUNTER
Anticoagulation Management    Discussed INR home monitoring program with Unique Ward reviewing:      Elibigility requirements: >= 3 months of anticoagulation therapy, indication for chronic anticoagulation and order from provider    Required testing frequency (q1-2 weeks)    Home meters, testing supplies, meter training, and reporting of INR results done through an outside company. Patient would be contacted by home monitoring company to review insurance coverage with home monitoring company prior to enrolling.    [facility] would continue to receive and manage INR results.    Home monitoring application may take several weeks and must continue to follow up with recommended INR monitoring in clinic until receives monitor and training completed.     Home monitoring terms reviewed with patient      Patient agrees to frequency of testing as directed by referring provider ( weekly or biweekly) Yes    Testing to be performed during business hours of [facility] Yes    Patient agrees they have the skill (or a designated caregiver) necessary to perform the self test Yes    Patient agrees to report all INR results to INR home monitoring company Yes    Patient agrees to have additional INR test in clinic if a home result is critical Yes    Patient agrees to schedule an INR test at a [facility] clinic yearly for technique observation and quality check of INR results with their home meter Yes    Patient agrees to use a [facility] approved service provider and device for home monitoring Yes    [facility]    Referring provider: Dr Lee    Referring providers Clinic Fax number [phone]    Unique Ward is interested home INR monitoring and requests order be submitted.

## 2021-11-10 NOTE — TELEPHONE ENCOUNTER
ANTICOAGULATION  MANAGEMENT     Interacting Medication Review    Interacting medication(s): Ciprofloxacin (Cipro) with warfarin.    Duration: 7 days (11/10 to 11/17)    Indication: UTI    New medication?: Yes, interaction may increase INR and risk of bleeding       PLAN     Continue current warfarin dose. Recommend to check INR on 11/12/21 as previoulsy planned    Spoke with Unique    No adjustment to Anticoagulation Calendar was required    Veronika Campbell RN

## 2021-11-10 NOTE — PROGRESS NOTES
Pt with UTI, was prescribed fosfomycin but didn't pick it up b/c it was too expensive. Avoid Bactrim due to being on warfarin. Allergic to cephalosporins. Culture shows only partial sensitivity to Macrobid. Unfortunately, that leaves us with a flouroquinolone. Sent a Rx for Cipro to the pharmacy.

## 2021-11-12 ENCOUNTER — ANTICOAGULATION THERAPY VISIT (OUTPATIENT)
Dept: ANTICOAGULATION | Facility: CLINIC | Age: 81
End: 2021-11-12

## 2021-11-12 ENCOUNTER — NURSE TRIAGE (OUTPATIENT)
Dept: NURSING | Facility: CLINIC | Age: 81
End: 2021-11-12
Payer: COMMERCIAL

## 2021-11-12 ENCOUNTER — LAB (OUTPATIENT)
Dept: LAB | Facility: CLINIC | Age: 81
End: 2021-11-12
Payer: COMMERCIAL

## 2021-11-12 DIAGNOSIS — I48.91 ATRIAL FIBRILLATION (H): ICD-10-CM

## 2021-11-12 DIAGNOSIS — I48.91 ATRIAL FIBRILLATION, UNSPECIFIED TYPE (H): ICD-10-CM

## 2021-11-12 DIAGNOSIS — Z79.01 LONG TERM CURRENT USE OF ANTICOAGULANT THERAPY: ICD-10-CM

## 2021-11-12 DIAGNOSIS — I48.91 ATRIAL FIBRILLATION (H): Primary | ICD-10-CM

## 2021-11-12 LAB — INR BLD: 3.8 (ref 0.9–1.1)

## 2021-11-12 PROCEDURE — 85610 PROTHROMBIN TIME: CPT

## 2021-11-12 PROCEDURE — 36416 COLLJ CAPILLARY BLOOD SPEC: CPT

## 2021-11-12 RX ORDER — SULFAMETHOXAZOLE/TRIMETHOPRIM 800-160 MG
1 TABLET ORAL 2 TIMES DAILY
Qty: 14 TABLET | Refills: 0 | Status: SHIPPED | OUTPATIENT
Start: 2021-11-12 | End: 2021-11-19

## 2021-11-12 NOTE — TELEPHONE ENCOUNTER
Patient chooses the Bactrim to go to [facility].  Merly Sorenson, BSN, RN

## 2021-11-12 NOTE — TELEPHONE ENCOUNTER
Message left for patient to call back, please ask which pharmacy she would like as well as choice of medication per note from provider.    Nicole LANDRUMO/        Its either cipro (which is very safe for her to take), or bactrim (which prob will mess with her INR and require close monitoring). What does she prefer. I would choose cipro

## 2021-11-12 NOTE — TELEPHONE ENCOUNTER
Pt states that she has a bladder infection - she did an e-visit and medication was to expensive, so she was switched to Cipro. Pt read the Cipro insertion packet and is not comfortable with the side effects listed.    Pt cannot take Bactrim because she is on Warfarin   Pt is allergic to Cephalosporins   Culture showed only partial sensitivity to Macrobid  - per provider progress notes     Per protocol - Call PCP within 24 hours    Pt transferred to scheduling for a virtual visit with a provider to talk about antibiotics    Care advice given per protocol and when to call back. Pt verbalized understanding and agrees to plan of care.    Cherry Cornejo RN  [facility] Nurse Advisor  2:34 AM 11/12/2021      COVID 19 Nurse Triage Plan/Patient Instructions    Please be aware that novel coronavirus (COVID-19) may be circulating in the community. If you develop symptoms such as fever, cough, or SOB or if you have concerns about the presence of another infection including coronavirus (COVID-19), please contact your health care provider or visit [email or web address].     Disposition/Instructions    Virtual Visit with provider recommended. Reference Visit Selection Guide.    Thank you for taking steps to prevent the spread of this virus.  o Limit your contact with others.  o Wear a simple mask to cover your cough.  o Wash your hands well and often.    Resources    [facility]: About COVID-19: [email or web address]    CDC: What to Do If You're Sick: www.cdc.gov/coronavirus/2019-ncov/about/steps-when-sick.html    CDC: Ending Home Isolation: www.cdc.gov/coronavirus/2019-ncov/hcp/disposition-in-home-patients.html     CDC: Caring for Someone: www.cdc.gov/coronavirus/2019-ncov/if-you-are-sick/care-for-someone.html     [facility]: Interim Guidance for Hospital Discharge to Home: www.health.[facility].[place]/diseases/coronavirus/hcp/hospdischarge.pdf    [facility] clinical trials (COVID-19 research studies):  "clinicalaffairs.[facility]-clinical-trials     Below are the COVID-19 hotlines at the [place] Department of [facility] ([facility]). Interpreters are available.   o For health questions: Call 299-221-1384 or 1-295.508.2711 (7 a.m. to 7 p.m.)  o For questions about schools and childcare: Call 951-617-4745 or 1-655.514.2303 (7 a.m. to 7 p.m.)                               Reason for Disposition    [1] Caller has NON-URGENT medication question about med that PCP prescribed AND [2] triager unable to answer question    Additional Information    Negative: Drug overdose and triager unable to answer question    Negative: Caller requesting information unrelated to medicine    Negative: Caller requesting a prescription for Strep throat and has a positive culture result    Negative: Rash while taking a medication or within 3 days of stopping it    Negative: Immunization reaction suspected    Negative: [1] Asthma and [2] having symptoms of asthma (cough, wheezing, etc.)    Negative: [1] Influenza symptoms AND [2] anti-viral med prescription request, such as Tamiflu    Negative: [1] Symptom of illness (e.g., headache, abdominal pain, earache, vomiting) AND [2] more than mild    Negative: MORE THAN A DOUBLE DOSE of a prescription or over-the-counter (OTC) drug    Negative: [1] DOUBLE DOSE (an extra dose or lesser amount) of over-the-counter (OTC) drug AND [2] any symptoms (e.g., dizziness, nausea, pain, sleepiness)    Negative: [1] DOUBLE DOSE (an extra dose or lesser amount) of prescription drug AND [2] any symptoms (e.g., dizziness, nausea, pain, sleepiness)    Negative: Took another person's prescription drug    Negative: [1] DOUBLE DOSE (an extra dose or lesser amount) of prescription drug AND [2] NO symptoms (Exception: a double dose of antibiotics)    Negative: Diabetes drug error or overdose (e.g., took wrong type of insulin or took extra dose)    Negative: [1] Request for URGENT new prescription or refill of \"essential\" " medication (i.e., likelihood of harm to patient if not taken) AND [2] triager unable to fill per unit policy    Negative: [1] Prescription not at pharmacy AND [2] was prescribed by PCP recently    Negative: [1] Pharmacy calling with prescription questions AND [2] triager unable to answer question    Negative: [1] Caller has URGENT medication question about med that PCP or specialist prescribed AND [2] triager unable to answer question    Protocols used: MEDICATION QUESTION CALL-A-AH

## 2021-11-12 NOTE — PROGRESS NOTES
ANTICOAGULATION MANAGEMENT     Unique Ward 81 year old female is on warfarin with therapeutic INR result. (Goal INR 2.0-3.0)    Recent labs: (last 7 days)     11/12/21  1112   INR 3.8*       ASSESSMENT     Source(s): Chart Review and Patient/Caregiver Call       Warfarin doses taken: Warfarin taken as instructed    Diet: No new diet changes identified    New illness, injury, or hospitalization: Yes: UTI    Medication/supplement changes: Will be starting bactim today, did not start cipro    Signs or symptoms of bleeding or clotting: No    Previous INR: Supratherapeutic    Additional findings: None     PLAN     Recommended plan for temporary change(s) affecting INR     Dosing Instructions: Partial hold then continue your current warfarin dose with next INR in 3 days       Summary  As of 11/12/2021    Full warfarin instructions:  11/12: 5 mg; 11/13: 5 mg; Otherwise 7.5 mg every Sun, Tue, Thu; 10 mg all other days   Next INR check:  11/15/2021             Telephone call with Unique who verbalizes understanding and agrees to plan    Lab visit scheduled    Education provided: Monitoring for bleeding signs and symptoms    Plan made per [facility] anticoagulation protocol    Oxana Smith RN  Anticoagulation Clinic  11/12/2021    _______________________________________________________________________     Anticoagulation Episode Summary     Current INR goal:  2.0-3.0   TTR:  39.2 % (1 y)   Target end date:  Indefinite   Send INR reminders to:  [facility]    Indications    Atrial fibrillation (H) [I48.91]  Long term current use of anticoagulant therapy [Z79.01]  Atrial fibrillation  unspecified type (H) [I48.91]           Comments:           Anticoagulation Care Providers     Provider Role Specialty Phone number    Tyler Lee MD Referring Family Medicine [phone]

## 2021-11-14 ENCOUNTER — NURSE TRIAGE (OUTPATIENT)
Dept: NURSING | Facility: CLINIC | Age: 81
End: 2021-11-14
Payer: COMMERCIAL

## 2021-11-15 ENCOUNTER — ANTICOAGULATION THERAPY VISIT (OUTPATIENT)
Dept: ANTICOAGULATION | Facility: CLINIC | Age: 81
End: 2021-11-15

## 2021-11-15 ENCOUNTER — LAB (OUTPATIENT)
Dept: LAB | Facility: CLINIC | Age: 81
End: 2021-11-15
Payer: COMMERCIAL

## 2021-11-15 DIAGNOSIS — Z79.01 LONG TERM CURRENT USE OF ANTICOAGULANT THERAPY: ICD-10-CM

## 2021-11-15 DIAGNOSIS — I48.91 ATRIAL FIBRILLATION, UNSPECIFIED TYPE (H): Primary | ICD-10-CM

## 2021-11-15 DIAGNOSIS — I48.91 ATRIAL FIBRILLATION, UNSPECIFIED TYPE (H): ICD-10-CM

## 2021-11-15 DIAGNOSIS — I48.91 ATRIAL FIBRILLATION (H): ICD-10-CM

## 2021-11-15 LAB — INR BLD: 2.7 (ref 0.9–1.1)

## 2021-11-15 PROCEDURE — 85610 PROTHROMBIN TIME: CPT

## 2021-11-15 PROCEDURE — 36416 COLLJ CAPILLARY BLOOD SPEC: CPT

## 2021-11-15 NOTE — PROGRESS NOTES
ANTICOAGULATION MANAGEMENT     Unique Ward 81 year old female is on warfarin with therapeutic INR result. (Goal INR 2.0-3.0)    Recent labs: (last 7 days)     11/15/21  1251   INR 2.7*       ASSESSMENT     Source(s): Chart Review and Patient/Caregiver Call       Warfarin doses taken: Warfarin taken as instructed    Diet: No new diet changes identified    New illness, injury, or hospitalization: No    Medication/supplement changes: sulfamethoxazole-trimethoprim (BACTRIM DS) 800-160 MG tablet   11/12-11/19    Signs or symptoms of bleeding or clotting: No    Previous INR: Supratherapeutic    Additional findings: None     PLAN     Recommended plan for temporary change(s) affecting INR     Dosing Instructions: take 10 mg Mon and 7.5 mg Tues, Wed, Thurs - recheck on Fri  with next INR in 4 days       Summary  As of 11/15/2021    Full warfarin instructions:  11/17: 7.5 mg; Otherwise 7.5 mg every Sun, Tue, Thu; 10 mg all other days   Next INR check:  11/19/2021             Telephone call with Unique who verbalizes understanding and agrees to plan    Patient offered & declined to schedule next visit    Education provided: Please call back if any changes to your diet, medications or how you've been taking warfarin, Potential interaction between warfarin and Bactrim  and Monitoring for bleeding signs and symptoms    Plan made per [facility] anticoagulation protocol    Neelima Higgins, RN  Anticoagulation Clinic  11/15/2021    _______________________________________________________________________     Anticoagulation Episode Summary     Current INR goal:  2.0-3.0   TTR:  38.6 % (1 y)   Target end date:  Indefinite   Send INR reminders to:  [facility]    Indications    Atrial fibrillation (H) [I48.91]  Long term current use of anticoagulant therapy [Z79.01]  Atrial fibrillation  unspecified type (H) [I48.91]           Comments:           Anticoagulation Care Providers     Provider Role Specialty Phone number    Jesus  Tyler Nguyễn MD [facility] [phone]

## 2021-11-15 NOTE — TELEPHONE ENCOUNTER
"Patient states she has a INR test tomorrow.  Is wondering if she can come in with a sore throat.  Started last night at 11pm.  Started just scratchy.  No coughing.  No fever    Has post nasal drip.    Advised that it is ok to go to appointment tomorrow.  Wear a mask and let the check in desk know she has a sore throat.    Patient is comfortable with that plan.    Bev Wan RN   11/14/21 8:49 PM  [facility] Nurse Advisor    Reason for Disposition    [1] Sore throat is the only symptom AND [2] sore throat present < 48 hours    Additional Information    Negative: Severe difficulty breathing (e.g., struggling for each breath, speaks in single words, stridor)    Negative: Sounds like a life-threatening emergency to the triager    Negative: [1] Diagnosed strep throat AND [2] taking antibiotic AND [3] symptoms continue    Negative: Throat culture results, call about    Negative: Productive cough is main symptom    Negative: Non-productive cough is main symptom    Negative: Hoarseness is main symptom    Negative: Runny nose is main symptom    Negative: [1] Drooling or spitting out saliva (because can't swallow) AND [2] normal breathing    Negative: Unable to open mouth completely    Negative: [1] Difficulty breathing AND [2] not severe    Negative: Fever > 104 F (40 C)    Negative: [1] Refuses to drink anything AND [2] for > 12 hours    Negative: [1] Drinking very little AND [2] dehydration suspected (e.g., no urine > 12 hours, very dry mouth, very lightheaded)    Negative: Patient sounds very sick or weak to the triager    Negative: SEVERE (e.g., excruciating) throat pain    Negative: [1] Pus on tonsils (back of throat) AND [2]  fever AND [3] swollen neck lymph nodes (\"glands\")    Negative: [1] Rash AND [2] widespread (especially chest and abdomen)    Negative: Earache also present    Negative: Fever present > 3 days (72 hours)    Negative: Diabetes mellitus or weak immune system (e.g., HIV positive, cancer " chemo, splenectomy, organ transplant, chronic steroids)    Negative: History of rheumatic fever    Negative: [1] Adult is leaving on a trip AND [2] requests an antibiotic NOW    Negative: [1] Positive throat culture or rapid strep test (according to lab, PCP, caller, etc.) AND [2] NO  standing order to call in prescription for antibiotic    Negative: [1] Exposure to family member (or spouse or boyfriend/girlfriend) with test-proven strep AND [2] within last 10 days    Negative: [1] Sore throat is the only symptom AND [2] present > 48 hours    Negative: [1] Sore throat with cough/cold symptoms AND [2] present > 5 days    Protocols used: SORE THROAT-A-AH

## 2021-11-19 ENCOUNTER — ANTICOAGULATION THERAPY VISIT (OUTPATIENT)
Dept: ANTICOAGULATION | Facility: CLINIC | Age: 81
End: 2021-11-19

## 2021-11-19 ENCOUNTER — LAB (OUTPATIENT)
Dept: LAB | Facility: CLINIC | Age: 81
End: 2021-11-19
Payer: COMMERCIAL

## 2021-11-19 DIAGNOSIS — I48.91 ATRIAL FIBRILLATION, UNSPECIFIED TYPE (H): ICD-10-CM

## 2021-11-19 DIAGNOSIS — I48.91 ATRIAL FIBRILLATION (H): Primary | ICD-10-CM

## 2021-11-19 DIAGNOSIS — I48.91 ATRIAL FIBRILLATION (H): ICD-10-CM

## 2021-11-19 DIAGNOSIS — Z79.01 LONG TERM CURRENT USE OF ANTICOAGULANT THERAPY: ICD-10-CM

## 2021-11-19 LAB — INR BLD: 5 (ref 0.9–1.1)

## 2021-11-19 PROCEDURE — 85610 PROTHROMBIN TIME: CPT

## 2021-11-19 PROCEDURE — 36416 COLLJ CAPILLARY BLOOD SPEC: CPT

## 2021-11-19 NOTE — PROGRESS NOTES
ANTICOAGULATION MANAGEMENT     Unique Ward 81 year old female is on warfarin with supratherapeutic INR result. (Goal INR 2.0-3.0)    Recent labs: (last 7 days)     11/19/21  1158   INR 5.0*       ASSESSMENT     Source(s): Chart Review and Patient/Caregiver Call       Warfarin doses taken: Warfarin taken as instructed    Diet: No new diet changes identified    New illness, injury, or hospitalization: No    Medication/supplement changes: will be done with Bactrim tomorrow morning     Signs or symptoms of bleeding or clotting: No    Previous INR: Therapeutic last visit; previously outside of goal range    Additional findings: None     PLAN     Recommended plan for temporary change(s) affecting INR     Dosing Instructions: hold Fri and Sat, 7.5 mg Sun  with next INR in 3 days       Summary  As of 11/19/2021    Full warfarin instructions:  11/19: Hold; 11/20: Hold; Otherwise 7.5 mg every Sun, Tue, Thu; 10 mg all other days   Next INR check:  11/22/2021             Telephone call with Unique who verbalizes understanding and agrees to plan    Patient offered & declined to schedule next visit    Education provided: Please call back if any changes to your diet, medications or how you've been taking warfarin, Potential interaction between warfarin and Bactrim  and Monitoring for bleeding signs and symptoms    Plan made per [facility] anticoagulation protocol    Neelima Higgins, RN  Anticoagulation Clinic  11/19/2021    _______________________________________________________________________     Anticoagulation Episode Summary     Current INR goal:  2.0-3.0   TTR:  38.3 % (1 y)   Target end date:  Indefinite   Send INR reminders to:  [facility]    Indications    Atrial fibrillation (H) [I48.91]  Long term current use of anticoagulant therapy [Z79.01]  Atrial fibrillation  unspecified type (H) [I48.91]           Comments:           Anticoagulation Care Providers     Provider Role Specialty Phone number    Tyler Lee  MD [facility] [phone]

## 2021-11-19 NOTE — PROGRESS NOTES
Anticoagulation Management    Unable to reach pt today.    Today's INR result of 5.0 is supratherapeutic (goal INR of 2.0-3.0).  Result received from: Clinic Lab    Follow up required to confirm warfarin dose taken and assess for changes    VM left to call [facility] back. Will try again later.       Anticoagulation clinic to follow up    Neelima Higgins RN

## 2021-11-22 ENCOUNTER — LAB (OUTPATIENT)
Dept: LAB | Facility: CLINIC | Age: 81
End: 2021-11-22
Payer: COMMERCIAL

## 2021-11-22 ENCOUNTER — ANTICOAGULATION THERAPY VISIT (OUTPATIENT)
Dept: ANTICOAGULATION | Facility: CLINIC | Age: 81
End: 2021-11-22

## 2021-11-22 DIAGNOSIS — I48.91 ATRIAL FIBRILLATION, UNSPECIFIED TYPE (H): ICD-10-CM

## 2021-11-22 DIAGNOSIS — I48.91 ATRIAL FIBRILLATION (H): ICD-10-CM

## 2021-11-22 DIAGNOSIS — Z79.01 LONG TERM CURRENT USE OF ANTICOAGULANT THERAPY: ICD-10-CM

## 2021-11-22 DIAGNOSIS — I48.91 ATRIAL FIBRILLATION (H): Primary | ICD-10-CM

## 2021-11-22 LAB — INR BLD: 1.4 (ref 0.9–1.1)

## 2021-11-22 PROCEDURE — 85610 PROTHROMBIN TIME: CPT

## 2021-11-22 PROCEDURE — 36416 COLLJ CAPILLARY BLOOD SPEC: CPT

## 2021-11-22 NOTE — PROGRESS NOTES
ANTICOAGULATION MANAGEMENT     Unique THOMAS Ed 81 year old female is on warfarin with subtherapeutic INR result. (Goal INR 2.0-3.0)    Recent labs: (last 7 days)     11/22/21  1134   INR 1.4*       ASSESSMENT     Source(s): Chart Review and Patient/Caregiver Call       Warfarin doses taken: Warfarin taken as instructed    Diet: No new diet changes identified    New illness, injury, or hospitalization: No    Medication/supplement changes: Done with antibiotics yesterday 11/21     Signs or symptoms of bleeding or clotting: No    Previous INR: Supratherapeutic    Additional findings: None     PLAN     Recommended plan for temporary change(s) affecting INR     Dosing Instructions: 10 mg Mon and 7.5 mg ROW  with next INR in 1 week       Summary  As of 11/22/2021    Full warfarin instructions:  10 mg every Mon; 7.5 mg all other days   Next INR check:  11/29/2021             Telephone call with Unique who verbalizes understanding and agrees to plan    Patient offered & declined to schedule next visit    Education provided: Please call back if any changes to your diet, medications or how you've been taking warfarin and Monitoring for clotting signs and symptoms    Plan made per [facility] anticoagulation protocol    Neelima Higgins, RN  Anticoagulation Clinic  11/22/2021    _______________________________________________________________________     Anticoagulation Episode Summary     Current INR goal:  2.0-3.0   TTR:  38.6 % (1 y)   Target end date:  Indefinite   Send INR reminders to:  [facility]    Indications    Atrial fibrillation (H) [I48.91]  Long term current use of anticoagulant therapy [Z79.01]  Atrial fibrillation  unspecified type (H) [I48.91]           Comments:           Anticoagulation Care Providers     Provider Role Specialty Phone number    Tyler Lee MD Referring Family Medicine [phone]

## 2021-11-28 ENCOUNTER — HEALTH MAINTENANCE LETTER (OUTPATIENT)
Age: 81
End: 2021-11-28

## 2021-11-29 ENCOUNTER — ANTICOAGULATION THERAPY VISIT (OUTPATIENT)
Dept: ANTICOAGULATION | Facility: CLINIC | Age: 81
End: 2021-11-29

## 2021-11-29 ENCOUNTER — LAB (OUTPATIENT)
Dept: LAB | Facility: CLINIC | Age: 81
End: 2021-11-29
Payer: COMMERCIAL

## 2021-11-29 DIAGNOSIS — Z79.01 LONG TERM CURRENT USE OF ANTICOAGULANT THERAPY: ICD-10-CM

## 2021-11-29 DIAGNOSIS — I48.91 ATRIAL FIBRILLATION (H): Primary | ICD-10-CM

## 2021-11-29 DIAGNOSIS — I48.91 ATRIAL FIBRILLATION (H): ICD-10-CM

## 2021-11-29 DIAGNOSIS — I48.91 ATRIAL FIBRILLATION, UNSPECIFIED TYPE (H): ICD-10-CM

## 2021-11-29 LAB — INR BLD: 1.6 (ref 0.9–1.1)

## 2021-11-29 PROCEDURE — 36416 COLLJ CAPILLARY BLOOD SPEC: CPT

## 2021-11-29 PROCEDURE — 85610 PROTHROMBIN TIME: CPT

## 2021-11-29 NOTE — TELEPHONE ENCOUNTER
Patient is following up regarding request for INR home monitor.  Paperwork has been faxed to PCP via [employer].    Routing to provider for review.    TRENA FoxN, RN  Anticoagulation Clinic

## 2021-11-29 NOTE — PROGRESS NOTES
ANTICOAGULATION MANAGEMENT     Unique Ward 81 year old female is on warfarin with subtherapeutic INR result. (Goal INR 2.0-3.0)    Recent labs: (last 7 days)     11/29/21  1256   INR 1.6*       ASSESSMENT     Source(s): Chart Review and Patient/Caregiver Call       Warfarin doses taken: Warfarin taken as instructed    Diet: Decreased greens/vitamin K in diet; plans to resume previous intake    New illness, injury, or hospitalization: No    Medication/supplement changes: None noted    Signs or symptoms of bleeding or clotting: No    Previous INR: Subtherapeutic    Additional findings: Patient's UTI symptoms have resolved     PLAN     Recommended plan for no diet, medication or health factor changes affecting INR.  Resumed previous maintenance dose prior to bactrim and UTI     Dosing Instructions:  Change your warfarin dose (22.7% change) with next INR in 1 week       Summary  As of 11/29/2021    Full warfarin instructions:  7.5 mg every Sat; 10 mg all other days   Next INR check:  12/6/2021             Telephone call with Unique who verbalizes understanding and agrees to plan    Lab visit scheduled    Education provided: Goal range and significance of current result, Importance of therapeutic range, Importance of following up at instructed interval and Importance of taking warfarin as instructed    Plan made with [facility] Pharmacist Deonna Mancilla, RN  Anticoagulation Clinic  11/29/2021    _______________________________________________________________________     Anticoagulation Episode Summary     Current INR goal:  2.0-3.0   TTR:  38.6 % (1 y)   Target end date:  Indefinite   Send INR reminders to:  [facility]    Indications    Atrial fibrillation (H) [I48.91]  Long term current use of anticoagulant therapy [Z79.01]  Atrial fibrillation  unspecified type (H) [I48.91]           Comments:           Anticoagulation Care Providers     Provider Role Specialty Phone number    Jesus  Tyler Nguyễn MD [facility] [phone]

## 2021-11-30 ENCOUNTER — MYC MEDICAL ADVICE (OUTPATIENT)
Dept: FAMILY MEDICINE | Facility: CLINIC | Age: 81
End: 2021-11-30
Payer: COMMERCIAL

## 2021-12-01 ENCOUNTER — MEDICAL CORRESPONDENCE (OUTPATIENT)
Dept: HEALTH INFORMATION MANAGEMENT | Facility: CLINIC | Age: 81
End: 2021-12-01
Payer: COMMERCIAL

## 2021-12-01 NOTE — TELEPHONE ENCOUNTER
Please refax. I believe the fax number below is the [employer] fax- perhaps this is the problem? The PCP fax number the form should go to is [phone].    TRENA HuffmanN, RN- Coumadin Clinic RN

## 2021-12-01 NOTE — TELEPHONE ENCOUNTER
Hi-sorry I have nothing here.  Never received a fax, could they resend it?  Any help you can give me would be great

## 2021-12-01 NOTE — TELEPHONE ENCOUNTER
See TE 10/15, It appears the wrong fax number was in the notes, so this will be faxed again to PCP to get this going.     Oxana Smith, BSN, RN- Coumadin Clinic RN

## 2021-12-01 NOTE — TELEPHONE ENCOUNTER
Would someone please send this message to the Coumadin team.  I have not received any information about a home INR monitoring prescription.  I am happy to sign off on it if that is an option, thanks

## 2021-12-02 NOTE — TELEPHONE ENCOUNTER
Fax number that it was previously sent to was   (f) [phone]        Received signed order. Faxing to [facility] with insurance and demographics

## 2021-12-06 ENCOUNTER — LAB (OUTPATIENT)
Dept: LAB | Facility: CLINIC | Age: 81
End: 2021-12-06
Payer: COMMERCIAL

## 2021-12-06 ENCOUNTER — ANTICOAGULATION THERAPY VISIT (OUTPATIENT)
Dept: ANTICOAGULATION | Facility: CLINIC | Age: 81
End: 2021-12-06

## 2021-12-06 DIAGNOSIS — Z79.01 LONG TERM CURRENT USE OF ANTICOAGULANT THERAPY: ICD-10-CM

## 2021-12-06 DIAGNOSIS — I48.91 ATRIAL FIBRILLATION (H): ICD-10-CM

## 2021-12-06 DIAGNOSIS — I48.91 ATRIAL FIBRILLATION, UNSPECIFIED TYPE (H): ICD-10-CM

## 2021-12-06 DIAGNOSIS — I48.91 ATRIAL FIBRILLATION (H): Primary | ICD-10-CM

## 2021-12-06 LAB — INR BLD: 3 (ref 0.9–1.1)

## 2021-12-06 PROCEDURE — 85610 PROTHROMBIN TIME: CPT

## 2021-12-06 PROCEDURE — 36416 COLLJ CAPILLARY BLOOD SPEC: CPT

## 2021-12-06 NOTE — PROGRESS NOTES
ANTICOAGULATION MANAGEMENT     Unique Ward 81 year old female is on warfarin with therapeutic INR result. (Goal INR 2.0-3.0)    Recent labs: (last 7 days)     12/06/21  1045   INR 3.0*       ASSESSMENT     Source(s): Chart Review I left a detailed voicemail with the orders below. I have also requested a call back if there have been any missed doses, concerns, illness, fever, or if there have been any changes in medications, activity level, or diet        Warfarin doses taken: Warfarin taken as instructed    Diet: No new diet changes identified    New illness, injury, or hospitalization: No    Medication/supplement changes: None noted    Signs or symptoms of bleeding or clotting: No    Previous INR: Subtherapeutic    Additional findings: None     PLAN     Recommended plan for no diet, medication or health factor changes affecting INR     Dosing Instructions: Continue your current warfarin dose with next INR in 1 week       Summary  As of 12/6/2021    Full warfarin instructions:  7.5 mg every Sat; 10 mg all other days   Next INR check:  12/13/2021             Detailed voice message left for Unique with dosing instructions and follow up date.     Contact [phone]  to schedule and with any changes, questions or concerns.     Education provided: Please call back if any changes to your diet, medications or how you've been taking warfarin, Goal range and significance of current result and Importance of following up at instructed interval    Plan made per [facility] anticoagulation protocol    Veronika Campbell, RN  Anticoagulation Clinic  12/6/2021    _______________________________________________________________________     Anticoagulation Episode Summary     Current INR goal:  2.0-3.0   TTR:  39.4 % (1 y)   Target end date:  Indefinite   Send INR reminders to:  [facility]    Indications    Atrial fibrillation (H) [I48.91]  Long term current use of anticoagulant therapy [Z79.01]  Atrial fibrillation  unspecified  type (H) [I48.91]           Comments:           Anticoagulation Care Providers     Provider Role Specialty Phone number    Tyler Lee MD [facility] Family Medicine [phone]

## 2021-12-07 ENCOUNTER — TELEPHONE (OUTPATIENT)
Dept: ALLERGY | Facility: CLINIC | Age: 81
End: 2021-12-07
Payer: COMMERCIAL

## 2021-12-07 NOTE — TELEPHONE ENCOUNTER
Left message letting patient know we are unable to do any drug testing at this time. We can reschedule her for the next available appointment.    DIANA Krishna

## 2021-12-08 ENCOUNTER — PRE VISIT (OUTPATIENT)
Dept: ALLERGY | Facility: CLINIC | Age: 81
End: 2021-12-08

## 2021-12-13 ENCOUNTER — TELEPHONE (OUTPATIENT)
Dept: FAMILY MEDICINE | Facility: CLINIC | Age: 81
End: 2021-12-13
Payer: COMMERCIAL

## 2021-12-13 NOTE — TELEPHONE ENCOUNTER
Reason for Call:  Other Anticoagulation    Detailed comments: Aaliyah called and needs dosing for her Warfarin for tonight. She also has an appointment for tomorrow 12/14 at 8:50am. She was wondering if she could come around 12:00 instead. Please call    Phone Number Patient can be reached at: Home number on file [phone] (home)    Best Time: Anytime    Can we leave a detailed message on this number? YES    Call taken on 12/13/2021 at 11:46 AM by Esperanza Gardiner

## 2021-12-14 ENCOUNTER — LAB (OUTPATIENT)
Dept: LAB | Facility: CLINIC | Age: 81
End: 2021-12-14
Payer: COMMERCIAL

## 2021-12-14 ENCOUNTER — ANTICOAGULATION THERAPY VISIT (OUTPATIENT)
Dept: ANTICOAGULATION | Facility: CLINIC | Age: 81
End: 2021-12-14

## 2021-12-14 DIAGNOSIS — Z79.01 LONG TERM CURRENT USE OF ANTICOAGULANT THERAPY: ICD-10-CM

## 2021-12-14 DIAGNOSIS — I48.91 ATRIAL FIBRILLATION (H): ICD-10-CM

## 2021-12-14 DIAGNOSIS — I48.91 ATRIAL FIBRILLATION, UNSPECIFIED TYPE (H): ICD-10-CM

## 2021-12-14 DIAGNOSIS — I48.91 ATRIAL FIBRILLATION, UNSPECIFIED TYPE (H): Primary | ICD-10-CM

## 2021-12-14 LAB — INR BLD: 3.1 (ref 0.9–1.1)

## 2021-12-14 PROCEDURE — 36416 COLLJ CAPILLARY BLOOD SPEC: CPT

## 2021-12-14 PROCEDURE — 85610 PROTHROMBIN TIME: CPT

## 2021-12-14 NOTE — PROGRESS NOTES
Anticoagulation Management    Unable to reach pt today.    Today's INR result of 3.1 is supratherapeutic (goal INR of 2.0-3.0).  Result received from: Clinic Lab    Follow up required to confirm warfarin dose taken and assess for changes    VM left to call [facility] back. Will try again later.       Anticoagulation clinic to follow up    Neelima Higgins RN

## 2021-12-14 NOTE — PROGRESS NOTES
ANTICOAGULATION MANAGEMENT     Unique Ward 81 year old female is on warfarin with supratherapeutic INR result. (Goal INR 2.0-3.0)    Recent labs: (last 7 days)     12/14/21  1228   INR 3.1*       ASSESSMENT     Source(s): Chart Review and Patient/Caregiver Call       Warfarin doses taken: Warfarin taken as instructed    Diet: Decreased greens/vitamin K in diet; plans to resume previous intake    New illness, injury, or hospitalization: No    Medication/supplement changes: None noted    Signs or symptoms of bleeding or clotting: No    Previous INR: Supratherapeutic    Additional findings: None     PLAN     Recommended plan for temporary change(s) affecting INR     Dosing Instructions: Continue your current warfarin dose with next INR in 2 weeks       Summary  As of 12/14/2021    Full warfarin instructions:  7.5 mg every Sat; 10 mg all other days   Next INR check:  12/28/2021             Telephone call with Unique who verbalizes understanding and agrees to plan    Patient offered & declined to schedule next visit    Education provided: Please call back if any changes to your diet, medications or how you've been taking warfarin, Importance of consistent vitamin K intake, Impact of vitamin K foods on INR, Importance of following up at instructed interval and Monitoring for bleeding signs and symptoms    Plan made per [facility] anticoagulation protocol    Neelima Higgins, RN  Anticoagulation Clinic  12/14/2021    _______________________________________________________________________     Anticoagulation Episode Summary     Current INR goal:  2.0-3.0   TTR:  37.2 % (1 y)   Target end date:  Indefinite   Send INR reminders to:  [facility]    Indications    Atrial fibrillation (H) [I48.91]  Long term current use of anticoagulant therapy [Z79.01]  Atrial fibrillation  unspecified type (H) [I48.91]           Comments:           Anticoagulation Care Providers     Provider Role Specialty Phone number    Tyler Lee  MD [facility] [phone]

## 2021-12-15 ENCOUNTER — TRANSFERRED RECORDS (OUTPATIENT)
Dept: HEALTH INFORMATION MANAGEMENT | Facility: CLINIC | Age: 81
End: 2021-12-15
Payer: COMMERCIAL

## 2021-12-15 ENCOUNTER — ANTICOAGULATION THERAPY VISIT (OUTPATIENT)
Dept: ANTICOAGULATION | Facility: CLINIC | Age: 81
End: 2021-12-15
Payer: COMMERCIAL

## 2021-12-15 DIAGNOSIS — Z79.01 LONG TERM CURRENT USE OF ANTICOAGULANT THERAPY: ICD-10-CM

## 2021-12-15 DIAGNOSIS — I48.91 ATRIAL FIBRILLATION, UNSPECIFIED TYPE (H): Primary | ICD-10-CM

## 2021-12-15 LAB — INR HOME MONITORING: 3.5 (ref 2–3)

## 2021-12-15 NOTE — PROGRESS NOTES
ANTICOAGULATION MANAGEMENT     Unique THOMAS Ed 81 year old female is on warfarin with supratherapeutic INR result. (Goal INR 2.0-3.0)    Recent labs: (last 7 days)     12/15/21  0000   INR 3.50*       ASSESSMENT     Source(s): Chart Review       Warfarin doses taken: Warfarin taken as instructed    Diet: No new diet changes identified    New illness, injury, or hospitalization: No    Medication/supplement changes: None noted    Signs or symptoms of bleeding or clotting: No    Previous INR: Supratherapeutic    Additional findings: [employer] result      PLAN     Recommended plan for no diet, medication or health factor changes affecting INR     Dosing Instructions: Hold tonight, then  Decrease your warfarin dose (3.7% change) with next INR in 1 week       Summary  As of 12/15/2021    Full warfarin instructions:  12/15: Hold; Otherwise 7.5 mg every Mon, Sat; 10 mg all other days   Next INR check:  12/22/2021             Telephone call with Unique who verbalizes understanding and agrees to plan    Patient to recheck with home meter    Education provided: Please call back if any changes to your diet, medications or how you've been taking warfarin and Monitoring for bleeding signs and symptoms    Plan made per [facility] anticoagulation protocol    Neelima Higgins, RN  Anticoagulation Clinic  12/15/2021    _______________________________________________________________________     Anticoagulation Episode Summary     Current INR goal:  2.0-3.0   TTR:  37.0 % (1 y)   Target end date:  Indefinite   Send INR reminders to:  [facility]    Indications    Atrial fibrillation (H) [I48.91]  Long term current use of anticoagulant therapy [Z79.01]  Atrial fibrillation  unspecified type (H) [I48.91]           Comments:           Anticoagulation Care Providers     Provider Role Specialty Phone number    Tyler Lee MD Referring Family Medicine [phone]

## 2021-12-22 ENCOUNTER — VIRTUAL VISIT (OUTPATIENT)
Dept: FAMILY MEDICINE | Facility: CLINIC | Age: 81
End: 2021-12-22
Payer: COMMERCIAL

## 2021-12-22 DIAGNOSIS — N39.0 URINARY TRACT INFECTION WITHOUT HEMATURIA, SITE UNSPECIFIED: Primary | ICD-10-CM

## 2021-12-22 PROCEDURE — 99213 OFFICE O/P EST LOW 20 MIN: CPT | Mod: 95 | Performed by: FAMILY MEDICINE

## 2021-12-22 RX ORDER — PHENAZOPYRIDINE HYDROCHLORIDE 200 MG/1
200 TABLET, FILM COATED ORAL 3 TIMES DAILY PRN
Qty: 21 TABLET | Refills: 0 | Status: SHIPPED | OUTPATIENT
Start: 2021-12-22 | End: 2022-05-11

## 2021-12-22 RX ORDER — NITROFURANTOIN 25; 75 MG/1; MG/1
100 CAPSULE ORAL 2 TIMES DAILY
Qty: 14 CAPSULE | Refills: 0 | Status: SHIPPED | OUTPATIENT
Start: 2021-12-22 | End: 2022-03-20

## 2021-12-22 NOTE — PROGRESS NOTES
Aaliyah is a 81 year old who is being evaluated via a billable telephone visit.      What phone number would you like to be contacted at? [phone]  How would you like to obtain your AVS? MyChart    Assessment & Plan     Urinary tract infection without hematuria, site unspecified    - nitroFURantoin macrocrystal-monohydrate (MACROBID) 100 MG capsule; Take 1 capsule (100 mg) by mouth 2 times daily  - phenazopyridine (PYRIDIUM) 200 MG tablet; Take 1 tablet (200 mg) by mouth 3 times daily as needed for irritation    Jone Ring MD  [facility]   Aaliyah is a 81 year old who presents for the following health issues     HPI     Genitourinary - Female  Onset/Duration: Saturday  Description:   Painful urination (Dysuria): YES- only when not drinking much wter           Frequency: no  Blood in urine (Hematuria): no  Delay in urine (Hesitency): no  Intensity: moderate  Progression of Symptoms:  same  Accompanying Signs & Symptoms:  Fever/chills: no  Flank pain: no  Nausea and vomiting: no  Vaginal symptoms: none  Abdominal/Pelvic Pain: no  History:   History of frequent UTI s: YES  History of kidney stones: no  Sexually Active: no  Possibility of pregnancy: No  Precipitating or alleviating factors: None  Therapies tried and outcome: Increase fluid intake and vaginal cream      Patient states she has her normal UTI symptoms.  She does not have any fever.  She is wondering whether she needs to bring in a urine for she can get on medications.  I did state she does not need to do that if she understands her UTI symptoms which she does.  He is comfortable just starting medication.      Review of Systems   Constitutional, HEENT, cardiovascular, pulmonary, gi and gu systems are negative, except as otherwise noted.      Objective           Vitals:  No vitals were obtained today due to virtual visit.    Physical Exam   healthy, alert and no distress  PSYCH: Alert and oriented times 3; coherent  speech, normal   rate and volume, able to articulate logical thoughts, able   to abstract reason, no tangential thoughts, no hallucinations   or delusions  Her affect is normal  RESP: No cough, no audible wheezing, able to talk in full sentences  Remainder of exam unable to be completed due to telephone visits                Phone call duration: 11 minutes

## 2021-12-27 ENCOUNTER — TELEPHONE (OUTPATIENT)
Dept: FAMILY MEDICINE | Facility: CLINIC | Age: 81
End: 2021-12-27
Payer: COMMERCIAL

## 2021-12-27 ENCOUNTER — ANTICOAGULATION THERAPY VISIT (OUTPATIENT)
Dept: FAMILY MEDICINE | Facility: CLINIC | Age: 81
End: 2021-12-27
Payer: COMMERCIAL

## 2021-12-27 ENCOUNTER — TRANSFERRED RECORDS (OUTPATIENT)
Dept: HEALTH INFORMATION MANAGEMENT | Facility: CLINIC | Age: 81
End: 2021-12-27
Payer: COMMERCIAL

## 2021-12-27 DIAGNOSIS — I48.91 ATRIAL FIBRILLATION, UNSPECIFIED TYPE (H): ICD-10-CM

## 2021-12-27 DIAGNOSIS — Z79.01 LONG TERM CURRENT USE OF ANTICOAGULANT THERAPY: ICD-10-CM

## 2021-12-27 DIAGNOSIS — I48.91 ATRIAL FIBRILLATION (H): Primary | ICD-10-CM

## 2021-12-27 LAB
INR HOME MONITORING: 2.5 (ref 2–3)
INR HOME MONITORING: 2.5 (ref 2–3)

## 2021-12-27 NOTE — TELEPHONE ENCOUNTER
Returned call to patient under anticoagulation encounter.    TRENA FoxN, RN  Anticoagulation Clinic

## 2021-12-27 NOTE — TELEPHONE ENCOUNTER
Reason for Call:  INR    Who is calling?  Patient    Phone number:  [phone]     Fax number:  n/a    Name of caller: Aaliyah    INR Value:  2.5    Are there any other concerns:  Yes: patient says she messed it up     Can we leave a detailed message on this number? YES    Phone number patient can be reached at: Home number on file [phone] (home)      Call taken on 12/27/2021 at 2:44 PM by Isi Downs

## 2021-12-27 NOTE — PROGRESS NOTES
ANTICOAGULATION MANAGEMENT     Unique Ward 81 year old female is on warfarin with therapeutic INR result. (Goal INR 2.0-3.0)    Recent labs: (last 7 days)     12/27/21  0000   INR 2.50  2.50       ASSESSMENT     Source(s): Chart Review and Patient/Caregiver Call       Warfarin doses taken: Warfarin taken as instructed    Diet: No new diet changes identified    New illness, injury, or hospitalization: Yes: UTI    Medication/supplement changes: Macrobid 7 day course (dates: 12/23-12/30) No interaction anticipated    Signs or symptoms of bleeding or clotting: No    Previous INR: Supratherapeutic    Additional findings: None     PLAN     Recommended plan for temporary change(s) affecting INR     Dosing Instructions: Continue your current warfarin dose with next INR in 2 weeks       Summary  As of 12/27/2021    Full warfarin instructions:  7.5 mg every Mon, Sat; 10 mg all other days   Next INR check:  1/10/2022             Telephone call with Unique who verbalizes understanding and agrees to plan    Patient to recheck with home meter    Education provided: Importance of consistent vitamin K intake, Impact of vitamin K foods on INR, Vitamin K content of foods, Goal range and significance of current result, Importance of therapeutic range, Importance of following up at instructed interval and Importance of taking warfarin as instructed    Plan made per [facility] anticoagulation protocol    Bev Mancilla RN  Anticoagulation Clinic  12/27/2021    _______________________________________________________________________     Anticoagulation Episode Summary     Current INR goal:  2.0-3.0   TTR:  35.3 % (1 y)   Target end date:  Indefinite   Send INR reminders to:  [facility]    Indications    Atrial fibrillation (H) [I48.91]  Long term current use of anticoagulant therapy [Z79.01]  Atrial fibrillation  unspecified type (H) [I48.91]           Comments:           Anticoagulation Care Providers     Provider Role Specialty  Phone number    Tyler Lee MD [facility] Medicine [phone]

## 2021-12-27 NOTE — PROGRESS NOTES
Anticoagulation Management    Unable to reach Aaliyah today.    Today's INR result of 2.50 is therapeutic (goal INR of 2.0-3.0).  Result received from: home monitor     Follow up required to confirm warfarin dose taken and assess for changes    Left message to continue current dose of warfarin 7.5 mg tonight.   Yung sent      Anticoagulation clinic to follow up    DIONTE Potts RN, BSN, PHN  Anticoagulation Nurse  [phone]

## 2021-12-27 NOTE — PROGRESS NOTES
"ANTICOAGULATION  MANAGEMENT    Unique Ward received home monitor and has submitted first result.  Reviewed home monitoring program management:      INR testing:    Ordered INR testing frequency is:  2-4 x month    INR should be tested Monday-Friday prior to 2 pm and submitted directly to home monitoring company on day of testing    INR test and monitor review at [facility] required annually for technique observation and quality check.     Venous INR drawn in clinic/lab required if INR > 8    Patient will be discharged from home monitoring if they do not meet requirements of home monitoring company or in clinic check.       Management and Communication with [facility]:    Patient will be called regarding all out of range INRS on the business day result is received for assessment and dosing instructions. If no call received by 4 PM; please contact [facility] at: [phone]    [facility]N typically does not call patient for therapeutic results. Patient is to continue current warfarin maintenance dose and continue testing INR at ordered frequency.     Patient must call and notify [facility] if new medication started, dose missed, s/sx of bleeding or clotting or upcoming procedure    Patient will receive a check in call at least once every 12 weeks if INRs remain in range.      Aaliyah verbalizes understanding and agrees to INR testing and manage by exception communication plan    [employer] Maintenance Modifier: \"Anticoagulation: Home Monitoring\" added to chart    Bev Mancilla RN              "

## 2022-01-03 ENCOUNTER — MYC MEDICAL ADVICE (OUTPATIENT)
Dept: INTERNAL MEDICINE | Facility: CLINIC | Age: 82
End: 2022-01-03
Payer: COMMERCIAL

## 2022-01-03 DIAGNOSIS — E55.9 VITAMIN D DEFICIENCY: ICD-10-CM

## 2022-01-03 DIAGNOSIS — R73.09 ELEVATED GLUCOSE: Primary | ICD-10-CM

## 2022-01-07 ENCOUNTER — LAB (OUTPATIENT)
Dept: LAB | Facility: CLINIC | Age: 82
End: 2022-01-07
Payer: COMMERCIAL

## 2022-01-07 DIAGNOSIS — R73.09 ELEVATED GLUCOSE: ICD-10-CM

## 2022-01-07 DIAGNOSIS — E55.9 VITAMIN D DEFICIENCY: ICD-10-CM

## 2022-01-07 LAB
DEPRECATED CALCIDIOL+CALCIFEROL SERPL-MC: 41 UG/L (ref 20–75)
HBA1C MFR BLD: 6 % (ref 0–5.6)

## 2022-01-07 PROCEDURE — 82306 VITAMIN D 25 HYDROXY: CPT

## 2022-01-07 PROCEDURE — 36415 COLL VENOUS BLD VENIPUNCTURE: CPT

## 2022-01-07 PROCEDURE — 83036 HEMOGLOBIN GLYCOSYLATED A1C: CPT

## 2022-01-10 ENCOUNTER — ANTICOAGULATION THERAPY VISIT (OUTPATIENT)
Dept: ANTICOAGULATION | Facility: CLINIC | Age: 82
End: 2022-01-10
Payer: COMMERCIAL

## 2022-01-10 ENCOUNTER — TRANSFERRED RECORDS (OUTPATIENT)
Dept: HEALTH INFORMATION MANAGEMENT | Facility: CLINIC | Age: 82
End: 2022-01-10
Payer: COMMERCIAL

## 2022-01-10 DIAGNOSIS — Z79.01 LONG TERM CURRENT USE OF ANTICOAGULANT THERAPY: ICD-10-CM

## 2022-01-10 DIAGNOSIS — I48.91 ATRIAL FIBRILLATION, UNSPECIFIED TYPE (H): ICD-10-CM

## 2022-01-10 DIAGNOSIS — I48.91 ATRIAL FIBRILLATION (H): Primary | ICD-10-CM

## 2022-01-10 LAB — INR HOME MONITORING: 1.8 (ref 2–3)

## 2022-01-10 NOTE — PROGRESS NOTES
ANTICOAGULATION MANAGEMENT     Unique GUZMAN Ed 81 year old female is on warfarin with subtherapeutic INR result. (Goal INR 2.0-3.0)    Recent labs: (last 7 days)     01/10/22  0000   INR 1.80*       ASSESSMENT     Source(s): Chart Review and Patient/Caregiver Call       Warfarin doses taken: Warfarin taken as instructed    Diet: Decreased greens/vitamin K in diet; plans to resume previous intake  Wants to try going to back to eating greens. Patient is going to try having 1 cup of broccoli 3 x per week to see what happens to her INR    New illness, injury, or hospitalization: No    Medication/supplement changes: None noted    Signs or symptoms of bleeding or clotting: No    Previous INR: Therapeutic last visit; previously outside of goal range    Additional findings: None     PLAN     Recommended plan for temporary change(s) affecting INR     Dosing Instructions: Booster dose then continue your current warfarin dose with next INR in 1 week       Summary  As of 1/10/2022    Full warfarin instructions:  1/10: 10 mg; Otherwise 7.5 mg every Mon, Sat; 10 mg all other days   Next INR check:  1/17/2022             Telephone call with Unique who agrees to plan and repeated back plan correctly    Patient to recheck with home meter    Education provided: Importance of consistent vitamin K intake, Impact of vitamin K foods on INR and Vitamin K content of foods    Plan made per [facility] anticoagulation protocol    Bev Mancilla RN  Anticoagulation Clinic  1/10/2022    _______________________________________________________________________     Anticoagulation Episode Summary     Current INR goal:  2.0-3.0   TTR:  37.1 % (1 y)   Target end date:  Indefinite   Send INR reminders to:  ALFREDITO BRANHAM    Indications    Atrial fibrillation (H) [I48.91]  Long term current use of anticoagulant therapy [Z79.01]  Atrial fibrillation  unspecified type (H) [I48.91]           Comments:           Anticoagulation Care Providers     Provider  Role Specialty Phone number    Tyler Lee MD Referring Family Medicine [phone]

## 2022-01-18 ENCOUNTER — ANTICOAGULATION THERAPY VISIT (OUTPATIENT)
Dept: FAMILY MEDICINE | Facility: CLINIC | Age: 82
End: 2022-01-18
Payer: COMMERCIAL

## 2022-01-18 DIAGNOSIS — I48.91 ATRIAL FIBRILLATION, UNSPECIFIED TYPE (H): ICD-10-CM

## 2022-01-18 DIAGNOSIS — Z79.01 LONG TERM CURRENT USE OF ANTICOAGULANT THERAPY: Primary | ICD-10-CM

## 2022-01-18 LAB — INR HOME MONITORING: 2.5 (ref 2–3)

## 2022-01-18 NOTE — PROGRESS NOTES
ANTICOAGULATION MANAGEMENT     Unique THOMAS Ed 81 year old female is on warfarin with therapeutic INR result. (Goal INR 2.0-3.0)    Recent labs: (last 7 days)     01/18/22  0000   INR 2.50       ASSESSMENT     Source(s): Chart Review and Patient/Caregiver Call       Warfarin doses taken: Warfarin taken as instructed    Diet: Adding Vit K suppliment at 90 mcg daily to help with Calcium Absorption    New illness, injury, or hospitalization: No    Medication/supplement changes: Vit K Suppliment at 90 mcg daily/has Osteopenia    Signs or symptoms of bleeding or clotting: No    Previous INR: Subtherapeutic    Additional findings: None     PLAN     Recommended plan for temporary change(s) affecting INR     Dosing Instructions: Continue your current warfarin dose with next INR in 1 week       Summary  As of 1/18/2022    Full warfarin instructions:  7.5 mg every Mon, Sat; 10 mg all other days   Next INR check:  1/25/2022             Telephone call with Unique who verbalizes understanding and agrees to plan    Patient to recheck with home meter    Education provided: Please call back if any changes to your diet, medications or how you've been taking warfarin, Impact of vitamin K foods on INR, Vitamin K content of foods, Monitoring for bleeding signs and symptoms and Monitoring for clotting signs and symptoms    Plan made per [facility] anticoagulation protocol    Trisha Lo, RN  Anticoagulation Clinic  1/18/2022    _______________________________________________________________________     Anticoagulation Episode Summary     Current INR goal:  2.0-3.0   TTR:  38.7 % (1 y)   Target end date:  Indefinite   Send INR reminders to:  [facility]    Indications    Atrial fibrillation (H) [I48.91]  Long term current use of anticoagulant therapy [Z79.01]  Atrial fibrillation  unspecified type (H) [I48.91]           Comments:           Anticoagulation Care Providers     Provider Role Specialty Phone number    Tyler Lee  MD [facility] Medicine [phone]

## 2022-01-24 DIAGNOSIS — I48.0 PAROXYSMAL ATRIAL FIBRILLATION (H): ICD-10-CM

## 2022-01-25 RX ORDER — WARFARIN SODIUM 5 MG/1
TABLET ORAL
Qty: 160 TABLET | Refills: 0 | Status: SHIPPED | OUTPATIENT
Start: 2022-01-25 | End: 2022-04-05

## 2022-01-28 ENCOUNTER — ANTICOAGULATION THERAPY VISIT (OUTPATIENT)
Dept: ANTICOAGULATION | Facility: CLINIC | Age: 82
End: 2022-01-28
Payer: COMMERCIAL

## 2022-01-28 DIAGNOSIS — I48.91 ATRIAL FIBRILLATION, UNSPECIFIED TYPE (H): ICD-10-CM

## 2022-01-28 DIAGNOSIS — Z79.01 LONG TERM CURRENT USE OF ANTICOAGULANT THERAPY: ICD-10-CM

## 2022-01-28 DIAGNOSIS — I48.91 ATRIAL FIBRILLATION (H): Primary | ICD-10-CM

## 2022-01-28 LAB — INR HOME MONITORING: 1.7 (ref 2–3)

## 2022-01-28 NOTE — PROGRESS NOTES
ANTICOAGULATION MANAGEMENT     Unique Ward 81 year old female is on warfarin with subtherapeutic INR result. (Goal INR 2.0-3.0)    Recent labs: (last 7 days)     01/28/22  0000   INR 1.70*       ASSESSMENT     Source(s): Chart Review and Patient/Caregiver Call       Warfarin doses taken: Warfarin taken as instructed    Diet: Increased greens/vitamin K in diet; ongoing change. Pt started K supplement 01/18/2022 with 90mcg of K. Pt is also going to start having greens MWF.    New illness, injury, or hospitalization: No    Medication/supplement changes: None noted    Signs or symptoms of bleeding or clotting: No    Previous INR: Therapeutic last visit; previously outside of goal range    Additional findings: None     PLAN     Recommended plan for ongoing change(s) affecting INR     Dosing Instructions: Booster dose then Increase your warfarin dose (12% change) with next INR in 1 week       Summary  As of 1/28/2022    Full warfarin instructions:  1/28: 15 mg; Otherwise 12.5 mg every Fri; 10 mg all other days   Next INR check:  2/4/2022             Telephone call with Unique who verbalizes understanding and agrees to plan    Patient to recheck with home meter    Education provided: Goal range and significance of current result    Plan made per [facility] anticoagulation protocol    Ramírez Arredondo RN  Anticoagulation Clinic  1/28/2022    _______________________________________________________________________     Anticoagulation Episode Summary     Current INR goal:  2.0-3.0   TTR:  40.4 % (1 y)   Target end date:  Indefinite   Send INR reminders to:  [facility]    Indications    Atrial fibrillation (H) [I48.91]  Long term current use of anticoagulant therapy [Z79.01]  Atrial fibrillation  unspecified type (H) [I48.91]           Comments:           Anticoagulation Care Providers     Provider Role Specialty Phone number    Tyler Lee MD Referring Family Medicine [phone]

## 2022-02-07 ENCOUNTER — ANTICOAGULATION THERAPY VISIT (OUTPATIENT)
Dept: ANTICOAGULATION | Facility: CLINIC | Age: 82
End: 2022-02-07
Payer: COMMERCIAL

## 2022-02-07 DIAGNOSIS — Z79.01 LONG TERM CURRENT USE OF ANTICOAGULANT THERAPY: ICD-10-CM

## 2022-02-07 DIAGNOSIS — I48.91 ATRIAL FIBRILLATION, UNSPECIFIED TYPE (H): ICD-10-CM

## 2022-02-07 DIAGNOSIS — I48.91 ATRIAL FIBRILLATION (H): Primary | ICD-10-CM

## 2022-02-07 LAB — INR HOME MONITORING: 2.1 (ref 2–3)

## 2022-02-07 NOTE — PROGRESS NOTES
ANTICOAGULATION MANAGEMENT     Unique Ward 81 year old female is on warfarin with therapeutic INR result. (Goal INR 2.0-3.0)    Recent labs: (last 7 days)     02/07/22  0000   INR 2.10       ASSESSMENT     Source(s): Chart Review and Patient/Caregiver Call       Warfarin doses taken: Less warfarin taken than planned which may be affecting INR    Diet: No new diet changes identified    New illness, injury, or hospitalization: No    Medication/supplement changes: None noted    Signs or symptoms of bleeding or clotting: No    Previous INR: Subtherapeutic    Additional findings: None     PLAN     Recommended plan for no diet, medication or health factor changes affecting INR     Dosing Instructions: Continue your current warfarin dose with next INR in 1 week       Summary  As of 2/7/2022    Full warfarin instructions:  12.5 mg every Fri; 10 mg all other days   Next INR check:  2/14/2022             Telephone call with Unique who verbalizes understanding and agrees to plan    Patient to recheck with home meter    Education provided: Importance of following up at instructed interval    Plan made per [facility] anticoagulation protocol    Oxana Smith RN  Anticoagulation Clinic  2/7/2022    _______________________________________________________________________     Anticoagulation Episode Summary     Current INR goal:  2.0-3.0   TTR:  41.1 % (1 y)   Target end date:  Indefinite   Send INR reminders to:  [facility]    Indications    Atrial fibrillation (H) [I48.91]  Long term current use of anticoagulant therapy [Z79.01]  Atrial fibrillation  unspecified type (H) [I48.91]           Comments:           Anticoagulation Care Providers     Provider Role Specialty Phone number    Tyler Lee MD Referring Family Medicine [phone]

## 2022-02-15 ENCOUNTER — ANTICOAGULATION THERAPY VISIT (OUTPATIENT)
Dept: FAMILY MEDICINE | Facility: CLINIC | Age: 82
End: 2022-02-15
Payer: COMMERCIAL

## 2022-02-15 DIAGNOSIS — I48.91 ATRIAL FIBRILLATION (H): Primary | ICD-10-CM

## 2022-02-15 DIAGNOSIS — Z79.01 LONG TERM CURRENT USE OF ANTICOAGULANT THERAPY: ICD-10-CM

## 2022-02-15 DIAGNOSIS — I48.91 ATRIAL FIBRILLATION, UNSPECIFIED TYPE (H): ICD-10-CM

## 2022-02-15 LAB — INR HOME MONITORING: 2 (ref 2–3)

## 2022-02-15 NOTE — PROGRESS NOTES
ANTICOAGULATION MANAGEMENT     Unique Ward 81 year old female is on warfarin with therapeutic INR result. (Goal INR 2.0-3.0)    Recent labs: (last 7 days)     02/15/22  0000   INR 2.00       ASSESSMENT     Source(s): Chart Review and Patient/Caregiver Call       Warfarin doses taken: Warfarin taken as instructed    Diet: No new diet changes identified    New illness, injury, or hospitalization: Yes: Patient was diagnosed with a urinary tract infection on 2/12/22    Medication/supplement changes: Patient reports she started macrobid on 2/12/22 for a UTI, patient reports she will finish this on 2/19/22. Patient also reports she intends to start taking fish oil this week. This can increase risk of bleeding. Per chart, patient was on macrobid on 12/22/21 for urinary tract infection, and it did not seem to impact INR on 12/27/22.    Signs or symptoms of bleeding or clotting: No    Previous INR: Therapeutic last visit; previously outside of goal range    Additional findings: None     PLAN     Recommended plan for no diet, medication or health factor changes affecting INR     Dosing Instructions: Continue your current warfarin dose with next INR in 6 days       Summary  As of 2/15/2022    Full warfarin instructions:  12.5 mg every Fri; 10 mg all other days   Next INR check:  2/21/2022             Telephone call with Unique who verbalizes understanding and agrees to plan    Patient to recheck with home meter    Education provided: Please call back if any changes to your diet, medications or how you've been taking warfarin, Importance of consistent vitamin K intake, Potential interaction between warfarin and macrobid, fish oil, Monitoring for bleeding signs and symptoms and When to seek medical attention/emergency care    Plan made per [facility] anticoagulation protocol    Georgia Card RN  Anticoagulation Clinic  2/15/2022    _______________________________________________________________________     Anticoagulation Episode  Summary     Current INR goal:  2.0-3.0   TTR:  43.3 % (1 y)   Target end date:  Indefinite   Send INR reminders to:  [facility]    Indications    Atrial fibrillation (H) [I48.91]  Long term current use of anticoagulant therapy [Z79.01]  Atrial fibrillation  unspecified type (H) [I48.91]           Comments:           Anticoagulation Care Providers     Provider Role Specialty Phone number    Tyler Lee MD Referring Family Medicine [phone]

## 2022-02-21 ENCOUNTER — ANTICOAGULATION THERAPY VISIT (OUTPATIENT)
Dept: ANTICOAGULATION | Facility: CLINIC | Age: 82
End: 2022-02-21
Payer: COMMERCIAL

## 2022-02-21 DIAGNOSIS — I48.91 ATRIAL FIBRILLATION (H): Primary | ICD-10-CM

## 2022-02-21 DIAGNOSIS — Z79.01 LONG TERM CURRENT USE OF ANTICOAGULANT THERAPY: ICD-10-CM

## 2022-02-21 DIAGNOSIS — I48.91 ATRIAL FIBRILLATION, UNSPECIFIED TYPE (H): ICD-10-CM

## 2022-02-21 LAB — INR HOME MONITORING: 1.5 (ref 2–3)

## 2022-02-21 NOTE — PROGRESS NOTES
ANTICOAGULATION MANAGEMENT     Unique Ward 81 year old female is on warfarin with subtherapeutic INR result. (Goal INR 2.0-3.0)    Recent labs: (last 7 days)     02/21/22  0000   INR 1.50*       ASSESSMENT     Source(s): Chart Review and Patient/Caregiver Call       Warfarin doses taken: Warfarin taken as instructed    Diet: No new diet changes identified    New illness, injury, or hospitalization: No    Medication/supplement changes: out of Cod liver oil     Signs or symptoms of bleeding or clotting: No    Previous INR: Therapeutic last 2(+) visits    Additional findings: None     PLAN     Recommended plan for no diet, medication or health factor changes affecting INR     Dosing Instructions: Booster dose then Increase your warfarin dose (10.3% change) with next INR in 1 week       Summary  As of 2/21/2022    Full warfarin instructions:  2/21: 15 mg; Otherwise 10 mg every Sun, Tue, Thu; 12.5 mg all other days   Next INR check:  2/28/2022             Telephone call with Unique who verbalizes understanding and agrees to plan    Patient to recheck with home meter    Education provided: None required    Plan made per [facility] anticoagulation protocol    Shi Bennett, RN  Anticoagulation Clinic  2/21/2022    _______________________________________________________________________     Anticoagulation Episode Summary     Current INR goal:  2.0-3.0   TTR:  43.3 % (1 y)   Target end date:  Indefinite   Send INR reminders to:  [facility]    Indications    Atrial fibrillation (H) [I48.91]  Long term current use of anticoagulant therapy [Z79.01]  Atrial fibrillation  unspecified type (H) [I48.91]           Comments:           Anticoagulation Care Providers     Provider Role Specialty Phone number    Tyler Lee MD Referring Family Medicine [phone]

## 2022-02-28 ENCOUNTER — ANTICOAGULATION THERAPY VISIT (OUTPATIENT)
Dept: FAMILY MEDICINE | Facility: CLINIC | Age: 82
End: 2022-02-28
Payer: COMMERCIAL

## 2022-02-28 DIAGNOSIS — I48.91 ATRIAL FIBRILLATION, UNSPECIFIED TYPE (H): ICD-10-CM

## 2022-02-28 DIAGNOSIS — Z79.01 LONG TERM CURRENT USE OF ANTICOAGULANT THERAPY: ICD-10-CM

## 2022-02-28 DIAGNOSIS — I48.91 ATRIAL FIBRILLATION (H): Primary | ICD-10-CM

## 2022-02-28 LAB — INR HOME MONITORING: 3.4 (ref 2–3)

## 2022-02-28 NOTE — PROGRESS NOTES
ANTICOAGULATION MANAGEMENT     Unique THOMAS Ed 81 year old female is on warfarin with supratherapeutic INR result. (Goal INR 2.0-3.0)    Recent labs: (last 7 days)     02/28/22  0000   INR 3.40*       ASSESSMENT       Source(s): Chart Review and Patient/Caregiver Call       Warfarin doses taken: Warfarin taken as instructed    Diet: No new diet changes identified    New illness, injury, or hospitalization: No    Medication/supplement changes: None noted    Signs or symptoms of bleeding or clotting: No    Previous INR: Subtherapeutic    Additional findings: None       PLAN     Recommended plan for no diet, medication or health factor changes affecting INR     Dosing Instructions: Partial hold then continue your current warfarin dose with next INR in 1 week       Summary  As of 2/28/2022    Full warfarin instructions:  2/28: 10 mg; Otherwise 10 mg every Sun, Tue, Thu; 12.5 mg all other days   Next INR check:  3/7/2022             Telephone call with Unique who verbalizes understanding and agrees to plan    Patient to recheck with home meter    Education provided: Please call back if any changes to your diet, medications or how you've been taking warfarin    Plan made per [facility] anticoagulation protocol    Ning Forman, RN  Anticoagulation Clinic  2/28/2022    _______________________________________________________________________     Anticoagulation Episode Summary     Current INR goal:  2.0-3.0   TTR:  44.3 % (1 y)   Target end date:  Indefinite   Send INR reminders to:  [facility]    Indications    Atrial fibrillation (H) [I48.91]  Long term current use of anticoagulant therapy [Z79.01]  Atrial fibrillation  unspecified type (H) [I48.91]           Comments:           Anticoagulation Care Providers     Provider Role Specialty Phone number    Tyler Lee MD Referring Family Medicine [phone]

## 2022-03-02 ENCOUNTER — ANTICOAGULATION THERAPY VISIT (OUTPATIENT)
Dept: FAMILY MEDICINE | Facility: CLINIC | Age: 82
End: 2022-03-02
Payer: COMMERCIAL

## 2022-03-02 ENCOUNTER — MYC MEDICAL ADVICE (OUTPATIENT)
Dept: FAMILY MEDICINE | Facility: CLINIC | Age: 82
End: 2022-03-02
Payer: COMMERCIAL

## 2022-03-02 DIAGNOSIS — I48.91 ATRIAL FIBRILLATION (H): ICD-10-CM

## 2022-03-02 DIAGNOSIS — I48.91 ATRIAL FIBRILLATION, UNSPECIFIED TYPE (H): ICD-10-CM

## 2022-03-02 DIAGNOSIS — Z79.01 LONG TERM CURRENT USE OF ANTICOAGULANT THERAPY: Primary | ICD-10-CM

## 2022-03-02 LAB — INR HOME MONITORING: 4.4 (ref 2–3)

## 2022-03-02 NOTE — PROGRESS NOTES
ANTICOAGULATION MANAGEMENT     Unique GUZMAN Ward 81 year old female is on warfarin with supratherapeutic INR result. (Goal INR 2.0-3.0)    Recent labs: (last 7 days)     03/02/22  0000   INR 4.40*       ASSESSMENT       Source(s): Chart Review and Patient/Caregiver Call       Warfarin doses taken: Warfarin taken as instructed    Diet: No new diet changes identified    New illness, injury, or hospitalization: Yes: UTI    Medication/supplement changes: 800-160 Bactrim x14 days    Signs or symptoms of bleeding or clotting: No    Previous INR: Supratherapeutic    Additional findings: None       PLAN     Recommended plan for temporary change(s) affecting INR     Dosing Instructions: Hold x 1, then partial holds with maint dose reduction. See [facility] notes. with next INR in 5 days       Summary  As of 3/2/2022    Full warfarin instructions:  3/2: Hold; 3/3: 5 mg; 3/4: 7.5 mg; 3/6: 7.5 mg; Otherwise 12.5 mg every Mon, Fri; 10 mg all other days   Next INR check:  3/7/2022             Telephone call with Unique who verbalizes understanding and agrees to plan; also read plan back correctly.  Plan also sent via [employer]    Patient to recheck with home meter    Education provided: Please call back if any changes to your diet, medications or how you've been taking warfarin, Monitoring for bleeding signs and symptoms, Monitoring for clotting signs and symptoms and When to seek medical attention/emergency care    Plan made with [facility] Pharmacist Deonna Lo, RN  Anticoagulation Clinic  3/2/2022    _______________________________________________________________________     Anticoagulation Episode Summary     Current INR goal:  2.0-3.0   TTR:  44.3 % (1 y)   Target end date:  Indefinite   Send INR reminders to:  [facility]    Indications    Atrial fibrillation (H) [I48.91]  Long term current use of anticoagulant therapy [Z79.01]  Atrial fibrillation  unspecified type (H) [I48.91]           Comments:            Anticoagulation Care Providers     Provider Role Specialty Phone number    Tyler Lee MD Referring Family Medicine [phone]

## 2022-03-02 NOTE — TELEPHONE ENCOUNTER
Pt started 800-160 Bactrim x14 days.   She started this on Sunday morning (2/27).   She will recheck INR today on home monitor and call it in  Neelima Higgins RN

## 2022-03-02 NOTE — PROGRESS NOTES
Chart reviewed with [facility] RN at time of encounter.    Based on historical response to SMX/TMP will need dose reduction, also pulling back maintenance dose ~6% for now, since dose was increased last time within protocol, and unable to assess accurately if INR today influenced by dose change and additive effect of infection.      For now reducing dose ~20% by Monday, which would be the farthest out recommended for INR recheck, otherwise recheck 03/04 if patient would like to check prior to weekend.     Deonna Banks, PharmD BCACP  Anticoagulation Clinical Pharmacist

## 2022-03-02 NOTE — PROGRESS NOTES
RPH:   Please review and advise. Patient placed on 800-160 Bactrim x14 days.  Thank you.  Trisha Lo RN  Anticoagulation Nurse - [facility]

## 2022-03-07 ENCOUNTER — ANTICOAGULATION THERAPY VISIT (OUTPATIENT)
Dept: ANTICOAGULATION | Facility: CLINIC | Age: 82
End: 2022-03-07
Payer: COMMERCIAL

## 2022-03-07 DIAGNOSIS — Z79.01 LONG TERM CURRENT USE OF ANTICOAGULANT THERAPY: ICD-10-CM

## 2022-03-07 DIAGNOSIS — I48.91 ATRIAL FIBRILLATION, UNSPECIFIED TYPE (H): ICD-10-CM

## 2022-03-07 DIAGNOSIS — I48.91 ATRIAL FIBRILLATION (H): Primary | ICD-10-CM

## 2022-03-07 LAB — INR HOME MONITORING: 2 (ref 2–3)

## 2022-03-07 NOTE — PROGRESS NOTES
ANTICOAGULATION MANAGEMENT     Unique GUZMAN Ed 81 year old female is on warfarin with therapeutic INR result. (Goal INR 2.0-3.0)    Recent labs: (last 7 days)     03/07/22  0000   INR 2.00       ASSESSMENT       Source(s): Chart Review and Patient/Caregiver Call       Warfarin doses taken: Warfarin taken as instructed    Diet: No new diet changes identified    New illness, injury, or hospitalization: No    Medication/supplement changes: Patient finished bactrim on 3/6/22    Signs or symptoms of bleeding or clotting: No    Previous INR: Supratherapeutic    Additional findings: None       PLAN     Recommended plan for temporary change(s) affecting INR     Dosing Instructions: Continue your current warfarin dose with next INR in 3 days       Summary  As of 3/7/2022    Full warfarin instructions:  12.5 mg every Mon, Fri; 10 mg all other days   Next INR check:  3/10/2022             Telephone call with Unique who verbalizes understanding and agrees to plan    Patient to recheck with home meter    Education provided: Please call back if any changes to your diet, medications or how you've been taking warfarin    Plan made per [facility] anticoagulation protocol    Ning Forman, RN  Anticoagulation Clinic  3/7/2022    _______________________________________________________________________     Anticoagulation Episode Summary     Current INR goal:  2.0-3.0   TTR:  44.9 % (1 y)   Target end date:  Indefinite   Send INR reminders to:  [facility]    Indications    Atrial fibrillation (H) [I48.91]  Long term current use of anticoagulant therapy [Z79.01]  Atrial fibrillation  unspecified type (H) [I48.91]           Comments:           Anticoagulation Care Providers     Provider Role Specialty Phone number    Tyler Lee MD Referring Family Medicine [phone]

## 2022-03-10 ENCOUNTER — ANTICOAGULATION THERAPY VISIT (OUTPATIENT)
Dept: ANTICOAGULATION | Facility: CLINIC | Age: 82
End: 2022-03-10
Payer: COMMERCIAL

## 2022-03-10 DIAGNOSIS — I48.91 ATRIAL FIBRILLATION (H): Primary | ICD-10-CM

## 2022-03-10 DIAGNOSIS — I48.91 ATRIAL FIBRILLATION, UNSPECIFIED TYPE (H): ICD-10-CM

## 2022-03-10 DIAGNOSIS — Z79.01 LONG TERM CURRENT USE OF ANTICOAGULANT THERAPY: ICD-10-CM

## 2022-03-10 LAB — INR HOME MONITORING: 2 (ref 2–3)

## 2022-03-10 NOTE — PROGRESS NOTES
ANTICOAGULATION MANAGEMENT     Unique Ward 81 year old female is on warfarin with therapeutic INR result. (Goal INR 2.0-3.0)    Recent labs: (last 7 days)     03/10/22  0000   INR 2.00       ASSESSMENT       Source(s): Chart Review and Patient/Caregiver Call       Warfarin doses taken: Warfarin taken as instructed    Diet: No new diet changes identified. Patient reports in the future, she may start drinking 1/2 glass of cranberry juice daily to help prevent UTIs. Patient is aware that this can increase INR.    New illness, injury, or hospitalization: No    Medication/supplement changes: None noted    Signs or symptoms of bleeding or clotting: No    Previous INR: Therapeutic last visit; previously outside of goal range    Additional findings: None       PLAN     Recommended plan for no diet, medication or health factor changes affecting INR     Dosing Instructions: Continue your current warfarin dose with next INR in 3 days. Patient would like to recheck Monday to get back on her Monday schedule.       Summary  As of 3/10/2022    Full warfarin instructions:  12.5 mg every Mon, Fri; 10 mg all other days   Next INR check:  3/14/2022             Telephone call with Unique who verbalizes understanding and agrees to plan    Patient to recheck with home meter    Education provided: Please call back if any changes to your diet, medications or how you've been taking warfarin, Monitoring for bleeding signs and symptoms and Monitoring for clotting signs and symptoms    Plan made per [facility] anticoagulation protocol    Georgia Card RN  Anticoagulation Clinic  3/10/2022    _______________________________________________________________________     Anticoagulation Episode Summary     Current INR goal:  2.0-3.0   TTR:  45.7 % (1 y)   Target end date:  Indefinite   Send INR reminders to:  ALFREDITO BRANHAM    Indications    Atrial fibrillation (H) [I48.91]  Long term current use of anticoagulant therapy [Z79.01]  Atrial  fibrillation  unspecified type (H) [I48.91]           Comments:           Anticoagulation Care Providers     Provider Role Specialty Phone number    Tyler Lee MD Referring Family Medicine [phone]

## 2022-03-14 ENCOUNTER — ANTICOAGULATION THERAPY VISIT (OUTPATIENT)
Dept: ANTICOAGULATION | Facility: CLINIC | Age: 82
End: 2022-03-14
Payer: COMMERCIAL

## 2022-03-14 DIAGNOSIS — Z79.01 LONG TERM CURRENT USE OF ANTICOAGULANT THERAPY: ICD-10-CM

## 2022-03-14 DIAGNOSIS — I48.91 ATRIAL FIBRILLATION (H): Primary | ICD-10-CM

## 2022-03-14 DIAGNOSIS — I48.91 ATRIAL FIBRILLATION, UNSPECIFIED TYPE (H): ICD-10-CM

## 2022-03-14 LAB — INR HOME MONITORING: 2 (ref 2–3)

## 2022-03-14 NOTE — PROGRESS NOTES
ANTICOAGULATION MANAGEMENT     Unique GUZMAN Ed 81 year old female is on warfarin with therapeutic INR result. (Goal INR 2.0-3.0)    Recent labs: (last 7 days)     03/14/22  0000   INR 2.00       ASSESSMENT       Source(s): Chart Review and Patient/Caregiver Call       Warfarin doses taken: Warfarin taken as instructed    Diet: no changes    New illness, injury, or hospitalization: No    Medication/supplement changes: Patient taking 500 mg of Cranberry supplement    Signs or symptoms of bleeding or clotting: No    Previous INR: Therapeutic last 2(+) visits    Additional findings: Patient is concerned that strips are not okay, she has had 3 2.0 test results and is concerned with accuracy. She is having new strips coming and will recheck with those.       PLAN     Recommended plan for no diet, medication or health factor changes affecting INR     Dosing Instructions: Continue your current warfarin dose with next INR in 1 week       Summary  As of 3/14/2022    Full warfarin instructions:  12.5 mg every Mon, Fri; 10 mg all other days   Next INR check:  3/28/2022             Telephone call with Unique who verbalizes understanding and agrees to plan    Patient to recheck with home meter    Education provided: Please call back if any changes to your diet, medications or how you've been taking warfarin    Plan made per [facility] anticoagulation protocol    Ning Forman, RN  Anticoagulation Clinic  3/14/2022    _______________________________________________________________________     Anticoagulation Episode Summary     Current INR goal:  2.0-3.0   TTR:  45.7 % (1 y)   Target end date:  Indefinite   Send INR reminders to:  [facility]    Indications    Atrial fibrillation (H) [I48.91]  Long term current use of anticoagulant therapy [Z79.01]  Atrial fibrillation  unspecified type (H) [I48.91]           Comments:           Anticoagulation Care Providers     Provider Role Specialty Phone number    Tyler Lee MD  [facility] [phone]

## 2022-03-20 ENCOUNTER — HOSPITAL ENCOUNTER (EMERGENCY)
Facility: CLINIC | Age: 82
Discharge: HOME | End: 2022-03-20
Attending: FAMILY MEDICINE | Admitting: FAMILY MEDICINE
Payer: MEDICARE

## 2022-03-20 VITALS
TEMPERATURE: 97.6 F | BODY MASS INDEX: 20.68 KG/M2 | OXYGEN SATURATION: 98 % | WEIGHT: 136 LBS | SYSTOLIC BLOOD PRESSURE: 142 MMHG | RESPIRATION RATE: 16 BRPM | DIASTOLIC BLOOD PRESSURE: 76 MMHG | HEART RATE: 74 BPM

## 2022-03-20 DIAGNOSIS — N39.0 URINARY TRACT INFECTION WITHOUT HEMATURIA, SITE UNSPECIFIED: ICD-10-CM

## 2022-03-20 DIAGNOSIS — H61.21 CERUMINOSIS, RIGHT: ICD-10-CM

## 2022-03-20 LAB
ALBUMIN UR-MCNC: 30 MG/DL
APPEARANCE UR: ABNORMAL
BILIRUB UR QL STRIP: NEGATIVE
COLOR UR AUTO: YELLOW
GLUCOSE UR STRIP-MCNC: NEGATIVE MG/DL
HGB UR QL STRIP: NEGATIVE
KETONES UR STRIP-MCNC: NEGATIVE MG/DL
LEUKOCYTE ESTERASE UR QL STRIP: ABNORMAL
NITRATE UR QL: NEGATIVE
PH UR STRIP: 6 [PH] (ref 5–7)
RBC URINE: 15 /HPF
SP GR UR STRIP: 1.01 (ref 1–1.03)
UROBILINOGEN UR STRIP-MCNC: NORMAL MG/DL
WBC CLUMPS #/AREA URNS HPF: PRESENT /HPF
WBC URINE: >182 /HPF

## 2022-03-20 PROCEDURE — 87086 URINE CULTURE/COLONY COUNT: CPT | Performed by: EMERGENCY MEDICINE

## 2022-03-20 PROCEDURE — 81001 URINALYSIS AUTO W/SCOPE: CPT | Performed by: EMERGENCY MEDICINE

## 2022-03-20 PROCEDURE — 99284 EMERGENCY DEPT VISIT MOD MDM: CPT | Mod: 25 | Performed by: FAMILY MEDICINE

## 2022-03-20 PROCEDURE — 69209 REMOVE IMPACTED EAR WAX UNI: CPT | Mod: 50 | Performed by: FAMILY MEDICINE

## 2022-03-20 PROCEDURE — 99283 EMERGENCY DEPT VISIT LOW MDM: CPT | Mod: 25 | Performed by: FAMILY MEDICINE

## 2022-03-20 RX ORDER — NITROFURANTOIN 25; 75 MG/1; MG/1
100 CAPSULE ORAL 2 TIMES DAILY
Qty: 14 CAPSULE | Refills: 0 | Status: SHIPPED | OUTPATIENT
Start: 2022-03-20 | End: 2022-05-11

## 2022-03-21 ENCOUNTER — DOCUMENTATION ONLY (OUTPATIENT)
Dept: ANTICOAGULATION | Facility: CLINIC | Age: 82
End: 2022-03-21
Payer: COMMERCIAL

## 2022-03-21 ENCOUNTER — ANTICOAGULATION THERAPY VISIT (OUTPATIENT)
Dept: FAMILY MEDICINE | Facility: CLINIC | Age: 82
End: 2022-03-21
Payer: COMMERCIAL

## 2022-03-21 DIAGNOSIS — I48.91 ATRIAL FIBRILLATION, UNSPECIFIED TYPE (H): ICD-10-CM

## 2022-03-21 DIAGNOSIS — Z79.01 LONG TERM CURRENT USE OF ANTICOAGULANT THERAPY: Primary | ICD-10-CM

## 2022-03-21 LAB — INR HOME MONITORING: 2.5 (ref 2–3)

## 2022-03-21 NOTE — ED NOTES
Per triage nurse, patient collected urine sample at noon today and had it in the refrigerator. Brought with her to ED visit to r/o UTI.

## 2022-03-21 NOTE — DISCHARGE INSTRUCTIONS
You have a urinary tract infection with a large amount of white blood cells.  Begin Macrobid 100 mg twice a day for 7 days.  Continue to push lots of water.  We were able to remove the wax from the right ear, and wash out some wax from the left ear.  Please see the attached handouts.  Follow-up in the clinic in 3 days if not improving.

## 2022-03-21 NOTE — PROGRESS NOTES
ANTICOAGULATION MANAGEMENT     Unique Ward 81 year old female is on warfarin with therapeutic INR result. (Goal INR 2.0-3.0)    Recent labs: (last 7 days)     03/21/22  0000   INR 2.50       ASSESSMENT       Source(s): Chart Review    Previous INR was Therapeutic last 2(+) visits    Medication, diet, health changes since last INR patient in ER today. UTI; patient prescribed Macrobid which doesn't interact with Warfarin per up to date           PLAN     Unable to reach Aaliyah today.    Left message to 12.5 this weekend. Request call back for assessment.    Follow up required to confirm warfarin dose taken and assess for changes    Paola Tiwari, RN  Anticoagulation Clinic  3/21/2022

## 2022-03-21 NOTE — ED PROVIDER NOTES
ED Provider Note     Unique Ward  [id number]  March 20, 2022      CC:     Chief Complaint   Patient presents with     Rule out Urinary Tract Infection          History is obtained from the patient.    HPI: Unique Ward is a 81 year old female presenting with urinary frequency, and cloudy urine since this morning.  Patient does not have any fever, chills, nausea, abdominal or flank pain.  Patient has frequent urinary tract infections with 2 episodes last month.  Patient tries to drink a couple of quarts of water per day but she prefers coffee.  She had slight dizziness earlier in the day and her right ear briefly popped.  She does not have any ear pain but has tendency to have cerumen collection.  She has no pain currently.  Patient is on warfarin for chronic atrial fibrillation.  She is allergic to Keflex.    PMH/Problem List:   Past Medical History:   Diagnosis Date     Arthritis 01.01.1980     Atrial fibrillation (H) 6/7/2019     Cancer (H) 01.01.1993     scalp     Depressive disorder 01.01.1977     Hypertension 03.16.2016       PSH:   Past Surgical History:   Procedure Laterality Date     BIOPSY  11/01/2014     COLONOSCOPY  01.01/1979    Two routine,  one for chronic diarrhea     EP PACEMAKER N/A 3/31/2020    Procedure: EP Pacemaker;  Surgeon: Erich Clark MD;  Location:  [facility]     EYE SURGERY  1/1/2014    first    cataracts both eyes.  second a few months later     GENITOURINARY SURGERY       GYN SURGERY  01.01.1977    TL     HEAD & NECK SURGERY  01.01.1997    basal cell X2   Moh's on scalp.  other on bridge of nose     LASER YAG CAPSULOTOMY Bilateral 4/1/2021    Procedure: CAPSULOTOMY, LENS, USING YAG LASER;  Surgeon: Bernardino Chu MD;  Location:  OR     SOFT TISSUE SURGERY  01.01.1990    Ganglion Cyst   Left inner wrist       MEDS: No current facility-administered medications on file prior to  encounter.  amLODIPine (NORVASC) 10 MG tablet, Take 1 tablet (10 mg) by mouth daily  azelastine (ASTELIN) 0.1 % nasal spray, Spray 2 sprays into both nostrils 2 times daily as needed for rhinitis  cholecalciferol (VITAMIN D3) 5000 units TABS tablet, Take 5,000 mcg by mouth daily   EPINEPHrine (ANY BX GENERIC EQUIV) 0.3 MG/0.3ML injection 2-pack, Inject 0.3 mLs (0.3 mg) into the muscle once as needed for anaphylaxis  estradiol (ESTRACE) 0.1 MG/GM vaginal cream, Place 0.5 g vaginally twice a week THIS IS A ONE TIME [place] REFILL. YOU MUST CALL TO MAKE AN APPOINTMENT FOR FUTURE FILLS.  magnesium 100 MG TABS, Take 1 tablet by mouth daily Takes 2 tabs (300mg) daily  meclizine (ANTIVERT) 25 MG tablet, Take 1 tablet (25 mg) by mouth 3 times daily as needed for dizziness  phenazopyridine (PYRIDIUM) 200 MG tablet, Take 1 tablet (200 mg) by mouth 3 times daily as needed for irritation  warfarin ANTICOAGULANT (COUMADIN) 5 MG tablet, TAKE 1 & 1/2 TABLETS BY MOUTH ON Monday AND Saturday AND 2 TABLETS ON ALL OTHER DAYS OR AS DIRECTED BY COUMADIN CLINIC  Zinc Sulfate (ZINC 15 PO),         Allergies: Keflex [cephalexin], No clinical screening - see comments, Codeine, Gentamicin, Influenza vaccines, Influenza virus vaccine h5n1, Paxil [paroxetine], Typhoid vaccine-strain ty21a, and Typhoid vaccines    Triage and nursing notes were reviewed.    ROS: All other systems were reviewed and are negative    Physical Exam:  Vitals:    03/20/22 1915   BP: (!) 142/76   Pulse: 74   Resp: 16   Temp: 97.6  F (36.4  C)   TempSrc: Oral   SpO2: 98%   Weight: 61.7 kg (136 lb)     GENERAL APPEARANCE: Alert and oriented x3; pleasant female, no distress  HEAD: atraumatic  EYES: PERRL, EOMI  HENT: Right outer ear canal with complete occlusion from cerumen  NECK: no adenopathy or masses, trachea is midline  RESP: lungs clear to auscultation - no rales, rhonchi or wheezes  CV: regular rate and rhythm, no significant murmurs or rubs  ABDOMEN: soft,  nontender, no CVA tenderness; no masses with normal bowel sounds  SKIN: no rash; as above  NEURO: mentation and speech normal; no focal deficits    Labs/Imaging Results:  Results for orders placed or performed during the hospital encounter of 03/20/22 (from the past 24 hour(s))   UA with Microscopic reflex to Culture    Specimen: Urine, Clean Catch   Result Value Ref Range    Color Urine Yellow Colorless, Straw, Light Yellow, Yellow    Appearance Urine Cloudy (A) Clear    Glucose Urine Negative Negative mg/dL    Bilirubin Urine Negative Negative    Ketones Urine Negative Negative mg/dL    Specific Gravity Urine 1.013 1.003 - 1.035    Blood Urine Negative Negative    pH Urine 6.0 5.0 - 7.0    Protein Albumin Urine 30  (A) Negative mg/dL    Urobilinogen Urine Normal Normal, 2.0 mg/dL    Nitrite Urine Negative Negative    Leukocyte Esterase Urine Large (A) Negative    WBC Clumps Urine Present (A) None Seen /HPF    RBC Urine 15 (H) <=2 /HPF    WBC Urine >182 (H) <=5 /HPF    Narrative    Urine Culture ordered based on laboratory criteria           Impression:  Final diagnoses:   Urinary tract infection   Ceruminosis, right         ED Course & Medical Decision Making (Plan):  Unique Ward is a 81 year old female with 1 day history of urinary tract infections with cloudy urine and burning with urination.  She does not have any fever, chills, nausea, vomiting, flank, back or abdominal pain.  She gets frequent urinary tract infections and typically gets this treated right away.  She is on chronic anticoagulation with warfarin for chronic atrial fibrillation.  She is allergic to Keflex.  She knows that she usually responds to Macrobid or Bactrim but one of them affects her INR levels.  She also has unexplained dizziness and a brief popping in the right ear earlier today.  She would like to have her right ear checked.  Vital signs are noted above.  Blood pressure is 142/76, temperature is 97.6.  Patient has significant  ceruminosis in the right external canal.  The rest of her exam was unremarkable.  She has some right low back tenderness but not over the flank region.  Urinalysis reveals greater than 182 white blood cells and 15 red blood cells.  Negative nitrites.  Patient will begin Macrobid twice a day for 7 days.  She is to push lots of fluids.  Home care for ceruminosis was discussed.  Follow-up in 3 days with Dr. Lee if not improving.    Written after-visit summary and instructions were given at the time of discharge.          Follow Up Plan:  Tyler Lee MD  [address] [place] [address] [facility] [address]  [phone]    In 3 days  if not improving        Discharge Instructions:  You have a urinary tract infection with a large amount of white blood cells.  Begin Macrobid 100 mg twice a day for 7 days.  Continue to push lots of water.  We were able to remove the wax from the right ear, and wash out some wax from the left ear.  Please see the attached handouts.  Follow-up in the clinic in 3 days if not improving.        Disclaimer: This note consists of words and symbols derived from keyboarding and dictation using voice recognition software.  As a result, there may be errors that have gone undetected.  Please consider this when interpreting information found in this note.       Charly Martinez MD  03/20/22 1951

## 2022-03-21 NOTE — PROGRESS NOTES
ANTICOAGULATION  MANAGEMENT: Discharge Review    Unique Ward chart reviewed for anticoagulation continuity of care    Emergency room visit on 3/20/22 for UTI,ceruminosis.    Discharge disposition: Home    Results:    Recent labs: (last 7 days)     03/21/22  0000   INR 2.50     Anticoagulation inpatient management:     not applicable     Anticoagulation discharge instructions:     Warfarin dosing: home regimen continued   Bridging: No   INR goal change: No      Medication changes affecting anticoagulation: No    Additional factors affecting anticoagulation: Yes: improvement in UTI symptoms could lower INR (inflammation)     Plan     No adjustment to anticoagulation plan needed    patient has already tested INR today   INR   Date Value Ref Range Status   04/28/2021 1.61 (H) 0.86 - 1.14 Final     INR Point of Care   Date Value Ref Range Status   07/02/2021 1.8 (H) 0.86 - 1.14 Final     Comment:     This test is intended for monitoring Coumadin therapy.  Results are not   accurate in patients with prolonged INR due to factor deficiency.       INR HOME MONITORING   Date Value Ref Range Status   03/21/2022 2.50 2.000 - 3.000 Final          No adjustment to Anticoagulation Calendar was required    DIONTE Potts, RN, BSN, PHN  Anticoagulation Nurse  [phone]

## 2022-03-22 LAB — BACTERIA UR CULT: ABNORMAL

## 2022-03-22 NOTE — PROGRESS NOTES
ANTICOAGULATION MANAGEMENT     Unique Ward 81 year old female is on warfarin with therapeutic INR result. (Goal INR 2.0-3.0)    Recent labs: (last 7 days)     03/21/22  0000   INR 2.50       ASSESSMENT       Source(s): Chart Review    Previous INR was Therapeutic last 2(+) visits    Medication, diet, health changes since last INR Source(s): Chart Review    Previous INR was Therapeutic last 2(+) visits    Medication, diet, health changes since last INR patient in ER today. UTI; patient prescribed Macrobid which doesn't interact with Warfarin per up to date               PLAN     Recommended plan for temporary change(s) affecting INR     Dosing Instructions: Continue your current warfarin dose with next INR in 1 week       Summary  As of 3/21/2022    Full warfarin instructions:  12.5 mg every Mon, Fri; 10 mg all other days   Next INR check:  3/28/2022             Detailed voice message left for Unique with dosing instructions and follow up date.     Patient to recheck with home meter    Education provided: Please call back if any changes to your diet, medications or how you've been taking warfarin    Plan made per [facility] anticoagulation protocol    Paola Tiwari, RN  Anticoagulation Clinic  3/22/2022    _______________________________________________________________________     Anticoagulation Episode Summary     Current INR goal:  2.0-3.0   TTR:  45.5 % (1 y)   Target end date:  Indefinite   Send INR reminders to:  ALFREDITO BRANHAM    Indications    Atrial fibrillation (H) [I48.91]  Long term current use of anticoagulant therapy [Z79.01]  Atrial fibrillation  unspecified type (H) [I48.91]           Comments:           Anticoagulation Care Providers     Provider Role Specialty Phone number    Tyler Lee MD Referring Family Medicine [phone]

## 2022-03-28 ENCOUNTER — ANTICOAGULATION THERAPY VISIT (OUTPATIENT)
Dept: ANTICOAGULATION | Facility: CLINIC | Age: 82
End: 2022-03-28
Payer: COMMERCIAL

## 2022-03-28 DIAGNOSIS — Z79.01 LONG TERM CURRENT USE OF ANTICOAGULANT THERAPY: ICD-10-CM

## 2022-03-28 DIAGNOSIS — I48.91 ATRIAL FIBRILLATION, UNSPECIFIED TYPE (H): ICD-10-CM

## 2022-03-28 DIAGNOSIS — I48.91 ATRIAL FIBRILLATION (H): Primary | ICD-10-CM

## 2022-03-28 LAB — INR HOME MONITORING: 2.2 (ref 2–3)

## 2022-03-28 NOTE — PROGRESS NOTES
ANTICOAGULATION MANAGEMENT     Unique Ward 81 year old female is on warfarin with therapeutic INR result. (Goal INR 2.0-3.0)    Recent labs: (last 7 days)     03/28/22  0000   INR 2.20       ASSESSMENT       Source(s): Chart Review and Patient/Caregiver Call       Warfarin doses taken: Warfarin taken as instructed    Diet: No new diet changes identified    New illness, injury, or hospitalization: No    Medication/supplement changes: None noted    Signs or symptoms of bleeding or clotting: No    Previous INR: Therapeutic last 2(+) visits    Additional findings: None       PLAN     Recommended plan for no diet, medication or health factor changes affecting INR     Dosing Instructions: Continue your current warfarin dose with next INR in 2 weeks       Summary  As of 3/28/2022    Full warfarin instructions:  12.5 mg every Mon, Fri; 10 mg all other days   Next INR check:  4/11/2022             Telephone call with Unique who verbalizes understanding and agrees to plan    Patient to recheck with home meter    Education provided: Please call back if any changes to your diet, medications or how you've been taking warfarin    Plan made per [facility] anticoagulation protocol    Ning Forman RN  Anticoagulation Clinic  3/28/2022    _______________________________________________________________________     Anticoagulation Episode Summary     Current INR goal:  2.0-3.0   TTR:  45.7 % (1 y)   Target end date:  Indefinite   Send INR reminders to:  ALFREDITO BRANHAM    Indications    Atrial fibrillation (H) [I48.91]  Long term current use of anticoagulant therapy [Z79.01]  Atrial fibrillation  unspecified type (H) [I48.91]           Comments:  [employer] home meter         Anticoagulation Care Providers     Provider Role Specialty Phone number    Tyler Lee MD Referring Family Medicine [phone]

## 2022-03-30 DIAGNOSIS — Z95.0 CARDIAC PACEMAKER IN SITU: Primary | ICD-10-CM

## 2022-03-30 DIAGNOSIS — I44.2 CHB (COMPLETE HEART BLOCK) (H): ICD-10-CM

## 2022-04-04 DIAGNOSIS — I48.0 PAROXYSMAL ATRIAL FIBRILLATION (H): ICD-10-CM

## 2022-04-05 RX ORDER — WARFARIN SODIUM 5 MG/1
TABLET ORAL
Qty: 180 TABLET | Refills: 0 | Status: SHIPPED | OUTPATIENT
Start: 2022-04-05 | End: 2022-07-06

## 2022-04-11 ENCOUNTER — ANTICOAGULATION THERAPY VISIT (OUTPATIENT)
Dept: ANTICOAGULATION | Facility: CLINIC | Age: 82
End: 2022-04-11
Payer: COMMERCIAL

## 2022-04-11 ENCOUNTER — TELEPHONE (OUTPATIENT)
Dept: FAMILY MEDICINE | Facility: CLINIC | Age: 82
End: 2022-04-11
Payer: COMMERCIAL

## 2022-04-11 DIAGNOSIS — I48.91 ATRIAL FIBRILLATION (H): Primary | ICD-10-CM

## 2022-04-11 DIAGNOSIS — Z79.01 LONG TERM CURRENT USE OF ANTICOAGULANT THERAPY: ICD-10-CM

## 2022-04-11 DIAGNOSIS — I48.91 ATRIAL FIBRILLATION, UNSPECIFIED TYPE (H): ICD-10-CM

## 2022-04-11 LAB — INR HOME MONITORING: 4 (ref 2–3)

## 2022-04-11 NOTE — TELEPHONE ENCOUNTER
Patient's INR today is high at 4.0. Patient reports she passed a loose bowel movement on 4/9/22 and noted a small amount of blood on the surface of the stool. Patient passed a formed bowel movement today, and yesterday with no blood noted. Patient notes history of hemorrhoids in the past, but does not believe that they are the cause of bleeding now. Patient denies abdominal pain or fever.    [facility] instructed patient to hold today's dose of warfarin per protocol. Will route to primary care provider and triage.

## 2022-04-11 NOTE — PROGRESS NOTES
ANTICOAGULATION MANAGEMENT     Unique Ward 81 year old female is on warfarin with supratherapeutic INR result. (Goal INR 2.0-3.0)    Recent labs: (last 7 days)     04/11/22  0000   INR 4.00*       ASSESSMENT       Source(s): Chart Review and Patient/Caregiver Call       Warfarin doses taken: Warfarin taken as instructed    Diet: Decreased greens/vitamin K in diet; plans to resume previous intake    New illness, injury, or hospitalization: No    Medication/supplement changes: None noted    Signs or symptoms of bleeding or clotting: Yes: patient reports she passed a loose stool on Saturday, and noted a small amount of blood on the surface of the stool. Patient passed a formed bowel movement today and yesterday with no blood present. Patient denies any other signs of bruising or bleeding, but will continue to monitor and report any symptoms. Will route a message to primary care provider's triage team as well.    Previous INR: Therapeutic last 2(+) visits    Additional findings: None       PLAN     Recommended plan for temporary change(s) affecting INR     Dosing Instructions: hold dose then continue your current warfarin dose with next INR in 1 week       Summary  As of 4/11/2022    Full warfarin instructions:  4/11: Hold; Otherwise 12.5 mg every Mon, Fri; 10 mg all other days   Next INR check:  4/18/2022             Telephone call with Aaliyah who verbalizes understanding and agrees to plan    Patient to recheck with home meter    Education provided: Please call back if any changes to your diet, medications or how you've been taking warfarin, Importance of consistent vitamin K intake, Monitoring for bleeding signs and symptoms, Monitoring for clotting signs and symptoms and When to seek medical attention/emergency care    Plan made per [facility] anticoagulation protocol    Georgia Card, RN  Anticoagulation Clinic  4/11/2022    _______________________________________________________________________     Anticoagulation  Episode Summary     Current INR goal:  2.0-3.0   TTR:  43.5 % (1 y)   Target end date:  Indefinite   Send INR reminders to:  ALFREDITO BRANHAM    Indications    Atrial fibrillation (H) [I48.91]  Long term current use of anticoagulant therapy [Z79.01]  Atrial fibrillation  unspecified type (H) [I48.91]           Comments:  [employer] home meter         Anticoagulation Care Providers     Provider Role Specialty Phone number    Tyler Lee MD [facility] [phone]

## 2022-04-13 ENCOUNTER — ANCILLARY PROCEDURE (OUTPATIENT)
Dept: CARDIOLOGY | Facility: CLINIC | Age: 82
End: 2022-04-13
Attending: INTERNAL MEDICINE
Payer: COMMERCIAL

## 2022-04-13 DIAGNOSIS — I44.2 CHB (COMPLETE HEART BLOCK) (H): ICD-10-CM

## 2022-04-13 DIAGNOSIS — Z95.0 CARDIAC PACEMAKER IN SITU: ICD-10-CM

## 2022-04-13 PROCEDURE — 93294 REM INTERROG EVL PM/LDLS PM: CPT | Performed by: INTERNAL MEDICINE

## 2022-04-13 PROCEDURE — 93296 REM INTERROG EVL PM/IDS: CPT | Performed by: INTERNAL MEDICINE

## 2022-04-18 ENCOUNTER — ANTICOAGULATION THERAPY VISIT (OUTPATIENT)
Dept: ANTICOAGULATION | Facility: CLINIC | Age: 82
End: 2022-04-18
Payer: COMMERCIAL

## 2022-04-18 DIAGNOSIS — I48.91 ATRIAL FIBRILLATION, UNSPECIFIED TYPE (H): ICD-10-CM

## 2022-04-18 DIAGNOSIS — Z79.01 LONG TERM CURRENT USE OF ANTICOAGULANT THERAPY: Primary | ICD-10-CM

## 2022-04-18 LAB — INR HOME MONITORING: 3.2 (ref 2–3)

## 2022-04-18 NOTE — PROGRESS NOTES
"ANTICOAGULATION MANAGEMENT     Unique Ward 81 year old female is on warfarin with supratherapeutic INR result. (Goal INR 2.0-3.0)    Recent labs: (last 7 days)     04/18/22  0000   INR 3.20*       ASSESSMENT       Source(s): Chart Review and Patient/Caregiver Call       Warfarin doses taken: Warfarin taken as instructed    Diet: Decreased greens/vitamin K in diet; plans to resume previous intake    New illness, injury, or hospitalization: No    Medication/supplement changes: None noted    Signs or symptoms of bleeding or clotting: No    Previous INR: Supratherapeutic    Additional findings: Normal stool / No further episodes of red blood with BM.  Back to baseline. Watch Vit K food intake more closley it ensure consistent amout each week.  Patient's INR is usually under 2.5 and has been stable previously.  She also reports she had not been keeping an eye on Vit K food intake and is doing a \"better job\" now of doing so.       PLAN     Recommended plan for no diet, medication or health factor changes affecting INR     Dosing Instructions: partial hold then continue your current warfarin dose with next INR in 2 weeks       Summary  As of 4/18/2022    Full warfarin instructions:  4/18: 10 mg; Otherwise 12.5 mg every Mon, Fri; 10 mg all other days   Next INR check:  5/2/2022             Telephone call with Aaliyah who verbalizes understanding and agrees to plan    Patient to recheck with home meter    Education provided: Please call back if any changes to your diet, medications or how you've been taking warfarin, Monitoring for bleeding signs and symptoms, Monitoring for clotting signs and symptoms and When to seek medical attention/emergency care    Plan made per [facility] anticoagulation protocol    Trisha Lo RN  Anticoagulation Clinic  4/18/2022    _______________________________________________________________________     Anticoagulation Episode Summary     Current INR goal:  2.0-3.0   TTR:  41.6 % (1 y)   Target " end date:  Indefinite   Send INR reminders to:  ALFREDITO BRANHAM    Indications    Atrial fibrillation (H) [I48.91]  Long term current use of anticoagulant therapy [Z79.01]  Atrial fibrillation  unspecified type (H) [I48.91]           Comments:  [employer] home meter         Anticoagulation Care Providers     Provider Role Specialty Phone number    Tyler Lee MD Referring Family Medicine [phone]

## 2022-04-21 LAB
MDC_IDC_EPISODE_DTM: NORMAL
MDC_IDC_EPISODE_DURATION: 1166 S
MDC_IDC_EPISODE_DURATION: 12 S
MDC_IDC_EPISODE_DURATION: 126 S
MDC_IDC_EPISODE_DURATION: 1459 S
MDC_IDC_EPISODE_DURATION: 146 S
MDC_IDC_EPISODE_DURATION: 148 S
MDC_IDC_EPISODE_DURATION: 193 S
MDC_IDC_EPISODE_DURATION: 267 S
MDC_IDC_EPISODE_DURATION: 3168 S
MDC_IDC_EPISODE_DURATION: 350 S
MDC_IDC_EPISODE_DURATION: 55 S
MDC_IDC_EPISODE_DURATION: 9 S
MDC_IDC_EPISODE_DURATION: NORMAL S
MDC_IDC_EPISODE_ID: 32
MDC_IDC_EPISODE_ID: 33
MDC_IDC_EPISODE_ID: 34
MDC_IDC_EPISODE_ID: 35
MDC_IDC_EPISODE_ID: 36
MDC_IDC_EPISODE_ID: 37
MDC_IDC_EPISODE_ID: 38
MDC_IDC_EPISODE_ID: 39
MDC_IDC_EPISODE_ID: 40
MDC_IDC_EPISODE_ID: 41
MDC_IDC_EPISODE_ID: 42
MDC_IDC_EPISODE_ID: 43
MDC_IDC_EPISODE_ID: 44
MDC_IDC_EPISODE_ID: 45
MDC_IDC_EPISODE_ID: 46
MDC_IDC_EPISODE_TYPE: NORMAL
MDC_IDC_LEAD_IMPLANT_DT: NORMAL
MDC_IDC_LEAD_IMPLANT_DT: NORMAL
MDC_IDC_LEAD_LOCATION: NORMAL
MDC_IDC_LEAD_LOCATION: NORMAL
MDC_IDC_LEAD_LOCATION_DETAIL_1: NORMAL
MDC_IDC_LEAD_LOCATION_DETAIL_1: NORMAL
MDC_IDC_LEAD_MFG: NORMAL
MDC_IDC_LEAD_MFG: NORMAL
MDC_IDC_LEAD_MODEL: NORMAL
MDC_IDC_LEAD_MODEL: NORMAL
MDC_IDC_LEAD_POLARITY_TYPE: NORMAL
MDC_IDC_LEAD_POLARITY_TYPE: NORMAL
MDC_IDC_LEAD_SERIAL: NORMAL
MDC_IDC_LEAD_SERIAL: NORMAL
MDC_IDC_MSMT_BATTERY_DTM: NORMAL
MDC_IDC_MSMT_BATTERY_REMAINING_LONGEVITY: 118 MO
MDC_IDC_MSMT_BATTERY_RRT_TRIGGER: 2.62
MDC_IDC_MSMT_BATTERY_STATUS: NORMAL
MDC_IDC_MSMT_BATTERY_VOLTAGE: 3 V
MDC_IDC_MSMT_LEADCHNL_RA_IMPEDANCE_VALUE: 266 OHM
MDC_IDC_MSMT_LEADCHNL_RA_IMPEDANCE_VALUE: 323 OHM
MDC_IDC_MSMT_LEADCHNL_RA_PACING_THRESHOLD_AMPLITUDE: 0.75 V
MDC_IDC_MSMT_LEADCHNL_RA_PACING_THRESHOLD_PULSEWIDTH: 0.4 MS
MDC_IDC_MSMT_LEADCHNL_RA_SENSING_INTR_AMPL: 1 MV
MDC_IDC_MSMT_LEADCHNL_RA_SENSING_INTR_AMPL: 1 MV
MDC_IDC_MSMT_LEADCHNL_RV_IMPEDANCE_VALUE: 342 OHM
MDC_IDC_MSMT_LEADCHNL_RV_IMPEDANCE_VALUE: 418 OHM
MDC_IDC_MSMT_LEADCHNL_RV_PACING_THRESHOLD_AMPLITUDE: 1 V
MDC_IDC_MSMT_LEADCHNL_RV_PACING_THRESHOLD_PULSEWIDTH: 0.4 MS
MDC_IDC_MSMT_LEADCHNL_RV_SENSING_INTR_AMPL: 11.38 MV
MDC_IDC_MSMT_LEADCHNL_RV_SENSING_INTR_AMPL: 11.38 MV
MDC_IDC_PG_IMPLANT_DTM: NORMAL
MDC_IDC_PG_MFG: NORMAL
MDC_IDC_PG_MODEL: NORMAL
MDC_IDC_PG_SERIAL: NORMAL
MDC_IDC_PG_TYPE: NORMAL
MDC_IDC_SESS_CLINIC_NAME: NORMAL
MDC_IDC_SESS_DTM: NORMAL
MDC_IDC_SESS_TYPE: NORMAL
MDC_IDC_SET_BRADY_AT_MODE_SWITCH_RATE: 171 {BEATS}/MIN
MDC_IDC_SET_BRADY_HYSTRATE: NORMAL
MDC_IDC_SET_BRADY_LOWRATE: 60 {BEATS}/MIN
MDC_IDC_SET_BRADY_MAX_SENSOR_RATE: 130 {BEATS}/MIN
MDC_IDC_SET_BRADY_MAX_TRACKING_RATE: 130 {BEATS}/MIN
MDC_IDC_SET_BRADY_MODE: NORMAL
MDC_IDC_SET_BRADY_PAV_DELAY_HIGH: 140 MS
MDC_IDC_SET_BRADY_PAV_DELAY_LOW: 300 MS
MDC_IDC_SET_BRADY_SAV_DELAY_HIGH: 110 MS
MDC_IDC_SET_BRADY_SAV_DELAY_LOW: 230 MS
MDC_IDC_SET_LEADCHNL_RA_PACING_AMPLITUDE: 1.5 V
MDC_IDC_SET_LEADCHNL_RA_PACING_ANODE_ELECTRODE_1: NORMAL
MDC_IDC_SET_LEADCHNL_RA_PACING_ANODE_LOCATION_1: NORMAL
MDC_IDC_SET_LEADCHNL_RA_PACING_CAPTURE_MODE: NORMAL
MDC_IDC_SET_LEADCHNL_RA_PACING_CATHODE_ELECTRODE_1: NORMAL
MDC_IDC_SET_LEADCHNL_RA_PACING_CATHODE_LOCATION_1: NORMAL
MDC_IDC_SET_LEADCHNL_RA_PACING_POLARITY: NORMAL
MDC_IDC_SET_LEADCHNL_RA_PACING_PULSEWIDTH: 0.4 MS
MDC_IDC_SET_LEADCHNL_RA_SENSING_ANODE_ELECTRODE_1: NORMAL
MDC_IDC_SET_LEADCHNL_RA_SENSING_ANODE_LOCATION_1: NORMAL
MDC_IDC_SET_LEADCHNL_RA_SENSING_CATHODE_ELECTRODE_1: NORMAL
MDC_IDC_SET_LEADCHNL_RA_SENSING_CATHODE_LOCATION_1: NORMAL
MDC_IDC_SET_LEADCHNL_RA_SENSING_POLARITY: NORMAL
MDC_IDC_SET_LEADCHNL_RA_SENSING_SENSITIVITY: 0.3 MV
MDC_IDC_SET_LEADCHNL_RV_PACING_AMPLITUDE: 2 V
MDC_IDC_SET_LEADCHNL_RV_PACING_ANODE_ELECTRODE_1: NORMAL
MDC_IDC_SET_LEADCHNL_RV_PACING_ANODE_LOCATION_1: NORMAL
MDC_IDC_SET_LEADCHNL_RV_PACING_CAPTURE_MODE: NORMAL
MDC_IDC_SET_LEADCHNL_RV_PACING_CATHODE_ELECTRODE_1: NORMAL
MDC_IDC_SET_LEADCHNL_RV_PACING_CATHODE_LOCATION_1: NORMAL
MDC_IDC_SET_LEADCHNL_RV_PACING_POLARITY: NORMAL
MDC_IDC_SET_LEADCHNL_RV_PACING_PULSEWIDTH: 0.4 MS
MDC_IDC_SET_LEADCHNL_RV_SENSING_ANODE_ELECTRODE_1: NORMAL
MDC_IDC_SET_LEADCHNL_RV_SENSING_ANODE_LOCATION_1: NORMAL
MDC_IDC_SET_LEADCHNL_RV_SENSING_CATHODE_ELECTRODE_1: NORMAL
MDC_IDC_SET_LEADCHNL_RV_SENSING_CATHODE_LOCATION_1: NORMAL
MDC_IDC_SET_LEADCHNL_RV_SENSING_POLARITY: NORMAL
MDC_IDC_SET_LEADCHNL_RV_SENSING_SENSITIVITY: 0.9 MV
MDC_IDC_SET_ZONE_DETECTION_INTERVAL: 350 MS
MDC_IDC_SET_ZONE_DETECTION_INTERVAL: 400 MS
MDC_IDC_SET_ZONE_TYPE: NORMAL
MDC_IDC_STAT_AT_BURDEN_PERCENT: 0.2 %
MDC_IDC_STAT_AT_DTM_END: NORMAL
MDC_IDC_STAT_AT_DTM_START: NORMAL
MDC_IDC_STAT_BRADY_AP_VP_PERCENT: 61.37 %
MDC_IDC_STAT_BRADY_AP_VS_PERCENT: 12.5 %
MDC_IDC_STAT_BRADY_AS_VP_PERCENT: 16.79 %
MDC_IDC_STAT_BRADY_AS_VS_PERCENT: 9.34 %
MDC_IDC_STAT_BRADY_DTM_END: NORMAL
MDC_IDC_STAT_BRADY_DTM_START: NORMAL
MDC_IDC_STAT_BRADY_RA_PERCENT_PACED: 72.31 %
MDC_IDC_STAT_BRADY_RV_PERCENT_PACED: 78.18 %
MDC_IDC_STAT_EPISODE_RECENT_COUNT: 0
MDC_IDC_STAT_EPISODE_RECENT_COUNT: 15
MDC_IDC_STAT_EPISODE_RECENT_COUNT_DTM_END: NORMAL
MDC_IDC_STAT_EPISODE_RECENT_COUNT_DTM_START: NORMAL
MDC_IDC_STAT_EPISODE_TOTAL_COUNT: 0
MDC_IDC_STAT_EPISODE_TOTAL_COUNT: 2
MDC_IDC_STAT_EPISODE_TOTAL_COUNT: 44
MDC_IDC_STAT_EPISODE_TOTAL_COUNT_DTM_END: NORMAL
MDC_IDC_STAT_EPISODE_TOTAL_COUNT_DTM_START: NORMAL
MDC_IDC_STAT_EPISODE_TYPE: NORMAL

## 2022-04-27 ENCOUNTER — OFFICE VISIT (OUTPATIENT)
Dept: ALLERGY | Facility: CLINIC | Age: 82
End: 2022-04-27
Payer: COMMERCIAL

## 2022-04-27 DIAGNOSIS — Z88.9 DRUG ALLERGY: ICD-10-CM

## 2022-04-27 DIAGNOSIS — T78.2XXA ANAPHYLAXIS, INITIAL ENCOUNTER: Primary | ICD-10-CM

## 2022-04-27 DIAGNOSIS — Z91.018 FOOD ALLERGY: ICD-10-CM

## 2022-04-27 PROCEDURE — 99203 OFFICE O/P NEW LOW 30 MIN: CPT | Performed by: DERMATOLOGY

## 2022-04-27 ASSESSMENT — PAIN SCALES - GENERAL: PAINLEVEL: NO PAIN (0)

## 2022-04-27 NOTE — LETTER
4/27/2022         RE: [facility]  [address] [address]        Dear Colleague,    Thank you for referring your patient, Unique Ward, to the [facility] [place]. Please see a copy of my visit note below.    [facility] Dermato-allergology Note  Office visit  Encounter Date: Apr 27, 2022  ____________________________________________    CC: No chief complaint on file.      HPI:  (04/26/22)  Ms. Unique Ward is a(n) 81 year old female who presents today as a new patient for allergy consultation    ==>  about 1 year ago patient took a Keflex (first dose for that cycle). Patient had UTI and got the Keflex prescribed. Took the Keflex the next morning around 7:30am. Ate for breakfast a fried chicken (senior meal) = left over from dinner, but did not eat that the night before.  >> within 15 min patient observed redness on the belly area and took 2 Benadryl (7:40am). Started feeling weak and loosing control and after bringing out the dog patient fainted right after calling 911 ==> woke up when the paramedic where there. Left Hospital around 2pm and felt fine.    ==> had same chicken many months prior to the episode and developed afterwards itching on the arms and after Benadryl improved.    >> once ate small amount of chicken in soup without problems    >> never had such severe reaction, but reacted to various vaccines with facial swelling over days.    >> eats daily eggs with liquid yolk and no problems.  >> patient is basically vegetarian and eats only salmon. Avoids basically red meat    >> ate one time salmon with MCT oil (mostly coconut oil) and then developed itching      Physical exam:  General: In no acute distress, well-developed, well-nourished  Eyes: no conjunctivitis  ENT: no signs of rhinitis   Pulmonary: no wheezing or coughing  Skin: Focused examination of the skin on test sites was performed = see test results below    Past Medical History:    Patient Active Problem List   Diagnosis     Essential hypertension     Personal history of urinary tract infection     Advance Care Planning     History of basal cell carcinoma- face and scalp     FH: melanoma- son     Dysthymia     Vitamin D deficiency     Fibromyalgia     Left lumbosacral radiculopathy     Acute cystitis with hematuria     Atrial fibrillation (H)     CHB (complete heart block) (H)     Pacemaker     Long term current use of anticoagulant therapy     Atrial fibrillation, unspecified type (H)     Past Medical History:   Diagnosis Date     Arthritis 01.01.1980     Atrial fibrillation (H) 6/7/2019     Cancer (H) 01.01.1993     scalp    Basal Cell X2  head      nose bridge 1996     Depressive disorder 01.01.1977    anxiety     Hypertension 03.16.2016       Allergies:  Allergies   Allergen Reactions     Keflex [Cephalexin] Anaphylaxis     No Clinical Screening - See Comments      Eye drop antibiotic.     Codeine Other (See Comments)     Comment: , Description:      Gentamicin Hives     Per Unique this medication gives hives on arms and legs. Bev Lo LSXO,  ....................  7/15/2014   1:15 PM     Influenza Vaccines Other (See Comments)     Comment: , Description:      Influenza Virus Vaccine H5n1      Paxil [Paroxetine] Other (See Comments)     Comment: , Description:      Typhoid Vaccine-Strain Ty21a Other (See Comments)     Comment: , Description:   Comment: , Description:      Typhoid Vaccines        Medications:  Current Outpatient Medications   Medication     amLODIPine (NORVASC) 10 MG tablet     azelastine (ASTELIN) 0.1 % nasal spray     cholecalciferol (VITAMIN D3) 5000 units TABS tablet     EPINEPHrine (ANY BX GENERIC EQUIV) 0.3 MG/0.3ML injection 2-pack     estradiol (ESTRACE) 0.1 MG/GM vaginal cream     magnesium 100 MG TABS     meclizine (ANTIVERT) 25 MG tablet     nitroFURantoin macrocrystal-monohydrate (MACROBID) 100 MG capsule     phenazopyridine (PYRIDIUM) 200 MG tablet      warfarin ANTICOAGULANT (COUMADIN) 5 MG tablet     Zinc Sulfate (ZINC 15 PO)     No current facility-administered medications for this visit.       Social History:  The patient is retired. Patient has the following hobbies or non-occupational exposure none    Family History:  Family History   Problem Relation Age of Onset     Diabetes Maternal Grandmother         Na     Coronary Artery Disease Sister         error     Coronary Artery Disease Brother         error     Hypertension Brother      Hyperlipidemia Brother      Cerebrovascular Disease Brother      Hypertension Sister      Hypertension Sister      Hyperlipidemia Sister      Other Cancer Sister         cervical     Breast Cancer Sister      Other Cancer Sister         kidney     Mental Illness Mother         paranoid/schizophrenic/suicide     Mental Illness Sister         ???  Had shock treatments     Hyperlipidemia Sister      Other Cancer Sister         Kidney       Previous Labs, Allergy Tests, Dermatopathology, Imaging:  With Dr. Osei all prick tests negatives (pollens, dust mites, molds etc), except 4./4 mm reaction in prick test. Spec IgE to chicken negative and total igE not elevate (93)    Referred By: Kev Osei MD  [address] [place] [address]     Allergy Tests:    Past Allergy Test    Order for Future Allergy Testing:    [] Outpatient  [] Inpatient: [facility]..../ Bed ....       Skin Atopy (atopic dermatitis) [] Yes   [x] No .........  Contact allergies:   [] Yes   [x] No ..........  Hand eczema:   [] Yes   [x] No           Leading hand:   [] R   [] L       [] Ambidextrous         Drug allergies:        [x] Yes   [] No  which?......    Urticaria/Angioedema  [x] Yes   [] No .........  Food Allergy:  [] Yes   [x] No  which?......  Pets :  [] Yes   [x] No  which?......         []  Rhinitis   [] Conjunctivitis   [] Sinusitis   [] Polyposis   [] Otitis   [] Pharyngitis         [x]  none  Operations:   [] Tonsils   [] Septum   [] Sinus   []  Polyposis        [] Asthma bronchiale   [] Coughing      [x]  none  Symptoms (mostly Rhinoconjunctivitis and Asthma) aggravated by:  Season   [] I   [] II   [] III   [] IV   []V   []VI   []VII   []VIII   []IX   []X   []XI   []XI     [] perennial   Day time      [] morning   [] noon      [] evening        [] night    [] whole day........  []  none  Location/changes    [] inside        [] outside   [] mountains    [] sea     [] others.............   []  none  Triggers, specific     [] animals     [] plants     [] dust              [] others ...........................    []  none  Triggers, others       [] work          [] psyche    [] sport            [] others .............................  []  none  Irritant                [] phys efforts [] smoke    [] heat/cold     [] odors  []others............... []  none    Order for PATCH TESTS  Reason for tests (suspected allergy): not necessary  Known previous allergies: n/a  Standardized panels  [] Standard panel (40 tests)  [] Preservatives & Antimicrobials (31 tests)  [] Emulsifiers & Additives (25 tests)   [] Perfumes/Flavours & Plants (25 tests)  [] Hairdresser panel (12 tests)  [] Rubber Chemicals (22 tests)  [] Plastics (26 tests)  [] Colorants/Dyes/Food additives (20 tests)  [] Metals (implants/dental) (24 tests)  [] Local anaesthetics/NSAIDs (13 tests)  [] Antibiotics & Antimycotics (14 tests)   [] Corticosteroids (15 tests)   [] Photopatch test (62 tests)   [] others: ...      [] Patient's own products: ...    DO NOT test if chemical or biological identity is unknown!     always ask from patient the product information and safety sheets (MSDS)       Order for PRICK TESTS    Reason for tests (suspected allergy): food allergy  Known previous allergies: none    Standardized prick panels  [] Atopic panel (20 tests)  [] Pediatric Panel (12 tests)  [] Milk, Meat, Eggs, Grains (20 tests)   [] Dust, Epithelia, Feathers (10 tests)  [] Fish, Seafood, Shellfish (17  tests)  [] Nuts, Beans (14 tests)  [] Spice, Vegetable, Fruit (17 tests)  [] Pollen Panel = Tree, Grass, Weed (24 tests)  [x] Others: ...      [x] Patient's own products: prick/prick with the fried chicken that patient had then, repeat with chicken meat prick tests, do egg yolk and white, MCT    DO NOT test if chemical or biological identity is unknown!     always ask from patient the product information and safety sheets (MSDS)     Standardized intradermal tests  [] Penicillium notatum [] Aspergillus fumigatus [] House dust mites D.far & D. pteron  [] Cat    [] dog  [] Others: ...  [] Bee venom   [] Wasp venom  !!Specific protocol with dilutions!!       Order for Drug allergy tests (prick & Intradermal & patch tests)    [] Penicillin G  [] Ampicillin [] Cefazolin   [] Ceftriaxone   [] Ceftazidime  [] Bactrim    [] Others: ...  Order for ... as test date      DRUG ALLERGY TEST SERIES     ANTIBIOTICS      Prick Tests         Substance/ Allergen Conc Result (20 min) Remarks    Histamine Hydrochloride (ALK) 0.1 mg/ml      NaCl 0.9%     1 Cefazolin[1] 100 mg/ml     2 Ceftriaxone* [2] 100 mg/ml     3 Keflex (Cephalexin) Tabl      4 Ceftazidime[3] 100 mg/ml     5 Benzylpenicillin (Penicillin G) 1 [place] IU/ml (600 mg)     6 Gentamycin 40 mg/ml        Intradermal Tests   immed immed delay delay      Substance Conc 1st dil  2nd dil  days  days remarks    Histamine Hydrochloride (ALK) 0.1 mg/ml         NaCl 0.9%        1 Cefazolin[1] 1:5        2 Ceftriaxone* [2] 1:5        4 Ceftazidime[3] 1:5        5 Benzylpenicillin (Penicillin G) 1:100        6 Gentamicin 1:10        [1]  Cefalexin/Cefazolin-group        [2]    Cefotaxim-group     [3]     Cefuroxim-group    ________________________________    Assessment & Plan:    ==> Final Diagnosis:     # one time anaphylactic reaction   DDx drug allergy to Cephalosporines vs food allergy to chicken/red meat or spice  * acute illness with systemic symptoms    # food allergy? To chicken  or red meat  >> if all negative then do Alpha Gal IgE  * acute illness with systemic symptoms    These conclusions are made at the best of one's knowledge and belief based on the provided evidence such as patient's history and allergy test results and they can change over time or can be incomplete because of missing information's.    ==> Treatment Plan:  >> got from Dr. Osei the emergency medications  >> plan prick and IDT with chicken and the medications    Staff:  Provider    Follow-up in Derm-Allergy clinic for tests as planned (no antihistamines 1 week prior)      I spent a total of 32 minutes with Unique Ward. This time was spent counseling the patient and/or coordinating care, explaining the allergy tests, performing allergy tests and assessing the clinical relevance.        Again, thank you for allowing me to participate in the care of your patient.        Sincerely,        Carl Horta MD

## 2022-04-27 NOTE — PROGRESS NOTES
[facility] Dermato-allergology Note  Office visit  Encounter Date: Apr 27, 2022  ____________________________________________    CC: No chief complaint on file.      HPI:  (04/26/22)  Ms. Unique Ward is a(n) 81 year old female who presents today as a new patient for allergy consultation    ==>  about 1 year ago patient took a Keflex (first dose for that cycle). Patient had UTI and got the Keflex prescribed. Took the Keflex the next morning around 7:30am. Ate for breakfast a fried chicken (senior meal) = left over from dinner, but did not eat that the night before.  >> within 15 min patient observed redness on the belly area and took 2 Benadryl (7:40am). Started feeling weak and loosing control and after bringing out the dog patient fainted right after calling 911 ==> woke up when the paramedic where there. Left Hospital around 2pm and felt fine.    ==> had same chicken many months prior to the episode and developed afterwards itching on the arms and after Benadryl improved.    >> once ate small amount of chicken in soup without problems    >> never had such severe reaction, but reacted to various vaccines with facial swelling over days.    >> eats daily eggs with liquid yolk and no problems.  >> patient is basically vegetarian and eats only salmon. Avoids basically red meat    >> ate one time salmon with MCT oil (mostly coconut oil) and then developed itching      Physical exam:  General: In no acute distress, well-developed, well-nourished  Eyes: no conjunctivitis  ENT: no signs of rhinitis   Pulmonary: no wheezing or coughing  Skin: Focused examination of the skin on test sites was performed = see test results below    Past Medical History:   Patient Active Problem List   Diagnosis     Essential hypertension     Personal history of urinary tract infection     Advance Care Planning     History of basal cell carcinoma- face and scalp     FH: melanoma- son     Dysthymia     Vitamin D deficiency      Fibromyalgia     Left lumbosacral radiculopathy     Acute cystitis with hematuria     Atrial fibrillation (H)     CHB (complete heart block) (H)     Pacemaker     Long term current use of anticoagulant therapy     Atrial fibrillation, unspecified type (H)     Past Medical History:   Diagnosis Date     Arthritis 01.01.1980     Atrial fibrillation (H) 6/7/2019     Cancer (H) 01.01.1993     scalp    Basal Cell X2  head      nose bridge 1996     Depressive disorder 01.01.1977    anxiety     Hypertension 03.16.2016       Allergies:  Allergies   Allergen Reactions     Keflex [Cephalexin] Anaphylaxis     No Clinical Screening - See Comments      Eye drop antibiotic.     Codeine Other (See Comments)     Comment: , Description:      Gentamicin Hives     Per Unique this medication gives hives on arms and legs. Bev Lo LSXO,  ....................  7/15/2014   1:15 PM     Influenza Vaccines Other (See Comments)     Comment: , Description:      Influenza Virus Vaccine H5n1      Paxil [Paroxetine] Other (See Comments)     Comment: , Description:      Typhoid Vaccine-Strain Ty21a Other (See Comments)     Comment: , Description:   Comment: , Description:      Typhoid Vaccines        Medications:  Current Outpatient Medications   Medication     amLODIPine (NORVASC) 10 MG tablet     azelastine (ASTELIN) 0.1 % nasal spray     cholecalciferol (VITAMIN D3) 5000 units TABS tablet     EPINEPHrine (ANY BX GENERIC EQUIV) 0.3 MG/0.3ML injection 2-pack     estradiol (ESTRACE) 0.1 MG/GM vaginal cream     magnesium 100 MG TABS     meclizine (ANTIVERT) 25 MG tablet     nitroFURantoin macrocrystal-monohydrate (MACROBID) 100 MG capsule     phenazopyridine (PYRIDIUM) 200 MG tablet     warfarin ANTICOAGULANT (COUMADIN) 5 MG tablet     Zinc Sulfate (ZINC 15 PO)     No current facility-administered medications for this visit.       Social History:  The patient is retired. Patient has the following hobbies or non-occupational exposure  none    Family History:  Family History   Problem Relation Age of Onset     Diabetes Maternal Grandmother         Na     Coronary Artery Disease Sister         error     Coronary Artery Disease Brother         error     Hypertension Brother      Hyperlipidemia Brother      Cerebrovascular Disease Brother      Hypertension Sister      Hypertension Sister      Hyperlipidemia Sister      Other Cancer Sister         cervical     Breast Cancer Sister      Other Cancer Sister         kidney     Mental Illness Mother         paranoid/schizophrenic/suicide     Mental Illness Sister         ???  Had shock treatments     Hyperlipidemia Sister      Other Cancer Sister         Kidney       Previous Labs, Allergy Tests, Dermatopathology, Imaging:  With Dr. Osei all prick tests negatives (pollens, dust mites, molds etc), except 4./4 mm reaction in prick test. Spec IgE to chicken negative and total igE not elevate (93)    Referred By: Kev Osei MD  [address] [place] [address]     Allergy Tests:    Past Allergy Test    Order for Future Allergy Testing:    [] Outpatient  [] Inpatient: [facility]..../ Bed ....       Skin Atopy (atopic dermatitis) [] Yes   [x] No .........  Contact allergies:   [] Yes   [x] No ..........  Hand eczema:   [] Yes   [x] No           Leading hand:   [] R   [] L       [] Ambidextrous         Drug allergies:        [x] Yes   [] No  which?......    Urticaria/Angioedema  [x] Yes   [] No .........  Food Allergy:  [] Yes   [x] No  which?......  Pets :  [] Yes   [x] No  which?......         []  Rhinitis   [] Conjunctivitis   [] Sinusitis   [] Polyposis   [] Otitis   [] Pharyngitis         [x]  none  Operations:   [] Tonsils   [] Septum   [] Sinus   [] Polyposis        [] Asthma bronchiale   [] Coughing      [x]  none  Symptoms (mostly Rhinoconjunctivitis and Asthma) aggravated by:  Season   [] I   [] II   [] III   [] IV   []V   []VI   []VII   []VIII   []IX   []X   []XI   []XI     [] perennial   Day  time      [] morning   [] noon      [] evening        [] night    [] whole day........  []  none  Location/changes    [] inside        [] outside   [] mountains    [] sea     [] others.............   []  none  Triggers, specific     [] animals     [] plants     [] dust              [] others ...........................    []  none  Triggers, others       [] work          [] psyche    [] sport            [] others .............................  []  none  Irritant                [] phys efforts [] smoke    [] heat/cold     [] odors  []others............... []  none    Order for PATCH TESTS  Reason for tests (suspected allergy): not necessary  Known previous allergies: n/a  Standardized panels  [] Standard panel (40 tests)  [] Preservatives & Antimicrobials (31 tests)  [] Emulsifiers & Additives (25 tests)   [] Perfumes/Flavours & Plants (25 tests)  [] Hairdresser panel (12 tests)  [] Rubber Chemicals (22 tests)  [] Plastics (26 tests)  [] Colorants/Dyes/Food additives (20 tests)  [] Metals (implants/dental) (24 tests)  [] Local anaesthetics/NSAIDs (13 tests)  [] Antibiotics & Antimycotics (14 tests)   [] Corticosteroids (15 tests)   [] Photopatch test (62 tests)   [] others: ...      [] Patient's own products: ...    DO NOT test if chemical or biological identity is unknown!     always ask from patient the product information and safety sheets (MSDS)       Order for PRICK TESTS    Reason for tests (suspected allergy): food allergy  Known previous allergies: none    Standardized prick panels  [] Atopic panel (20 tests)  [] Pediatric Panel (12 tests)  [] Milk, Meat, Eggs, Grains (20 tests)   [] Dust, Epithelia, Feathers (10 tests)  [] Fish, Seafood, Shellfish (17 tests)  [] Nuts, Beans (14 tests)  [] Spice, Vegetable, Fruit (17 tests)  [] Pollen Panel = Tree, Grass, Weed (24 tests)  [x] Others: ...      [x] Patient's own products: prick/prick with the fried chicken that patient had then, repeat with chicken meat prick  tests, do egg yolk and white, MCT    DO NOT test if chemical or biological identity is unknown!     always ask from patient the product information and safety sheets (MSDS)     Standardized intradermal tests  [] Penicillium notatum [] Aspergillus fumigatus [] House dust mites D.far & D. pteron  [] Cat    [] dog  [] Others: ...  [] Bee venom   [] Wasp venom  !!Specific protocol with dilutions!!       Order for Drug allergy tests (prick & Intradermal & patch tests)    [] Penicillin G  [] Ampicillin [] Cefazolin   [] Ceftriaxone   [] Ceftazidime  [] Bactrim    [] Others: ...  Order for ... as test date      DRUG ALLERGY TEST SERIES     ANTIBIOTICS      Prick Tests         Substance/ Allergen Conc Result (20 min) Remarks    Histamine Hydrochloride (ALK) 0.1 mg/ml      NaCl 0.9%     1 Cefazolin[1] 100 mg/ml     2 Ceftriaxone* [2] 100 mg/ml     3 Keflex (Cephalexin) Tabl      4 Ceftazidime[3] 100 mg/ml     5 Benzylpenicillin (Penicillin G) 1 [place] IU/ml (600 mg)     6 Gentamycin 40 mg/ml        Intradermal Tests   immed immed delay delay      Substance Conc 1st dil  2nd dil  days  days remarks    Histamine Hydrochloride (ALK) 0.1 mg/ml         NaCl 0.9%        1 Cefazolin[1] 1:5        2 Ceftriaxone* [2] 1:5        4 Ceftazidime[3] 1:5        5 Benzylpenicillin (Penicillin G) 1:100        6 Gentamicin 1:10        [1]  Cefalexin/Cefazolin-group        [2]    Cefotaxim-group     [3]     Cefuroxim-group    ________________________________    Assessment & Plan:    ==> Final Diagnosis:     # one time anaphylactic reaction   DDx drug allergy to Cephalosporines vs food allergy to chicken/red meat or spice  * acute illness with systemic symptoms    # food allergy? To chicken or red meat  >> if all negative then do Alpha Gal IgE  * acute illness with systemic symptoms    These conclusions are made at the best of one's knowledge and belief based on the provided evidence such as patient's history and allergy test results and they can  change over time or can be incomplete because of missing information's.    ==> Treatment Plan:  >> got from Dr. Gonzaleserea the emergency medications  >> plan prick and IDT with chicken and the medications    Staff:  Provider    Follow-up in Derm-Allergy clinic for tests as planned (no antihistamines 1 week prior)      I spent a total of 32 minutes with Unique Ward. This time was spent counseling the patient and/or coordinating care, explaining the allergy tests, performing allergy tests and assessing the clinical relevance.

## 2022-04-27 NOTE — NURSING NOTE
Chief Complaint   Patient presents with     Allergy Consult     Aaliyah is here today to get tested for a Keflex allergy. She is uncertain if chicken or the medicine caused the issue.      Joseph Banks, Paramedic

## 2022-05-02 ENCOUNTER — ANTICOAGULATION THERAPY VISIT (OUTPATIENT)
Dept: ANTICOAGULATION | Facility: CLINIC | Age: 82
End: 2022-05-02
Payer: COMMERCIAL

## 2022-05-02 DIAGNOSIS — I48.91 ATRIAL FIBRILLATION, UNSPECIFIED TYPE (H): ICD-10-CM

## 2022-05-02 DIAGNOSIS — Z79.01 LONG TERM CURRENT USE OF ANTICOAGULANT THERAPY: Primary | ICD-10-CM

## 2022-05-02 LAB — INR HOME MONITORING: 3.4 (ref 2–3)

## 2022-05-02 NOTE — PROGRESS NOTES
ANTICOAGULATION MANAGEMENT     Unique Ward 81 year old female is on warfarin with supratherapeutic INR result. (Goal INR 2.0-3.0)    Recent labs: (last 7 days)     05/02/22  0000   INR 3.40*       ASSESSMENT       Source(s): Chart Review and Patient/Caregiver Call       Warfarin doses taken: Warfarin taken as instructed    Diet: No new diet changes identified. Patient reports 3 servings of greens weekly and feels that she is consistent    New illness, injury, or hospitalization: No    Medication/supplement changes: Patient completed 5 day RX of Macrobid on 4/30 for UTI    Signs or symptoms of bleeding or clotting: No    Previous INR: Supratherapeutic    Additional findings: None       PLAN     Recommended plan for temporary change(s) affecting INR     Dosing Instructions: decrease your warfarin dose (13.3% change) with next INR in 1 week       Summary  As of 5/2/2022    Full warfarin instructions:  7.5 mg every Mon, Fri; 10 mg all other days   Next INR check:  5/9/2022             Telephone call with Aaliyah who agrees to plan and repeated back plan correctly    Patient to recheck with home meter    Education provided: Please call back if any changes to your diet, medications or how you've been taking warfarin, Importance of consistent vitamin K intake, Impact of vitamin K foods on INR, Goal range and significance of current result and Potential interaction between warfarin and Macrobid, per Micromedex no Drug-Drug interaction expected    Plan made per [facility] anticoagulation protocol    Christine Olivares RN  Anticoagulation Clinic  5/2/2022    _______________________________________________________________________     Anticoagulation Episode Summary     Current INR goal:  2.0-3.0   TTR:  40.2 % (1 y)   Target end date:  Indefinite   Send INR reminders to:  ALFREDITO BRANHAM    Indications    Atrial fibrillation (H) [I48.91]  Long term current use of anticoagulant therapy [Z79.01]  Atrial fibrillation  unspecified type (H)  [I48.91]           Comments:  [employer] home meter         Anticoagulation Care Providers     Provider Role Specialty Phone number    Tyler Lee MD Referring Family Medicine [phone]

## 2022-05-03 ENCOUNTER — OFFICE VISIT (OUTPATIENT)
Dept: FAMILY MEDICINE | Facility: CLINIC | Age: 82
End: 2022-05-03
Payer: COMMERCIAL

## 2022-05-03 VITALS
HEART RATE: 72 BPM | RESPIRATION RATE: 18 BRPM | DIASTOLIC BLOOD PRESSURE: 80 MMHG | SYSTOLIC BLOOD PRESSURE: 132 MMHG | BODY MASS INDEX: 21.13 KG/M2 | WEIGHT: 139 LBS | TEMPERATURE: 97.6 F | OXYGEN SATURATION: 99 %

## 2022-05-03 DIAGNOSIS — R73.03 PREDIABETES: ICD-10-CM

## 2022-05-03 DIAGNOSIS — Z13.220 LIPID SCREENING: ICD-10-CM

## 2022-05-03 DIAGNOSIS — G89.29 CHRONIC BILATERAL LOW BACK PAIN WITHOUT SCIATICA: ICD-10-CM

## 2022-05-03 DIAGNOSIS — I10 ESSENTIAL HYPERTENSION: ICD-10-CM

## 2022-05-03 DIAGNOSIS — I44.2 CHB (COMPLETE HEART BLOCK) (H): ICD-10-CM

## 2022-05-03 DIAGNOSIS — I48.91 ATRIAL FIBRILLATION, UNSPECIFIED TYPE (H): ICD-10-CM

## 2022-05-03 DIAGNOSIS — E55.9 HYPOVITAMINOSIS D: ICD-10-CM

## 2022-05-03 DIAGNOSIS — M54.50 CHRONIC BILATERAL LOW BACK PAIN WITHOUT SCIATICA: ICD-10-CM

## 2022-05-03 DIAGNOSIS — M25.562 CHRONIC PAIN OF LEFT KNEE: ICD-10-CM

## 2022-05-03 DIAGNOSIS — Z76.89 ENCOUNTER TO ESTABLISH CARE: Primary | ICD-10-CM

## 2022-05-03 DIAGNOSIS — Z12.31 VISIT FOR SCREENING MAMMOGRAM: ICD-10-CM

## 2022-05-03 DIAGNOSIS — G89.29 CHRONIC PAIN OF LEFT KNEE: ICD-10-CM

## 2022-05-03 PROBLEM — Z80.8 FH: MELANOMA: Status: RESOLVED | Noted: 2017-02-28 | Resolved: 2022-05-03

## 2022-05-03 PROBLEM — N30.01 ACUTE CYSTITIS WITH HEMATURIA: Status: RESOLVED | Noted: 2017-05-31 | Resolved: 2022-05-03

## 2022-05-03 PROBLEM — M54.17 LEFT LUMBOSACRAL RADICULOPATHY: Status: RESOLVED | Noted: 2017-05-31 | Resolved: 2022-05-03

## 2022-05-03 LAB
ANION GAP SERPL CALCULATED.3IONS-SCNC: 1 MMOL/L (ref 3–14)
BUN SERPL-MCNC: 18 MG/DL (ref 7–30)
CALCIUM SERPL-MCNC: 8.9 MG/DL (ref 8.5–10.1)
CHLORIDE BLD-SCNC: 103 MMOL/L (ref 94–109)
CHOLEST SERPL-MCNC: 287 MG/DL
CO2 SERPL-SCNC: 31 MMOL/L (ref 20–32)
CREAT SERPL-MCNC: 0.76 MG/DL (ref 0.52–1.04)
DEPRECATED CALCIDIOL+CALCIFEROL SERPL-MC: 46 UG/L (ref 20–75)
FASTING STATUS PATIENT QL REPORTED: YES
GFR SERPL CREATININE-BSD FRML MDRD: 78 ML/MIN/1.73M2
GLUCOSE BLD-MCNC: 110 MG/DL (ref 70–99)
HBA1C MFR BLD: 5.9 % (ref 0–5.6)
HDLC SERPL-MCNC: 108 MG/DL
LDLC SERPL CALC-MCNC: 168 MG/DL
NONHDLC SERPL-MCNC: 179 MG/DL
POTASSIUM BLD-SCNC: 3.7 MMOL/L (ref 3.4–5.3)
SODIUM SERPL-SCNC: 135 MMOL/L (ref 133–144)
TRIGL SERPL-MCNC: 57 MG/DL

## 2022-05-03 PROCEDURE — 80061 LIPID PANEL: CPT | Performed by: NURSE PRACTITIONER

## 2022-05-03 PROCEDURE — 82306 VITAMIN D 25 HYDROXY: CPT | Performed by: NURSE PRACTITIONER

## 2022-05-03 PROCEDURE — 36415 COLL VENOUS BLD VENIPUNCTURE: CPT | Performed by: NURSE PRACTITIONER

## 2022-05-03 PROCEDURE — 99214 OFFICE O/P EST MOD 30 MIN: CPT | Performed by: NURSE PRACTITIONER

## 2022-05-03 PROCEDURE — 80048 BASIC METABOLIC PNL TOTAL CA: CPT | Performed by: NURSE PRACTITIONER

## 2022-05-03 PROCEDURE — 83036 HEMOGLOBIN GLYCOSYLATED A1C: CPT | Performed by: NURSE PRACTITIONER

## 2022-05-03 ASSESSMENT — PAIN SCALES - GENERAL: PAINLEVEL: NO PAIN (0)

## 2022-05-03 ASSESSMENT — PATIENT HEALTH QUESTIONNAIRE - PHQ9
SUM OF ALL RESPONSES TO PHQ QUESTIONS 1-9: 5
10. IF YOU CHECKED OFF ANY PROBLEMS, HOW DIFFICULT HAVE THESE PROBLEMS MADE IT FOR YOU TO DO YOUR WORK, TAKE CARE OF THINGS AT HOME, OR GET ALONG WITH OTHER PEOPLE: SOMEWHAT DIFFICULT
SUM OF ALL RESPONSES TO PHQ QUESTIONS 1-9: 5

## 2022-05-03 NOTE — PROGRESS NOTES
Assessment & Plan     Encounter to establish care  Chart review    Atrial fibrillation, paroxysmal (H)  Followed by Cardiology.  [id number] score of 4.  On coumadin    CHB (complete heart block) (H)  2020 with pacemaker- followed by Cardiology    Essential hypertension  On norvasc good control  - Basic metabolic panel  (Ca, Cl, CO2, Creat, Gluc, K, Na, BUN); Future  - Basic metabolic panel  (Ca, Cl, CO2, Creat, Gluc, K, Na, BUN)    Chronic bilateral low back pain without sciatica  Exam benign today.  Referral physical therapy  - Physical Therapy Referral; Future    Chronic pain of left knee  Exam benign.  Referral ortho  - Orthopedic  Referral; Future  - Physical Therapy Referral; Future    Hypovitaminosis D  recheck  - Vitamin D Deficiency; Future  - Vitamin D Deficiency    Prediabetes  Recheck   - Hemoglobin A1c; Future  - Hemoglobin A1c    Lipid screening  Has declined testing in the past as she does not want to be on a statin.  Discussed we could make dietary changes.  Open to testing today  - Lipid panel reflex to direct LDL Fasting; Future  - Lipid panel reflex to direct LDL Fasting    Visit for screening mammogram  Wants mammogram- declines breast exam  - MA Screen Bilateral w/Sha; Future      Return in about 6 months (around 11/3/2022) for Physical Exam.    Araceli Crowley NP  [facility]    Joseph Fischer is a 81 year old who presents for the following health issues     History of Present Illness       Back Pain:  She presents for follow up of back pain. Patient's back pain is a recurring problem.  Location of back pain:  Right side of waist and left side of waist  Description of back pain: dull ache  Back pain spreads: nowhere    Since patient first noticed back pain, pain is: unchanged  Does back pain interfere with her job:  Not applicable      Mental Health Follow-up:                    Today's PHQ-9         PHQ-9 Total Score: 5  PHQ-9 Q9 Thoughts of better  off dead/self-harm past 2 weeks :   (P) Not at all    How difficult have these problems made it for you to do your work, take care of things at home, or get along with other people: Somewhat difficult        Diabetes:   She presents for follow up of diabetes.  She is not checking blood glucose. She is concerned about frequent infections and other. She is not experiencing numbness or burning in feet, excessive thirst, blurry vision, weight changes or redness, sores or blisters on feet.         Hypertension: She presents for follow up of hypertension.  She does not check blood pressure  regularly outside of the clinic. Outside blood pressures have been over 140/90. She follows a low salt diet.     She eats 2-3 servings of fruits and vegetables daily.She consumes 0 sweetened beverage(s) daily.She exercises with enough effort to increase her heart rate 9 or less minutes per day.  She exercises with enough effort to increase her heart rate 3 or less days per week.   She is taking medications regularly.     Has been having some left knee pain- was a runner in the past      Low back pain- feels from not walking straight.  Has chickens and yard work- after works for awhile gets painful then when does heating pad improves.      Stool incontinence- not every day-= more leakage- sometimes after she has gone.  Sometimes will do something and a little has come out- sometimes a tablespoon.  Some wax and wane with constipation.          Review of Systems   Constitutional, HEENT, cardiovascular, pulmonary, GI, , musculoskeletal, neuro, skin, endocrine and psych systems are negative, except as otherwise noted.      Objective    LMP  (LMP Unknown)   There is no height or weight on file to calculate BMI.  Physical Exam   GENERAL: healthy, alert and no distress  NECK: no adenopathy, no asymmetry, masses, or scars and thyroid normal to palpation  RESP: lungs clear to auscultation - no rales, rhonchi or wheezes  CV: regular rate and  rhythm, normal S1 S2, no S3 or S4, no murmur, click or rub, no peripheral edema and peripheral pulses strong  ABDOMEN: soft, nontender, no hepatosplenomegaly, no masses and bowel sounds normal  MS: no gross musculoskeletal defects noted, no edema    Results for orders placed or performed in visit on 05/03/22 (from the past 24 hour(s))   Hemoglobin A1c   Result Value Ref Range    Hemoglobin A1C 5.9 (H) 0.0 - 5.6 %   Lipid panel reflex to direct LDL Fasting   Result Value Ref Range    Cholesterol 287 (H) <200 mg/dL    Triglycerides 57 <150 mg/dL    Direct Measure  >=50 mg/dL    LDL Cholesterol Calculated 168 (H) <=100 mg/dL    Non HDL Cholesterol 179 (H) <130 mg/dL    Patient Fasting > 8hrs? Yes     Narrative    Cholesterol  Desirable:  <200 mg/dL    Triglycerides  Normal:  Less than 150 mg/dL  Borderline High:  150-199 mg/dL  High:  200-499 mg/dL  Very High:  Greater than or equal to 500 mg/dL    Direct Measure HDL  Female:  Greater than or equal to 50 mg/dL   Male:  Greater than or equal to 40 mg/dL    LDL Cholesterol  Desirable:  <100mg/dL  Above Desirable:  100-129 mg/dL   Borderline High:  130-159 mg/dL   High:  160-189 mg/dL   Very High:  >= 190 mg/dL    Non HDL Cholesterol  Desirable:  130 mg/dL  Above Desirable:  130-159 mg/dL  Borderline High:  160-189 mg/dL  High:  190-219 mg/dL  Very High:  Greater than or equal to 220 mg/dL   Basic metabolic panel  (Ca, Cl, CO2, Creat, Gluc, K, Na, BUN)   Result Value Ref Range    Sodium 135 133 - 144 mmol/L    Potassium 3.7 3.4 - 5.3 mmol/L    Chloride 103 94 - 109 mmol/L    Carbon Dioxide (CO2) 31 20 - 32 mmol/L    Anion Gap 1 (L) 3 - 14 mmol/L    Urea Nitrogen 18 7 - 30 mg/dL    Creatinine 0.76 0.52 - 1.04 mg/dL    Calcium 8.9 8.5 - 10.1 mg/dL    Glucose 110 (H) 70 - 99 mg/dL    GFR Estimate 78 >60 mL/min/1.73m2

## 2022-05-04 ASSESSMENT — PATIENT HEALTH QUESTIONNAIRE - PHQ9: SUM OF ALL RESPONSES TO PHQ QUESTIONS 1-9: 5

## 2022-05-09 ENCOUNTER — TELEPHONE (OUTPATIENT)
Dept: FAMILY MEDICINE | Facility: CLINIC | Age: 82
End: 2022-05-09
Payer: COMMERCIAL

## 2022-05-09 ENCOUNTER — ANTICOAGULATION THERAPY VISIT (OUTPATIENT)
Dept: ANTICOAGULATION | Facility: CLINIC | Age: 82
End: 2022-05-09
Payer: COMMERCIAL

## 2022-05-09 DIAGNOSIS — Z79.01 LONG TERM CURRENT USE OF ANTICOAGULANT THERAPY: ICD-10-CM

## 2022-05-09 DIAGNOSIS — I48.91 ATRIAL FIBRILLATION (H): Primary | ICD-10-CM

## 2022-05-09 DIAGNOSIS — I48.91 ATRIAL FIBRILLATION, UNSPECIFIED TYPE (H): ICD-10-CM

## 2022-05-09 LAB
INR (EXTERNAL): 3.8 (ref 0.9–1.1)
INR HOME MONITORING: 3.8 (ref 2–3)

## 2022-05-09 NOTE — TELEPHONE ENCOUNTER
Reason for Call:  Other call back    Detailed comments: patient called and had an INR result yesterday of 3.8.  Patient would like [facility]TI [facility] nurse to contact her back today on medication questions and next steps.  Thank you.    Phone Number Patient can be reached at: Home number on file [phone] (home)    Best Time: any    Can we leave a detailed message on this number? YES    Call taken on 5/9/2022 at 3:54 PM by Funmilayo Ogden

## 2022-05-09 NOTE — PROGRESS NOTES
ANTICOAGULATION MANAGEMENT     Unique Ward 81 year old female is on warfarin with supratherapeutic INR result. (Goal INR 2.0-3.0)    Recent labs: (last 7 days)     05/09/22  1608   INR 3.8*       ASSESSMENT       Source(s): Chart Review and Patient/Caregiver Call       Warfarin doses taken: More warfarin taken than planned which may be affecting INR    Diet: No new diet changes identified    New illness, injury, or hospitalization: No    Medication/supplement changes: None noted    Signs or symptoms of bleeding or clotting: No    Previous INR: Supratherapeutic    Additional findings: None       PLAN     Recommended plan for temporary change(s) affecting INR     Dosing Instructions: Decrease dose- to the dose that pt was advised last visit with next INR in 1 week       Summary  As of 5/9/2022    Full warfarin instructions:  7.5 mg every Mon, Fri; 10 mg all other days   Next INR check:  5/16/2022             Telephone call with Aaliyah who verbalizes understanding and agrees to plan and who agrees to plan and repeated back plan correctly    Orders given to [facility] ([phone])    Education provided: Importance of consistent vitamin K intake, Importance of taking warfarin as instructed and Monitoring for bleeding signs and symptoms    Plan made per [facility] anticoagulation protocol    Oxana Smith, DIONTE  Anticoagulation Clinic  5/9/2022    _______________________________________________________________________     Anticoagulation Episode Summary     Current INR goal:  2.0-3.0   TTR:  40.2 % (1 y)   Target end date:  Indefinite   Send INR reminders to:  ALFREDITO KASJAIMIE    Indications    Atrial fibrillation (H) [I48.91]  Long term current use of anticoagulant therapy [Z79.01]  Atrial fibrillation  unspecified type (H) [I48.91]           Comments:  [employer] home meter         Anticoagulation Care Providers     Provider Role Specialty Phone number    Tyler Lee MD Referring Family Medicine  [phone]

## 2022-05-10 ASSESSMENT — KOOS JR
STRAIGHTENING KNEE FULLY: SEVERE
RISING FROM SITTING: MODERATE
GOING UP OR DOWN STAIRS: MODERATE
STANDING UPRIGHT: MILD
HOW SEVERE IS YOUR KNEE STIFFNESS AFTER FIRST WAKING IN MORNING: SEVERE
KOOS JR SCORING: 59.38

## 2022-05-11 ENCOUNTER — ANCILLARY PROCEDURE (OUTPATIENT)
Dept: GENERAL RADIOLOGY | Facility: CLINIC | Age: 82
End: 2022-05-11
Attending: PHYSICIAN ASSISTANT
Payer: COMMERCIAL

## 2022-05-11 ENCOUNTER — OFFICE VISIT (OUTPATIENT)
Dept: ORTHOPEDICS | Facility: CLINIC | Age: 82
End: 2022-05-11

## 2022-05-11 VITALS
SYSTOLIC BLOOD PRESSURE: 138 MMHG | DIASTOLIC BLOOD PRESSURE: 84 MMHG | HEIGHT: 66 IN | WEIGHT: 139 LBS | BODY MASS INDEX: 22.34 KG/M2

## 2022-05-11 DIAGNOSIS — M17.12 LOCALIZED OSTEOARTHRITIS OF LEFT KNEE: Primary | ICD-10-CM

## 2022-05-11 DIAGNOSIS — M25.562 CHRONIC PAIN OF LEFT KNEE: ICD-10-CM

## 2022-05-11 DIAGNOSIS — G89.29 CHRONIC PAIN OF LEFT KNEE: ICD-10-CM

## 2022-05-11 DIAGNOSIS — R22.43 LOCALIZED SWELLING OF BOTH LOWER LEGS: ICD-10-CM

## 2022-05-11 PROCEDURE — 73564 X-RAY EXAM KNEE 4 OR MORE: CPT | Mod: TC | Performed by: RADIOLOGY

## 2022-05-11 PROCEDURE — 99203 OFFICE O/P NEW LOW 30 MIN: CPT | Performed by: PHYSICIAN ASSISTANT

## 2022-05-11 ASSESSMENT — PAIN SCALES - GENERAL: PAINLEVEL: NO PAIN (0)

## 2022-05-11 NOTE — LETTER
5/11/2022         RE: Unique Ward  [id number] [facility] [address]        Dear Colleague,    Thank you for referring your patient, Unique Ward, to the [facility]. Please see a copy of my visit note below.    ORTHOPEDIC CLINIC CONSULT      Chief Complaint: Unique Ward is a 81 year old female who is being seen for   Chief Complaint   Patient presents with     Knee Pain     Left knee pain     Consult     Ref: Araceli Crowley     Referred by: Araceli Crowley    Here for Orthopedic consultation.    History of Present Illness:   Patient reports she has been having left knee pain primarily anterior shin with pressure applied.  Patient is also noticed some popping from time to time.  She states she feels weak in the left knee and her right knee is doing all the work.  Right knee does have some pain on the inside of her knee at times.  Patient indicates she has dependent swelling bilateral lower legs at times and her left knee is a persistent state of swelling.  She states 0 out of 10 pain at the current time.  The knee pain does come and go.    Patient's past medical, surgical, social and family histories reviewed.       Past Medical History:   Diagnosis Date     Arthritis 01.01.1980     Atrial fibrillation (H) 6/7/2019     Cancer (H) 01.01.1993     scalp    Basal Cell X2  head      nose bridge 1996     Depressive disorder 01.01.1977    anxiety     FH: melanoma- son 2/28/2017     Hypertension 03.16.2016       Past Surgical History:   Procedure Laterality Date     BIOPSY  11/01/2014     COLONOSCOPY  01.01/1979    Two routine,  one for chronic diarrhea     EP PACEMAKER N/A 3/31/2020    Procedure: EP Pacemaker;  Surgeon: Erich Clark MD;  Location:  [facility]     EYE SURGERY  1/1/2014    first    cataracts both eyes.  second a few months later     GENITOURINARY SURGERY       GYN SURGERY  01.01.1977    TL     HEAD & NECK SURGERY  01.01.1997    basal cell X2   Moh's on scalp.   other on bridge of nose     LASER YAG CAPSULOTOMY Bilateral 4/1/2021    Procedure: CAPSULOTOMY, LENS, USING YAG LASER;  Surgeon: Bernardino Chu MD;  Location: [facility]     SOFT TISSUE SURGERY  01.01.1990    Ganglion Cyst   Left inner wrist       Home Medications:  Prior to Admission medications    Medication Sig Start Date End Date Taking? Authorizing Provider   amLODIPine (NORVASC) 10 MG tablet Take 1 tablet (10 mg) by mouth daily 10/13/21  Yes Christin Sanchez MD   azelastine (ASTELIN) 0.1 % nasal spray Spray 2 sprays into both nostrils 2 times daily as needed for rhinitis 9/8/21  Yes Kev Osei MD   cholecalciferol (VITAMIN D3) 5000 units TABS tablet Take 5,000 mcg by mouth daily    Yes Reported, Patient   EPINEPHrine (ANY BX GENERIC EQUIV) 0.3 MG/0.3ML injection 2-pack Inject 0.3 mLs (0.3 mg) into the muscle once as needed for anaphylaxis 9/8/21  Yes Kev Osei MD   meclizine (ANTIVERT) 25 MG tablet Take 1 tablet (25 mg) by mouth 3 times daily as needed for dizziness 8/6/21  Yes Chico Quiroz,    warfarin ANTICOAGULANT (COUMADIN) 5 MG tablet Take 12.5 mg (2.5 tablets) on Mon, Fri and 10 mg (2 tablets) all other days, or as directed by the Coumadin Clinic 4/5/22  Yes Tyler Lee MD   Zinc Sulfate (ZINC 15 PO)    Yes Reported, Patient       Allergies   Allergen Reactions     Keflex [Cephalexin] Anaphylaxis     No Clinical Screening - See Comments      Eye drop antibiotic.     Codeine Other (See Comments)     Comment: , Description:      Gentamicin Hives     Per Unique this medication gives hives on arms and legs. Bev Lo LSXO,  ....................  7/15/2014   1:15 PM     Influenza Vaccines Other (See Comments)     Comment: , Description:      Influenza Virus Vaccine H5n1      Paxil [Paroxetine] Other (See Comments)     Comment: , Description:      Typhoid Vaccine-Strain Ty21a Other (See Comments)     Comment: , Description:   Comment: , Description:       "Typhoid Vaccines        Social History     Occupational History     Comment: Retired   Tobacco Use     Smoking status: Former Smoker     Packs/day: 0.75     Years: 10.00     Pack years: 7.50     Types: Cigarettes     Start date: 1955     Quit date: 1965     Years since quittin.3     Smokeless tobacco: Never Used   Vaping Use     Vaping Use: Never used   Substance and Sexual Activity     Alcohol use: No     Alcohol/week: 0.0 standard drinks     Drug use: No     Sexual activity: Not Currently       Family History   Problem Relation Age of Onset     Diabetes Maternal Grandmother         Na     Coronary Artery Disease Sister         error     Coronary Artery Disease Brother         error     Hypertension Brother      Hyperlipidemia Brother      Cerebrovascular Disease Brother      Hypertension Sister      Hypertension Sister      Hyperlipidemia Sister      Other Cancer Sister         cervical     Breast Cancer Sister      Other Cancer Sister         kidney     Mental Illness Mother         paranoid/schizophrenic/suicide     Mental Illness Sister         ???  Had shock treatments     Hyperlipidemia Sister      Other Cancer Sister         Kidney       REVIEW OF SYSTEMS    General: Negative for fever or fatigue    Psych:  positive anxiety or depression     Ophthalmic:  Corrective lenses?  No    ENT:  Hearing difficulty? No    CV: negative for chest pain, venous insufficiency, no history of MI or stroke    Endocrine:  negative diabetes     Urology:  negative kidney disease    Resp:  Normal respiratory effort, no history of pulmonary disease or asthma    Skin: negative for cuts/sores or redness    Musculoskeletal: as above    Neurologic:negative for numbness/tingling    Hematologic: negative for bleeding disorder, does not use of prescription anticoagulants       Physical Exam:    Vitals: /84   Ht 1.683 m (5' 6.25\")   Wt 63 kg (139 lb)   LMP  (LMP Unknown)   BMI 22.27 kg/m    BMI= Body mass index is " 22.27 kg/m .    GENERAL APPEARANCE: Healthy, alert, no distress    SKIN:  negative for suspicious lesions or rashes    NEURO: Normal strength and tone, mentation intact and speech normal    PSYCH:   Mentation appears Normal and affect normal/bright    RESPIRATORY: negative for respiratory distress.    EYES: negative for Conjunctivitis    Cardiovascular: No vascular discoloration.  Does have dependent swelling bilateral lower legs at times    JOINT/EXTREMITIES:  bilateral with focus on the left knee.    Knee Exam: Inspection: Slight genu valgum, left knee does have some swelling present especially with comparison to the right knee.  There is no dependent swelling bilateral lower legs.  There is no redness or bruising  Tender: Patient is tender medial joint line of the right knee.  Patient with some tenderness of the lateral joint line of the left knee.  Active Range of Motion: full flexion, full extension, no pain with extension, patient with a good amount of flexion to approximately 140 degrees  Strength: quad patient with very thin quadriceps likely related to age of 81.,  Atrophy.  Special tests: left knee with normal Valgus stress test, normal Varus, negative Lachman's test, negative posterior drawer, no apprehension with lateral stress of the patella;  Right knee with mild laxity with varus stress    Radiographs:   R Knee Left G/E 4 Views    Narrative    KNEE LEFT FOUR OR MORE VIEWS  DATE/TIME: 5/11/2022 10:54 AM     INDICATION: Left-sided knee pain.   COMPARISON: None.      Impression    IMPRESSION:  1.  Left knee: Advanced degenerative arthrosis. Advanced lateral  compartment narrowing with mild subchondral sclerosis. Mild  tricompartmental marginal osteophytosis. No fracture. Small joint  effusion.  2.  Right knee: Degenerative arthrosis with lateral compartment  narrowing. No fracture.    JARETT GONG MD         SYSTEM ID:  [id number]     bilateral knee X-ray: Patient with lateral compartment narrowing  bilaterally with more advancement on the left and nearly bone-on-bone especially with squatting views of the left knee on the lateral aspect.  There are some small osteophytes present throughout the knee..  Independent visualization of the films was made.     Impression:      ICD-10-CM    1. Localized osteoarthritis of left knee  M17.12 Compression Sleeve/Stocking Order for DME - ONLY FOR DME     Physical Therapy Referral   2. Chronic pain of left knee  M25.562 Orthopedic  Referral    G89.29 XR Knee Left G/E 4 Views     Physical Therapy Referral   3. Localized swelling of both lower legs  R22.43 Compression Sleeve/Stocking Order for DME - ONLY FOR DME   Patient with osteoarthritis findings of the left knee.  Patient does have visible swelling of the left knee.  Patient states usually the swelling does go down into the ankles and foot bilaterally.  She has stated persistent state of swelling of the left.  Patient with some atrophy of quad tone.  This is bilateral.    Plan:  All of the above pertinent physical exam and imaging modalities findings was reviewed with Unique.  Patient is advised to the above findings of osteoarthritis and discussion regarding management of her pain and swelling is discussed.  Patient is offered physical therapy for strengthening of bilateral knees which should help her expressed weakness.  Patient was hopeful for this.  Reduction of swelling strategies were discussed.  Strategies include compression of the knee to reduce swelling.  Patient reports she then gets dependent swelling into the ankle.  She has considered thigh-high compression.  I did order thigh-high compression stockings to be utilized bilaterally.  She is advised she will require some breaks with this.  She does not need to use these at night.  Other swelling strategies include elevation above the heart level.  Patient may also consider cortisone injection for the future to reduce swelling of the left knee as well  as for pain control.  Anti-inflammatory use is recommended.  Patient follow-up as needed.    BP Readings from Last 1 Encounters:   05/11/22 138/84     BP noted to be well controlled today in office.     Alisson Pack PA-C   5/11/2022  2:26 PM        Again, thank you for allowing me to participate in the care of your patient.        Sincerely,        Alisson Pack PA-C

## 2022-05-11 NOTE — PROGRESS NOTES
ORTHOPEDIC CLINIC CONSULT      Chief Complaint: Unique Ward is a 81 year old female who is being seen for   Chief Complaint   Patient presents with     Knee Pain     Left knee pain     Consult     Ref: Araceli Crowley     Referred by: Araceli Crowley    Here for Orthopedic consultation.    History of Present Illness:   Patient reports she has been having left knee pain primarily anterior shin with pressure applied.  Patient is also noticed some popping from time to time.  She states she feels weak in the left knee and her right knee is doing all the work.  Right knee does have some pain on the inside of her knee at times.  Patient indicates she has dependent swelling bilateral lower legs at times and her left knee is a persistent state of swelling.  She states 0 out of 10 pain at the current time.  The knee pain does come and go.    Patient's past medical, surgical, social and family histories reviewed.       Past Medical History:   Diagnosis Date     Arthritis 01.01.1980     Atrial fibrillation (H) 6/7/2019     Cancer (H) 01.01.1993     scalp    Basal Cell X2  head      nose bridge 1996     Depressive disorder 01.01.1977    anxiety     FH: melanoma- son 2/28/2017     Hypertension 03.16.2016       Past Surgical History:   Procedure Laterality Date     BIOPSY  11/01/2014     COLONOSCOPY  01.01/1979    Two routine,  one for chronic diarrhea     EP PACEMAKER N/A 3/31/2020    Procedure: EP Pacemaker;  Surgeon: Erich Clark MD;  Location:  [facility]     EYE SURGERY  1/1/2014    first    cataracts both eyes.  second a few months later     GENITOURINARY SURGERY       GYN SURGERY  01.01.1977    TL     HEAD & NECK SURGERY  01.01.1997    basal cell X2   Moh's on scalp.  other on bridge of nose     LASER YAG CAPSULOTOMY Bilateral 4/1/2021    Procedure: CAPSULOTOMY, LENS, USING YAG LASER;  Surgeon: Bernardino Chu MD;  Location:  OR     SOFT TISSUE SURGERY  01.01.1990    Ganglion Cyst   Left inner wrist        Home Medications:  Prior to Admission medications    Medication Sig Start Date End Date Taking? Authorizing Provider   amLODIPine (NORVASC) 10 MG tablet Take 1 tablet (10 mg) by mouth daily 10/13/21  Yes Christin Sanchez MD   azelastine (ASTELIN) 0.1 % nasal spray Spray 2 sprays into both nostrils 2 times daily as needed for rhinitis 9/8/21  Yes Kev Osei MD   cholecalciferol (VITAMIN D3) 5000 units TABS tablet Take 5,000 mcg by mouth daily    Yes Reported, Patient   EPINEPHrine (ANY BX GENERIC EQUIV) 0.3 MG/0.3ML injection 2-pack Inject 0.3 mLs (0.3 mg) into the muscle once as needed for anaphylaxis 9/8/21  Yes Kev Osei MD   meclizine (ANTIVERT) 25 MG tablet Take 1 tablet (25 mg) by mouth 3 times daily as needed for dizziness 8/6/21  Yes Chico Quiroz DO   warfarin ANTICOAGULANT (COUMADIN) 5 MG tablet Take 12.5 mg (2.5 tablets) on Mon, Fri and 10 mg (2 tablets) all other days, or as directed by the [facility] 4/5/22  Yes Tyler Lee MD   Zinc Sulfate (ZINC 15 PO)    Yes Reported, Patient       Allergies   Allergen Reactions     Keflex [Cephalexin] Anaphylaxis     No Clinical Screening - See Comments      Eye drop antibiotic.     Codeine Other (See Comments)     Comment: , Description:      Gentamicin Hives     Per Unique this medication gives hives on arms and legs. Bev Lo LSXO,  ....................  7/15/2014   1:15 PM     Influenza Vaccines Other (See Comments)     Comment: , Description:      Influenza Virus Vaccine H5n1      Paxil [Paroxetine] Other (See Comments)     Comment: , Description:      Typhoid Vaccine-Strain Ty21a Other (See Comments)     Comment: , Description:   Comment: , Description:      Typhoid Vaccines        Social History     Occupational History     Comment: Retired   Tobacco Use     Smoking status: Former Smoker     Packs/day: 0.75     Years: 10.00     Pack years: 7.50     Types: Cigarettes     Start date: 1/1/1955     Quit  "date: 1965     Years since quittin.3     Smokeless tobacco: Never Used   Vaping Use     Vaping Use: Never used   Substance and Sexual Activity     Alcohol use: No     Alcohol/week: 0.0 standard drinks     Drug use: No     Sexual activity: Not Currently       Family History   Problem Relation Age of Onset     Diabetes Maternal Grandmother         Na     Coronary Artery Disease Sister         error     Coronary Artery Disease Brother         error     Hypertension Brother      Hyperlipidemia Brother      Cerebrovascular Disease Brother      Hypertension Sister      Hypertension Sister      Hyperlipidemia Sister      Other Cancer Sister         cervical     Breast Cancer Sister      Other Cancer Sister         kidney     Mental Illness Mother         paranoid/schizophrenic/suicide     Mental Illness Sister         ???  Had shock treatments     Hyperlipidemia Sister      Other Cancer Sister         Kidney       REVIEW OF SYSTEMS    General: Negative for fever or fatigue    Psych:  positive anxiety or depression     Ophthalmic:  Corrective lenses?  No    ENT:  Hearing difficulty? No    CV: negative for chest pain, venous insufficiency, no history of MI or stroke    Endocrine:  negative diabetes     Urology:  negative kidney disease    Resp:  Normal respiratory effort, no history of pulmonary disease or asthma    Skin: negative for cuts/sores or redness    Musculoskeletal: as above    Neurologic:negative for numbness/tingling    Hematologic: negative for bleeding disorder, does not use of prescription anticoagulants       Physical Exam:    Vitals: /84   Ht 1.683 m (5' 6.25\")   Wt 63 kg (139 lb)   LMP  (LMP Unknown)   BMI 22.27 kg/m    BMI= Body mass index is 22.27 kg/m .    GENERAL APPEARANCE: Healthy, alert, no distress    SKIN:  negative for suspicious lesions or rashes    NEURO: Normal strength and tone, mentation intact and speech normal    PSYCH:   Mentation appears Normal and affect " normal/bright    RESPIRATORY: negative for respiratory distress.    EYES: negative for Conjunctivitis    Cardiovascular: No vascular discoloration.  Does have dependent swelling bilateral lower legs at times    JOINT/EXTREMITIES:  bilateral with focus on the left knee.    Knee Exam: Inspection: Slight genu valgum, left knee does have some swelling present especially with comparison to the right knee.  There is no dependent swelling bilateral lower legs.  There is no redness or bruising  Tender: Patient is tender medial joint line of the right knee.  Patient with some tenderness of the lateral joint line of the left knee.  Active Range of Motion: full flexion, full extension, no pain with extension, patient with a good amount of flexion to approximately 140 degrees  Strength: quad patient with very thin quadriceps likely related to age of 81.,  Atrophy.  Special tests: left knee with normal Valgus stress test, normal Varus, negative Lachman's test, negative posterior drawer, no apprehension with lateral stress of the patella;  Right knee with mild laxity with varus stress    Radiographs:   R Knee Left G/E 4 Views    Narrative    KNEE LEFT FOUR OR MORE VIEWS  DATE/TIME: 5/11/2022 10:54 AM     INDICATION: Left-sided knee pain.   COMPARISON: None.      Impression    IMPRESSION:  1.  Left knee: Advanced degenerative arthrosis. Advanced lateral  compartment narrowing with mild subchondral sclerosis. Mild  tricompartmental marginal osteophytosis. No fracture. Small joint  effusion.  2.  Right knee: Degenerative arthrosis with lateral compartment  narrowing. No fracture.    JARETT GONG MD         SYSTEM ID:  [id number]     bilateral knee X-ray: Patient with lateral compartment narrowing bilaterally with more advancement on the left and nearly bone-on-bone especially with squatting views of the left knee on the lateral aspect.  There are some small osteophytes present throughout the knee..  Independent visualization of  the films was made.     Impression:      ICD-10-CM    1. Localized osteoarthritis of left knee  M17.12 Compression Sleeve/Stocking Order for DME - ONLY FOR DME     Physical Therapy Referral   2. Chronic pain of left knee  M25.562 Orthopedic  Referral    G89.29 XR Knee Left G/E 4 Views     Physical Therapy Referral   3. Localized swelling of both lower legs  R22.43 Compression Sleeve/Stocking Order for DME - ONLY FOR DME   Patient with osteoarthritis findings of the left knee.  Patient does have visible swelling of the left knee.  Patient states usually the swelling does go down into the ankles and foot bilaterally.  She has stated persistent state of swelling of the left.  Patient with some atrophy of quad tone.  This is bilateral.    Plan:  All of the above pertinent physical exam and imaging modalities findings was reviewed with Unique.  Patient is advised to the above findings of osteoarthritis and discussion regarding management of her pain and swelling is discussed.  Patient is offered physical therapy for strengthening of bilateral knees which should help her expressed weakness.  Patient was hopeful for this.  Reduction of swelling strategies were discussed.  Strategies include compression of the knee to reduce swelling.  Patient reports she then gets dependent swelling into the ankle.  She has considered thigh-high compression.  I did order thigh-high compression stockings to be utilized bilaterally.  She is advised she will require some breaks with this.  She does not need to use these at night.  Other swelling strategies include elevation above the heart level.  Patient may also consider cortisone injection for the future to reduce swelling of the left knee as well as for pain control.  Anti-inflammatory use is recommended.  Patient follow-up as needed.    BP Readings from Last 1 Encounters:   05/11/22 138/84     BP noted to be well controlled today in office.     Alisson Pack PA-C   5/11/2022  2:26  PM

## 2022-05-16 ENCOUNTER — ANTICOAGULATION THERAPY VISIT (OUTPATIENT)
Dept: ANTICOAGULATION | Facility: CLINIC | Age: 82
End: 2022-05-16
Payer: COMMERCIAL

## 2022-05-16 ENCOUNTER — DOCUMENTATION ONLY (OUTPATIENT)
Dept: ANTICOAGULATION | Facility: CLINIC | Age: 82
End: 2022-05-16
Payer: COMMERCIAL

## 2022-05-16 DIAGNOSIS — Z79.01 LONG TERM CURRENT USE OF ANTICOAGULANT THERAPY: ICD-10-CM

## 2022-05-16 DIAGNOSIS — I48.91 ATRIAL FIBRILLATION, UNSPECIFIED TYPE (H): ICD-10-CM

## 2022-05-16 DIAGNOSIS — I48.91 ATRIAL FIBRILLATION (H): Primary | ICD-10-CM

## 2022-05-16 DIAGNOSIS — I48.21 PERMANENT ATRIAL FIBRILLATION (H): ICD-10-CM

## 2022-05-16 DIAGNOSIS — I48.91 ATRIAL FIBRILLATION (H): ICD-10-CM

## 2022-05-16 DIAGNOSIS — I48.91 ATRIAL FIBRILLATION, UNSPECIFIED TYPE (H): Primary | ICD-10-CM

## 2022-05-16 LAB — INR HOME MONITORING: 2.5 (ref 2–3)

## 2022-05-16 NOTE — PROGRESS NOTES
ANTICOAGULATION MANAGEMENT      Unique Ward due for annual renewal of referral to anticoagulation monitoring. Order pended for your review and signature.      ANTICOAGULATION SUMMARY      Warfarin indication(s)     Atrial fibrillation    Heart valve present?  NO       Current goal range   INR: 2.0-3.0     Goal appropriate for indication? Yes, INR 2-3 appropriate for hx of DVT, PE, hypercoagulable state, Afib, LVAD, or bileaflet AVR without risk factors     Current duration of therapy Indefinite/long term therapy   Time in Therapeutic Range (TTR)  (Goal > 60%) 41%       Office visit with referring provider's group within last year yes on 5/3/22       Georgia Card RN

## 2022-05-16 NOTE — PROGRESS NOTES
ANTICOAGULATION MANAGEMENT     Unique Ward 81 year old female is on warfarin with therapeutic INR result. (Goal INR 2.0-3.0)    Recent labs: (last 7 days)     05/16/22  0000   INR 2.50       ASSESSMENT       Source(s): Chart Review and Patient/Caregiver Call       Warfarin doses taken: Warfarin taken as instructed    Diet: Increased greens/vitamin K in diet; ongoing change. Patient reports she ate greens x3 last week to help get her INR down. Patient reports she will continue to do this going forward.    New illness, injury, or hospitalization: No    Medication/supplement changes: None noted    Signs or symptoms of bleeding or clotting: No    Previous INR: Supratherapeutic    Additional findings: None       PLAN     Recommended plan for no diet, medication or health factor changes affecting INR     Dosing Instructions: continue your current warfarin dose with next INR in 1 week       Summary  As of 5/16/2022    Full warfarin instructions:  7.5 mg every Mon, Fri; 10 mg all other days   Next INR check:  5/23/2022             Telephone call with Aaliyah who verbalizes understanding and agrees to plan    Patient to recheck with home meter    Education provided: Please call back if any changes to your diet, medications or how you've been taking warfarin and Importance of consistent vitamin K intake    Plan made per [facility] anticoagulation protocol    Georgia Card RN  Anticoagulation Clinic  5/16/2022    _______________________________________________________________________     Anticoagulation Episode Summary     Current INR goal:  2.0-3.0   TTR:  41.0 % (1 y)   Target end date:  Indefinite   Send INR reminders to:  [facility]    Indications    Atrial fibrillation (H) [I48.91]  Long term current use of anticoagulant therapy [Z79.01]  Atrial fibrillation  unspecified type (H) [I48.91]           Comments:  [facility] home meter         Anticoagulation Care Providers     Provider Role Specialty Phone number    Tyler Lee  MD [facility] [phone]

## 2022-05-19 ENCOUNTER — HOSPITAL ENCOUNTER (OUTPATIENT)
Dept: PHYSICAL THERAPY | Facility: CLINIC | Age: 82
Setting detail: THERAPIES SERIES
Discharge: HOME | End: 2022-05-19
Attending: NURSE PRACTITIONER
Payer: MEDICARE

## 2022-05-19 DIAGNOSIS — M54.50 CHRONIC BILATERAL LOW BACK PAIN WITHOUT SCIATICA: ICD-10-CM

## 2022-05-19 DIAGNOSIS — G89.29 CHRONIC BILATERAL LOW BACK PAIN WITHOUT SCIATICA: ICD-10-CM

## 2022-05-19 DIAGNOSIS — M25.562 CHRONIC PAIN OF LEFT KNEE: ICD-10-CM

## 2022-05-19 DIAGNOSIS — G89.29 CHRONIC PAIN OF LEFT KNEE: ICD-10-CM

## 2022-05-19 DIAGNOSIS — M17.12 LOCALIZED OSTEOARTHRITIS OF LEFT KNEE: ICD-10-CM

## 2022-05-19 PROCEDURE — 97162 PT EVAL MOD COMPLEX 30 MIN: CPT | Mod: GP | Performed by: PHYSICAL THERAPIST

## 2022-05-19 PROCEDURE — 97110 THERAPEUTIC EXERCISES: CPT | Mod: GP | Performed by: PHYSICAL THERAPIST

## 2022-05-19 NOTE — PROGRESS NOTES
"   05/19/22 1300   General Information   Type of Visit Initial OP Ortho PT Evaluation   Start of Care Date 05/19/22   Referring Physician Alisson Cummings   Patient/Family Goals Statement Better balance, reduced pain and improved function   Orders Per Therapist Evaluation   Date of Order 05/03/22   Certification Required? Yes   Medical Diagnosis Chronic bilateral low back pain without sciatica (M54.50, G89.29), Chronic pain of left knee (M25.562, G89.29)   Surgical/Medical history reviewed Yes   Precautions/Limitations no known precautions/limitations   Weight-Bearing Status - LUE weight-bearing as tolerated   Weight-Bearing Status - RUE weight-bearing as tolerated   Weight-Bearing Status - LLE weight-bearing as tolerated   Weight-Bearing Status - RLE weight-bearing as tolerated   Body Part(s)   Body Part(s) Lumbar Spine/SI   Presentation and Etiology   Pertinent history of current problem (include personal factors and/or comorbidities that impact the POC) Reported symptoms: B L>R knee pain, back pain. States OA in the knees, states has \"bad posture\", slowly worsened over time. Never radicular back pain. Pain in the left knee is less so painful but apprehensive. Reports was a runner in the past so wore out her knees per her report. States some apprehension in the knee but states hasnt given out on her. Aggravating factors include: Stairs descent> ascent for the knee, in/out of the truck. Rough surfaces. States balance \"not good, need to work on it\". Low back pain increased with bending over to up sticks, standing too long is variable 30-60 minutes. Walking is affected some. Alleviating factors include:  Knee doesn't bother too much with walking. States no assistive device use, has a walking stick if needed. Uses heating pad which helped. No pain while sleeping. Prior interventions include heating pad, changing position, limitation in activity. Patient is retired, enjoys activity/household chores, yardwork, " stairs without hesitation. Patient lives in house, has stairs down to her laundry and storage with single rail and wall, 4-5 to enter with double rail. Pain is 0/10 at best, 4-6/10 worst, and 0/10 currently low back muscles, left knee into shin.   Impairments A. Pain;C. Swelling;D. Decreased ROM;E. Decreased flexibility;F. Decreased strength and endurance;G. Impaired balance;H. Impaired gait   Functional Limitations perform desired leisure / sports activities;perform activities of daily living   Symptom Location L knee ,can go into shin, B low back musculature, non radicular pain   How/Where did it occur From Degenerative Joint Disease;With repetition/overuse   Onset date of current episode/exacerbation 05/03/22  (Date of referral)   Chronicity Chronic   Current / Previous Interventions   Diagnostic Tests: X-ray   X-ray Results Results   X-ray results Left knee: Advanced degenerative arthrosis. Advanced lateral compartment narrowing with mild subchondral sclerosis. Mild tricompartmental marginal osteophytosis. No fracture. Small joint effusion. 2.  Right knee: Degenerative arthrosis with lateral compartment narrowing. No fracture. No imaging for low back present, reports dx of scolisosi   Prior Level of Function   Functional Level Prior Comment See above   Current Level of Function   Patient role/employment history F. Retired   [facility]   Current equipment-Gait/Locomotion None   Current equipment-ADL None   Fall Risk Screen   Have you fallen 2 or more times in the past year? No   Have you fallen and had an injury in the past year? No   Is patient a fall risk? Department fall risk interventions implemented   Fall screen comments Patient will have formal balance screen and work on her balance this course of care, no falls; reports is somewhat fearful of falling   Abuse Screen (yes response referral indicated)   Feels Unsafe at Home or Work/School no   Feels Threatened by Someone no   Does  Anyone Try to Keep You From Having Contact with Others or Doing Things Outside Your Home? no   Physical Signs of Abuse Present no   Functional Scales   Functional Scales Other   Other Scales  LEFS 31, MAGGIE 24%, [place] 3 sub 1   Knee Objective Findings   Balance/Proprioception (Single Leg Stance) With UE assist only, holds 5 second, unable to do without hand hold   Knee/Hip Strength Comments Weak SLR with increased back pain without added resistance,   Palpation TTP lumbar paraspinals, Quad L>R   Accessory Motion/Joint Mobility Reduced tibial IR glide with excess ER at end o fextension affecting comfort of end feel   Posture Very forward flexed and L SB in standing, reduced ankle DF, sig. foward head and kyphoscoliosis , R scap winging with R rib hump. Uncomfortable lying supine extended knees , needs 2-3 pillows under head and remains FHP and axial extended   Side (if bilateral, select both right and left) Right   Knee Special Test Comments Quad atrophy moderate L, L knee mild knee swelling localized   Left Knee Extension AROM lacking 2 to extend, excess tibial ER at extension with valgus knee   Left Knee Flexion AROM Full   Left Knee Flexion Strength 4+   Left Knee Extension Strength 5-/5 in NWB   Left Hip Abduction Strength 3+   Left Quad Set Strength Fair, pain with end range quad set, mid range better   Left Gastrocnemius Flexibility Tightness   Left Hamstring Flexibility 45 deg B 90/90, increased LBP if OP on R.   Left Hip Flexor Flexibility Tightness B   Left Quadricep Flexibility NT   Right Knee Extension AROM 0   Right Knee Flexion AROM WFL soft tissue approximation   Right Knee Flexion Strength 4+   Right Knee Extension Strength 4-/5   Right Hip Abduction Strength 3+   Right Quad Set Strength Fair   Right Gastrocnemius Flexibility Tight B   Right Hamstring Flexibility 45 90/90   Lumbar Spine/SI Objective Findings   Flexion ROM Fingertips to distal shins   Extension ROM LImited barely to posutral neutral,  kyphoscoliosis   Right Side Bending ROM Limited max, tends to SB thoracic to the right   Left Side Bending ROM Mod limited, from flexed position   Lumbar ROM Comment Limitation in APT to neutral, tends to stay flexed   Planned Therapy Interventions   Planned Therapy Interventions balance training;joint mobilization;manual therapy;neuromuscular re-education;ROM;strengthening   Planned Therapy Interventions Comment Flexiblity, ROM< strengthening core/hips, balance training   Clinical Impression   Criteria for Skilled Therapeutic Interventions Met yes, treatment indicated   PT Diagnosis Reduced ROM, postural flexibility, kyphoscoliosis, weakness, reduced balance, altered gait   Influenced by the following impairments ROM, palpation, joint mobility   Functional limitations due to impairments Standing, walking, stairs   Clinical Presentation Stable/Uncomplicated   Clinical Presentation Rationale PLOF vs current, motivated to improve   Clinical Decision Making (Complexity) Moderate complexity   Therapy Frequency 2 times/Week   Predicted Duration of Therapy Intervention (days/wks) 10 weeks, reduced frequency as improving   Risk & Benefits of therapy have been explained Yes   Patient, Family & other staff in agreement with plan of care Yes   Clinical Impression Comments Patient with forward flexed and L SB/Rot posture affecting balance, weight bearing control, mm activation with weakness B hips and L>R Quad most pronounced> Appropriate for flexiblity/postural training, Strengthening, balance training, ROM   Education Assessment   Preferred Learning Style Listening;Reading;Demonstration;Pictures/video   Barriers to Learning No barriers   Ortho Goal 1   Goal Identifier LEFS   Goal Description Patient will improve LEFS score indicating improved LE tolerance to activity from 31/80 to 45/80   Target Date 07/14/22   Ortho Goal 2   Goal Identifier Strengthening   Goal Description Patient will improve standing toleran with 50% less  back pain with standing to do dishes for 15 minutes   Target Date 07/07/22   Ortho Goal 3   Goal Identifier Balance   Goal Description Patient will have TUG <16 seconds, [place] Balance score > 48/56 to imrpove balance reactions and reduce fear of falling   Ortho Goal 4   Goal Identifier Posture   Goal Description Patient will improve postural ROM and control with upright posture with less than 8 inches head/ tragus to wall distance to improve balance   Target Date 07/14/22   Total Evaluation Time   PT Eval, Moderate Complexity Minutes (80975) 25   Therapy Certification   Certification date from 05/19/22   Certification date to 07/28/22   Medical Diagnosis Chronic bilateral low back pain without sciatica (M54.50, G89.29), Chronic pain of left knee (M25.562, G89.29)

## 2022-05-19 NOTE — PROGRESS NOTES
[facility]      OUTPATIENT PHYSICAL THERAPY ORTHOPEDIC EVALUATION  PLAN OF TREATMENT FOR OUTPATIENT REHABILITATION  (COMPLETE FOR INITIAL CLAIMS ONLY)  Patient's Last Name, First Name, Unique Richards    Provider s Name:  [facility]   Medical Record No.  [id number]   Start of Care Date:  05/19/22   Onset Date:  05/03/22 (Date of referral)   Type:     _X__PT   ___OT   ___SLP Medical Diagnosis:  Chronic bilateral low back pain without sciatica (M54.50, G89.29), Chronic pain of left knee (M25.562, G89.29)     PT Diagnosis:  Reduced ROM, postural flexibility, kyphoscoliosis, weakness, reduced balance, altered gait   Visits from SOC:  1   Therapist assessment:   _________________________________________________________________________________  Plan of Treatment/Functional Goals:  balance training, joint mobilization, manual therapy, neuromuscular re-education, ROM, strengthening  Flexiblity, ROM< strengthening core/hips, balance training        Goals  Goal Identifier: LEFS  Goal Description: Patient will improve LEFS score indicating improved LE tolerance to activity from 31/80 to 45/80  Target Date: 07/14/22    Goal Identifier: Strengthening  Goal Description: Patient will improve standing toleran with 50% less back pain with standing to do dishes for 15 minutes  Target Date: 07/07/22    Goal Identifier: Balance  Goal Description: Patient will have TUG <16 seconds, [place] Balance score > 48/56 to imrpove balance reactions and reduce fear of falling       Goal Identifier: Posture  Goal Description: Patient will improve postural ROM and control with upright posture with less than 8 inches head/ tragus to wall distance to improve balance  Target Date: 07/14/22                    Therapy Frequency:  2 times/Week  Predicted Duration of Therapy Intervention:  10 weeks, reduced frequency as improving    Merly Campbell, PT                 I CERTIFY THE NEED FOR THESE SERVICES  FURNISHED UNDER        THIS PLAN OF TREATMENT AND WHILE UNDER MY CARE     (Physician co-signature of this document indicates review and certification of the therapy plan).                        Certification Date From:  05/19/22   Certification Date To:  07/28/22    Referring Provider:  Alisson Cummings    Initial Assessment        See Epic Evaluation Start of Care Date: 05/19/22             Merly Campbell................... PT, DPT, CLT   5/19/2022, 4:53 PM  [phone]

## 2022-05-23 ENCOUNTER — ANTICOAGULATION THERAPY VISIT (OUTPATIENT)
Dept: ANTICOAGULATION | Facility: CLINIC | Age: 82
End: 2022-05-23
Payer: COMMERCIAL

## 2022-05-23 ENCOUNTER — HOSPITAL ENCOUNTER (OUTPATIENT)
Dept: PHYSICAL THERAPY | Facility: CLINIC | Age: 82
Setting detail: THERAPIES SERIES
Discharge: HOME | End: 2022-05-23
Attending: PHYSICIAN ASSISTANT
Payer: MEDICARE

## 2022-05-23 ENCOUNTER — HOSPITAL ENCOUNTER (OUTPATIENT)
Dept: MAMMOGRAPHY | Facility: CLINIC | Age: 82
Discharge: HOME | End: 2022-05-23
Attending: NURSE PRACTITIONER | Admitting: NURSE PRACTITIONER
Payer: MEDICARE

## 2022-05-23 DIAGNOSIS — Z12.31 VISIT FOR SCREENING MAMMOGRAM: ICD-10-CM

## 2022-05-23 DIAGNOSIS — I48.91 ATRIAL FIBRILLATION, UNSPECIFIED TYPE (H): ICD-10-CM

## 2022-05-23 DIAGNOSIS — Z79.01 LONG TERM CURRENT USE OF ANTICOAGULANT THERAPY: ICD-10-CM

## 2022-05-23 DIAGNOSIS — I48.91 ATRIAL FIBRILLATION (H): Primary | ICD-10-CM

## 2022-05-23 DIAGNOSIS — I48.21 PERMANENT ATRIAL FIBRILLATION (H): ICD-10-CM

## 2022-05-23 LAB — INR HOME MONITORING: 2.8 (ref 2–3)

## 2022-05-23 PROCEDURE — 97110 THERAPEUTIC EXERCISES: CPT | Mod: GP | Performed by: PHYSICAL THERAPIST

## 2022-05-23 PROCEDURE — 97530 THERAPEUTIC ACTIVITIES: CPT | Mod: GP | Performed by: PHYSICAL THERAPIST

## 2022-05-23 PROCEDURE — 97112 NEUROMUSCULAR REEDUCATION: CPT | Mod: GP | Performed by: PHYSICAL THERAPIST

## 2022-05-23 PROCEDURE — 77067 SCR MAMMO BI INCL CAD: CPT

## 2022-05-23 NOTE — PROGRESS NOTES
ANTICOAGULATION MANAGEMENT     Unique Ward 81 year old female is on warfarin with therapeutic INR result. (Goal INR 2.0-3.0)    Recent labs: (last 7 days)     05/23/22  0000   INR 2.80       ASSESSMENT       Source(s): Chart Review and Patient/Caregiver Call       Warfarin doses taken: Warfarin taken as instructed    Diet: No new diet changes identified    New illness, injury, or hospitalization: No    Medication/supplement changes: None noted    Signs or symptoms of bleeding or clotting: No    Previous INR: Therapeutic last visit; previously outside of goal range    Additional findings: None       PLAN     Recommended plan for no diet, medication or health factor changes affecting INR     Dosing Instructions: continue your current warfarin dose with next INR in 1 week       Summary  As of 5/23/2022    Full warfarin instructions:  7.5 mg every Mon, Fri; 10 mg all other days   Next INR check:               Telephone call with Aaliyah who verbalizes understanding and agrees to plan    Patient to recheck with home meter    Education provided: Please call back if any changes to your diet, medications or how you've been taking warfarin    Plan made per [facility] anticoagulation protocol    Georgia Card RN  Anticoagulation Clinic  5/23/2022    _______________________________________________________________________     Anticoagulation Episode Summary     Current INR goal:  2.0-3.0   TTR:  41.9 % (1 y)   Target end date:  Indefinite   Send INR reminders to:  [facility]    Indications    Atrial fibrillation (H) [I48.91]  Long term current use of anticoagulant therapy [Z79.01]  Atrial fibrillation  unspecified type (H) [I48.91]  Permanent atrial fibrillation (H) [I48.21]           Comments:  [employer] home meter         Anticoagulation Care Providers     Provider Role Specialty Phone number    Tyler Lee MD Referring Family Medicine [phone]    Araceli Crowley NP Referring Nurse Practitioner - Family  [phone]

## 2022-05-30 LAB — INR HOME MONITORING: 1.9 (ref 2–3)

## 2022-05-31 ENCOUNTER — ANTICOAGULATION THERAPY VISIT (OUTPATIENT)
Dept: ANTICOAGULATION | Facility: CLINIC | Age: 82
End: 2022-05-31
Payer: COMMERCIAL

## 2022-05-31 DIAGNOSIS — I48.21 PERMANENT ATRIAL FIBRILLATION (H): ICD-10-CM

## 2022-05-31 DIAGNOSIS — I48.91 ATRIAL FIBRILLATION, UNSPECIFIED TYPE (H): ICD-10-CM

## 2022-05-31 DIAGNOSIS — I48.91 ATRIAL FIBRILLATION (H): Primary | ICD-10-CM

## 2022-05-31 DIAGNOSIS — Z79.01 LONG TERM CURRENT USE OF ANTICOAGULANT THERAPY: ICD-10-CM

## 2022-06-01 ENCOUNTER — HOSPITAL ENCOUNTER (OUTPATIENT)
Dept: PHYSICAL THERAPY | Facility: CLINIC | Age: 82
Setting detail: THERAPIES SERIES
Discharge: HOME | End: 2022-06-01
Attending: PHYSICIAN ASSISTANT
Payer: MEDICARE

## 2022-06-01 PROCEDURE — 97530 THERAPEUTIC ACTIVITIES: CPT | Mod: GP | Performed by: PHYSICAL THERAPIST

## 2022-06-01 PROCEDURE — 97110 THERAPEUTIC EXERCISES: CPT | Mod: GP | Performed by: PHYSICAL THERAPIST

## 2022-06-03 ENCOUNTER — HOSPITAL ENCOUNTER (OUTPATIENT)
Dept: PHYSICAL THERAPY | Facility: CLINIC | Age: 82
Setting detail: THERAPIES SERIES
Discharge: HOME | End: 2022-06-03
Attending: PHYSICIAN ASSISTANT
Payer: MEDICARE

## 2022-06-03 PROCEDURE — 97110 THERAPEUTIC EXERCISES: CPT | Mod: GP | Performed by: PHYSICAL THERAPIST

## 2022-06-06 ENCOUNTER — ANTICOAGULATION THERAPY VISIT (OUTPATIENT)
Dept: ANTICOAGULATION | Facility: CLINIC | Age: 82
End: 2022-06-06
Payer: COMMERCIAL

## 2022-06-06 DIAGNOSIS — Z79.01 LONG TERM CURRENT USE OF ANTICOAGULANT THERAPY: ICD-10-CM

## 2022-06-06 DIAGNOSIS — I48.91 ATRIAL FIBRILLATION (H): Primary | ICD-10-CM

## 2022-06-06 DIAGNOSIS — I48.91 ATRIAL FIBRILLATION, UNSPECIFIED TYPE (H): ICD-10-CM

## 2022-06-06 DIAGNOSIS — I48.91 ATRIAL FIBRILLATION, UNSPECIFIED TYPE (H): Primary | ICD-10-CM

## 2022-06-06 DIAGNOSIS — I48.21 PERMANENT ATRIAL FIBRILLATION (H): ICD-10-CM

## 2022-06-06 LAB — INR HOME MONITORING: 2.4 (ref 2–3)

## 2022-06-06 NOTE — PROGRESS NOTES
ANTICOAGULATION MANAGEMENT     Unique Ward 81 year old female is on warfarin with therapeutic INR result. (Goal INR 2.0-3.0)    Recent labs: (last 7 days)     06/06/22  0000   INR 2.40       ASSESSMENT       Source(s): Chart Review and Patient/Caregiver Call       Warfarin doses taken: Warfarin taken as instructed    Diet: No new diet changes identified    New illness, injury, or hospitalization: No    Medication/supplement changes: None noted    Signs or symptoms of bleeding or clotting: No    Previous INR: Therapeutic last 2(+) visits    Additional findings: None       PLAN     Recommended plan for no diet, medication or health factor changes affecting INR     Dosing Instructions: continue your current warfarin dose with next INR in 1 week       Summary  As of 6/6/2022    Full warfarin instructions:  7.5 mg every Mon, Fri; 10 mg all other days   Next INR check:  6/13/2022             Telephone call with Aaliyah who verbalizes understanding and agrees to plan    Patient to recheck with home meter    Education provided: Please call back if any changes to your diet, medications or how you've been taking warfarin    Plan made per [facility] anticoagulation protocol    Georgia Card RN  Anticoagulation Clinic  6/6/2022    _______________________________________________________________________     Anticoagulation Episode Summary     Current INR goal:  2.0-3.0   TTR:  44.3 % (1 y)   Target end date:  Indefinite   Send INR reminders to:  [facility]    Indications    Atrial fibrillation (H) [I48.91]  Long term current use of anticoagulant therapy [Z79.01]  Atrial fibrillation  unspecified type (H) [I48.91]  Permanent atrial fibrillation (H) [I48.21]           Comments:  [employer] home meter         Anticoagulation Care Providers     Provider Role Specialty Phone number    Tyler Lee MD Referring Family Medicine [phone]    Araceli Crowley NP Referring Nurse Practitioner - Family [phone]

## 2022-06-07 ENCOUNTER — HOSPITAL ENCOUNTER (OUTPATIENT)
Dept: PHYSICAL THERAPY | Facility: CLINIC | Age: 82
Setting detail: THERAPIES SERIES
Discharge: HOME | End: 2022-06-07
Attending: PHYSICIAN ASSISTANT
Payer: MEDICARE

## 2022-06-07 PROCEDURE — 97110 THERAPEUTIC EXERCISES: CPT | Mod: GP | Performed by: PHYSICAL THERAPIST

## 2022-06-09 ENCOUNTER — HOSPITAL ENCOUNTER (OUTPATIENT)
Dept: PHYSICAL THERAPY | Facility: CLINIC | Age: 82
Setting detail: THERAPIES SERIES
Discharge: HOME | End: 2022-06-09
Attending: PHYSICIAN ASSISTANT
Payer: MEDICARE

## 2022-06-09 ENCOUNTER — ANCILLARY PROCEDURE (OUTPATIENT)
Dept: CARDIOLOGY | Facility: CLINIC | Age: 82
End: 2022-06-09
Payer: COMMERCIAL

## 2022-06-09 DIAGNOSIS — Z95.0 CARDIAC PACEMAKER IN SITU: ICD-10-CM

## 2022-06-09 PROCEDURE — 97110 THERAPEUTIC EXERCISES: CPT | Mod: GP | Performed by: PHYSICAL THERAPIST

## 2022-06-09 PROCEDURE — 93280 PM DEVICE PROGR EVAL DUAL: CPT | Performed by: INTERNAL MEDICINE

## 2022-06-10 LAB
MDC_IDC_EPISODE_DTM: NORMAL
MDC_IDC_EPISODE_DURATION: 117 S
MDC_IDC_EPISODE_DURATION: 183 S
MDC_IDC_EPISODE_DURATION: 184 S
MDC_IDC_EPISODE_DURATION: 9717 S
MDC_IDC_EPISODE_ID: 54
MDC_IDC_EPISODE_ID: 55
MDC_IDC_EPISODE_ID: 56
MDC_IDC_EPISODE_ID: 57
MDC_IDC_EPISODE_TYPE: NORMAL
MDC_IDC_LEAD_IMPLANT_DT: NORMAL
MDC_IDC_LEAD_IMPLANT_DT: NORMAL
MDC_IDC_LEAD_LOCATION: NORMAL
MDC_IDC_LEAD_LOCATION: NORMAL
MDC_IDC_LEAD_LOCATION_DETAIL_1: NORMAL
MDC_IDC_LEAD_LOCATION_DETAIL_1: NORMAL
MDC_IDC_LEAD_MFG: NORMAL
MDC_IDC_LEAD_MFG: NORMAL
MDC_IDC_LEAD_MODEL: NORMAL
MDC_IDC_LEAD_MODEL: NORMAL
MDC_IDC_LEAD_POLARITY_TYPE: NORMAL
MDC_IDC_LEAD_POLARITY_TYPE: NORMAL
MDC_IDC_LEAD_SERIAL: NORMAL
MDC_IDC_LEAD_SERIAL: NORMAL
MDC_IDC_MSMT_BATTERY_DTM: NORMAL
MDC_IDC_MSMT_BATTERY_REMAINING_LONGEVITY: 119 MO
MDC_IDC_MSMT_BATTERY_RRT_TRIGGER: 2.62
MDC_IDC_MSMT_BATTERY_STATUS: NORMAL
MDC_IDC_MSMT_BATTERY_VOLTAGE: 3 V
MDC_IDC_MSMT_LEADCHNL_RA_IMPEDANCE_VALUE: 323 OHM
MDC_IDC_MSMT_LEADCHNL_RA_IMPEDANCE_VALUE: 361 OHM
MDC_IDC_MSMT_LEADCHNL_RA_PACING_THRESHOLD_AMPLITUDE: 0.75 V
MDC_IDC_MSMT_LEADCHNL_RA_PACING_THRESHOLD_PULSEWIDTH: 0.4 MS
MDC_IDC_MSMT_LEADCHNL_RA_SENSING_INTR_AMPL: 1.12 MV
MDC_IDC_MSMT_LEADCHNL_RA_SENSING_INTR_AMPL: 1.25 MV
MDC_IDC_MSMT_LEADCHNL_RV_IMPEDANCE_VALUE: 399 OHM
MDC_IDC_MSMT_LEADCHNL_RV_IMPEDANCE_VALUE: 475 OHM
MDC_IDC_MSMT_LEADCHNL_RV_PACING_THRESHOLD_AMPLITUDE: 0.88 V
MDC_IDC_MSMT_LEADCHNL_RV_PACING_THRESHOLD_PULSEWIDTH: 0.4 MS
MDC_IDC_MSMT_LEADCHNL_RV_SENSING_INTR_AMPL: 9.75 MV
MDC_IDC_MSMT_LEADCHNL_RV_SENSING_INTR_AMPL: 9.75 MV
MDC_IDC_PG_IMPLANT_DTM: NORMAL
MDC_IDC_PG_MFG: NORMAL
MDC_IDC_PG_MODEL: NORMAL
MDC_IDC_PG_SERIAL: NORMAL
MDC_IDC_PG_TYPE: NORMAL
MDC_IDC_SESS_CLINIC_NAME: NORMAL
MDC_IDC_SESS_DTM: NORMAL
MDC_IDC_SESS_TYPE: NORMAL
MDC_IDC_SET_BRADY_AT_MODE_SWITCH_RATE: 171 {BEATS}/MIN
MDC_IDC_SET_BRADY_HYSTRATE: NORMAL
MDC_IDC_SET_BRADY_LOWRATE: 60 {BEATS}/MIN
MDC_IDC_SET_BRADY_MAX_SENSOR_RATE: 130 {BEATS}/MIN
MDC_IDC_SET_BRADY_MAX_TRACKING_RATE: 130 {BEATS}/MIN
MDC_IDC_SET_BRADY_MODE: NORMAL
MDC_IDC_SET_BRADY_PAV_DELAY_HIGH: 140 MS
MDC_IDC_SET_BRADY_PAV_DELAY_LOW: 300 MS
MDC_IDC_SET_BRADY_SAV_DELAY_HIGH: 110 MS
MDC_IDC_SET_BRADY_SAV_DELAY_LOW: 230 MS
MDC_IDC_SET_LEADCHNL_RA_PACING_AMPLITUDE: 1.5 V
MDC_IDC_SET_LEADCHNL_RA_PACING_ANODE_ELECTRODE_1: NORMAL
MDC_IDC_SET_LEADCHNL_RA_PACING_ANODE_LOCATION_1: NORMAL
MDC_IDC_SET_LEADCHNL_RA_PACING_CAPTURE_MODE: NORMAL
MDC_IDC_SET_LEADCHNL_RA_PACING_CATHODE_ELECTRODE_1: NORMAL
MDC_IDC_SET_LEADCHNL_RA_PACING_CATHODE_LOCATION_1: NORMAL
MDC_IDC_SET_LEADCHNL_RA_PACING_POLARITY: NORMAL
MDC_IDC_SET_LEADCHNL_RA_PACING_PULSEWIDTH: 0.4 MS
MDC_IDC_SET_LEADCHNL_RA_SENSING_ANODE_ELECTRODE_1: NORMAL
MDC_IDC_SET_LEADCHNL_RA_SENSING_ANODE_LOCATION_1: NORMAL
MDC_IDC_SET_LEADCHNL_RA_SENSING_CATHODE_ELECTRODE_1: NORMAL
MDC_IDC_SET_LEADCHNL_RA_SENSING_CATHODE_LOCATION_1: NORMAL
MDC_IDC_SET_LEADCHNL_RA_SENSING_POLARITY: NORMAL
MDC_IDC_SET_LEADCHNL_RA_SENSING_SENSITIVITY: 0.3 MV
MDC_IDC_SET_LEADCHNL_RV_PACING_AMPLITUDE: 2 V
MDC_IDC_SET_LEADCHNL_RV_PACING_ANODE_ELECTRODE_1: NORMAL
MDC_IDC_SET_LEADCHNL_RV_PACING_ANODE_LOCATION_1: NORMAL
MDC_IDC_SET_LEADCHNL_RV_PACING_CAPTURE_MODE: NORMAL
MDC_IDC_SET_LEADCHNL_RV_PACING_CATHODE_ELECTRODE_1: NORMAL
MDC_IDC_SET_LEADCHNL_RV_PACING_CATHODE_LOCATION_1: NORMAL
MDC_IDC_SET_LEADCHNL_RV_PACING_POLARITY: NORMAL
MDC_IDC_SET_LEADCHNL_RV_PACING_PULSEWIDTH: 0.4 MS
MDC_IDC_SET_LEADCHNL_RV_SENSING_ANODE_ELECTRODE_1: NORMAL
MDC_IDC_SET_LEADCHNL_RV_SENSING_ANODE_LOCATION_1: NORMAL
MDC_IDC_SET_LEADCHNL_RV_SENSING_CATHODE_ELECTRODE_1: NORMAL
MDC_IDC_SET_LEADCHNL_RV_SENSING_CATHODE_LOCATION_1: NORMAL
MDC_IDC_SET_LEADCHNL_RV_SENSING_POLARITY: NORMAL
MDC_IDC_SET_LEADCHNL_RV_SENSING_SENSITIVITY: 0.9 MV
MDC_IDC_SET_ZONE_DETECTION_INTERVAL: 350 MS
MDC_IDC_SET_ZONE_DETECTION_INTERVAL: 400 MS
MDC_IDC_SET_ZONE_TYPE: NORMAL
MDC_IDC_STAT_AT_BURDEN_PERCENT: 0.3 %
MDC_IDC_STAT_AT_DTM_END: NORMAL
MDC_IDC_STAT_AT_DTM_START: NORMAL
MDC_IDC_STAT_BRADY_AP_VP_PERCENT: 52.6 %
MDC_IDC_STAT_BRADY_AP_VS_PERCENT: 18.63 %
MDC_IDC_STAT_BRADY_AS_VP_PERCENT: 14.59 %
MDC_IDC_STAT_BRADY_AS_VS_PERCENT: 14.18 %
MDC_IDC_STAT_BRADY_DTM_END: NORMAL
MDC_IDC_STAT_BRADY_DTM_START: NORMAL
MDC_IDC_STAT_BRADY_RA_PERCENT_PACED: 69.6 %
MDC_IDC_STAT_BRADY_RV_PERCENT_PACED: 67.25 %
MDC_IDC_STAT_EPISODE_RECENT_COUNT: 0
MDC_IDC_STAT_EPISODE_RECENT_COUNT: 3
MDC_IDC_STAT_EPISODE_RECENT_COUNT: 40
MDC_IDC_STAT_EPISODE_RECENT_COUNT_DTM_END: NORMAL
MDC_IDC_STAT_EPISODE_RECENT_COUNT_DTM_START: NORMAL
MDC_IDC_STAT_EPISODE_TOTAL_COUNT: 0
MDC_IDC_STAT_EPISODE_TOTAL_COUNT: 3
MDC_IDC_STAT_EPISODE_TOTAL_COUNT: 54
MDC_IDC_STAT_EPISODE_TOTAL_COUNT_DTM_END: NORMAL
MDC_IDC_STAT_EPISODE_TOTAL_COUNT_DTM_START: NORMAL
MDC_IDC_STAT_EPISODE_TYPE: NORMAL

## 2022-06-13 ENCOUNTER — ANTICOAGULATION THERAPY VISIT (OUTPATIENT)
Dept: ANTICOAGULATION | Facility: CLINIC | Age: 82
End: 2022-06-13
Payer: COMMERCIAL

## 2022-06-13 DIAGNOSIS — I48.91 ATRIAL FIBRILLATION (H): Primary | ICD-10-CM

## 2022-06-13 DIAGNOSIS — I48.91 ATRIAL FIBRILLATION, UNSPECIFIED TYPE (H): ICD-10-CM

## 2022-06-13 DIAGNOSIS — I48.21 PERMANENT ATRIAL FIBRILLATION (H): ICD-10-CM

## 2022-06-13 DIAGNOSIS — Z79.01 LONG TERM CURRENT USE OF ANTICOAGULANT THERAPY: ICD-10-CM

## 2022-06-13 LAB — INR HOME MONITORING: 2.3 (ref 2–3)

## 2022-06-13 NOTE — PROGRESS NOTES
ANTICOAGULATION MANAGEMENT     Unique Ward 81 year old female is on warfarin with therapeutic INR result. (Goal INR 2.0-3.0)    Recent labs: (last 7 days)     06/13/22  0000   INR 2.3       ASSESSMENT       Source(s): Chart Review and Patient/Caregiver Call       Warfarin doses taken: Warfarin taken as instructed    Diet: No new diet changes identified    New illness, injury, or hospitalization: No    Medication/supplement changes: Patient plans on starting a magnesium supplement, this should not effect INR    Signs or symptoms of bleeding or clotting: No    Previous INR: Therapeutic last visit; previously outside of goal range    Additional findings: None       PLAN     Recommended plan for no diet, medication or health factor changes affecting INR     Dosing Instructions: continue your current warfarin dose with next INR in 1 week       Summary  As of 6/13/2022    Full warfarin instructions:  7.5 mg every Mon, Fri; 10 mg all other days   Next INR check:  6/20/2022             Telephone call with Aaliyah who verbalizes understanding and agrees to plan    Patient to recheck with home meter    Education provided: Please call back if any changes to your diet, medications or how you've been taking warfarin    Plan made per [facility] anticoagulation protocol    Georgia Card, RN  Anticoagulation Clinic  6/13/2022    _______________________________________________________________________     Anticoagulation Episode Summary     Current INR goal:  2.0-3.0   TTR:  46.2 % (1 y)   Target end date:  Indefinite   Send INR reminders to:  [facility]    Indications    Atrial fibrillation (H) [I48.91]  Long term current use of anticoagulant therapy [Z79.01]  Atrial fibrillation  unspecified type (H) [I48.91]  Permanent atrial fibrillation (H) [I48.21]           Comments:  [employer] home meter         Anticoagulation Care Providers     Provider Role Specialty Phone number    Tyler Lee MD Referring Family Medicine  [phone]    Araceli Crowley NP Referring Nurse Practitioner - Family [phone]

## 2022-06-14 ENCOUNTER — HOSPITAL ENCOUNTER (OUTPATIENT)
Dept: PHYSICAL THERAPY | Facility: CLINIC | Age: 82
Setting detail: THERAPIES SERIES
Discharge: HOME | End: 2022-06-14
Attending: PHYSICIAN ASSISTANT
Payer: MEDICARE

## 2022-06-14 PROCEDURE — 97110 THERAPEUTIC EXERCISES: CPT | Mod: GP | Performed by: PHYSICAL THERAPIST

## 2022-06-14 PROCEDURE — 97112 NEUROMUSCULAR REEDUCATION: CPT | Mod: GP | Performed by: PHYSICAL THERAPIST

## 2022-06-23 ENCOUNTER — HOSPITAL ENCOUNTER (OUTPATIENT)
Dept: PHYSICAL THERAPY | Facility: CLINIC | Age: 82
Setting detail: THERAPIES SERIES
Discharge: HOME | End: 2022-06-23
Attending: PHYSICIAN ASSISTANT
Payer: MEDICARE

## 2022-06-23 PROCEDURE — 97110 THERAPEUTIC EXERCISES: CPT | Mod: GP | Performed by: PHYSICAL THERAPIST

## 2022-06-23 PROCEDURE — 97112 NEUROMUSCULAR REEDUCATION: CPT | Mod: GP | Performed by: PHYSICAL THERAPIST

## 2022-06-27 ENCOUNTER — ANTICOAGULATION THERAPY VISIT (OUTPATIENT)
Dept: ANTICOAGULATION | Facility: CLINIC | Age: 82
End: 2022-06-27

## 2022-06-27 DIAGNOSIS — I48.91 ATRIAL FIBRILLATION, UNSPECIFIED TYPE (H): ICD-10-CM

## 2022-06-27 DIAGNOSIS — I48.21 PERMANENT ATRIAL FIBRILLATION (H): ICD-10-CM

## 2022-06-27 DIAGNOSIS — Z79.01 LONG TERM CURRENT USE OF ANTICOAGULANT THERAPY: Primary | ICD-10-CM

## 2022-06-27 LAB — INR HOME MONITORING: 2.7 (ref 2–3)

## 2022-06-27 NOTE — PROGRESS NOTES
ANTICOAGULATION MANAGEMENT     Unique Ward 81 year old female is on warfarin with therapeutic INR result. (Goal INR 2.0-3.0)    Recent labs: (last 7 days)     06/27/22  0000   INR 2.7       ASSESSMENT       Source(s): Chart Review and Patient/Caregiver Call       Warfarin doses taken: Warfarin taken as instructed    Diet: No new diet changes identified    New illness, injury, or hospitalization: No    Medication/supplement changes: None noted    Signs or symptoms of bleeding or clotting: No    Previous INR: Therapeutic last 2(+) visits    Additional findings: None       PLAN     Recommended plan for no diet, medication or health factor changes affecting INR     Dosing Instructions: continue your current warfarin dose with next INR in 2 weeks       Summary  As of 6/27/2022    Full warfarin instructions:  7.5 mg every Mon, Fri; 10 mg all other days   Next INR check:  7/11/2022             Telephone call with Aaliyah who agrees to plan and repeated back plan correctly    Patient to recheck with home meter    Education provided: Please call back if any changes to your diet, medications or how you've been taking warfarin    Plan made per [facility] anticoagulation protocol    Trisha Lo, RN  Anticoagulation Clinic  6/27/2022    _______________________________________________________________________     Anticoagulation Episode Summary     Current INR goal:  2.0-3.0   TTR:  50.0 % (1 y)   Target end date:  Indefinite   Send INR reminders to:  [facility]    Indications    Atrial fibrillation (H) [I48.91]  Long term current use of anticoagulant therapy [Z79.01]  Atrial fibrillation  unspecified type (H) [I48.91]  Permanent atrial fibrillation (H) [I48.21]           Comments:  [employer] home meter         Anticoagulation Care Providers     Provider Role Specialty Phone number    Tyler Lee MD Referring Family Medicine [phone]    Araceli Crowley NP Referring Nurse Practitioner - Family [phone]

## 2022-07-05 ENCOUNTER — TELEPHONE (OUTPATIENT)
Dept: DERMATOLOGY | Facility: CLINIC | Age: 82
End: 2022-07-05

## 2022-07-05 DIAGNOSIS — I48.0 PAROXYSMAL ATRIAL FIBRILLATION (H): ICD-10-CM

## 2022-07-06 RX ORDER — WARFARIN SODIUM 5 MG/1
TABLET ORAL
Qty: 170 TABLET | Refills: 1 | Status: SHIPPED | OUTPATIENT
Start: 2022-07-06 | End: 2022-12-30

## 2022-07-06 NOTE — TELEPHONE ENCOUNTER
ANTICOAGULATION MANAGEMENT:  Medication Refill    Anticoagulation Summary  As of 6/27/2022    Warfarin maintenance plan:  7.5 mg (5 mg x 1.5) every Mon, Fri; 10 mg (5 mg x 2) all other days   Next INR check:  7/11/2022   Target end date:  Indefinite    Indications    Atrial fibrillation (H) [I48.91]  Long term current use of anticoagulant therapy [Z79.01]  Atrial fibrillation  unspecified type (H) [I48.91]  Permanent atrial fibrillation (H) [I48.21]             Anticoagulation Care Providers     Provider Role Specialty Phone number    Tyler Lee MD Referring Family Medicine [phone]    Araceli Crowley NP Referring Nurse Practitioner - Family [phone]          Visit with referring provider/group within last year: Yes    [facility] referral signed within last year: Yes    Unique meets all criteria for refill (current [facility] referral, office visit with referring provider/group in last year, lab monitoring up to date or not exceeding 2 weeks overdue). Rx instructions and quantity supplied updated to match patient's current dosing plan. Warfarin 90 day supply with 1 refill granted per [facility] protocol     Ning Forman, RN  Anticoagulation Clinic

## 2022-07-11 ENCOUNTER — ANTICOAGULATION THERAPY VISIT (OUTPATIENT)
Dept: ANTICOAGULATION | Facility: CLINIC | Age: 82
End: 2022-07-11

## 2022-07-11 DIAGNOSIS — I48.91 ATRIAL FIBRILLATION, UNSPECIFIED TYPE (H): ICD-10-CM

## 2022-07-11 DIAGNOSIS — I48.21 PERMANENT ATRIAL FIBRILLATION (H): ICD-10-CM

## 2022-07-11 DIAGNOSIS — Z79.01 LONG TERM CURRENT USE OF ANTICOAGULANT THERAPY: ICD-10-CM

## 2022-07-11 DIAGNOSIS — I48.91 ATRIAL FIBRILLATION (H): Primary | ICD-10-CM

## 2022-07-11 LAB — INR HOME MONITORING: 2.5 (ref 2–3)

## 2022-07-11 NOTE — PROGRESS NOTES
ANTICOAGULATION MANAGEMENT     Unique Ward 81 year old female is on warfarin with therapeutic INR result. (Goal INR 2.0-3.0)    Recent labs: (last 7 days)     07/11/22  0000   INR 2.5       ASSESSMENT       Source(s): Chart Review and Patient/Caregiver Call       Warfarin doses taken: Warfarin taken as instructed    Diet: No new diet changes identified    New illness, injury, or hospitalization: No    Medication/supplement changes: None noted    Signs or symptoms of bleeding or clotting: No    Previous INR: Therapeutic last 2(+) visits    Additional findings: None       PLAN     Recommended plan for no diet, medication or health factor changes affecting INR     Dosing Instructions: continue your current warfarin dose with next INR in 2 weeks       Summary  As of 7/11/2022    Full warfarin instructions:  7.5 mg every Mon, Fri; 10 mg all other days   Next INR check:  7/25/2022             Telephone call with Aaliyah who verbalizes understanding and agrees to plan    Patient to recheck with home meter    Education provided: Please call back if any changes to your diet, medications or how you've been taking warfarin    Plan made per [facility] anticoagulation protocol    Ning Forman RN  Anticoagulation Clinic  7/11/2022    _______________________________________________________________________     Anticoagulation Episode Summary     Current INR goal:  2.0-3.0   TTR:  53.9 % (1 y)   Target end date:  Indefinite   Send INR reminders to:  [facility]    Indications    Atrial fibrillation (H) [I48.91]  Long term current use of anticoagulant therapy [Z79.01]  Atrial fibrillation  unspecified type (H) [I48.91]  Permanent atrial fibrillation (H) [I48.21]           Comments:  [employer] home meter- call every result         Anticoagulation Care Providers     Provider Role Specialty Phone number    Tyler Lee MD Referring Family Medicine [phone]    Araceli Crowley NP Referring Nurse Practitioner - Family  [phone]

## 2022-07-25 ENCOUNTER — ANTICOAGULATION THERAPY VISIT (OUTPATIENT)
Dept: ANTICOAGULATION | Facility: CLINIC | Age: 82
End: 2022-07-25

## 2022-07-25 DIAGNOSIS — Z79.01 LONG TERM CURRENT USE OF ANTICOAGULANT THERAPY: ICD-10-CM

## 2022-07-25 DIAGNOSIS — I48.21 PERMANENT ATRIAL FIBRILLATION (H): ICD-10-CM

## 2022-07-25 DIAGNOSIS — I48.91 ATRIAL FIBRILLATION (H): Primary | ICD-10-CM

## 2022-07-25 DIAGNOSIS — I48.91 ATRIAL FIBRILLATION, UNSPECIFIED TYPE (H): ICD-10-CM

## 2022-07-25 LAB — INR HOME MONITORING: 2.8 (ref 2–3)

## 2022-07-25 NOTE — PROGRESS NOTES
ANTICOAGULATION MANAGEMENT     Unique Ward 82 year old female is on warfarin with therapeutic INR result. (Goal INR 2.0-3.0)    Recent labs: (last 7 days)     07/25/22  0000   INR 2.8       ASSESSMENT       Source(s): Chart Review and Patient/Caregiver Call       Warfarin doses taken: Warfarin taken as instructed    Diet: No new diet changes identified    New illness, injury, or hospitalization: No    Medication/supplement changes: None noted    Signs or symptoms of bleeding or clotting: No    Previous INR: Therapeutic last 2(+) visits    Additional findings: None       PLAN     Recommended plan for no diet, medication or health factor changes affecting INR     Dosing Instructions: continue your current warfarin dose with next INR in 2 weeks       Summary  As of 7/25/2022    Full warfarin instructions:  7.5 mg every Mon, Fri; 10 mg all other days   Next INR check:  8/8/2022             Telephone call with Aaliyah who verbalizes understanding and agrees to plan    Patient to recheck with home meter    Education provided: Please call back if any changes to your diet, medications or how you've been taking warfarin, Monitoring for bleeding signs and symptoms and Monitoring for clotting signs and symptoms    Plan made per [facility] anticoagulation protocol    Georgia Card, RN  Anticoagulation Clinic  7/25/2022    _______________________________________________________________________     Anticoagulation Episode Summary     Current INR goal:  2.0-3.0   TTR:  57.0 % (1 y)   Target end date:  Indefinite   Send INR reminders to:  [facility]    Indications    Atrial fibrillation (H) [I48.91]  Long term current use of anticoagulant therapy [Z79.01]  Atrial fibrillation  unspecified type (H) [I48.91]  Permanent atrial fibrillation (H) [I48.21]           Comments:  [employer] home meter- call every result         Anticoagulation Care Providers     Provider Role Specialty Phone number    Tyler Lee MD Referring Family  Medicine [phone]    Araceli Crowley NP Referring Nurse Practitioner - Family [phone]

## 2022-08-05 ENCOUNTER — TELEPHONE (OUTPATIENT)
Dept: FAMILY MEDICINE | Facility: CLINIC | Age: 82
End: 2022-08-05

## 2022-08-05 DIAGNOSIS — I48.91 ATRIAL FIBRILLATION (H): Primary | ICD-10-CM

## 2022-08-05 DIAGNOSIS — Z79.01 LONG TERM CURRENT USE OF ANTICOAGULANT THERAPY: ICD-10-CM

## 2022-08-05 DIAGNOSIS — I48.91 ATRIAL FIBRILLATION, UNSPECIFIED TYPE (H): ICD-10-CM

## 2022-08-05 DIAGNOSIS — I48.21 PERMANENT ATRIAL FIBRILLATION (H): ICD-10-CM

## 2022-08-05 LAB — INR HOME MONITORING: 2.9 (ref 2–3)

## 2022-08-05 NOTE — TELEPHONE ENCOUNTER
Aaliyah uses a home INR monitor. Last INR was in range on 7/25, she is next due to check on Mon 8/5.     Called and spoke to her. Advised that it may be too early today to see the full effect of a missed dose last night. Recommended:    Test INR today with her home INR monitor. We will call her and give recommendations if she needs a boost dose today or if she should just continue her dosing and she will likely need to recheck INR Monday as planned, as we may see a dip in her INR by then.     Aaliyah verbalized understanding and was agreeable, she will test INR now and submit to her monitoring company.

## 2022-08-05 NOTE — TELEPHONE ENCOUNTER
ANTICOAGULATION MANAGEMENT     Unique Ward 82 year old female is on warfarin with high therapeutic INR result. (Goal INR )    Recent labs: (last 7 days)     08/05/22  0000   INR 2.9       ASSESSMENT       Source(s): Chart Review and Patient/Caregiver Call       Warfarin doses taken: Missed dose(s) yesterday, likely too soon to be affecting INR    Diet: No new diet changes identified    New illness, injury, or hospitalization: No    Medication/supplement changes: None noted    Signs or symptoms of bleeding or clotting: No    Previous INR: Therapeutic last 2(+) visits    Additional findings: None       PLAN     Recommended plan for temporary change(s) affecting INR     Dosing Instructions: Continue your current warfarin dose with next INR in 3 days on her usual testing day, advised that we may need to make an adjustment dose on Monday if her INR drops. She will avoid Vit K foods over the weekend.     Summary  As of 8/5/2022    Full warfarin instructions:  7.5 mg every Mon, Fri; 10 mg all other days   Next INR check:  8/8/2022             Telephone call with Aaliyah who verbalizes understanding and agrees to plan    Patient to recheck with home meter    Education provided: Impact of vitamin K foods on INR, Monitoring for bleeding signs and symptoms, Monitoring for clotting signs and symptoms and Contact [phone]  with any changes, questions or concerns.     Plan made per [facility] anticoagulation protocol    Stephanie Gonsalez RN  Anticoagulation Clinic  8/5/2022    _______________________________________________________________________     Anticoagulation Episode Summary     Current INR goal:  2.0-3.0   TTR:  57.0 % (1 y)   Target end date:  Indefinite   Send INR reminders to:  [facility]    Indications    Atrial fibrillation (H) [I48.91]  Long term current use of anticoagulant therapy [Z79.01]  Atrial fibrillation  unspecified type (H) [I48.91]  Permanent atrial fibrillation (H) [I48.21]           Comments:  [employer]  home meter- call every result         Anticoagulation Care Providers     Provider Role Specialty Phone number    Tyler Lee MD Referring Family Medicine [phone]    Araceli Crowley NP Referring Nurse Practitioner - Family [phone]

## 2022-08-05 NOTE — TELEPHONE ENCOUNTER
Reason for Call:  Other prescription dosing    Detailed comments: Pt is calling for instructions stating she forgot to take her coumadin last night.    Phone Number Patient can be reached at: Home number on file [phone] (home)    Best Time: any    Can we leave a detailed message on this number? YES    Call taken on 8/5/2022 at 7:54 AM by Arabella Mosher

## 2022-08-06 DIAGNOSIS — T78.2XXA ANAPHYLAXIS, INITIAL ENCOUNTER: Primary | ICD-10-CM

## 2022-08-06 DIAGNOSIS — Z88.9 DRUG ALLERGY: ICD-10-CM

## 2022-08-06 RX ORDER — GENTAMICIN 10 MG/ML
1 INJECTION, SOLUTION INTRAMUSCULAR; INTRAVENOUS ONCE
Status: ACTIVE | OUTPATIENT
Start: 2022-08-09

## 2022-08-06 RX ORDER — CEFTAZIDIME 1 G/1
INJECTION, POWDER, FOR SOLUTION INTRAMUSCULAR; INTRAVENOUS
Qty: 1 EACH | Refills: 0 | Status: SHIPPED | OUTPATIENT
Start: 2022-08-09 | End: 2023-06-20

## 2022-08-06 RX ORDER — PENICILLIN G POTASSIUM 5000000 [IU]/1
5000000 INJECTION, POWDER, FOR SOLUTION INTRAMUSCULAR; INTRAVENOUS ONCE
Status: ACTIVE | OUTPATIENT
Start: 2022-08-09

## 2022-08-06 RX ORDER — CEFAZOLIN SODIUM 1 G
500 VIAL (EA) INJECTION ONCE
Status: COMPLETED | OUTPATIENT
Start: 2022-08-09 | End: 2022-08-09

## 2022-08-06 RX ORDER — CEFTRIAXONE SODIUM 1 G
250 VIAL (EA) INJECTION ONCE
Status: COMPLETED | OUTPATIENT
Start: 2022-08-09 | End: 2022-08-09

## 2022-08-06 NOTE — PROGRESS NOTES
[facility] Dermato-allergology Note  Office visit  Encounter Date: Aug 9, 2022  ____________________________________________    CC: No chief complaint on file.      HPI:  (Aug 9, 2022)  Ms. Unique Ward is a(n) 82 year old female who presents today as a return patient for allergy consultation  - Follow-up in Derm-Allergy clinic for tests as planned (no antihistamines 1 week prior)  - otherwise feeling well in usual state of health    Physical exam:  General: In no acute distress, well-developed, well-nourished  Eyes: no conjunctivitis  ENT: no signs of rhinitis   Pulmonary: no wheezing or coughing  Skin:Focused examination of the skin on test sites was performed = see test results below    Earlier History and Allergy exams:  (04/26/22)  ==>  about 1 year ago patient took a Keflex (first dose for that cycle). Patient had UTI and got the Keflex prescribed. Took the Keflex the next morning around 7:30am. Ate for breakfast a fried chicken (senior meal) = left over from dinner, but did not eat that the night before.  >> within 15 min patient observed redness on the belly area and took 2 Benadryl (7:40am). Started feeling weak and loosing control and after bringing out the dog patient fainted right after calling 911 ==> woke up when the paramedic where there. Left Hospital around 2pm and felt fine.    ==> had same chicken many months prior to the episode and developed afterwards itching on the arms and after Benadryl improved.    >> once ate small amount of chicken in soup without problems    >> never had such severe reaction, but reacted to various vaccines with facial swelling over days.    >> eats daily eggs with liquid yolk and no problems.  >> patient is basically vegetarian and eats only salmon. Avoids basically red meat    >> ate one time salmon with MCT oil (mostly coconut oil) and then developed itching        Past Medical History:   Patient Active Problem List   Diagnosis     Essential  hypertension     Personal history of urinary tract infection     Advance Care Planning     History of basal cell carcinoma- face and scalp     Dysthymia     Vitamin D deficiency     Fibromyalgia     Atrial fibrillation (H)     CHB (complete heart block) (H)     Pacemaker     Long term current use of anticoagulant therapy     Atrial fibrillation, unspecified type (H)     Permanent atrial fibrillation (H)     Past Medical History:   Diagnosis Date     Arthritis 01.01.1980     Atrial fibrillation (H) 6/7/2019     Cancer (H) 01.01.1993     scalp    Basal Cell X2  head      nose bridge 1996     Depressive disorder 01.01.1977    anxiety     FH: melanoma- son 2/28/2017     Hypertension 03.16.2016       Allergies:  Allergies   Allergen Reactions     Keflex [Cephalexin] Anaphylaxis     No Clinical Screening - See Comments      Eye drop antibiotic.     Codeine Other (See Comments)     Comment: , Description:      Gentamicin Hives     Per Unique this medication gives hives on arms and legs. Bev Lo LSXO,  ....................  7/15/2014   1:15 PM     Influenza Vaccines Other (See Comments)     Comment: , Description:      Influenza Virus Vaccine H5n1      Paxil [Paroxetine] Other (See Comments)     Comment: , Description:      Typhoid Vaccine-Strain Ty21a Other (See Comments)     Comment: , Description:   Comment: , Description:      Typhoid Vaccines        Medications:  Current Outpatient Medications   Medication     amLODIPine (NORVASC) 10 MG tablet     azelastine (ASTELIN) 0.1 % nasal spray     cholecalciferol (VITAMIN D3) 5000 units TABS tablet     EPINEPHrine (ANY BX GENERIC EQUIV) 0.3 MG/0.3ML injection 2-pack     meclizine (ANTIVERT) 25 MG tablet     warfarin ANTICOAGULANT (COUMADIN) 5 MG tablet     Zinc Sulfate (ZINC 15 PO)     No current facility-administered medications for this visit.       Social History:  The patient is retired. Patient has the following hobbies or non-occupational exposure  none    Family History:  Family History   Problem Relation Age of Onset     Diabetes Maternal Grandmother         Na     Coronary Artery Disease Sister         error     Coronary Artery Disease Brother         error     Hypertension Brother      Hyperlipidemia Brother      Cerebrovascular Disease Brother      Hypertension Sister      Hypertension Sister      Hyperlipidemia Sister      Other Cancer Sister         cervical     Breast Cancer Sister      Other Cancer Sister         kidney     Mental Illness Mother         paranoid/schizophrenic/suicide     Mental Illness Sister         ???  Had shock treatments     Hyperlipidemia Sister      Other Cancer Sister         Kidney       Previous Labs, Allergy Tests, Dermatopathology, Imaging:  With Dr. Osei all prick tests negatives (pollens, dust mites, molds etc), except 4./4 mm reaction in prick test. Spec IgE to chicken negative and total igE not elevate (93)    Referred By: Kev Osei MD  [address] [place] [address]     Allergy Tests:    Past Allergy Test    Order for Future Allergy Testing:    [] Outpatient  [] Inpatient: [facility]..../ Bed ....       Skin Atopy (atopic dermatitis) [] Yes   [x] No .........  Contact allergies:   [] Yes   [x] No ..........  Hand eczema:   [] Yes   [x] No           Leading hand:   [] R   [] L       [] Ambidextrous         Drug allergies:        [x] Yes   [] No  which?......    Urticaria/Angioedema  [x] Yes   [] No .........  Food Allergy:  [] Yes   [x] No  which?......  Pets :  [] Yes   [x] No  which?......         []  Rhinitis   [] Conjunctivitis   [] Sinusitis   [] Polyposis   [] Otitis   [] Pharyngitis         [x]  none  Operations:   [] Tonsils   [] Septum   [] Sinus   [] Polyposis        [] Asthma bronchiale   [] Coughing      [x]  none  Symptoms (mostly Rhinoconjunctivitis and Asthma) aggravated by:  Season   [] I   [] II   [] III   [] IV   []V   []VI   []VII   []VIII   []IX   []X   []XI   []XI     [] perennial   Day  time      [] morning   [] noon      [] evening        [] night    [] whole day........  []  none  Location/changes    [] inside        [] outside   [] mountains    [] sea     [] others.............   []  none  Triggers, specific     [] animals     [] plants     [] dust              [] others ...........................    []  none  Triggers, others       [] work          [] psyche    [] sport            [] others .............................  []  none  Irritant                [] phys efforts [] smoke    [] heat/cold     [] odors  []others............... []  none    Order for PATCH TESTS  Reason for tests (suspected allergy): not necessary  Known previous allergies: n/a  Standardized panels  [] Standard panel (40 tests)  [] Preservatives & Antimicrobials (31 tests)  [] Emulsifiers & Additives (25 tests)   [] Perfumes/Flavours & Plants (25 tests)  [] Hairdresser panel (12 tests)  [] Rubber Chemicals (22 tests)  [] Plastics (26 tests)  [] Colorants/Dyes/Food additives (20 tests)  [] Metals (implants/dental) (24 tests)  [] Local anaesthetics/NSAIDs (13 tests)  [] Antibiotics & Antimycotics (14 tests)   [] Corticosteroids (15 tests)   [] Photopatch test (62 tests)   [] others: ...      [] Patient's own products: ...    DO NOT test if chemical or biological identity is unknown!     always ask from patient the product information and safety sheets (MSDS)       Order for PRICK TESTS    Reason for tests (suspected allergy): food allergy  Known previous allergies: none    Standardized prick panels  [] Atopic panel (20 tests)  [] Pediatric Panel (12 tests)  [] Milk, Meat, Eggs, Grains (20 tests)   [] Dust, Epithelia, Feathers (10 tests)  [] Fish, Seafood, Shellfish (17 tests)  [] Nuts, Beans (14 tests)  [] Spice, Vegetable, Fruit (17 tests)  [] Pollen Panel = Tree, Grass, Weed (24 tests)  [x] Others: ...      [x] Patient's own products: prick/prick with the fried chicken that patient had then, repeat with chicken meat prick  tests, do egg yolk and white, MCT    DO NOT test if chemical or biological identity is unknown!     always ask from patient the product information and safety sheets (MSDS)     Standardized intradermal tests  [] Penicillium notatum [] Aspergillus fumigatus [] House dust mites D.far & D. pteron  [] Cat    [] dog  [] Others: ...  [] Bee venom   [] Wasp venom  !!Specific protocol with dilutions!!       Order for Drug allergy tests (prick & Intradermal & patch tests)    [] Penicillin G  [] Ampicillin [] Cefazolin   [] Ceftriaxone   [] Ceftazidime  [] Bactrim    [] Others: ...  Order for ... as test date      DRUG ALLERGY TEST SERIES Aug 9, 2022     ANTIBIOTICS      Prick Tests         Substance/ Allergen Conc Result (20 min) Remarks    Histamine Hydrochloride (ALK) 0.1 mg/ml      NaCl 0.9%     1 Cefazolin[1] 100 mg/ml     2 Ceftriaxone* [2] 100 mg/ml     3 Keflex (Cephalexin) Tabl         Intradermal Tests   immed immed delay delay      Substance Conc 1st dil  2nd dil  days  days remarks    Histamine Hydrochloride (ALK) 0.1 mg/ml         NaCl 0.9%        1 Cefazolin[1] 1:5        2 Ceftriaxone* [2] 1:5                  [1]  Cefalexin/Cefazolin-group        [2]    Cefotaxim-group     [3]     Cefuroxim-group    Patients Own Products (Placed Aug 9, 2022)  No Substance Readings (15min) Evaluation   POS Histamine 1mg/ml ++    NEG NaCl 0.9% -      No Substance Readings (15min) Evaluation   1 Chicken drum batter -    2 chicken drum inside -    3 MCT oil (coconut) -    4 Egg white -    5 Egg yolk -    6 White flour -    Conclusion:     ________________________________    Assessment & Plan:    ==> Final Diagnosis:     # one time anaphylactic reaction DDx   drug allergy to Cephalosporines = all skin tests negatives   food allergy to chicken/red meat or spice = all prick tests negatives  * acute illness with systemic symptoms    # food allergy? To chicken or red meat  >> if all negative then do Alpha Gal IgE  * acute illness with  systemic symptoms    These conclusions are made at the best of one's knowledge and belief based on the provided evidence such as patient's history and allergy test results and they can change over time or can be incomplete because of missing information's.    ==> Treatment Plan:  >> got from Dr. Gonzaleserea the emergency medications  >> plan prick and IDT with chicken and the medications      Procedures Performed: Allergy tests, including prick and drug  Tests    Staff: : provider    Follow-up in Derm-Allergy clinic if any new allergic reaction with 24h retrospective diary  ___________________________    I spent a total of 40 minutes with Unique Ward during today s  visit. This time was spent discussing all the individual test results, correlating them to the clinical relevance, counseling the patient and/or coordinating care and performing allergy tests or procedures.

## 2022-08-08 ENCOUNTER — ANTICOAGULATION THERAPY VISIT (OUTPATIENT)
Dept: ANTICOAGULATION | Facility: CLINIC | Age: 82
End: 2022-08-08

## 2022-08-08 DIAGNOSIS — Z79.01 LONG TERM CURRENT USE OF ANTICOAGULANT THERAPY: Primary | ICD-10-CM

## 2022-08-08 DIAGNOSIS — I48.91 ATRIAL FIBRILLATION, UNSPECIFIED TYPE (H): ICD-10-CM

## 2022-08-08 DIAGNOSIS — I48.21 PERMANENT ATRIAL FIBRILLATION (H): ICD-10-CM

## 2022-08-08 LAB — INR HOME MONITORING: 2.4 (ref 2–3)

## 2022-08-08 NOTE — PROGRESS NOTES
ANTICOAGULATION MANAGEMENT     Unique Ward 82 year old female is on warfarin with therapeutic INR result. (Goal INR 2.0-3.0)    Recent labs: (last 7 days)     08/08/22  0000   INR 2.4       ASSESSMENT       Source(s): Chart Review and Patient/Caregiver Call       Warfarin doses taken: Warfarin taken as instructed    Diet: No new diet changes identified    New illness, injury, or hospitalization: No    Medication/supplement changes: None noted    Signs or symptoms of bleeding or clotting: No    Previous INR: Therapeutic last visit; previously outside of goal range    Additional findings: None       PLAN     Recommended plan for no diet, medication or health factor changes affecting INR     Dosing Instructions: Continue your current warfarin dose with next INR in 2 weeks       Summary  As of 8/8/2022    Full warfarin instructions:  7.5 mg every Mon, Fri; 10 mg all other days   Next INR check:  8/22/2022             Telephone call with Aaliyah who verbalizes understanding and agrees to plan and who agrees to plan and repeated back plan correctly    Patient to recheck with home meter    Education provided: Please call back if any changes to your diet, medications or how you've been taking warfarin    Plan made per [facility] anticoagulation protocol    Paola Tiwari, RN  Anticoagulation Clinic  8/8/2022    _______________________________________________________________________     Anticoagulation Episode Summary     Current INR goal:  2.0-3.0   TTR:  57.0 % (1 y)   Target end date:  Indefinite   Send INR reminders to:  ALFREDITO BRANHAM    Indications    Atrial fibrillation (H) [I48.91]  Long term current use of anticoagulant therapy [Z79.01]  Atrial fibrillation  unspecified type (H) [I48.91]  Permanent atrial fibrillation (H) [I48.21]           Comments:  [facility] home meter- call every result         Anticoagulation Care Providers     Provider Role Specialty Phone number    Tyler Lee MD Referring Family Medicine  [phone]    Araceli Crowley NP Referring Nurse Practitioner - Family [phone]

## 2022-08-09 ENCOUNTER — OFFICE VISIT (OUTPATIENT)
Dept: ALLERGY | Facility: CLINIC | Age: 82
End: 2022-08-09
Payer: COMMERCIAL

## 2022-08-09 DIAGNOSIS — T78.2XXA ANAPHYLAXIS, INITIAL ENCOUNTER: ICD-10-CM

## 2022-08-09 DIAGNOSIS — Z88.9 DRUG ALLERGY: Primary | ICD-10-CM

## 2022-08-09 DIAGNOSIS — Z91.018 FOOD ALLERGY: ICD-10-CM

## 2022-08-09 PROCEDURE — 95004 PERQ TESTS W/ALRGNC XTRCS: CPT | Performed by: DERMATOLOGY

## 2022-08-09 PROCEDURE — 95018 ALL TSTG PERQ&IQ DRUGS/BIOL: CPT | Performed by: DERMATOLOGY

## 2022-08-09 PROCEDURE — 99214 OFFICE O/P EST MOD 30 MIN: CPT | Mod: 25 | Performed by: DERMATOLOGY

## 2022-08-09 RX ORDER — CEPHALEXIN 500 MG/1
500 CAPSULE ORAL ONCE
Status: COMPLETED | OUTPATIENT
Start: 2022-08-09 | End: 2022-08-09

## 2022-08-09 RX ADMIN — CEPHALEXIN 500 MG: 500 CAPSULE ORAL at 16:00

## 2022-08-09 RX ADMIN — Medication 50 MG: at 16:00

## 2022-08-09 RX ADMIN — Medication 500 MG: at 16:00

## 2022-08-09 ASSESSMENT — PAIN SCALES - GENERAL: PAINLEVEL: NO PAIN (0)

## 2022-08-09 NOTE — LETTER
8/9/2022         RE: [facility] Ward  [address] [address] [facility] [address]        Dear Colleague,    Thank you for referring your patient, Unique Ward, to the [facility] [place]. Please see a copy of my visit note below.    [facility] Dermato-allergology Note  Office visit  Encounter Date: Aug 9, 2022  ____________________________________________    CC: No chief complaint on file.      HPI:  (Aug 9, 2022)  Ms. Unique Ward is a(n) 82 year old female who presents today as a return patient for allergy consultation  - Follow-up in Derm-Allergy clinic for tests as planned (no antihistamines 1 week prior)  - otherwise feeling well in usual state of health    Physical exam:  General: In no acute distress, well-developed, well-nourished  Eyes: no conjunctivitis  ENT: no signs of rhinitis   Pulmonary: no wheezing or coughing  Skin:Focused examination of the skin on test sites was performed = see test results below    Earlier History and Allergy exams:  (04/26/22)  ==>  about 1 year ago patient took a Keflex (first dose for that cycle). Patient had UTI and got the Keflex prescribed. Took the Keflex the next morning around 7:30am. Ate for breakfast a fried chicken (senior meal) = left over from dinner, but did not eat that the night before.  >> within 15 min patient observed redness on the belly area and took 2 Benadryl (7:40am). Started feeling weak and loosing control and after bringing out the dog patient fainted right after calling 911 ==> woke up when the paramedic where there. Left Hospital around 2pm and felt fine.    ==> had same chicken many months prior to the episode and developed afterwards itching on the arms and after Benadryl improved.    >> once ate small amount of chicken in soup without problems    >> never had such severe reaction, but reacted to various vaccines with facial swelling over days.    >> eats daily eggs with liquid yolk and no problems.  >>  patient is basically vegetarian and eats only salmon. Avoids basically red meat    >> ate one time salmon with MCT oil (mostly coconut oil) and then developed itching        Past Medical History:   Patient Active Problem List   Diagnosis     Essential hypertension     Personal history of urinary tract infection     Advance Care Planning     History of basal cell carcinoma- face and scalp     Dysthymia     Vitamin D deficiency     Fibromyalgia     Atrial fibrillation (H)     CHB (complete heart block) (H)     Pacemaker     Long term current use of anticoagulant therapy     Atrial fibrillation, unspecified type (H)     Permanent atrial fibrillation (H)     Past Medical History:   Diagnosis Date     Arthritis 01.01.1980     Atrial fibrillation (H) 6/7/2019     Cancer (H) 01.01.1993     scalp    Basal Cell X2  head      nose bridge 1996     Depressive disorder 01.01.1977    anxiety     FH: melanoma- son 2/28/2017     Hypertension 03.16.2016       Allergies:  Allergies   Allergen Reactions     Keflex [Cephalexin] Anaphylaxis     No Clinical Screening - See Comments      Eye drop antibiotic.     Codeine Other (See Comments)     Comment: , Description:      Gentamicin Hives     Per Unique this medication gives hives on arms and legs. Bev Lo LSXO,  ....................  7/15/2014   1:15 PM     Influenza Vaccines Other (See Comments)     Comment: , Description:      Influenza Virus Vaccine H5n1      Paxil [Paroxetine] Other (See Comments)     Comment: , Description:      Typhoid Vaccine-Strain Ty21a Other (See Comments)     Comment: , Description:   Comment: , Description:      Typhoid Vaccines        Medications:  Current Outpatient Medications   Medication     amLODIPine (NORVASC) 10 MG tablet     azelastine (ASTELIN) 0.1 % nasal spray     cholecalciferol (VITAMIN D3) 5000 units TABS tablet     EPINEPHrine (ANY BX GENERIC EQUIV) 0.3 MG/0.3ML injection 2-pack     meclizine (ANTIVERT) 25 MG tablet     warfarin  ANTICOAGULANT (COUMADIN) 5 MG tablet     Zinc Sulfate (ZINC 15 PO)     No current facility-administered medications for this visit.       Social History:  The patient is retired. Patient has the following hobbies or non-occupational exposure none    Family History:  Family History   Problem Relation Age of Onset     Diabetes Maternal Grandmother         Na     Coronary Artery Disease Sister         error     Coronary Artery Disease Brother         error     Hypertension Brother      Hyperlipidemia Brother      Cerebrovascular Disease Brother      Hypertension Sister      Hypertension Sister      Hyperlipidemia Sister      Other Cancer Sister         cervical     Breast Cancer Sister      Other Cancer Sister         kidney     Mental Illness Mother         paranoid/schizophrenic/suicide     Mental Illness Sister         ???  Had shock treatments     Hyperlipidemia Sister      Other Cancer Sister         Kidney       Previous Labs, Allergy Tests, Dermatopathology, Imaging:  With Dr. Osei all prick tests negatives (pollens, dust mites, molds etc), except 4./4 mm reaction in prick test. Spec IgE to chicken negative and total igE not elevate (93)    Referred By: Kev Osei MD  [address] [place] [address]     Allergy Tests:    Past Allergy Test    Order for Future Allergy Testing:    [] Outpatient  [] Inpatient: [facility]..../ Bed ....       Skin Atopy (atopic dermatitis) [] Yes   [x] No .........  Contact allergies:   [] Yes   [x] No ..........  Hand eczema:   [] Yes   [x] No           Leading hand:   [] R   [] L       [] Ambidextrous         Drug allergies:        [x] Yes   [] No  which?......    Urticaria/Angioedema  [x] Yes   [] No .........  Food Allergy:  [] Yes   [x] No  which?......  Pets :  [] Yes   [x] No  which?......         []  Rhinitis   [] Conjunctivitis   [] Sinusitis   [] Polyposis   [] Otitis   [] Pharyngitis         [x]  none  Operations:   [] Tonsils   [] Septum   [] Sinus   [] Polyposis         [] Asthma bronchiale   [] Coughing      [x]  none  Symptoms (mostly Rhinoconjunctivitis and Asthma) aggravated by:  Season   [] I   [] II   [] III   [] IV   []V   []VI   []VII   []VIII   []IX   []X   []XI   []XI     [] perennial   Day time      [] morning   [] noon      [] evening        [] night    [] whole day........  []  none  Location/changes    [] inside        [] outside   [] mountains    [] sea     [] others.............   []  none  Triggers, specific     [] animals     [] plants     [] dust              [] others ...........................    []  none  Triggers, others       [] work          [] psyche    [] sport            [] others .............................  []  none  Irritant                [] phys efforts [] smoke    [] heat/cold     [] odors  []others............... []  none    Order for PATCH TESTS  Reason for tests (suspected allergy): not necessary  Known previous allergies: n/a  Standardized panels  [] Standard panel (40 tests)  [] Preservatives & Antimicrobials (31 tests)  [] Emulsifiers & Additives (25 tests)   [] Perfumes/Flavours & Plants (25 tests)  [] Hairdresser panel (12 tests)  [] Rubber Chemicals (22 tests)  [] Plastics (26 tests)  [] Colorants/Dyes/Food additives (20 tests)  [] Metals (implants/dental) (24 tests)  [] Local anaesthetics/NSAIDs (13 tests)  [] Antibiotics & Antimycotics (14 tests)   [] Corticosteroids (15 tests)   [] Photopatch test (62 tests)   [] others: ...      [] Patient's own products: ...    DO NOT test if chemical or biological identity is unknown!     always ask from patient the product information and safety sheets (MSDS)       Order for PRICK TESTS    Reason for tests (suspected allergy): food allergy  Known previous allergies: none    Standardized prick panels  [] Atopic panel (20 tests)  [] Pediatric Panel (12 tests)  [] Milk, Meat, Eggs, Grains (20 tests)   [] Dust, Epithelia, Feathers (10 tests)  [] Fish, Seafood, Shellfish (17 tests)  [] Nuts, Beans  (14 tests)  [] Spice, Vegetable, Fruit (17 tests)  [] Pollen Panel = Tree, Grass, Weed (24 tests)  [x] Others: ...      [x] Patient's own products: prick/prick with the fried chicken that patient had then, repeat with chicken meat prick tests, do egg yolk and white, MCT    DO NOT test if chemical or biological identity is unknown!     always ask from patient the product information and safety sheets (MSDS)     Standardized intradermal tests  [] Penicillium notatum [] Aspergillus fumigatus [] House dust mites D.far & D. pteron  [] Cat    [] dog  [] Others: ...  [] Bee venom   [] Wasp venom  !!Specific protocol with dilutions!!       Order for Drug allergy tests (prick & Intradermal & patch tests)    [] Penicillin G  [] Ampicillin [] Cefazolin   [] Ceftriaxone   [] Ceftazidime  [] Bactrim    [] Others: ...  Order for ... as test date      DRUG ALLERGY TEST SERIES Aug 9, 2022     ANTIBIOTICS      Prick Tests         Substance/ Allergen Conc Result (20 min) Remarks    Histamine Hydrochloride (ALK) 0.1 mg/ml      NaCl 0.9%     1 Cefazolin[1] 100 mg/ml     2 Ceftriaxone* [2] 100 mg/ml     3 Keflex (Cephalexin) Tabl         Intradermal Tests   immed immed delay delay      Substance Conc 1st dil  2nd dil  days  days remarks    Histamine Hydrochloride (ALK) 0.1 mg/ml         NaCl 0.9%        1 Cefazolin[1] 1:5        2 Ceftriaxone* [2] 1:5                  [1]  Cefalexin/Cefazolin-group        [2]    Cefotaxim-group     [3]     Cefuroxim-group    Patients Own Products (Placed Aug 9, 2022)  No Substance Readings (15min) Evaluation   POS Histamine 1mg/ml ++    NEG NaCl 0.9% -      No Substance Readings (15min) Evaluation   1 Chicken drum batter -    2 chicken drum inside -    3 MCT oil (coconut) -    4 Egg white -    5 Egg yolk -    6 White flour -    Conclusion:     ________________________________    Assessment & Plan:    ==> Final Diagnosis:     # one time anaphylactic reaction DDx   drug allergy to Cephalosporines = all  skin tests negatives   food allergy to chicken/red meat or spice = all prick tests negatives  * acute illness with systemic symptoms    # food allergy? To chicken or red meat  >> if all negative then do Alpha Gal IgE  * acute illness with systemic symptoms    These conclusions are made at the best of one's knowledge and belief based on the provided evidence such as patient's history and allergy test results and they can change over time or can be incomplete because of missing information's.    ==> Treatment Plan:  >> got from Dr. Gonzaleserea the emergency medications  >> plan prick and IDT with chicken and the medications      Procedures Performed: Allergy tests, including prick and drug  Tests    Staff: : provider    Follow-up in Derm-Allergy clinic if any new allergic reaction with 24h retrospective diary  ___________________________    I spent a total of 40 minutes with Unique Ward during today s  visit. This time was spent discussing all the individual test results, correlating them to the clinical relevance, counseling the patient and/or coordinating care and performing allergy tests or procedures.         Drug Administration Record    Prior to injection, verified patient identity using patient's name and date of birth.  Due to injection administration, patient instructed to remain in clinic for 15 minutes  afterwards, and to report any adverse reaction to me immediately.    Drug Name: cefazolin  Dose: 0.5mL  Route administered: Prick and intradermal for allergy testing  NDC #: 25021-100-10  Amount of waste(mL):4.5mL  Reason for waste: Single use vial    LOT #: [id number]  SITE: [facility]  : [employer]  EXPIRATION DATE: June 2023      Drug Administration Record    Prior to injection, verified patient identity using patient's name and date of birth.  Due to injection administration, patient instructed to remain in clinic for 15 minutes  afterwards, and to report any adverse reaction to me  immediately.    Drug Name: ceftriaxone  Dose: 0.5mL  Route administered: prick and intradermal for allergy testing  NDC #: 0479-8123-97  Amount of waste(mL):2.0mL  Reason for waste: Single use vial    LOT #: [id number]  SITE: [facility]  : [employer]  EXPIRATION DATE: Oct 2023    Drug Administration Record    Prior to injection, verified patient identity using patient's name and date of birth.  Due to injection administration, patient instructed to remain in clinic for 15 minutes  afterwards, and to report any adverse reaction to me immediately.    Drug Name: cephalexin (Keflex)  Dose: 500mg capsule  Route administered: prick for allergy testing  NDC #: 36198-957-87  Amount of waste(mL):0  Reason for waste: Single use vial    LOT #: [id number]  SITE: [facility]  : [employer]  EXPIRATION DATE: May 2024      Again, thank you for allowing me to participate in the care of your patient.        Sincerely,        Carl Horta MD

## 2022-08-09 NOTE — PROGRESS NOTES
Drug Administration Record    Prior to injection, verified patient identity using patient's name and date of birth.  Due to injection administration, patient instructed to remain in clinic for 15 minutes  afterwards, and to report any adverse reaction to me immediately.    Drug Name: cefazolin  Dose: 0.5mL  Route administered: Prick and intradermal for allergy testing  NDC #: 25021-100-10  Amount of waste(mL):4.5mL  Reason for waste: Single use vial    LOT #: [id number]  SITE: [facility]  : [employer]  EXPIRATION DATE: June 2023      Drug Administration Record    Prior to injection, verified patient identity using patient's name and date of birth.  Due to injection administration, patient instructed to remain in clinic for 15 minutes  afterwards, and to report any adverse reaction to me immediately.    Drug Name: ceftriaxone  Dose: 0.5mL  Route administered: prick and intradermal for allergy testing  NDC #: 9178-1989-52  Amount of waste(mL):2.0mL  Reason for waste: Single use vial    LOT #: [id number]  SITE: [facility]  : [employer]  EXPIRATION DATE: Oct 2023    Drug Administration Record    Prior to injection, verified patient identity using patient's name and date of birth.  Due to injection administration, patient instructed to remain in clinic for 15 minutes  afterwards, and to report any adverse reaction to me immediately.    Drug Name: cephalexin (Keflex)  Dose: 500mg capsule  Route administered: prick for allergy testing  NDC #: 57005-321-51  Amount of waste(mL):0  Reason for waste: Single use vial    LOT #: [id number]  SITE: [facility]  : [employer]  EXPIRATION DATE: May 2024

## 2022-08-09 NOTE — NURSING NOTE
Chief Complaint   Patient presents with     Allergy Recheck     Unique is here today for allergy follow up.     Sirisha Choi RN

## 2022-08-15 ENCOUNTER — TELEPHONE (OUTPATIENT)
Dept: FAMILY MEDICINE | Facility: CLINIC | Age: 82
End: 2022-08-15

## 2022-08-15 NOTE — TELEPHONE ENCOUNTER
Patient wanted to inform INR nurse that she was started on macrobid on 8-13-22 for UTI.  Has taken 5 of the the 14 pills so far.    Please call patient with any updates or questions.  OK to LM on VM.

## 2022-08-15 NOTE — TELEPHONE ENCOUNTER
Returned call to pt, informed her that according [employer] no interaction is anticipated between warfarin and Macrobid. Advised that she should check INR as scheduled next Monday 8/22/22 but if she wants to check sooner than that she is welcome to.     Yan Jang RN

## 2022-08-22 ENCOUNTER — ANTICOAGULATION THERAPY VISIT (OUTPATIENT)
Dept: ANTICOAGULATION | Facility: CLINIC | Age: 82
End: 2022-08-22

## 2022-08-22 DIAGNOSIS — I48.21 PERMANENT ATRIAL FIBRILLATION (H): ICD-10-CM

## 2022-08-22 DIAGNOSIS — I48.91 ATRIAL FIBRILLATION, UNSPECIFIED TYPE (H): ICD-10-CM

## 2022-08-22 DIAGNOSIS — Z79.01 LONG TERM CURRENT USE OF ANTICOAGULANT THERAPY: ICD-10-CM

## 2022-08-22 DIAGNOSIS — I48.91 ATRIAL FIBRILLATION (H): Primary | ICD-10-CM

## 2022-08-22 LAB — INR HOME MONITORING: 2.7 (ref 2–3)

## 2022-08-22 NOTE — PROGRESS NOTES
ANTICOAGULATION MANAGEMENT     Unique Ward 82 year old female is on warfarin with therapeutic INR result. (Goal INR 2.0-3.0)    Recent labs: (last 7 days)     08/22/22  0000   INR 2.7       ASSESSMENT       Source(s): Chart Review and Patient/Caregiver Call       Warfarin doses taken: Warfarin taken as instructed    Diet: No new diet changes identified    New illness, injury, or hospitalization: No    Medication/supplement changes: None noted    Signs or symptoms of bleeding or clotting: No    Previous INR: Therapeutic last 2(+) visits    Additional findings: None       PLAN     Recommended plan for no diet, medication or health factor changes affecting INR     Dosing Instructions: Continue your current warfarin dose with next INR in 2 weeks       Summary  As of 8/22/2022    Full warfarin instructions:  7.5 mg every Mon, Fri; 10 mg all other days   Next INR check:  9/5/2022             Telephone call with Aaliyah who verbalizes understanding and agrees to plan    Patient to recheck with home meter    Education provided: Please call back if any changes to your diet, medications or how you've been taking warfarin, Manage by exception with home monitor. Continue to submit INR results to home monitor company.You will only be called when your result is out of range. Please call and notify [facility] if new medication started, dose missed, signs or symptoms of bleeding or clotting, or a surgery/procedure is scheduled. and Contact [phone]  with any changes, questions or concerns.     Plan made per [facility] anticoagulation protocol    Shi Bennett RN  Anticoagulation Clinic  8/22/2022    _______________________________________________________________________     Anticoagulation Episode Summary     Current INR goal:  2.0-3.0   TTR:  60.2 % (1 y)   Target end date:  Indefinite   Send INR reminders to:  [facility]    Indications    Atrial fibrillation (H) [I48.91]  Long term current use of anticoagulant therapy  [Z79.01]  Atrial fibrillation  unspecified type (H) [I48.91]  Permanent atrial fibrillation (H) [I48.21]           Comments:  [employer] home meter- MBE         Anticoagulation Care Providers     Provider Role Specialty Phone number    Tyler Lee MD Referring Family Medicine [phone]    Araceli Crowley NP Referring Nurse Practitioner - Family [phone]

## 2022-09-02 ENCOUNTER — MYC MEDICAL ADVICE (OUTPATIENT)
Dept: FAMILY MEDICINE | Facility: CLINIC | Age: 82
End: 2022-09-02

## 2022-09-02 DIAGNOSIS — R73.03 PREDIABETES: Primary | ICD-10-CM

## 2022-09-02 DIAGNOSIS — I10 ESSENTIAL HYPERTENSION: ICD-10-CM

## 2022-09-08 ENCOUNTER — ANCILLARY PROCEDURE (OUTPATIENT)
Dept: CARDIOLOGY | Facility: CLINIC | Age: 82
End: 2022-09-08
Attending: INTERNAL MEDICINE
Payer: COMMERCIAL

## 2022-09-08 DIAGNOSIS — I44.2 CHB (COMPLETE HEART BLOCK) (H): ICD-10-CM

## 2022-09-08 DIAGNOSIS — Z95.0 CARDIAC PACEMAKER IN SITU: ICD-10-CM

## 2022-09-08 PROCEDURE — 93296 REM INTERROG EVL PM/IDS: CPT | Performed by: INTERNAL MEDICINE

## 2022-09-08 PROCEDURE — 93294 REM INTERROG EVL PM/LDLS PM: CPT | Performed by: INTERNAL MEDICINE

## 2022-09-09 ENCOUNTER — LAB (OUTPATIENT)
Dept: LAB | Facility: CLINIC | Age: 82
End: 2022-09-09
Payer: COMMERCIAL

## 2022-09-09 ENCOUNTER — ANTICOAGULATION THERAPY VISIT (OUTPATIENT)
Dept: ANTICOAGULATION | Facility: CLINIC | Age: 82
End: 2022-09-09

## 2022-09-09 DIAGNOSIS — R73.03 PREDIABETES: ICD-10-CM

## 2022-09-09 DIAGNOSIS — I48.91 ATRIAL FIBRILLATION, UNSPECIFIED TYPE (H): ICD-10-CM

## 2022-09-09 DIAGNOSIS — Z79.01 LONG TERM CURRENT USE OF ANTICOAGULANT THERAPY: Primary | ICD-10-CM

## 2022-09-09 DIAGNOSIS — I48.21 PERMANENT ATRIAL FIBRILLATION (H): ICD-10-CM

## 2022-09-09 DIAGNOSIS — I10 ESSENTIAL HYPERTENSION: ICD-10-CM

## 2022-09-09 LAB
ANION GAP SERPL CALCULATED.3IONS-SCNC: 7 MMOL/L (ref 3–14)
BUN SERPL-MCNC: 22 MG/DL (ref 7–30)
CALCIUM SERPL-MCNC: 9 MG/DL (ref 8.5–10.1)
CHLORIDE BLD-SCNC: 101 MMOL/L (ref 94–109)
CO2 SERPL-SCNC: 25 MMOL/L (ref 20–32)
CREAT SERPL-MCNC: 0.62 MG/DL (ref 0.52–1.04)
GFR SERPL CREATININE-BSD FRML MDRD: 88 ML/MIN/1.73M2
GLUCOSE BLD-MCNC: 95 MG/DL (ref 70–99)
HBA1C MFR BLD: 5.8 % (ref 0–5.6)
INR BLD: 2.4 (ref 0.9–1.1)
INR HOME MONITORING: 2.4 (ref 2–3)
POTASSIUM BLD-SCNC: 4 MMOL/L (ref 3.4–5.3)
SODIUM SERPL-SCNC: 133 MMOL/L (ref 133–144)

## 2022-09-09 PROCEDURE — 80048 BASIC METABOLIC PNL TOTAL CA: CPT

## 2022-09-09 PROCEDURE — 85610 PROTHROMBIN TIME: CPT

## 2022-09-09 PROCEDURE — 83036 HEMOGLOBIN GLYCOSYLATED A1C: CPT

## 2022-09-09 PROCEDURE — 36415 COLL VENOUS BLD VENIPUNCTURE: CPT

## 2022-09-09 NOTE — PROGRESS NOTES
ANTICOAGULATION MANAGEMENT     Unique Ward 82 year old female is on warfarin with therapeutic INR result. (Goal INR 2.0-3.0)    Recent labs: (last 7 days)     09/09/22  1050   INR 2.4*       ASSESSMENT       Source(s): Chart Review and Patient/Caregiver Call       Warfarin doses taken: Warfarin taken as instructed    Diet: No new diet changes identified    New illness, injury, or hospitalization: No    Medication/supplement changes: None noted    Signs or symptoms of bleeding or clotting: No    Previous INR: Therapeutic last 2(+) visits    Additional findings: None       PLAN     Unable to reach Aaliyah today.    Left detailed message with Lab INR result, dosing and follow up INR date. Requested call back with questions or concerns, and any changes in health diet or medications.     Patient is manage by exception    Christine Olivares RN  Anticoagulation Clinic  9/9/2022

## 2022-09-11 ENCOUNTER — HEALTH MAINTENANCE LETTER (OUTPATIENT)
Age: 82
End: 2022-09-11

## 2022-09-12 LAB
MDC_IDC_EPISODE_DTM: NORMAL
MDC_IDC_EPISODE_DURATION: 45 S
MDC_IDC_EPISODE_DURATION: 49 S
MDC_IDC_EPISODE_DURATION: 5375 S
MDC_IDC_EPISODE_ID: 62
MDC_IDC_EPISODE_ID: 63
MDC_IDC_EPISODE_ID: 64
MDC_IDC_EPISODE_TYPE: NORMAL
MDC_IDC_LEAD_IMPLANT_DT: NORMAL
MDC_IDC_LEAD_IMPLANT_DT: NORMAL
MDC_IDC_LEAD_LOCATION: NORMAL
MDC_IDC_LEAD_LOCATION: NORMAL
MDC_IDC_LEAD_LOCATION_DETAIL_1: NORMAL
MDC_IDC_LEAD_LOCATION_DETAIL_1: NORMAL
MDC_IDC_LEAD_MFG: NORMAL
MDC_IDC_LEAD_MFG: NORMAL
MDC_IDC_LEAD_MODEL: NORMAL
MDC_IDC_LEAD_MODEL: NORMAL
MDC_IDC_LEAD_POLARITY_TYPE: NORMAL
MDC_IDC_LEAD_POLARITY_TYPE: NORMAL
MDC_IDC_LEAD_SERIAL: NORMAL
MDC_IDC_LEAD_SERIAL: NORMAL
MDC_IDC_MSMT_BATTERY_DTM: NORMAL
MDC_IDC_MSMT_BATTERY_REMAINING_LONGEVITY: 113 MO
MDC_IDC_MSMT_BATTERY_RRT_TRIGGER: 2.62
MDC_IDC_MSMT_BATTERY_STATUS: NORMAL
MDC_IDC_MSMT_BATTERY_VOLTAGE: 3 V
MDC_IDC_MSMT_LEADCHNL_RA_IMPEDANCE_VALUE: 285 OHM
MDC_IDC_MSMT_LEADCHNL_RA_IMPEDANCE_VALUE: 342 OHM
MDC_IDC_MSMT_LEADCHNL_RA_PACING_THRESHOLD_AMPLITUDE: 0.75 V
MDC_IDC_MSMT_LEADCHNL_RA_PACING_THRESHOLD_PULSEWIDTH: 0.4 MS
MDC_IDC_MSMT_LEADCHNL_RA_SENSING_INTR_AMPL: 0.75 MV
MDC_IDC_MSMT_LEADCHNL_RA_SENSING_INTR_AMPL: 0.75 MV
MDC_IDC_MSMT_LEADCHNL_RV_IMPEDANCE_VALUE: 361 OHM
MDC_IDC_MSMT_LEADCHNL_RV_IMPEDANCE_VALUE: 437 OHM
MDC_IDC_MSMT_LEADCHNL_RV_PACING_THRESHOLD_AMPLITUDE: 0.88 V
MDC_IDC_MSMT_LEADCHNL_RV_PACING_THRESHOLD_PULSEWIDTH: 0.4 MS
MDC_IDC_MSMT_LEADCHNL_RV_SENSING_INTR_AMPL: 10 MV
MDC_IDC_MSMT_LEADCHNL_RV_SENSING_INTR_AMPL: 10 MV
MDC_IDC_PG_IMPLANT_DTM: NORMAL
MDC_IDC_PG_MFG: NORMAL
MDC_IDC_PG_MODEL: NORMAL
MDC_IDC_PG_SERIAL: NORMAL
MDC_IDC_PG_TYPE: NORMAL
MDC_IDC_SESS_CLINIC_NAME: NORMAL
MDC_IDC_SESS_DTM: NORMAL
MDC_IDC_SESS_TYPE: NORMAL
MDC_IDC_SET_BRADY_AT_MODE_SWITCH_RATE: 171 {BEATS}/MIN
MDC_IDC_SET_BRADY_HYSTRATE: NORMAL
MDC_IDC_SET_BRADY_LOWRATE: 60 {BEATS}/MIN
MDC_IDC_SET_BRADY_MAX_SENSOR_RATE: 130 {BEATS}/MIN
MDC_IDC_SET_BRADY_MAX_TRACKING_RATE: 130 {BEATS}/MIN
MDC_IDC_SET_BRADY_MODE: NORMAL
MDC_IDC_SET_BRADY_PAV_DELAY_HIGH: 140 MS
MDC_IDC_SET_BRADY_PAV_DELAY_LOW: 300 MS
MDC_IDC_SET_BRADY_SAV_DELAY_HIGH: 110 MS
MDC_IDC_SET_BRADY_SAV_DELAY_LOW: 230 MS
MDC_IDC_SET_LEADCHNL_RA_PACING_AMPLITUDE: 1.5 V
MDC_IDC_SET_LEADCHNL_RA_PACING_ANODE_ELECTRODE_1: NORMAL
MDC_IDC_SET_LEADCHNL_RA_PACING_ANODE_LOCATION_1: NORMAL
MDC_IDC_SET_LEADCHNL_RA_PACING_CAPTURE_MODE: NORMAL
MDC_IDC_SET_LEADCHNL_RA_PACING_CATHODE_ELECTRODE_1: NORMAL
MDC_IDC_SET_LEADCHNL_RA_PACING_CATHODE_LOCATION_1: NORMAL
MDC_IDC_SET_LEADCHNL_RA_PACING_POLARITY: NORMAL
MDC_IDC_SET_LEADCHNL_RA_PACING_PULSEWIDTH: 0.4 MS
MDC_IDC_SET_LEADCHNL_RA_SENSING_ANODE_ELECTRODE_1: NORMAL
MDC_IDC_SET_LEADCHNL_RA_SENSING_ANODE_LOCATION_1: NORMAL
MDC_IDC_SET_LEADCHNL_RA_SENSING_CATHODE_ELECTRODE_1: NORMAL
MDC_IDC_SET_LEADCHNL_RA_SENSING_CATHODE_LOCATION_1: NORMAL
MDC_IDC_SET_LEADCHNL_RA_SENSING_POLARITY: NORMAL
MDC_IDC_SET_LEADCHNL_RA_SENSING_SENSITIVITY: 0.3 MV
MDC_IDC_SET_LEADCHNL_RV_PACING_AMPLITUDE: 2 V
MDC_IDC_SET_LEADCHNL_RV_PACING_ANODE_ELECTRODE_1: NORMAL
MDC_IDC_SET_LEADCHNL_RV_PACING_ANODE_LOCATION_1: NORMAL
MDC_IDC_SET_LEADCHNL_RV_PACING_CAPTURE_MODE: NORMAL
MDC_IDC_SET_LEADCHNL_RV_PACING_CATHODE_ELECTRODE_1: NORMAL
MDC_IDC_SET_LEADCHNL_RV_PACING_CATHODE_LOCATION_1: NORMAL
MDC_IDC_SET_LEADCHNL_RV_PACING_POLARITY: NORMAL
MDC_IDC_SET_LEADCHNL_RV_PACING_PULSEWIDTH: 0.4 MS
MDC_IDC_SET_LEADCHNL_RV_SENSING_ANODE_ELECTRODE_1: NORMAL
MDC_IDC_SET_LEADCHNL_RV_SENSING_ANODE_LOCATION_1: NORMAL
MDC_IDC_SET_LEADCHNL_RV_SENSING_CATHODE_ELECTRODE_1: NORMAL
MDC_IDC_SET_LEADCHNL_RV_SENSING_CATHODE_LOCATION_1: NORMAL
MDC_IDC_SET_LEADCHNL_RV_SENSING_POLARITY: NORMAL
MDC_IDC_SET_LEADCHNL_RV_SENSING_SENSITIVITY: 0.9 MV
MDC_IDC_SET_ZONE_DETECTION_INTERVAL: 350 MS
MDC_IDC_SET_ZONE_DETECTION_INTERVAL: 400 MS
MDC_IDC_SET_ZONE_TYPE: NORMAL
MDC_IDC_STAT_AT_BURDEN_PERCENT: 0.1 %
MDC_IDC_STAT_AT_DTM_END: NORMAL
MDC_IDC_STAT_AT_DTM_START: NORMAL
MDC_IDC_STAT_BRADY_AP_VP_PERCENT: 73.31 %
MDC_IDC_STAT_BRADY_AP_VS_PERCENT: 1.03 %
MDC_IDC_STAT_BRADY_AS_VP_PERCENT: 25.4 %
MDC_IDC_STAT_BRADY_AS_VS_PERCENT: 0.25 %
MDC_IDC_STAT_BRADY_DTM_END: NORMAL
MDC_IDC_STAT_BRADY_DTM_START: NORMAL
MDC_IDC_STAT_BRADY_RA_PERCENT_PACED: 73.01 %
MDC_IDC_STAT_BRADY_RV_PERCENT_PACED: 98.71 %
MDC_IDC_STAT_EPISODE_RECENT_COUNT: 0
MDC_IDC_STAT_EPISODE_RECENT_COUNT: 3
MDC_IDC_STAT_EPISODE_RECENT_COUNT_DTM_END: NORMAL
MDC_IDC_STAT_EPISODE_RECENT_COUNT_DTM_START: NORMAL
MDC_IDC_STAT_EPISODE_TOTAL_COUNT: 0
MDC_IDC_STAT_EPISODE_TOTAL_COUNT: 3
MDC_IDC_STAT_EPISODE_TOTAL_COUNT: 61
MDC_IDC_STAT_EPISODE_TOTAL_COUNT_DTM_END: NORMAL
MDC_IDC_STAT_EPISODE_TOTAL_COUNT_DTM_START: NORMAL
MDC_IDC_STAT_EPISODE_TYPE: NORMAL

## 2022-09-13 ENCOUNTER — MYC MEDICAL ADVICE (OUTPATIENT)
Dept: FAMILY MEDICINE | Facility: CLINIC | Age: 82
End: 2022-09-13

## 2022-09-13 DIAGNOSIS — E55.9 HYPOVITAMINOSIS D: Primary | ICD-10-CM

## 2022-09-13 DIAGNOSIS — Z87.898 HISTORY OF PREDIABETES: ICD-10-CM

## 2022-09-16 NOTE — PROGRESS NOTES
[facility] Rehabilitation Service    Outpatient Physical Therapy Discharge Note  Patient: Unique Ward  : 1940    Beginning/End Dates of Reporting Period:  22 to 22    Referring Provider: Araceli Crowley NP, Alisson Pack PA-C    Therapy Diagnosis: Reduced ROM, postural flexibility, kyphoscoliosis, weakness, reduced balance, altered gait     Client Self Report: Patient reports feel excellent, reports decreased shuffling with walking. Pt reports needing to use heating pad due to back pain.  Pt reports completing exercises, something every day.    Objective Measurements:  Objective Measure: low back pain  Details: much less sore and need for heat pad; 4-5/10 highest that resolved with heating pad for 10 min  Objective Measure: bilateral knee pain  Details: (B) denies      Goals:  Goal Identifier LEFS   Goal Description Patient will improve LEFS score indicating improved LE tolerance to activity from 31/80 to 45/80   Target Date 22   Date Met      Progress (detail required for progress note):  Not assessed     Goal Identifier Strengthening   Goal Description Patient will improve standing toleran with 50% less back pain with standing to do dishes for 15 minutes   Target Date 22   Date Met      Progress (detail required for progress note):       Goal Identifier Balance   Goal Description Patient will have TUG <16 seconds, Angeles Balance score > 48/56 to imrpove balance reactions and reduce fear of falling   Target Date     Date Met      Progress (detail required for progress note):  Not assessed     Goal Identifier Posture   Goal Description Patient will improve postural ROM and control with upright posture with less than 8 inches head/ tragus to wall distance to improve balance   Target Date 22   Date Met      Progress (detail required for progress note):           Plan:  Discharge from  therapy.    Discharge:    Reason for Discharge: Patient chooses to discontinue therapy.      Discharge Plan: Patient to continue home program.    Herminia Banks PT, DPT, OCS, [facility]  [phone]

## 2022-09-23 ENCOUNTER — DOCUMENTATION ONLY (OUTPATIENT)
Dept: ANTICOAGULATION | Facility: CLINIC | Age: 82
End: 2022-09-23

## 2022-09-23 DIAGNOSIS — I48.91 ATRIAL FIBRILLATION (H): Primary | ICD-10-CM

## 2022-09-23 DIAGNOSIS — I48.91 ATRIAL FIBRILLATION, UNSPECIFIED TYPE (H): ICD-10-CM

## 2022-09-23 DIAGNOSIS — Z79.01 LONG TERM CURRENT USE OF ANTICOAGULANT THERAPY: ICD-10-CM

## 2022-09-23 DIAGNOSIS — I48.21 PERMANENT ATRIAL FIBRILLATION (H): ICD-10-CM

## 2022-09-23 LAB — INR HOME MONITORING: 2.4 (ref 2–3)

## 2022-09-23 NOTE — PROGRESS NOTES
ANTICOAGULATION  MANAGEMENT-Home Monitor Managed by Exception    Unique Ward 82 year old female is on warfarin with therapeutic INR result. (Goal INR 2.0-3.0)    Recent labs: (last 7 days)     09/23/22  0000   INR 2.4         Previous INR was Therapeutic    Medication, diet, health changes since last INR:chart reviewed; none identified    Contacted within the last 12 weeks by phone on 9/9/22    ARIELLE     Unique was NOT contacted regarding therapeutic result today per home monitoring policy manage by exception agreement.   Current warfarin dose is to be continued:     Summary  As of 9/23/2022    Full warfarin instructions:  7.5 mg every Mon, Fri; 10 mg all other days   Next INR check:  10/7/2022           ?   Ning Forman RN  Anticoagulation Clinic  9/23/2022    _______________________________________________________________________     Anticoagulation Episode Summary     Current INR goal:  2.0-3.0   TTR:  65.2 % (1 y)   Target end date:  Indefinite   Send INR reminders to:  [employer] [facility] [employer]    Indications    Atrial fibrillation (H) [I48.91]  Long term current use of anticoagulant therapy [Z79.01]  Atrial fibrillation  unspecified type (H) [I48.91]  Permanent atrial fibrillation (H) [I48.21]           Comments:  [employer] home meter- MBE         Anticoagulation Care Providers     Provider Role Specialty Phone number    Tyler Lee MD Referring Family Medicine [phone]    Araceli Crowley NP Referring Nurse Practitioner - Family [phone]

## 2022-10-07 ENCOUNTER — DOCUMENTATION ONLY (OUTPATIENT)
Dept: ANTICOAGULATION | Facility: CLINIC | Age: 82
End: 2022-10-07

## 2022-10-07 LAB — INR HOME MONITORING: 2 (ref 2–3)

## 2022-10-07 NOTE — PROGRESS NOTES
ANTICOAGULATION  MANAGEMENT-Home Monitor Managed by Exception    Unique Ward 82 year old female is on warfarin with therapeutic INR result. (Goal INR 2.0-3.0)    Recent labs: (last 7 days)     10/07/22  0000   INR 2.0         Previous INR was Therapeutic    Medication, diet, health changes since last INR:chart reviewed; none identified    Contacted within the last 12 weeks by phone on 9/9/22      ARIELLE     Unique was NOT contacted regarding therapeutic result today per home monitoring policy manage by exception agreement.   Current warfarin dose is to be continued:     Summary  As of 10/7/2022    Full warfarin instructions:  7.5 mg every Mon, Fri; 10 mg all other days   Next INR check:  10/21/2022           ?   Kirstin Cooper RN  Anticoagulation Clinic  10/7/2022    _______________________________________________________________________     Anticoagulation Episode Summary     Current INR goal:  2.0-3.0   TTR:  66.3 % (1 y)   Target end date:  Indefinite   Send INR reminders to:  [employer] [facility]    Indications    Atrial fibrillation (H) [I48.91]  Long term current use of anticoagulant therapy [Z79.01]  Atrial fibrillation  unspecified type (H) [I48.91]  Permanent atrial fibrillation (H) [I48.21]           Comments:  [employer] home meter- MBE         Anticoagulation Care Providers     Provider Role Specialty Phone number    Tyler Lee MD Referring Family Medicine [phone]    rAaceli Crowley NP Referring Nurse Practitioner - Family [phone]

## 2022-10-13 ENCOUNTER — HOSPITAL ENCOUNTER (OUTPATIENT)
Dept: CARDIOLOGY | Facility: CLINIC | Age: 82
Discharge: HOME | End: 2022-10-13
Attending: INTERNAL MEDICINE | Admitting: INTERNAL MEDICINE
Payer: MEDICARE

## 2022-10-13 DIAGNOSIS — I10 ESSENTIAL HYPERTENSION: ICD-10-CM

## 2022-10-13 LAB — LVEF ECHO: NORMAL

## 2022-10-13 PROCEDURE — 93306 TTE W/DOPPLER COMPLETE: CPT | Mod: 26 | Performed by: INTERNAL MEDICINE

## 2022-10-13 PROCEDURE — 93306 TTE W/DOPPLER COMPLETE: CPT

## 2022-10-18 ENCOUNTER — OFFICE VISIT (OUTPATIENT)
Dept: CARDIOLOGY | Facility: CLINIC | Age: 82
End: 2022-10-18
Attending: INTERNAL MEDICINE
Payer: COMMERCIAL

## 2022-10-18 VITALS
DIASTOLIC BLOOD PRESSURE: 64 MMHG | WEIGHT: 136 LBS | OXYGEN SATURATION: 99 % | SYSTOLIC BLOOD PRESSURE: 122 MMHG | BODY MASS INDEX: 21.86 KG/M2 | HEART RATE: 78 BPM | HEIGHT: 66 IN

## 2022-10-18 DIAGNOSIS — R60.9 EDEMA, UNSPECIFIED TYPE: Primary | ICD-10-CM

## 2022-10-18 DIAGNOSIS — I10 ESSENTIAL HYPERTENSION: ICD-10-CM

## 2022-10-18 PROCEDURE — 99214 OFFICE O/P EST MOD 30 MIN: CPT | Performed by: INTERNAL MEDICINE

## 2022-10-18 RX ORDER — CHLORAL HYDRATE 500 MG
2 CAPSULE ORAL DAILY
COMMUNITY
End: 2023-08-29

## 2022-10-18 NOTE — PATIENT INSTRUCTIONS
-Continue current medications; no changes made today  -wear compression stockings daily; take off at night  -Follow up in one year with an echocardiogram prior to that visit

## 2022-10-18 NOTE — PROGRESS NOTES
CARDIOLOGY CLINIC VISIT  DATE OF SERVICE:  October 18, 2022      PRIMARY CARE PHYSICIAN:  Araceli Crowley    HISTORY OF PRESENT ILLNESS:  Ms. Unique Ward is a very pleasant 80 year old patient with PMH significant for paroxysmal atrial fibrillation.  She wast last seen by me in 10/2021 and presents today for follow up.    In brief review, she presented to the ED at [facility] on 4/14/19 with palpitations. She was found to the in atrial fibrillation with RVR and spontaneously converted to normal sinus rhythm with occasional PVCs. An echocardiogram showed normal LV function with moderate tricuspid regurgitation. Given [id number] score of 4. Initially, Xarelto was prescribed, but was cost prohibitive and she was then started on Warfarin.  In March 2020, she was admitted to the hospital with complete heart block.  She underwent permanent pacemaker implantation at that time.  She has had regular follow up with device clinic.  A follow up echocardiogram performed last week demonstrated normal LV function, moderate to moderate-severe TR, mild RV dilatation with normal RV function.    In follow up today, Aaliyah reports feeling well.  She states she has noted worsening lower extremity edema when norvasc was increased to 10 mg daily.  Overall, its mild.  She also has knee swelling on the left related to a prior injury.  She is wondering about compression stockings.  She denies any exertional chest pain, chest discomfort or shortness of breath.  She rarely notes episodes of palpitations.  Recent Device check documented infrequent PAF, rate controlled.                 PAST MEDICAL HISTORY:  Past Medical History:   Diagnosis Date     Arthritis 01.01.1980     Atrial fibrillation (H) 6/7/2019     Cancer (H) 01.01.1993     scalp    Basal Cell X2  head      nose bridge 1996     Depressive disorder 01.01.1977    anxiety     FH: melanoma- son 2/28/2017     Hypertension 03.16.2016       MEDICATIONS:  Current Outpatient  Medications   Medication     amLODIPine (NORVASC) 10 MG tablet     azelastine (ASTELIN) 0.1 % nasal spray     cefTAZidime (FORTAZ) 1 GM vial     cholecalciferol (VITAMIN D3) 5000 units TABS tablet     EPINEPHrine (ANY BX GENERIC EQUIV) 0.3 MG/0.3ML injection 2-pack     meclizine (ANTIVERT) 25 MG tablet     warfarin ANTICOAGULANT (COUMADIN) 5 MG tablet     Zinc Sulfate (ZINC 15 PO)     Current Facility-Administered Medications   Medication     cephALEXin (KEFLEX) capsule 250 mg     gentamicin (PF) (GARAMYCIN) injection 1 mg     penicillin g potassium [id number] unit vial INTRADERMAL       ALLERGIES:  Allergies   Allergen Reactions     Keflex [Cephalexin] Anaphylaxis     No Clinical Screening - See Comments      Eye drop antibiotic.     Codeine Other (See Comments)     Comment: , Description:      Gentamicin Hives     Per Unique this medication gives hives on arms and legs. Bev Lo LSXO,  ....................  7/15/2014   1:15 PM     Influenza Vaccines Other (See Comments)     Comment: , Description:      Influenza Virus Vaccine H5n1      Paxil [Paroxetine] Other (See Comments)     Comment: , Description:      Typhoid Vaccine-Strain Ty21a Other (See Comments)     Comment: , Description:   Comment: , Description:      Typhoid Vaccines        SOCIAL HISTORY:  I have reviewed this patient's social history and updated it with pertinent information if needed. Unique GUZMAN Ward  reports that she quit smoking about 57 years ago. Her smoking use included cigarettes. She started smoking about 67 years ago. She has a 7.50 pack-year smoking history. She has never used smokeless tobacco. She reports that she does not drink alcohol and does not use drugs.    FAMILY HISTORY:  I have reviewed this patient's family history and updated it with pertinent information if needed.   Family History   Problem Relation Age of Onset     Diabetes Maternal Grandmother         Na     Coronary Artery Disease Sister         error     Coronary  Artery Disease Brother         error     Hypertension Brother      Hyperlipidemia Brother      Cerebrovascular Disease Brother      Hypertension Sister      Hypertension Sister      Hyperlipidemia Sister      Other Cancer Sister         cervical     Breast Cancer Sister      Other Cancer Sister         kidney     Mental Illness Mother         paranoid/schizophrenic/suicide     Mental Illness Sister         ???  Had shock treatments     Hyperlipidemia Sister      Other Cancer Sister         Kidney       REVIEW OF SYSTEMS:  A complete ROS was obtained and the pertinent positives are outlined in the history of present illness above.  The remainder of systems is negative.    PHYSICAL EXAM:                     Vital Signs with Ranges     0 lbs 0 oz    Constitutional: awake, alert, no distress  Eyes: PERRL, sclera nonicteric  ENT: trachea midline  Respiratory: CTAB  Cardiovascular: RRR no m/r/g, no JVD  GI: nondistended, nontender, bowel sounds present  Lymph/Hematologic: no lymphadenopathy  Skin: dry, no rash  Musculoskeletal: good muscle tone, strength 5/5 in upper and lower extremities  Neurologic: no focal deficits  Neuropsychiatric: appropriate affact      Echocardiogram dated 10/13/22 images reviewed personally  Left ventricular systolic function is normal.  The visual ejection fraction is 55-60%.  The right ventricle is normal in size and function. Pacemaker noted in the  right ventricle.  Moderate to severe tricuspid regurgitation.  There is mild (1+) mitral regurgitation.  Small pericardial effusion.          ASSESSMENT:     1. Paroxysmal atrial fibrillation    [id number] score of 4 (age, gender, hypertension)    Anticoagulated with Coumadin     Infrequent PAF on device check; rate controlled     2. Hypertension    At goal on amlodipine 10 mg daily    Notes some mild edema with increase in amlodipine     3. Moderate tricuspid regurgitation    Stable by recent echocardiogram, no symptoms of concern     4.   Complete AV block s/p dual chamber pacemaker       Normal device check; brief rate controlled atrial fibrillation noted     5.  Feelings of anxiety       Improved         RECOMMENDATIONS:  -Discussed options for BP management including continuing amlodipine 10 mg daily or reducing back to 5 mg daily and adding an additional agent.  Overall, her edema is mild and she would like to continue on the 10 mg daily  -She was given a rx for thigh high compression stockings and instructed on their use  -Follow up in 12 months with an echocardiogram prior to that visit          Christin Sanchez MD [facility]  October 18, 2022

## 2022-10-18 NOTE — LETTER
10/18/2022    Araceli Crowley, NP  [address] [place] [address]    RE: Unique Ward       Dear Colleague,     I had the pleasure of seeing Unique Ward in the [facility].  CARDIOLOGY CLINIC VISIT  DATE OF SERVICE:  October 18, 2022      PRIMARY CARE PHYSICIAN:  Araceli Crowley    HISTORY OF PRESENT ILLNESS:  Ms. Unique Ward is a very pleasant 80 year old patient with PMH significant for paroxysmal atrial fibrillation.  She wast last seen by me in 10/2021 and presents today for follow up.    In brief review, she presented to the ED at [facility] on 4/14/19 with palpitations. She was found to the in atrial fibrillation with RVR and spontaneously converted to normal sinus rhythm with occasional PVCs. An echocardiogram showed normal LV function with moderate tricuspid regurgitation. Given [id number] score of 4. Initially, Xarelto was prescribed, but was cost prohibitive and she was then started on Warfarin.  In March 2020, she was admitted to the hospital with complete heart block.  She underwent permanent pacemaker implantation at that time.  She has had regular follow up with device clinic.  A follow up echocardiogram performed last week demonstrated normal LV function, moderate to moderate-severe TR, mild RV dilatation with normal RV function.    In follow up today, Aaliyah reports feeling well.  She states she has noted worsening lower extremity edema when norvasc was increased to 10 mg daily.  Overall, its mild.  She also has knee swelling on the left related to a prior injury.  She is wondering about compression stockings.  She denies any exertional chest pain, chest discomfort or shortness of breath.  She rarely notes episodes of palpitations.  Recent Device check documented infrequent PAF, rate controlled.                 PAST MEDICAL HISTORY:  Past Medical History:   Diagnosis Date     Arthritis 01.01.1980     Atrial fibrillation (H) 6/7/2019     Cancer (H)  01.01.1993     scalp    Basal Cell X2  head      nose bridge 1996     Depressive disorder 01.01.1977    anxiety     FH: melanoma- son 2/28/2017     Hypertension 03.16.2016       MEDICATIONS:  Current Outpatient Medications   Medication     amLODIPine (NORVASC) 10 MG tablet     azelastine (ASTELIN) 0.1 % nasal spray     cefTAZidime (FORTAZ) 1 GM vial     cholecalciferol (VITAMIN D3) 5000 units TABS tablet     EPINEPHrine (ANY BX GENERIC EQUIV) 0.3 MG/0.3ML injection 2-pack     meclizine (ANTIVERT) 25 MG tablet     warfarin ANTICOAGULANT (COUMADIN) 5 MG tablet     Zinc Sulfate (ZINC 15 PO)     Current Facility-Administered Medications   Medication     cephALEXin (KEFLEX) capsule 250 mg     gentamicin (PF) (GARAMYCIN) injection 1 mg     penicillin g potassium [id number] unit vial INTRADERMAL       ALLERGIES:  Allergies   Allergen Reactions     Keflex [Cephalexin] Anaphylaxis     No Clinical Screening - See Comments      Eye drop antibiotic.     Codeine Other (See Comments)     Comment: , Description:      Gentamicin Hives     Per Unique this medication gives hives on arms and legs. Bev Lo LSXO,  ....................  7/15/2014   1:15 PM     Influenza Vaccines Other (See Comments)     Comment: , Description:      Influenza Virus Vaccine H5n1      Paxil [Paroxetine] Other (See Comments)     Comment: , Description:      Typhoid Vaccine-Strain Ty21a Other (See Comments)     Comment: , Description:   Comment: , Description:      Typhoid Vaccines        SOCIAL HISTORY:  I have reviewed this patient's social history and updated it with pertinent information if needed. Unique Ward  reports that she quit smoking about 57 years ago. Her smoking use included cigarettes. She started smoking about 67 years ago. She has a 7.50 pack-year smoking history. She has never used smokeless tobacco. She reports that she does not drink alcohol and does not use drugs.    FAMILY HISTORY:  I have reviewed this patient's family  history and updated it with pertinent information if needed.   Family History   Problem Relation Age of Onset     Diabetes Maternal Grandmother         Na     Coronary Artery Disease Sister         error     Coronary Artery Disease Brother         error     Hypertension Brother      Hyperlipidemia Brother      Cerebrovascular Disease Brother      Hypertension Sister      Hypertension Sister      Hyperlipidemia Sister      Other Cancer Sister         cervical     Breast Cancer Sister      Other Cancer Sister         kidney     Mental Illness Mother         paranoid/schizophrenic/suicide     Mental Illness Sister         ???  Had shock treatments     Hyperlipidemia Sister      Other Cancer Sister         Kidney       REVIEW OF SYSTEMS:  A complete ROS was obtained and the pertinent positives are outlined in the history of present illness above.  The remainder of systems is negative.    PHYSICAL EXAM:                     Vital Signs with Ranges     0 lbs 0 oz    Constitutional: awake, alert, no distress  Eyes: PERRL, sclera nonicteric  ENT: trachea midline  Respiratory: CTAB  Cardiovascular: RRR no m/r/g, no JVD  GI: nondistended, nontender, bowel sounds present  Lymph/Hematologic: no lymphadenopathy  Skin: dry, no rash  Musculoskeletal: good muscle tone, strength 5/5 in upper and lower extremities  Neurologic: no focal deficits  Neuropsychiatric: appropriate affact      Echocardiogram dated 10/13/22 images reviewed personally  Left ventricular systolic function is normal.  The visual ejection fraction is 55-60%.  The right ventricle is normal in size and function. Pacemaker noted in the  right ventricle.  Moderate to severe tricuspid regurgitation.  There is mild (1+) mitral regurgitation.  Small pericardial effusion.          ASSESSMENT:     1. Paroxysmal atrial fibrillation    [id number] score of 4 (age, gender, hypertension)    Anticoagulated with Coumadin     Infrequent PAF on device check; rate  controlled     2. Hypertension    At goal on amlodipine 10 mg daily    Notes some mild edema with increase in amlodipine     3. Moderate tricuspid regurgitation    Stable by recent echocardiogram, no symptoms of concern     4.  Complete AV block s/p dual chamber pacemaker       Normal device check; brief rate controlled atrial fibrillation noted     5.  Feelings of anxiety       Improved         RECOMMENDATIONS:  -Discussed options for BP management including continuing amlodipine 10 mg daily or reducing back to 5 mg daily and adding an additional agent.  Overall, her edema is mild and she would like to continue on the 10 mg daily  -She was given a rx for thigh high compression stockings and instructed on their use  -Follow up in 12 months with an echocardiogram prior to that visit      Christin Sanchez MD [facility]  October 18, 2022      Thank you for allowing me to participate in the care of your patient.      Sincerely,     Christin Sanchez MD     [facility]  cc:   Christin Sanchez MD  [facility] [place]  [address]  [place] [address]

## 2022-10-24 ENCOUNTER — ANTICOAGULATION THERAPY VISIT (OUTPATIENT)
Dept: ANTICOAGULATION | Facility: CLINIC | Age: 82
End: 2022-10-24

## 2022-10-24 DIAGNOSIS — I48.21 PERMANENT ATRIAL FIBRILLATION (H): ICD-10-CM

## 2022-10-24 DIAGNOSIS — I48.91 ATRIAL FIBRILLATION, UNSPECIFIED TYPE (H): Primary | ICD-10-CM

## 2022-10-24 DIAGNOSIS — Z79.01 LONG TERM CURRENT USE OF ANTICOAGULANT THERAPY: ICD-10-CM

## 2022-10-24 LAB — INR HOME MONITORING: 1.9 (ref 2–3)

## 2022-10-24 NOTE — PROGRESS NOTES
ANTICOAGULATION MANAGEMENT     Unique GUZMAN Ed 82 year old female is on warfarin with subtherapeutic INR result. (Goal INR 2.0-3.0)    Recent labs: (last 7 days)     10/24/22  0000   INR 1.9*       ASSESSMENT       Source(s): Chart Review and Patient/Caregiver Call       Warfarin doses taken: Warfarin taken as instructed    Diet: No new diet changes identified    New illness, injury, or hospitalization: No    Medication/supplement changes: Patient intends on starting fish oil today which can increase INR.    Signs or symptoms of bleeding or clotting: No    Previous INR: Therapeutic last 2(+) visits    Additional findings: INR appears to be dropping slowly over the last month. Due to starting fish oil, will wait to make any dosing changes. Will have patient recheck INR early, and adjust dose from there.       PLAN     Recommended plan for ongoing change(s) affecting INR     Dosing Instructions: Continue your current warfarin dose with next INR in 1 week       Summary  As of 10/24/2022    Full warfarin instructions:  7.5 mg every Mon, Fri; 10 mg all other days; Starting 10/24/2022   Next INR check:  10/31/2022             Telephone call with Aaliyah who verbalizes understanding and agrees to plan    Patient to recheck with home meter    Education provided:     Please call back if any changes to your diet, medications or how you've been taking warfarin    Interaction IS anticipated between warfarin and fish oil    Symptom monitoring: monitoring for bleeding signs and symptoms    Plan made per [facility] anticoagulation protocol    Georgia Card RN  Anticoagulation Clinic  10/24/2022    _______________________________________________________________________     Anticoagulation Episode Summary     Current INR goal:  2.0-3.0   TTR:  62.2 % (1 y)   Target end date:  Indefinite   Send INR reminders to:  [facility]    Indications    Atrial fibrillation (H) [I48.91]  Long term current use of anticoagulant therapy  [Z79.01]  Atrial fibrillation  unspecified type (H) [I48.91]  Permanent atrial fibrillation (H) [I48.21]           Comments:  [employer] home meter- MBE         Anticoagulation Care Providers     Provider Role Specialty Phone number    Tyler Lee MD Referring Family Medicine [phone]    Araceli Crowley NP Referring Nurse Practitioner - Family [phone]

## 2022-10-26 ENCOUNTER — MYC MEDICAL ADVICE (OUTPATIENT)
Dept: CARDIOLOGY | Facility: CLINIC | Age: 82
End: 2022-10-26

## 2022-10-26 DIAGNOSIS — I10 ESSENTIAL HYPERTENSION: ICD-10-CM

## 2022-10-26 RX ORDER — AMLODIPINE BESYLATE 10 MG/1
10 TABLET ORAL DAILY
Qty: 90 TABLET | Refills: 3 | Status: SHIPPED | OUTPATIENT
Start: 2022-10-26 | End: 2023-09-14

## 2022-11-01 ENCOUNTER — DOCUMENTATION ONLY (OUTPATIENT)
Dept: ANTICOAGULATION | Facility: CLINIC | Age: 82
End: 2022-11-01

## 2022-11-01 ENCOUNTER — MYC MEDICAL ADVICE (OUTPATIENT)
Dept: ANTICOAGULATION | Facility: CLINIC | Age: 82
End: 2022-11-01

## 2022-11-01 NOTE — PROGRESS NOTES
ANTICOAGULATION     Unique Ward is overdue for INR check.      SENSIMED message sent.    Georgia Card RN

## 2022-11-02 ENCOUNTER — ANTICOAGULATION THERAPY VISIT (OUTPATIENT)
Dept: ANTICOAGULATION | Facility: CLINIC | Age: 82
End: 2022-11-02

## 2022-11-02 ENCOUNTER — LAB (OUTPATIENT)
Dept: LAB | Facility: CLINIC | Age: 82
End: 2022-11-02
Payer: COMMERCIAL

## 2022-11-02 DIAGNOSIS — I48.91 ATRIAL FIBRILLATION, UNSPECIFIED TYPE (H): ICD-10-CM

## 2022-11-02 LAB — INR BLD: 1.5 (ref 0.9–1.1)

## 2022-11-02 PROCEDURE — 36416 COLLJ CAPILLARY BLOOD SPEC: CPT

## 2022-11-02 PROCEDURE — 85610 PROTHROMBIN TIME: CPT

## 2022-11-02 NOTE — PROGRESS NOTES
ANTICOAGULATION MANAGEMENT     Unique Ward 82 year old female is on warfarin with subtherapeutic INR result. (Goal INR 2.0-3.0)    Recent labs: (last 7 days)     11/02/22  0952   INR 1.5*       ASSESSMENT       Source(s): Chart Review and Patient/Caregiver Call       Warfarin doses taken: Warfarin taken as instructed    Diet: Pt has not been eating greens lately    New illness, injury, or hospitalization: No    Medication/supplement changes: Fish oil    Signs or symptoms of bleeding or clotting: No    Previous INR: Subtherapeutic    Additional findings: Pt INR level has been dropping. Pt denies any missed doses or change in activity level. Pt reports that she actually plans to join an exercise group on line in the next couple of weeks. Pt reports that she has been afraid to eat greens for the last 3-4 weeks. Pt encouraged to eat a serving of greens over the weekend. Pt denies drinking ensure or a change in MVI.       PLAN     Recommended plan for ongoing change(s) affecting INR     Dosing Instructions: booster dose then Increase your warfarin dose (3.8% change) with next INR in 1 week       Summary  As of 11/2/2022    Full warfarin instructions:  11/2: 15 mg; Otherwise 7.5 mg every Wed; 10 mg all other days; Starting 11/2/2022   Next INR check:  11/9/2022             Telephone call with Aaliyah who agrees to plan and repeated back plan correctly ([employer] message sent as requested)    Patient to recheck with home meter    Education provided:     Contact [phone]  with any changes, questions or concerns.     Plan made per [facility] anticoagulation protocol    Kirstin Cooper RN  Anticoagulation Clinic  11/2/2022    _______________________________________________________________________     Anticoagulation Episode Summary     Current INR goal:  2.0-3.0   TTR:  61.0 % (1 y)   Target end date:  Indefinite   Send INR reminders to:  [facility]    Indications    Atrial fibrillation (H) [I48.91]  Long term  current use of anticoagulant therapy [Z79.01]  Atrial fibrillation  unspecified type (H) [I48.91]  Permanent atrial fibrillation (H) [I48.21]           Comments:  [employer] meter- MBE         Anticoagulation Care Providers     Provider Role Specialty Phone number    Tyler Lee MD Referring Family Medicine [phone]    Araceli Crowley NP Referring Nurse Practitioner - Family [phone]

## 2022-11-09 ENCOUNTER — ANTICOAGULATION THERAPY VISIT (OUTPATIENT)
Dept: ANTICOAGULATION | Facility: CLINIC | Age: 82
End: 2022-11-09

## 2022-11-09 ENCOUNTER — MYC MEDICAL ADVICE (OUTPATIENT)
Dept: FAMILY MEDICINE | Facility: CLINIC | Age: 82
End: 2022-11-09

## 2022-11-09 DIAGNOSIS — I48.91 ATRIAL FIBRILLATION, UNSPECIFIED TYPE (H): Primary | ICD-10-CM

## 2022-11-09 DIAGNOSIS — I48.21 PERMANENT ATRIAL FIBRILLATION (H): ICD-10-CM

## 2022-11-09 DIAGNOSIS — Z79.01 LONG TERM CURRENT USE OF ANTICOAGULANT THERAPY: ICD-10-CM

## 2022-11-09 LAB — INR HOME MONITORING: 2.3 (ref 2–3)

## 2022-11-09 NOTE — PROGRESS NOTES
ANTICOAGULATION MANAGEMENT     Unique GUZMAN Ed 82 year old female is on warfarin with therapeutic INR result. (Goal INR 2.0-3.0)    Recent labs: (last 7 days)     11/09/22  0000   INR 2.3       ASSESSMENT       Source(s): Chart Review and Patient/Caregiver Call       Warfarin doses taken: Warfarin taken as instructed    Diet: she is adding Vit K foods back in to her routine, had been strictly avoiding them for a few weeks. Not quite back up to baseline level but getting there.     New illness, injury, or hospitalization: No    Medication/supplement changes: None noted    Signs or symptoms of bleeding or clotting: No    Previous INR: Subtherapeutic    Additional findings: None       PLAN     Recommended plan for ongoing change(s) affecting INR     Dosing Instructions: Increase your warfarin dose (3% change) with next INR in 1 week       Summary  As of 11/9/2022    Full warfarin instructions:  10 mg every day; Starting 11/9/2022   Next INR check:  11/16/2022             Telephone call with Aaliyah who verbalizes understanding and agrees to plan    Patient to recheck with home meter    Education provided:     Goal range and lab monitoring: goal range and significance of current result    Dietary considerations: importance of consistent vitamin K intake    Plan made per [facility] anticoagulation protocol    Stephanie Gonsalez RN  Anticoagulation Clinic  11/9/2022    _______________________________________________________________________     Anticoagulation Episode Summary     Current INR goal:  2.0-3.0   TTR:  61.9 % (1 y)   Target end date:  Indefinite   Send INR reminders to:  [facility]    Indications    Atrial fibrillation (H) [I48.91]  Long term current use of anticoagulant therapy [Z79.01]  Atrial fibrillation  unspecified type (H) [I48.91]  Permanent atrial fibrillation (H) [I48.21]           Comments:  [employer] home meter- MBE         Anticoagulation Care Providers     Provider Role Specialty Phone number     Tyler Lee MD Referring [facility] Medicine [phone]    Araceli Crowley NP Referring Nurse Practitioner - Family [phone]

## 2022-11-16 ENCOUNTER — DOCUMENTATION ONLY (OUTPATIENT)
Dept: ANTICOAGULATION | Facility: CLINIC | Age: 82
End: 2022-11-16

## 2022-11-16 DIAGNOSIS — I48.91 ATRIAL FIBRILLATION, UNSPECIFIED TYPE (H): Primary | ICD-10-CM

## 2022-11-16 DIAGNOSIS — I48.21 PERMANENT ATRIAL FIBRILLATION (H): ICD-10-CM

## 2022-11-16 DIAGNOSIS — Z79.01 LONG TERM CURRENT USE OF ANTICOAGULANT THERAPY: ICD-10-CM

## 2022-11-16 LAB — INR HOME MONITORING: 2.1 (ref 2–3)

## 2022-11-16 NOTE — PROGRESS NOTES
ANTICOAGULATION  MANAGEMENT-Home Monitor Managed by Exception    Unique Ward 82 year old female is on warfarin with therapeutic INR result. (Goal INR 2.0-3.0)    Recent labs: (last 7 days)     11/16/22  0000   INR 2.1         Previous INR was Therapeutic    Medication, diet, health changes since last INR:chart reviewed; none identified    Contacted within the last 12 weeks by phone on 11/9/22    ARIELLE     Unique was NOT contacted regarding therapeutic result today per home monitoring policy manage by exception agreement.   Current warfarin dose is to be continued:     Summary  As of 11/16/2022    Full warfarin instructions:  10 mg every day; Starting 11/16/2022   Next INR check:  11/30/2022           ?   Ning Forman RN  Anticoagulation Clinic  11/16/2022    _______________________________________________________________________     Anticoagulation Episode Summary     Current INR goal:  2.0-3.0   TTR:  63.4 % (1 y)   Target end date:  Indefinite   Send INR reminders to:  [facility]    Indications    Atrial fibrillation (H) [I48.91]  Long term current use of anticoagulant therapy [Z79.01]  Atrial fibrillation  unspecified type (H) [I48.91]  Permanent atrial fibrillation (H) [I48.21]           Comments:  [employer] home meter- MBE         Anticoagulation Care Providers     Provider Role Specialty Phone number    Tyler Lee MD Referring Family Medicine [phone]    Araceli Crowley NP Referring Nurse Practitioner - Family [phone]

## 2022-11-18 ENCOUNTER — TELEPHONE (OUTPATIENT)
Dept: FAMILY MEDICINE | Facility: CLINIC | Age: 82
End: 2022-11-18

## 2022-11-18 NOTE — TELEPHONE ENCOUNTER
Reason for Call:  Patient is calling/concerned about dropping INR's please contact patient for next route of care.     Detailed comments: Please return call as soon as possible    Phone Number Patient can be reached at: Cell number on file:    Telephone Information:   Mobile [phone]       Best Time: As soon as available    Can we leave a detailed message on this number? NO    Call taken on 11/18/2022 at 3:02 PM by Deonna Mcdonald

## 2022-11-18 NOTE — TELEPHONE ENCOUNTER
Called and spoke to pt. Pt advised to continue taking 10 mg of warfarin daily and recheck INR sooner on 11/23/22 if feels more comfortable for closer monitoring since she is adding greens back into her diet.

## 2022-11-23 ENCOUNTER — DOCUMENTATION ONLY (OUTPATIENT)
Dept: ANTICOAGULATION | Facility: CLINIC | Age: 82
End: 2022-11-23

## 2022-11-23 DIAGNOSIS — I48.21 PERMANENT ATRIAL FIBRILLATION (H): ICD-10-CM

## 2022-11-23 DIAGNOSIS — Z79.01 LONG TERM CURRENT USE OF ANTICOAGULANT THERAPY: Primary | ICD-10-CM

## 2022-11-23 DIAGNOSIS — I48.91 ATRIAL FIBRILLATION, UNSPECIFIED TYPE (H): ICD-10-CM

## 2022-11-23 LAB — INR HOME MONITORING: 2.3 (ref 2–3)

## 2022-11-23 NOTE — PROGRESS NOTES
ANTICOAGULATION  MANAGEMENT-Home Monitor Managed by Exception    Unique Ward 82 year old female is on warfarin with therapeutic INR result. (Goal INR 2.0-3.0)    Recent labs: (last 7 days)     11/23/22  0000   INR 2.3         Previous INR was Therapeutic    Medication, diet, health changes since last INR:chart reviewed; none identified    Contacted within the last 12 weeks by phone on 11/9/22      ARIELLE     Unique was NOT contacted regarding therapeutic result today per home monitoring policy manage by exception agreement.   Current warfarin dose is to be continued:     Summary  As of 11/23/2022    Full warfarin instructions:  10 mg every day; Starting 11/23/2022   Next INR check:  11/30/2022           ?   Trisha Lo RN  Anticoagulation Clinic  11/23/2022    _______________________________________________________________________     Anticoagulation Episode Summary     Current INR goal:  2.0-3.0   TTR:  65.1 % (1 y)   Target end date:  Indefinite   Send INR reminders to:  [facility]    Indications    Atrial fibrillation (H) [I48.91]  Long term current use of anticoagulant therapy [Z79.01]  Atrial fibrillation  unspecified type (H) [I48.91]  Permanent atrial fibrillation (H) [I48.21]           Comments:  [employer] home meter- MBE         Anticoagulation Care Providers     Provider Role Specialty Phone number    Tyler Lee MD Referring Family Medicine [phone]    Araceli Crowley NP Referring Nurse Practitioner - Family [phone]

## 2022-12-07 ENCOUNTER — ANTICOAGULATION THERAPY VISIT (OUTPATIENT)
Dept: ANTICOAGULATION | Facility: CLINIC | Age: 82
End: 2022-12-07

## 2022-12-07 DIAGNOSIS — I48.91 ATRIAL FIBRILLATION, UNSPECIFIED TYPE (H): Primary | ICD-10-CM

## 2022-12-07 DIAGNOSIS — Z79.01 LONG TERM CURRENT USE OF ANTICOAGULANT THERAPY: ICD-10-CM

## 2022-12-07 DIAGNOSIS — I48.21 PERMANENT ATRIAL FIBRILLATION (H): ICD-10-CM

## 2022-12-07 LAB — INR HOME MONITORING: 3.2 (ref 2–3)

## 2022-12-07 NOTE — PROGRESS NOTES
ANTICOAGULATION MANAGEMENT     Unique Ward 82 year old female is on warfarin with therapeutic INR result. (Goal INR 2.0-3.0)    Recent labs: (last 7 days)     12/07/22  0000   INR 3.2*       ASSESSMENT       Source(s): Chart Review and Patient/Caregiver Call       Warfarin doses taken: Warfarin taken as instructed    Diet: Decreased greens/vitamin K in diet; plans to resume previous intake    New illness, injury, or hospitalization: No    Medication/supplement changes: None noted    Signs or symptoms of bleeding or clotting: No    Previous INR: Therapeutic last 2(+) visits    Additional findings: None       PLAN     Recommended plan for temporary change(s) affecting INR     Dosing Instructions: Continue your current warfarin dose with next INR in 2 weeks       Summary  As of 12/7/2022    Full warfarin instructions:  10 mg every day; Starting 12/7/2022   Next INR check:  12/21/2022             Telephone call with Aaliyah who verbalizes understanding and agrees to plan    Patient to recheck with home meter    Education provided:     Dietary considerations: importance of consistent vitamin K intake and impact of vitamin K foods on INR    Plan made per [facility] anticoagulation protocol    Yan aJng RN  Anticoagulation Clinic  12/7/2022    _______________________________________________________________________     Anticoagulation Episode Summary     Current INR goal:  2.0-3.0   TTR:  66.6 % (1 y)   Target end date:  Indefinite   Send INR reminders to:  [facility]    Indications    Atrial fibrillation (H) [I48.91]  Long term current use of anticoagulant therapy [Z79.01]  Atrial fibrillation  unspecified type (H) [I48.91]  Permanent atrial fibrillation (H) [I48.21]           Comments:  [employer] home meter- MBE         Anticoagulation Care Providers     Provider Role Specialty Phone number    Tyler Lee MD Referring Family Medicine [phone]    Araceli Crowley NP Referring Nurse Practitioner -  Family [phone]

## 2022-12-15 ENCOUNTER — ANCILLARY PROCEDURE (OUTPATIENT)
Dept: CARDIOLOGY | Facility: CLINIC | Age: 82
End: 2022-12-15
Attending: INTERNAL MEDICINE
Payer: COMMERCIAL

## 2022-12-15 DIAGNOSIS — I44.2 CHB (COMPLETE HEART BLOCK) (H): ICD-10-CM

## 2022-12-15 DIAGNOSIS — Z95.0 CARDIAC PACEMAKER IN SITU: ICD-10-CM

## 2022-12-15 PROCEDURE — 93294 REM INTERROG EVL PM/LDLS PM: CPT | Performed by: INTERNAL MEDICINE

## 2022-12-15 PROCEDURE — 93296 REM INTERROG EVL PM/IDS: CPT | Performed by: INTERNAL MEDICINE

## 2022-12-17 ENCOUNTER — LAB (OUTPATIENT)
Dept: LAB | Facility: CLINIC | Age: 82
End: 2022-12-17
Payer: COMMERCIAL

## 2022-12-17 ENCOUNTER — HOSPITAL ENCOUNTER (EMERGENCY)
Facility: CLINIC | Age: 82
Discharge: HOME | End: 2022-12-17
Attending: EMERGENCY MEDICINE | Admitting: EMERGENCY MEDICINE
Payer: MEDICARE

## 2022-12-17 VITALS
HEART RATE: 96 BPM | DIASTOLIC BLOOD PRESSURE: 82 MMHG | WEIGHT: 135 LBS | BODY MASS INDEX: 21.62 KG/M2 | RESPIRATION RATE: 18 BRPM | OXYGEN SATURATION: 100 % | SYSTOLIC BLOOD PRESSURE: 166 MMHG | TEMPERATURE: 98.6 F

## 2022-12-17 DIAGNOSIS — I48.91 ATRIAL FIBRILLATION, UNSPECIFIED TYPE (H): ICD-10-CM

## 2022-12-17 DIAGNOSIS — N39.0 URINARY TRACT INFECTION WITHOUT HEMATURIA, SITE UNSPECIFIED: ICD-10-CM

## 2022-12-17 LAB
ALBUMIN UR-MCNC: 30 MG/DL
APPEARANCE UR: ABNORMAL
BACTERIA #/AREA URNS HPF: ABNORMAL /HPF
BILIRUB UR QL STRIP: NEGATIVE
COLOR UR AUTO: YELLOW
GLUCOSE UR STRIP-MCNC: NEGATIVE MG/DL
HGB UR QL STRIP: ABNORMAL
INR BLD: 1.8 (ref 0.9–1.1)
KETONES UR STRIP-MCNC: ABNORMAL MG/DL
LEUKOCYTE ESTERASE UR QL STRIP: ABNORMAL
NITRATE UR QL: NEGATIVE
PH UR STRIP: 6.5 [PH] (ref 5–7)
RBC URINE: 24 /HPF
SP GR UR STRIP: 1.01 (ref 1–1.03)
SQUAMOUS EPITHELIAL: 1 /HPF
UROBILINOGEN UR STRIP-MCNC: NORMAL MG/DL
WBC CLUMPS #/AREA URNS HPF: PRESENT /HPF
WBC URINE: >182 /HPF

## 2022-12-17 PROCEDURE — 81001 URINALYSIS AUTO W/SCOPE: CPT | Performed by: EMERGENCY MEDICINE

## 2022-12-17 PROCEDURE — 85610 PROTHROMBIN TIME: CPT

## 2022-12-17 PROCEDURE — 99283 EMERGENCY DEPT VISIT LOW MDM: CPT | Performed by: EMERGENCY MEDICINE

## 2022-12-17 PROCEDURE — 36415 COLL VENOUS BLD VENIPUNCTURE: CPT

## 2022-12-17 PROCEDURE — 87086 URINE CULTURE/COLONY COUNT: CPT | Performed by: EMERGENCY MEDICINE

## 2022-12-17 PROCEDURE — 99284 EMERGENCY DEPT VISIT MOD MDM: CPT | Performed by: EMERGENCY MEDICINE

## 2022-12-17 RX ORDER — NITROFURANTOIN 25; 75 MG/1; MG/1
100 CAPSULE ORAL 2 TIMES DAILY
Qty: 14 CAPSULE | Refills: 0 | Status: SHIPPED | OUTPATIENT
Start: 2022-12-17 | End: 2023-06-20

## 2022-12-17 ASSESSMENT — ENCOUNTER SYMPTOMS
CHILLS: 0
DIARRHEA: 0
FLANK PAIN: 0
DYSURIA: 1
DIFFICULTY URINATING: 0
FREQUENCY: 1
NAUSEA: 0
APPETITE CHANGE: 0
VOMITING: 0
FEVER: 0
ABDOMINAL PAIN: 0

## 2022-12-17 ASSESSMENT — ACTIVITIES OF DAILY LIVING (ADL): ADLS_ACUITY_SCORE: 35

## 2022-12-17 NOTE — ED PROVIDER NOTES
History     Chief Complaint   Patient presents with     Dysuria     HPI  Unique Ward is a 82 year old female who arrives today to the emergency department valuation of dysuria and urinary frequency x1 day.  The patient reports frequent history of urinary tract infection typically improves with Macrobid.  She reports no other concern.  She has no abdominal pain, flank pain, fever, chills, nausea, vomiting.  Patient does add that she did miss 1 dose of her Coumadin last evening.  She is requested an INR check.  No other concerns of bleeding, clot, chest pain, shortness of breath.    Allergies:  Allergies   Allergen Reactions     Keflex [Cephalexin] Anaphylaxis     No Clinical Screening - See Comments      Eye drop antibiotic.     Codeine Other (See Comments)     Comment: , Description:      Gentamicin Hives     Per Unique this medication gives hives on arms and legs. Bev Lo LSXO,  ....................  7/15/2014   1:15 PM     Influenza Vaccines Other (See Comments)     Comment: , Description:      Influenza Virus Vaccine H5n1      Paxil [Paroxetine] Other (See Comments)     Comment: , Description:      Typhoid Vaccine-Strain Ty21a Other (See Comments)     Comment: , Description:   Comment: , Description:      Typhoid Vaccines        Problem List:    Patient Active Problem List    Diagnosis Date Noted     Permanent atrial fibrillation (H) 05/16/2022     Priority: Medium     Long term current use of anticoagulant therapy 06/30/2020     Priority: Medium     Atrial fibrillation, unspecified type (H) 06/30/2020     Priority: Medium     CHB (complete heart block) (H) 03/31/2020     Priority: Medium     Added automatically from request for surgery [id number]       Pacemaker 03/31/2020     Priority: Medium     Atrial fibrillation (H) 06/07/2019     Priority: Medium     History of basal cell carcinoma- face and scalp 02/28/2017     Priority: Medium     Fibromyalgia 02/28/2017     Priority: Medium     Advance Care  Planning 01/06/2017     Priority: Medium     Advance Care Planning 5/4/2017: ACP Facilitation Session:    Unique GUZMAN Ed presented for ACP Facilitation session at a group class. She was accompanied by daughter-in-law. [employer] information provided and resources reviewed. She wishes to give additional consideration to ACP.  She currently has the following questions or concerns about Advance Care Planning: none known. Follow-up meeting: not needed/applicable. Added by Unique Uribe RN, Advance Care Planning Liaison.  Advance Care Planning 1/6/17: Info given   Saray Greer MA         Personal history of urinary tract infection 06/18/2016     Priority: Medium     Essential hypertension 03/23/2016     Priority: Medium     Vitamin D deficiency 03/30/2013     Priority: Medium     Dysthymia 04/01/2005     Priority: Medium        Past Medical History:    Past Medical History:   Diagnosis Date     Arthritis 01.01.1980     Atrial fibrillation (H) 6/7/2019     Cancer (H) 01.01.1993     scalp     Depressive disorder 01.01.1977     FH: melanoma- son 2/28/2017     Hypertension 03.16.2016       Past Surgical History:    Past Surgical History:   Procedure Laterality Date     BIOPSY  11/01/2014     COLONOSCOPY  01.01/1979    Two routine,  one for chronic diarrhea     EP PACEMAKER N/A 3/31/2020    Procedure: EP Pacemaker;  Surgeon: Erich Clark MD;  Location:  [facility]     EYE SURGERY  1/1/2014    first    cataracts both eyes.  second a few months later     GENITOURINARY SURGERY       GYN SURGERY  01.01.1977    TL     HEAD & NECK SURGERY  01.01.1997    basal cell X2   Moh's on scalp.  other on bridge of nose     LASER YAG CAPSULOTOMY Bilateral 4/1/2021    Procedure: CAPSULOTOMY, LENS, USING YAG LASER;  Surgeon: Bernardino Chu MD;  Location:  OR     SOFT TISSUE SURGERY  01.01.1990    Ganglion Cyst   Left inner wrist       Family History:    Family History   Problem Relation Age of Onset      Diabetes Maternal Grandmother         Na     Coronary Artery Disease Sister         error     Coronary Artery Disease Brother         error     Hypertension Brother      Hyperlipidemia Brother      Cerebrovascular Disease Brother      Hypertension Sister      Hypertension Sister      Hyperlipidemia Sister      Other Cancer Sister         cervical     Breast Cancer Sister      Other Cancer Sister         kidney     Mental Illness Mother         paranoid/schizophrenic/suicide     Mental Illness Sister         ???  Had shock treatments     Hyperlipidemia Sister      Other Cancer Sister         Kidney       Social History:  Marital Status:  Legally  [3]  Social History     Tobacco Use     Smoking status: Former     Packs/day: 0.75     Years: 10.00     Pack years: 7.50     Types: Cigarettes     Start date: 1955     Quit date: 1965     Years since quittin.9     Smokeless tobacco: Never   Vaping Use     Vaping Use: Never used   Substance Use Topics     Alcohol use: No     Alcohol/week: 0.0 standard drinks     Drug use: No        Medications:    nitroFURantoin macrocrystal-monohydrate (MACROBID) 100 MG capsule  amLODIPine (NORVASC) 10 MG tablet  azelastine (ASTELIN) 0.1 % nasal spray  cefTAZidime (FORTAZ) 1 GM vial  cholecalciferol (VITAMIN D3) 5000 units TABS tablet  COMPRESSION STOCKINGS  EPINEPHrine (ANY BX GENERIC EQUIV) 0.3 MG/0.3ML injection 2-pack  fish oil-omega-3 fatty acids 1000 MG capsule  meclizine (ANTIVERT) 25 MG tablet  warfarin ANTICOAGULANT (COUMADIN) 5 MG tablet  Zinc Sulfate (ZINC 15 PO)          Review of Systems   Constitutional: Negative for appetite change, chills and fever.   Gastrointestinal: Negative for abdominal pain, diarrhea, nausea and vomiting.   Genitourinary: Positive for dysuria and frequency. Negative for difficulty urinating, flank pain and pelvic pain.       Physical Exam   BP: (!) 166/82  Pulse: 96  Temp: 98.6  F (37  C)  Resp: 18  Weight: 61.2 kg (135 lb)  SpO2:  100 %      Physical Exam  Vitals and nursing note reviewed.   Constitutional:       General: She is not in acute distress.     Appearance: She is not ill-appearing or toxic-appearing.   Cardiovascular:      Rate and Rhythm: Normal rate.   Abdominal:      Tenderness: There is no abdominal tenderness. There is no right CVA tenderness or left CVA tenderness.   Neurological:      Mental Status: She is alert.   Psychiatric:         Mood and Affect: Mood normal.         Behavior: Behavior normal.         ED Course                 Procedures           Results for orders placed or performed during the hospital encounter of 12/17/22 (from the past 24 hour(s))   UA with Microscopic reflex to Culture    Specimen: Urine, Clean Catch   Result Value Ref Range    Color Urine Yellow Colorless, Straw, Light Yellow, Yellow    Appearance Urine Cloudy (A) Clear    Glucose Urine Negative Negative mg/dL    Bilirubin Urine Negative Negative    Ketones Urine Trace (A) Negative mg/dL    Specific Gravity Urine 1.015 1.003 - 1.035    Blood Urine Moderate (A) Negative    pH Urine 6.5 5.0 - 7.0    Protein Albumin Urine 30 (A) Negative mg/dL    Urobilinogen Urine Normal Normal, 2.0 mg/dL    Nitrite Urine Negative Negative    Leukocyte Esterase Urine Large (A) Negative    Bacteria Urine Few (A) None Seen /HPF    WBC Clumps Urine Present (A) None Seen /HPF    RBC Urine 24 (H) <=2 /HPF    WBC Urine >182 (H) <=5 /HPF    Squamous Epithelials Urine 1 <=1 /HPF    Narrative    Urine Culture ordered based on laboratory criteria       Medications - No data to display    Assessments & Plan (with Medical Decision Making)     I have reviewed the nursing notes.    I have reviewed the findings, diagnosis, plan and need for follow up with the patient.    Unique Ward is a 82 year old female who arrives today to the emergency department valuation of dysuria and urinary frequency x1 day.  On arrival patient noted alert.  She is presently afebrile and  hemodynamically stable.  She is seated upright in bed and appears nontoxic.  Based on history and symptoms, this is most consistent with a uncomplicated urinary tract infection.  Previously she has done quite well on Macrobid which I would stick with.  I reviewed cultures and this is an appropriate medication.  I did discuss that this will not adequately treat pyelonephritis which she understands and would return should she have change or progression of symptoms.  I would hold on laboratory studies or imaging at this time.  Regarding Coumadin level.  She did miss a full dose last evening.  I discussed this would likely minimally impact long-term management.  We discussed an option for half dose this morning and to continue to follow-up with her INR/Coumadin clinic.    New Prescriptions    NITROFURANTOIN MACROCRYSTAL-MONOHYDRATE (MACROBID) 100 MG CAPSULE    Take 1 capsule (100 mg) by mouth 2 times daily       Final diagnoses:   Urinary tract infection without hematuria, site unspecified       12/17/2022   [facility] EMERGENCY DEPT     Lambert Wheeler MD  12/17/22 6149

## 2022-12-17 NOTE — ED TRIAGE NOTES
C/o dysuria and frequency since last night.  Denies flank pain, back pain, nausea,and fevers.      Triage Assessment     Row Name 12/17/22 1044       Triage Assessment (Adult)    Airway WDL WDL       Respiratory WDL    Respiratory WDL WDL       Cardiac WDL    Cardiac WDL WDL

## 2022-12-17 NOTE — DISCHARGE INSTRUCTIONS
As we discussed in the emergency department I think it is very appropriate to start Macrobid for treatment of your symptoms.  As we noted this will not treat higher urinary tract infection such as kidney infections.  Should you develop flank pain, fever, chills we would want you to return for reevaluation.

## 2022-12-18 LAB — BACTERIA UR CULT: ABNORMAL

## 2022-12-19 ENCOUNTER — DOCUMENTATION ONLY (OUTPATIENT)
Dept: ANTICOAGULATION | Facility: CLINIC | Age: 82
End: 2022-12-19

## 2022-12-19 DIAGNOSIS — Z79.01 LONG TERM CURRENT USE OF ANTICOAGULANT THERAPY: ICD-10-CM

## 2022-12-19 DIAGNOSIS — I48.91 ATRIAL FIBRILLATION, UNSPECIFIED TYPE (H): Primary | ICD-10-CM

## 2022-12-19 DIAGNOSIS — I48.21 PERMANENT ATRIAL FIBRILLATION (H): ICD-10-CM

## 2022-12-19 NOTE — PROGRESS NOTES
ANTICOAGULATION  MANAGEMENT: Discharge Review    Unique Ward chart reviewed for anticoagulation continuity of care    Emergency room visit on 12/17/22 for urinary tract infection.    Discharge disposition: Home    Results:    Recent labs: (last 7 days)     12/17/22  1125   INR 1.8*     Anticoagulation inpatient management:     not applicable     Anticoagulation discharge instructions:     Warfarin dosing: take booster dose of 15 mg on 12/17/22 due to missing dose on 12/16/22   Bridging: No   INR goal change: No      Medication changes affecting anticoagulation: Yes: macrobid, could increase INR    Additional factors affecting anticoagulation: patient reports she had more greens over the last week, patient also reports increased stress     PLAN     Recommend to check INR on 12/20/22    Spoke with Pratima    Anticoagulation Calendar updated    Georgia Card RN'

## 2022-12-20 ENCOUNTER — ANTICOAGULATION THERAPY VISIT (OUTPATIENT)
Dept: ANTICOAGULATION | Facility: CLINIC | Age: 82
End: 2022-12-20

## 2022-12-20 DIAGNOSIS — Z79.01 LONG TERM CURRENT USE OF ANTICOAGULANT THERAPY: ICD-10-CM

## 2022-12-20 DIAGNOSIS — I48.21 PERMANENT ATRIAL FIBRILLATION (H): ICD-10-CM

## 2022-12-20 DIAGNOSIS — I48.91 ATRIAL FIBRILLATION, UNSPECIFIED TYPE (H): Primary | ICD-10-CM

## 2022-12-20 LAB — INR HOME MONITORING: 2.3 (ref 2–3)

## 2022-12-20 NOTE — PROGRESS NOTES
ANTICOAGULATION MANAGEMENT     Unique Ward 82 year old female is on warfarin with therapeutic INR result. (Goal INR 2.0-3.0)    Recent labs: (last 7 days)     12/20/22  0000   INR 2.3       ASSESSMENT       Source(s): Chart Review    Previous INR was Subtherapeutic    Medication, diet, health changes since last INR chart reviewed; none identified           PLAN     Recommended plan for no diet, medication or health factor changes affecting INR     Dosing Instructions: Continue your current warfarin dose with next INR in 1 week       Summary  As of 12/20/2022    Full warfarin instructions:  10 mg every day; Starting 12/20/2022   Next INR check:  12/27/2022             Detailed voice message left for Aaliyah with dosing instructions and follow up date.   Sent Roseonly message with dosing and follow up instructions    Patient to recheck with home meter    Education provided:     Please call back if any changes to your diet, medications or how you've been taking warfarin    Plan made per [facility] anticoagulation protocol    Sherie Bellamy RN  Anticoagulation Clinic  12/20/2022    _______________________________________________________________________     Anticoagulation Episode Summary     Current INR goal:  2.0-3.0   TTR:  69.1 % (1 y)   Target end date:  Indefinite   Send INR reminders to:  [facility]    Indications    Atrial fibrillation (H) [I48.91]  Long term current use of anticoagulant therapy [Z79.01]  Atrial fibrillation  unspecified type (H) [I48.91]  Permanent atrial fibrillation (H) [I48.21]           Comments:  [employer] home meter- MBE         Anticoagulation Care Providers     Provider Role Specialty Phone number    Tyler Lee MD Referring Family Medicine [phone]    Araceli Crowley NP Referring Nurse Practitioner - Family [phone]

## 2022-12-28 ENCOUNTER — DOCUMENTATION ONLY (OUTPATIENT)
Dept: ANTICOAGULATION | Facility: CLINIC | Age: 82
End: 2022-12-28

## 2022-12-28 DIAGNOSIS — Z79.01 LONG TERM CURRENT USE OF ANTICOAGULANT THERAPY: ICD-10-CM

## 2022-12-28 DIAGNOSIS — I48.21 PERMANENT ATRIAL FIBRILLATION (H): ICD-10-CM

## 2022-12-28 DIAGNOSIS — I48.91 ATRIAL FIBRILLATION, UNSPECIFIED TYPE (H): Primary | ICD-10-CM

## 2022-12-28 LAB — INR HOME MONITORING: 2.4 (ref 2–3)

## 2022-12-28 NOTE — PROGRESS NOTES
ANTICOAGULATION  MANAGEMENT-Home Monitor Managed by Exception    Unique Ward 82 year old female is on warfarin with therapeutic INR result. (Goal INR 2.0-3.0)    Recent labs: (last 7 days)     12/28/22  0000   INR 2.4         Previous INR was Therapeutic    Medication, diet, health changes since last INR:chart reviewed; none identified    Contacted within the last 12 weeks by phone on 12/20/22      ARIELLE     Unique was NOT contacted regarding therapeutic result today per home monitoring policy manage by exception agreement.   Current warfarin dose is to be continued:     Summary  As of 12/28/2022    Full warfarin instructions:  10 mg every day   Next INR check:  1/4/2023           ?   Valery Ramirez RN  Anticoagulation Clinic  12/28/2022    _______________________________________________________________________     Anticoagulation Episode Summary     Current INR goal:  2.0-3.0   TTR:  69.4 % (1 y)   Target end date:  Indefinite   Send INR reminders to:  [facility]    Indications    Atrial fibrillation (H) [I48.91]  Long term current use of anticoagulant therapy [Z79.01]  Atrial fibrillation  unspecified type (H) [I48.91]  Permanent atrial fibrillation (H) [I48.21]           Comments:  [employer] home meter- MBE         Anticoagulation Care Providers     Provider Role Specialty Phone number    Tyler Lee MD Referring Family Medicine [phone]    Araceli Crowley NP Referring Nurse Practitioner - Family [phone]

## 2022-12-30 DIAGNOSIS — I48.0 PAROXYSMAL ATRIAL FIBRILLATION (H): ICD-10-CM

## 2022-12-30 RX ORDER — WARFARIN SODIUM 5 MG/1
TABLET ORAL
Qty: 170 TABLET | Refills: 1 | Status: SHIPPED | OUTPATIENT
Start: 2022-12-30 | End: 2023-06-13

## 2022-12-30 NOTE — TELEPHONE ENCOUNTER
ANTICOAGULATION MANAGEMENT:  Medication Refill    Anticoagulation Summary  As of 12/28/2022    Warfarin maintenance plan:  10 mg (5 mg x 2) every day   Next INR check:  1/4/2023   Target end date:  Indefinite    Indications    Atrial fibrillation (H) [I48.91]  Long term current use of anticoagulant therapy [Z79.01]  Atrial fibrillation  unspecified type (H) [I48.91]  Permanent atrial fibrillation (H) [I48.21]             Anticoagulation Care Providers     Provider Role Specialty Phone number    Tyler Lee MD Referring Family Medicine [phone]    Araceli Crowley NP Referring Nurse Practitioner - Family [phone]          Visit with referring provider/group within last year: Yes    [facility] referral signed within last year: Yes    Unique meets all criteria for refill (current [facility] referral, office visit with referring provider/group in last year, lab monitoring up to date or not exceeding 2 weeks overdue). Rx instructions and quantity supplied updated to match patient's current dosing plan. Warfarin 90 day supply with 1 refill granted per [facility] protocol     Neelima Higgins RN  Anticoagulation Clinic

## 2023-01-04 ENCOUNTER — DOCUMENTATION ONLY (OUTPATIENT)
Dept: ANTICOAGULATION | Facility: CLINIC | Age: 83
End: 2023-01-04

## 2023-01-04 DIAGNOSIS — I48.21 PERMANENT ATRIAL FIBRILLATION (H): ICD-10-CM

## 2023-01-04 DIAGNOSIS — Z79.01 LONG TERM CURRENT USE OF ANTICOAGULANT THERAPY: ICD-10-CM

## 2023-01-04 DIAGNOSIS — I48.91 ATRIAL FIBRILLATION, UNSPECIFIED TYPE (H): Primary | ICD-10-CM

## 2023-01-04 LAB — INR HOME MONITORING: 2.5 (ref 2–3)

## 2023-01-04 NOTE — PROGRESS NOTES
ANTICOAGULATION  MANAGEMENT-Home Monitor Managed by Exception    Unique Ward 82 year old female is on warfarin with therapeutic INR result. (Goal INR 2.0-3.0)    Recent labs: (last 7 days)     01/04/23  0000   INR 2.5         Previous INR was Therapeutic    Medication, diet, health changes since last INR:chart reviewed; none identified    Contacted within the last 12 weeks by phone on 12/20/22      ARIELLE     Unique was NOT contacted regarding therapeutic result today per home monitoring policy manage by exception agreement.   Current warfarin dose is to be continued:     Summary  As of 1/4/2023    Full warfarin instructions:  10 mg every day   Next INR check:  1/18/2023           ?   Georgia Card RN  Anticoagulation Clinic  1/4/2023    _______________________________________________________________________     Anticoagulation Episode Summary     Current INR goal:  2.0-3.0   TTR:  69.4 % (1 y)   Target end date:  Indefinite   Send INR reminders to:  [facility]    Indications    Atrial fibrillation (H) [I48.91]  Long term current use of anticoagulant therapy [Z79.01]  Atrial fibrillation  unspecified type (H) [I48.91]  Permanent atrial fibrillation (H) [I48.21]           Comments:  [employer] home meter- MBE         Anticoagulation Care Providers     Provider Role Specialty Phone number    Tyler Lee MD Referring Family Medicine [phone]    Araceli Crowley NP Referring Nurse Practitioner - Family [phone]

## 2023-01-07 LAB
MDC_IDC_EPISODE_DTM: NORMAL
MDC_IDC_EPISODE_DURATION: 141 S
MDC_IDC_EPISODE_DURATION: 161 S
MDC_IDC_EPISODE_DURATION: 163 S
MDC_IDC_EPISODE_DURATION: 181 S
MDC_IDC_EPISODE_DURATION: 182 S
MDC_IDC_EPISODE_DURATION: 183 S
MDC_IDC_EPISODE_DURATION: 184 S
MDC_IDC_EPISODE_DURATION: 19 S
MDC_IDC_EPISODE_DURATION: 246 S
MDC_IDC_EPISODE_DURATION: 273 S
MDC_IDC_EPISODE_DURATION: 278 S
MDC_IDC_EPISODE_DURATION: 33 S
MDC_IDC_EPISODE_DURATION: 34 S
MDC_IDC_EPISODE_DURATION: 34 S
MDC_IDC_EPISODE_DURATION: 42 S
MDC_IDC_EPISODE_DURATION: 49 S
MDC_IDC_EPISODE_DURATION: 4928 S
MDC_IDC_EPISODE_DURATION: 51 S
MDC_IDC_EPISODE_DURATION: 64 S
MDC_IDC_EPISODE_DURATION: 70 S
MDC_IDC_EPISODE_DURATION: 82 S
MDC_IDC_EPISODE_DURATION: 83 S
MDC_IDC_EPISODE_ID: 103
MDC_IDC_EPISODE_ID: 104
MDC_IDC_EPISODE_ID: 105
MDC_IDC_EPISODE_ID: 106
MDC_IDC_EPISODE_ID: 107
MDC_IDC_EPISODE_ID: 108
MDC_IDC_EPISODE_ID: 109
MDC_IDC_EPISODE_ID: 110
MDC_IDC_EPISODE_ID: 111
MDC_IDC_EPISODE_ID: 112
MDC_IDC_EPISODE_ID: 113
MDC_IDC_EPISODE_ID: 114
MDC_IDC_EPISODE_ID: 115
MDC_IDC_EPISODE_ID: 116
MDC_IDC_EPISODE_ID: 117
MDC_IDC_EPISODE_ID: 118
MDC_IDC_EPISODE_ID: 119
MDC_IDC_EPISODE_ID: 120
MDC_IDC_EPISODE_ID: 121
MDC_IDC_EPISODE_ID: 122
MDC_IDC_EPISODE_ID: 123
MDC_IDC_EPISODE_ID: 124
MDC_IDC_EPISODE_ID: 125
MDC_IDC_EPISODE_ID: 126
MDC_IDC_EPISODE_ID: 127
MDC_IDC_EPISODE_ID: 128
MDC_IDC_EPISODE_ID: 129
MDC_IDC_EPISODE_TYPE: NORMAL
MDC_IDC_LEAD_IMPLANT_DT: NORMAL
MDC_IDC_LEAD_IMPLANT_DT: NORMAL
MDC_IDC_LEAD_LOCATION: NORMAL
MDC_IDC_LEAD_LOCATION: NORMAL
MDC_IDC_LEAD_LOCATION_DETAIL_1: NORMAL
MDC_IDC_LEAD_LOCATION_DETAIL_1: NORMAL
MDC_IDC_LEAD_MFG: NORMAL
MDC_IDC_LEAD_MFG: NORMAL
MDC_IDC_LEAD_MODEL: NORMAL
MDC_IDC_LEAD_MODEL: NORMAL
MDC_IDC_LEAD_POLARITY_TYPE: NORMAL
MDC_IDC_LEAD_POLARITY_TYPE: NORMAL
MDC_IDC_LEAD_SERIAL: NORMAL
MDC_IDC_LEAD_SERIAL: NORMAL
MDC_IDC_MSMT_BATTERY_DTM: NORMAL
MDC_IDC_MSMT_BATTERY_REMAINING_LONGEVITY: 108 MO
MDC_IDC_MSMT_BATTERY_RRT_TRIGGER: 2.62
MDC_IDC_MSMT_BATTERY_STATUS: NORMAL
MDC_IDC_MSMT_BATTERY_VOLTAGE: 2.99 V
MDC_IDC_MSMT_LEADCHNL_RA_IMPEDANCE_VALUE: 285 OHM
MDC_IDC_MSMT_LEADCHNL_RA_IMPEDANCE_VALUE: 323 OHM
MDC_IDC_MSMT_LEADCHNL_RA_PACING_THRESHOLD_AMPLITUDE: 0.88 V
MDC_IDC_MSMT_LEADCHNL_RA_PACING_THRESHOLD_PULSEWIDTH: 0.4 MS
MDC_IDC_MSMT_LEADCHNL_RA_SENSING_INTR_AMPL: 1.5 MV
MDC_IDC_MSMT_LEADCHNL_RA_SENSING_INTR_AMPL: 1.5 MV
MDC_IDC_MSMT_LEADCHNL_RV_IMPEDANCE_VALUE: 342 OHM
MDC_IDC_MSMT_LEADCHNL_RV_IMPEDANCE_VALUE: 418 OHM
MDC_IDC_MSMT_LEADCHNL_RV_PACING_THRESHOLD_AMPLITUDE: 0.88 V
MDC_IDC_MSMT_LEADCHNL_RV_PACING_THRESHOLD_PULSEWIDTH: 0.4 MS
MDC_IDC_MSMT_LEADCHNL_RV_SENSING_INTR_AMPL: 6.75 MV
MDC_IDC_MSMT_LEADCHNL_RV_SENSING_INTR_AMPL: 6.75 MV
MDC_IDC_PG_IMPLANT_DTM: NORMAL
MDC_IDC_PG_MFG: NORMAL
MDC_IDC_PG_MODEL: NORMAL
MDC_IDC_PG_SERIAL: NORMAL
MDC_IDC_PG_TYPE: NORMAL
MDC_IDC_SESS_CLINIC_NAME: NORMAL
MDC_IDC_SESS_DTM: NORMAL
MDC_IDC_SESS_TYPE: NORMAL
MDC_IDC_SET_BRADY_AT_MODE_SWITCH_RATE: 171 {BEATS}/MIN
MDC_IDC_SET_BRADY_HYSTRATE: NORMAL
MDC_IDC_SET_BRADY_LOWRATE: 60 {BEATS}/MIN
MDC_IDC_SET_BRADY_MAX_SENSOR_RATE: 130 {BEATS}/MIN
MDC_IDC_SET_BRADY_MAX_TRACKING_RATE: 130 {BEATS}/MIN
MDC_IDC_SET_BRADY_MODE: NORMAL
MDC_IDC_SET_BRADY_PAV_DELAY_HIGH: 140 MS
MDC_IDC_SET_BRADY_PAV_DELAY_LOW: 300 MS
MDC_IDC_SET_BRADY_SAV_DELAY_HIGH: 110 MS
MDC_IDC_SET_BRADY_SAV_DELAY_LOW: 230 MS
MDC_IDC_SET_LEADCHNL_RA_PACING_AMPLITUDE: 1.5 V
MDC_IDC_SET_LEADCHNL_RA_PACING_ANODE_ELECTRODE_1: NORMAL
MDC_IDC_SET_LEADCHNL_RA_PACING_ANODE_LOCATION_1: NORMAL
MDC_IDC_SET_LEADCHNL_RA_PACING_CAPTURE_MODE: NORMAL
MDC_IDC_SET_LEADCHNL_RA_PACING_CATHODE_ELECTRODE_1: NORMAL
MDC_IDC_SET_LEADCHNL_RA_PACING_CATHODE_LOCATION_1: NORMAL
MDC_IDC_SET_LEADCHNL_RA_PACING_POLARITY: NORMAL
MDC_IDC_SET_LEADCHNL_RA_PACING_PULSEWIDTH: 0.4 MS
MDC_IDC_SET_LEADCHNL_RA_SENSING_ANODE_ELECTRODE_1: NORMAL
MDC_IDC_SET_LEADCHNL_RA_SENSING_ANODE_LOCATION_1: NORMAL
MDC_IDC_SET_LEADCHNL_RA_SENSING_CATHODE_ELECTRODE_1: NORMAL
MDC_IDC_SET_LEADCHNL_RA_SENSING_CATHODE_LOCATION_1: NORMAL
MDC_IDC_SET_LEADCHNL_RA_SENSING_POLARITY: NORMAL
MDC_IDC_SET_LEADCHNL_RA_SENSING_SENSITIVITY: 0.3 MV
MDC_IDC_SET_LEADCHNL_RV_PACING_AMPLITUDE: 2 V
MDC_IDC_SET_LEADCHNL_RV_PACING_ANODE_ELECTRODE_1: NORMAL
MDC_IDC_SET_LEADCHNL_RV_PACING_ANODE_LOCATION_1: NORMAL
MDC_IDC_SET_LEADCHNL_RV_PACING_CAPTURE_MODE: NORMAL
MDC_IDC_SET_LEADCHNL_RV_PACING_CATHODE_ELECTRODE_1: NORMAL
MDC_IDC_SET_LEADCHNL_RV_PACING_CATHODE_LOCATION_1: NORMAL
MDC_IDC_SET_LEADCHNL_RV_PACING_POLARITY: NORMAL
MDC_IDC_SET_LEADCHNL_RV_PACING_PULSEWIDTH: 0.4 MS
MDC_IDC_SET_LEADCHNL_RV_SENSING_ANODE_ELECTRODE_1: NORMAL
MDC_IDC_SET_LEADCHNL_RV_SENSING_ANODE_LOCATION_1: NORMAL
MDC_IDC_SET_LEADCHNL_RV_SENSING_CATHODE_ELECTRODE_1: NORMAL
MDC_IDC_SET_LEADCHNL_RV_SENSING_CATHODE_LOCATION_1: NORMAL
MDC_IDC_SET_LEADCHNL_RV_SENSING_POLARITY: NORMAL
MDC_IDC_SET_LEADCHNL_RV_SENSING_SENSITIVITY: 0.9 MV
MDC_IDC_SET_ZONE_DETECTION_INTERVAL: 350 MS
MDC_IDC_SET_ZONE_DETECTION_INTERVAL: 400 MS
MDC_IDC_SET_ZONE_TYPE: NORMAL
MDC_IDC_STAT_AT_BURDEN_PERCENT: 0.8 %
MDC_IDC_STAT_AT_DTM_END: NORMAL
MDC_IDC_STAT_AT_DTM_START: NORMAL
MDC_IDC_STAT_BRADY_AP_VP_PERCENT: 68.3 %
MDC_IDC_STAT_BRADY_AP_VS_PERCENT: 0.03 %
MDC_IDC_STAT_BRADY_AS_VP_PERCENT: 31.63 %
MDC_IDC_STAT_BRADY_AS_VS_PERCENT: 0.04 %
MDC_IDC_STAT_BRADY_DTM_END: NORMAL
MDC_IDC_STAT_BRADY_DTM_START: NORMAL
MDC_IDC_STAT_BRADY_RA_PERCENT_PACED: 66.87 %
MDC_IDC_STAT_BRADY_RV_PERCENT_PACED: 99.93 %
MDC_IDC_STAT_EPISODE_RECENT_COUNT: 0
MDC_IDC_STAT_EPISODE_RECENT_COUNT: 27
MDC_IDC_STAT_EPISODE_RECENT_COUNT_DTM_END: NORMAL
MDC_IDC_STAT_EPISODE_RECENT_COUNT_DTM_START: NORMAL
MDC_IDC_STAT_EPISODE_TOTAL_COUNT: 0
MDC_IDC_STAT_EPISODE_TOTAL_COUNT: 126
MDC_IDC_STAT_EPISODE_TOTAL_COUNT: 3
MDC_IDC_STAT_EPISODE_TOTAL_COUNT_DTM_END: NORMAL
MDC_IDC_STAT_EPISODE_TOTAL_COUNT_DTM_START: NORMAL
MDC_IDC_STAT_EPISODE_TYPE: NORMAL

## 2023-01-18 ENCOUNTER — ANTICOAGULATION THERAPY VISIT (OUTPATIENT)
Dept: ANTICOAGULATION | Facility: CLINIC | Age: 83
End: 2023-01-18
Payer: COMMERCIAL

## 2023-01-18 DIAGNOSIS — I48.21 PERMANENT ATRIAL FIBRILLATION (H): ICD-10-CM

## 2023-01-18 DIAGNOSIS — I48.91 ATRIAL FIBRILLATION, UNSPECIFIED TYPE (H): Primary | ICD-10-CM

## 2023-01-18 DIAGNOSIS — Z79.01 LONG TERM CURRENT USE OF ANTICOAGULANT THERAPY: ICD-10-CM

## 2023-01-18 LAB — INR HOME MONITORING: 3.4 (ref 2–3)

## 2023-01-18 NOTE — PROGRESS NOTES
ANTICOAGULATION MANAGEMENT     Unique Ward 82 year old female is on warfarin with supratherapeutic INR result. (Goal INR 2.0-3.0)    Recent labs: (last 7 days)     01/18/23  0000   INR 3.4*       ASSESSMENT       Source(s): Chart Review and Patient/Caregiver Call       Warfarin doses taken: Warfarin taken as instructed    Diet: No new diet changes identified    New illness, injury, or hospitalization: No    Medication/supplement changes: Restarted zinc and magnesium, this should not effect INR    Signs or symptoms of bleeding or clotting: No    Previous INR: Therapeutic last 2(+) visits    Additional findings: None       PLAN     Recommended plan for no diet, medication or health factor changes affecting INR     Dosing Instructions: partial hold then decrease your warfarin dose (7% change) with next INR in 1 week       Summary  As of 1/18/2023    Full warfarin instructions:  1/18: 5 mg; Otherwise 5 mg every Sat; 10 mg all other days   Next INR check:  1/25/2023             Telephone call with Aaliyah who verbalizes understanding and agrees to plan    Patient to recheck with home meter    Education provided:     Please call back if any changes to your diet, medications or how you've been taking warfarin    Symptom monitoring: monitoring for bleeding signs and symptoms    Plan made per [facility] anticoagulation protocol    Georgia Card RN  Anticoagulation Clinic  1/18/2023    _______________________________________________________________________     Anticoagulation Episode Summary     Current INR goal:  2.0-3.0   TTR:  69.4 % (1 y)   Target end date:  Indefinite   Send INR reminders to:  [facility]    Indications    Atrial fibrillation (H) [I48.91]  Long term current use of anticoagulant therapy [Z79.01]  Atrial fibrillation  unspecified type (H) [I48.91]  Permanent atrial fibrillation (H) [I48.21]           Comments:  [employer] home meter- MBE         Anticoagulation Care Providers     Provider Role Specialty  Phone number    Tyler Lee MD Referring Family Medicine [phone]    Araceli Crowley NP Referring Nurse Practitioner - Family [phone]

## 2023-01-22 ENCOUNTER — HEALTH MAINTENANCE LETTER (OUTPATIENT)
Age: 83
End: 2023-01-22

## 2023-01-25 ENCOUNTER — ANTICOAGULATION THERAPY VISIT (OUTPATIENT)
Dept: ANTICOAGULATION | Facility: CLINIC | Age: 83
End: 2023-01-25
Payer: COMMERCIAL

## 2023-01-25 DIAGNOSIS — I48.91 ATRIAL FIBRILLATION, UNSPECIFIED TYPE (H): Primary | ICD-10-CM

## 2023-01-25 DIAGNOSIS — Z79.01 LONG TERM CURRENT USE OF ANTICOAGULANT THERAPY: ICD-10-CM

## 2023-01-25 DIAGNOSIS — I48.21 PERMANENT ATRIAL FIBRILLATION (H): ICD-10-CM

## 2023-01-25 LAB — INR HOME MONITORING: 2.6 (ref 2–3)

## 2023-01-25 NOTE — PROGRESS NOTES
ANTICOAGULATION MANAGEMENT     Unique Ward 82 year old female is on warfarin with therapeutic INR result. (Goal INR 2.0-3.0)    Recent labs: (last 7 days)     01/25/23  0000   INR 2.6       ASSESSMENT       Source(s): Chart Review and Patient/Caregiver Call       Warfarin doses taken: Missed dose(s) may be affecting INR    Diet: No new diet changes identified    New illness, injury, or hospitalization: No    Medication/supplement changes: None noted    Signs or symptoms of bleeding or clotting: No    Previous INR: Supratherapeutic    Additional findings: None       PLAN     Recommended plan for temporary change(s) affecting INR     Dosing Instructions: booster dose then continue your current warfarin dose with next INR in 2 weeks       Summary  As of 1/25/2023    Full warfarin instructions:  1/25: 15 mg; Otherwise 5 mg every Sat; 10 mg all other days   Next INR check:  2/8/2023             Telephone call with Aaliyah who verbalizes understanding and agrees to plan    Patient to recheck with home meter    Education provided:     Goal range and lab monitoring: goal range and significance of current result and Importance of therapeutic range    Plan made with [facility] Pharmacist Deonna Jang, RN  Anticoagulation Clinic  1/25/2023    _______________________________________________________________________     Anticoagulation Episode Summary     Current INR goal:  2.0-3.0   TTR:  68.7 % (1 y)   Target end date:  Indefinite   Send INR reminders to:  [facility]    Indications    Atrial fibrillation (H) [I48.91]  Long term current use of anticoagulant therapy [Z79.01]  Atrial fibrillation  unspecified type (H) [I48.91]  Permanent atrial fibrillation (H) [I48.21]           Comments:  [employer] home meter- MBE         Anticoagulation Care Providers     Provider Role Specialty Phone number    Tyler Lee MD Referring Family Medicine [phone]    Araceli Crowley NP Referring Nurse  Practitioner - Family [phone]

## 2023-02-08 ENCOUNTER — ANTICOAGULATION THERAPY VISIT (OUTPATIENT)
Dept: ANTICOAGULATION | Facility: CLINIC | Age: 83
End: 2023-02-08
Payer: COMMERCIAL

## 2023-02-08 DIAGNOSIS — I48.91 ATRIAL FIBRILLATION, UNSPECIFIED TYPE (H): Primary | ICD-10-CM

## 2023-02-08 DIAGNOSIS — Z79.01 LONG TERM CURRENT USE OF ANTICOAGULANT THERAPY: ICD-10-CM

## 2023-02-08 DIAGNOSIS — I48.21 PERMANENT ATRIAL FIBRILLATION (H): ICD-10-CM

## 2023-02-08 LAB — INR HOME MONITORING: 3 (ref 2–3)

## 2023-02-08 NOTE — PROGRESS NOTES
ANTICOAGULATION MANAGEMENT     Unique Ward 82 year old female is on warfarin with therapeutic INR result. (Goal INR 2.0-3.0)    Recent labs: (last 7 days)     02/08/23  0000   INR 3.0       ASSESSMENT       Source(s): Chart Review    Previous INR was Therapeutic last visit; previously outside of goal range    Medication, diet, health changes since last INR chart reviewed; none identified           PLAN     Recommended plan for no diet, medication or health factor changes affecting INR     Dosing Instructions: Continue your current warfarin dose with next INR in 2 weeks       Summary  As of 2/8/2023    Full warfarin instructions:  5 mg every Sat; 10 mg all other days   Next INR check:  2/22/2023             Detailed voice message left for Aaliyah with dosing instructions and follow up date.     Patient to recheck with home meter    Education provided:     Please call back if any changes to your diet, medications or how you've been taking warfarin    Plan made per [facility] anticoagulation protocol    Cherry Fuller, RN  Anticoagulation Clinic  2/8/2023    _______________________________________________________________________     Anticoagulation Episode Summary     Current INR goal:  2.0-3.0   TTR:  71.5 % (1 y)   Target end date:  Indefinite   Send INR reminders to:  [facility]    Indications    Atrial fibrillation (H) [I48.91]  Long term current use of anticoagulant therapy [Z79.01]  Atrial fibrillation  unspecified type (H) [I48.91]  Permanent atrial fibrillation (H) [I48.21]           Comments:  [employer] home meter- MBE         Anticoagulation Care Providers     Provider Role Specialty Phone number    Tyler Lee MD Referring Family Medicine [phone]    Araceli Crowley NP Referring Nurse Practitioner - Family [phone]

## 2023-02-22 ENCOUNTER — ANTICOAGULATION THERAPY VISIT (OUTPATIENT)
Dept: ANTICOAGULATION | Facility: CLINIC | Age: 83
End: 2023-02-22
Payer: COMMERCIAL

## 2023-02-22 ENCOUNTER — TELEPHONE (OUTPATIENT)
Dept: FAMILY MEDICINE | Facility: CLINIC | Age: 83
End: 2023-02-22
Payer: COMMERCIAL

## 2023-02-22 DIAGNOSIS — I48.91 ATRIAL FIBRILLATION, UNSPECIFIED TYPE (H): ICD-10-CM

## 2023-02-22 DIAGNOSIS — I48.21 PERMANENT ATRIAL FIBRILLATION (H): ICD-10-CM

## 2023-02-22 DIAGNOSIS — Z79.01 LONG TERM CURRENT USE OF ANTICOAGULANT THERAPY: Primary | ICD-10-CM

## 2023-02-22 LAB — INR HOME MONITORING: 2.7 (ref 2–3)

## 2023-02-22 NOTE — TELEPHONE ENCOUNTER
Reason for Call:  CALL BACK     Detailed comments: Patient forgot to take Warfarin last night. Please give patient a call back.      Phone Number Patient can be reached at: Home number on file [phone] (home)    Best Time: n/a    Can we leave a detailed message on this number? Not Applicable    Call taken on 2/22/2023 at 9:47 AM by Cleo Sparks

## 2023-02-22 NOTE — PROGRESS NOTES
ANTICOAGULATION MANAGEMENT     Unique Ward 82 year old female is on warfarin with therapeutic INR result. (Goal INR 2.0-3.0)    Recent labs: (last 7 days)     02/22/23  0000   INR 2.7       ASSESSMENT       Source(s): Chart Review and Patient/Caregiver Call       Warfarin doses taken: Missed dose(s) may be affecting INR    Diet: No new diet changes identified    New illness, injury, or hospitalization: No    Medication/supplement changes: None noted    Signs or symptoms of bleeding or clotting: No    Previous INR: Therapeutic last 2(+) visits    Additional findings: None         PLAN     Recommended plan for temporary change(s) affecting INR     Dosing Instructions: booster dose then continue your current warfarin dose with next INR in 2 weeks       Summary  As of 2/22/2023    Full warfarin instructions:  2/22: 15 mg; Otherwise 5 mg every Sat; 10 mg all other days   Next INR check:  3/8/2023             Telephone call with Aaliyah who agrees to plan and repeated back plan correctly    Patient to recheck with home meter    Education provided:     Please call back if any changes to your diet, medications or how you've been taking warfarin    Plan made per [facility] anticoagulation protocol    Trisha Lo, RN  Anticoagulation Clinic  2/22/2023    _______________________________________________________________________     Anticoagulation Episode Summary     Current INR goal:  2.0-3.0   TTR:  73.5 % (1 y)   Target end date:  Indefinite   Send INR reminders to:  [facility]    Indications    Atrial fibrillation (H) [I48.91]  Long term current use of anticoagulant therapy [Z79.01]  Atrial fibrillation  unspecified type (H) [I48.91]  Permanent atrial fibrillation (H) [I48.21]           Comments:  [employer] home meter- M[facility]         Anticoagulation Care Providers     Provider Role Specialty Phone number    Tyler Lee MD Referring Family Medicine [phone]    Araceli Crowley NP Referring Nurse  Practitioner - Family [phone]

## 2023-02-22 NOTE — TELEPHONE ENCOUNTER
See [facility] encounter for further details.  Trisha Lo, RN  Anticoagulation Nurse - [facility] [place]

## 2023-03-08 ENCOUNTER — ANTICOAGULATION THERAPY VISIT (OUTPATIENT)
Dept: ANTICOAGULATION | Facility: CLINIC | Age: 83
End: 2023-03-08
Payer: COMMERCIAL

## 2023-03-08 DIAGNOSIS — I48.91 ATRIAL FIBRILLATION, UNSPECIFIED TYPE (H): Primary | ICD-10-CM

## 2023-03-08 DIAGNOSIS — I48.21 PERMANENT ATRIAL FIBRILLATION (H): ICD-10-CM

## 2023-03-08 DIAGNOSIS — Z79.01 LONG TERM CURRENT USE OF ANTICOAGULANT THERAPY: ICD-10-CM

## 2023-03-08 LAB — INR HOME MONITORING: 2.9 (ref 2–3)

## 2023-03-08 NOTE — PROGRESS NOTES
ANTICOAGULATION MANAGEMENT     Unique GUZMAN Ed 82 year old female is on warfarin with therapeutic INR result. (Goal INR 2.0-3.0)    Recent labs: (last 7 days)     03/08/23  0000   INR 2.9       ASSESSMENT       Source(s): Chart Review and Patient/Caregiver Call       Warfarin doses taken: Warfarin taken as instructed    Diet: No new diet changes identified    New illness, injury, or hospitalization: No    Medication/supplement changes: None noted    Signs or symptoms of bleeding or clotting: No    Previous INR: Therapeutic last 2(+) visits    Additional findings: Upcoming surgery/procedure 3/23steroid injection on left knee with [facility] - instructed patient to call clinic and ask if she needs to hold warfarin / what INR range is needed  prior to procedure - patient stated that she will call back and update [facility] with information.    Patient made aware that she will get a call regarding next INR result.         PLAN     Recommended plan for no diet, medication or health factor changes affecting INR     Dosing Instructions: Continue your current warfarin dose with next INR in 2 weeks       Summary  As of 3/8/2023    Full warfarin instructions:  5 mg every Sat; 10 mg all other days   Next INR check:  3/22/2023             Telephone call with Aaliyah who verbalizes understanding and agrees to plan    Patient to recheck with home meter    Education provided:     Contact [phone]  with any changes, questions or concerns.     Plan made per [facility] anticoagulation protocol    Georgia Aguilar RN  Anticoagulation Clinic  3/8/2023    _______________________________________________________________________     Anticoagulation Episode Summary     Current INR goal:  2.0-3.0   TTR:  75.4 % (1 y)   Target end date:  Indefinite   Send INR reminders to:  [facility]    Indications    Atrial fibrillation (H) [I48.91]  Long term current use of anticoagulant therapy [Z79.01]  Atrial fibrillation  unspecified type (H)  [I48.91]  Permanent atrial fibrillation (H) [I48.21]           Comments:  [employer] home meter- MBE         Anticoagulation Care Providers     Provider Role Specialty Phone number    Tyler Lee MD Referring Family Medicine [phone]    Araceli Crowley NP Referring Nurse Practitioner - Family [phone]

## 2023-03-22 ENCOUNTER — ANTICOAGULATION THERAPY VISIT (OUTPATIENT)
Dept: ANTICOAGULATION | Facility: CLINIC | Age: 83
End: 2023-03-22
Payer: COMMERCIAL

## 2023-03-22 DIAGNOSIS — Z79.01 LONG TERM CURRENT USE OF ANTICOAGULANT THERAPY: ICD-10-CM

## 2023-03-22 DIAGNOSIS — I48.21 PERMANENT ATRIAL FIBRILLATION (H): ICD-10-CM

## 2023-03-22 DIAGNOSIS — I48.91 ATRIAL FIBRILLATION, UNSPECIFIED TYPE (H): Primary | ICD-10-CM

## 2023-03-22 LAB — INR HOME MONITORING: 3 (ref 2–3)

## 2023-03-22 NOTE — PROGRESS NOTES
ANTICOAGULATION MANAGEMENT     Unique Ward 82 year old female is on warfarin with therapeutic INR result. (Goal INR 2.0-3.0)    Recent labs: (last 7 days)     03/22/23  0000   INR 3.0       ASSESSMENT       Source(s): Chart Review and Patient/Caregiver Call           Additional findings: Upcoming surgery/procedure 3/23/23- steroid injection at [facility] into left knee, advised to call [facility] back if she needs to go over procedure plan if hold is needed, patient was awaiting instruction from ortho per last visit notes.         PLAN     Recommended plan for no diet, medication or health factor changes affecting INR     Dosing Instructions: Continue your current warfarin dose with next INR in 2 weeks       Summary  As of 3/22/2023    Full warfarin instructions:  5 mg every Sat; 10 mg all other days   Next INR check:  4/5/2023             Detailed voice message left for Aaliyah with dosing instructions and follow up date.     Patient to recheck with home meter    Education provided:     Please call back if any changes to your diet, medications or how you've been taking warfarin    Resume manage by exception with home monitor. Continue to submit INR results to home monitor company.You will only be called when your result is out of range. Please call and notify [facility] if new medication started, dose missed, signs or symptoms of bleeding or clotting, or a surgery/procedure is scheduled.    Plan made per [facility] anticoagulation protocol    Ning Forman RN  Anticoagulation Clinic  3/22/2023    _______________________________________________________________________     Anticoagulation Episode Summary     Current INR goal:  2.0-3.0   TTR:  75.4 % (1 y)   Target end date:  Indefinite   Send INR reminders to:  [facility]    Indications    Atrial fibrillation (H) [I48.91]  Long term current use of anticoagulant therapy [Z79.01]  Atrial fibrillation  unspecified type (H) [I48.91]  Permanent atrial fibrillation (H)  [I48.21]           Comments:  [employer] home meter- MBE         Anticoagulation Care Providers     Provider Role Specialty Phone number    Tyler Lee MD Referring Family Medicine [phone]    Araceli Crowley NP Referring Nurse Practitioner - Family [phone]

## 2023-03-23 ENCOUNTER — ANCILLARY PROCEDURE (OUTPATIENT)
Dept: CARDIOLOGY | Facility: CLINIC | Age: 83
End: 2023-03-23
Attending: INTERNAL MEDICINE
Payer: COMMERCIAL

## 2023-03-23 DIAGNOSIS — I44.2 CHB (COMPLETE HEART BLOCK) (H): ICD-10-CM

## 2023-03-23 DIAGNOSIS — Z95.0 CARDIAC PACEMAKER IN SITU: Primary | ICD-10-CM

## 2023-03-23 DIAGNOSIS — Z95.0 CARDIAC PACEMAKER IN SITU: ICD-10-CM

## 2023-03-23 PROCEDURE — 93294 REM INTERROG EVL PM/LDLS PM: CPT | Performed by: INTERNAL MEDICINE

## 2023-03-23 PROCEDURE — 93296 REM INTERROG EVL PM/IDS: CPT | Performed by: INTERNAL MEDICINE

## 2023-03-24 LAB
MDC_IDC_EPISODE_DTM: NORMAL
MDC_IDC_EPISODE_DURATION: 101 S
MDC_IDC_EPISODE_DURATION: 102 S
MDC_IDC_EPISODE_DURATION: 103 S
MDC_IDC_EPISODE_DURATION: 106 S
MDC_IDC_EPISODE_DURATION: 114 S
MDC_IDC_EPISODE_DURATION: 116 S
MDC_IDC_EPISODE_DURATION: 12 S
MDC_IDC_EPISODE_DURATION: 125 S
MDC_IDC_EPISODE_DURATION: 146 S
MDC_IDC_EPISODE_DURATION: 15 S
MDC_IDC_EPISODE_DURATION: 164 S
MDC_IDC_EPISODE_DURATION: 170 S
MDC_IDC_EPISODE_DURATION: 176 S
MDC_IDC_EPISODE_DURATION: 180 S
MDC_IDC_EPISODE_DURATION: 181 S
MDC_IDC_EPISODE_DURATION: 183 S
MDC_IDC_EPISODE_DURATION: 193 S
MDC_IDC_EPISODE_DURATION: 203 S
MDC_IDC_EPISODE_DURATION: 213 S
MDC_IDC_EPISODE_DURATION: 232 S
MDC_IDC_EPISODE_DURATION: 26 S
MDC_IDC_EPISODE_DURATION: 267 S
MDC_IDC_EPISODE_DURATION: 29 S
MDC_IDC_EPISODE_DURATION: 29 S
MDC_IDC_EPISODE_DURATION: 294 S
MDC_IDC_EPISODE_DURATION: 297 S
MDC_IDC_EPISODE_DURATION: 30 S
MDC_IDC_EPISODE_DURATION: 32 S
MDC_IDC_EPISODE_DURATION: 36 S
MDC_IDC_EPISODE_DURATION: 373 S
MDC_IDC_EPISODE_DURATION: 5 S
MDC_IDC_EPISODE_DURATION: 51 S
MDC_IDC_EPISODE_DURATION: 5174 S
MDC_IDC_EPISODE_DURATION: 58 S
MDC_IDC_EPISODE_DURATION: 61 S
MDC_IDC_EPISODE_DURATION: 67 S
MDC_IDC_EPISODE_DURATION: 69 S
MDC_IDC_EPISODE_DURATION: 75 S
MDC_IDC_EPISODE_DURATION: 80 S
MDC_IDC_EPISODE_DURATION: 92 S
MDC_IDC_EPISODE_DURATION: 9729 S
MDC_IDC_EPISODE_DURATION: NORMAL S
MDC_IDC_EPISODE_ID: 181
MDC_IDC_EPISODE_ID: 182
MDC_IDC_EPISODE_ID: 183
MDC_IDC_EPISODE_ID: 184
MDC_IDC_EPISODE_ID: 185
MDC_IDC_EPISODE_ID: 186
MDC_IDC_EPISODE_ID: 187
MDC_IDC_EPISODE_ID: 188
MDC_IDC_EPISODE_ID: 189
MDC_IDC_EPISODE_ID: 190
MDC_IDC_EPISODE_ID: 191
MDC_IDC_EPISODE_ID: 192
MDC_IDC_EPISODE_ID: 193
MDC_IDC_EPISODE_ID: 194
MDC_IDC_EPISODE_ID: 195
MDC_IDC_EPISODE_ID: 196
MDC_IDC_EPISODE_ID: 197
MDC_IDC_EPISODE_ID: 198
MDC_IDC_EPISODE_ID: 199
MDC_IDC_EPISODE_ID: 200
MDC_IDC_EPISODE_ID: 201
MDC_IDC_EPISODE_ID: 202
MDC_IDC_EPISODE_ID: 203
MDC_IDC_EPISODE_ID: 204
MDC_IDC_EPISODE_ID: 205
MDC_IDC_EPISODE_ID: 206
MDC_IDC_EPISODE_ID: 207
MDC_IDC_EPISODE_ID: 208
MDC_IDC_EPISODE_ID: 209
MDC_IDC_EPISODE_ID: 210
MDC_IDC_EPISODE_ID: 211
MDC_IDC_EPISODE_ID: 212
MDC_IDC_EPISODE_ID: 213
MDC_IDC_EPISODE_ID: 214
MDC_IDC_EPISODE_ID: 215
MDC_IDC_EPISODE_ID: 216
MDC_IDC_EPISODE_ID: 217
MDC_IDC_EPISODE_ID: 218
MDC_IDC_EPISODE_ID: 219
MDC_IDC_EPISODE_ID: 220
MDC_IDC_EPISODE_ID: 221
MDC_IDC_EPISODE_ID: 222
MDC_IDC_EPISODE_ID: 223
MDC_IDC_EPISODE_ID: 224
MDC_IDC_EPISODE_ID: 225
MDC_IDC_EPISODE_ID: 226
MDC_IDC_EPISODE_ID: 227
MDC_IDC_EPISODE_ID: 228
MDC_IDC_EPISODE_ID: 229
MDC_IDC_EPISODE_ID: 230
MDC_IDC_EPISODE_TYPE: NORMAL
MDC_IDC_LEAD_IMPLANT_DT: NORMAL
MDC_IDC_LEAD_IMPLANT_DT: NORMAL
MDC_IDC_LEAD_LOCATION: NORMAL
MDC_IDC_LEAD_LOCATION: NORMAL
MDC_IDC_LEAD_LOCATION_DETAIL_1: NORMAL
MDC_IDC_LEAD_LOCATION_DETAIL_1: NORMAL
MDC_IDC_LEAD_MFG: NORMAL
MDC_IDC_LEAD_MFG: NORMAL
MDC_IDC_LEAD_MODEL: NORMAL
MDC_IDC_LEAD_MODEL: NORMAL
MDC_IDC_LEAD_POLARITY_TYPE: NORMAL
MDC_IDC_LEAD_POLARITY_TYPE: NORMAL
MDC_IDC_LEAD_SERIAL: NORMAL
MDC_IDC_LEAD_SERIAL: NORMAL
MDC_IDC_MSMT_BATTERY_DTM: NORMAL
MDC_IDC_MSMT_BATTERY_REMAINING_LONGEVITY: 105 MO
MDC_IDC_MSMT_BATTERY_RRT_TRIGGER: 2.62
MDC_IDC_MSMT_BATTERY_STATUS: NORMAL
MDC_IDC_MSMT_BATTERY_VOLTAGE: 2.99 V
MDC_IDC_MSMT_LEADCHNL_RA_IMPEDANCE_VALUE: 285 OHM
MDC_IDC_MSMT_LEADCHNL_RA_IMPEDANCE_VALUE: 342 OHM
MDC_IDC_MSMT_LEADCHNL_RA_PACING_THRESHOLD_AMPLITUDE: 0.75 V
MDC_IDC_MSMT_LEADCHNL_RA_PACING_THRESHOLD_PULSEWIDTH: 0.4 MS
MDC_IDC_MSMT_LEADCHNL_RA_SENSING_INTR_AMPL: 1.25 MV
MDC_IDC_MSMT_LEADCHNL_RA_SENSING_INTR_AMPL: 1.25 MV
MDC_IDC_MSMT_LEADCHNL_RV_IMPEDANCE_VALUE: 342 OHM
MDC_IDC_MSMT_LEADCHNL_RV_IMPEDANCE_VALUE: 418 OHM
MDC_IDC_MSMT_LEADCHNL_RV_PACING_THRESHOLD_AMPLITUDE: 0.88 V
MDC_IDC_MSMT_LEADCHNL_RV_PACING_THRESHOLD_PULSEWIDTH: 0.4 MS
MDC_IDC_MSMT_LEADCHNL_RV_SENSING_INTR_AMPL: 7.88 MV
MDC_IDC_MSMT_LEADCHNL_RV_SENSING_INTR_AMPL: 7.88 MV
MDC_IDC_PG_IMPLANT_DTM: NORMAL
MDC_IDC_PG_MFG: NORMAL
MDC_IDC_PG_MODEL: NORMAL
MDC_IDC_PG_SERIAL: NORMAL
MDC_IDC_PG_TYPE: NORMAL
MDC_IDC_SESS_CLINIC_NAME: NORMAL
MDC_IDC_SESS_DTM: NORMAL
MDC_IDC_SESS_TYPE: NORMAL
MDC_IDC_SET_BRADY_AT_MODE_SWITCH_RATE: 171 {BEATS}/MIN
MDC_IDC_SET_BRADY_HYSTRATE: NORMAL
MDC_IDC_SET_BRADY_LOWRATE: 60 {BEATS}/MIN
MDC_IDC_SET_BRADY_MAX_SENSOR_RATE: 130 {BEATS}/MIN
MDC_IDC_SET_BRADY_MAX_TRACKING_RATE: 130 {BEATS}/MIN
MDC_IDC_SET_BRADY_MODE: NORMAL
MDC_IDC_SET_BRADY_PAV_DELAY_HIGH: 140 MS
MDC_IDC_SET_BRADY_PAV_DELAY_LOW: 300 MS
MDC_IDC_SET_BRADY_SAV_DELAY_HIGH: 110 MS
MDC_IDC_SET_BRADY_SAV_DELAY_LOW: 230 MS
MDC_IDC_SET_LEADCHNL_RA_PACING_AMPLITUDE: 1.5 V
MDC_IDC_SET_LEADCHNL_RA_PACING_ANODE_ELECTRODE_1: NORMAL
MDC_IDC_SET_LEADCHNL_RA_PACING_ANODE_LOCATION_1: NORMAL
MDC_IDC_SET_LEADCHNL_RA_PACING_CAPTURE_MODE: NORMAL
MDC_IDC_SET_LEADCHNL_RA_PACING_CATHODE_ELECTRODE_1: NORMAL
MDC_IDC_SET_LEADCHNL_RA_PACING_CATHODE_LOCATION_1: NORMAL
MDC_IDC_SET_LEADCHNL_RA_PACING_POLARITY: NORMAL
MDC_IDC_SET_LEADCHNL_RA_PACING_PULSEWIDTH: 0.4 MS
MDC_IDC_SET_LEADCHNL_RA_SENSING_ANODE_ELECTRODE_1: NORMAL
MDC_IDC_SET_LEADCHNL_RA_SENSING_ANODE_LOCATION_1: NORMAL
MDC_IDC_SET_LEADCHNL_RA_SENSING_CATHODE_ELECTRODE_1: NORMAL
MDC_IDC_SET_LEADCHNL_RA_SENSING_CATHODE_LOCATION_1: NORMAL
MDC_IDC_SET_LEADCHNL_RA_SENSING_POLARITY: NORMAL
MDC_IDC_SET_LEADCHNL_RA_SENSING_SENSITIVITY: 0.3 MV
MDC_IDC_SET_LEADCHNL_RV_PACING_AMPLITUDE: 2 V
MDC_IDC_SET_LEADCHNL_RV_PACING_ANODE_ELECTRODE_1: NORMAL
MDC_IDC_SET_LEADCHNL_RV_PACING_ANODE_LOCATION_1: NORMAL
MDC_IDC_SET_LEADCHNL_RV_PACING_CAPTURE_MODE: NORMAL
MDC_IDC_SET_LEADCHNL_RV_PACING_CATHODE_ELECTRODE_1: NORMAL
MDC_IDC_SET_LEADCHNL_RV_PACING_CATHODE_LOCATION_1: NORMAL
MDC_IDC_SET_LEADCHNL_RV_PACING_POLARITY: NORMAL
MDC_IDC_SET_LEADCHNL_RV_PACING_PULSEWIDTH: 0.4 MS
MDC_IDC_SET_LEADCHNL_RV_SENSING_ANODE_ELECTRODE_1: NORMAL
MDC_IDC_SET_LEADCHNL_RV_SENSING_ANODE_LOCATION_1: NORMAL
MDC_IDC_SET_LEADCHNL_RV_SENSING_CATHODE_ELECTRODE_1: NORMAL
MDC_IDC_SET_LEADCHNL_RV_SENSING_CATHODE_LOCATION_1: NORMAL
MDC_IDC_SET_LEADCHNL_RV_SENSING_POLARITY: NORMAL
MDC_IDC_SET_LEADCHNL_RV_SENSING_SENSITIVITY: 0.9 MV
MDC_IDC_SET_ZONE_DETECTION_INTERVAL: 350 MS
MDC_IDC_SET_ZONE_DETECTION_INTERVAL: 400 MS
MDC_IDC_SET_ZONE_TYPE: NORMAL
MDC_IDC_STAT_AT_BURDEN_PERCENT: 0.6 %
MDC_IDC_STAT_AT_DTM_END: NORMAL
MDC_IDC_STAT_AT_DTM_START: NORMAL
MDC_IDC_STAT_BRADY_AP_VP_PERCENT: 71 %
MDC_IDC_STAT_BRADY_AP_VS_PERCENT: 0.07 %
MDC_IDC_STAT_BRADY_AS_VP_PERCENT: 28.8 %
MDC_IDC_STAT_BRADY_AS_VS_PERCENT: 0.13 %
MDC_IDC_STAT_BRADY_DTM_END: NORMAL
MDC_IDC_STAT_BRADY_DTM_START: NORMAL
MDC_IDC_STAT_BRADY_RA_PERCENT_PACED: 69.79 %
MDC_IDC_STAT_BRADY_RV_PERCENT_PACED: 99.8 %
MDC_IDC_STAT_EPISODE_RECENT_COUNT: 0
MDC_IDC_STAT_EPISODE_RECENT_COUNT: 101
MDC_IDC_STAT_EPISODE_RECENT_COUNT_DTM_END: NORMAL
MDC_IDC_STAT_EPISODE_RECENT_COUNT_DTM_START: NORMAL
MDC_IDC_STAT_EPISODE_TOTAL_COUNT: 0
MDC_IDC_STAT_EPISODE_TOTAL_COUNT: 227
MDC_IDC_STAT_EPISODE_TOTAL_COUNT: 3
MDC_IDC_STAT_EPISODE_TOTAL_COUNT_DTM_END: NORMAL
MDC_IDC_STAT_EPISODE_TOTAL_COUNT_DTM_START: NORMAL
MDC_IDC_STAT_EPISODE_TYPE: NORMAL

## 2023-04-06 ENCOUNTER — ANTICOAGULATION THERAPY VISIT (OUTPATIENT)
Dept: ANTICOAGULATION | Facility: CLINIC | Age: 83
End: 2023-04-06
Payer: COMMERCIAL

## 2023-04-06 DIAGNOSIS — Z79.01 LONG TERM CURRENT USE OF ANTICOAGULANT THERAPY: ICD-10-CM

## 2023-04-06 DIAGNOSIS — I48.91 ATRIAL FIBRILLATION, UNSPECIFIED TYPE (H): Primary | ICD-10-CM

## 2023-04-06 DIAGNOSIS — I48.21 PERMANENT ATRIAL FIBRILLATION (H): ICD-10-CM

## 2023-04-06 LAB — INR HOME MONITORING: 3.1 (ref 2–3)

## 2023-04-06 NOTE — PROGRESS NOTES
ANTICOAGULATION MANAGEMENT     Unique Ward 82 year old female is on warfarin with supratherapeutic INR result. (Goal INR 2.0-3.0)    Recent labs: (last 7 days)     04/06/23  0000   INR 3.1*       ASSESSMENT       Source(s): Chart Review and Patient/Caregiver Call       Warfarin doses taken: Warfarin taken as instructed    Diet: Decreased greens/vitamin K in diet; plans to resume previous intake    New illness, injury, or hospitalization: No. Patient was scheduled to have a steroid injection in her knee on 3/23/23, but patient decided to cancel this procedure. Patient is going to try physical therapy first instead.    Medication/supplement changes: None noted    Signs or symptoms of bleeding or clotting: No    Previous INR: Therapeutic last 2(+) visits    Additional findings: A partial hold is recommended today given that patient has had a decrease in greens, patient declines and would like to get INR back into range with green intake         PLAN     Recommended plan for temporary change(s) affecting INR     Dosing Instructions: Continue your current warfarin dose with next INR in 1 week (this is per patient's request, see note above)    Summary  As of 4/6/2023    Full warfarin instructions:  5 mg every Sat; 10 mg all other days   Next INR check:  4/13/2023             Telephone call with Aaliyah who agrees to plan and repeated back plan correctly    Patient to recheck with home meter    Education provided:     Please call back if any changes to your diet, medications or how you've been taking warfarin    Dietary considerations: importance of consistent vitamin K intake    Symptom monitoring: monitoring for bleeding signs and symptoms and monitoring for clotting signs and symptoms    Contact [phone]  with any changes, questions or concerns.     Plan made per [facility] anticoagulation protocol    Georgia Card RN  Anticoagulation  Clinic  4/6/2023    _______________________________________________________________________     Anticoagulation Episode Summary     Current INR goal:  2.0-3.0   TTR:  72.1 % (1 y)   Target end date:  Indefinite   Send INR reminders to:  [facility]    Indications    Atrial fibrillation (H) [I48.91]  Long term current use of anticoagulant therapy [Z79.01]  Atrial fibrillation  unspecified type (H) [I48.91]  Permanent atrial fibrillation (H) [I48.21]           Comments:  [employer] home meter- MBE         Anticoagulation Care Providers     Provider Role Specialty Phone number    Tyler Lee MD Referring Family Medicine [phone]    Araceli Crowley NP Referring Nurse Practitioner - Family [phone]

## 2023-04-13 ENCOUNTER — TELEPHONE (OUTPATIENT)
Dept: FAMILY MEDICINE | Facility: CLINIC | Age: 83
End: 2023-04-13
Payer: COMMERCIAL

## 2023-04-13 ENCOUNTER — ANTICOAGULATION THERAPY VISIT (OUTPATIENT)
Dept: ANTICOAGULATION | Facility: CLINIC | Age: 83
End: 2023-04-13
Payer: COMMERCIAL

## 2023-04-13 DIAGNOSIS — I48.91 ATRIAL FIBRILLATION, UNSPECIFIED TYPE (H): Primary | ICD-10-CM

## 2023-04-13 DIAGNOSIS — I48.21 PERMANENT ATRIAL FIBRILLATION (H): ICD-10-CM

## 2023-04-13 DIAGNOSIS — Z79.01 LONG TERM CURRENT USE OF ANTICOAGULANT THERAPY: ICD-10-CM

## 2023-04-13 LAB — INR HOME MONITORING: 3.2 (ref 2–3)

## 2023-04-13 NOTE — PROGRESS NOTES
ANTICOAGULATION MANAGEMENT     Unique Ward 82 year old female is on warfarin with supratherapeutic INR result. (Goal INR 2.0-3.0)    Recent labs: (last 7 days)     04/13/23  0000   INR 3.2*       ASSESSMENT       Source(s): Chart Review and Patient/Caregiver Call       Warfarin doses taken: Warfarin taken as instructed    Diet: Continues to eat less greens, ongoing change per patient.    New illness, injury, or hospitalization: No    Medication/supplement changes: None noted    Signs or symptoms of bleeding or clotting: No    Previous INR: Supratherapeutic    Additional findings: None         PLAN     Recommended plan for ongoing change(s) affecting INR     Dosing Instructions: decrease your warfarin dose (7.7% change) with next INR in 1 week       Summary  As of 4/13/2023    Full warfarin instructions:  5 mg every Tue, Sat; 10 mg all other days   Next INR check:  4/20/2023             Telephone call with Aaliyah who verbalizes understanding and agrees to plan    Patient to recheck with home meter    Education provided:     Please call back if any changes to your diet, medications or how you've been taking warfarin    Plan made per [facility] anticoagulation protocol    Ning Forman, RN  Anticoagulation Clinic  4/13/2023    _______________________________________________________________________     Anticoagulation Episode Summary     Current INR goal:  2.0-3.0   TTR:  72.1 % (1 y)   Target end date:  Indefinite   Send INR reminders to:  [facility]    Indications    Atrial fibrillation (H) [I48.91]  Long term current use of anticoagulant therapy [Z79.01]  Atrial fibrillation  unspecified type (H) [I48.91]  Permanent atrial fibrillation (H) [I48.21]           Comments:  [employer] home meter- MBE         Anticoagulation Care Providers     Provider Role Specialty Phone number    Tyler Lee MD Referring Family Medicine [phone]    Araceli Crowley NP Referring Nurse Practitioner - Family  [phone]

## 2023-04-13 NOTE — TELEPHONE ENCOUNTER
Patient Returning Call    Reason for call:  Patient is returning a phone call    Information relayed to patient:       Patient has additional questions:  Yes    What are your questions/concerns:  Patient is returning a call from the anticoagulation dept.      Who does the patient want to speak with:  anticoagulation department    Is an  needed?:  No      Could we send this information to you in [facility] or would you prefer to receive a phone call?:   Patient would prefer a phone call   Okay to leave a detailed message?: n/a  at Home number on file [phone] (home)

## 2023-04-20 ENCOUNTER — ANTICOAGULATION THERAPY VISIT (OUTPATIENT)
Dept: ANTICOAGULATION | Facility: CLINIC | Age: 83
End: 2023-04-20
Payer: COMMERCIAL

## 2023-04-20 ENCOUNTER — DOCUMENTATION ONLY (OUTPATIENT)
Dept: ANTICOAGULATION | Facility: CLINIC | Age: 83
End: 2023-04-20
Payer: COMMERCIAL

## 2023-04-20 DIAGNOSIS — Z79.01 LONG TERM CURRENT USE OF ANTICOAGULANT THERAPY: ICD-10-CM

## 2023-04-20 DIAGNOSIS — I48.21 PERMANENT ATRIAL FIBRILLATION (H): ICD-10-CM

## 2023-04-20 DIAGNOSIS — I48.91 ATRIAL FIBRILLATION, UNSPECIFIED TYPE (H): Primary | ICD-10-CM

## 2023-04-20 LAB — INR HOME MONITORING: 2.5 (ref 2–3)

## 2023-04-20 NOTE — PROGRESS NOTES
ANTICOAGULATION MANAGEMENT     Unique GUZMAN Ed 82 year old female is on warfarin with therapeutic INR result. (Goal INR 2.0-3.0)    Recent labs: (last 7 days)     04/20/23  0000   INR 2.5       ASSESSMENT       Source(s): Chart Review and Patient/Caregiver Call       Warfarin doses taken: Warfarin taken as instructed    Diet: Decreased greens/vitamin K in diet; plans to resume previous intake planning to eat greens 3 days /week -     Medication/supplement changes: None noted    New illness, injury, or hospitalization: No    Signs or symptoms of bleeding or clotting: No    Previous INR: Supratherapeutic    Additional findings: None         PLAN     Recommended plan for temporary change(s) affecting INR     Dosing Instructions: Continue your current warfarin dose with next INR in 1 week       Summary  As of 4/20/2023    Full warfarin instructions:  5 mg every Tue, Sat; 10 mg all other days   Next INR check:  4/27/2023             Telephone call with Aaliyah who verbalizes understanding and agrees to plan and who agrees to plan and repeated back plan correctly    Patient to recheck with home meter    Education provided:     Goal range and lab monitoring: goal range and significance of current result, Importance of therapeutic range and Importance of following up at instructed interval    Dietary considerations: importance of consistent vitamin K intake, impact of vitamin K foods on INR and vitamin K content of foods    Plan made per [facility] anticoagulation protocol    Georgia Aguilar RN  Anticoagulation Clinic  4/20/2023    _______________________________________________________________________     Anticoagulation Episode Summary     Current INR goal:  2.0-3.0   TTR:  73.5 % (1 y)   Target end date:  Indefinite   Send INR reminders to:  [facility]    Indications    Atrial fibrillation (H) [I48.91]  Long term current use of anticoagulant therapy [Z79.01]  Atrial fibrillation  unspecified type (H) [I48.91]  Permanent  atrial fibrillation (H) [I48.21]           Comments:  [employer] home meter- MBE         Anticoagulation Care Providers     Provider Role Specialty Phone number    Tyler Lee MD Referring Family Medicine [phone]    Araceli Crowley NP Referring Nurse Practitioner - Family [phone]

## 2023-04-20 NOTE — PROGRESS NOTES
ANTICOAGULATION CLINIC REFERRAL RENEWAL REQUEST       An annual renewal order is required for all patients referred to [facility].?  Please review and sign the pended referral order for Unique Ward.       ANTICOAGULATION SUMMARY      Warfarin indication(s)   Atrial Fibrillation    Mechanical heart valve present?  NO       Current goal range   INR: 2.0-3.0     Goal appropriate for indication? Goal INR 2-3, standard for indication(s) above     Time in Therapeutic Range (TTR)  (Goal > 60%) 73.5%       Office visit with referring provider's group within last year yes on 5/3/2022       Georgia Aguilar RN  [facility]

## 2023-04-27 ENCOUNTER — ANTICOAGULATION THERAPY VISIT (OUTPATIENT)
Dept: ANTICOAGULATION | Facility: CLINIC | Age: 83
End: 2023-04-27
Payer: COMMERCIAL

## 2023-04-27 DIAGNOSIS — I48.21 PERMANENT ATRIAL FIBRILLATION (H): ICD-10-CM

## 2023-04-27 DIAGNOSIS — I48.91 ATRIAL FIBRILLATION, UNSPECIFIED TYPE (H): Primary | ICD-10-CM

## 2023-04-27 DIAGNOSIS — Z79.01 LONG TERM CURRENT USE OF ANTICOAGULANT THERAPY: ICD-10-CM

## 2023-04-27 LAB — INR HOME MONITORING: 1.8 (ref 2–3)

## 2023-04-27 NOTE — PROGRESS NOTES
ANTICOAGULATION MANAGEMENT     Unique GUZMAN Ward 82 year old female is on warfarin with subtherapeutic INR result. (Goal INR 2.0-3.0)    Recent labs: (last 7 days)     04/27/23  0000   INR 1.8*       ASSESSMENT       Source(s): Chart Review and Patient/Caregiver Call       Warfarin doses taken: Warfarin taken as instructed    Diet: Increased greens/vitamin K in diet; ongoing change-having 3 servings a week and would like to continue    Medication/supplement changes: None noted    New illness, injury, or hospitalization: No    Signs or symptoms of bleeding or clotting: No    Previous result: Therapeutic last visit; previously outside of goal range    Additional findings: None         PLAN     Recommended plan for ongoing change(s) affecting INR     Dosing Instructions: Increase your warfarin dose (8.3% change) with next INR in 1 week       Summary  As of 4/27/2023    Full warfarin instructions:  5 mg every Tue; 10 mg all other days   Next INR check:  5/4/2023             Telephone call with Aaliyah who verbalizes understanding and agrees to plan  Sent [employer] message with dosing and follow up instructions    Patient to recheck with home meter    Education provided:     Dietary considerations: importance of consistent vitamin K intake    Plan made per [facility] anticoagulation protocol    Bev Mancilla, RN  Anticoagulation Clinic  4/27/2023    _______________________________________________________________________     Anticoagulation Episode Summary     Current INR goal:  2.0-3.0   TTR:  74.8 % (1 y)   Target end date:  Indefinite   Send INR reminders to:  [facility]    Indications    Atrial fibrillation (H) [I48.91]  Long term current use of anticoagulant therapy [Z79.01]  Atrial fibrillation  unspecified type (H) [I48.91]  Permanent atrial fibrillation (H) [I48.21]           Comments:  [employer] home meter- MBE         Anticoagulation Care Providers     Provider Role Specialty Phone number    Tyler Lee  MD Gopi Referring [facility] Medicine [phone]    Araceli Crowley NP Referring Nurse Practitioner - Family [phone]

## 2023-05-05 ENCOUNTER — ANTICOAGULATION THERAPY VISIT (OUTPATIENT)
Dept: ANTICOAGULATION | Facility: CLINIC | Age: 83
End: 2023-05-05
Payer: COMMERCIAL

## 2023-05-05 DIAGNOSIS — Z79.01 LONG TERM CURRENT USE OF ANTICOAGULANT THERAPY: ICD-10-CM

## 2023-05-05 DIAGNOSIS — I48.21 PERMANENT ATRIAL FIBRILLATION (H): ICD-10-CM

## 2023-05-05 DIAGNOSIS — I48.91 ATRIAL FIBRILLATION, UNSPECIFIED TYPE (H): Primary | ICD-10-CM

## 2023-05-05 LAB — INR HOME MONITORING: 2 (ref 2–3)

## 2023-05-05 NOTE — PROGRESS NOTES
ANTICOAGULATION MANAGEMENT     Unique Ward 82 year old female is on warfarin with therapeutic INR result. (Goal INR 2.0-3.0)    Recent labs: (last 7 days)     05/05/23  0000   INR 2.0       ASSESSMENT       Source(s): Chart Review and Patient/Caregiver Call       Warfarin doses taken: Warfarin taken as instructed    Diet: No new diet changes identified-took 3 servings of greens this week.    Medication/supplement changes: None noted    New illness, injury, or hospitalization: No    Signs or symptoms of bleeding or clotting: No    Previous result: Subtherapeutic    Additional findings: None         PLAN     Recommended plan for no diet, medication or health factor changes affecting INR     Dosing Instructions: Continue your current warfarin dose with next INR in 1 week       Summary  As of 5/5/2023    Full warfarin instructions:  5 mg every Tue; 10 mg all other days   Next INR check:  5/12/2023             Telephone call with Aaliyah who verbalizes understanding and agrees to plan  Sent [employer] message with dosing and follow up instructions    Patient to recheck with home meter    Education provided:     Please call back if any changes to your diet, medications or how you've been taking warfarin    Resume manage by exception with home monitor. Continue to submit INR results to home monitor company.You will only be called when your result is out of range. Please call and notify [facility] if new medication started, dose missed, signs or symptoms of bleeding or clotting, or a surgery/procedure is scheduled.    Plan made per [facility] anticoagulation protocol    Bev Mancilla RN  Anticoagulation Clinic  5/5/2023    _______________________________________________________________________     Anticoagulation Episode Summary     Current INR goal:  2.0-3.0   TTR:  74.9 % (1 y)   Target end date:  Indefinite   Send INR reminders to:  [employer] [facility]    Indications    Atrial fibrillation (H) [I48.91]  Long term current use  of anticoagulant therapy [Z79.01]  Atrial fibrillation  unspecified type (H) [I48.91]  Permanent atrial fibrillation (H) [I48.21]           Comments:  [employer] home meter- MBE         Anticoagulation Care Providers     Provider Role Specialty Phone number    Tyler Lee MD Referring Family Medicine [phone]    Araceli Crowley NP Referring Nurse Practitioner - Family [phone]

## 2023-05-12 ENCOUNTER — DOCUMENTATION ONLY (OUTPATIENT)
Dept: ANTICOAGULATION | Facility: CLINIC | Age: 83
End: 2023-05-12
Payer: COMMERCIAL

## 2023-05-12 DIAGNOSIS — Z79.01 LONG TERM CURRENT USE OF ANTICOAGULANT THERAPY: Primary | ICD-10-CM

## 2023-05-12 DIAGNOSIS — I48.91 ATRIAL FIBRILLATION, UNSPECIFIED TYPE (H): ICD-10-CM

## 2023-05-12 DIAGNOSIS — I48.21 PERMANENT ATRIAL FIBRILLATION (H): ICD-10-CM

## 2023-05-12 LAB — INR HOME MONITORING: 2.1 (ref 2–3)

## 2023-05-12 NOTE — PROGRESS NOTES
ANTICOAGULATION  MANAGEMENT-Home Monitor Managed by Exception    Unique Ward 82 year old female is on warfarin with therapeutic INR result. (Goal INR 2.0-3.0)    Recent labs: (last 7 days)     05/12/23  0000   INR 2.1         Previous INR was Therapeutic    Medication, diet, health changes since last INR:chart reviewed; none identified    Contacted within the last 12 weeks by phone on 5/5/23      ARIELLE     Unique was NOT contacted regarding therapeutic result today per home monitoring policy manage by exception agreement.   Current warfarin dose is to be continued:     Summary  As of 5/12/2023    Full warfarin instructions:  5 mg every Tue; 10 mg all other days   Next INR check:  5/19/2023           ?   Trisha Lo, RN  Anticoagulation Clinic  5/12/2023    _______________________________________________________________________     Anticoagulation Episode Summary     Current INR goal:  2.0-3.0   TTR:  76.8 % (1 y)   Target end date:  Indefinite   Send INR reminders to:  [facility]    Indications    Atrial fibrillation (H) [I48.91]  Long term current use of anticoagulant therapy [Z79.01]  Atrial fibrillation  unspecified type (H) [I48.91]  Permanent atrial fibrillation (H) [I48.21]           Comments:  [employer] home meter- MBE         Anticoagulation Care Providers     Provider Role Specialty Phone number    Tyler Lee MD Referring Family Medicine [phone]    Araceli Crowley NP Referring Nurse Practitioner - Family [phone]

## 2023-05-27 ENCOUNTER — HOSPITAL ENCOUNTER (EMERGENCY)
Facility: CLINIC | Age: 83
Discharge: HOME | End: 2023-05-27
Attending: EMERGENCY MEDICINE | Admitting: EMERGENCY MEDICINE
Payer: MEDICARE

## 2023-05-27 ENCOUNTER — APPOINTMENT (OUTPATIENT)
Dept: CT IMAGING | Facility: CLINIC | Age: 83
End: 2023-05-27
Attending: EMERGENCY MEDICINE
Payer: MEDICARE

## 2023-05-27 ENCOUNTER — NURSE TRIAGE (OUTPATIENT)
Dept: NURSING | Facility: CLINIC | Age: 83
End: 2023-05-27
Payer: COMMERCIAL

## 2023-05-27 VITALS
WEIGHT: 130 LBS | TEMPERATURE: 97.7 F | HEART RATE: 79 BPM | BODY MASS INDEX: 19.7 KG/M2 | SYSTOLIC BLOOD PRESSURE: 137 MMHG | OXYGEN SATURATION: 98 % | HEIGHT: 68 IN | DIASTOLIC BLOOD PRESSURE: 71 MMHG | RESPIRATION RATE: 17 BRPM

## 2023-05-27 DIAGNOSIS — S09.90XA CLOSED HEAD INJURY, INITIAL ENCOUNTER: ICD-10-CM

## 2023-05-27 LAB — INR PPP: 2.29 (ref 0.85–1.15)

## 2023-05-27 PROCEDURE — 70450 CT HEAD/BRAIN W/O DYE: CPT | Mod: ME

## 2023-05-27 PROCEDURE — 36415 COLL VENOUS BLD VENIPUNCTURE: CPT | Performed by: EMERGENCY MEDICINE

## 2023-05-27 PROCEDURE — 99284 EMERGENCY DEPT VISIT MOD MDM: CPT | Mod: 25 | Performed by: EMERGENCY MEDICINE

## 2023-05-27 PROCEDURE — G1010 CDSM STANSON: HCPCS

## 2023-05-27 PROCEDURE — 85610 PROTHROMBIN TIME: CPT | Performed by: EMERGENCY MEDICINE

## 2023-05-27 PROCEDURE — 99283 EMERGENCY DEPT VISIT LOW MDM: CPT | Performed by: EMERGENCY MEDICINE

## 2023-05-27 ASSESSMENT — ACTIVITIES OF DAILY LIVING (ADL): ADLS_ACUITY_SCORE: 35

## 2023-05-27 NOTE — ED PROVIDER NOTES
History     chief complaint  HPI  Patient is a pleasant 82-year-old female with a history of permanent A-fib on Coumadin presenting with a head injury.  Patient states her 225 pound dog was shaking his head and struck the left side of her head on the left lateral periorbital region.  She did not lose consciousness.  She is concerned with her Coumadin usage and head trauma.  She has mild pain to the left periorbital region but no pain in her eye.  No double vision, blurry vision, globe pain, numbness/tingling/weakness distal to the injury.    Review of Systems:  All organ systems below were reviewed and are negative unless indicated in the HPI.    Constitutional  Eyes  ENT  Respiratory  Cardiovascular  Gastrointestinal  Genitourinary  Musculoskeletal  Skin  Neuro    Allergies:  Allergies   Allergen Reactions     Keflex [Cephalexin] Anaphylaxis     No Clinical Screening - See Comments      Eye drop antibiotic.     Codeine Other (See Comments)     Comment: , Description:      Gentamicin Hives     Per Unique this medication gives hives on arms and legs. Bev Lo LSXO,  ....................  7/15/2014   1:15 PM     Influenza Vaccines Other (See Comments)     Comment: , Description:      Influenza Virus Vaccine H5n1      Paxil [Paroxetine] Other (See Comments)     Comment: , Description:      Typhoid Vaccine, Live Other (See Comments)     Comment: , Description:   Comment: , Description:      Typhoid Vaccines        Problem List:    Patient Active Problem List    Diagnosis Date Noted     Permanent atrial fibrillation (H) 05/16/2022     Priority: Medium     Long term current use of anticoagulant therapy 06/30/2020     Priority: Medium     Atrial fibrillation, unspecified type (H) 06/30/2020     Priority: Medium     CHB (complete heart block) (H) 03/31/2020     Priority: Medium     Added automatically from request for surgery [id number]       Pacemaker 03/31/2020     Priority: Medium     Atrial fibrillation (H)  06/07/2019     Priority: Medium     History of basal cell carcinoma- face and scalp 02/28/2017     Priority: Medium     Fibromyalgia 02/28/2017     Priority: Medium     Advance Care Planning 01/06/2017     Priority: Medium     Advance Care Planning 5/4/2017: ACP Facilitation Session:    Unique GUZMAN Ed presented for ACP Facilitation session at a group class. She was accompanied by daughter-in-law. [employer] Cho[employer] information provided and resources reviewed. She wishes to give additional consideration to ACP.  She currently has the following questions or concerns about Advance Care Planning: none known. Follow-up meeting: not needed/applicable. Added by Unique Uribe RN, Advance Care Planning Liaison.  Advance Care Planning 1/6/17: Info given   Saray Greer MA         Personal history of urinary tract infection 06/18/2016     Priority: Medium     Essential hypertension 03/23/2016     Priority: Medium     Vitamin D deficiency 03/30/2013     Priority: Medium     Dysthymia 04/01/2005     Priority: Medium        Past Medical History:    Past Medical History:   Diagnosis Date     Arthritis 01.01.1980     Atrial fibrillation (H) 6/7/2019     Cancer (H) 01.01.1993     scalp     Depressive disorder 01.01.1977     FH: melanoma- son 2/28/2017     Hypertension 03.16.2016       Past Surgical History:    Past Surgical History:   Procedure Laterality Date     BIOPSY  11/01/2014     COLONOSCOPY  01.01/1979    Two routine,  one for chronic diarrhea     EP PACEMAKER N/A 3/31/2020    Procedure: EP Pacemaker;  Surgeon: Erich Clark MD;  Location:  [facility]     EYE SURGERY  1/1/2014    first    cataracts both eyes.  second a few months later     GENITOURINARY SURGERY       GYN SURGERY  01.01.1977    TL     HEAD & NECK SURGERY  01.01.1997    basal cell X2   Moh's on scalp.  other on bridge of nose     LASER YAG CAPSULOTOMY Bilateral 4/1/2021    Procedure: CAPSULOTOMY, LENS, USING YAG LASER;  Surgeon:  "Bernardino Chu MD;  Location: [facility]     SOFT TISSUE SURGERY  01.01.1990    Ganglion Cyst   Left inner wrist       Family History:    Family History   Problem Relation Age of Onset     Diabetes Maternal Grandmother         Na     Coronary Artery Disease Sister         error     Coronary Artery Disease Brother         error     Hypertension Brother      Hyperlipidemia Brother      Cerebrovascular Disease Brother      Hypertension Sister      Hypertension Sister      Hyperlipidemia Sister      Other Cancer Sister         cervical     Breast Cancer Sister      Other Cancer Sister         kidney     Mental Illness Mother         paranoid/schizophrenic/suicide     Mental Illness Sister         ???  Had shock treatments     Hyperlipidemia Sister      Other Cancer Sister         Kidney       Medications:    warfarin ANTICOAGULANT (COUMADIN) 5 MG tablet  amLODIPine (NORVASC) 10 MG tablet  azelastine (ASTELIN) 0.1 % nasal spray  cefTAZidime (FORTAZ) 1 GM vial  cholecalciferol (VITAMIN D3) 5000 units TABS tablet  COMPRESSION STOCKINGS  EPINEPHrine (ANY BX GENERIC EQUIV) 0.3 MG/0.3ML injection 2-pack  fish oil-omega-3 fatty acids 1000 MG capsule  meclizine (ANTIVERT) 25 MG tablet  nitroFURantoin macrocrystal-monohydrate (MACROBID) 100 MG capsule  Zinc Sulfate (ZINC 15 PO)          Physical Exam   BP: 137/71  Pulse: 79  Temp: 97.7  F (36.5  C)  Resp: 17  Height: 172.7 cm (5' 8\")  Weight: 59 kg (130 lb)  SpO2: 98 %    Gen: Vital signs reviewed  Eyes: Sclera white, pupils round, extraocular eye movements intact  ENT: External ears and nares normal.  Mild tenderness palpation with a small amount of ecchymoses to the left lateral orbital bone.  Card: Regular rate and rhythm  Resp: No respiratory distress. Lungs clear to auscultation bilaterally  Abd: Soft, non-distended, non-tender  Extremities: Warm, no edema  Skin: No rashes  Neuro: Alert, speech normal. Face is symmetric.  Strength and sensation intact throughout.  No " dysmetria.  Visual fields grossly intact.  Hearing grossly intact.       ED Course        Procedures                  Results for orders placed or performed during the hospital encounter of 05/27/23 (from the past 24 hour(s))   INR   Result Value Ref Range    INR 2.29 (H) 0.85 - 1.15   CT Head w/o Contrast    Narrative    EXAM: CT HEAD WITHOUT CONTRAST, CT FACIAL BONES WITHOUT CONTRAST  LOCATION: [facility]  DATE/TIME: 05/27/2023, 4:56 AM CDT    INDICATION: Head trauma on coumadin.  COMPARISON: Prior head CT 06/04/2020.  TECHNIQUE:   1) Routine CT Head without IV contrast. Multiplanar reformats. Dose reduction techniques were used.  2) Routine CT Facial Bones without IV contrast. Multiplanar reformats. Dose reduction techniques were used.    FINDINGS:  HEAD CT:   INTRACRANIAL CONTENTS: No intracranial hemorrhage, extra-axial collection, or mass effect.  No CT evidence of acute infarct. Normal parenchymal density for age. The ventricles and sulci are normal for age.     OSSEOUS STRUCTURES/SOFT TISSUES: Incidental left parietal osteoma.    FACIAL BONE CT:  OSSEOUS STRUCTURES/SOFT TISSUES: No localized soft tissue swelling/inflammation. Mild chronic left anterior nasal arch deformity is unchanged No evidence for dental trauma or periapical abscess. Elongate calcification of the stylohyoid ligaments.    ORBITAL CONTENTS: No acute abnormality.    SINUSES: No paranasal sinus mucosal disease.      Impression    IMPRESSION:  HEAD CT:  1.  No intracranial abnormality or significant change compared to the prior study.    FACIAL BONE CT:  1.  No acute fracture.    2.  Stable mild chronic left anterior nasal arch deformity.    3.  Elongate calcification of the stylohyoid ligaments bilaterally may be incidental though can be seen in [place] syndrome.       CT Facial Bones without Contrast    Narrative    EXAM: CT HEAD WITHOUT CONTRAST, CT FACIAL BONES WITHOUT CONTRAST  LOCATION: [facility]  DATE/TIME: 05/27/2023, 4:56 AM CDT    INDICATION: Head trauma on coumadin.  COMPARISON: Prior head CT 06/04/2020.  TECHNIQUE:   1) Routine CT Head without IV contrast. Multiplanar reformats. Dose reduction techniques were used.  2) Routine CT Facial Bones without IV contrast. Multiplanar reformats. Dose reduction techniques were used.    FINDINGS:  HEAD CT:   INTRACRANIAL CONTENTS: No intracranial hemorrhage, extra-axial collection, or mass effect.  No CT evidence of acute infarct. Normal parenchymal density for age. The ventricles and sulci are normal for age.     OSSEOUS STRUCTURES/SOFT TISSUES: Incidental left parietal osteoma.    FACIAL BONE CT:  OSSEOUS STRUCTURES/SOFT TISSUES: No localized soft tissue swelling/inflammation. Mild chronic left anterior nasal arch deformity is unchanged No evidence for dental trauma or periapical abscess. Elongate calcification of the stylohyoid ligaments.    ORBITAL CONTENTS: No acute abnormality.    SINUSES: No paranasal sinus mucosal disease.      Impression    IMPRESSION:  HEAD CT:  1.  No intracranial abnormality or significant change compared to the prior study.    FACIAL BONE CT:  1.  No acute fracture.    2.  Stable mild chronic left anterior nasal arch deformity.    3.  Elongate calcification of the stylohyoid ligaments bilaterally may be incidental though can be seen in [place]t syndrome.           Medications - No data to display      Consultations:  None    Social Determinants of Health:  Manages ADLs    Assessments & Plan (with Medical Decision Making)       I have reviewed the nursing notes.    I have reviewed the findings, diagnosis, plan and need for follow up with the patient.      Medical Decision Making  On arrival, patient well-appearing.  No loss of extraocular eye movements.  Good neurologic exam.  Did order a CT scan of her head and face given her Coumadin usage.  Her INR is therapeutic.    CT scans did not reveal acute injury.  No  clinical decompensation while in the emergency department.  I discussed appropriate return precautions with patient including the rare delayed head bleed on Coumadin though I think this is less likely in her case.  She was discharged home in stable condition.    Final diagnoses:   Closed head injury, initial encounter         Junaid Burden M.D.   [facility] Emergency Department     Junaid Burden MD  05/27/23 0511

## 2023-05-27 NOTE — TELEPHONE ENCOUNTER
"Patient calling reports colliding with a large dog and being on coumadin. Reports having bruising to one of the eyes now. Denies seizure, being knocked unconscious and penetrating injury. Advised per protocol to be seen in the ER with patient agreeable to the plan.     Paulina Rock RN 05/27/23 3:12 AM    Health Triage Nurse Advisor      Reason for Disposition    [1] One or two \"black eyes\" (bruising, purple color of eyelids) AND [2] onset within 24 hours of head injury    Additional Information    Negative: [1] ACUTE NEURO SYMPTOM AND [2] present now  (DEFINITION: difficult to awaken OR confused thinking and talking OR slurred speech OR weakness of arms OR unsteady walking)    Negative: Knocked out (unconscious) > 1 minute    Negative: Seizure (convulsion) occurred  (Exception: prior history of seizures and now alert and without Acute Neuro Symptoms)    Negative: Penetrating head injury (e.g., knife, gun shot wound, metal object)    Negative: [1] Major bleeding (e.g., actively dripping or spurting) AND [2] can't be stopped    Negative: [1] Dangerous mechanism of injury (e.g., MVA, diving, trampoline, contact sports, fall > 10 feet or 3 meters) AND [2] NECK pain AND [3] began < 1 hour after injury    Negative: Sounds like a life-threatening emergency to the triager    Negative: [1] Diagnosed with concussion AND [2] within last 14 days    Negative: [1] Traumatic brain injury (mTBI; concussion) AND [2] more than 14 days since head injury    Negative: Can't remember what happened (amnesia)    Negative: Vomiting once or more    Negative: [1] Loss of vision or double vision AND [2] present now    Negative: Watery or blood-tinged fluid dripping from the NOSE or EARS now  (Exception: tears from crying or nosebleed from nasal trauma)    Protocols used: HEAD INJURY-A-AH      "

## 2023-05-27 NOTE — ED TRIAGE NOTES
Is dog sitting and when getting up from couch, her and the [place] hit heads.   Has a small bruise to left eye socket.  Denies pain.  On Coumadin     Triage Assessment     Row Name 05/27/23 0411       Triage Assessment (Adult)    Airway WDL WDL       Respiratory WDL    Respiratory WDL WDL       Skin Circulation/Temperature WDL    Skin Circulation/Temperature WDL X  small ecchymotic area near left eye socket       Cardiac WDL    Cardiac WDL WDL       Peripheral/Neurovascular WDL    Peripheral Neurovascular WDL WDL       Cognitive/Neuro/Behavioral WDL    Cognitive/Neuro/Behavioral WDL WDL

## 2023-05-27 NOTE — DISCHARGE INSTRUCTIONS
Your INR is 2.29 which is therapeutic.    Return here if you develop any new symptoms you find concerning.  I do think that your pain will resolve on its own using Tylenol.

## 2023-05-30 ENCOUNTER — DOCUMENTATION ONLY (OUTPATIENT)
Dept: ANTICOAGULATION | Facility: CLINIC | Age: 83
End: 2023-05-30
Payer: COMMERCIAL

## 2023-05-30 DIAGNOSIS — Z79.01 LONG TERM CURRENT USE OF ANTICOAGULANT THERAPY: ICD-10-CM

## 2023-05-30 DIAGNOSIS — I48.21 PERMANENT ATRIAL FIBRILLATION (H): ICD-10-CM

## 2023-05-30 DIAGNOSIS — I48.91 ATRIAL FIBRILLATION, UNSPECIFIED TYPE (H): Primary | ICD-10-CM

## 2023-05-30 NOTE — PROGRESS NOTES
ANTICOAGULATION  MANAGEMENT: Discharge Review    Unique Ward chart reviewed for anticoagulation continuity of care    Emergency room visit on 5/27/23 for closed head injury.    Discharge disposition: Home    Results:    Recent labs: (last 7 days)     05/27/23  0432   INR 2.29*     Anticoagulation inpatient management:     not applicable     Anticoagulation discharge instructions:     Warfarin dosing: home regimen continued   Bridging: No   INR goal change: No      Medication changes affecting anticoagulation: No    Additional factors affecting anticoagulation: No     PLAN     Recommend to check INR on 5/1/23 or 5/2/23    Left a detailed message for Aaliyah   Sent Red Zebrahart message as well    No adjustment to Anticoagulation Calendar was required    Georgia Card RN

## 2023-05-31 ENCOUNTER — MYC MEDICAL ADVICE (OUTPATIENT)
Dept: FAMILY MEDICINE | Facility: CLINIC | Age: 83
End: 2023-05-31
Payer: COMMERCIAL

## 2023-05-31 DIAGNOSIS — T78.49XA OTHER ALLERGY, INITIAL ENCOUNTER: ICD-10-CM

## 2023-06-01 NOTE — TELEPHONE ENCOUNTER
"Requested Prescriptions   Pending Prescriptions Disp Refills    EPINEPHrine (ANY BX GENERIC EQUIV) 0.3 MG/0.3ML injection 2-pack 0.6 mL 0     Sig: Inject 0.3 mLs (0.3 mg) into the muscle once as needed for anaphylaxis       Anaphylaxis Kits Protocol Failed - 6/1/2023 11:12 AM        Failed - Recent (12 mo) or future (30 days) visit witin the authorizing provider's specialty     Patient has had an office visit with the authorizing provider or a provider within the authorizing providers department within the previous 12 mos or has a future within next 30 days. See \"Patient Info\" tab in inbasket, or \"Choose Columns\" in Meds & Orders section of the refill encounter.              Passed - Patient is age 5 or older        Passed - Medication is active on med list             "

## 2023-06-02 ENCOUNTER — ANTICOAGULATION THERAPY VISIT (OUTPATIENT)
Dept: ANTICOAGULATION | Facility: CLINIC | Age: 83
End: 2023-06-02
Payer: COMMERCIAL

## 2023-06-02 DIAGNOSIS — I48.91 ATRIAL FIBRILLATION, UNSPECIFIED TYPE (H): Primary | ICD-10-CM

## 2023-06-02 DIAGNOSIS — Z79.01 LONG TERM CURRENT USE OF ANTICOAGULANT THERAPY: ICD-10-CM

## 2023-06-02 DIAGNOSIS — I48.21 PERMANENT ATRIAL FIBRILLATION (H): ICD-10-CM

## 2023-06-02 LAB — INR HOME MONITORING: 1.9 (ref 2–3)

## 2023-06-02 RX ORDER — EPINEPHRINE 0.3 MG/.3ML
0.3 INJECTION SUBCUTANEOUS
Qty: 0.6 ML | Refills: 0 | OUTPATIENT
Start: 2023-06-02

## 2023-06-02 NOTE — PROGRESS NOTES
ANTICOAGULATION MANAGEMENT     Unique GUZMAN Ed 82 year old female is on warfarin with subtherapeutic INR result. (Goal INR 2.0-3.0)    Recent labs: (last 7 days)     06/02/23  0000   INR 1.9*       ASSESSMENT       Source(s): Chart Review and Patient/Caregiver Call       Warfarin doses taken: Warfarin taken as instructed    Diet: Increased greens/vitamin K in diet; plans to resume previous intake    Medication/supplement changes: None noted    New illness, injury, or hospitalization: Yes: 5/27/23 ED visit for closed head injury after a 125 pound dog stuck the left side of her head when the dog was shaking his head.    Signs or symptoms of bleeding or clotting: Yes: left side of head noted bruising  ~Per patient it is getting better, no pain noted.    Previous result: Therapeutic last 2(+) visits    Additional findings: None         PLAN     Recommended plan for temporary change(s) affecting INR     Dosing Instructions: Continue your current warfarin dose with next INR in 1 week       Summary  As of 6/2/2023    Full warfarin instructions:  5 mg every Tue; 10 mg all other days   Next INR check:  6/9/2023             Telephone call with Aaliyah who verbalizes understanding and agrees to plan    Patient to recheck with home meter    Education provided:     Goal range and lab monitoring: goal range and significance of current result, Importance of therapeutic range and Importance of following up at instructed interval    Dietary considerations: importance of consistent vitamin K intake, impact of vitamin K foods on INR and vitamin K content of foods    Plan made per [facility] anticoagulation protocol    Georgia Aguilar, RN  Anticoagulation Clinic  6/2/2023    _______________________________________________________________________     Anticoagulation Episode Summary     Current INR goal:  2.0-3.0   TTR:  77.3 % (1 y)   Target end date:  Indefinite   Send INR reminders to:  [facility]    Indications    Atrial fibrillation  (H) [I48.91]  Long term current use of anticoagulant therapy [Z79.01]  Atrial fibrillation  unspecified type (H) [I48.91]  Permanent atrial fibrillation (H) [I48.21]           Comments:  [employer] home meter- MBE         Anticoagulation Care Providers     Provider Role Specialty Phone number    Tyler Lee MD Referring Family Medicine [phone]    Araceli Crowley NP Referring Nurse Practitioner - Family [phone]

## 2023-06-09 ENCOUNTER — ANTICOAGULATION THERAPY VISIT (OUTPATIENT)
Dept: ANTICOAGULATION | Facility: CLINIC | Age: 83
End: 2023-06-09
Payer: COMMERCIAL

## 2023-06-09 DIAGNOSIS — Z79.01 LONG TERM CURRENT USE OF ANTICOAGULANT THERAPY: ICD-10-CM

## 2023-06-09 DIAGNOSIS — I48.91 ATRIAL FIBRILLATION, UNSPECIFIED TYPE (H): Primary | ICD-10-CM

## 2023-06-09 DIAGNOSIS — I48.21 PERMANENT ATRIAL FIBRILLATION (H): ICD-10-CM

## 2023-06-09 LAB — INR HOME MONITORING: 2.4 (ref 2–3)

## 2023-06-09 NOTE — PROGRESS NOTES
ANTICOAGULATION MANAGEMENT     Unique Ward 82 year old female is on warfarin with therapeutic INR result. (Goal INR 2.0-3.0)    Recent labs: (last 7 days)     06/09/23  0000   INR 2.4       ASSESSMENT       Source(s): Chart Review and Patient/Caregiver Call       Warfarin doses taken: Warfarin taken as instructed    Diet: No new diet changes identified    Medication/supplement changes: None noted    New illness, injury, or hospitalization: No    Signs or symptoms of bleeding or clotting: No    Previous result: Subtherapeutic    Additional findings: OK to go back to [facility]BE -  Next contact ~ 12 weeks: 9/1/23         PLAN     Recommended plan for no diet, medication or health factor changes affecting INR     Dosing Instructions: Continue your current warfarin dose with next INR in 2 weeks       Summary  As of 6/9/2023    Full warfarin instructions:  5 mg every Tue; 10 mg all other days   Next INR check:  6/23/2023             Telephone call with Aaliyah who verbalizes understanding and agrees to plan    Patient to recheck with home meter    Education provided:     Please call back if any changes to your diet, medications or how you've been taking warfarin    Plan made per [facility] anticoagulation protocol    Neelima Higgins, RN  Anticoagulation Clinic  6/9/2023    _______________________________________________________________________     Anticoagulation Episode Summary     Current INR goal:  2.0-3.0   TTR:  76.9 % (1 y)   Target end date:  Indefinite   Send INR reminders to:  [facility]ITOR[facility]    Indications    Atrial fibrillation (H) [I48.91]  Long term current use of anticoagulant therapy [Z79.01]  Atrial fibrillation  unspecified type (H) [I48.91]  Permanent atrial fibrillation (H) [I48.21]           Comments:  [employer] home meter- MBE         Anticoagulation Care Providers     Provider Role Specialty Phone number    Tyler Lee MD Referring Family Medicine [phone]    Araceli Crowley NP  Referring Nurse Practitioner - Family [phone]

## 2023-06-13 DIAGNOSIS — I48.0 PAROXYSMAL ATRIAL FIBRILLATION (H): ICD-10-CM

## 2023-06-13 RX ORDER — EPINEPHRINE 0.3 MG/.3ML
0.3 INJECTION SUBCUTANEOUS
Qty: 0.6 ML | Refills: 0 | Status: CANCELLED | OUTPATIENT
Start: 2023-06-13

## 2023-06-13 RX ORDER — WARFARIN SODIUM 5 MG/1
TABLET ORAL
Qty: 167 TABLET | Refills: 0 | Status: SHIPPED | OUTPATIENT
Start: 2023-06-13 | End: 2023-10-04

## 2023-06-13 NOTE — TELEPHONE ENCOUNTER
ANTICOAGULATION MANAGEMENT:  Medication Refill    Anticoagulation Summary  As of 6/9/2023    Warfarin maintenance plan:  5 mg (5 mg x 1) every Tue; 10 mg (5 mg x 2) all other days   Next INR check:  6/23/2023   Target end date:  Indefinite    Indications    Atrial fibrillation (H) [I48.91]  Long term current use of anticoagulant therapy [Z79.01]  Atrial fibrillation  unspecified type (H) [I48.91]  Permanent atrial fibrillation (H) [I48.21]             Anticoagulation Care Providers     Provider Role Specialty Phone number    Tyler Lee MD Referring Family Medicine [phone]    Araceli Crowley NP Referring Nurse Practitioner - Family [phone]          Visit with referring provider/group within last year: No, last visit date: 05/03/2022    [facility] referral signed last signed: 04/20/2023; within last year: Yes    Unique does NOT meet all criteria for refill: Visit with referring provider's group was >= 1 year ago. 90 day almaz fill approved; patient notified to schedule with referring provider per [facility] protocol    Ramírez Arredondo RN  Anticoagulation Clinic

## 2023-06-13 NOTE — TELEPHONE ENCOUNTER
"Requested Prescriptions   Pending Prescriptions Disp Refills    warfarin ANTICOAGULANT (COUMADIN) 5 MG tablet [Pharmacy Med Name: Warfarin Sodium 5 MG Oral Tablet] 170 tablet 0     Sig: TAKE 10MG DAILY OR AS DIRECTED BY THE COUMADIN CLINIC       Vitamin K Antagonists Failed - 6/13/2023  9:23 AM        Failed - Recent (12 mo) or future (30 days) visit within the authorizing provider's specialty     Patient has had an office visit with the authorizing provider or a provider within the authorizing providers department within the previous 12 mos or has a future within next 30 days. See \"Patient Info\" tab in inbasket, or \"Choose Columns\" in Meds & Orders section of the refill encounter.              Failed - INR is within goal in the past 6 weeks     Confirm INR is within goal in the past 6 weeks.     Recent Labs   Lab Test 06/09/23  0000   INR 2.4                       Passed - Medication is active on med list        Passed - Patient is 18 years of age or older        Passed - Patient is not pregnant        Passed - No positive pregnancy on file in past 12 months             "

## 2023-06-19 ASSESSMENT — PATIENT HEALTH QUESTIONNAIRE - PHQ9
10. IF YOU CHECKED OFF ANY PROBLEMS, HOW DIFFICULT HAVE THESE PROBLEMS MADE IT FOR YOU TO DO YOUR WORK, TAKE CARE OF THINGS AT HOME, OR GET ALONG WITH OTHER PEOPLE: SOMEWHAT DIFFICULT
SUM OF ALL RESPONSES TO PHQ QUESTIONS 1-9: 5
SUM OF ALL RESPONSES TO PHQ QUESTIONS 1-9: 5

## 2023-06-20 ENCOUNTER — DOCUMENTATION ONLY (OUTPATIENT)
Dept: ANTICOAGULATION | Facility: CLINIC | Age: 83
End: 2023-06-20

## 2023-06-20 ENCOUNTER — OFFICE VISIT (OUTPATIENT)
Dept: FAMILY MEDICINE | Facility: CLINIC | Age: 83
End: 2023-06-20
Payer: COMMERCIAL

## 2023-06-20 VITALS
TEMPERATURE: 97.1 F | BODY MASS INDEX: 21.61 KG/M2 | OXYGEN SATURATION: 100 % | RESPIRATION RATE: 18 BRPM | DIASTOLIC BLOOD PRESSURE: 62 MMHG | SYSTOLIC BLOOD PRESSURE: 118 MMHG | HEIGHT: 65 IN | HEART RATE: 68 BPM | WEIGHT: 129.7 LBS

## 2023-06-20 DIAGNOSIS — R42 DIZZINESS: ICD-10-CM

## 2023-06-20 DIAGNOSIS — Z95.0 PACEMAKER: ICD-10-CM

## 2023-06-20 DIAGNOSIS — G47.00 INSOMNIA, UNSPECIFIED TYPE: ICD-10-CM

## 2023-06-20 DIAGNOSIS — Z79.01 LONG TERM CURRENT USE OF ANTICOAGULANT THERAPY: ICD-10-CM

## 2023-06-20 DIAGNOSIS — H61.23 IMPACTED CERUMEN OF BOTH EARS: ICD-10-CM

## 2023-06-20 DIAGNOSIS — I48.21 PERMANENT ATRIAL FIBRILLATION (H): ICD-10-CM

## 2023-06-20 DIAGNOSIS — E55.9 VITAMIN D DEFICIENCY: ICD-10-CM

## 2023-06-20 DIAGNOSIS — I48.91 ATRIAL FIBRILLATION, UNSPECIFIED TYPE (H): Primary | ICD-10-CM

## 2023-06-20 DIAGNOSIS — T78.49XA OTHER ALLERGY, INITIAL ENCOUNTER: ICD-10-CM

## 2023-06-20 DIAGNOSIS — I10 ESSENTIAL HYPERTENSION: Primary | ICD-10-CM

## 2023-06-20 DIAGNOSIS — I44.2 CHB (COMPLETE HEART BLOCK) (H): ICD-10-CM

## 2023-06-20 DIAGNOSIS — I48.91 ATRIAL FIBRILLATION, UNSPECIFIED TYPE (H): ICD-10-CM

## 2023-06-20 DIAGNOSIS — R73.03 PRE-DIABETES: ICD-10-CM

## 2023-06-20 LAB
ANION GAP SERPL CALCULATED.3IONS-SCNC: 8 MMOL/L (ref 7–15)
BUN SERPL-MCNC: 19 MG/DL (ref 8–23)
CALCIUM SERPL-MCNC: 9.1 MG/DL (ref 8.8–10.2)
CHLORIDE SERPL-SCNC: 93 MMOL/L (ref 98–107)
CHOLEST SERPL-MCNC: 301 MG/DL
CREAT SERPL-MCNC: 0.8 MG/DL (ref 0.51–0.95)
DEPRECATED CALCIDIOL+CALCIFEROL SERPL-MC: 76 UG/L (ref 20–75)
DEPRECATED HCO3 PLAS-SCNC: 28 MMOL/L (ref 22–29)
GFR SERPL CREATININE-BSD FRML MDRD: 73 ML/MIN/1.73M2
GLUCOSE SERPL-MCNC: 108 MG/DL (ref 70–99)
HBA1C MFR BLD: 6 %
HDLC SERPL-MCNC: 104 MG/DL
INR PPP: 2.31 (ref 0.85–1.15)
LDLC SERPL CALC-MCNC: 187 MG/DL
NONHDLC SERPL-MCNC: 197 MG/DL
POTASSIUM SERPL-SCNC: 4.2 MMOL/L (ref 3.4–5.3)
SODIUM SERPL-SCNC: 129 MMOL/L (ref 136–145)
TRIGL SERPL-MCNC: 50 MG/DL

## 2023-06-20 PROCEDURE — 82306 VITAMIN D 25 HYDROXY: CPT | Performed by: STUDENT IN AN ORGANIZED HEALTH CARE EDUCATION/TRAINING PROGRAM

## 2023-06-20 PROCEDURE — 99214 OFFICE O/P EST MOD 30 MIN: CPT | Performed by: STUDENT IN AN ORGANIZED HEALTH CARE EDUCATION/TRAINING PROGRAM

## 2023-06-20 PROCEDURE — 83036 HEMOGLOBIN GLYCOSYLATED A1C: CPT | Performed by: STUDENT IN AN ORGANIZED HEALTH CARE EDUCATION/TRAINING PROGRAM

## 2023-06-20 PROCEDURE — 36415 COLL VENOUS BLD VENIPUNCTURE: CPT | Performed by: STUDENT IN AN ORGANIZED HEALTH CARE EDUCATION/TRAINING PROGRAM

## 2023-06-20 PROCEDURE — 80048 BASIC METABOLIC PNL TOTAL CA: CPT | Performed by: STUDENT IN AN ORGANIZED HEALTH CARE EDUCATION/TRAINING PROGRAM

## 2023-06-20 PROCEDURE — 80061 LIPID PANEL: CPT | Performed by: STUDENT IN AN ORGANIZED HEALTH CARE EDUCATION/TRAINING PROGRAM

## 2023-06-20 PROCEDURE — 85610 PROTHROMBIN TIME: CPT | Performed by: STUDENT IN AN ORGANIZED HEALTH CARE EDUCATION/TRAINING PROGRAM

## 2023-06-20 RX ORDER — EPINEPHRINE 0.3 MG/.3ML
0.3 INJECTION SUBCUTANEOUS
Qty: 0.6 ML | Refills: 0 | Status: SHIPPED | OUTPATIENT
Start: 2023-06-20

## 2023-06-20 RX ORDER — MECLIZINE HYDROCHLORIDE 25 MG/1
25 TABLET ORAL 3 TIMES DAILY PRN
Qty: 21 TABLET | Refills: 0 | Status: SHIPPED | OUTPATIENT
Start: 2023-06-20

## 2023-06-20 ASSESSMENT — PATIENT HEALTH QUESTIONNAIRE - PHQ9
SUM OF ALL RESPONSES TO PHQ QUESTIONS 1-9: 5
10. IF YOU CHECKED OFF ANY PROBLEMS, HOW DIFFICULT HAVE THESE PROBLEMS MADE IT FOR YOU TO DO YOUR WORK, TAKE CARE OF THINGS AT HOME, OR GET ALONG WITH OTHER PEOPLE: SOMEWHAT DIFFICULT

## 2023-06-20 ASSESSMENT — PAIN SCALES - GENERAL: PAINLEVEL: NO PAIN (0)

## 2023-06-20 NOTE — PROGRESS NOTES
Assessment & Plan     Essential hypertension  Well-controlled with amlodipine.  Does note some evening swelling into her legs but not bothersome.  Plan to continue now.  Repeat BMP.  - Hemoglobin A1c  - Basic metabolic panel  (Ca, Cl, CO2, Creat, Gluc, K, Na, BUN)  - Lipid panel reflex to direct LDL Fasting    Insomnia  Sleep hygiene discussed today and she will work on improving sleep habits.    Pre-diabetes  Diet and exercise discussed.  Repeat A1c today.  - Hemoglobin A1c    Long term current use of anticoagulant therapy  Atrial fibrillation, unspecified type (H)  Patient with pacemaker and rate control.  Continues on chronic anticoagulation.  Does home INR monitoring.    CHB (complete heart block) (H)  Pacemaker    Vitamin D deficiency  - Vitamin D Deficiency    Dizziness  - meclizine (ANTIVERT) 25 MG tablet  Dispense: 21 tablet; Refill: 0    Other allergy, initial encounter  - EPINEPHrine (ANY BX GENERIC EQUIV) 0.3 MG/0.3ML injection 2-pack  Dispense: 0.6 mL; Refill: 0    Impacted cerumen of both ears  - [place] REMOVAL IMPACTED CERUMEN IRRIGATION/LVG UNILAT (RN/MA)        Fernando Ogden MD  [facility]   Aaliyah is a 82 year old, presenting for the following health issues:  Recheck Medication (Epi pen)        6/20/2023     7:10 AM   Additional Questions   Roomed by Prabha WONG         6/20/2023     7:10 AM   Patient Reported Additional Medications   Patient reports taking the following new medications zyrtec     History of Present Illness       Reason for visit:  Required to renew meds    She eats 4 or more servings of fruits and vegetables daily.She consumes 0 sweetened beverage(s) daily.She exercises with enough effort to increase her heart rate 9 or less minutes per day.  She exercises with enough effort to increase her heart rate 3 or less days per week.   She is taking medications regularly.    Today's PHQ-9         PHQ-9 Total Score: 5    PHQ-9 Q9 Thoughts of better  "off dead/self-harm past 2 weeks :   Not at all    How difficult have these problems made it for you to do your work, take care of things at home, or get along with other people: Somewhat difficult             Review of Systems   Constitutional, HEENT, cardiovascular, pulmonary, gi and gu systems are negative, except as otherwise noted.      Objective    /62 (BP Location: Right arm, Patient Position: Chair)   Pulse 68   Temp 97.1  F (36.2  C) (Temporal)   Resp 18   Ht 1.657 m (5' 5.25\")   Wt 58.8 kg (129 lb 11.2 oz)   LMP  (LMP Unknown)   SpO2 100%   BMI 21.42 kg/m    Body mass index is 21.42 kg/m .  Physical Exam   GENERAL: healthy, alert and no distress  EYES: Eyes grossly normal to inspection, PERRL and conjunctivae and sclerae normal  HENT: ear canals obstructed bilaterally  NECK: no adenopathy, no asymmetry, masses, or scars and thyroid normal to palpation  RESP: lungs clear to auscultation - no rales, rhonchi or wheezes  CV: regular rate and rhythm, normal S1 S2, no murmur, click or rub, no peripheral edema and peripheral pulses strong  MS: no gross musculoskeletal defects noted  SKIN: no suspicious lesions or rashes  NEURO: Normal strength and tone, mentation intact and speech normal  PSYCH: mentation appears normal, affect normal/bright              "

## 2023-06-20 NOTE — PROGRESS NOTES
ANTICOAGULATION  MANAGEMENT-Home Monitor Managed by Exception    Unique Ward 82 year old female is on warfarin with therapeutic INR result. (Goal INR 2.0-3.0)    Recent labs: (last 7 days)     06/20/23  0823   INR 2.31*         Previous INR was Therapeutic    Medication, diet, health changes since last INR:chart reviewed; none identified    Contacted within the last 12 weeks by phone on 6/9/23      ARIELLE     Unique was NOT contacted regarding therapeutic result today per home monitoring policy manage by exception agreement.   Current warfarin dose is to be continued:     Summary  As of 6/20/2023    Full warfarin instructions:  5 mg every Tue; 10 mg all other days   Next INR check:  7/5/2023           ?   Georgia Card RN  Anticoagulation Clinic  6/20/2023    _______________________________________________________________________     Anticoagulation Episode Summary     Current INR goal:  2.0-3.0   TTR:  76.9 % (1 y)   Target end date:  Indefinite   Send INR reminders to:  [employer] [facility]    Indications    Atrial fibrillation (H) [I48.91]  Long term current use of anticoagulant therapy [Z79.01]  Atrial fibrillation  unspecified type (H) [I48.91]  Permanent atrial fibrillation (H) [I48.21]           Comments:  [employer] home meter- MBE         Anticoagulation Care Providers     Provider Role Specialty Phone number    Tyler Lee MD Referring Family Medicine [phone]    Araceli Crowley NP Referring Nurse Practitioner - Family [phone]

## 2023-06-29 ENCOUNTER — ANCILLARY PROCEDURE (OUTPATIENT)
Dept: CARDIOLOGY | Facility: CLINIC | Age: 83
End: 2023-06-29
Attending: INTERNAL MEDICINE
Payer: COMMERCIAL

## 2023-06-29 DIAGNOSIS — Z95.0 CARDIAC PACEMAKER IN SITU: ICD-10-CM

## 2023-06-29 DIAGNOSIS — Z95.0 CARDIAC PACEMAKER IN SITU: Primary | ICD-10-CM

## 2023-06-29 DIAGNOSIS — I44.2 CHB (COMPLETE HEART BLOCK) (H): ICD-10-CM

## 2023-06-29 PROCEDURE — 93296 REM INTERROG EVL PM/IDS: CPT | Performed by: INTERNAL MEDICINE

## 2023-06-29 PROCEDURE — 93294 REM INTERROG EVL PM/LDLS PM: CPT | Performed by: INTERNAL MEDICINE

## 2023-07-05 ENCOUNTER — DOCUMENTATION ONLY (OUTPATIENT)
Dept: ANTICOAGULATION | Facility: CLINIC | Age: 83
End: 2023-07-05
Payer: COMMERCIAL

## 2023-07-05 DIAGNOSIS — Z79.01 LONG TERM CURRENT USE OF ANTICOAGULANT THERAPY: Primary | ICD-10-CM

## 2023-07-05 DIAGNOSIS — I48.21 PERMANENT ATRIAL FIBRILLATION (H): ICD-10-CM

## 2023-07-05 DIAGNOSIS — I48.91 ATRIAL FIBRILLATION, UNSPECIFIED TYPE (H): ICD-10-CM

## 2023-07-05 LAB — INR HOME MONITORING: 3 (ref 2–3)

## 2023-07-05 NOTE — PROGRESS NOTES
ANTICOAGULATION  MANAGEMENT-Home Monitor Managed by Exception    Unique Ward 82 year old female is on warfarin with therapeutic INR result. (Goal INR 2.0-3.0)    Recent labs: (last 7 days)     07/05/23  0000   INR 3.0         Previous INR was Therapeutic    Medication, diet, health changes since last INR:chart reviewed; none identified    Contacted within the last 12 weeks by phone on 6/9/23      ARIELLE     Unique was NOT contacted regarding therapeutic result today per home monitoring policy manage by exception agreement.   Current warfarin dose is to be continued:     Summary  As of 7/5/2023    Full warfarin instructions:  5 mg every Tue; 10 mg all other days   Next INR check:  7/19/2023           ?   Trisha Lo, RN  Anticoagulation Clinic  7/5/2023    _______________________________________________________________________     Anticoagulation Episode Summary     Current INR goal:  2.0-3.0   TTR:  76.9 % (1 y)   Target end date:  Indefinite   Send INR reminders to:  [employer] [facility]    Indications    Atrial fibrillation (H) [I48.91]  Long term current use of anticoagulant therapy [Z79.01]  Atrial fibrillation  unspecified type (H) [I48.91]  Permanent atrial fibrillation (H) [I48.21]           Comments:  [employer] home meter- MBE         Anticoagulation Care Providers     Provider Role Specialty Phone number    Tyler Lee MD Referring Family Medicine [phone]    Araceli Crowley NP Referring Nurse Practitioner - Family [phone]

## 2023-07-19 ENCOUNTER — ANTICOAGULATION THERAPY VISIT (OUTPATIENT)
Dept: ANTICOAGULATION | Facility: CLINIC | Age: 83
End: 2023-07-19
Payer: COMMERCIAL

## 2023-07-19 DIAGNOSIS — I48.21 PERMANENT ATRIAL FIBRILLATION (H): ICD-10-CM

## 2023-07-19 DIAGNOSIS — Z79.01 LONG TERM CURRENT USE OF ANTICOAGULANT THERAPY: ICD-10-CM

## 2023-07-19 DIAGNOSIS — I48.91 ATRIAL FIBRILLATION, UNSPECIFIED TYPE (H): Primary | ICD-10-CM

## 2023-07-19 LAB — INR HOME MONITORING: 3.1 (ref 2–3)

## 2023-07-19 NOTE — PROGRESS NOTES
ANTICOAGULATION MANAGEMENT     Unique Ward 83 year old female is on warfarin with supratherapeutic INR result. (Goal INR 2.0-3.0)    Recent labs: (last 7 days)     07/19/23  0000   INR 3.1*       ASSESSMENT       Source(s): Chart Review and Patient/Caregiver Call       Warfarin doses taken: Warfarin taken as instructed    Diet: Decreased greens/vitamin K in diet; plans to resume previous intake - patient will eat greens tonight as well     Medication/supplement changes: None noted    New illness, injury, or hospitalization: No    Signs or symptoms of bleeding or clotting: No    Previous result: Therapeutic last 2(+) visits    Additional findings: None         PLAN     Recommended plan for temporary change(s) affecting INR     Dosing Instructions: Continue your current warfarin dose with next INR in 2 weeks       Summary  As of 7/19/2023    Full warfarin instructions:  5 mg every Tue; 10 mg all other days   Next INR check:  8/2/2023             Telephone call with Aaliyah who verbalizes understanding and agrees to plan and who agrees to plan and repeated back plan correctly    Patient to recheck with home meter    Education provided:     Contact [phone]  with any changes, questions or concerns.     Plan made per [facility] anticoagulation protocol    Valery Ramirez RN  Anticoagulation Clinic  7/19/2023    _______________________________________________________________________     Anticoagulation Episode Summary     Current INR goal:  2.0-3.0   TTR:  73.1 % (1 y)   Target end date:  Indefinite   Send INR reminders to:  [employer] [facility]    Indications    Atrial fibrillation (H) [I48.91]  Long term current use of anticoagulant therapy [Z79.01]  Atrial fibrillation  unspecified type (H) [I48.91]  Permanent atrial fibrillation (H) [I48.21]           Comments:  [employer] home meter- MBE         Anticoagulation Care Providers     Provider Role Specialty Phone number    Tyler Lee MD Referring Family Medicine  [phone]    Araceli Crowley NP Referring Nurse Practitioner - Family [phone]

## 2023-07-20 ENCOUNTER — TELEPHONE (OUTPATIENT)
Dept: ANTICOAGULATION | Facility: CLINIC | Age: 83
End: 2023-07-20
Payer: COMMERCIAL

## 2023-07-21 ENCOUNTER — TELEPHONE (OUTPATIENT)
Dept: FAMILY MEDICINE | Facility: CLINIC | Age: 83
End: 2023-07-21
Payer: COMMERCIAL

## 2023-07-21 NOTE — TELEPHONE ENCOUNTER
Please call patient to get scheduled for annual wellness and follow up has been more than a year.  Araceli Crowley, CNP

## 2023-07-22 LAB
MDC_IDC_EPISODE_DTM: NORMAL
MDC_IDC_EPISODE_DURATION: 1 S
MDC_IDC_EPISODE_DURATION: 16 S
MDC_IDC_EPISODE_DURATION: 179 S
MDC_IDC_EPISODE_DURATION: 181 S
MDC_IDC_EPISODE_DURATION: 183 S
MDC_IDC_EPISODE_DURATION: 225 S
MDC_IDC_EPISODE_DURATION: 235 S
MDC_IDC_EPISODE_DURATION: 3 S
MDC_IDC_EPISODE_DURATION: 33 S
MDC_IDC_EPISODE_DURATION: 33 S
MDC_IDC_EPISODE_DURATION: 36 S
MDC_IDC_EPISODE_DURATION: 39 S
MDC_IDC_EPISODE_DURATION: 5 S
MDC_IDC_EPISODE_DURATION: 63 S
MDC_IDC_EPISODE_DURATION: 73 S
MDC_IDC_EPISODE_DURATION: 75 S
MDC_IDC_EPISODE_ID: 231
MDC_IDC_EPISODE_ID: 232
MDC_IDC_EPISODE_ID: 233
MDC_IDC_EPISODE_ID: 234
MDC_IDC_EPISODE_ID: 235
MDC_IDC_EPISODE_ID: 236
MDC_IDC_EPISODE_ID: 237
MDC_IDC_EPISODE_ID: 238
MDC_IDC_EPISODE_ID: 239
MDC_IDC_EPISODE_ID: 240
MDC_IDC_EPISODE_ID: 241
MDC_IDC_EPISODE_ID: 242
MDC_IDC_EPISODE_ID: 243
MDC_IDC_EPISODE_ID: 244
MDC_IDC_EPISODE_ID: 245
MDC_IDC_EPISODE_ID: 246
MDC_IDC_EPISODE_ID: 247
MDC_IDC_EPISODE_ID: 248
MDC_IDC_EPISODE_ID: 249
MDC_IDC_EPISODE_ID: 250
MDC_IDC_EPISODE_ID: 251
MDC_IDC_EPISODE_ID: 252
MDC_IDC_EPISODE_ID: 253
MDC_IDC_EPISODE_ID: 254
MDC_IDC_EPISODE_TYPE: NORMAL
MDC_IDC_LEAD_IMPLANT_DT: NORMAL
MDC_IDC_LEAD_IMPLANT_DT: NORMAL
MDC_IDC_LEAD_LOCATION: NORMAL
MDC_IDC_LEAD_LOCATION: NORMAL
MDC_IDC_LEAD_LOCATION_DETAIL_1: NORMAL
MDC_IDC_LEAD_LOCATION_DETAIL_1: NORMAL
MDC_IDC_LEAD_MFG: NORMAL
MDC_IDC_LEAD_MFG: NORMAL
MDC_IDC_LEAD_MODEL: NORMAL
MDC_IDC_LEAD_MODEL: NORMAL
MDC_IDC_LEAD_POLARITY_TYPE: NORMAL
MDC_IDC_LEAD_POLARITY_TYPE: NORMAL
MDC_IDC_LEAD_SERIAL: NORMAL
MDC_IDC_LEAD_SERIAL: NORMAL
MDC_IDC_MSMT_BATTERY_DTM: NORMAL
MDC_IDC_MSMT_BATTERY_REMAINING_LONGEVITY: 103 MO
MDC_IDC_MSMT_BATTERY_RRT_TRIGGER: 2.62
MDC_IDC_MSMT_BATTERY_STATUS: NORMAL
MDC_IDC_MSMT_BATTERY_VOLTAGE: 2.99 V
MDC_IDC_MSMT_LEADCHNL_RA_IMPEDANCE_VALUE: 323 OHM
MDC_IDC_MSMT_LEADCHNL_RA_IMPEDANCE_VALUE: 361 OHM
MDC_IDC_MSMT_LEADCHNL_RA_PACING_THRESHOLD_AMPLITUDE: 0.75 V
MDC_IDC_MSMT_LEADCHNL_RA_PACING_THRESHOLD_PULSEWIDTH: 0.4 MS
MDC_IDC_MSMT_LEADCHNL_RA_SENSING_INTR_AMPL: 0.5 MV
MDC_IDC_MSMT_LEADCHNL_RA_SENSING_INTR_AMPL: 0.5 MV
MDC_IDC_MSMT_LEADCHNL_RV_IMPEDANCE_VALUE: 380 OHM
MDC_IDC_MSMT_LEADCHNL_RV_IMPEDANCE_VALUE: 456 OHM
MDC_IDC_MSMT_LEADCHNL_RV_PACING_THRESHOLD_AMPLITUDE: 0.88 V
MDC_IDC_MSMT_LEADCHNL_RV_PACING_THRESHOLD_PULSEWIDTH: 0.4 MS
MDC_IDC_MSMT_LEADCHNL_RV_SENSING_INTR_AMPL: 7.88 MV
MDC_IDC_MSMT_LEADCHNL_RV_SENSING_INTR_AMPL: 7.88 MV
MDC_IDC_PG_IMPLANT_DTM: NORMAL
MDC_IDC_PG_MFG: NORMAL
MDC_IDC_PG_MODEL: NORMAL
MDC_IDC_PG_SERIAL: NORMAL
MDC_IDC_PG_TYPE: NORMAL
MDC_IDC_SESS_CLINIC_NAME: NORMAL
MDC_IDC_SESS_DTM: NORMAL
MDC_IDC_SESS_TYPE: NORMAL
MDC_IDC_SET_BRADY_AT_MODE_SWITCH_RATE: 171 {BEATS}/MIN
MDC_IDC_SET_BRADY_HYSTRATE: NORMAL
MDC_IDC_SET_BRADY_LOWRATE: 60 {BEATS}/MIN
MDC_IDC_SET_BRADY_MAX_SENSOR_RATE: 130 {BEATS}/MIN
MDC_IDC_SET_BRADY_MAX_TRACKING_RATE: 130 {BEATS}/MIN
MDC_IDC_SET_BRADY_MODE: NORMAL
MDC_IDC_SET_BRADY_PAV_DELAY_HIGH: 140 MS
MDC_IDC_SET_BRADY_PAV_DELAY_LOW: 300 MS
MDC_IDC_SET_BRADY_SAV_DELAY_HIGH: 110 MS
MDC_IDC_SET_BRADY_SAV_DELAY_LOW: 230 MS
MDC_IDC_SET_LEADCHNL_RA_PACING_AMPLITUDE: 1.5 V
MDC_IDC_SET_LEADCHNL_RA_PACING_ANODE_ELECTRODE_1: NORMAL
MDC_IDC_SET_LEADCHNL_RA_PACING_ANODE_LOCATION_1: NORMAL
MDC_IDC_SET_LEADCHNL_RA_PACING_CAPTURE_MODE: NORMAL
MDC_IDC_SET_LEADCHNL_RA_PACING_CATHODE_ELECTRODE_1: NORMAL
MDC_IDC_SET_LEADCHNL_RA_PACING_CATHODE_LOCATION_1: NORMAL
MDC_IDC_SET_LEADCHNL_RA_PACING_POLARITY: NORMAL
MDC_IDC_SET_LEADCHNL_RA_PACING_PULSEWIDTH: 0.4 MS
MDC_IDC_SET_LEADCHNL_RA_SENSING_ANODE_ELECTRODE_1: NORMAL
MDC_IDC_SET_LEADCHNL_RA_SENSING_ANODE_LOCATION_1: NORMAL
MDC_IDC_SET_LEADCHNL_RA_SENSING_CATHODE_ELECTRODE_1: NORMAL
MDC_IDC_SET_LEADCHNL_RA_SENSING_CATHODE_LOCATION_1: NORMAL
MDC_IDC_SET_LEADCHNL_RA_SENSING_POLARITY: NORMAL
MDC_IDC_SET_LEADCHNL_RA_SENSING_SENSITIVITY: 0.3 MV
MDC_IDC_SET_LEADCHNL_RV_PACING_AMPLITUDE: 2 V
MDC_IDC_SET_LEADCHNL_RV_PACING_ANODE_ELECTRODE_1: NORMAL
MDC_IDC_SET_LEADCHNL_RV_PACING_ANODE_LOCATION_1: NORMAL
MDC_IDC_SET_LEADCHNL_RV_PACING_CAPTURE_MODE: NORMAL
MDC_IDC_SET_LEADCHNL_RV_PACING_CATHODE_ELECTRODE_1: NORMAL
MDC_IDC_SET_LEADCHNL_RV_PACING_CATHODE_LOCATION_1: NORMAL
MDC_IDC_SET_LEADCHNL_RV_PACING_POLARITY: NORMAL
MDC_IDC_SET_LEADCHNL_RV_PACING_PULSEWIDTH: 0.4 MS
MDC_IDC_SET_LEADCHNL_RV_SENSING_ANODE_ELECTRODE_1: NORMAL
MDC_IDC_SET_LEADCHNL_RV_SENSING_ANODE_LOCATION_1: NORMAL
MDC_IDC_SET_LEADCHNL_RV_SENSING_CATHODE_ELECTRODE_1: NORMAL
MDC_IDC_SET_LEADCHNL_RV_SENSING_CATHODE_LOCATION_1: NORMAL
MDC_IDC_SET_LEADCHNL_RV_SENSING_POLARITY: NORMAL
MDC_IDC_SET_LEADCHNL_RV_SENSING_SENSITIVITY: 0.9 MV
MDC_IDC_SET_ZONE_DETECTION_INTERVAL: 350 MS
MDC_IDC_SET_ZONE_DETECTION_INTERVAL: 400 MS
MDC_IDC_SET_ZONE_TYPE: NORMAL
MDC_IDC_STAT_AT_BURDEN_PERCENT: 0 %
MDC_IDC_STAT_AT_DTM_END: NORMAL
MDC_IDC_STAT_AT_DTM_START: NORMAL
MDC_IDC_STAT_BRADY_AP_VP_PERCENT: 72.91 %
MDC_IDC_STAT_BRADY_AP_VS_PERCENT: 0.06 %
MDC_IDC_STAT_BRADY_AS_VP_PERCENT: 26.79 %
MDC_IDC_STAT_BRADY_AS_VS_PERCENT: 0.24 %
MDC_IDC_STAT_BRADY_DTM_END: NORMAL
MDC_IDC_STAT_BRADY_DTM_START: NORMAL
MDC_IDC_STAT_BRADY_RA_PERCENT_PACED: 71.69 %
MDC_IDC_STAT_BRADY_RV_PERCENT_PACED: 99.7 %
MDC_IDC_STAT_EPISODE_RECENT_COUNT: 0
MDC_IDC_STAT_EPISODE_RECENT_COUNT: 1
MDC_IDC_STAT_EPISODE_RECENT_COUNT: 23
MDC_IDC_STAT_EPISODE_RECENT_COUNT_DTM_END: NORMAL
MDC_IDC_STAT_EPISODE_RECENT_COUNT_DTM_START: NORMAL
MDC_IDC_STAT_EPISODE_TOTAL_COUNT: 0
MDC_IDC_STAT_EPISODE_TOTAL_COUNT: 250
MDC_IDC_STAT_EPISODE_TOTAL_COUNT: 4
MDC_IDC_STAT_EPISODE_TOTAL_COUNT_DTM_END: NORMAL
MDC_IDC_STAT_EPISODE_TOTAL_COUNT_DTM_START: NORMAL
MDC_IDC_STAT_EPISODE_TYPE: NORMAL

## 2023-07-24 NOTE — TELEPHONE ENCOUNTER
Left message to call back.  Let her know we have not received a prescription request from [facility].  Have them fax it to [phone].

## 2023-07-28 ENCOUNTER — TELEPHONE (OUTPATIENT)
Dept: FAMILY MEDICINE | Facility: CLINIC | Age: 83
End: 2023-07-28
Payer: COMMERCIAL

## 2023-07-28 NOTE — TELEPHONE ENCOUNTER
Pt came into clinic, she received a letter from [employer] and it states she needs a complete prescription for her at home INR monitoring partner.   Copy of letter is in an interoffice envelope for .     Please call patient when this is taken care of, ok to leave a message.     THanks.

## 2023-08-01 NOTE — TELEPHONE ENCOUNTER
Anticoagulation Management    Updated home monitor orders needed due to Q 1 week frequency required by insurance    Current home monitor company: [employer]    [facility] Referring provider: Araceli Crowley    Referring provider's Clinic Fax number (except [employer]): [phone]    Request made by: Home monitor company    Received a fax from [employer] requesting updated order due to needing weekly testing.    Ning Forman RN    
New Electronic order indicating weekly INR testing (per insurance) has been sent to provider for signature   
Dentalgia

## 2023-08-02 ENCOUNTER — HOSPITAL ENCOUNTER (EMERGENCY)
Facility: CLINIC | Age: 83
Discharge: HOME | End: 2023-08-02
Attending: FAMILY MEDICINE | Admitting: FAMILY MEDICINE
Payer: MEDICARE

## 2023-08-02 ENCOUNTER — ANTICOAGULATION THERAPY VISIT (OUTPATIENT)
Dept: ANTICOAGULATION | Facility: CLINIC | Age: 83
End: 2023-08-02
Payer: COMMERCIAL

## 2023-08-02 VITALS
SYSTOLIC BLOOD PRESSURE: 145 MMHG | TEMPERATURE: 97.7 F | RESPIRATION RATE: 18 BRPM | HEART RATE: 94 BPM | DIASTOLIC BLOOD PRESSURE: 74 MMHG | OXYGEN SATURATION: 99 %

## 2023-08-02 DIAGNOSIS — I48.91 ATRIAL FIBRILLATION, UNSPECIFIED TYPE (H): ICD-10-CM

## 2023-08-02 DIAGNOSIS — N30.00 ACUTE CYSTITIS WITHOUT HEMATURIA: ICD-10-CM

## 2023-08-02 DIAGNOSIS — I48.91 ATRIAL FIBRILLATION (H): Primary | ICD-10-CM

## 2023-08-02 DIAGNOSIS — I48.21 PERMANENT ATRIAL FIBRILLATION (H): ICD-10-CM

## 2023-08-02 DIAGNOSIS — Z79.01 LONG TERM CURRENT USE OF ANTICOAGULANT THERAPY: ICD-10-CM

## 2023-08-02 LAB
ALBUMIN UR-MCNC: NEGATIVE MG/DL
APPEARANCE UR: ABNORMAL
BILIRUB UR QL STRIP: NEGATIVE
COLOR UR AUTO: YELLOW
GLUCOSE UR STRIP-MCNC: NEGATIVE MG/DL
HGB UR QL STRIP: NEGATIVE
INR HOME MONITORING: 3.4 (ref 2–3)
KETONES UR STRIP-MCNC: NEGATIVE MG/DL
LEUKOCYTE ESTERASE UR QL STRIP: ABNORMAL
MUCOUS THREADS #/AREA URNS LPF: PRESENT /LPF
NITRATE UR QL: NEGATIVE
PH UR STRIP: 6 [PH] (ref 5–7)
RBC URINE: 0 /HPF
SP GR UR STRIP: 1.02 (ref 1–1.03)
UROBILINOGEN UR STRIP-MCNC: NORMAL MG/DL
WBC CLUMPS #/AREA URNS HPF: PRESENT /HPF
WBC URINE: >182 /HPF

## 2023-08-02 PROCEDURE — 99283 EMERGENCY DEPT VISIT LOW MDM: CPT | Performed by: FAMILY MEDICINE

## 2023-08-02 PROCEDURE — 87086 URINE CULTURE/COLONY COUNT: CPT | Performed by: FAMILY MEDICINE

## 2023-08-02 PROCEDURE — 81001 URINALYSIS AUTO W/SCOPE: CPT | Performed by: FAMILY MEDICINE

## 2023-08-02 RX ORDER — NITROFURANTOIN 25; 75 MG/1; MG/1
100 CAPSULE ORAL 2 TIMES DAILY
Qty: 14 CAPSULE | Refills: 0 | Status: SHIPPED | OUTPATIENT
Start: 2023-08-02 | End: 2023-08-29

## 2023-08-02 NOTE — TELEPHONE ENCOUNTER
[facility]  InternalMedicine Residency Program      SUBJECTIVE:  CC: had concerns including Check-Up (off work since November needs return to work form filled out works at [employer] off due to numbness hands /ft but has degenerative disc disease and severe pain to inside  of legs). HPI:Ora Ambrose presented to the clinic for a routine visit. Patient presents for evaluation for clearance for work. She was in the hospital for AMS on 11/24/19 to 11/27/19 and then she was in rehab from 11/27/19 to 12/12/19. Patient was initially cleared to return to work on 12/16 but she was not aware. Workplace requires more information on why she was not able to work. Patient works as a  in [employer], which requires ringing up items, bagging groceries, shelving small items, standing for a while. She has completed her home PT/OT. Patient is eager to go back to work. Patient is currently not using any assistive devices to ambulate. Was using a walker initially after being discharged from the hospital. Patient states she would be able to walk more than 2 blocks. Patient has periodic numbness and tingling in fingertips and feet bilaterally, but this occurs maybe once a day, much less than before. Denies any back pain. Pain in her legs has resolved. Review of Systems   Constitutional: Negative for chills and fever. HENT: Negative for congestion. Respiratory: Negative for shortness of breath. Cardiovascular: Negative for chest pain and leg swelling. Gastrointestinal: Negative for abdominal pain. Musculoskeletal: Negative for back pain and gait problem. Neurological: Positive for numbness. Negative for dizziness, weakness and headaches.        Current Outpatient Medications on File Prior to Visit   Medication Sig Dispense Refill    vitamin B-6 (B-6) 50 MG tablet Take 1 tablet by mouth daily 30 tablet 3    lisinopril (PRINIVIL;ZESTRIL) 10 MG tablet Take 1 tablet by This has been faxed.    mouth daily 30 tablet 3    atorvastatin (LIPITOR) 40 MG tablet Take 1 tablet by mouth nightly 30 tablet 3    amLODIPine (NORVASC) 2.5 MG tablet Take 1 tablet by mouth daily 30 tablet 3    Misc. Devices MISC Jermaine hose for bilateral leg swelling. 1 Device 0     No current facility-administered medications on file prior to visit. OBJECTIVE:    VS: /70   Pulse 72   Temp 97.5 °F (36.4 °C) (Oral)   Resp 18   Ht 5' 2\" (1.575 m)   Wt 162 lb 14.4 oz (73.9 kg)   BMI 29.79 kg/m²     Physical Exam  General appearance: Awake, alert, oriented, no distress  HEENT: pink palpebral conjunctivae, no lymphadenopathy, moist mucous membranes  Heart: S1 S2  Regular rate and rhythm. No rub, murmur or gallop  Lungs: Lungs clear to auscultation bilaterally. No rhonchi, crackles or wheezes  Abdomen: Abdomen soft, non-tender. non-distended BS normal. No masses, organomegaly, no guarding rebound or rigidity. Extremities: No edema, Radial and pedal pulses 2+ bilaterally. 5/5 strength in upper and lower extremities bilaterally. No sensory deficits. Reflexes 2+ bilaterally. Normal gait. ASSESSMENT/PLAN:  [place] was seen today for check-up. Diagnoses and all orders for this visit:    Essential hypertension  -     lisinopril (PRINIVIL;ZESTRIL) 10 MG tablet; Take 1 tablet by mouth daily  -     amLODIPine (NORVASC) 2.5 MG tablet; Take 1 tablet by mouth daily    Vitamin B6 deficiency  -     pyridoxine (B-6) 50 MG tablet; Take 1 tablet by mouth daily    Hyperlipidemia, unspecified hyperlipidemia type  -     atorvastatin (LIPITOR) 40 MG tablet; Take 1 tablet by mouth nightly    Intermittent Numbness and Tingling in B/L fingertips and feet  Possibly 2/2 hyperventilation 2/2 panic attacks      I have reviewed all pertinent PMHx, PSHx, FamHx, Social Hx, medications, andallergies and updated history as appropriate.       RTC:     I have reviewed my findings andrecommendations with Nel Vora and attending

## 2023-08-03 ENCOUNTER — DOCUMENTATION ONLY (OUTPATIENT)
Dept: ANTICOAGULATION | Facility: CLINIC | Age: 83
End: 2023-08-03
Payer: COMMERCIAL

## 2023-08-03 DIAGNOSIS — I48.91 ATRIAL FIBRILLATION, UNSPECIFIED TYPE (H): Primary | ICD-10-CM

## 2023-08-03 DIAGNOSIS — I48.21 PERMANENT ATRIAL FIBRILLATION (H): ICD-10-CM

## 2023-08-03 DIAGNOSIS — Z79.01 LONG TERM CURRENT USE OF ANTICOAGULANT THERAPY: ICD-10-CM

## 2023-08-03 ASSESSMENT — ENCOUNTER SYMPTOMS
FREQUENCY: 1
FEVER: 0

## 2023-08-03 NOTE — ED PROVIDER NOTES
History     Chief Complaint   Patient presents with    UTI     HPI  Unique Ward is a 83 year old female who presented to the emergency room today secondary concerns of possible urinary tract infection.  Patient's had some increased frequency and also noted cloudiness of her urine which is typically a sign for urinary infection in the past.  She denies any fever or chills.  She denies any significant pain with urination or burning.  She denies any changes to her stools.  Patient states that she has had multiple prior urinary infections and usually does best if she takes Macrobid which she tolerates well and does not interact with her warfarin medication.    Allergies:  Allergies   Allergen Reactions    Keflex [Cephalexin] Anaphylaxis    No Clinical Screening - See Comments      Eye drop antibiotic.    Codeine Other (See Comments)     Comment: , Description:     Gentamicin Hives     Per Unique this medication gives hives on arms and legs. Bev Lo LSXO,  ....................  7/15/2014   1:15 PM    Influenza Vaccines Other (See Comments)     Comment: , Description:     Influenza Virus Vaccine H5n1     Mct Oil [Medium Chain Triglycerides]     Paxil [Paroxetine] Other (See Comments)     Comment: , Description:     Typhoid Vaccine, Live Other (See Comments)     Comment: , Description:   Comment: , Description:     Typhoid Vaccines        Problem List:    Patient Active Problem List    Diagnosis Date Noted    Permanent atrial fibrillation (H) 05/16/2022     Priority: Medium    Long term current use of anticoagulant therapy 06/30/2020     Priority: Medium    Atrial fibrillation, unspecified type (H) 06/30/2020     Priority: Medium    CHB (complete heart block) (H) 03/31/2020     Priority: Medium     Added automatically from request for surgery [id number]      Pacemaker 03/31/2020     Priority: Medium    Atrial fibrillation (H) 06/07/2019     Priority: Medium    History of basal cell carcinoma- face and scalp  02/28/2017     Priority: Medium    Fibromyalgia 02/28/2017     Priority: Medium    Advance Care Planning 01/06/2017     Priority: Medium     Advance Care Planning 5/4/2017: ACP Facilitation Session:    Unique GUZMAN Ed presented for ACP Facilitation session at a group class. She was accompanied by daughter-in-law. [employer] Cho[employer] information provided and resources reviewed. She wishes to give additional consideration to ACP.  She currently has the following questions or concerns about Advance Care Planning: none known. Follow-up meeting: not needed/applicable. Added by Unique Uribe RN, Advance Care Planning Liaison.  Advance Care Planning 1/6/17: Info given   Saray Greer MA        Personal history of urinary tract infection 06/18/2016     Priority: Medium    Essential hypertension 03/23/2016     Priority: Medium    Vitamin D deficiency 03/30/2013     Priority: Medium    Dysthymia 04/01/2005     Priority: Medium        Past Medical History:    Past Medical History:   Diagnosis Date    Arthritis 01.01.1980    Atrial fibrillation (H) 6/7/2019    Cancer (H) 01.01.1993     scalp    Depressive disorder 01.01.1977    FH: melanoma- son 2/28/2017    Hypertension 03.16.2016       Past Surgical History:    Past Surgical History:   Procedure Laterality Date    BIOPSY  11/01/2014    COLONOSCOPY  01.01/1979    Two routine,  one for chronic diarrhea    EP PACEMAKER N/A 3/31/2020    Procedure: EP Pacemaker;  Surgeon: Erich Clark MD;  Location:  [facility]ATH [facility]    EYE SURGERY  1/1/2014    first    cataracts both eyes.  second a few months later    GENITOURINARY SURGERY      GYN SURGERY  01.01.1977    TL    HEAD & NECK SURGERY  01.01.1997    basal cell X2   Moh's on scalp.  other on bridge of nose    LASER YAG CAPSULOTOMY Bilateral 4/1/2021    Procedure: CAPSULOTOMY, LENS, USING YAG LASER;  Surgeon: Bernardino Chu MD;  Location:  OR    SOFT TISSUE SURGERY  01.01.1990    Ganglion Cyst   Left inner wrist        Family History:    Family History   Problem Relation Age of Onset    Diabetes Maternal Grandmother         Na    Coronary Artery Disease Sister         error    Coronary Artery Disease Brother         error    Hypertension Brother     Hyperlipidemia Brother     Cerebrovascular Disease Brother     Hypertension Sister     Hypertension Sister     Hyperlipidemia Sister     Other Cancer Sister         cervical    Breast Cancer Sister     Other Cancer Sister         kidney    Mental Illness Mother         paranoid/schizophrenic/suicide    Mental Illness Sister         ???  Had shock treatments    Hyperlipidemia Sister     Other Cancer Sister         Kidney       Social History:  Marital Status:  Legally  [3]  Social History     Tobacco Use    Smoking status: Former     Packs/day: 0.75     Years: 10.00     Pack years: 7.50     Types: Cigarettes     Start date: 1955     Quit date: 1965     Years since quittin.6    Smokeless tobacco: Never   Vaping Use    Vaping Use: Never used   Substance Use Topics    Alcohol use: No     Alcohol/week: 0.0 standard drinks of alcohol    Drug use: No        Medications:    amLODIPine (NORVASC) 10 MG tablet  cholecalciferol (VITAMIN D3) 5000 units TABS tablet  COMPRESSION STOCKINGS  EPINEPHrine (ANY BX GENERIC EQUIV) 0.3 MG/0.3ML injection 2-pack  fish oil-omega-3 fatty acids 1000 MG capsule  meclizine (ANTIVERT) 25 MG tablet  warfarin ANTICOAGULANT (COUMADIN) 5 MG tablet  Zinc Sulfate (ZINC 15 PO)          Review of Systems   Constitutional:  Negative for fever.   Genitourinary:  Positive for frequency.   All other systems reviewed and are negative.      Physical Exam   BP: (!) 152/97  Pulse: 94  Temp: 97.7  F (36.5  C)  Resp: 18  SpO2: 99 %      Physical Exam  Vitals and nursing note reviewed.   Constitutional:       General: She is not in acute distress.  HENT:      Head: Atraumatic.   Eyes:      Conjunctiva/sclera: Conjunctivae normal.   Cardiovascular:       Rate and Rhythm: Normal rate.   Pulmonary:      Effort: Pulmonary effort is normal. No respiratory distress.   Skin:     Coloration: Skin is not jaundiced.   Neurological:      Mental Status: She is alert and oriented to person, place, and time.   Psychiatric:         Behavior: Behavior normal.         ED Course                 Procedures              Critical Care time:  none               Results for orders placed or performed during the hospital encounter of 08/02/23 (from the past 24 hour(s))   UA with Microscopic reflex to Culture    Specimen: Urine, Clean Catch   Result Value Ref Range    Color Urine Yellow Colorless, Straw, Light Yellow, Yellow    Appearance Urine Cloudy (A) Clear    Glucose Urine Negative Negative mg/dL    Bilirubin Urine Negative Negative    Ketones Urine Negative Negative mg/dL    Specific Gravity Urine 1.016 1.003 - 1.035    Blood Urine Negative Negative    pH Urine 6.0 5.0 - 7.0    Protein Albumin Urine Negative Negative mg/dL    Urobilinogen Urine Normal Normal, 2.0 mg/dL    Nitrite Urine Negative Negative    Leukocyte Esterase Urine Large (A) Negative    WBC Clumps Urine Present (A) None Seen /HPF    Mucus Urine Present (A) None Seen /LPF    RBC Urine 0 <=2 /HPF    WBC Urine >182 (H) <=5 /HPF    Narrative    Urine Culture ordered based on laboratory criteria       Medications - No data to display    Assessments & Plan (with Medical Decision Making)  Patient with history and exam findings of recurrent urinary tract infection.  Patient initiated on antibiotic in the form of the Macrobid twice a day for 7 days.  To return if any increase or worsening of symptoms as directed on the handouts I sent home with her today.  Encourage follow-up in our clinic to follow-up on the culture results and to make sure the urine infection has cleared.     I have reviewed the nursing notes.    I have reviewed the findings, diagnosis, plan and need for follow up with the patient.           Medical Decision  Making  The patient's presentation was of low complexity (an acute and uncomplicated illness or injury).    The patient's evaluation involved:  ordering and/or review of 1 test(s) in this encounter (see separate area of note for details)    The patient's management necessitated moderate risk (prescription drug management including medications given in the ED).        Discharge Medication List as of 8/2/2023  9:33 PM        START taking these medications    Details   nitroFURantoin macrocrystal-monohydrate (MACROBID) 100 MG capsule Take 1 capsule (100 mg) by mouth 2 times daily, Disp-14 capsule, R-0, [facility]                  I verbally discussed the findings of the evaluation today in the ER. I have verbally discussed with Unique the suggested treatment(s) as described in the discharge instructions and handouts. I have prescribed the above listed medications and instructed her on appropriate use of these medications.      I have verbally suggested she follow-up in her clinic or return to the ER for increased symptoms. See the follow-up recommendations documented  in the after visit summary in this visit's EPIC chart.      Disclaimer: This note consists of words and symbols derived from keyboarding and dictation using voice recognition software.  As a result, there may be errors that have gone undetected.  Please consider this when interpreting information found in this note.    Final diagnoses:   None       8/2/2023   [facility] EMERGENCY DEPT       Jared Bowman,   08/03/23 0431

## 2023-08-03 NOTE — DISCHARGE INSTRUCTIONS
Please read and follow the handout(s) instructions. Return, if needed, for increased or worsening symptoms and as directed by the handout(s).    Please increase your fluid intake. Water is best. Blueberries and cranberries may help your infection as well. They possibly make the lining of your bladder slippery so the bacteria can not easily attach themselves to the wall of the bladder making it easier for the water you drink to flush them out.  Eating yogurt once or twice a day during the time you're on the antibiotics can help prevent diarrhea and yeast infections that can be caused by the antibiotics. Yogurt may also help prevent a return of the infection if eating daily. You can purchase a  medication called Azo/phenazopyridine without a prescription. It can help with the pain associated with this infection. You should expect to see your urine turn a bright orange color if you use this medication. This is normal for this medication. If you are not improved in the next 3 days you should be rechecked in your clinic. Return to the ER, if needed for increased symptoms, as directed by your handout.    I hope you feel better soon!

## 2023-08-03 NOTE — PROGRESS NOTES
ANTICOAGULATION  MANAGEMENT: Discharge Review    Unique Ward chart reviewed for anticoagulation continuity of care    Emergency room visit on 8/2/23 for Acute cystitis.    Discharge disposition: Home    Results:    Recent labs: (last 7 days)     08/02/23  0000   INR 3.4*     Anticoagulation inpatient management:     not applicable     Anticoagulation discharge instructions:     Warfarin dosing: home regimen continued   Bridging: No   INR goal change: No      Medication changes affecting anticoagulation: No  Nitrofurantoin 100 mg BID for 7 days - no interaction with warfarin anticipated.    Additional factors affecting anticoagulation: Yes: infection     PLAN         Left a detailed message for Pratima and instructed to continue doses as instructed yesterday but to check INR   sooner between 8/7-8/9 and to call [facility] back for any questions or concerns    Anticoagulation Calendar updated    Georgia Aguilar RN

## 2023-08-03 NOTE — ED TRIAGE NOTES
"\"Cloudy\" urine intermittently for a few days - denies pain. R/o UTI.      Triage Assessment       Row Name 08/02/23 2014       Triage Assessment (Adult)    Airway WDL WDL       Respiratory WDL    Respiratory WDL WDL       Cardiac WDL    Cardiac WDL WDL                    "

## 2023-08-04 LAB — BACTERIA UR CULT: NORMAL

## 2023-08-09 ENCOUNTER — ANTICOAGULATION THERAPY VISIT (OUTPATIENT)
Dept: ANTICOAGULATION | Facility: CLINIC | Age: 83
End: 2023-08-09
Payer: COMMERCIAL

## 2023-08-09 DIAGNOSIS — I48.91 ATRIAL FIBRILLATION, UNSPECIFIED TYPE (H): Primary | ICD-10-CM

## 2023-08-09 DIAGNOSIS — Z79.01 LONG TERM CURRENT USE OF ANTICOAGULANT THERAPY: ICD-10-CM

## 2023-08-09 DIAGNOSIS — I48.21 PERMANENT ATRIAL FIBRILLATION (H): ICD-10-CM

## 2023-08-09 LAB — INR HOME MONITORING: 2.2 (ref 2–3)

## 2023-08-09 NOTE — PROGRESS NOTES
ANTICOAGULATION MANAGEMENT     Unique GUZMAN Ward 83 year old female is on warfarin with therapeutic INR result. (Goal INR 2.0-3.0)    Recent labs: (last 7 days)     08/09/23  0000   INR 2.2       ASSESSMENT     Source(s): Warfarin taken as instructed     Warfarin doses taken: Warfarin taken as instructed  Diet: Increased greens/vitamin K in diet; ongoing change eating greens 4 x a week now  Medication/supplement changes:  Nitrofurantoin 7 day course (dates: 8/3-8/9) No interaction anticipated  New illness, injury, or hospitalization: Yes: 8/3/23 ED visit for acute cystitis  Signs or symptoms of bleeding or clotting: No  Previous result: Supratherapeutic  Additional findings: Made aware that INR testing is now to weekly per [facility] insurance coverage       PLAN     Recommended plan for temporary change(s) and ongoing change(s) affecting INR     Dosing Instructions: Continue your current warfarin dose with next INR in 1 week       Summary  As of 8/9/2023      Full warfarin instructions:  5 mg every Tue, Sat; 10 mg all other days   Next INR check:  8/16/2023               Telephone call with Aaliyah who verbalizes understanding and agrees to plan and who agrees to plan and repeated back plan correctly    Patient to recheck with home meter    Education provided:   Dietary considerations: importance of consistent vitamin K intake  Resume manage by exception with home monitor. Continue to submit INR results to home monitor company.You will only be called when your result is out of range. Please call and notify [employer] if new medication started, dose missed, signs or symptoms of bleeding or clotting, or a surgery/procedure is scheduled.  Contact [phone]  with any changes, questions or concerns.     Plan made per [facility] anticoagulation protocol    Georgia Aguilar RN  Anticoagulation Clinic  8/9/2023    _______________________________________________________________________     Anticoagulation Episode Summary       Current INR goal:   2.0-3.0   TTR:  68.6 % (1 y)   Target end date:  Indefinite   Send INR reminders to:  [facility]IT[facility]    Indications    Atrial fibrillation (H) [I48.91]  Long term current use of anticoagulant therapy [Z79.01]  Atrial fibrillation  unspecified type (H) [I48.91]  Permanent atrial fibrillation (H) [I48.21]             Comments:  [employer] home meter- MBE             Anticoagulation Care Providers       Provider Role Specialty Phone number    Tyler Lee MD Referring Family Medicine [phone]    Araceli Crowley NP Referring Nurse Practitioner - Family [phone]

## 2023-08-16 ENCOUNTER — NURSE TRIAGE (OUTPATIENT)
Dept: FAMILY MEDICINE | Facility: CLINIC | Age: 83
End: 2023-08-16
Payer: COMMERCIAL

## 2023-08-16 ENCOUNTER — DOCUMENTATION ONLY (OUTPATIENT)
Dept: ANTICOAGULATION | Facility: CLINIC | Age: 83
End: 2023-08-16
Payer: COMMERCIAL

## 2023-08-16 ENCOUNTER — HOSPITAL ENCOUNTER (EMERGENCY)
Facility: CLINIC | Age: 83
Discharge: HOME | End: 2023-08-16
Attending: NURSE PRACTITIONER | Admitting: NURSE PRACTITIONER
Payer: MEDICARE

## 2023-08-16 VITALS
RESPIRATION RATE: 18 BRPM | DIASTOLIC BLOOD PRESSURE: 89 MMHG | TEMPERATURE: 98.2 F | SYSTOLIC BLOOD PRESSURE: 163 MMHG | OXYGEN SATURATION: 97 % | BODY MASS INDEX: 21.47 KG/M2 | HEART RATE: 57 BPM | WEIGHT: 130 LBS

## 2023-08-16 DIAGNOSIS — I48.91 ATRIAL FIBRILLATION, UNSPECIFIED TYPE (H): Primary | ICD-10-CM

## 2023-08-16 DIAGNOSIS — R42 DIZZINESS: ICD-10-CM

## 2023-08-16 DIAGNOSIS — E87.1 HYPONATREMIA: ICD-10-CM

## 2023-08-16 DIAGNOSIS — I48.21 PERMANENT ATRIAL FIBRILLATION (H): ICD-10-CM

## 2023-08-16 DIAGNOSIS — Z79.01 LONG TERM CURRENT USE OF ANTICOAGULANT THERAPY: ICD-10-CM

## 2023-08-16 LAB
ALBUMIN SERPL BCG-MCNC: 4.2 G/DL (ref 3.5–5.2)
ALP SERPL-CCNC: 41 U/L (ref 35–104)
ALT SERPL W P-5'-P-CCNC: 15 U/L (ref 0–50)
ANION GAP SERPL CALCULATED.3IONS-SCNC: 14 MMOL/L (ref 7–15)
AST SERPL W P-5'-P-CCNC: 41 U/L (ref 0–45)
BASOPHILS # BLD AUTO: 0.1 10E3/UL (ref 0–0.2)
BASOPHILS NFR BLD AUTO: 1 %
BILIRUB SERPL-MCNC: 0.4 MG/DL
BUN SERPL-MCNC: 17 MG/DL (ref 8–23)
CALCIUM SERPL-MCNC: 9 MG/DL (ref 8.8–10.2)
CHLORIDE SERPL-SCNC: 90 MMOL/L (ref 98–107)
CREAT SERPL-MCNC: 0.74 MG/DL (ref 0.51–0.95)
DEPRECATED HCO3 PLAS-SCNC: 23 MMOL/L (ref 22–29)
EOSINOPHIL # BLD AUTO: 0.1 10E3/UL (ref 0–0.7)
EOSINOPHIL NFR BLD AUTO: 2 %
ERYTHROCYTE [DISTWIDTH] IN BLOOD BY AUTOMATED COUNT: 13.8 % (ref 10–15)
GFR SERPL CREATININE-BSD FRML MDRD: 79 ML/MIN/1.73M2
GLUCOSE SERPL-MCNC: 106 MG/DL (ref 70–99)
HBA1C MFR BLD: 5.8 %
HCT VFR BLD AUTO: 34.6 % (ref 35–47)
HGB BLD-MCNC: 11.6 G/DL (ref 11.7–15.7)
IMM GRANULOCYTES # BLD: 0 10E3/UL
IMM GRANULOCYTES NFR BLD: 0 %
INR HOME MONITORING: 2 (ref 2–3)
LYMPHOCYTES # BLD AUTO: 1.6 10E3/UL (ref 0.8–5.3)
LYMPHOCYTES NFR BLD AUTO: 29 %
MCH RBC QN AUTO: 28.9 PG (ref 26.5–33)
MCHC RBC AUTO-ENTMCNC: 33.5 G/DL (ref 31.5–36.5)
MCV RBC AUTO: 86 FL (ref 78–100)
MONOCYTES # BLD AUTO: 0.5 10E3/UL (ref 0–1.3)
MONOCYTES NFR BLD AUTO: 9 %
NEUTROPHILS # BLD AUTO: 3.2 10E3/UL (ref 1.6–8.3)
NEUTROPHILS NFR BLD AUTO: 59 %
NRBC # BLD AUTO: 0 10E3/UL
NRBC BLD AUTO-RTO: 0 /100
PLATELET # BLD AUTO: 285 10E3/UL (ref 150–450)
POTASSIUM SERPL-SCNC: 3.9 MMOL/L (ref 3.4–5.3)
PROT SERPL-MCNC: 6.6 G/DL (ref 6.4–8.3)
RBC # BLD AUTO: 4.01 10E6/UL (ref 3.8–5.2)
SODIUM SERPL-SCNC: 127 MMOL/L (ref 136–145)
WBC # BLD AUTO: 5.4 10E3/UL (ref 4–11)

## 2023-08-16 PROCEDURE — 85025 COMPLETE CBC W/AUTO DIFF WBC: CPT | Performed by: NURSE PRACTITIONER

## 2023-08-16 PROCEDURE — 83036 HEMOGLOBIN GLYCOSYLATED A1C: CPT | Performed by: NURSE PRACTITIONER

## 2023-08-16 PROCEDURE — 99283 EMERGENCY DEPT VISIT LOW MDM: CPT | Performed by: NURSE PRACTITIONER

## 2023-08-16 PROCEDURE — 80053 COMPREHEN METABOLIC PANEL: CPT | Performed by: NURSE PRACTITIONER

## 2023-08-16 PROCEDURE — 36415 COLL VENOUS BLD VENIPUNCTURE: CPT | Performed by: NURSE PRACTITIONER

## 2023-08-16 ASSESSMENT — ACTIVITIES OF DAILY LIVING (ADL): ADLS_ACUITY_SCORE: 36

## 2023-08-16 NOTE — PROGRESS NOTES
ANTICOAGULATION  MANAGEMENT-Home Monitor Managed by Exception    Unique Ward 83 year old female is on warfarin with therapeutic INR result. (Goal INR 2.0-3.0)    Recent labs: (last 7 days)     08/16/23  0000   INR 2.0       Previous INR was Therapeutic  Medication, diet, health changes since last INR:chart reviewed; none identified  Contacted within the last 12 weeks by phone on 8/9/23      ARIELLE     Unique was NOT contacted regarding therapeutic result today per home monitoring policy manage by exception agreement.   Current warfarin dose is to be continued:     Summary  As of 8/16/2023        Full warfarin instructions:  5 mg every Tue, Sat; 10 mg all other days   Next INR check:  8/23/2023           ?   Valery Ramirez RN  Anticoagulation Clinic  8/16/2023    _______________________________________________________________________     Anticoagulation Episode Summary       Current INR goal:  2.0-3.0   TTR:  68.6 % (1 y)   Target end date:  Indefinite   Send INR reminders to:  [employer] [facility]    Indications    Atrial fibrillation (H) [I48.91]  Long term current use of anticoagulant therapy [Z79.01]  Atrial fibrillation  unspecified type (H) [I48.91]  Permanent atrial fibrillation (H) [I48.21]             Comments:  [employer] home meter- MBE             Anticoagulation Care Providers       Provider Role Specialty Phone number    Tyler Lee MD Referring Family Medicine [phone]    Araceli Crowley NP Referring Nurse Practitioner - Family [phone]

## 2023-08-16 NOTE — ED PROVIDER NOTES
"  History     Chief Complaint   Patient presents with    Dizziness     HPI  Unique Ward is a 83 year old female who reports dizziness that started on Monday, 2 days ago, when she was getting out of bed. Symptoms worsened by position change. She will feels a little unsteady when going from sit to stand but once she is up walking she feels fine. She uses a walking stick for support/mobility. Her symptoms are improving, and it was worse on Monday than now.    Denies any speech changes. Denies headache. Denies confusion. Denies any extremity weakness or numbness. Denies any visual changes.     She has history of vertigo for which she takes Meclizine. She states this feels a little different than her vertigo because she has not had significant room spinning.  She took Meclizine yesterday and didn't notice if  it helped or not. She reports she drinks \"a lot of water\" and her doctor was concerned about low sodium. She was told she may be overdoing it with water and needs to have more salt in her diet. She also requests that I recheck her HgbA1c today.    Allergies:  Allergies   Allergen Reactions    Keflex [Cephalexin] Anaphylaxis    No Clinical Screening - See Comments      Eye drop antibiotic.    Codeine Other (See Comments)     Comment: , Description:     Gentamicin Hives     Per Unique this medication gives hives on arms and legs. Bev Lo LSXO,  ....................  7/15/2014   1:15 PM    Influenza Vaccines Other (See Comments)     Comment: , Description:     Influenza Virus Vaccine H5n1     Mct Oil [Medium Chain Triglycerides]     Paxil [Paroxetine] Other (See Comments)     Comment: , Description:     Typhoid Vaccine, Live Other (See Comments)     Comment: , Description:   Comment: , Description:     Typhoid Vaccines        Problem List:    Patient Active Problem List    Diagnosis Date Noted    Permanent atrial fibrillation (H) 05/16/2022     Priority: Medium    Long term current use of anticoagulant " therapy 06/30/2020     Priority: Medium    Atrial fibrillation, unspecified type (H) 06/30/2020     Priority: Medium    CHB (complete heart block) (H) 03/31/2020     Priority: Medium     Added automatically from request for surgery [id number]      Pacemaker 03/31/2020     Priority: Medium    Atrial fibrillation (H) 06/07/2019     Priority: Medium    History of basal cell carcinoma- face and scalp 02/28/2017     Priority: Medium    Fibromyalgia 02/28/2017     Priority: Medium    Advance Care Planning 01/06/2017     Priority: Medium     Advance Care Planning 5/4/2017: ACP Facilitation Session:    Unique Ward presented for ACP Facilitation session at a group class. She was accompanied by daughter-in-law. [employer] information provided and resources reviewed. She wishes to give additional consideration to ACP.  She currently has the following questions or concerns about Advance Care Planning: none known. Follow-up meeting: not needed/applicable. Added by Unique Uribe RN, Advance Care Planning Liaison.  Advance Care Planning 1/6/17: Info given   Saray Greer MA        Personal history of urinary tract infection 06/18/2016     Priority: Medium    Essential hypertension 03/23/2016     Priority: Medium    Vitamin D deficiency 03/30/2013     Priority: Medium    Dysthymia 04/01/2005     Priority: Medium        Past Medical History:    Past Medical History:   Diagnosis Date    Arthritis 01.01.1980    Atrial fibrillation (H) 6/7/2019    Cancer (H) 01.01.1993     scalp    Depressive disorder 01.01.1977    FH: melanoma- son 2/28/2017    Hypertension 03.16.2016       Past Surgical History:    Past Surgical History:   Procedure Laterality Date    BIOPSY  11/01/2014    COLONOSCOPY  01.01/1979    Two routine,  one for chronic diarrhea    EP PACEMAKER N/A 3/31/2020    Procedure: EP Pacemaker;  Surgeon: Erich Clark MD;  Location:  [facility]    EYE SURGERY  1/1/2014    first    cataracts both eyes.   second a few months later    GENITOURINARY SURGERY      GYN SURGERY  1977    TL    HEAD & NECK SURGERY  1997    basal cell X2   Moh's on scalp.  other on bridge of nose    LASER YAG CAPSULOTOMY Bilateral 2021    Procedure: CAPSULOTOMY, LENS, USING YAG LASER;  Surgeon: Bernardino Chu MD;  Location: [facility]    SOFT TISSUE SURGERY  1990    Ganglion Cyst   Left inner wrist       Family History:    Family History   Problem Relation Age of Onset    Diabetes Maternal Grandmother         Na    Coronary Artery Disease Sister         error    Coronary Artery Disease Brother         error    Hypertension Brother     Hyperlipidemia Brother     Cerebrovascular Disease Brother     Hypertension Sister     Hypertension Sister     Hyperlipidemia Sister     Other Cancer Sister         cervical    Breast Cancer Sister     Other Cancer Sister         kidney    Mental Illness Mother         paranoid/schizophrenic/suicide    Mental Illness Sister         ???  Had shock treatments    Hyperlipidemia Sister     Other Cancer Sister         Kidney       Social History:  Marital Status:  Legally  [3]  Social History     Tobacco Use    Smoking status: Former     Packs/day: 0.75     Years: 10.00     Pack years: 7.50     Types: Cigarettes     Start date: 1955     Quit date: 1965     Years since quittin.6    Smokeless tobacco: Never   Vaping Use    Vaping Use: Never used   Substance Use Topics    Alcohol use: No     Alcohol/week: 0.0 standard drinks of alcohol    Drug use: No        Medications:    amLODIPine (NORVASC) 10 MG tablet  cholecalciferol (VITAMIN D3) 5000 units TABS tablet  COMPRESSION STOCKINGS  EPINEPHrine (ANY BX GENERIC EQUIV) 0.3 MG/0.3ML injection 2-pack  fish oil-omega-3 fatty acids 1000 MG capsule  meclizine (ANTIVERT) 25 MG tablet  nitroFURantoin macrocrystal-monohydrate (MACROBID) 100 MG capsule  warfarin ANTICOAGULANT (COUMADIN) 5 MG tablet  Zinc Sulfate (ZINC 15  PO)          Review of Systems  As mentioned above in the history present illness. All other systems were reviewed and are negative.    Physical Exam   BP: (!) 141/114  Pulse: 81  Temp: 98.2  F (36.8  C)  Resp: 18  Weight: 59 kg (130 lb)  SpO2: 98 %      Physical Exam  Constitutional:       General: She is not in acute distress.     Appearance: Normal appearance. She is well-developed. She is not ill-appearing.   HENT:      Head: Normocephalic and atraumatic.      Right Ear: Tympanic membrane and external ear normal.      Left Ear: Tympanic membrane and external ear normal.      Nose: Nose normal.      Mouth/Throat:      Mouth: Mucous membranes are moist.      Pharynx: Oropharynx is clear. Uvula midline.   Eyes:      Extraocular Movements: Extraocular movements intact.      Right eye: No nystagmus.      Left eye: No nystagmus.      Conjunctiva/sclera: Conjunctivae normal.      Pupils: Pupils are equal, round, and reactive to light.   Cardiovascular:      Rate and Rhythm: Normal rate and regular rhythm.      Heart sounds: Normal heart sounds. No murmur heard.  Pulmonary:      Effort: Pulmonary effort is normal. No respiratory distress.      Breath sounds: Normal breath sounds.   Abdominal:      General: Bowel sounds are normal. There is no distension.      Palpations: Abdomen is soft.      Tenderness: There is no abdominal tenderness.   Musculoskeletal:         General: Normal range of motion.   Skin:     General: Skin is warm and dry.      Findings: No rash.   Neurological:      General: No focal deficit present.      Mental Status: She is alert and oriented to person, place, and time.      Cranial Nerves: Cranial nerves 2-12 are intact.      Sensory: Sensation is intact.      Motor: Motor function is intact. No weakness.      Coordination: Coordination is intact. Romberg sign negative.      Gait: Gait is intact.      Comments: She is able to ambulate to the bathroom here without assistance. Her gait is slow (she is  slightly kyphotic), but steady.          ED Course                 Procedures              Results for orders placed or performed during the hospital encounter of 08/16/23 (from the past 24 hour(s))   CBC with platelets differential    Narrative    The following orders were created for panel order CBC with platelets differential.  Procedure                               Abnormality         Status                     ---------                               -----------         ------                     CBC with platelets and d...[[id number]]  Abnormal            Final result                 Please view results for these tests on the individual orders.   Comprehensive metabolic panel   Result Value Ref Range    Sodium 127 (L) 136 - 145 mmol/L    Potassium 3.9 3.4 - 5.3 mmol/L    Chloride 90 (L) 98 - 107 mmol/L    Carbon Dioxide (CO2) 23 22 - 29 mmol/L    Anion Gap 14 7 - 15 mmol/L    Urea Nitrogen 17.0 8.0 - 23.0 mg/dL    Creatinine 0.74 0.51 - 0.95 mg/dL    Calcium 9.0 8.8 - 10.2 mg/dL    Glucose 106 (H) 70 - 99 mg/dL    Alkaline Phosphatase 41 35 - 104 U/L    AST 41 0 - 45 U/L    ALT 15 0 - 50 U/L    Protein Total 6.6 6.4 - 8.3 g/dL    Albumin 4.2 3.5 - 5.2 g/dL    Bilirubin Total 0.4 <=1.2 mg/dL    GFR Estimate 79 >60 mL/min/1.73m2   Hemoglobin A1c   Result Value Ref Range    Hemoglobin A1C 5.8 (H) <5.7 %   CBC with platelets and differential   Result Value Ref Range    WBC Count 5.4 4.0 - 11.0 10e3/uL    RBC Count 4.01 3.80 - 5.20 10e6/uL    Hemoglobin 11.6 (L) 11.7 - 15.7 g/dL    Hematocrit 34.6 (L) 35.0 - 47.0 %    MCV 86 78 - 100 fL    MCH 28.9 26.5 - 33.0 pg    MCHC 33.5 31.5 - 36.5 g/dL    RDW 13.8 10.0 - 15.0 %    Platelet Count 285 150 - 450 10e3/uL    % Neutrophils 59 %    % Lymphocytes 29 %    % Monocytes 9 %    % Eosinophils 2 %    % Basophils 1 %    % Immature Granulocytes 0 %    NRBCs per 100 WBC 0 <1 /100    Absolute Neutrophils 3.2 1.6 - 8.3 10e3/uL    Absolute Lymphocytes 1.6 0.8 - 5.3 10e3/uL     "Absolute Monocytes 0.5 0.0 - 1.3 10e3/uL    Absolute Eosinophils 0.1 0.0 - 0.7 10e3/uL    Absolute Basophils 0.1 0.0 - 0.2 10e3/uL    Absolute Immature Granulocytes 0.0 <=0.4 10e3/uL    Absolute NRBCs 0.0 10e3/uL       Medications - No data to display    Assessments & Plan (with Medical Decision Making)     83 year old female who reports dizziness that started on Monday, 2 days ago, when she was getting out of bed. Symptoms worsened by position change. She will feels a little unsteady when going from sit to stand but once she is up walking she feels fine. She uses a walking stick for support/mobility. Her symptoms are improving, and it was worse on Monday than now.    Denies any speech changes. Denies headache. Denies confusion. Denies any extremity weakness or numbness. Denies any visual changes.     She has history of vertigo for which she takes Meclizine. She states this feels a little different than her vertigo because she has not had significant room spinning.  She took Meclizine yesterday and didn't notice if  it helped or not. She reports she drinks \"a lot of water\" and her doctor was concerned about low sodium. She was told she may be overdoing it with water and needs to have more salt in her diet. She also requests that I recheck her HgbA1c today.    On exam she is alert and oriented.  No neurologic deficit.  She is able to ambulate independently, negative Romberg.  She is slightly kyphotic, and her gait is slow but steady.  She has normal strength in all of her extremities.  No speech changes.  Sodium is low at 127, prior level was 129.  Hemoglobin is 11.6.  No leukocytosis.  Remainder of electrolytes and kidney function labs are normal.  Normal LFTs.    I did not see any strong indication for imaging at this time.  I suspect her dizziness is related to her chronic episodes of vertigo, just not as severe.  However, given her sodium 127 in the setting of drinking a lots of free water I do recommend she add " salt to her diet and I told her this.  I recommend recheck of her sodium and I have ordered a basic metabolic panel for her to have rechecked on Monday.  She does have a upcoming appointment in clinic on 9/14/2023, but I would like someone to follow-up on her labs early next week and ensure she is doing okay.  Patient and her daughter were instructed to have a low threshold for returning if she worsens in any way.  I will message her clinic provider to follow-up on her labs next week.      Discharge Medication List as of 8/16/2023  8:32 PM          Final diagnoses:   Dizziness   Hyponatremia       8/16/2023   [facility] EMERGENCY DEPT       Tab, MAYRA Miner CNP  08/16/23 2051

## 2023-08-16 NOTE — ED TRIAGE NOTES
She has felt dizzy and off balance since Monday morning which worsens when she stands for a long time. Denies weakness in any extremities or visual changes

## 2023-08-16 NOTE — TELEPHONE ENCOUNTER
S: New onset dizziness     B: Patient is prediabetic but is unable to check blood sugars from home. Patient had a sodium level of 129 on 6/20/23.    A: Patient calling and stating she woke up on Monday with dizziness. Patient is unable to stand on her own without support when she first gets up. She feels like she could fall. She stated she feels extra lethargic and having sore muscles although she feels like there is no explanation for her sore muscles. Patient also stated she has slept really hard the last two nights and that is also unlike her. Patient took her blood pressures around noon today and the initial one was 139/77 with a heart rate of 76 and then 10 minutes later blood pressure came down to 128/70. Patient is unable to check blood sugar from home.     R: Per RN protocol patient should be seen in ED or UC now. Patient was already thinking about going to ED and will do the second she has a ride from her daughter. RN reviewed red flag symptoms with patient and when to seek emergency care. No further questions or concerns.       Reason for Disposition   SEVERE dizziness (e.g., unable to stand, requires support to walk, feels like passing out now)    Additional Information   Negative: SEVERE difficulty breathing (e.g., struggling for each breath, speaks in single words)   Negative: Shock suspected (e.g., cold/pale/clammy skin, too weak to stand, low BP, rapid pulse)   Negative: Difficult to awaken or acting confused (e.g., disoriented, slurred speech)   Negative: Fainted, and still feels dizzy afterwards   Negative: Overdose (accidental or intentional) of medications   Negative: New neurologic deficit that is present now: * Weakness of the face, arm, or leg on one side of the body * Numbness of the face, arm, or leg on one side of the body * Loss of speech or garbled speech   Negative: Heart beating < 50 beats per minute OR > 140 beats per minute   Negative: Sounds like a life-threatening emergency to the  triager   Negative: Chest pain   Negative: Rectal bleeding, bloody stool, or tarry-black stool   Negative: Vomiting is main symptom   Negative: Diarrhea is main symptom   Negative: Headache is main symptom   Negative: Patient states that they are having an anxiety or panic attack   Negative: Heat exhaustion suspected (i.e., dehydration from heat exposure)   Negative: Dizziness from low blood sugar (i.e., < 60 mg/dl or 3.5 mmol/l)    Protocols used: Dizziness-A-OH

## 2023-08-17 ENCOUNTER — DOCUMENTATION ONLY (OUTPATIENT)
Dept: ANTICOAGULATION | Facility: CLINIC | Age: 83
End: 2023-08-17
Payer: COMMERCIAL

## 2023-08-17 ENCOUNTER — MYC MEDICAL ADVICE (OUTPATIENT)
Dept: FAMILY MEDICINE | Facility: CLINIC | Age: 83
End: 2023-08-17
Payer: COMMERCIAL

## 2023-08-17 DIAGNOSIS — R73.03 PRE-DIABETES: Primary | ICD-10-CM

## 2023-08-17 NOTE — PROGRESS NOTES
ANTICOAGULATION  MANAGEMENT: Discharge Review    Unique Ward chart reviewed for anticoagulation continuity of care    Emergency room visit on 8/16/23 for dizziness; hyponatremia.    Discharge disposition: Home    Results:    Recent labs: (last 7 days)     08/16/23  0000   INR 2.0     Anticoagulation inpatient management:     not applicable     Anticoagulation discharge instructions:     Warfarin dosing: home regimen continued   Bridging: No   INR goal change: No      Medication changes affecting anticoagulation: No    Additional factors affecting anticoagulation: No     PLAN     No adjustment to anticoagulation plan needed    Patient not contacted    No adjustment to Anticoagulation Calendar was required    Georgia Aguilar RN

## 2023-08-17 NOTE — DISCHARGE INSTRUCTIONS
Continue your meclizine 1 tablet 3 times a day as needed for dizziness.  I know that you drink a lot of water, but you need to add more sodium/salt to your diet.    You should have your sodium rechecked and I have ordered lab work for you to have this checked next week.   --We made appointment for lab draw on Monday at 9:45pm   --I will message Araceli Crowley, nurse practitioner to watch for the results of your labs.  Make position changes slowly.  Follow-up in clinic as scheduled on 9/14/2023.    Return to the emergency department for fevers, headaches, weakness, worsening dizziness, or any other symptoms of concern.

## 2023-08-21 ENCOUNTER — LAB (OUTPATIENT)
Dept: LAB | Facility: CLINIC | Age: 83
End: 2023-08-21
Payer: COMMERCIAL

## 2023-08-21 DIAGNOSIS — E87.1 LOW SODIUM LEVELS: ICD-10-CM

## 2023-08-21 DIAGNOSIS — E87.1 LOW SODIUM LEVELS: Primary | ICD-10-CM

## 2023-08-21 LAB
ANION GAP SERPL CALCULATED.3IONS-SCNC: 13 MMOL/L (ref 7–15)
BUN SERPL-MCNC: 17 MG/DL (ref 8–23)
CALCIUM SERPL-MCNC: 9.3 MG/DL (ref 8.8–10.2)
CHLORIDE SERPL-SCNC: 95 MMOL/L (ref 98–107)
CREAT SERPL-MCNC: 0.78 MG/DL (ref 0.51–0.95)
DEPRECATED HCO3 PLAS-SCNC: 24 MMOL/L (ref 22–29)
GFR SERPL CREATININE-BSD FRML MDRD: 74 ML/MIN/1.73M2
GLUCOSE SERPL-MCNC: 106 MG/DL (ref 70–99)
POTASSIUM SERPL-SCNC: 4.2 MMOL/L (ref 3.4–5.3)
SODIUM SERPL-SCNC: 132 MMOL/L (ref 136–145)

## 2023-08-21 PROCEDURE — 36415 COLL VENOUS BLD VENIPUNCTURE: CPT

## 2023-08-21 PROCEDURE — 80048 BASIC METABOLIC PNL TOTAL CA: CPT

## 2023-08-23 ENCOUNTER — DOCUMENTATION ONLY (OUTPATIENT)
Dept: ANTICOAGULATION | Facility: CLINIC | Age: 83
End: 2023-08-23
Payer: COMMERCIAL

## 2023-08-23 DIAGNOSIS — I48.21 PERMANENT ATRIAL FIBRILLATION (H): ICD-10-CM

## 2023-08-23 DIAGNOSIS — Z79.01 LONG TERM CURRENT USE OF ANTICOAGULANT THERAPY: ICD-10-CM

## 2023-08-23 DIAGNOSIS — R73.03 PRE-DIABETES: ICD-10-CM

## 2023-08-23 DIAGNOSIS — I48.91 ATRIAL FIBRILLATION, UNSPECIFIED TYPE (H): Primary | ICD-10-CM

## 2023-08-23 LAB — INR HOME MONITORING: 2.5 (ref 2–3)

## 2023-08-23 NOTE — PROGRESS NOTES
ANTICOAGULATION  MANAGEMENT-Home Monitor Managed by Exception    Unique Ward 83 year old female is on warfarin with therapeutic INR result. (Goal INR 2.0-3.0)    Recent labs: (last 7 days)     08/23/23  0000   INR 2.5       Previous INR was Therapeutic  Medication, diet, health changes since last INR:Pt was in ED on 08/16 for dizziness. No changes to meds at that time.  Contacted within the last 12 weeks by phone on 08/09/2023      ARIELLE Calhoun was NOT contacted regarding therapeutic result today per home monitoring policy manage by exception agreement.   Current warfarin dose is to be continued:     Summary  As of 8/23/2023      Full warfarin instructions:  5 mg every Tue, Sat; 10 mg all other days   Next INR check:  8/30/2023             ?   Ramírez Arredondo RN  Anticoagulation Clinic  8/23/2023    _______________________________________________________________________     Anticoagulation Episode Summary       Current INR goal:  2.0-3.0   TTR:  68.6 % (1 y)   Target end date:  Indefinite   Send INR reminders to:  [facility]    Indications    Atrial fibrillation (H) [I48.91]  Long term current use of anticoagulant therapy [Z79.01]  Atrial fibrillation  unspecified type (H) [I48.91]  Permanent atrial fibrillation (H) [I48.21]             Comments:  [employer] home meter- MBE             Anticoagulation Care Providers       Provider Role Specialty Phone number    Tyler Lee MD Referring Family Medicine [phone]    Araceli Crowley NP Referring Nurse Practitioner - Family [phone]

## 2023-08-29 ENCOUNTER — OFFICE VISIT (OUTPATIENT)
Dept: FAMILY MEDICINE | Facility: CLINIC | Age: 83
End: 2023-08-29
Payer: COMMERCIAL

## 2023-08-29 VITALS
SYSTOLIC BLOOD PRESSURE: 138 MMHG | TEMPERATURE: 97.5 F | BODY MASS INDEX: 22.4 KG/M2 | HEART RATE: 72 BPM | RESPIRATION RATE: 18 BRPM | HEIGHT: 64 IN | OXYGEN SATURATION: 99 % | WEIGHT: 131.2 LBS | DIASTOLIC BLOOD PRESSURE: 80 MMHG

## 2023-08-29 DIAGNOSIS — E87.1 HYPONATREMIA: Primary | ICD-10-CM

## 2023-08-29 DIAGNOSIS — I48.91 ATRIAL FIBRILLATION, UNSPECIFIED TYPE (H): ICD-10-CM

## 2023-08-29 DIAGNOSIS — D64.9 ANEMIA, UNSPECIFIED TYPE: ICD-10-CM

## 2023-08-29 DIAGNOSIS — I10 ESSENTIAL HYPERTENSION: ICD-10-CM

## 2023-08-29 DIAGNOSIS — Z95.0 PACEMAKER: ICD-10-CM

## 2023-08-29 DIAGNOSIS — D69.6 THROMBOCYTOPENIA (H): ICD-10-CM

## 2023-08-29 PROCEDURE — 99214 OFFICE O/P EST MOD 30 MIN: CPT | Performed by: STUDENT IN AN ORGANIZED HEALTH CARE EDUCATION/TRAINING PROGRAM

## 2023-08-29 ASSESSMENT — PAIN SCALES - GENERAL: PAINLEVEL: NO PAIN (0)

## 2023-08-29 NOTE — NURSING NOTE
Prior to immunization administration, verified patients identity using patient s name and date of birth. Please see Immunization Activity for additional information.     Screening Questionnaire for Adult Immunization    Are you sick today?   Don't Know   Do you have allergies to medications, food, a vaccine component or latex?   Yes   Have you ever had a serious reaction after receiving a vaccination?   Yes   Do you have a long-term health problem with heart, lung, kidney, or metabolic disease (e.g., diabetes), asthma, a blood disorder, no spleen, complement component deficiency, a cochlear implant, or a spinal fluid leak?  Are you on long-term aspirin therapy?   Yes   Do you have cancer, leukemia, HIV/AIDS, or any other immune system problem?   Yes   Do you have a parent, brother, or sister with an immune system problem?   No   In the past 3 months, have you taken medications that affect  your immune system, such as prednisone, other steroids, or anticancer drugs; drugs for the treatment of rheumatoid arthritis, Crohn s disease, or psoriasis; or have you had radiation treatments?   No   Have you had a seizure, or a brain or other nervous system problem?   Yes   During the past year, have you received a transfusion of blood or blood    products, or been given immune (gamma) globulin or antiviral drug?   No   For women: Are you pregnant or is there a chance you could become       pregnant during the next month?   No   Have you received any vaccinations in the past 4 weeks?   No     Immunization questionnaire was positive for at least one answer.  Notified Dr Ogden..      Patient instructed to remain in clinic for 15 minutes afterwards, and to report any adverse reactions.     Screening performed by Prabha Morales LPN on 8/29/2023 at 10:38 AM.

## 2023-08-29 NOTE — PROGRESS NOTES
Assessment & Plan     Hyponatremia  No recurrent episode of lightheadedness and not having her vertigo that she has had in the past.  Does feel somewhat tired but no focal deficits.  No chest pain.  Does get winded with exertion but no chest pain with exertion or significant change.  She does have upcoming follow-up with cardiology.  Sodium improved upon check 1 week ago.  Has just increased her dietary sodium.  No new medications that would be concerning to exacerbate SIADH.  She has no signs of volume overload at this time.  Has had low sodium in the past.  Plan to recheck in 1 week  - Basic metabolic panel  (Ca, Cl, CO2, Creat, Gluc, K, Na, BUN)    Thrombocytopenia (H)    Anemia, unspecified type  Slightly decreased hemoglobin.  Plan to repeat and obtain iron studies today.  No signs of acute blood loss.  Did discuss colon cancer screening.  Follow-up pending results.  - Iron and iron binding capacity  - TSH with free T4 reflex  - Ferritin  - Hemoglobin    Atrial fibrillation, unspecified type (H)  Pacemaker  Upcoming follow-up with cardiology and repeat echo    Essential hypertension  Stable    Discussion regarding sleep hygiene today.       MED REC REQUIRED  Post Medication Reconciliation Status:  Discharge medications reconciled and changed, see notes/orders      Fernando Ogden MD  [facility]    Joseph Fischer is a 83 year old, presenting for the following health issues:  ER F/U    History of Present Illness       Reason for visit:  Follow up on dizzyness    She eats 2-3 servings of fruits and vegetables daily.She consumes 0 sweetened beverage(s) daily.She exercises with enough effort to increase her heart rate 9 or less minutes per day.  She exercises with enough effort to increase her heart rate 3 or less days per week.   She is taking medications regularly.     Patient following up the ER for lightheadedness and no true vertigo per patient.  Was found to have low sodium  "and dietary intake increased.  Sodium did improve with this.  No new medications or recent illness.  No respiratory symptoms.  Does have A-fib with palpitations in the past but no chest pain or shortness of breath although she does note being tired with exertion.  Has not slept well but does take naps during the day.      Review of Systems   Constitutional, HEENT, cardiovascular, pulmonary, gi and gu systems are negative, except as otherwise noted.      Objective    /80 (BP Location: Right arm, Patient Position: Chair)   Pulse 72   Temp 97.5  F (36.4  C) (Temporal)   Resp 18   Ht 1.632 m (5' 4.25\")   Wt 59.5 kg (131 lb 3.2 oz)   LMP  (LMP Unknown)   SpO2 99%   BMI 22.35 kg/m    Body mass index is 22.35 kg/m .  Physical Exam   GENERAL: healthy, alert and no distress  EYES: Eyes grossly normal to inspection, PERRL and conjunctivae and sclerae normal  HENT: ear canals impacted bilaterally with partial visualization of TM, nose and mouth without ulcers or lesions  NECK: no adenopathy, no asymmetry, masses, or scars and thyroid normal to palpation  RESP: lungs clear to auscultation - no rales, rhonchi or wheezes  CV: regular rate and rhythm, normal S1 S2, no S3 or S4, no murmur, click or rub, no peripheral edema and peripheral pulses strong  ABDOMEN: soft, nontender, no hepatosplenomegaly, no masses and bowel sounds normal  SKIN: no suspicious lesions or rashes  NEURO: mentation intact and speech normal  PSYCH: mentation appears normal, affect normal/bright                  "

## 2023-08-30 ENCOUNTER — DOCUMENTATION ONLY (OUTPATIENT)
Dept: ANTICOAGULATION | Facility: CLINIC | Age: 83
End: 2023-08-30
Payer: COMMERCIAL

## 2023-08-30 DIAGNOSIS — Z79.01 LONG TERM CURRENT USE OF ANTICOAGULANT THERAPY: ICD-10-CM

## 2023-08-30 DIAGNOSIS — I48.21 PERMANENT ATRIAL FIBRILLATION (H): ICD-10-CM

## 2023-08-30 DIAGNOSIS — I48.91 ATRIAL FIBRILLATION (H): Primary | ICD-10-CM

## 2023-08-30 DIAGNOSIS — I48.91 ATRIAL FIBRILLATION, UNSPECIFIED TYPE (H): ICD-10-CM

## 2023-08-30 LAB — INR HOME MONITORING: 2.7 (ref 2–3)

## 2023-08-30 NOTE — PROGRESS NOTES
ANTICOAGULATION  MANAGEMENT-Home Monitor Managed by Exception    Unique Ward 83 year old female is on warfarin with therapeutic INR result. (Goal INR 2.0-3.0)    Recent labs: (last 7 days)     08/30/23  0000   INR 2.7       Previous INR was Therapeutic  Medication, diet, health changes since last INR:chart reviewed; none identified  Contacted within the last 12 weeks by phone on 08/09/2023      ARIELLE     Unique was NOT contacted regarding therapeutic result today per home monitoring policy manage by exception agreement.   Current warfarin dose is to be continued:     Summary  As of 8/30/2023      Full warfarin instructions:  5 mg every Tue, Sat; 10 mg all other days   Next INR check:  9/6/2023             ?   Shi Bennett RN  Anticoagulation Clinic  8/30/2023    _______________________________________________________________________     Anticoagulation Episode Summary       Current INR goal:  2.0-3.0   TTR:  68.6 % (1 y)   Target end date:  Indefinite   Send INR reminders to:  [facility]    Indications    Atrial fibrillation (H) [I48.91]  Long term current use of anticoagulant therapy [Z79.01]  Atrial fibrillation  unspecified type (H) [I48.91]  Permanent atrial fibrillation (H) [I48.21]             Comments:  [employer] home meter- MBE             Anticoagulation Care Providers       Provider Role Specialty Phone number    Tyler Lee MD Referring Family Medicine [phone]    Araceli Crowley NP Referring Nurse Practitioner - Family [phone]

## 2023-09-06 ENCOUNTER — ANTICOAGULATION THERAPY VISIT (OUTPATIENT)
Dept: ANTICOAGULATION | Facility: CLINIC | Age: 83
End: 2023-09-06

## 2023-09-06 ENCOUNTER — LAB (OUTPATIENT)
Dept: LAB | Facility: CLINIC | Age: 83
End: 2023-09-06
Payer: COMMERCIAL

## 2023-09-06 DIAGNOSIS — E87.1 HYPONATREMIA: ICD-10-CM

## 2023-09-06 DIAGNOSIS — I48.91 ATRIAL FIBRILLATION, UNSPECIFIED TYPE (H): ICD-10-CM

## 2023-09-06 DIAGNOSIS — D64.9 ANEMIA, UNSPECIFIED TYPE: ICD-10-CM

## 2023-09-06 DIAGNOSIS — Z79.01 LONG TERM CURRENT USE OF ANTICOAGULANT THERAPY: Primary | ICD-10-CM

## 2023-09-06 DIAGNOSIS — I48.21 PERMANENT ATRIAL FIBRILLATION (H): ICD-10-CM

## 2023-09-06 LAB
ANION GAP SERPL CALCULATED.3IONS-SCNC: 13 MMOL/L (ref 7–15)
BUN SERPL-MCNC: 22.9 MG/DL (ref 8–23)
CALCIUM SERPL-MCNC: 9.3 MG/DL (ref 8.8–10.2)
CHLORIDE SERPL-SCNC: 95 MMOL/L (ref 98–107)
CREAT SERPL-MCNC: 0.7 MG/DL (ref 0.51–0.95)
DEPRECATED HCO3 PLAS-SCNC: 24 MMOL/L (ref 22–29)
EGFRCR SERPLBLD CKD-EPI 2021: 85 ML/MIN/1.73M2
FERRITIN SERPL-MCNC: 72 NG/ML (ref 11–328)
GLUCOSE SERPL-MCNC: 107 MG/DL (ref 70–99)
HGB BLD-MCNC: 12.1 G/DL (ref 11.7–15.7)
INR BLD: 1.4 (ref 0.9–1.1)
IRON BINDING CAPACITY (ROCHE): 313 UG/DL (ref 240–430)
IRON SATN MFR SERPL: 31 % (ref 15–46)
IRON SERPL-MCNC: 97 UG/DL (ref 37–145)
POTASSIUM SERPL-SCNC: 4.3 MMOL/L (ref 3.4–5.3)
SODIUM SERPL-SCNC: 132 MMOL/L (ref 136–145)
TSH SERPL DL<=0.005 MIU/L-ACNC: 1.93 UIU/ML (ref 0.3–4.2)

## 2023-09-06 PROCEDURE — 80048 BASIC METABOLIC PNL TOTAL CA: CPT

## 2023-09-06 PROCEDURE — 85610 PROTHROMBIN TIME: CPT

## 2023-09-06 PROCEDURE — 85018 HEMOGLOBIN: CPT

## 2023-09-06 PROCEDURE — 36416 COLLJ CAPILLARY BLOOD SPEC: CPT

## 2023-09-06 PROCEDURE — 36415 COLL VENOUS BLD VENIPUNCTURE: CPT

## 2023-09-06 PROCEDURE — 82728 ASSAY OF FERRITIN: CPT

## 2023-09-06 PROCEDURE — 83550 IRON BINDING TEST: CPT

## 2023-09-06 PROCEDURE — 84443 ASSAY THYROID STIM HORMONE: CPT

## 2023-09-06 PROCEDURE — 83540 ASSAY OF IRON: CPT

## 2023-09-06 NOTE — PROGRESS NOTES
ANTICOAGULATION MANAGEMENT     Unique Ward 83 year old female is on warfarin with subtherapeutic INR result. (Goal INR 2.0-3.0)    Recent labs: (last 7 days)     09/06/23  1153   INR 1.4*       ASSESSMENT     Source(s): Chart Review and Patient/Caregiver Call     Warfarin doses taken: Warfarin taken as instructed  Diet: No new diet changes identified  Medication/supplement changes: None noted  New illness, injury, or hospitalization: No  Signs or symptoms of bleeding or clotting: No  Previous result: Therapeutic last 2(+) visits  Additional findings:  Patient reports no overall changes. INR was completed in Clinic Lab today with POC vs. Home meter. Note: patient may be starting a supplement called MSM for joint pain/inflammation. No interactions noted with warfarin.        PLAN     Recommended plan for no diet, medication or health factor changes affecting INR     Dosing Instructions: booster dose then continue your current warfarin dose with next INR in 1 week       Summary  As of 9/6/2023      Full warfarin instructions:  9/6: 15 mg; Otherwise 5 mg every Tue, Sat; 10 mg all other days   Next INR check:  9/13/2023               Telephone call with Aaliyah who agrees to plan and repeated back plan correctly    Patient to recheck with home meter    Education provided:   Please call back if any changes to your diet, medications or how you've been taking warfarin    Plan made per [facility] anticoagulation protocol    Trisha Lo RN  Anticoagulation Clinic  9/6/2023    _______________________________________________________________________     Anticoagulation Episode Summary       Current INR goal:  2.0-3.0   TTR:  67.7 % (1 y)   Target end date:  Indefinite   Send INR reminders to:  [facility]    Indications    Atrial fibrillation (H) [I48.91]  Long term current use of anticoagulant therapy [Z79.01]  Atrial fibrillation  unspecified type (H) [I48.91]  Permanent atrial fibrillation (H) [I48.21]              Comments:  [employer] home meter- MBE             Anticoagulation Care Providers       Provider Role Specialty Phone number    Tyler Lee MD Referring Family Medicine [phone]    Araceli Crowley NP Referring Nurse Practitioner - Family [phone]

## 2023-09-13 ENCOUNTER — ANTICOAGULATION THERAPY VISIT (OUTPATIENT)
Dept: ANTICOAGULATION | Facility: CLINIC | Age: 83
End: 2023-09-13
Payer: COMMERCIAL

## 2023-09-13 DIAGNOSIS — I48.91 ATRIAL FIBRILLATION, UNSPECIFIED TYPE (H): Primary | ICD-10-CM

## 2023-09-13 DIAGNOSIS — I48.21 PERMANENT ATRIAL FIBRILLATION (H): ICD-10-CM

## 2023-09-13 DIAGNOSIS — Z79.01 LONG TERM CURRENT USE OF ANTICOAGULANT THERAPY: ICD-10-CM

## 2023-09-13 LAB — INR HOME MONITORING: 1.4 (ref 2–3)

## 2023-09-13 ASSESSMENT — ENCOUNTER SYMPTOMS
ARTHRALGIAS: 1
PARESTHESIAS: 0
COUGH: 0
WEAKNESS: 1
NAUSEA: 0
ABDOMINAL PAIN: 0
MYALGIAS: 0
DYSURIA: 0
CONSTIPATION: 1
DIARRHEA: 0
SORE THROAT: 0
JOINT SWELLING: 1
PALPITATIONS: 0
HEADACHES: 0
DIZZINESS: 0
CHILLS: 0
FREQUENCY: 1
HEMATURIA: 0
HEARTBURN: 0
EYE PAIN: 0
SHORTNESS OF BREATH: 0
BREAST MASS: 0
FEVER: 0
HEMATOCHEZIA: 0
NERVOUS/ANXIOUS: 1

## 2023-09-13 ASSESSMENT — ACTIVITIES OF DAILY LIVING (ADL): CURRENT_FUNCTION: NO ASSISTANCE NEEDED

## 2023-09-13 ASSESSMENT — PATIENT HEALTH QUESTIONNAIRE - PHQ9
SUM OF ALL RESPONSES TO PHQ QUESTIONS 1-9: 4
SUM OF ALL RESPONSES TO PHQ QUESTIONS 1-9: 4
10. IF YOU CHECKED OFF ANY PROBLEMS, HOW DIFFICULT HAVE THESE PROBLEMS MADE IT FOR YOU TO DO YOUR WORK, TAKE CARE OF THINGS AT HOME, OR GET ALONG WITH OTHER PEOPLE: SOMEWHAT DIFFICULT

## 2023-09-13 NOTE — PROGRESS NOTES
ANTICOAGULATION MANAGEMENT     Unique Ward 83 year old female is on warfarin with subtherapeutic INR result. (Goal INR 2.0-3.0)    Recent labs: (last 7 days)     09/13/23  0000   INR 1.4*       ASSESSMENT     Source(s): Chart Review and Patient/Caregiver Call     Warfarin doses taken: Warfarin taken as instructed  Diet: No new diet changes identified  Medication/supplement changes:  pt ran out of Blanca K2 (contains 100 mck of Blanca K) a while ago and she just restarted it, which  likely explains the low INRs this week and last  New illness, injury, or hospitalization: No  Signs or symptoms of bleeding or clotting: No  Previous result: Subtherapeutic  Additional findings: None       PLAN     Recommended plan for ongoing change(s) affecting INR     Dosing Instructions: booster dose then Increase your warfarin dose (16.7% change) with next INR in 5-7 days       Summary  As of 9/13/2023      Full warfarin instructions:  9/13: 20 mg; Otherwise 10 mg every day   Next INR check:  9/18/2023               Telephone call with Aaliyah who verbalizes understanding and agrees to plan    Patient to recheck with home meter    Education provided:   Please call back if any changes to your diet, medications or how you've been taking warfarin  Goal range and lab monitoring: goal range and significance of current result, Importance of therapeutic range, and Importance of following up at instructed interval  Interaction IS anticipated between warfarin and Blanca K supplement   Symptom monitoring: monitoring for clotting signs and symptoms, monitoring for stroke signs and symptoms, and when to seek medical attention/emergency care    Plan made per [facility] anticoagulation protocol    Neelima Higgins, RN  Anticoagulation Clinic  9/13/2023    _______________________________________________________________________     Anticoagulation Episode Summary       Current INR goal:  2.0-3.0   TTR:  65.9 % (1 y)   Target end date:  Indefinite   Send INR  reminders to:  [facility]    Indications    Atrial fibrillation (H) [I48.91]  Long term current use of anticoagulant therapy [Z79.01]  Atrial fibrillation  unspecified type (H) [I48.91]  Permanent atrial fibrillation (H) [I48.21]             Comments:  [employer] home meter- MBE             Anticoagulation Care Providers       Provider Role Specialty Phone number    Tyler Lee MD Referring Family Medicine [phone]    Araceli Crowley NP Referring Nurse Practitioner - Family [phone]

## 2023-09-14 ENCOUNTER — OFFICE VISIT (OUTPATIENT)
Dept: FAMILY MEDICINE | Facility: CLINIC | Age: 83
End: 2023-09-14
Payer: COMMERCIAL

## 2023-09-14 VITALS
BODY MASS INDEX: 20.73 KG/M2 | OXYGEN SATURATION: 99 % | HEART RATE: 92 BPM | TEMPERATURE: 97.5 F | WEIGHT: 129 LBS | HEIGHT: 66 IN | RESPIRATION RATE: 14 BRPM | SYSTOLIC BLOOD PRESSURE: 128 MMHG | DIASTOLIC BLOOD PRESSURE: 88 MMHG

## 2023-09-14 DIAGNOSIS — E55.9 VITAMIN D DEFICIENCY: ICD-10-CM

## 2023-09-14 DIAGNOSIS — E87.1 HYPONATREMIA: ICD-10-CM

## 2023-09-14 DIAGNOSIS — Z79.01 LONG TERM CURRENT USE OF ANTICOAGULANT THERAPY: ICD-10-CM

## 2023-09-14 DIAGNOSIS — I10 ESSENTIAL HYPERTENSION: ICD-10-CM

## 2023-09-14 DIAGNOSIS — Z00.00 ENCOUNTER FOR MEDICARE ANNUAL WELLNESS EXAM: Primary | ICD-10-CM

## 2023-09-14 DIAGNOSIS — Z95.0 PACEMAKER: ICD-10-CM

## 2023-09-14 DIAGNOSIS — I48.91 ATRIAL FIBRILLATION, UNSPECIFIED TYPE (H): ICD-10-CM

## 2023-09-14 PROCEDURE — G0439 PPPS, SUBSEQ VISIT: HCPCS | Performed by: STUDENT IN AN ORGANIZED HEALTH CARE EDUCATION/TRAINING PROGRAM

## 2023-09-14 PROCEDURE — 99213 OFFICE O/P EST LOW 20 MIN: CPT | Mod: 25 | Performed by: STUDENT IN AN ORGANIZED HEALTH CARE EDUCATION/TRAINING PROGRAM

## 2023-09-14 RX ORDER — AMLODIPINE BESYLATE 5 MG/1
5 TABLET ORAL DAILY
Qty: 90 TABLET | Refills: 1 | Status: SHIPPED | OUTPATIENT
Start: 2023-09-14 | End: 2024-03-21

## 2023-09-14 ASSESSMENT — ENCOUNTER SYMPTOMS
HEADACHES: 0
HEARTBURN: 0
DIARRHEA: 0
FEVER: 0
SORE THROAT: 0
HEMATOCHEZIA: 0
HEMATURIA: 0
NAUSEA: 0
NERVOUS/ANXIOUS: 1
CHILLS: 0
FREQUENCY: 1
CONSTIPATION: 1
JOINT SWELLING: 1
DIZZINESS: 0
PARESTHESIAS: 0
PALPITATIONS: 0
ARTHRALGIAS: 1
ABDOMINAL PAIN: 0
SHORTNESS OF BREATH: 0
MYALGIAS: 0
WEAKNESS: 1
BREAST MASS: 0
COUGH: 0
EYE PAIN: 0
DYSURIA: 0

## 2023-09-14 ASSESSMENT — ACTIVITIES OF DAILY LIVING (ADL): CURRENT_FUNCTION: NO ASSISTANCE NEEDED

## 2023-09-14 ASSESSMENT — PAIN SCALES - GENERAL: PAINLEVEL: NO PAIN (0)

## 2023-09-14 NOTE — PROGRESS NOTES
"SUBJECTIVE:   Aaliyah is a 83 year old who presents for Preventive Visit.      9/14/2023    11:01 AM   Additional Questions   Roomed by Deandre Pineda CMA       Are you in the first 12 months of your Medicare coverage?  No    Healthy Habits:     In general, how would you rate your overall health?  Poor    Frequency of exercise:  None    Do you usually eat at least 4 servings of fruit and vegetables a day, include whole grains    & fiber and avoid regularly eating high fat or \"junk\" foods?  No    Taking medications regularly:  Yes    Medication side effects:  Other    Ability to successfully perform activities of daily living:  No assistance needed    Home Safety:  Lack of grab bars in the bathroom    Hearing Impairment:  Difficult to understand a speaker at a public meeting or [employer] service and no hearing concerns    In the past 6 months, have you been bothered by leaking of urine? Yes    In general, how would you rate your overall mental or emotional health?  Poor    Additional concerns today:  Yes        Have you ever done Advance Care Planning? (For example, a Health Directive, POLST, or a discussion with a medical provider or your loved ones about your wishes): Yes, patient states has an Advance Care Planning document and will bring a copy to the clinic.      Fall risk  Fallen 2 or more times in the past year?: No  Any fall with injury in the past year?: No    Cognitive Screening   1) Repeat 3 items (Leader, Season, Table)    2) Clock draw: NORMAL  3) 3 item recall: Recalls 2 objects   Results: NORMAL clock, 1-2 items recalled: COGNITIVE IMPAIRMENT LESS LIKELY    Mini-CogTM Copyright RUBEN Amador. Licensed by the author for use in [facility]; reprinted with permission (flaquito@.[email or web address]). All rights reserved.      Do you have sleep apnea, excessive snoring or daytime drowsiness? : no    Reviewed and updated as needed this visit by clinical staff   Tobacco  Allergies  Meds              Reviewed and updated " as needed this visit by Provider     Meds             Social History     Tobacco Use    Smoking status: Former     Packs/day: 0.75     Years: 10.00     Pack years: 7.50     Types: Cigarettes     Start date: 1955     Quit date: 1965     Years since quittin.7    Smokeless tobacco: Never   Substance Use Topics    Alcohol use: No     Alcohol/week: 0.0 standard drinks of alcohol             2023     5:22 PM   Alcohol Use   Prescreen: >3 drinks/day or >7 drinks/week? Not Applicable     Do you have a current opioid prescription? No  Do you use any other controlled substances or medications that are not prescribed by a provider? None            Patient Care Team:  Fernando Ogden MD as PCP - General (Family Medicine)  Christin Sanchez MD as Assigned Heart and Vascular Provider  Marlys Hilton RD as Diabetes Educator (Dietitian, Registered)  Carl Horta MD as MD (Dermatology)  Kev Osei MD as Referring Physician (Allergy & Immunology)  Carl Horta MD as Assigned Surgical Provider  Alisson Pack PA-C as Assigned Musculoskeletal Provider  Fernando Ogden MD as Assigned PCP    The following health maintenance items are reviewed in Epic and correct as of today:  Health Maintenance   Topic Date Due    COVID-19 Vaccine (1) Never done    Pneumococcal Vaccine: 65+ Years (1 - PCV) Never done    ZOSTER IMMUNIZATION (1 of 2) Never done    INFLUENZA VACCINE (1) 2023    PHQ-9  2024    DTAP/TDAP/TD IMMUNIZATION (2 - Td or Tdap) 2024    ANNUAL REVIEW OF HM ORDERS  2024    MEDICARE ANNUAL WELLNESS VISIT  2024    FALL RISK ASSESSMENT  2024    ADVANCE CARE PLANNING  2028    DEXA  2032    DEPRESSION ACTION PLAN  Completed    IPV IMMUNIZATION  Aged Out    HPV IMMUNIZATION  Aged Out    MENINGITIS IMMUNIZATION  Aged Out         Mammogram Screening - Patient over age 75, unsure at this time if she would like to continue.   Pertinent  "mammograms are reviewed under the imaging tab.    Review of Systems   Constitutional:  Negative for chills and fever.   HENT:  Negative for congestion, ear pain, hearing loss and sore throat.    Eyes:  Negative for pain and visual disturbance.   Respiratory:  Negative for cough and shortness of breath.    Cardiovascular:  Positive for peripheral edema. Negative for chest pain and palpitations.   Gastrointestinal:  Positive for constipation. Negative for abdominal pain, diarrhea, heartburn, hematochezia and nausea.   Breasts:  Negative for tenderness, breast mass and discharge.   Genitourinary:  Positive for frequency and urgency. Negative for dysuria, genital sores, hematuria, pelvic pain, vaginal bleeding and vaginal discharge.   Musculoskeletal:  Positive for arthralgias and joint swelling. Negative for myalgias.   Skin:  Negative for rash.   Neurological:  Positive for weakness. Negative for dizziness, headaches and paresthesias.   Psychiatric/Behavioral:  Negative for mood changes. The patient is nervous/anxious.        OBJECTIVE:   /88 (BP Location: Left arm, Patient Position: Chair, Cuff Size: Adult Regular)   Pulse 92   Temp 97.5  F (36.4  C) (Tympanic)   Resp 14   Ht 1.676 m (5' 6\")   Wt 58.5 kg (129 lb)   LMP  (LMP Unknown)   SpO2 99%   BMI 20.82 kg/m   Estimated body mass index is 20.82 kg/m  as calculated from the following:    Height as of this encounter: 1.676 m (5' 6\").    Weight as of this encounter: 58.5 kg (129 lb).  Physical Exam  GENERAL: healthy, alert and no distress  EYES: Eyes grossly normal to inspection, PERRL and conjunctivae and sclerae normal  HENT: ear canals and TM's normal with partial visualization due to impaction, nose and mouth without ulcers or lesions  NECK: no adenopathy, no asymmetry, masses, or scars and thyroid normal to palpation  RESP: lungs clear to auscultation - no rales, rhonchi or wheezes  BREAST: deferred   CV: regular rate and rhythm, normal S1 S2,  no " murmur, click or rub, no peripheral edema and peripheral pulses strong  ABDOMEN: soft, nontender, no hepatosplenomegaly, no masses and bowel sounds normal  MS: no gross musculoskeletal defects noted, no edema  SKIN: no suspicious lesions or rashes  NEURO: Normal strength and tone, mentation intact and speech normal  PSYCH: mentation appears normal, affect normal/bright        ASSESSMENT / PLAN:   Unique was seen today for wellness visit.    Diagnoses and all orders for this visit:    Encounter for Medicare annual wellness exam  -     Miscellaneous Order for DME - ONLY FOR DME    Atrial fibrillation, unspecified type (H)    Long term current use of anticoagulant therapy    Pacemaker    Essential hypertension  Plan to decrease to 5 mg and see if this improves her nightly swelling and has been increased since start of medication.  Monitor home blood pressures and follow-up with cardiology  -     amLODIPine (NORVASC) 5 MG tablet; Take 1 tablet (5 mg) by mouth daily    Vitamin D deficiency    Hyponatremia  Stable with no further episodes of lightheadedness    Other orders  -     PRIMARY CARE FOLLOW-UP SCHEDULING; Future        Patient has been advised of split billing requirements and indicates understanding: Yes      COUNSELING:  Reviewed preventive health counseling, as reflected in patient instructions       Regular exercise       Healthy diet/nutrition       Vision screening       Hearing screening       Dental care       Bladder control       Fall risk prevention       Immunizations       Alcohol Use        Osteoporosis prevention/bone health       Colon cancer screening      She reports that she quit smoking about 58 years ago. Her smoking use included cigarettes. She started smoking about 68 years ago. She has a 7.50 pack-year smoking history. She has never used smokeless tobacco.      Appropriate preventive services were discussed with this patient, including applicable screening as appropriate for  cardiovascular disease, diabetes, osteopenia/osteoporosis, and glaucoma.  As appropriate for age/gender, discussed screening for colorectal cancer, prostate cancer, breast cancer, and cervical cancer. Checklist reviewing preventive services available has been given to the patient.    Reviewed patients plan of care and provided an AVS. The Basic Care Plan (routine screening as documented in Health Maintenance) for Unique meets the Care Plan requirement. This Care Plan has been established and reviewed with the Patient.          Fernando Ogden MD  [facility]    Identified Health Risks:    Patient Health Questionnaire (Submitted on 9/13/2023)  If you checked off any problems, how difficult have these problems made it for you to do your work, take care of things at home, or get along with other people?: Somewhat difficult  PHQ9 TOTAL SCORE: 4  The patient was provided with suggestions to help her develop a healthy physical lifestyle.  She is at risk for lack of exercise and has been provided with information to increase physical activity for the benefit of her well-being.  The patient was counseled and encouraged to consider modifying their diet and eating habits. She was provided with information on recommended healthy diet options.  The patient was provided with written information regarding signs of hearing loss.  Information on urinary incontinence and treatment options given to patient.  The patient was provided with suggestions to help her develop a healthy emotional lifestyle.

## 2023-09-14 NOTE — PATIENT INSTRUCTIONS
Patient Education   Personalized Prevention Plan  You are due for the preventive services outlined below.  Your care team is available to assist you in scheduling these services.  If you have already completed any of these items, please share that information with your care team to update in your medical record.  Health Maintenance Due   Topic Date Due     COVID-19 Vaccine (1) Never done     Pneumococcal Vaccine (1 - PCV) Never done     Zoster (Shingles) Vaccine (1 of 2) Never done     Flu Vaccine (1) 09/01/2023     Your Health Risk Assessment indicates you feel you are not in good health    A healthy lifestyle helps keep the body fit and the mind alert. It helps protect you from disease, helps you fight disease, and helps prevent chronic disease (disease that doesn't go away) from getting worse. This is important as you get older and begin to notice twinges in muscles and joints and a decline in the strength and stamina you once took for granted. A healthy lifestyle includes good healthcare, good nutrition, weight control, recreation, and regular exercise. Avoid harmful substances and do what you can to keep safe. Another part of a healthy lifestyle is stay mentally active and socially involved.    Good healthcare   Have a wellness visit every year.   If you have new symptoms, let us know right away. Don't wait until the next checkup.   Take medicines exactly as prescribed and keep your medicines in a safe place. Tell us if your medicine causes problems.   Healthy diet and weight control   Eat 3 or 4 small, nutritious, low-fat, high-fiber meals a day. Include a variety of fruits, vegetables, and whole-grain foods.   Make sure you get enough calcium in your diet. Calcium, vitamin D, and exercise help prevent osteoporosis (bone thinning).   If you live alone, try eating with others when you can. That way you get a good meal and have company while you eat it.   Try to keep a healthy weight. If you eat more calories  "than your body uses for energy, it will be stored as fat and you will gain weight.     Recreation   Recreation is not limited to sports and team events. It includes any activity that provides relaxation, interest, enjoyment, and exercise. Recreation provides an outlet for physical, mental, and social energy. It can give a sense of worth and achievement. It can help you stay healthy.    Mental Exercise and Social Involvement  Mental and emotional health is as important as physical health. Keep in touch with friends and family. Stay as active as possible. Continue to learn and challenge yourself.   Things you can do to stay mentally active are:  Learn something new, like a foreign language or musical instrument.   Play SCRABBLE or do crossword puzzles. If you cannot find people to play these games with you at home, you can play them with others on your computer through the Internet.   Join a games club--anything from card games to chess or checkers or lawn bowling.   Start a new hobby.   Go back to school.   Volunteer.   Read.   Keep up with world events.  Learning About Being Physically Active  What is physical activity?     Being physically active means doing any kind of activity that gets your body moving.  The types of physical activity that can help you get fit and stay healthy include:  Aerobic or \"cardio\" activities. These make your heart beat faster and make you breathe harder, such as brisk walking, riding a bike, or running. They strengthen your heart and lungs and build up your endurance.  Strength training activities. These make your muscles work against, or \"resist,\" something. Examples include lifting weights or doing push-ups. These activities help tone and strengthen your muscles and bones.  Stretches. These let you move your joints and muscles through their full range of motion. Stretching helps you be more flexible.  Reaching a balance between these three types of physical activity is important " "because each one contributes to your overall fitness.  What are the benefits of being active?  Being active is one of the best things you can do for your health. It helps you to:  Feel stronger and have more energy to do all the things you like to do.  Focus better at school or work.  Feel, think, and sleep better.  Reach and stay at a healthy weight.  Lose fat and build lean muscle.  Lower your risk for serious health problems, including diabetes, heart attack, high blood pressure, and some cancers.  Keep your heart, lungs, bones, muscles, and joints strong and healthy.  How can you make being active part of your life?  Start slowly. Make it your long-term goal to get at least 30 minutes of exercise on most days of the week. Walking is a good choice. You also may want to do other activities, such as running, swimming, cycling, or playing tennis or team sports.  Pick activities that you like--ones that make your heart beat faster, your muscles stronger, and your muscles and joints more flexible. If you find more than one thing you like doing, do them all. You don't have to do the same thing every day.  Get your heart pumping every day. Any activity that makes your heart beat faster and keeps it at that rate for a while counts.  Here are some great ways to get your heart beating faster:  Go for a brisk walk, run, or bike ride.  Go for a hike or swim.  Go in-line skating.  Play a game of touch football, basketball, or soccer.  Ride a bike.  Play tennis or racquetball.  Climb stairs.  Even some household chores can be aerobic--just do them at a faster pace. Vacuuming, raking or mowing the lawn, sweeping the garage, and washing and waxing the car all can help get your heart rate up.  Strengthen your muscles during the week. You don't have to lift heavy weights or grow big, bulky muscles to get stronger. Doing a few simple activities that make your muscles work against, or \"resist,\" something can help you get " "stronger.  For example, you can:  Do push-ups or sit-ups, which use your own body weight as resistance.  Lift weights or dumbbells or use stretch bands at home or in a gym or community center.  Stretch your muscles often. Stretching will help you as you become more active. It can help you stay flexible, loosen tight muscles, and avoid injury. It can also help improve your balance and posture and can be a great way to relax.  Be sure to stretch the muscles you'll be using when you work out. It's best to warm your muscles slightly before you stretch them. Walk or do some other light aerobic activity for a few minutes, and then start stretching.  When you stretch your muscles:  Do it slowly. Stretching is not about going fast or making sudden movements.  Don't push or bounce during a stretch.  Hold each stretch for at least 15 to 30 seconds, if you can. You should feel a stretch in the muscle, but not pain.  Breathe out as you do the stretch. Then breathe in as you hold the stretch. Don't hold your breath.  If you're worried about how more activity might affect your health, have a checkup before you start. Follow any special advice your doctor gives you for getting a smart start.  Where can you learn more?  Go to https://www.[employer].net/patiented  Enter W332 in the search box to learn more about \"Learning About Being Physically Active.\"  Current as of: October 10, 2022               Content Version: 13.7    6307-1516 [employer].   Care instructions adapted under license by your healthcare professional. If you have questions about a medical condition or this instruction, always ask your healthcare professional. [employer] disclaims any warranty or liability for your use of this information.      Learning About Dietary Guidelines  What are the Dietary Guidelines for Americans?     Dietary Guidelines for Americans provide tips for eating well and staying healthy. This helps reduce the risk " for long-term (chronic) diseases.  These guidelines recommend that you:  Eat and drink the right amount for you. The U.[place] government's food guide is called MyPlate. It can help you make your own well-balanced eating plan.  Try to balance your eating with your activity. This helps you stay at a healthy weight.  Drink alcohol in moderation, if at all.  Limit foods high in salt, saturated fat, trans fat, and added sugar.  These guidelines are from the U.[place]. Department of Agriculture and the U.S. Department of Health and Human Services. They are updated every 5 years.  What is MyPlate?  MyPlate is the [place] government's food guide. It can help you make your own well-balanced eating plan. A balanced eating plan means that you eat enough, but not too much, and that your food gives you the nutrients you need to stay healthy.  MyPlate focuses on eating plenty of whole grains, fruits, and vegetables, and on limiting fat and sugar. It is available online at www.ChooseMyPlate.gov.  How can you get started?  If you're trying to eat healthier, you can slowly change your eating habits over time. You don't have to make big changes all at once. Start by adding one or two healthy foods to your meals each day.  Grains  Choose whole-grain breads and cereals and whole-wheat pasta and whole-grain crackers.  Vegetables  Eat a variety of vegetables every day. They have lots of nutrients and are part of a heart-healthy diet.  Fruits  Eat a variety of fruits every day. Fruits contain lots of nutrients. Choose fresh fruit instead of fruit juice.  Protein foods  Choose fish and lean poultry more often. Eat red meat and fried meats less often. Dried beans, tofu, and nuts are also good sources of protein.  Dairy  Choose low-fat or fat-free products from this food group. If you have problems digesting milk, try eating cheese or yogurt instead.  Fats and oils  Limit fats and oils if you're trying to cut calories. Choose healthy fats when you cook.  "These include canola oil and olive oil.  Where can you learn more?  Go to https://www.[employer].net/patiented  Enter D676 in the search box to learn more about \"Learning About Dietary Guidelines.\"  Current as of: March 1, 2023               Content Version: 13.7    5280-8890 [employer].   Care instructions adapted under license by your healthcare professional. If you have questions about a medical condition or this instruction, always ask your healthcare professional. [employer] disclaims any warranty or liability for your use of this information.      Hearing Loss: Care Instructions  Overview     Hearing loss is a sudden or slow decrease in how well you hear. It can range from slight to profound. Permanent hearing loss can occur with aging. It also can happen when you are exposed long-term to loud noise. Examples include listening to loud music, riding motorcycles, or being around other loud machines.  Hearing loss can affect your work and home life. It can make you feel lonely or depressed. You may feel that you have lost your independence. But hearing aids and other devices can help you hear better and feel connected to others.  Follow-up care is a key part of your treatment and safety. Be sure to make and go to all appointments, and call your doctor if you are having problems. It's also a good idea to know your test results and keep a list of the medicines you take.  How can you care for yourself at home?  Avoid loud noises whenever possible. This helps keep your hearing from getting worse.  Always wear hearing protection around loud noises.  Wear a hearing aid as directed.  A professional can help you pick a hearing aid that will work best for you.  You can also get hearing aids over the counter for mild to moderate hearing loss.  Have hearing tests as your doctor suggests. They can show whether your hearing has changed. Your hearing aid may need to be adjusted.  Use other devices " "as needed. These may include:  Telephone amplifiers and hearing aids that can connect to a television, stereo, radio, or microphone.  Devices that use lights or vibrations. These alert you to the doorbell, a ringing telephone, or a baby monitor.  Television closed-captioning. This shows the words at the bottom of the screen. Most new TVs can do this.  TTY (text telephone). This lets you type messages back and forth on the telephone instead of talking or listening. These devices are also called TDD. When messages are typed on the keyboard, they are sent over the phone line to a receiving TTY. The message is shown on a monitor.  Use text messaging, social media, and email if it is hard for you to communicate by telephone.  Try to learn a listening technique called speechreading. It is not lipreading. You pay attention to people's gestures, expressions, posture, and tone of voice. These clues can help you understand what a person is saying. Face the person you are talking to, and have them face you. Make sure the lighting is good. You need to see the other person's face clearly.  Think about counseling if you need help to adjust to your hearing loss.  When should you call for help?  Watch closely for changes in your health, and be sure to contact your doctor if:    You think your hearing is getting worse.     You have new symptoms, such as dizziness or nausea.   Where can you learn more?  Go to https://www.[employer].net/patiented  Enter R798 in the search box to learn more about \"Hearing Loss: Care Instructions.\"  Current as of: March 1, 2023               Content Version: 13.7    8981-2864 [employer].   Care instructions adapted under license by your healthcare professional. If you have questions about a medical condition or this instruction, always ask your healthcare professional. [employer] disclaims any warranty or liability for your use of this information.      Bladder Training: Care " Instructions  Your Care Instructions     Bladder training is used to treat urge incontinence and stress incontinence. Urge incontinence means that the need to urinate comes on so fast that you can't get to a toilet in time. Stress incontinence means that you leak urine because of pressure on your bladder. For example, it may happen when you laugh, cough, or lift something heavy.  Bladder training can increase how long you can wait before you have to urinate. It can also help your bladder hold more urine. And it can give you better control over the urge to urinate.  It is important to remember that bladder training takes a few weeks to a few months to make a difference. You may not see results right away, but don't give up.  Follow-up care is a key part of your treatment and safety. Be sure to make and go to all appointments, and call your doctor if you are having problems. It's also a good idea to know your test results and keep a list of the medicines you take.  How can you care for yourself at home?  Work with your doctor to come up with a bladder training program that is right for you. You may use one or more of the following methods.  Delayed urination  In the beginning, try to keep from urinating for 5 minutes after you first feel the need to go.  While you wait, take deep, slow breaths to relax. Kegel exercises can also help you delay the need to go to the bathroom.  After some practice, when you can easily wait 5 minutes to urinate, try to wait 10 minutes before you urinate.  Slowly increase the waiting period until you are able to control when you have to urinate.  Scheduled urination  Empty your bladder when you first wake up in the morning.  Schedule times throughout the day when you will urinate.  Start by going to the bathroom every hour, even if you don't need to go.  Slowly increase the time between trips to the bathroom.  When you have found a schedule that works well for you, keep doing it.  If you  "wake up during the night and have to urinate, do it. Apply your schedule to waking hours only.  Kegel exercises  These tighten and strengthen pelvic muscles, which can help you control the flow of urine. (If doing these exercises causes pain, stop doing them and talk with your doctor.) To do Kegel exercises:  Squeeze your muscles as if you were trying not to pass gas. Or squeeze your muscles as if you were stopping the flow of urine. Your belly, legs, and buttocks shouldn't move.  Hold the squeeze for 3 seconds, then relax for 5 to 10 seconds.  Start with 3 seconds, then add 1 second each week until you are able to squeeze for 10 seconds.  Repeat the exercise 10 times a session. Do 3 to 8 sessions a day.  When should you call for help?  Watch closely for changes in your health, and be sure to contact your doctor if:    Your incontinence is getting worse.     You do not get better as expected.   Where can you learn more?  Go to https://www.[employer].net/patiented  Enter V684 in the search box to learn more about \"Bladder Training: Care Instructions.\"  Current as of: March 1, 2023               Content Version: 13.7    5141-3475 [employer].   Care instructions adapted under license by your healthcare professional. If you have questions about a medical condition or this instruction, always ask your healthcare professional. [employer] disclaims any warranty or liability for your use of this information.      Your Health Risk Assessment indicates you feel you are not in good emotional health.    Recreation   Recreation is not limited to sports and team events. It includes any activity that provides relaxation, interest, enjoyment, and exercise. Recreation provides an outlet for physical, mental, and social energy. It can give a sense of worth and achievement. It can help you stay healthy.    Mental Exercise and Social Involvement  Mental and emotional health is as important as physical " health. Keep in touch with friends and family. Stay as active as possible. Continue to learn and challenge yourself.   Things you can do to stay mentally active are:  Learn something new, like a foreign language or musical instrument.   Play SCRABBLE or do crossword puzzles. If you cannot find people to play these games with you at home, you can play them with others on your computer through the Internet.   Join a games club--anything from card games to chess or checkers or lawn bowling.   Start a new hobby.   Go back to school.   Volunteer.   Read.   Keep up with world events.

## 2023-09-20 ENCOUNTER — ANTICOAGULATION THERAPY VISIT (OUTPATIENT)
Dept: ANTICOAGULATION | Facility: CLINIC | Age: 83
End: 2023-09-20
Payer: COMMERCIAL

## 2023-09-20 DIAGNOSIS — I48.21 PERMANENT ATRIAL FIBRILLATION (H): ICD-10-CM

## 2023-09-20 DIAGNOSIS — Z79.01 LONG TERM CURRENT USE OF ANTICOAGULANT THERAPY: ICD-10-CM

## 2023-09-20 DIAGNOSIS — I48.91 ATRIAL FIBRILLATION, UNSPECIFIED TYPE (H): Primary | ICD-10-CM

## 2023-09-20 LAB — INR HOME MONITORING: 2.1 (ref 2–3)

## 2023-09-20 NOTE — PROGRESS NOTES
ANTICOAGULATION MANAGEMENT     Unique Ward 83 year old female is on warfarin with therapeutic INR result. (Goal INR 2.0-3.0)    Recent labs: (last 7 days)     09/20/23  0000   INR 2.1       ASSESSMENT     Source(s): Chart Review and Patient/Caregiver Call     Warfarin doses taken: Booster dose(s) recently taken which may be affecting INR  Diet:  she had two servings of greens this past week.  Medication/supplement changes:  Norvasc dose decreased on 9/14 No interaction anticipated  New illness, injury, or hospitalization: No  Signs or symptoms of bleeding or clotting: No  Previous result: Subtherapeutic  Additional findings: None       PLAN     Recommended plan for temporary change(s) affecting INR     Dosing Instructions: Continue your current warfarin dose with next INR in 1 week       Summary  As of 9/20/2023      Full warfarin instructions:  10 mg every day   Next INR check:  9/27/2023               Telephone call with Aaliyah who verbalizes understanding and agrees to plan and who agrees to plan and repeated back plan correctly    Patient to recheck with home meter    Education provided:   Please call back if any changes to your diet, medications or how you've been taking warfarin  Goal range and lab monitoring: goal range and significance of current result and Importance of therapeutic range  Dietary considerations: importance of consistent vitamin K intake    Plan made per [facility] anticoagulation protocol    Cherry Fuller, RN  Anticoagulation Clinic  9/20/2023    _______________________________________________________________________     Anticoagulation Episode Summary       Current INR goal:  2.0-3.0   TTR:  64.2 % (1 y)   Target end date:  Indefinite   Send INR reminders to:  [facility] HOME MONITOR[facility]    Indications    Atrial fibrillation (H) [I48.91]  Long term current use of anticoagulant therapy [Z79.01]  Atrial fibrillation  unspecified type (H) [I48.91]  Permanent atrial fibrillation (H) [I48.21]              Comments:  [employer] home meter- [facility]             Anticoagulation Care Providers       Provider Role Specialty Phone number    Tyler Lee MD Referring Family Medicine [phone]    Araceli Crowley NP Referring Nurse Practitioner - Family [phone]

## 2023-09-25 LAB — INR HOME MONITORING: 2.1 (ref 2–3)

## 2023-09-26 ENCOUNTER — ANTICOAGULATION THERAPY VISIT (OUTPATIENT)
Dept: ANTICOAGULATION | Facility: CLINIC | Age: 83
End: 2023-09-26
Payer: COMMERCIAL

## 2023-09-26 DIAGNOSIS — Z79.01 LONG TERM CURRENT USE OF ANTICOAGULANT THERAPY: Primary | ICD-10-CM

## 2023-09-26 DIAGNOSIS — I48.91 ATRIAL FIBRILLATION (H): ICD-10-CM

## 2023-09-26 DIAGNOSIS — I48.91 ATRIAL FIBRILLATION, UNSPECIFIED TYPE (H): ICD-10-CM

## 2023-09-26 DIAGNOSIS — I48.21 PERMANENT ATRIAL FIBRILLATION (H): ICD-10-CM

## 2023-09-26 NOTE — PROGRESS NOTES
ANTICOAGULATION MANAGEMENT     Unique Ward 83 year old female is on warfarin with therapeutic INR result. (Goal INR 2.0-3.0)    Recent labs: (last 7 days)     09/25/23  0000   INR 2.1       ASSESSMENT     Source(s): Chart Review  Previous INR was Therapeutic last visit; previously outside of goal range  Medication, diet, health changes since last INR chart reviewed; none identified         PLAN     Recommended plan for no diet, medication or health factor changes affecting INR     Dosing Instructions: Continue your current warfarin dose with next INR in 1 week       Summary  As of 9/26/2023      Full warfarin instructions:  10 mg every day   Next INR check:  10/2/2023               Detailed voice message left for Aaliyah with dosing instructions and follow up date.     Patient to recheck with home meter    Education provided:   Please call back if any changes to your diet, medications or how you've been taking warfarin  Resume manage by exception with home monitor. Continue to submit INR results to home monitor company.You will only be called when your result is out of range. Please call and notify [facility] if new medication started, dose missed, signs or symptoms of bleeding or clotting, or a surgery/procedure is scheduled.  Contact [phone]  with any changes, questions or concerns.     Plan made per [facility] anticoagulation protocol    Shi Bennett, RN  Anticoagulation Clinic  9/26/2023    _______________________________________________________________________     Anticoagulation Episode Summary       Current INR goal:  2.0-3.0   TTR:  64.1 % (1 y)   Target end date:  Indefinite   Send INR reminders to:  [facility] HOME MONITOR[facility]    Indications    Atrial fibrillation (H) [I48.91]  Long term current use of anticoagulant therapy [Z79.01]  Atrial fibrillation  unspecified type (H) [I48.91]  Permanent atrial fibrillation (H) [I48.21]             Comments:  [employer] home meter- [facility]             Anticoagulation Care  Providers       Provider Role Specialty Phone number    Tyler Lee MD Referring Family Medicine [phone]    Araceli Crowley NP Referring Nurse Practitioner - Family [phone]

## 2023-09-26 NOTE — PROGRESS NOTES
Patient called back in and she got a cut on pinky and got it to stop bleeding. Bumped it later and it bleed again. She went to Urgent Care and it was 2.1 from their machine also. Reviewed dosing and recheck.    Reviewed back to [facility].    Shi Bennett RN    [facility]

## 2023-10-04 ENCOUNTER — ANTICOAGULATION THERAPY VISIT (OUTPATIENT)
Dept: ANTICOAGULATION | Facility: CLINIC | Age: 83
End: 2023-10-04
Payer: COMMERCIAL

## 2023-10-04 DIAGNOSIS — I48.91 ATRIAL FIBRILLATION, UNSPECIFIED TYPE (H): Primary | ICD-10-CM

## 2023-10-04 DIAGNOSIS — I48.21 PERMANENT ATRIAL FIBRILLATION (H): ICD-10-CM

## 2023-10-04 DIAGNOSIS — I48.0 PAROXYSMAL ATRIAL FIBRILLATION (H): ICD-10-CM

## 2023-10-04 DIAGNOSIS — Z79.01 LONG TERM CURRENT USE OF ANTICOAGULANT THERAPY: ICD-10-CM

## 2023-10-04 LAB — INR HOME MONITORING: 1.6 (ref 2–3)

## 2023-10-04 RX ORDER — WARFARIN SODIUM 5 MG/1
TABLET ORAL
Qty: 167 TABLET | Refills: 0 | Status: SHIPPED | OUTPATIENT
Start: 2023-10-04 | End: 2023-12-06

## 2023-10-04 NOTE — PROGRESS NOTES
ANTICOAGULATION MANAGEMENT     Unique Ward 83 year old female is on warfarin with subtherapeutic INR result. (Goal INR 2.0-3.0)    Recent labs: (last 7 days)     10/04/23  0000   INR 1.6*       ASSESSMENT     Source(s): Chart Review and Patient/Caregiver Call     Warfarin doses taken: Warfarin taken as instructed  Diet: No new diet changes identified  Medication/supplement changes: None noted  New illness, injury, or hospitalization: No  Signs or symptoms of bleeding or clotting: No  Previous result: Therapeutic last 2(+) visits  Additional findings: None       PLAN     Recommended plan for no diet, medication or health factor changes affecting INR     Dosing Instructions: booster dose then continue your current warfarin dose with next INR in 1 week       Summary  As of 10/4/2023      Full warfarin instructions:  10/4: 15 mg; Otherwise 10 mg every day   Next INR check:  10/11/2023               Telephone call with Aaliyah who verbalizes understanding and agrees to plan    Patient to recheck with home meter    Education provided:   Please call back if any changes to your diet, medications or how you've been taking warfarin  Goal range and lab monitoring: goal range and significance of current result, Importance of therapeutic range, and Importance of following up at instructed interval  Symptom monitoring: monitoring for clotting signs and symptoms, monitoring for stroke signs and symptoms, and when to seek medical attention/emergency care    Plan made per [facility] anticoagulation protocol    Neelima Higgins RN  Anticoagulation Clinic  10/4/2023    _______________________________________________________________________     Anticoagulation Episode Summary       Current INR goal:  2.0-3.0   TTR:  62.3 % (1 y)   Target end date:  Indefinite   Send INR reminders to:  [facility]    Indications    Atrial fibrillation (H) [I48.91]  Long term current use of anticoagulant therapy [Z79.01]  Atrial  fibrillation  unspecified type (H) [I48.91]  Permanent atrial fibrillation (H) [I48.21]             Comments:  [employer] home meter- MBE             Anticoagulation Care Providers       Provider Role Specialty Phone number    Tyler Lee MD Referring Family Medicine [phone]    Araceli Crowley NP Referring Nurse Practitioner - Family [phone]

## 2023-10-11 ENCOUNTER — ANTICOAGULATION THERAPY VISIT (OUTPATIENT)
Dept: ANTICOAGULATION | Facility: CLINIC | Age: 83
End: 2023-10-11
Payer: COMMERCIAL

## 2023-10-11 DIAGNOSIS — I48.21 PERMANENT ATRIAL FIBRILLATION (H): ICD-10-CM

## 2023-10-11 DIAGNOSIS — I48.91 ATRIAL FIBRILLATION, UNSPECIFIED TYPE (H): Primary | ICD-10-CM

## 2023-10-11 DIAGNOSIS — Z79.01 LONG TERM CURRENT USE OF ANTICOAGULANT THERAPY: ICD-10-CM

## 2023-10-11 LAB — INR HOME MONITORING: 1.8 (ref 2–3)

## 2023-10-11 NOTE — PROGRESS NOTES
ANTICOAGULATION MANAGEMENT     Unique Ward 83 year old female is on warfarin with subtherapeutic INR result. (Goal INR 2.0-3.0)    Recent labs: (last 7 days)     10/11/23  0000   INR 1.8*       ASSESSMENT     Source(s): Chart Review and Patient/Caregiver Call     Warfarin doses taken: Warfarin taken as instructed  Diet: No new diet changes identified  Medication/supplement changes: None noted  New illness, injury, or hospitalization: No  Signs or symptoms of bleeding or clotting: No  Previous result: Subtherapeutic  Additional findings: None       PLAN     Recommended plan for no diet, medication or health factor changes affecting INR     Dosing Instructions: booster dose then Increase your warfarin dose (7.1% change) with next INR in 1 week       Summary  As of 10/11/2023      Full warfarin instructions:  10/11: 20 mg; Otherwise 15 mg every Wed; 10 mg all other days   Next INR check:  10/18/2023               Telephone call with Aaliyah who verbalizes understanding and agrees to plan    Patient to recheck with home meter    Education provided:   Please call back if any changes to your diet, medications or how you've been taking warfarin    Plan made per [facility] anticoagulation protocol    Bev Mancilla, RN  Anticoagulation Clinic  10/11/2023    _______________________________________________________________________     Anticoagulation Episode Summary       Current INR goal:  2.0-3.0   TTR:  61.4% (1 y)   Target end date:  Indefinite   Send INR reminders to:  [facility]IT[facility]    Indications    Atrial fibrillation (H) [I48.91]  Long term current use of anticoagulant therapy [Z79.01]  Atrial fibrillation  unspecified type (H) [I48.91]  Permanent atrial fibrillation (H) [I48.21]             Comments:  [employer] home meter- MBE             Anticoagulation Care Providers       Provider Role Specialty Phone number    Tyler Lee MD Referring Family Medicine [phone]    Araceli Crowley NP  Referring Nurse Practitioner - Family [phone]

## 2023-10-18 ENCOUNTER — ANTICOAGULATION THERAPY VISIT (OUTPATIENT)
Dept: ANTICOAGULATION | Facility: CLINIC | Age: 83
End: 2023-10-18
Payer: COMMERCIAL

## 2023-10-18 DIAGNOSIS — I48.91 ATRIAL FIBRILLATION, UNSPECIFIED TYPE (H): Primary | ICD-10-CM

## 2023-10-18 DIAGNOSIS — Z79.01 LONG TERM CURRENT USE OF ANTICOAGULANT THERAPY: ICD-10-CM

## 2023-10-18 DIAGNOSIS — I48.21 PERMANENT ATRIAL FIBRILLATION (H): ICD-10-CM

## 2023-10-18 LAB — INR HOME MONITORING: 2.4 (ref 2–3)

## 2023-10-18 NOTE — PROGRESS NOTES
ANTICOAGULATION MANAGEMENT     Unique Ward 83 year old female is on warfarin with therapeutic INR result. (Goal INR 2.0-3.0)    Recent labs: (last 7 days)     10/18/23  0000   INR 2.4       ASSESSMENT     Source(s): Chart Review and Patient/Caregiver Call     Warfarin doses taken: Warfarin taken as instructed  Diet: No new diet changes identified  Medication/supplement changes: None noted  New illness, injury, or hospitalization: No  Signs or symptoms of bleeding or clotting: No  Previous result: Therapeutic last visit; previously outside of goal range  Additional findings: None       PLAN     Recommended plan for no diet, medication or health factor changes affecting INR     Dosing Instructions: Continue your current warfarin dose with next INR in 1 week       Summary  As of 10/18/2023      Full warfarin instructions:  15 mg every Wed; 10 mg all other days   Next INR check:  10/25/2023               Telephone call with Aaliyah who agrees to plan and repeated back plan correctly    Patient to recheck with home meter    Education provided:   Please call back if any changes to your diet, medications or how you've been taking warfarin  Goal range and lab monitoring: goal range and significance of current result  Resume manage by exception with home monitor. Continue to submit INR results to home monitor company.You will only be called when your result is out of range. Please call and notify [facility] if new medication started, dose missed, signs or symptoms of bleeding or clotting, or a surgery/procedure is scheduled.    Plan made per [facility] anticoagulation protocol    Christine Olivares RN  Anticoagulation Clinic  10/18/2023    _______________________________________________________________________     Anticoagulation Episode Summary       Current INR goal:  2.0-3.0   TTR:  62.7% (1 y)   Target end date:  Indefinite   Send INR reminders to:  [facility]    Indications    Atrial fibrillation (H) [I48.91]  Long term  current use of anticoagulant therapy [Z79.01]  Atrial fibrillation  unspecified type (H) [I48.91]  Permanent atrial fibrillation (H) [I48.21]             Comments:  [employer] meter- MBE             Anticoagulation Care Providers       Provider Role Specialty Phone number    Tyler Lee MD Referring Family Medicine [phone]    Araceli Crowley NP Referring Nurse Practitioner - Family [phone]

## 2023-10-23 ENCOUNTER — HOSPITAL ENCOUNTER (OUTPATIENT)
Dept: CARDIOLOGY | Facility: CLINIC | Age: 83
Discharge: HOME | End: 2023-10-23
Attending: INTERNAL MEDICINE | Admitting: INTERNAL MEDICINE
Payer: MEDICARE

## 2023-10-23 DIAGNOSIS — I10 ESSENTIAL HYPERTENSION: ICD-10-CM

## 2023-10-23 LAB — LVEF ECHO: NORMAL

## 2023-10-23 PROCEDURE — 255N000002 HC RX 255 OP 636: Performed by: INTERNAL MEDICINE

## 2023-10-23 PROCEDURE — 93306 TTE W/DOPPLER COMPLETE: CPT | Mod: 26 | Performed by: INTERNAL MEDICINE

## 2023-10-23 RX ADMIN — PERFLUTREN 4 ML: 6.52 INJECTION, SUSPENSION INTRAVENOUS at 11:54

## 2023-10-25 ENCOUNTER — OFFICE VISIT (OUTPATIENT)
Dept: CARDIOLOGY | Facility: CLINIC | Age: 83
End: 2023-10-25
Payer: COMMERCIAL

## 2023-10-25 ENCOUNTER — DOCUMENTATION ONLY (OUTPATIENT)
Dept: ANTICOAGULATION | Facility: CLINIC | Age: 83
End: 2023-10-25

## 2023-10-25 VITALS
OXYGEN SATURATION: 97 % | HEART RATE: 90 BPM | WEIGHT: 132.5 LBS | BODY MASS INDEX: 21.29 KG/M2 | HEIGHT: 66 IN | RESPIRATION RATE: 14 BRPM | SYSTOLIC BLOOD PRESSURE: 130 MMHG | DIASTOLIC BLOOD PRESSURE: 82 MMHG

## 2023-10-25 DIAGNOSIS — Z79.01 LONG TERM CURRENT USE OF ANTICOAGULANT THERAPY: ICD-10-CM

## 2023-10-25 DIAGNOSIS — I10 ESSENTIAL HYPERTENSION: ICD-10-CM

## 2023-10-25 DIAGNOSIS — I48.91 ATRIAL FIBRILLATION, UNSPECIFIED TYPE (H): ICD-10-CM

## 2023-10-25 DIAGNOSIS — I48.91 ATRIAL FIBRILLATION (H): Primary | ICD-10-CM

## 2023-10-25 DIAGNOSIS — I48.21 PERMANENT ATRIAL FIBRILLATION (H): ICD-10-CM

## 2023-10-25 LAB — INR HOME MONITORING: 2.5 (ref 2–3)

## 2023-10-25 PROCEDURE — 99214 OFFICE O/P EST MOD 30 MIN: CPT | Performed by: INTERNAL MEDICINE

## 2023-10-25 ASSESSMENT — PAIN SCALES - GENERAL: PAINLEVEL: MILD PAIN (2)

## 2023-10-25 NOTE — PROGRESS NOTES
CARDIOLOGY CLINIC VISIT  DATE OF SERVICE:  October 25, 2023      PRIMARY CARE PHYSICIAN:  Fernando Ogden    HISTORY OF PRESENT ILLNESS:  Ms. Unique Ward is a very pleasant 80 year old patient with PMH significant for paroxysmal atrial fibrillation.  She wast last seen by me in 10/2022 and presents today for follow up.    In brief review, she presented to the ED at [facility] on 4/14/19 with palpitations. She was found to the in atrial fibrillation with RVR and spontaneously converted to normal sinus rhythm with occasional PVCs. An echocardiogram showed normal LV function with moderate tricuspid regurgitation. Given [id number] score of 4. Initially, Xarelto was prescribed, but was cost prohibitive and she was then started on Warfarin.  In March 2020, she was admitted to the hospital with complete heart block.  She underwent permanent pacemaker implantation at that time.  She has had regular follow up with device clinic.  A follow up echocardiogram performed last week demonstrated normal LV function, moderate to moderate-severe TR, mild RV dilatation with normal RV function.    She presented ot the ED In August with symptoms of dizziness.  Her sodium was mildly decreased at 127.  It was felt she was drinking lots of water and she was instructed to increase salt in her diet.  She has been treated for UTIs.   Today, Aaliyah reports feeling well overall.  She denies any exertional chest pain, chest discomfort or shortness of breath.  She denies any light-headedness or dizziness.           PAST MEDICAL HISTORY:  Past Medical History:   Diagnosis Date    Arthritis 01.01.1980    Atrial fibrillation (H) 6/7/2019    Cancer (H) 01.01.1993     scalp    Basal Cell X2  head      nose bridge 1996    Depressive disorder 01.01.1977    anxiety    FH: melanoma- son 2/28/2017    Hypertension 03.16.2016       MEDICATIONS:  Current Outpatient Medications   Medication    amLODIPine (NORVASC) 5 MG tablet    EPINEPHrine  (ANY BX GENERIC EQUIV) 0.3 MG/0.3ML injection 2-pack    meclizine (ANTIVERT) 25 MG tablet    warfarin ANTICOAGULANT (COUMADIN) 5 MG tablet    Zinc Sulfate (ZINC 15 PO)     Current Facility-Administered Medications   Medication    cephALEXin (KEFLEX) capsule 250 mg    gentamicin (PF) (GARAMYCIN) injection 1 mg    penicillin g potassium [id number] unit vial INTRADERMAL       ALLERGIES:  Allergies   Allergen Reactions    Keflex [Cephalexin] Anaphylaxis    No Clinical Screening - See Comments      Eye drop antibiotic.    Codeine Other (See Comments)     Comment: , Description:     Gentamicin Hives     Per Unique this medication gives hives on arms and legs. Bev Lo LSXO,  ....................  7/15/2014   1:15 PM    Influenza Vaccines Other (See Comments)     Comment: , Description:     Influenza Virus Vaccine H5n1     Mct Oil [Medium Chain Triglycerides]     Paxil [Paroxetine] Other (See Comments)     Comment: , Description:     Typhoid Vaccine, Live Other (See Comments)     Comment: , Description:   Comment: , Description:     Typhoid Vaccines        SOCIAL HISTORY:  I have reviewed this patient's social history and updated it with pertinent information if needed. Unique Ward  reports that she quit smoking about 58 years ago. Her smoking use included cigarettes. She started smoking about 68 years ago. She has a 7.50 pack-year smoking history. She has never used smokeless tobacco. She reports that she does not drink alcohol and does not use drugs.    FAMILY HISTORY:  I have reviewed this patient's family history and updated it with pertinent information if needed.   Family History   Problem Relation Age of Onset    Diabetes Maternal Grandmother         Na    Coronary Artery Disease Sister         error    Coronary Artery Disease Brother         error    Hypertension Brother     Hyperlipidemia Brother     Cerebrovascular Disease Brother     Hypertension Sister     Hypertension Sister     Hyperlipidemia Sister      Other Cancer Sister         cervical    Breast Cancer Sister     Other Cancer Sister         kidney    Mental Illness Mother         paranoid/schizophrenic/suicide    Mental Illness Sister         ???  Had shock treatments    Hyperlipidemia Sister     Other Cancer Sister         Kidney       REVIEW OF SYSTEMS:  A complete ROS was obtained and the pertinent positives are outlined in the history of present illness above.  The remainder of systems is negative.    PHYSICAL EXAM:                     Vital Signs with Ranges     0 lbs 0 oz    Constitutional: awake, alert, no distress  Eyes: PERRL, sclera nonicteric  ENT: trachea midline  Respiratory: CTAB  Cardiovascular: RRR no m/r/g, no JVD  GI: nondistended, nontender, bowel sounds present  Lymph/Hematologic: no lymphadenopathy  Skin: dry, no rash  Musculoskeletal: good muscle tone, strength 5/5 in upper and lower extremities  Neurologic: no focal deficits  Neuropsychiatric: appropriate affact      Echocardiogram dated 10/25/23 images reviewed personally    The rhythm was paced.  Mildly decreased left ventricular systolic function  Septal wall motion abnormality may reflect pacemaker activation.  The visual ejection fraction is 45-50%.  Moderately dilated right ventricle with mildly decreased systolic function.  Moderate to severe right atrial enlargement.  Moderately severe tricuspid valve regurgitation in the presence of pacemaker  leads in the right atrium and right ventricle.  Normal inferior vena cava.  Trace mitral valve regurgitation.  Stable, small circumferential pericardial effusion.     Compared to the previous study dated 10/13/2022, the right ventricle is  dilated with reduced systolic function, TR remains moderately severe.         Labs dated 9/6/23 reviewed personally.  Hgb, TSH, BMP, INR      ASSESSMENT:     Paroxysmal atrial fibrillation  [id number] score of 4 (age, gender, hypertension)  Anticoagulated with Coumadin; INRs in range  Infrequent PAF  on device check; rate controlled     Hypertension  At goal  Noted increased edema on higher dose amlodipine; tolerating 5 mg daily    Moderate to severe tricuspid regurgitation  Stable by recent echocardiogram, no symptoms of concern  Mild RV dysfunction.    No symptoms of concern    4.  Complete AV block s/p dual chamber pacemaker       Normal device check; brief rate controlled atrial fibrillation noted     5.  Feelings of anxiety       Improved       RECOMMENDATIONS:  -Doing well from a cardiac standpoint.  Continue current medications unchanged  -Plan for follow up in 12 months with an echocardiogram prior to that visit      Christin Sanchez MD [facility]  October 23, 2023    I spent a total of 30 minutes with the patient.  This time spent includes chart review, time spent with the patient during the clinic visit and time spent afterwards providing necessary documentation.

## 2023-10-25 NOTE — LETTER
10/25/2023    Fernando Ogden MD  [address] [place] [address]    RE: Unique Ward       Dear Colleague,     I had the pleasure of seeing Unique Ward in the [facility].  CARDIOLOGY CLINIC VISIT  DATE OF SERVICE:  October 25, 2023      PRIMARY CARE PHYSICIAN:  Fernando Ogden    HISTORY OF PRESENT ILLNESS:  Ms. Unique Ward is a very pleasant 80 year old patient with PMH significant for paroxysmal atrial fibrillation.  She wast last seen by me in 10/2022 and presents today for follow up.    In brief review, she presented to the ED at [facility] on 4/14/19 with palpitations. She was found to the in atrial fibrillation with RVR and spontaneously converted to normal sinus rhythm with occasional PVCs. An echocardiogram showed normal LV function with moderate tricuspid regurgitation. Given [id number] score of 4. Initially, Xarelto was prescribed, but was cost prohibitive and she was then started on Warfarin.  In March 2020, she was admitted to the hospital with complete heart block.  She underwent permanent pacemaker implantation at that time.  She has had regular follow up with device clinic.  A follow up echocardiogram performed last week demonstrated normal LV function, moderate to moderate-severe TR, mild RV dilatation with normal RV function.    She presented ot the ED In August with symptoms of dizziness.  Her sodium was mildly decreased at 127.  It was felt she was drinking lots of water and she was instructed to increase salt in her diet.  She has been treated for UTIs.   Today, Aaliyah reports feeling well overall.  She denies any exertional chest pain, chest discomfort or shortness of breath.  She denies any light-headedness or dizziness.           PAST MEDICAL HISTORY:  Past Medical History:   Diagnosis Date    Arthritis 01.01.1980    Atrial fibrillation (H) 6/7/2019    Cancer (H) 01.01.1993     scalp    Basal Cell X2  head      nose bridge 1996    Depressive  disorder 01.01.1977    anxiety    FH: melanoma- son 2/28/2017    Hypertension 03.16.2016       MEDICATIONS:  Current Outpatient Medications   Medication    amLODIPine (NORVASC) 5 MG tablet    EPINEPHrine (ANY BX GENERIC EQUIV) 0.3 MG/0.3ML injection 2-pack    meclizine (ANTIVERT) 25 MG tablet    warfarin ANTICOAGULANT (COUMADIN) 5 MG tablet    Zinc Sulfate (ZINC 15 PO)     Current Facility-Administered Medications   Medication    cephALEXin (KEFLEX) capsule 250 mg    gentamicin (PF) (GARAMYCIN) injection 1 mg    penicillin g potassium [id number] unit vial INTRADERMAL       ALLERGIES:  Allergies   Allergen Reactions    Keflex [Cephalexin] Anaphylaxis    No Clinical Screening - See Comments      Eye drop antibiotic.    Codeine Other (See Comments)     Comment: , Description:     Gentamicin Hives     Per Unique this medication gives hives on arms and legs. Bev Lo LSXO,  ....................  7/15/2014   1:15 PM    Influenza Vaccines Other (See Comments)     Comment: , Description:     Influenza Virus Vaccine H5n1     Mct Oil [Medium Chain Triglycerides]     Paxil [Paroxetine] Other (See Comments)     Comment: , Description:     Typhoid Vaccine, Live Other (See Comments)     Comment: , Description:   Comment: , Description:     Typhoid Vaccines        SOCIAL HISTORY:  I have reviewed this patient's social history and updated it with pertinent information if needed. Unique Ward  reports that she quit smoking about 58 years ago. Her smoking use included cigarettes. She started smoking about 68 years ago. She has a 7.50 pack-year smoking history. She has never used smokeless tobacco. She reports that she does not drink alcohol and does not use drugs.    FAMILY HISTORY:  I have reviewed this patient's family history and updated it with pertinent information if needed.   Family History   Problem Relation Age of Onset    Diabetes Maternal Grandmother         Na    Coronary Artery Disease Sister         error     Coronary Artery Disease Brother         error    Hypertension Brother     Hyperlipidemia Brother     Cerebrovascular Disease Brother     Hypertension Sister     Hypertension Sister     Hyperlipidemia Sister     Other Cancer Sister         cervical    Breast Cancer Sister     Other Cancer Sister         kidney    Mental Illness Mother         paranoid/schizophrenic/suicide    Mental Illness Sister         ???  Had shock treatments    Hyperlipidemia Sister     Other Cancer Sister         Kidney       REVIEW OF SYSTEMS:  A complete ROS was obtained and the pertinent positives are outlined in the history of present illness above.  The remainder of systems is negative.    PHYSICAL EXAM:                     Vital Signs with Ranges     0 lbs 0 oz    Constitutional: awake, alert, no distress  Eyes: PERRL, sclera nonicteric  ENT: trachea midline  Respiratory: CTAB  Cardiovascular: RRR no m/r/g, no JVD  GI: nondistended, nontender, bowel sounds present  Lymph/Hematologic: no lymphadenopathy  Skin: dry, no rash  Musculoskeletal: good muscle tone, strength 5/5 in upper and lower extremities  Neurologic: no focal deficits  Neuropsychiatric: appropriate affact      Echocardiogram dated 10/25/23 images reviewed personally    The rhythm was paced.  Mildly decreased left ventricular systolic function  Septal wall motion abnormality may reflect pacemaker activation.  The visual ejection fraction is 45-50%.  Moderately dilated right ventricle with mildly decreased systolic function.  Moderate to severe right atrial enlargement.  Moderately severe tricuspid valve regurgitation in the presence of pacemaker  leads in the right atrium and right ventricle.  Normal inferior vena cava.  Trace mitral valve regurgitation.  Stable, small circumferential pericardial effusion.     Compared to the previous study dated 10/13/2022, the right ventricle is  dilated with reduced systolic function, TR remains moderately severe.         Labs dated 9/6/23  reviewed personally.  Hgb, TSH, BMP, INR      ASSESSMENT:     Paroxysmal atrial fibrillation  [id number]-SPRv score of 4 (age, gender, hypertension)  Anticoagulated with Coumadin; INRs in range  Infrequent PAF on device check; rate controlled     Hypertension  At goal  Noted increased edema on higher dose amlodipine; tolerating 5 mg daily    Moderate to severe tricuspid regurgitation  Stable by recent echocardiogram, no symptoms of concern  Mild RV dysfunction.    No symptoms of concern    4.  Complete AV block s/p dual chamber pacemaker       Normal device check; brief rate controlled atrial fibrillation noted     5.  Feelings of anxiety       Improved       RECOMMENDATIONS:  -Doing well from a cardiac standpoint.  Continue current medications unchanged  -Plan for follow up in 12 months with an echocardiogram prior to that visit      Christin Sanchez MD [facility]  October 23, 2023    I spent a total of 30 minutes with the patient.  This time spent includes chart review, time spent with the patient during the clinic visit and time spent afterwards providing necessary documentation.        Thank you for allowing me to participate in the care of your patient.      Sincerely,     Christin Sanchez MD     [facility]  cc:   Christin Sanchez MD  [address] [address]

## 2023-10-25 NOTE — PROGRESS NOTES
ANTICOAGULATION  MANAGEMENT-Home Monitor Managed by Exception    Unique Ward 83 year old female is on warfarin with therapeutic INR result. (Goal INR 2.0-3.0)    Recent labs: (last 7 days)     10/25/23  0000   INR 2.5       Previous INR was Therapeutic  Medication, diet, health changes since last INR:chart reviewed; none identified  Contacted within the last 12 weeks by phone on 10/18/2023      ARIELLE     Unique was NOT contacted regarding therapeutic result today per home monitoring policy manage by exception agreement.   Current warfarin dose is to be continued:     Summary  As of 10/25/2023      Full warfarin instructions:  15 mg every Wed; 10 mg all other days   Next INR check:  11/1/2023             ?   Shi Bennett RN  Anticoagulation Clinic  10/25/2023    _______________________________________________________________________     Anticoagulation Episode Summary       Current INR goal:  2.0-3.0   TTR:  64.6% (1 y)   Target end date:  Indefinite   Send INR reminders to:  [facility]    Indications    Atrial fibrillation (H) [I48.91]  Long term current use of anticoagulant therapy [Z79.01]  Atrial fibrillation  unspecified type (H) [I48.91]  Permanent atrial fibrillation (H) [I48.21]             Comments:  [employer] home meter- MBE             Anticoagulation Care Providers       Provider Role Specialty Phone number    Tyler Lee MD Referring Family Medicine [phone]    Araceli Crowley NP Referring Nurse Practitioner - Family [phone]

## 2023-10-26 ENCOUNTER — ANCILLARY PROCEDURE (OUTPATIENT)
Dept: CARDIOLOGY | Facility: CLINIC | Age: 83
End: 2023-10-26
Attending: INTERNAL MEDICINE
Payer: COMMERCIAL

## 2023-10-26 DIAGNOSIS — Z95.0 CARDIAC PACEMAKER IN SITU: ICD-10-CM

## 2023-10-26 DIAGNOSIS — I44.2 CHB (COMPLETE HEART BLOCK) (H): ICD-10-CM

## 2023-10-26 PROCEDURE — 93280 PM DEVICE PROGR EVAL DUAL: CPT | Performed by: INTERNAL MEDICINE

## 2023-10-27 LAB
MDC_IDC_EPISODE_DTM: NORMAL
MDC_IDC_EPISODE_DURATION: 1492 S
MDC_IDC_EPISODE_DURATION: 177 S
MDC_IDC_EPISODE_DURATION: 203 S
MDC_IDC_EPISODE_DURATION: 206 S
MDC_IDC_EPISODE_DURATION: 2075 S
MDC_IDC_EPISODE_DURATION: 2202 S
MDC_IDC_EPISODE_DURATION: 305 S
MDC_IDC_EPISODE_DURATION: 383 S
MDC_IDC_EPISODE_DURATION: 46 S
MDC_IDC_EPISODE_DURATION: 5565 S
MDC_IDC_EPISODE_DURATION: 777 S
MDC_IDC_EPISODE_ID: 255
MDC_IDC_EPISODE_ID: 256
MDC_IDC_EPISODE_ID: 257
MDC_IDC_EPISODE_ID: 258
MDC_IDC_EPISODE_ID: 259
MDC_IDC_EPISODE_ID: 260
MDC_IDC_EPISODE_ID: 261
MDC_IDC_EPISODE_ID: 262
MDC_IDC_EPISODE_ID: 263
MDC_IDC_EPISODE_ID: 264
MDC_IDC_EPISODE_ID: 265
MDC_IDC_EPISODE_TYPE: NORMAL
MDC_IDC_EPISODE_TYPE_INDUCED: NO
MDC_IDC_LEAD_CONNECTION_STATUS: NORMAL
MDC_IDC_LEAD_CONNECTION_STATUS: NORMAL
MDC_IDC_LEAD_IMPLANT_DT: NORMAL
MDC_IDC_LEAD_IMPLANT_DT: NORMAL
MDC_IDC_LEAD_LOCATION: NORMAL
MDC_IDC_LEAD_LOCATION: NORMAL
MDC_IDC_LEAD_LOCATION_DETAIL_1: NORMAL
MDC_IDC_LEAD_LOCATION_DETAIL_1: NORMAL
MDC_IDC_LEAD_MFG: NORMAL
MDC_IDC_LEAD_MFG: NORMAL
MDC_IDC_LEAD_MODEL: NORMAL
MDC_IDC_LEAD_MODEL: NORMAL
MDC_IDC_LEAD_POLARITY_TYPE: NORMAL
MDC_IDC_LEAD_POLARITY_TYPE: NORMAL
MDC_IDC_LEAD_SERIAL: NORMAL
MDC_IDC_LEAD_SERIAL: NORMAL
MDC_IDC_MSMT_BATTERY_DTM: NORMAL
MDC_IDC_MSMT_BATTERY_REMAINING_LONGEVITY: 99 MO
MDC_IDC_MSMT_BATTERY_RRT_TRIGGER: 2.62
MDC_IDC_MSMT_BATTERY_STATUS: NORMAL
MDC_IDC_MSMT_BATTERY_VOLTAGE: 2.98 V
MDC_IDC_MSMT_LEADCHNL_RA_IMPEDANCE_VALUE: 304 OHM
MDC_IDC_MSMT_LEADCHNL_RA_IMPEDANCE_VALUE: 361 OHM
MDC_IDC_MSMT_LEADCHNL_RA_PACING_THRESHOLD_AMPLITUDE: 0.62 V
MDC_IDC_MSMT_LEADCHNL_RA_PACING_THRESHOLD_PULSEWIDTH: 0.4 MS
MDC_IDC_MSMT_LEADCHNL_RA_SENSING_INTR_AMPL: 1 MV
MDC_IDC_MSMT_LEADCHNL_RA_SENSING_INTR_AMPL: 1.25 MV
MDC_IDC_MSMT_LEADCHNL_RV_IMPEDANCE_VALUE: 380 OHM
MDC_IDC_MSMT_LEADCHNL_RV_IMPEDANCE_VALUE: 456 OHM
MDC_IDC_MSMT_LEADCHNL_RV_PACING_THRESHOLD_AMPLITUDE: 0.88 V
MDC_IDC_MSMT_LEADCHNL_RV_PACING_THRESHOLD_PULSEWIDTH: 0.4 MS
MDC_IDC_MSMT_LEADCHNL_RV_SENSING_INTR_AMPL: 7.88 MV
MDC_IDC_MSMT_LEADCHNL_RV_SENSING_INTR_AMPL: 7.88 MV
MDC_IDC_PG_IMPLANT_DTM: NORMAL
MDC_IDC_PG_MFG: NORMAL
MDC_IDC_PG_MODEL: NORMAL
MDC_IDC_PG_SERIAL: NORMAL
MDC_IDC_PG_TYPE: NORMAL
MDC_IDC_SESS_CLINIC_NAME: NORMAL
MDC_IDC_SESS_DTM: NORMAL
MDC_IDC_SESS_TYPE: NORMAL
MDC_IDC_SET_BRADY_AT_MODE_SWITCH_RATE: 171 {BEATS}/MIN
MDC_IDC_SET_BRADY_HYSTRATE: NORMAL
MDC_IDC_SET_BRADY_LOWRATE: 60 {BEATS}/MIN
MDC_IDC_SET_BRADY_MAX_SENSOR_RATE: 130 {BEATS}/MIN
MDC_IDC_SET_BRADY_MAX_TRACKING_RATE: 130 {BEATS}/MIN
MDC_IDC_SET_BRADY_MODE: NORMAL
MDC_IDC_SET_BRADY_PAV_DELAY_HIGH: 140 MS
MDC_IDC_SET_BRADY_PAV_DELAY_LOW: 300 MS
MDC_IDC_SET_BRADY_SAV_DELAY_HIGH: 110 MS
MDC_IDC_SET_BRADY_SAV_DELAY_LOW: 230 MS
MDC_IDC_SET_LEADCHNL_RA_PACING_AMPLITUDE: 1.5 V
MDC_IDC_SET_LEADCHNL_RA_PACING_ANODE_ELECTRODE_1: NORMAL
MDC_IDC_SET_LEADCHNL_RA_PACING_ANODE_LOCATION_1: NORMAL
MDC_IDC_SET_LEADCHNL_RA_PACING_CAPTURE_MODE: NORMAL
MDC_IDC_SET_LEADCHNL_RA_PACING_CATHODE_ELECTRODE_1: NORMAL
MDC_IDC_SET_LEADCHNL_RA_PACING_CATHODE_LOCATION_1: NORMAL
MDC_IDC_SET_LEADCHNL_RA_PACING_POLARITY: NORMAL
MDC_IDC_SET_LEADCHNL_RA_PACING_PULSEWIDTH: 0.4 MS
MDC_IDC_SET_LEADCHNL_RA_SENSING_ANODE_ELECTRODE_1: NORMAL
MDC_IDC_SET_LEADCHNL_RA_SENSING_ANODE_LOCATION_1: NORMAL
MDC_IDC_SET_LEADCHNL_RA_SENSING_CATHODE_ELECTRODE_1: NORMAL
MDC_IDC_SET_LEADCHNL_RA_SENSING_CATHODE_LOCATION_1: NORMAL
MDC_IDC_SET_LEADCHNL_RA_SENSING_POLARITY: NORMAL
MDC_IDC_SET_LEADCHNL_RA_SENSING_SENSITIVITY: 0.3 MV
MDC_IDC_SET_LEADCHNL_RV_PACING_AMPLITUDE: 2 V
MDC_IDC_SET_LEADCHNL_RV_PACING_ANODE_ELECTRODE_1: NORMAL
MDC_IDC_SET_LEADCHNL_RV_PACING_ANODE_LOCATION_1: NORMAL
MDC_IDC_SET_LEADCHNL_RV_PACING_CAPTURE_MODE: NORMAL
MDC_IDC_SET_LEADCHNL_RV_PACING_CATHODE_ELECTRODE_1: NORMAL
MDC_IDC_SET_LEADCHNL_RV_PACING_CATHODE_LOCATION_1: NORMAL
MDC_IDC_SET_LEADCHNL_RV_PACING_POLARITY: NORMAL
MDC_IDC_SET_LEADCHNL_RV_PACING_PULSEWIDTH: 0.4 MS
MDC_IDC_SET_LEADCHNL_RV_SENSING_ANODE_ELECTRODE_1: NORMAL
MDC_IDC_SET_LEADCHNL_RV_SENSING_ANODE_LOCATION_1: NORMAL
MDC_IDC_SET_LEADCHNL_RV_SENSING_CATHODE_ELECTRODE_1: NORMAL
MDC_IDC_SET_LEADCHNL_RV_SENSING_CATHODE_LOCATION_1: NORMAL
MDC_IDC_SET_LEADCHNL_RV_SENSING_POLARITY: NORMAL
MDC_IDC_SET_LEADCHNL_RV_SENSING_SENSITIVITY: 0.9 MV
MDC_IDC_SET_ZONE_DETECTION_INTERVAL: 350 MS
MDC_IDC_SET_ZONE_DETECTION_INTERVAL: 400 MS
MDC_IDC_SET_ZONE_STATUS: NORMAL
MDC_IDC_SET_ZONE_STATUS: NORMAL
MDC_IDC_SET_ZONE_TYPE: NORMAL
MDC_IDC_SET_ZONE_VENDOR_TYPE: NORMAL
MDC_IDC_STAT_AT_BURDEN_PERCENT: 0.5 %
MDC_IDC_STAT_AT_DTM_END: NORMAL
MDC_IDC_STAT_AT_DTM_START: NORMAL
MDC_IDC_STAT_BRADY_AP_VP_PERCENT: 72.24 %
MDC_IDC_STAT_BRADY_AP_VS_PERCENT: 0.17 %
MDC_IDC_STAT_BRADY_AS_VP_PERCENT: 27.36 %
MDC_IDC_STAT_BRADY_AS_VS_PERCENT: 0.24 %
MDC_IDC_STAT_BRADY_DTM_END: NORMAL
MDC_IDC_STAT_BRADY_DTM_START: NORMAL
MDC_IDC_STAT_BRADY_RA_PERCENT_PACED: 70.98 %
MDC_IDC_STAT_BRADY_RV_PERCENT_PACED: 99.6 %
MDC_IDC_STAT_EPISODE_RECENT_COUNT: 0
MDC_IDC_STAT_EPISODE_RECENT_COUNT: 1
MDC_IDC_STAT_EPISODE_RECENT_COUNT: 207
MDC_IDC_STAT_EPISODE_RECENT_COUNT_DTM_END: NORMAL
MDC_IDC_STAT_EPISODE_RECENT_COUNT_DTM_START: NORMAL
MDC_IDC_STAT_EPISODE_TOTAL_COUNT: 0
MDC_IDC_STAT_EPISODE_TOTAL_COUNT: 261
MDC_IDC_STAT_EPISODE_TOTAL_COUNT: 4
MDC_IDC_STAT_EPISODE_TOTAL_COUNT_DTM_END: NORMAL
MDC_IDC_STAT_EPISODE_TOTAL_COUNT_DTM_START: NORMAL
MDC_IDC_STAT_EPISODE_TYPE: NORMAL
MDC_IDC_STAT_TACHYTHERAPY_RECENT_DTM_END: NORMAL
MDC_IDC_STAT_TACHYTHERAPY_RECENT_DTM_START: NORMAL
MDC_IDC_STAT_TACHYTHERAPY_TOTAL_DTM_END: NORMAL
MDC_IDC_STAT_TACHYTHERAPY_TOTAL_DTM_START: NORMAL

## 2023-11-01 ENCOUNTER — HOSPITAL ENCOUNTER (EMERGENCY)
Facility: CLINIC | Age: 83
Discharge: HOME | End: 2023-11-01
Attending: PHYSICIAN ASSISTANT | Admitting: PHYSICIAN ASSISTANT
Payer: MEDICARE

## 2023-11-01 ENCOUNTER — DOCUMENTATION ONLY (OUTPATIENT)
Dept: ANTICOAGULATION | Facility: CLINIC | Age: 83
End: 2023-11-01
Payer: COMMERCIAL

## 2023-11-01 VITALS
TEMPERATURE: 97.6 F | DIASTOLIC BLOOD PRESSURE: 97 MMHG | OXYGEN SATURATION: 99 % | HEART RATE: 81 BPM | BODY MASS INDEX: 20.88 KG/M2 | RESPIRATION RATE: 18 BRPM | HEIGHT: 67 IN | SYSTOLIC BLOOD PRESSURE: 175 MMHG | WEIGHT: 133 LBS

## 2023-11-01 DIAGNOSIS — Z79.01 LONG TERM CURRENT USE OF ANTICOAGULANT THERAPY: ICD-10-CM

## 2023-11-01 DIAGNOSIS — I48.91 ATRIAL FIBRILLATION, UNSPECIFIED TYPE (H): Primary | ICD-10-CM

## 2023-11-01 DIAGNOSIS — N39.0 UTI (URINARY TRACT INFECTION): ICD-10-CM

## 2023-11-01 DIAGNOSIS — I48.21 PERMANENT ATRIAL FIBRILLATION (H): ICD-10-CM

## 2023-11-01 LAB
ALBUMIN UR-MCNC: NEGATIVE MG/DL
APPEARANCE UR: ABNORMAL
BACTERIA #/AREA URNS HPF: ABNORMAL /HPF
BILIRUB UR QL STRIP: NEGATIVE
COLOR UR AUTO: YELLOW
GLUCOSE UR STRIP-MCNC: NEGATIVE MG/DL
HGB UR QL STRIP: NEGATIVE
INR HOME MONITORING: 2.2 (ref 2–3)
KETONES UR STRIP-MCNC: NEGATIVE MG/DL
LEUKOCYTE ESTERASE UR QL STRIP: ABNORMAL
MUCOUS THREADS #/AREA URNS LPF: PRESENT /LPF
NITRATE UR QL: NEGATIVE
PH UR STRIP: 6 [PH] (ref 5–7)
RBC URINE: 10 /HPF
SP GR UR STRIP: 1.01 (ref 1–1.03)
SQUAMOUS EPITHELIAL: 1 /HPF
UROBILINOGEN UR STRIP-MCNC: NORMAL MG/DL
WBC CLUMPS #/AREA URNS HPF: PRESENT /HPF
WBC URINE: >182 /HPF

## 2023-11-01 PROCEDURE — 87086 URINE CULTURE/COLONY COUNT: CPT | Performed by: PHYSICIAN ASSISTANT

## 2023-11-01 PROCEDURE — 99284 EMERGENCY DEPT VISIT MOD MDM: CPT | Performed by: PHYSICIAN ASSISTANT

## 2023-11-01 PROCEDURE — 99283 EMERGENCY DEPT VISIT LOW MDM: CPT | Performed by: PHYSICIAN ASSISTANT

## 2023-11-01 PROCEDURE — 81001 URINALYSIS AUTO W/SCOPE: CPT | Performed by: PHYSICIAN ASSISTANT

## 2023-11-01 RX ORDER — NITROFURANTOIN 25; 75 MG/1; MG/1
100 CAPSULE ORAL 2 TIMES DAILY
Qty: 14 CAPSULE | Refills: 0 | Status: SHIPPED | OUTPATIENT
Start: 2023-11-01

## 2023-11-01 NOTE — ED TRIAGE NOTES
Reports dysuria, frequency, and cloudy urine since Sunday.      Triage Assessment (Adult)       Row Name 11/01/23 9049          Triage Assessment    Airway WDL WDL        Respiratory WDL    Respiratory WDL WDL        Skin Circulation/Temperature WDL    Skin Circulation/Temperature WDL WDL        Cardiac WDL    Cardiac WDL WDL        Peripheral/Neurovascular WDL    Peripheral Neurovascular WDL WDL        Cognitive/Neuro/Behavioral WDL    Cognitive/Neuro/Behavioral WDL WDL

## 2023-11-01 NOTE — PROGRESS NOTES
ANTICOAGULATION  MANAGEMENT-Home Monitor Managed by Exception    Unique Ward 83 year old female is on warfarin with therapeutic INR result. (Goal INR 2.0-3.0)    Recent labs: (last 7 days)     11/01/23  0000   INR 2.2       Previous INR was Therapeutic  Medication, diet, health changes since last INR:chart reviewed; none identified  Contacted within the last 12 weeks by phone on 10/18/2023       ARIELLE     Unique was NOT contacted regarding therapeutic result today per home monitoring policy manage by exception agreement.   Current warfarin dose is to be continued:     Summary  As of 11/1/2023      Full warfarin instructions:  15 mg every Wed; 10 mg all other days   Next INR check:  11/8/2023             ?   Ning Forman RN  Anticoagulation Clinic  11/1/2023    _______________________________________________________________________     Anticoagulation Episode Summary       Current INR goal:  2.0-3.0   TTR:  66.6% (1 y)   Target end date:  Indefinite   Send INR reminders to:  [employer] [facility]    Indications    Atrial fibrillation (H) [I48.91]  Long term current use of anticoagulant therapy [Z79.01]  Atrial fibrillation  unspecified type (H) [I48.91]  Permanent atrial fibrillation (H) [I48.21]             Comments:  [employer] home meter- MBE             Anticoagulation Care Providers       Provider Role Specialty Phone number    Tyler Lee MD Referring Family Medicine [phone]    Araceli Crowley NP Referring Nurse Practitioner - Family [phone]

## 2023-11-02 ENCOUNTER — DOCUMENTATION ONLY (OUTPATIENT)
Dept: ANTICOAGULATION | Facility: CLINIC | Age: 83
End: 2023-11-02
Payer: COMMERCIAL

## 2023-11-02 DIAGNOSIS — I48.21 PERMANENT ATRIAL FIBRILLATION (H): ICD-10-CM

## 2023-11-02 DIAGNOSIS — Z79.01 LONG TERM CURRENT USE OF ANTICOAGULANT THERAPY: ICD-10-CM

## 2023-11-02 DIAGNOSIS — I48.91 ATRIAL FIBRILLATION, UNSPECIFIED TYPE (H): Primary | ICD-10-CM

## 2023-11-02 NOTE — ED PROVIDER NOTES
History     Chief Complaint   Patient presents with    Dysuria    Urinary Frequency     HPI  Unique Ward is a 83 year old female who presents for evaluation of cloudy urine and abnormal smell to the urine.  Occasional mild dysuria.  She states symptoms have been ongoing for about 5 to 7 days.  This is a sign that she has a UTI.  Has a history of recurrent UTI, and is getting 1 about every 6-10 months recently.  She has 1 medication that she likes and works very well for her.  On chart review, this is Macrobid.  She denies having side effects from in the past.  She denies current nausea, vomiting, flank pain, abdominal pain, fever, or chills.  No abnormal vaginal discharge.  No vaginal bleeding.  No other symptoms.    Allergies:  Allergies   Allergen Reactions    Keflex [Cephalexin] Anaphylaxis    No Clinical Screening - See Comments      Eye drop antibiotic.    Codeine Other (See Comments)     Comment: , Description:     Gentamicin Hives     Per Unique this medication gives hives on arms and legs. Bev Lo LSXO,  ....................  7/15/2014   1:15 PM    Influenza Vaccines Other (See Comments)     Comment: , Description:     Influenza Virus Vaccine H5n1     Mct Oil [Medium Chain Triglycerides]     Paxil [Paroxetine] Other (See Comments)     Comment: , Description:     Typhoid Vaccine, Live Other (See Comments)     Comment: , Description:   Comment: , Description:     Typhoid Vaccines        Problem List:    Patient Active Problem List    Diagnosis Date Noted    Thrombocytopenia (H24) 08/29/2023     Priority: Medium    Permanent atrial fibrillation (H) 05/16/2022     Priority: Medium    Long term current use of anticoagulant therapy 06/30/2020     Priority: Medium    Atrial fibrillation, unspecified type (H) 06/30/2020     Priority: Medium    CHB (complete heart block) (H) 03/31/2020     Priority: Medium     Added automatically from request for surgery [id number]      Pacemaker 03/31/2020     Priority:  Medium    Atrial fibrillation (H) 06/07/2019     Priority: Medium    History of basal cell carcinoma- face and scalp 02/28/2017     Priority: Medium    Fibromyalgia 02/28/2017     Priority: Medium    Advance Care Planning 01/06/2017     Priority: Medium     Advance Care Planning 5/4/2017: ACP Facilitation Session:    Unique GUZMAN Ed presented for ACP Facilitation session at a group class. She was accompanied by daughter-in-law. [employer] information provided and resources reviewed. She wishes to give additional consideration to ACP.  She currently has the following questions or concerns about Advance Care Planning: none known. Follow-up meeting: not needed/applicable. Added by Unique Uribe RN, Advance Care Planning Liaison.  Advance Care Planning 1/6/17: Info given   Saray Greer MA        Personal history of urinary tract infection 06/18/2016     Priority: Medium    Essential hypertension 03/23/2016     Priority: Medium    Vitamin D deficiency 03/30/2013     Priority: Medium    Dysthymia 04/01/2005     Priority: Medium        Past Medical History:    Past Medical History:   Diagnosis Date    Arthritis 01.01.1980    Atrial fibrillation (H) 6/7/2019    Cancer (H) 01.01.1993     scalp    Depressive disorder 01.01.1977    FH: melanoma- son 2/28/2017    Hypertension 03.16.2016       Past Surgical History:    Past Surgical History:   Procedure Laterality Date    BIOPSY  11/01/2014    COLONOSCOPY  01.01/1979    Two routine,  one for chronic diarrhea    EP PACEMAKER N/A 3/31/2020    Procedure: EP Pacemaker;  Surgeon: Erich Clark MD;  Location:  [facility]    EYE SURGERY  1/1/2014    first    cataracts both eyes.  second a few months later    GENITOURINARY SURGERY      GYN SURGERY  01.01.1977    TL    HEAD & NECK SURGERY  01.01.1997    basal cell X2   Moh's on scalp.  other on bridge of nose    LASER YAG CAPSULOTOMY Bilateral 4/1/2021    Procedure: CAPSULOTOMY, LENS, USING YAG LASER;   "Surgeon: Bernardino Chu MD;  Location: [facility]    SOFT TISSUE SURGERY  1990    Ganglion Cyst   Left inner wrist       Family History:    Family History   Problem Relation Age of Onset    Diabetes Maternal Grandmother         Na    Coronary Artery Disease Sister         error    Coronary Artery Disease Brother         error    Hypertension Brother     Hyperlipidemia Brother     Cerebrovascular Disease Brother     Hypertension Sister     Hypertension Sister     Hyperlipidemia Sister     Other Cancer Sister         cervical    Breast Cancer Sister     Other Cancer Sister         kidney    Mental Illness Mother         paranoid/schizophrenic/suicide    Mental Illness Sister         ???  Had shock treatments    Hyperlipidemia Sister     Other Cancer Sister         Kidney       Social History:  Marital Status:  Legally  [3]  Social History     Tobacco Use    Smoking status: Former     Packs/day: 0.75     Years: 10.00     Additional pack years: 0.00     Total pack years: 7.50     Types: Cigarettes     Start date: 1955     Quit date: 1965     Years since quittin.8    Smokeless tobacco: Never   Vaping Use    Vaping Use: Never used   Substance Use Topics    Alcohol use: No     Alcohol/week: 0.0 standard drinks of alcohol    Drug use: No        Medications:    nitroFURantoin macrocrystal-monohydrate (MACROBID) 100 MG capsule  amLODIPine (NORVASC) 5 MG tablet  EPINEPHrine (ANY BX GENERIC EQUIV) 0.3 MG/0.3ML injection 2-pack  meclizine (ANTIVERT) 25 MG tablet  warfarin ANTICOAGULANT (COUMADIN) 5 MG tablet  Zinc Sulfate (ZINC 15 PO)          Review of Systems   All other systems reviewed and are negative.      Physical Exam   BP: (!) 175/97  Pulse: 81  Temp: 97.6  F (36.4  C)  Resp: 18  Height: 170.2 cm (5' 7\")  Weight: 60.3 kg (133 lb)  SpO2: 99 %      Physical Exam  Vitals and nursing note reviewed.   Constitutional:       General: She is not in acute distress.     Appearance: She is not " diaphoretic.   HENT:      Head: Normocephalic and atraumatic.      Right Ear: External ear normal.      Left Ear: External ear normal.      Nose: Nose normal.      Mouth/Throat:      Pharynx: No oropharyngeal exudate.   Eyes:      General: No scleral icterus.        Right eye: No discharge.         Left eye: No discharge.      Conjunctiva/sclera: Conjunctivae normal.      Pupils: Pupils are equal, round, and reactive to light.   Neck:      Thyroid: No thyromegaly.   Cardiovascular:      Rate and Rhythm: Normal rate and regular rhythm.      Heart sounds: Normal heart sounds. No murmur heard.  Pulmonary:      Effort: Pulmonary effort is normal. No respiratory distress.      Breath sounds: Normal breath sounds. No wheezing or rales.   Chest:      Chest wall: No tenderness.   Abdominal:      General: Bowel sounds are normal. There is no distension.      Palpations: Abdomen is soft. There is no mass.      Tenderness: There is no abdominal tenderness. There is no right CVA tenderness, left CVA tenderness, guarding or rebound.   Musculoskeletal:         General: No tenderness or deformity. Normal range of motion.      Cervical back: Normal range of motion and neck supple.   Lymphadenopathy:      Cervical: No cervical adenopathy.   Skin:     General: Skin is warm and dry.      Capillary Refill: Capillary refill takes less than 2 seconds.      Findings: No erythema or rash.   Neurological:      Mental Status: She is alert and oriented to person, place, and time.      Cranial Nerves: No cranial nerve deficit.   Psychiatric:         Behavior: Behavior normal.         Thought Content: Thought content normal.         ED Course                 Procedures              Critical Care time:  none               Results for orders placed or performed during the hospital encounter of 11/01/23 (from the past 24 hour(s))   UA with Microscopic reflex to Culture    Specimen: Urine, Clean Catch   Result Value Ref Range    Color Urine Yellow  Colorless, Straw, Light Yellow, Yellow    Appearance Urine Cloudy (A) Clear    Glucose Urine Negative Negative mg/dL    Bilirubin Urine Negative Negative    Ketones Urine Negative Negative mg/dL    Specific Gravity Urine 1.014 1.003 - 1.035    Blood Urine Negative Negative    pH Urine 6.0 5.0 - 7.0    Protein Albumin Urine Negative Negative mg/dL    Urobilinogen Urine Normal Normal, 2.0 mg/dL    Nitrite Urine Negative Negative    Leukocyte Esterase Urine Large (A) Negative    Bacteria Urine Many (A) None Seen /HPF    WBC Clumps Urine Present (A) None Seen /HPF    Mucus Urine Present (A) None Seen /LPF    RBC Urine 10 (H) <=2 /HPF    WBC Urine >182 (H) <=5 /HPF    Squamous Epithelials Urine 1 <=1 /HPF    Narrative    Urine Culture ordered based on laboratory criteria       Medications - No data to display    Assessments & Plan (with Medical Decision Making)  UTI (urinary tract infection)     83 year old female presents for evaluation of cloudy and foul-smelling urine.  Symptoms ongoing for 5+ days.  Getting worse.  She states this is her typical symptom for UTI.  Does not have any nausea, vomiting, fever, chills, flank pain, or abdominal pain yet.  On exam blood pressure 175/97, temperature 97.6, pulse 81, respiration 18, oxygen saturation 99% on room air.  No abdominal pain with deep palpation.  No flank percussive tenderness.  Good bowel sounds.  Remainder of the exam is otherwise reassuring.  Urinalysis with large leukocyte esterase, negative nitrite, 10 RBC, and 182 WBC.  Consistent with UTI.  Urine culture pending.  Treatment with Macrobid per orders.  This is worked well with her in the past, and she does not need an earlier than normal INR check with this.  Importance of pushing clear fluids discussed.  Indications for return reviewed.  Patient was in agreement with the plan and was suitable for discharge.     I have reviewed the nursing notes.    I have reviewed the findings, diagnosis, plan and need for  follow up with the patient.           Medical Decision Making  The patient's presentation was of moderate complexity (an acute illness with systemic symptoms).    The patient's evaluation involved:  ordering and/or review of 1 test(s) in this encounter (see separate area of note for details)    The patient's management necessitated moderate risk (prescription drug management including medications given in the ED).        New Prescriptions    NITROFURANTOIN MACROCRYSTAL-MONOHYDRATE (MACROBID) 100 MG CAPSULE    Take 1 capsule (100 mg) by mouth 2 times daily       Final diagnoses:   UTI (urinary tract infection)     Disclaimer: This note consists of symbols derived from keyboarding, dictation and/or voice recognition software. As a result, there may be errors in the script that have gone undetected. Please consider this when interpreting information found in this chart.      11/1/2023   [facility] EMERGENCY DEPT       Hesham Linares PA-C  11/01/23 2032

## 2023-11-02 NOTE — PROGRESS NOTES
ANTICOAGULATION  MANAGEMENT: Discharge Review    Unique GUZMAN Ward chart reviewed for anticoagulation continuity of care    Emergency room visit on 11/1/23 for urinary tract infection.    Discharge disposition: Home    Results:    Recent labs: (last 7 days)     11/01/23  0000   INR 2.2     Anticoagulation inpatient management:     not applicable     Anticoagulation discharge instructions:     Warfarin dosing: home regimen continued   Bridging: No   INR goal change: No      Medication changes affecting anticoagulation: Yes: Macrobid which can increase INR    Additional factors affecting anticoagulation: Yes: infection which can increase INR     PLAN     Recommend to check INR on 11/6/23    Spoke with Aaliyah    Anticoagulation Calendar updated    Georgia Card RN

## 2023-11-02 NOTE — RESULT ENCOUNTER NOTE
[facility] Emergency Dept discharge antibiotic (if prescribed): Nitrofurantoin Macrocrystal-Monohydrate (Macrobid) 100 mg PO capsule, 1 capsule (100 mg) by mouth 2 times daily for 7 days   Date of Rx (if applicable):  11/1/23  No changes in treatment per [facility] ED Lab Result Urine culture protocol.

## 2023-11-03 LAB — BACTERIA UR CULT: ABNORMAL

## 2023-11-06 ENCOUNTER — ANTICOAGULATION THERAPY VISIT (OUTPATIENT)
Dept: ANTICOAGULATION | Facility: CLINIC | Age: 83
End: 2023-11-06
Payer: COMMERCIAL

## 2023-11-06 DIAGNOSIS — Z79.01 LONG TERM CURRENT USE OF ANTICOAGULANT THERAPY: ICD-10-CM

## 2023-11-06 DIAGNOSIS — I48.21 PERMANENT ATRIAL FIBRILLATION (H): ICD-10-CM

## 2023-11-06 DIAGNOSIS — I48.91 ATRIAL FIBRILLATION, UNSPECIFIED TYPE (H): Primary | ICD-10-CM

## 2023-11-06 LAB — INR HOME MONITORING: 2.2 (ref 2–3)

## 2023-11-06 NOTE — PROGRESS NOTES
ANTICOAGULATION MANAGEMENT     Unique Ward 83 year old female is on warfarin with therapeutic INR result. (Goal INR 2.0-3.0)    Recent labs: (last 7 days)     11/06/23  0000   INR 2.2       ASSESSMENT     Source(s): Chart Review  Previous INR was Therapeutic last 2(+) visits  Medication, diet, health changes since last INR; patient is currently on macrobid for urinary tract infection and was advised to recheck INR today to ensure it does not effect patient's INR         PLAN     Recommended plan for temporary change(s) affecting INR     Dosing Instructions: Continue your current warfarin dose with next INR in 1 week       Summary  As of 11/6/2023      Full warfarin instructions:  15 mg every Wed; 10 mg all other days   Next INR check:  11/13/2023               Detailed voice message left for Aaliyah with dosing instructions and follow up date.     Patient to recheck with home meter    Education provided:   Please call back if any changes to your diet, medications or how you've been taking warfarin  Symptom monitoring: monitoring for bleeding signs and symptoms and monitoring for clotting signs and symptoms  Contact [phone]  with any changes, questions or concerns.     Plan made per [facility] anticoagulation protocol    Georgia Card RN  Anticoagulation Clinic  11/6/2023    _______________________________________________________________________     Anticoagulation Episode Summary       Current INR goal:  2.0-3.0   TTR:  67.9% (1 y)   Target end date:  Indefinite   Send INR reminders to:  [facility]    Indications    Atrial fibrillation (H) [I48.91]  Long term current use of anticoagulant therapy [Z79.01]  Atrial fibrillation  unspecified type (H) [I48.91]  Permanent atrial fibrillation (H) [I48.21]             Comments:  [employer] home meter- MBE             Anticoagulation Care Providers       Provider Role Specialty Phone number    Tyler Lee MD Referring Family Medicine [phone]    Keo  Araceli Wesley NP Referring Nurse Practitioner - Family [phone]

## 2023-11-15 ENCOUNTER — ANTICOAGULATION THERAPY VISIT (OUTPATIENT)
Dept: ANTICOAGULATION | Facility: CLINIC | Age: 83
End: 2023-11-15
Payer: COMMERCIAL

## 2023-11-15 DIAGNOSIS — Z79.01 LONG TERM CURRENT USE OF ANTICOAGULANT THERAPY: ICD-10-CM

## 2023-11-15 DIAGNOSIS — I48.21 PERMANENT ATRIAL FIBRILLATION (H): ICD-10-CM

## 2023-11-15 DIAGNOSIS — I48.91 ATRIAL FIBRILLATION, UNSPECIFIED TYPE (H): Primary | ICD-10-CM

## 2023-11-15 LAB — INR HOME MONITORING: 2.7 (ref 2–3)

## 2023-11-15 NOTE — PROGRESS NOTES
ANTICOAGULATION MANAGEMENT     Unique Ward 83 year old female is on warfarin with therapeutic INR result. (Goal INR 2.0-3.0)    Recent labs: (last 7 days)     11/15/23  0000   INR 2.7       ASSESSMENT     Source(s): Chart Review and Patient/Caregiver Call     Warfarin doses taken: Warfarin taken as instructed  Diet: No new diet changes identified  Medication/supplement changes: None noted  New illness, injury, or hospitalization: No  Signs or symptoms of bleeding or clotting: No  Previous result: Therapeutic last 2(+) visits  Additional findings: None       PLAN     Recommended plan for no diet, medication or health factor changes affecting INR     Dosing Instructions: Continue your current warfarin dose with next INR in 1 week       Summary  As of 11/15/2023      Full warfarin instructions:  15 mg every Wed; 10 mg all other days   Next INR check:  11/22/2023               Telephone call with Aaliyah who verbalizes understanding and agrees to plan and who agrees to plan and repeated back plan correctly    Patient to recheck with home meter    Education provided:   Resume manage by exception with home monitor. Continue to submit INR results to home monitor company.You will only be called when your result is out of range. Please call and notify [facility] if new medication started, dose missed, signs or symptoms of bleeding or clotting, or a surgery/procedure is scheduled.  Contact [phone]  with any changes, questions or concerns.     Plan made per [facility] anticoagulation protocol    Valery Ramirez RN  Anticoagulation Clinic  11/15/2023    _______________________________________________________________________     Anticoagulation Episode Summary       Current INR goal:  2.0-3.0   TTR:  68.1% (1 y)   Target end date:  Indefinite   Send INR reminders to:  [facility]IT[facility]    Indications    Atrial fibrillation (H) [I48.91]  Long term current use of anticoagulant therapy [Z79.01]  Atrial fibrillation  unspecified type  (H) [I48.91]  Permanent atrial fibrillation (H) [I48.21]             Comments:  [employer] home meter- MBE             Anticoagulation Care Providers       Provider Role Specialty Phone number    Tyler Lee MD Referring Family Medicine [phone]    Araceli Crowley NP Referring Nurse Practitioner - Family [phone]

## 2023-11-22 ENCOUNTER — ANTICOAGULATION THERAPY VISIT (OUTPATIENT)
Dept: ANTICOAGULATION | Facility: CLINIC | Age: 83
End: 2023-11-22
Payer: COMMERCIAL

## 2023-11-22 DIAGNOSIS — Z79.01 LONG TERM CURRENT USE OF ANTICOAGULANT THERAPY: Primary | ICD-10-CM

## 2023-11-22 DIAGNOSIS — I48.91 ATRIAL FIBRILLATION, UNSPECIFIED TYPE (H): ICD-10-CM

## 2023-11-22 DIAGNOSIS — I48.21 PERMANENT ATRIAL FIBRILLATION (H): ICD-10-CM

## 2023-11-22 LAB — INR HOME MONITORING: 3.4 (ref 2–3)

## 2023-11-22 NOTE — PROGRESS NOTES
ANTICOAGULATION MANAGEMENT     Unique Ward 83 year old female is on warfarin with supratherapeutic INR result. (Goal INR 2.0-3.0)    Recent labs: (last 7 days)     11/22/23  0000   INR 3.4*       ASSESSMENT     Source(s): Chart Review  Previous INR was Therapeutic last 2(+) visits  Medication, diet, health changes since last INR chart reviewed; none identified         PLAN     Unable to reach Aaliyah today.    Left message to take reduced dose of warfarin, 10 mg tonight. Request call back for assessment. Also sent a 2U message for assessment    Follow up required to confirm warfarin dose taken and assess for changes    Trisha Lo, RN  Anticoagulation Clinic  11/22/2023

## 2023-11-24 NOTE — PROGRESS NOTES
ANTICOAGULATION MANAGEMENT     Unique Ward 83 year old female is on warfarin with therapeutic INR result. (Goal INR 2.0-3.0)    Recent labs: (last 7 days)     11/22/23  0000   INR 3.4*       ASSESSMENT     Source(s): Chart Review and Patient/Caregiver Call     Warfarin doses taken: Warfarin taken as instructed  Diet: No new diet changes identified  Medication/supplement changes: None noted  New illness, injury, or hospitalization: No  Signs or symptoms of bleeding or clotting: No  Previous result: Therapeutic last 2(+) visits  Additional findings:  Ate some grate fruit but otherwise no overall changes.        PLAN     Recommended plan for no diet, medication or health factor changes affecting INR     Dosing Instructions: partial hold then continue your current warfarin dose with next INR in 1 week       Summary  As of 11/22/2023      Full warfarin instructions:  11/22: 10 mg; Otherwise 15 mg every Wed; 10 mg all other days   Next INR check:  11/29/2023               Telephone call with Aaliyah who agrees to plan and repeated back plan correctly    Patient to recheck with home meter    Education provided:   Please call back if any changes to your diet, medications or how you've been taking warfarin    Plan made per [facility] anticoagulation protocol    Trisha Lo, RN  Anticoagulation Clinic  11/24/2023    _______________________________________________________________________     Anticoagulation Episode Summary       Current INR goal:  2.0-3.0   TTR:  66.8% (1 y)   Target end date:  Indefinite   Send INR reminders to:  [facility]IT[facility]    Indications    Atrial fibrillation (H) [I48.91]  Long term current use of anticoagulant therapy [Z79.01]  Atrial fibrillation  unspecified type (H) [I48.91]  Permanent atrial fibrillation (H) [I48.21]             Comments:  [employer] home meter- MBE             Anticoagulation Care Providers       Provider Role Specialty Phone number    Tyler Lee MD Referring Family  Medicine [phone]    Araceli Crowley NP Referring Nurse Practitioner - Family [phone]

## 2023-11-28 ENCOUNTER — NURSE TRIAGE (OUTPATIENT)
Dept: FAMILY MEDICINE | Facility: CLINIC | Age: 83
End: 2023-11-28
Payer: COMMERCIAL

## 2023-11-28 NOTE — TELEPHONE ENCOUNTER
Pt calling stating she feels like she might of had a TIA on Sunday     Pt states her symptoms are tiredness and mild all over weakness.     Pt is not feeling under the weather just tried.     Pt asking if the ER can rule out stroke.     Pt states she has never had a stroke before     Denies SOB, difficulty breathing, chest pain     No new medications or supplements. Taking medications as prescribed     Pt states she has always had heart palpitations her whole life.     RN discussed in detail signs and symptoms of a stroke and when to go to ER    RN relayed if tiredness and weakness goes into later this week to call triage line back to discuss further.     Patient verbalized understanding and in agreement with plan of care.     Lynn Swenson RN    Reason for Disposition   MILD weakness or fatigue with acute minor illness (e.g., colds)    Additional Information   Negative: SEVERE difficulty breathing (e.g., struggling for each breath, speaks in single words)   Negative: Shock suspected (e.g., cold/pale/clammy skin, too weak to stand, low BP, rapid pulse)   Negative: Difficult to awaken or acting confused (e.g., disoriented, slurred speech)   Negative: Fainted > 15 minutes ago and still feels too weak or dizzy to stand   Negative: SEVERE weakness (i.e., unable to walk or barely able to walk, requires support) and new-onset or worsening   Negative: Sounds like a life-threatening emergency to the triager   Negative: Weakness of the face, arm or leg on one side of the body   Negative: Has diabetes mellitus and weakness from low blood sugar (i.e., < 60 mg/dL or 3.5 mmol/L)   Negative: Recent heat exposure, suspected cause of weakness   Negative: Vomiting is main symptom   Negative: Diarrhea is main symptom   Negative: Difficulty breathing   Negative: Heart beating < 50 beats per minute OR > 140 beats per minute   Negative: Extra heartbeats, irregular heart beating, or heart is beating very fast (i.e., 'palpitations')    "Negative: Follows large amount of bleeding (e.g., from vomiting, rectum, vagina) (Exception: Small transient weakness from sight of a small amount blood.)   Negative: Black or tarry bowel movements   Negative: MODERATE weakness or fatigue from poor fluid intake with no improvement after 2 hours of rest and fluids   Negative: Drinking very little and dehydration suspected (e.g., no urine > 12 hours, very dry mouth, very lightheaded)   Negative: Patient sounds very sick or weak to the triager   Negative: MODERATE weakness (i.e., interferes with work, school, normal activities) and cause unknown (Exceptions: Weakness from acute minor illness or from poor fluid intake; weakness is chronic and not worse.)   Negative: Fever > 103 F  (39.4 C) and not able to get the Fever down using CARE ADVICE   Negative: Fever > 100.0 F (37.8 C) and bedridden (e.g., CVA, chronic illness, recovering from surgery)   Negative: Fever > 101 F (38.3 C) and over 60 years of age   Negative: Fever > 100.0 F (37.8 C) and diabetes mellitus or weak immune system (e.g., HIV positive, cancer chemo, splenectomy, organ transplant, chronic steroids)   Negative: Pale skin (pallor)   Negative: MODERATE weakness (i.e., interferes with work, school, normal activities) and persists > 3 days   Negative: Taking a medicine that could cause weakness (e.g., blood pressure medications, diuretics)   Negative: Patient wants to be seen   Negative: MILD weakness (i.e., does not interfere with ability to work, go to school, normal activities) and persists > 1 week   Negative: Fatigue (i.e., tires easily, decreased energy) and persists > 1 week   Negative: Weakness is a chronic symptom (recurrent or ongoing AND lasting > 4 weeks)   Negative: Fatigue is a chronic symptom (recurrent or ongoing AND present > 4 weeks)    Answer Assessment - Initial Assessment Questions  1. DESCRIPTION: \"Describe how you are feeling.\"      Weakness, tired  2. SEVERITY: \"How bad is it?\"  \"Can " "you stand and walk?\"    - MILD (0-3): Feels weak or tired, but does not interfere with work, school or normal activities.    - MODERATE (4-7): Able to stand and walk; weakness interferes with work, school, or normal activities.    - SEVERE (8-10): Unable to stand or walk; unable to do usual activities.      Mild, slower but stable  3. ONSET: \"When did these symptoms begin?\" (e.g., hours, days, weeks, months)      Sunday- worse   4. CAUSE: \"What do you think is causing the weakness or fatigue?\" (e.g., not drinking enough fluids, medical problem, trouble sleeping)      First night was hard to sleep, urinating during the day   5. NEW MEDICINES:  \"Have you started on any new medicines recently?\" (e.g., opioid pain medicines, benzodiazepines, muscle relaxants, antidepressants, antihistamines, neuroleptics, beta blockers)      none  6. OTHER SYMPTOMS: \"Do you have any other symptoms?\" (e.g., chest pain, fever, cough, SOB, vomiting, diarrhea, bleeding, other areas of pain)      none  7. PREGNANCY: \"Is there any chance you are pregnant?\" \"When was your last menstrual period?\"      N/a    Protocols used: Weakness (Generalized) and Fatigue-A-OH    "

## 2023-11-29 ENCOUNTER — HOSPITAL ENCOUNTER (EMERGENCY)
Facility: CLINIC | Age: 83
Discharge: HOME | End: 2023-11-29
Attending: EMERGENCY MEDICINE | Admitting: EMERGENCY MEDICINE
Payer: MEDICARE

## 2023-11-29 ENCOUNTER — DOCUMENTATION ONLY (OUTPATIENT)
Dept: ANTICOAGULATION | Facility: CLINIC | Age: 83
End: 2023-11-29

## 2023-11-29 VITALS
WEIGHT: 133 LBS | RESPIRATION RATE: 16 BRPM | DIASTOLIC BLOOD PRESSURE: 91 MMHG | OXYGEN SATURATION: 99 % | BODY MASS INDEX: 20.83 KG/M2 | HEART RATE: 98 BPM | TEMPERATURE: 98.1 F | SYSTOLIC BLOOD PRESSURE: 142 MMHG

## 2023-11-29 DIAGNOSIS — I48.21 PERMANENT ATRIAL FIBRILLATION (H): ICD-10-CM

## 2023-11-29 DIAGNOSIS — R53.1 WEAKNESS: ICD-10-CM

## 2023-11-29 DIAGNOSIS — Z79.01 LONG TERM CURRENT USE OF ANTICOAGULANT THERAPY: Primary | ICD-10-CM

## 2023-11-29 DIAGNOSIS — I48.91 ATRIAL FIBRILLATION, UNSPECIFIED TYPE (H): ICD-10-CM

## 2023-11-29 LAB
ALBUMIN UR-MCNC: NEGATIVE MG/DL
ANION GAP SERPL CALCULATED.3IONS-SCNC: 10 MMOL/L (ref 7–15)
APPEARANCE UR: CLEAR
BASOPHILS # BLD AUTO: 0.1 10E3/UL (ref 0–0.2)
BASOPHILS NFR BLD AUTO: 1 %
BILIRUB UR QL STRIP: NEGATIVE
BUN SERPL-MCNC: 26.5 MG/DL (ref 8–23)
CALCIUM SERPL-MCNC: 9.4 MG/DL (ref 8.8–10.2)
CHLORIDE SERPL-SCNC: 94 MMOL/L (ref 98–107)
COLOR UR AUTO: YELLOW
CREAT SERPL-MCNC: 0.79 MG/DL (ref 0.51–0.95)
DEPRECATED HCO3 PLAS-SCNC: 27 MMOL/L (ref 22–29)
EGFRCR SERPLBLD CKD-EPI 2021: 73 ML/MIN/1.73M2
EOSINOPHIL # BLD AUTO: 0 10E3/UL (ref 0–0.7)
EOSINOPHIL NFR BLD AUTO: 0 %
ERYTHROCYTE [DISTWIDTH] IN BLOOD BY AUTOMATED COUNT: 14.4 % (ref 10–15)
GLUCOSE SERPL-MCNC: 182 MG/DL (ref 70–99)
GLUCOSE UR STRIP-MCNC: NEGATIVE MG/DL
HCT VFR BLD AUTO: 33.2 % (ref 35–47)
HGB BLD-MCNC: 11.1 G/DL (ref 11.7–15.7)
HGB UR QL STRIP: NEGATIVE
HYALINE CASTS: 5 /LPF
IMM GRANULOCYTES # BLD: 0 10E3/UL
IMM GRANULOCYTES NFR BLD: 0 %
INR PPP: 2.89 (ref 0.85–1.15)
KETONES UR STRIP-MCNC: NEGATIVE MG/DL
LEUKOCYTE ESTERASE UR QL STRIP: ABNORMAL
LYMPHOCYTES # BLD AUTO: 0.9 10E3/UL (ref 0.8–5.3)
LYMPHOCYTES NFR BLD AUTO: 17 %
MCH RBC QN AUTO: 28.8 PG (ref 26.5–33)
MCHC RBC AUTO-ENTMCNC: 33.4 G/DL (ref 31.5–36.5)
MCV RBC AUTO: 86 FL (ref 78–100)
MONOCYTES # BLD AUTO: 0.4 10E3/UL (ref 0–1.3)
MONOCYTES NFR BLD AUTO: 9 %
MUCOUS THREADS #/AREA URNS LPF: PRESENT /LPF
NEUTROPHILS # BLD AUTO: 3.7 10E3/UL (ref 1.6–8.3)
NEUTROPHILS NFR BLD AUTO: 73 %
NITRATE UR QL: NEGATIVE
NRBC # BLD AUTO: 0 10E3/UL
NRBC BLD AUTO-RTO: 0 /100
PH UR STRIP: 6 [PH] (ref 5–7)
PLATELET # BLD AUTO: 292 10E3/UL (ref 150–450)
POTASSIUM SERPL-SCNC: 3.9 MMOL/L (ref 3.4–5.3)
RBC # BLD AUTO: 3.85 10E6/UL (ref 3.8–5.2)
RBC URINE: 0 /HPF
SODIUM SERPL-SCNC: 131 MMOL/L (ref 135–145)
SP GR UR STRIP: 1.01 (ref 1–1.03)
UROBILINOGEN UR STRIP-MCNC: NORMAL MG/DL
WBC # BLD AUTO: 5.1 10E3/UL (ref 4–11)
WBC URINE: 10 /HPF

## 2023-11-29 PROCEDURE — 85610 PROTHROMBIN TIME: CPT | Performed by: EMERGENCY MEDICINE

## 2023-11-29 PROCEDURE — 85025 COMPLETE CBC W/AUTO DIFF WBC: CPT | Performed by: EMERGENCY MEDICINE

## 2023-11-29 PROCEDURE — 87086 URINE CULTURE/COLONY COUNT: CPT | Performed by: EMERGENCY MEDICINE

## 2023-11-29 PROCEDURE — 82310 ASSAY OF CALCIUM: CPT | Performed by: EMERGENCY MEDICINE

## 2023-11-29 PROCEDURE — 99283 EMERGENCY DEPT VISIT LOW MDM: CPT | Performed by: EMERGENCY MEDICINE

## 2023-11-29 PROCEDURE — 81001 URINALYSIS AUTO W/SCOPE: CPT | Performed by: EMERGENCY MEDICINE

## 2023-11-29 PROCEDURE — 36415 COLL VENOUS BLD VENIPUNCTURE: CPT | Performed by: EMERGENCY MEDICINE

## 2023-11-29 PROCEDURE — 99284 EMERGENCY DEPT VISIT MOD MDM: CPT | Performed by: EMERGENCY MEDICINE

## 2023-11-29 ASSESSMENT — ACTIVITIES OF DAILY LIVING (ADL)
ADLS_ACUITY_SCORE: 33
ADLS_ACUITY_SCORE: 35

## 2023-11-29 NOTE — ED PROVIDER NOTES
History     Chief Complaint   Patient presents with    Generalized Weakness     HPI  Unique Ward is a 83 year old female who presents for evaluation of weakness.  This began 3 days ago.  No focal deficits.  Just felt tired.  No fever.  No cough.  No chest pain.  No nasal congestion.  No vomiting.  No diarrhea.  No abdominal pain.    Allergies:  Allergies   Allergen Reactions    Keflex [Cephalexin] Anaphylaxis    No Clinical Screening - See Comments      Eye drop antibiotic.    Codeine Other (See Comments)     Comment: , Description:     Gentamicin Hives     Per Unique this medication gives hives on arms and legs. Bev Lo LSXO,  ....................  7/15/2014   1:15 PM    Influenza Vaccines Other (See Comments)     Comment: , Description:     Influenza Virus Vaccine H5n1     Mct Oil [Medium Chain Triglycerides]     Paxil [Paroxetine] Other (See Comments)     Comment: , Description:     Typhoid Vaccine, Live Other (See Comments)     Comment: , Description:   Comment: , Description:     Typhoid Vaccines        Problem List:    Patient Active Problem List    Diagnosis Date Noted    Thrombocytopenia (H24) 08/29/2023     Priority: Medium    Permanent atrial fibrillation (H) 05/16/2022     Priority: Medium    Long term current use of anticoagulant therapy 06/30/2020     Priority: Medium    Atrial fibrillation, unspecified type (H) 06/30/2020     Priority: Medium    CHB (complete heart block) (H) 03/31/2020     Priority: Medium     Added automatically from request for surgery [id number]      Pacemaker 03/31/2020     Priority: Medium    Atrial fibrillation (H) 06/07/2019     Priority: Medium    History of basal cell carcinoma- face and scalp 02/28/2017     Priority: Medium    Fibromyalgia 02/28/2017     Priority: Medium    Advance Care Planning 01/06/2017     Priority: Medium     Advance Care Planning 5/4/2017: ACP Facilitation Session:    Unique Ward presented for ACP Facilitation session at a group class.  She was accompanied by daughter-in-law. [employer] information provided and resources reviewed. She wishes to give additional consideration to ACP.  She currently has the following questions or concerns about Advance Care Planning: none known. Follow-up meeting: not needed/applicable. Added by Unique Uribe RN, Advance Care Planning Liaison.  Advance Care Planning 1/6/17: Info given   Saray Greer MA        Personal history of urinary tract infection 06/18/2016     Priority: Medium    Essential hypertension 03/23/2016     Priority: Medium    Vitamin D deficiency 03/30/2013     Priority: Medium    Dysthymia 04/01/2005     Priority: Medium        Past Medical History:    Past Medical History:   Diagnosis Date    Arthritis 01.01.1980    Atrial fibrillation (H) 6/7/2019    Cancer (H) 01.01.1993     scalp    Depressive disorder 01.01.1977    FH: melanoma- son 2/28/2017    Hypertension 03.16.2016       Past Surgical History:    Past Surgical History:   Procedure Laterality Date    BIOPSY  11/01/2014    COLONOSCOPY  01.01/1979    Two routine,  one for chronic diarrhea    EP PACEMAKER N/A 3/31/2020    Procedure: EP Pacemaker;  Surgeon: Erich Clark MD;  Location:  [facility]    EYE SURGERY  1/1/2014    first    cataracts both eyes.  second a few months later    GENITOURINARY SURGERY      GYN SURGERY  01.01.1977    TL    HEAD & NECK SURGERY  01.01.1997    basal cell X2   Moh's on scalp.  other on bridge of nose    LASER YAG CAPSULOTOMY Bilateral 4/1/2021    Procedure: CAPSULOTOMY, LENS, USING YAG LASER;  Surgeon: Bernardino Chu MD;  Location: [facility]    SOFT TISSUE SURGERY  01.01.1990    Ganglion Cyst   Left inner wrist       Family History:    Family History   Problem Relation Age of Onset    Diabetes Maternal Grandmother         Na    Coronary Artery Disease Sister         error    Coronary Artery Disease Brother         error    Hypertension Brother     Hyperlipidemia Brother      Cerebrovascular Disease Brother     Hypertension Sister     Hypertension Sister     Hyperlipidemia Sister     Other Cancer Sister         cervical    Breast Cancer Sister     Other Cancer Sister         kidney    Mental Illness Mother         paranoid/schizophrenic/suicide    Mental Illness Sister         ???  Had shock treatments    Hyperlipidemia Sister     Other Cancer Sister         Kidney       Social History:  Marital Status:  Legally  [3]  Social History     Tobacco Use    Smoking status: Former     Packs/day: 0.75     Years: 10.00     Additional pack years: 0.00     Total pack years: 7.50     Types: Cigarettes     Start date: 1955     Quit date: 1965     Years since quittin.9    Smokeless tobacco: Never   Vaping Use    Vaping Use: Never used   Substance Use Topics    Alcohol use: No     Alcohol/week: 0.0 standard drinks of alcohol    Drug use: No        Medications:    amLODIPine (NORVASC) 5 MG tablet  EPINEPHrine (ANY BX GENERIC EQUIV) 0.3 MG/0.3ML injection 2-pack  meclizine (ANTIVERT) 25 MG tablet  nitroFURantoin macrocrystal-monohydrate (MACROBID) 100 MG capsule  warfarin ANTICOAGULANT (COUMADIN) 5 MG tablet  Zinc Sulfate (ZINC 15 PO)          Review of Systems  All other systems are reviewed and are negative    Physical Exam   BP: (!) 155/94  Pulse: 102  Temp: 98.1  F (36.7  C)  Resp: 16  Weight: 60.3 kg (133 lb)  SpO2: 99 %      Physical Exam  Vitals and nursing note reviewed.   Constitutional:       General: She is not in acute distress.     Appearance: She is well-developed. She is not diaphoretic.   HENT:      Head: Normocephalic and atraumatic.      Nose: Nose normal.      Mouth/Throat:      Mouth: Mucous membranes are moist.      Pharynx: Oropharynx is clear.   Eyes:      General: No scleral icterus.     Conjunctiva/sclera: Conjunctivae normal.   Cardiovascular:      Rate and Rhythm: Normal rate and regular rhythm.      Heart sounds: Normal heart sounds. No murmur  heard.  Pulmonary:      Effort: Pulmonary effort is normal. No respiratory distress.      Breath sounds: No stridor. No wheezing or rales.   Abdominal:      General: Abdomen is flat. There is no distension.      Palpations: Abdomen is soft. There is no mass.      Tenderness: There is no abdominal tenderness. There is no guarding or rebound.   Musculoskeletal:         General: No tenderness.      Cervical back: Normal range of motion and neck supple.   Skin:     General: Skin is warm and dry.      Coloration: Skin is not pale.      Findings: No erythema or rash.   Neurological:      General: No focal deficit present.      Mental Status: She is alert.      Comments: Speech and mentation are clear.  Excellent strength in all 4 extremities.  Cranial nerves intact without signs of any focal deficits   Psychiatric:         Mood and Affect: Mood normal.         ED Course                 Procedures                  Results for orders placed or performed during the hospital encounter of 11/29/23 (from the past 24 hour(s))   CBC with platelets differential    Narrative    The following orders were created for panel order CBC with platelets differential.  Procedure                               Abnormality         Status                     ---------                               -----------         ------                     CBC with platelets and d...[[id number]]  Abnormal            Final result                 Please view results for these tests on the individual orders.   Basic metabolic panel   Result Value Ref Range    Sodium 131 (L) 135 - 145 mmol/L    Potassium 3.9 3.4 - 5.3 mmol/L    Chloride 94 (L) 98 - 107 mmol/L    Carbon Dioxide (CO2) 27 22 - 29 mmol/L    Anion Gap 10 7 - 15 mmol/L    Urea Nitrogen 26.5 (H) 8.0 - 23.0 mg/dL    Creatinine 0.79 0.51 - 0.95 mg/dL    GFR Estimate 73 >60 mL/min/1.73m2    Calcium 9.4 8.8 - 10.2 mg/dL    Glucose 182 (H) 70 - 99 mg/dL   INR   Result Value Ref Range    INR 2.89 (H) 0.85 -  1.15   CBC with platelets and differential   Result Value Ref Range    WBC Count 5.1 4.0 - 11.0 10e3/uL    RBC Count 3.85 3.80 - 5.20 10e6/uL    Hemoglobin 11.1 (L) 11.7 - 15.7 g/dL    Hematocrit 33.2 (L) 35.0 - 47.0 %    MCV 86 78 - 100 fL    MCH 28.8 26.5 - 33.0 pg    MCHC 33.4 31.5 - 36.5 g/dL    RDW 14.4 10.0 - 15.0 %    Platelet Count 292 150 - 450 10e3/uL    % Neutrophils 73 %    % Lymphocytes 17 %    % Monocytes 9 %    % Eosinophils 0 %    % Basophils 1 %    % Immature Granulocytes 0 %    NRBCs per 100 WBC 0 <1 /100    Absolute Neutrophils 3.7 1.6 - 8.3 10e3/uL    Absolute Lymphocytes 0.9 0.8 - 5.3 10e3/uL    Absolute Monocytes 0.4 0.0 - 1.3 10e3/uL    Absolute Eosinophils 0.0 0.0 - 0.7 10e3/uL    Absolute Basophils 0.1 0.0 - 0.2 10e3/uL    Absolute Immature Granulocytes 0.0 <=0.4 10e3/uL    Absolute NRBCs 0.0 10e3/uL   UA with Microscopic reflex to Culture    Specimen: Urine, Clean Catch   Result Value Ref Range    Color Urine Yellow Colorless, Straw, Light Yellow, Yellow    Appearance Urine Clear Clear    Glucose Urine Negative Negative mg/dL    Bilirubin Urine Negative Negative    Ketones Urine Negative Negative mg/dL    Specific Gravity Urine 1.009 1.003 - 1.035    Blood Urine Negative Negative    pH Urine 6.0 5.0 - 7.0    Protein Albumin Urine Negative Negative mg/dL    Urobilinogen Urine Normal Normal, 2.0 mg/dL    Nitrite Urine Negative Negative    Leukocyte Esterase Urine Small (A) Negative    Mucus Urine Present (A) None Seen /LPF    RBC Urine 0 <=2 /HPF    WBC Urine 10 (H) <=5 /HPF    Hyaline Casts Urine 5 (H) <=2 /LPF    Narrative    Urine Culture not indicated       Medications - No data to display    Assessments & Plan (with Medical Decision Making)  83-year-old female presenting with some generalized weakness over the last 4 days.  No focal deficits.  Hemodynamically stable here.  Mild hyponatremia which is chronic for her.  Some mild elevation of BUN noted.  No indication for admission.  Have  encouraged fluid.  Urinalysis appears grossly normal, but with 10 white cells we will send for culture.  Reassurance given.  Stable for discharge home.  Have encouraged follow-up with her primary care clinic if not improving in the next week.  Return anytime sooner to the emergency department condition worsens or any other concern     I have reviewed the nursing notes.    I have reviewed the findings, diagnosis, plan and need for follow up with the patient.          New Prescriptions    No medications on file       Final diagnoses:   Weakness       11/29/2023   [facility] EMERGENCY DEPT       Reji Long MD  11/29/23 3228

## 2023-11-29 NOTE — ED TRIAGE NOTES
Pt presents with generalized weakness that started on Sunday afternoon. Pt just felt off. No once sided weakness. Pt states she hasn;'t written for a while and her hands were shaky today. . Pt alert and orientated. Pt concerned she had a TIA on the weekend.      Triage Assessment (Adult)       Row Name 11/29/23 125          Triage Assessment    Airway WDL WDL        Respiratory WDL    Respiratory WDL WDL        Skin Circulation/Temperature WDL    Skin Circulation/Temperature WDL WDL        Cardiac WDL    Cardiac WDL WDL        Peripheral/Neurovascular WDL    Peripheral Neurovascular WDL WDL        Cognitive/Neuro/Behavioral WDL    Cognitive/Neuro/Behavioral WDL WDL

## 2023-11-29 NOTE — PROGRESS NOTES
ANTICOAGULATION  MANAGEMENT: Discharge Review    Unique Ward chart reviewed for anticoagulation continuity of care    Emergency room visit on 11/29/23 for Weakness.    Discharge disposition: Home    Results:    Recent labs: (last 7 days)     11/29/23  1346   INR 2.89*     Anticoagulation inpatient management:     not applicable     Anticoagulation discharge instructions:     Warfarin dosing: home regimen continued   Bridging: No   INR goal change: No      Medication changes affecting anticoagulation: No    Additional factors affecting anticoagulation: Yes: Patient thought she had a stroke on Sunday 11/26/23, per ED review no sign of this.     PLAN     Recommend to check INR on 12/6/23 as INR is in range, due to INR being due today will call patient to discuss ER result.    Left a detailed message for Aaliyah to continue 15 mg on wed and 10 mg all other days and recheck in 1 week.    Anticoagulation Calendar updated    Ning Forman RN

## 2023-11-30 LAB — BACTERIA UR CULT: NORMAL

## 2023-12-06 ENCOUNTER — ANTICOAGULATION THERAPY VISIT (OUTPATIENT)
Dept: ANTICOAGULATION | Facility: CLINIC | Age: 83
End: 2023-12-06
Payer: COMMERCIAL

## 2023-12-06 DIAGNOSIS — I48.0 PAROXYSMAL ATRIAL FIBRILLATION (H): ICD-10-CM

## 2023-12-06 DIAGNOSIS — I48.21 PERMANENT ATRIAL FIBRILLATION (H): ICD-10-CM

## 2023-12-06 DIAGNOSIS — I48.91 ATRIAL FIBRILLATION, UNSPECIFIED TYPE (H): ICD-10-CM

## 2023-12-06 DIAGNOSIS — Z79.01 LONG TERM CURRENT USE OF ANTICOAGULANT THERAPY: ICD-10-CM

## 2023-12-06 DIAGNOSIS — I48.91 ATRIAL FIBRILLATION (H): Primary | ICD-10-CM

## 2023-12-06 LAB — INR HOME MONITORING: 5 (ref 2–3)

## 2023-12-06 RX ORDER — WARFARIN SODIUM 5 MG/1
TABLET ORAL
Qty: 200 TABLET | Refills: 0 | Status: SHIPPED | OUTPATIENT
Start: 2023-12-06 | End: 2024-03-04

## 2023-12-06 NOTE — PROGRESS NOTES
ANTICOAGULATION MANAGEMENT     Unique Ward 83 year old female is on warfarin with supratherapeutic INR result. (Goal INR 2.0-3.0)    Recent labs: (last 7 days)     12/06/23  0000   INR 5.0*       ASSESSMENT     Source(s): Chart Review and Patient/Caregiver Call     Warfarin doses taken: Warfarin taken as instructed  Diet: Decreased greens/vitamin K in diet; plans to resume previous intake  Medication/supplement changes:  stopped vitamin K and holding D till new D level  New illness, injury, or hospitalization: Yes: weakness, unsteady  Signs or symptoms of bleeding or clotting: Yes: ED on 11/29/2023, negative for stroke  Previous result: Therapeutic last visit; previously outside of goal range  Additional findings: Refill needed today. Unique meets all criteria for refill (current [facility] referral, office visit with referring provider/group in last 1 year unless directed to return in 2 years in last referring provider visit note, lab monitoring up to date or not exceeding 2 weeks overdue). Rx instructions and quantity supplied updated to match patient's current dosing plan.  90 day supply with 0 refills granted per [facility] protocol   Patient is switching to [facility] for primary starting 1/1/2024. Knows to change INR over.       PLAN     Recommended plan for temporary change(s) affecting INR     Dosing Instructions: hold dose then continue your current warfarin dose with next INR in 2 days       Summary  As of 12/6/2023      Full warfarin instructions:  12/6: Hold; Otherwise 15 mg every Wed; 10 mg all other days   Next INR check:  12/8/2023               Telephone call with Aaliyah who verbalizes understanding and agrees to plan and who agrees to plan and repeated back plan correctly    Patient to recheck with home meter    Education provided:   Dietary considerations: importance of consistent vitamin K intake    Plan made per [facility] anticoagulation protocol    Shi Bennett, RN  Anticoagulation  Clinic  12/6/2023    _______________________________________________________________________     Anticoagulation Episode Summary       Current INR goal:  2.0-3.0   TTR:  64.3% (1 y)   Target end date:  Indefinite   Send INR reminders to:  [facility]    Indications    Atrial fibrillation (H) [I48.91]  Long term current use of anticoagulant therapy [Z79.01]  Atrial fibrillation  unspecified type (H) [I48.91]  Permanent atrial fibrillation (H) [I48.21]             Comments:  [employer] home meter- MBE             Anticoagulation Care Providers       Provider Role Specialty Phone number    Tyler Lee MD Referring Family Medicine [phone]    Araceli Crowley NP Referring Nurse Practitioner - Family [phone]

## 2023-12-08 ENCOUNTER — ANTICOAGULATION THERAPY VISIT (OUTPATIENT)
Dept: ANTICOAGULATION | Facility: CLINIC | Age: 83
End: 2023-12-08
Payer: COMMERCIAL

## 2023-12-08 LAB — INR HOME MONITORING: 2.1 (ref 2–3)

## 2023-12-08 NOTE — PROGRESS NOTES
ANTICOAGULATION MANAGEMENT     Unique Ward 83 year old female is on warfarin with therapeutic INR result. (Goal INR 2.0-3.0)    Recent labs: (last 7 days)     12/08/23  0000   INR 2.1       ASSESSMENT     Source(s): Chart Review and Patient/Caregiver Call     Warfarin doses taken: Warfarin taken as instructed  Diet: No new diet changes identified, greens, 3 serving over previous week, wants to start taking 1 TBSP nohemi seeds daily   Medication/supplement changes: None noted  New illness, injury, or hospitalization: No  Signs or symptoms of bleeding or clotting: No  Previous result: Supratherapeutic  Additional findings:  Patient will make a few minor changes in diet and reveal next INR result       PLAN     Recommended plan for no diet, medication or health factor changes affecting INR     Dosing Instructions: Continue your current warfarin dose with next INR in 1 week       Summary  As of 12/8/2023      Full warfarin instructions:  15 mg every Wed; 10 mg all other days   Next INR check:  12/15/2023               Telephone call with Aaliyah who agrees to plan and repeated back plan correctly    Patient to recheck with home meter    Education provided:   Please call back if any changes to your diet, medications or how you've been taking warfarin  Goal range and lab monitoring: Importance of therapeutic range  Dietary considerations: importance of consistent vitamin K intake, impact of vitamin K foods on INR, and importance of notifying [facility] to changes in diet    Plan made per [facility] anticoagulation protocol    Christine Olivares RN  Anticoagulation Clinic  12/8/2023    _______________________________________________________________________     Anticoagulation Episode Summary       Current INR goal:  2.0-3.0   TTR:  64.4% (1 y)   Target end date:  Indefinite   Send INR reminders to:  [facility]    Indications    Atrial fibrillation (H) [I48.91]  Long term current use of anticoagulant therapy [Z79.01]  Atrial  fibrillation  unspecified type (H) [I48.91]  Permanent atrial fibrillation (H) [I48.21]             Comments:  [employer] home meter- MBE             Anticoagulation Care Providers       Provider Role Specialty Phone number    Tyler Lee MD Referring Family Medicine [phone]    Araceli Crowley NP Referring Nurse Practitioner - Family [phone]

## 2023-12-13 ENCOUNTER — ANTICOAGULATION THERAPY VISIT (OUTPATIENT)
Dept: ANTICOAGULATION | Facility: CLINIC | Age: 83
End: 2023-12-13
Payer: COMMERCIAL

## 2023-12-13 LAB — INR HOME MONITORING: 1.7 (ref 2–3)

## 2023-12-13 NOTE — PROGRESS NOTES
ANTICOAGULATION MANAGEMENT     Unique Ward 83 year old female is on warfarin with subtherapeutic INR result. (Goal INR 2.0-3.0)    Recent labs: (last 7 days)     12/13/23  0000   INR 1.7*       ASSESSMENT     Source(s): Chart Review and Patient/Caregiver Call     Warfarin doses taken: Missed dose(s) may be affecting INR  Diet: Increased greens/vitamin K in diet; ongoing change  Medication/supplement changes:  vitamin K stopped per  12/6/23 [facility] encounter  New illness, injury, or hospitalization: No  Signs or symptoms of bleeding or clotting: No  Previous result: Therapeutic last visit; previously outside of goal range  Additional findings: None       PLAN     Recommended plan for ongoing change(s) affecting INR     Dosing Instructions: booster dose then continue your current warfarin dose with next INR in 1 week       Summary  As of 12/13/2023      Full warfarin instructions:  12/13: 17.5 mg; Otherwise 15 mg every Wed; 10 mg all other days   Next INR check:  12/20/2023               Telephone call with Aaliyah who agrees to plan and repeated back plan correctly    Patient to recheck with home meter    Education provided:   Contact [phone]  with any changes, questions or concerns.     Plan made per [facility] anticoagulation protocol    Kirstin Cooper RN  Anticoagulation Clinic  12/13/2023    _______________________________________________________________________     Anticoagulation Episode Summary       Current INR goal:  2.0-3.0   TTR:  63.5% (1 y)   Target end date:  Indefinite   Send INR reminders to:  [facility]    Indications    Atrial fibrillation (H) [I48.91]  Long term current use of anticoagulant therapy [Z79.01]  Atrial fibrillation  unspecified type (H) [I48.91]  Permanent atrial fibrillation (H) [I48.21]             Comments:  [employer] home meter- [facility]             Anticoagulation Care Providers       Provider Role Specialty Phone number    Tyler Lee MD Referring Family Medicine  [phone]    Araceli Crowley NP Referring Nurse Practitioner - Family [phone]

## 2023-12-20 ENCOUNTER — ANTICOAGULATION THERAPY VISIT (OUTPATIENT)
Dept: ANTICOAGULATION | Facility: CLINIC | Age: 83
End: 2023-12-20
Payer: COMMERCIAL

## 2023-12-20 DIAGNOSIS — I48.21 PERMANENT ATRIAL FIBRILLATION (H): ICD-10-CM

## 2023-12-20 DIAGNOSIS — Z79.01 LONG TERM CURRENT USE OF ANTICOAGULANT THERAPY: ICD-10-CM

## 2023-12-20 DIAGNOSIS — I48.91 ATRIAL FIBRILLATION, UNSPECIFIED TYPE (H): Primary | ICD-10-CM

## 2023-12-20 LAB — INR HOME MONITORING: 3.3 (ref 2–3)

## 2023-12-20 NOTE — PROGRESS NOTES
ANTICOAGULATION MANAGEMENT     Unique Ward 83 year old female is on warfarin with supratherapeutic INR result. (Goal INR 2.0-3.0)    Recent labs: (last 7 days)     12/20/23  0000   INR 3.3*       ASSESSMENT     Source(s): Chart Review and Patient/Caregiver Call     Warfarin doses taken: Warfarin taken as instructed  Diet: Decreased greens/vitamin K in diet; plans to resume previous intake  Medication/supplement changes: None noted  New illness, injury, or hospitalization: No  Signs or symptoms of bleeding or clotting: No  Previous result: Subtherapeutic  Additional findings: None       PLAN     Recommended plan for temporary change(s) affecting INR     Dosing Instructions: partial hold then continue your current warfarin dose with next INR in 1 week       Summary  As of 12/20/2023      Full warfarin instructions:  12/20: 10 mg; Otherwise 15 mg every Wed; 10 mg all other days   Next INR check:  12/27/2023               Telephone call with Aaliyah who agrees to plan and repeated back plan correctly    Patient to recheck with home meter    Education provided:   Please call back if any changes to your diet, medications or how you've been taking warfarin  Dietary considerations: importance of consistent vitamin K intake  Symptom monitoring: monitoring for bleeding signs and symptoms, monitoring for clotting signs and symptoms, monitoring for stroke signs and symptoms, and when to seek medical attention/emergency care  Contact [phone]  with any changes, questions or concerns.     Plan made per [facility] anticoagulation protocol    Georgia Card RN  Anticoagulation Clinic  12/20/2023    _______________________________________________________________________     Anticoagulation Episode Summary       Current INR goal:  2.0-3.0   TTR:  63.5% (1 y)   Target end date:  Indefinite   Send INR reminders to:  [facility]    Indications    Atrial fibrillation (H) [I48.91]  Long term current use of anticoagulant therapy  [Z79.01]  Atrial fibrillation  unspecified type (H) [I48.91]  Permanent atrial fibrillation (H) [I48.21]             Comments:  [employer] home meter- [facility]             Anticoagulation Care Providers       Provider Role Specialty Phone number    Tyler Lee MD Referring Family Medicine [phone]    Araceli Crowley NP Referring Nurse Practitioner - Family [phone]

## 2023-12-27 ENCOUNTER — ANTICOAGULATION THERAPY VISIT (OUTPATIENT)
Dept: ANTICOAGULATION | Facility: CLINIC | Age: 83
End: 2023-12-27
Payer: COMMERCIAL

## 2023-12-27 DIAGNOSIS — I48.91 ATRIAL FIBRILLATION, UNSPECIFIED TYPE (H): ICD-10-CM

## 2023-12-27 DIAGNOSIS — I48.91 ATRIAL FIBRILLATION (H): Primary | ICD-10-CM

## 2023-12-27 DIAGNOSIS — I48.21 PERMANENT ATRIAL FIBRILLATION (H): ICD-10-CM

## 2023-12-27 DIAGNOSIS — Z79.01 LONG TERM CURRENT USE OF ANTICOAGULANT THERAPY: ICD-10-CM

## 2023-12-27 LAB — INR HOME MONITORING: 3.3 (ref 2–3)

## 2023-12-27 NOTE — PROGRESS NOTES
ANTICOAGULATION MANAGEMENT     Unique Ward 83 year old female is on warfarin with supratherapeutic INR result. (Goal INR 2.0-3.0)    Recent labs: (last 7 days)     12/27/23  0000   INR 3.3*       ASSESSMENT     Source(s): Chart Review and Patient/Caregiver Call     Warfarin doses taken: Warfarin taken as instructed  Diet: Decreased greens/vitamin K in diet; plans to resume previous intake. Has two bags of greens  Medication/supplement changes: None noted  New illness, injury, or hospitalization: No  Signs or symptoms of bleeding or clotting: No  Previous result: Supratherapeutic  Additional findings: None       PLAN     Recommended plan for temporary change(s) affecting INR     Dosing Instructions: decrease your warfarin dose (6.7% change) with next INR in 1 week       Summary  As of 12/27/2023      Full warfarin instructions:  10 mg every day   Next INR check:  1/3/2024               Telephone call with Aaliyah who verbalizes understanding and agrees to plan    Patient to recheck with home meter    Education provided:   Dietary considerations: importance of consistent vitamin K intake    Plan made per [facility]agulation protocol    Shi Bennett RN  Anticoagulation Clinic  12/27/2023    _______________________________________________________________________     Anticoagulation Episode Summary       Current INR goal:  2.0-3.0   TTR:  61.6% (1 y)   Target end date:  Indefinite   Send INR reminders to:  [facility]    Indications    Atrial fibrillation (H) [I48.91]  Long term current use of anticoagulant therapy [Z79.01]  Atrial fibrillation  unspecified type (H) [I48.91]  Permanent atrial fibrillation (H) [I48.21]             Comments:  [employer] home meter- [facility]             Anticoagulation Care Providers       Provider Role Specialty Phone number    Tyler Lee MD Referring Family Medicine [phone]    Araceli Crowley NP Referring Nurse Practitioner - Family [phone]

## 2024-01-01 NOTE — PATIENT INSTRUCTIONS
INR the 18th at 1130    Repeat urine sample 1-2 days after you finish the antibiotic    Eat some yogurt daily.     -Fewer than 5 wet diapers per day

## 2024-01-03 ENCOUNTER — ANTICOAGULATION THERAPY VISIT (OUTPATIENT)
Dept: ANTICOAGULATION | Facility: CLINIC | Age: 84
End: 2024-01-03
Payer: COMMERCIAL

## 2024-01-03 LAB — INR HOME MONITORING: 3.7 (ref 2–3)

## 2024-01-03 NOTE — PROGRESS NOTES
ANTICOAGULATION MANAGEMENT     Unique Ward 83 year old female is on warfarin with supratherapeutic INR result. (Goal INR 2.0-3.0)    Recent labs: (last 7 days)     01/03/24  0000   INR 3.7*       ASSESSMENT     Source(s): Chart Review and Patient/Caregiver Call     Warfarin doses taken: Warfarin taken as instructed  Diet: No new diet changes identified  Medication/supplement changes: pt plans to  voltaren gel-can increase risk for bleeding  New illness, injury, or hospitalization: Yes: left knee pain  Signs or symptoms of bleeding or clotting: No  Previous result: Supratherapeutic  Additional findings: Pt reports a decrease in her activity and states that her right knee hurts now too       PLAN     Recommended plan for ongoing change(s) affecting INR     Dosing Instructions: hold dose then decrease your warfarin dose (7.1% change) with next INR in 1 week       Summary  As of 1/3/2024      Full warfarin instructions:  1/3: Hold; Otherwise 5 mg every Sat; 10 mg all other days   Next INR check:  1/10/2024               Telephone call with Aaliyah who verbalizes understanding and agrees to plan    Patient to recheck with home meter    Education provided:   Contact [phone]  with any changes, questions or concerns.     Plan made per [facility]agulation protocol    Kirstin Cooper RN  Anticoagulation Clinic  1/3/2024    _______________________________________________________________________     Anticoagulation Episode Summary       Current INR goal:  2.0-3.0   TTR:  59.7% (1 y)   Target end date:  Indefinite   Send INR reminders to:  [facility]    Indications    Atrial fibrillation (H) [I48.91]  Long term current use of anticoagulant therapy [Z79.01]  Atrial fibrillation  unspecified type (H) [I48.91]  Permanent atrial fibrillation (H) [I48.21]             Comments:  [employer] home meter- [facility]             Anticoagulation Care Providers       Provider Role Specialty Phone number    Tyler Lee  MD Gopi Referring [facility] Medicine [phone]    Araceli Crowley NP Referring Nurse Practitioner - Family [phone]

## 2024-01-10 ENCOUNTER — ANTICOAGULATION THERAPY VISIT (OUTPATIENT)
Dept: ANTICOAGULATION | Facility: CLINIC | Age: 84
End: 2024-01-10
Payer: COMMERCIAL

## 2024-01-10 DIAGNOSIS — I48.91 ATRIAL FIBRILLATION (H): Primary | ICD-10-CM

## 2024-01-10 DIAGNOSIS — I48.21 PERMANENT ATRIAL FIBRILLATION (H): ICD-10-CM

## 2024-01-10 DIAGNOSIS — I48.91 ATRIAL FIBRILLATION, UNSPECIFIED TYPE (H): ICD-10-CM

## 2024-01-10 DIAGNOSIS — Z79.01 LONG TERM CURRENT USE OF ANTICOAGULANT THERAPY: ICD-10-CM

## 2024-01-10 LAB — INR HOME MONITORING: 2.3 (ref 2–3)

## 2024-01-10 NOTE — PROGRESS NOTES
"ANTICOAGULATION MANAGEMENT     Unique Ward 83 year old female is on warfarin with therapeutic INR result. (Goal INR 2.0-3.0)    Recent labs: (last 7 days)     01/10/24  0000   INR 2.3       ASSESSMENT     Source(s): Chart Review and Patient/Caregiver Call     Warfarin doses taken: Warfarin taken as instructed  Diet: No new diet changes identified  Medication/supplement changes: None noted  New illness, injury, or hospitalization: \"not feeling like myself for the past 2 months or so\", feeling run down, not worsening  Signs or symptoms of bleeding or clotting: No  Previous result: Supratherapeutic  Additional findings: brother had COVID mid-December, still tested positive right before New Years and saw patient on 1/1/24, so she is nervous about getting COVID       PLAN     Recommended plan for no diet, medication or health factor changes affecting INR     Dosing Instructions: Continue your current warfarin dose with next INR in 1 week       Summary  As of 1/10/2024      Full warfarin instructions:  5 mg every Sat; 10 mg all other days   Next INR check:  1/17/2024               Telephone call with Aaliyah who verbalizes understanding and agrees to plan    Patient to recheck with home meter    Education provided:   Resume manage by exception with home monitor. Continue to submit INR results to home monitor company.You will only be called when your result is out of range. Please call and notify [facility] if new medication started, dose missed, signs or symptoms of bleeding or clotting, or a surgery/procedure is scheduled.  Contact [phone]  with any changes, questions or concerns.   Notify PCP if new sx arise or if her current sx worsen    Plan made per [facility] anticoagulation protocol    Leanna Camargo RN  Anticoagulation Clinic  1/10/2024    _______________________________________________________________________     Anticoagulation Episode Summary       Current INR goal:  2.0-3.0   TTR:  58.7% (1 y)   Target end date:  " Indefinite   Send INR reminders to:  [facility]    Indications    Atrial fibrillation (H) [I48.91]  Long term current use of anticoagulant therapy [Z79.01]  Atrial fibrillation  unspecified type (H) [I48.91]  Permanent atrial fibrillation (H) [I48.21]             Comments:  [employer] home meter- [facility]             Anticoagulation Care Providers       Provider Role Specialty Phone number    Tyler Lee MD Referring Family Medicine [phone]    Araceli Crowley NP Referring Nurse Practitioner - Family [phone]

## 2024-01-17 ENCOUNTER — ANTICOAGULATION THERAPY VISIT (OUTPATIENT)
Dept: ANTICOAGULATION | Facility: CLINIC | Age: 84
End: 2024-01-17
Payer: COMMERCIAL

## 2024-01-17 DIAGNOSIS — I48.91 ATRIAL FIBRILLATION, UNSPECIFIED TYPE (H): Primary | ICD-10-CM

## 2024-01-17 DIAGNOSIS — Z79.01 LONG TERM CURRENT USE OF ANTICOAGULANT THERAPY: ICD-10-CM

## 2024-01-17 DIAGNOSIS — I48.21 PERMANENT ATRIAL FIBRILLATION (H): ICD-10-CM

## 2024-01-17 LAB — INR HOME MONITORING: 3.1 (ref 2–3)

## 2024-01-17 NOTE — PROGRESS NOTES
ANTICOAGULATION MANAGEMENT     Unique GUZMAN Ward 83 year old female is on warfarin with supratherapeutic INR result. (Goal INR 2.0-3.0)    Recent labs: (last 7 days)     01/17/24  0000   INR 3.1*       ASSESSMENT     Source(s): Chart Review and Patient/Caregiver Call     Warfarin doses taken: Warfarin taken as instructed  Diet: Decreased greens/vitamin K in diet; plans to resume previous intake  Medication/supplement changes: None noted  New illness, injury, or hospitalization: No  Signs or symptoms of bleeding or clotting: No  Previous result: Therapeutic last visit; previously outside of goal range  Additional findings: None       PLAN     Recommended plan for temporary change(s) affecting INR     Dosing Instructions: Continue your current warfarin dose with next INR in 1 week       Summary  As of 1/17/2024      Full warfarin instructions:  5 mg every Sat; 10 mg all other days   Next INR check:  1/24/2024               Telephone call with Aaliyah who verbalizes understanding and agrees to plan and who agrees to plan and repeated back plan correctly    Patient to recheck with home meter    Education provided:   Dietary considerations: importance of consistent vitamin K intake and impact of vitamin K foods on INR  Contact [phone]  with any changes, questions or concerns.     Plan made per [facility] anticoagulation protocol    Georgia Aguilar RN  Anticoagulation Clinic  1/17/2024    _______________________________________________________________________     Anticoagulation Episode Summary       Current INR goal:  2.0-3.0   TTR:  59.9% (1 y)   Target end date:  Indefinite   Send INR reminders to:  [facility]    Indications    Atrial fibrillation (H) [I48.91]  Long term current use of anticoagulant therapy [Z79.01]  Atrial fibrillation  unspecified type (H) [I48.91]  Permanent atrial fibrillation (H) [I48.21]             Comments:  [employer] home meter- [facility]             Anticoagulation Care Providers       Provider  Role Specialty Phone number    Tyler Lee MD Referring Family Medicine [phone]    Araceli Crowley NP Referring Nurse Practitioner - Family [phone]

## 2024-01-24 ENCOUNTER — ANTICOAGULATION THERAPY VISIT (OUTPATIENT)
Dept: ANTICOAGULATION | Facility: CLINIC | Age: 84
End: 2024-01-24
Payer: COMMERCIAL

## 2024-01-24 LAB — INR HOME MONITORING: 2.2 (ref 2–3)

## 2024-01-24 NOTE — PROGRESS NOTES
ANTICOAGULATION MANAGEMENT     Unique Ward 83 year old female is on warfarin with therapeutic INR result. (Goal INR 2.0-3.0)    Recent labs: (last 7 days)     01/24/24  0000   INR 2.2       ASSESSMENT     Source(s): Chart Review     Warfarin doses taken: Warfarin taken as instructed  Diet:  Patient is aiming for 4 servings of greens weekly. She is trying to eat healthier due to pre-diabetes diagnosis  Medication/supplement changes: None noted  New illness, injury, or hospitalization: No  Signs or symptoms of bleeding or clotting: No  Previous result: Supratherapeutic  Additional findings: None       PLAN     Recommended plan for no diet, medication or health factor changes affecting INR     Dosing Instructions: Continue your current warfarin dose with next INR in 1 week       Summary  As of 1/24/2024      Full warfarin instructions:  5 mg every Sat; 10 mg all other days   Next INR check:  1/31/2024               Telephone call with Aaliyah who agrees to plan and repeated back plan correctly    Patient to recheck with home meter    Education provided:   Please call back if any changes to your diet, medications or how you've been taking warfarin  Dietary considerations: importance of consistent vitamin K intake, impact of vitamin K foods on INR, vitamin K content of foods, and importance of notifying [facility] to changes in diet    Plan made per [facility] anticoagulation protocol    Christine Olivares RN  Anticoagulation Clinic  1/24/2024    _______________________________________________________________________     Anticoagulation Episode Summary       Current INR goal:  2.0-3.0   TTR:  60.9% (1 y)   Target end date:  Indefinite   Send INR reminders to:  [facility]    Indications    Atrial fibrillation (H) [I48.91]  Long term current use of anticoagulant therapy [Z79.01]  Atrial fibrillation  unspecified type (H) [I48.91]  Permanent atrial fibrillation (H) [I48.21]             Comments:  [employer] home meter- [facility]              Anticoagulation Care Providers       Provider Role Specialty Phone number    Tyler Lee MD Referring Family Medicine [phone]    Araceli Crowley NP Referring Nurse Practitioner - Family [phone]

## 2024-01-31 ENCOUNTER — ANTICOAGULATION THERAPY VISIT (OUTPATIENT)
Dept: ANTICOAGULATION | Facility: CLINIC | Age: 84
End: 2024-01-31
Payer: COMMERCIAL

## 2024-01-31 DIAGNOSIS — I48.91 ATRIAL FIBRILLATION (H): Primary | ICD-10-CM

## 2024-01-31 DIAGNOSIS — I48.21 PERMANENT ATRIAL FIBRILLATION (H): ICD-10-CM

## 2024-01-31 DIAGNOSIS — I48.91 ATRIAL FIBRILLATION, UNSPECIFIED TYPE (H): ICD-10-CM

## 2024-01-31 DIAGNOSIS — Z79.01 LONG TERM CURRENT USE OF ANTICOAGULANT THERAPY: ICD-10-CM

## 2024-01-31 LAB — INR HOME MONITORING: 3.2 (ref 2–3)

## 2024-01-31 NOTE — PROGRESS NOTES
ANTICOAGULATION MANAGEMENT     Unique Ward 83 year old female is on warfarin with supratherapeutic INR result. (Goal INR 2.0-3.0)    Recent labs: (last 7 days)     01/31/24  0000   INR 3.2*       ASSESSMENT     Source(s): Chart Review and Patient/Caregiver Call     Warfarin doses taken: Warfarin taken as instructed  Diet: No new diet changes identified, patient is wanting to increase the amount of greens in her diet.  Medication/supplement changes: None noted, has been out of vitamin K supplement for about one month  New illness, injury, or hospitalization: No  Signs or symptoms of bleeding or clotting: No  Previous result: Therapeutic last 2(+) visits  Additional findings: None       PLAN     Recommended plan for temporary change(s) affecting INR     Dosing Instructions: Continue your current warfarin dose with next INR in 1 week       Summary  As of 1/31/2024      Full warfarin instructions:  5 mg every Sat; 10 mg all other days   Next INR check:  2/7/2024               Telephone call with Aaliyah who verbalizes understanding and agrees to plan    Patient to recheck with home meter    Education provided:   Dietary considerations: importance of consistent vitamin K intake    Plan made per [facility] anticoagulation protocol    Shi Bennett, RN  Anticoagulation Clinic  1/31/2024    _______________________________________________________________________     Anticoagulation Episode Summary       Current INR goal:  2.0-3.0   TTR:  60.5% (1 y)   Target end date:  Indefinite   Send INR reminders to:  [facility]    Indications    Atrial fibrillation (H) [I48.91]  Long term current use of anticoagulant therapy [Z79.01]  Atrial fibrillation  unspecified type (H) [I48.91]  Permanent atrial fibrillation (H) [I48.21]             Comments:  [employer] home meter- [facility]             Anticoagulation Care Providers       Provider Role Specialty Phone number    Tyler Lee MD Referring Family Medicine [phone]     Araceli Crowley, NP Referring Nurse Practitioner - Family [phone]

## 2024-02-07 ENCOUNTER — ANTICOAGULATION THERAPY VISIT (OUTPATIENT)
Dept: ANTICOAGULATION | Facility: CLINIC | Age: 84
End: 2024-02-07
Payer: COMMERCIAL

## 2024-02-07 DIAGNOSIS — Z79.01 LONG TERM CURRENT USE OF ANTICOAGULANT THERAPY: ICD-10-CM

## 2024-02-07 DIAGNOSIS — I48.21 PERMANENT ATRIAL FIBRILLATION (H): ICD-10-CM

## 2024-02-07 DIAGNOSIS — I48.91 ATRIAL FIBRILLATION, UNSPECIFIED TYPE (H): Primary | ICD-10-CM

## 2024-02-07 LAB — INR HOME MONITORING: 1.7 (ref 2–3)

## 2024-02-07 NOTE — PROGRESS NOTES
ANTICOAGULATION MANAGEMENT     Unique GUZMAN Ward 83 year old female is on warfarin with subtherapeutic INR result. (Goal INR 2.0-3.0)    Recent labs: (last 7 days)     02/07/24  0000   INR 1.7*       ASSESSMENT     Source(s): Chart Review and Patient/Caregiver Call     Warfarin doses taken: Warfarin taken as instructed  Diet: Increased greens/vitamin K in diet; ongoing change-she is going to cut back the serving sizes some.  Medication/supplement changes:  She is going to start taking her K2 supplement and nohemi seeds again as she did before.  This can cause fluctuation of INR per [employer].  New illness, injury, or hospitalization: No  Signs or symptoms of bleeding or clotting: No  Previous result: Supratherapeutic  Additional findings: None       PLAN     Recommended plan for temporary change(s) and ongoing change(s) affecting INR     Dosing Instructions: booster dose then continue your current warfarin dose with next INR in 1 week       Summary  As of 2/7/2024      Full warfarin instructions:  2/7: 15 mg; Otherwise 5 mg every Sat; 10 mg all other days   Next INR check:  2/14/2024               Telephone call with Aaliyah who agrees to plan and repeated back plan correctly    Patient to recheck with home meter    Education provided:   Please call back if any changes to your diet, medications or how you've been taking warfarin  Goal range and lab monitoring: goal range and significance of current result and Importance of therapeutic range  Dietary considerations: importance of consistent vitamin K intake    Plan made per [facility] anticoagulation protocol    Cherry Fuller, RN  Anticoagulation Clinic  2/7/2024    _______________________________________________________________________     Anticoagulation Episode Summary       Current INR goal:  2.0-3.0   TTR:  59.9% (1 y)   Target end date:  Indefinite   Send INR reminders to:  [facility]    Indications    Atrial fibrillation (H) [I48.91]  Long term current  use of anticoagulant therapy [Z79.01]  Atrial fibrillation  unspecified type (H) [I48.91]  Permanent atrial fibrillation (H) [I48.21]             Comments:  [employer] home meter- [facility]             Anticoagulation Care Providers       Provider Role Specialty Phone number    Tyler Lee MD Referring Family Medicine [phone]    Araceli Crowley NP Referring Nurse Practitioner - Family [phone]

## 2024-02-14 LAB — INR (EXTERNAL): 2.6 (ref 0.9–1.1)

## 2024-02-15 ENCOUNTER — ANTICOAGULATION THERAPY VISIT (OUTPATIENT)
Dept: ANTICOAGULATION | Facility: CLINIC | Age: 84
End: 2024-02-15
Payer: COMMERCIAL

## 2024-02-15 DIAGNOSIS — I48.21 PERMANENT ATRIAL FIBRILLATION (H): ICD-10-CM

## 2024-02-15 DIAGNOSIS — I48.91 ATRIAL FIBRILLATION, UNSPECIFIED TYPE (H): ICD-10-CM

## 2024-02-15 DIAGNOSIS — Z79.01 LONG TERM CURRENT USE OF ANTICOAGULANT THERAPY: ICD-10-CM

## 2024-02-15 DIAGNOSIS — I48.91 ATRIAL FIBRILLATION (H): Primary | ICD-10-CM

## 2024-02-15 LAB — INR HOME MONITORING: 2.6 (ref 2–3)

## 2024-02-15 NOTE — PROGRESS NOTES
ANTICOAGULATION MANAGEMENT     Unique Ward 83 year old female is on warfarin with therapeutic INR result. (Goal INR 2.0-3.0)    Recent labs: (last 7 days)     02/14/24  0000   INR 2.6*       ASSESSMENT     Source(s): Chart Review and Patient/Caregiver Call     Warfarin doses taken: Warfarin taken as instructed  Diet: pt states she's decreased her greens a little. She also is starting back on her Blanca K supplement (she had run out a while ago). Has been eating about a tablespoon of nohemi seeds a day. Plans to keep all of these changes up.   Medication/supplement changes:  starting Blanca K supplement again , Macrobid x14 days (2/14-2/28) - per [employer], this should not affect INR, but the UTI can  New illness, injury, or hospitalization: Yes: ED visit on 2/14 due to UTI symptoms (out of network)  Signs or symptoms of bleeding or clotting: No  Previous result: Subtherapeutic  Additional findings: None      Advised pt to call INR result from yesterday into [employer] so they have it on record       PLAN     Recommended plan for ongoing change(s) affecting INR     Dosing Instructions: Continue your current warfarin dose with next INR in 5 days       Summary  As of 2/15/2024      Full warfarin instructions:  5 mg every Sat; 10 mg all other days   Next INR check:                 Telephone call with Aaliyah who verbalizes understanding and agrees to plan    Patient to recheck with home meter    Education provided:   Please call back if any changes to your diet, medications or how you've been taking warfarin  Goal range and lab monitoring: goal range and significance of current result, Importance of therapeutic range, and Importance of following up at instructed interval  Symptom monitoring: monitoring for bleeding signs and symptoms, monitoring for clotting signs and symptoms, and monitoring for stroke signs and symptoms    Plan made per [facility] anticoagulation protocol    Neelima Higgins, RN  Anticoagulation  Clinic  2/15/2024    _______________________________________________________________________     Anticoagulation Episode Summary       Current INR goal:  2.0-3.0   TTR:  59.1% (1 y)   Target end date:  Indefinite   Send INR reminders to:  [facility]    Indications    Atrial fibrillation (H) [I48.91]  Long term current use of anticoagulant therapy [Z79.01]  Atrial fibrillation  unspecified type (H) [I48.91]  Permanent atrial fibrillation (H) [I48.21]             Comments:  [employer] home meter- [facility]             Anticoagulation Care Providers       Provider Role Specialty Phone number    Tyler Lee MD Referring Family Medicine [phone]    Araceli Crowley NP Referring Nurse Practitioner - Family [phone]

## 2024-02-19 ENCOUNTER — ANTICOAGULATION THERAPY VISIT (OUTPATIENT)
Dept: ANTICOAGULATION | Facility: CLINIC | Age: 84
End: 2024-02-19
Payer: COMMERCIAL

## 2024-02-19 DIAGNOSIS — I48.21 PERMANENT ATRIAL FIBRILLATION (H): ICD-10-CM

## 2024-02-19 DIAGNOSIS — Z79.01 LONG TERM CURRENT USE OF ANTICOAGULANT THERAPY: ICD-10-CM

## 2024-02-19 DIAGNOSIS — I48.91 ATRIAL FIBRILLATION, UNSPECIFIED TYPE (H): Primary | ICD-10-CM

## 2024-02-19 LAB — INR HOME MONITORING: 2.1 (ref 2–3)

## 2024-02-19 NOTE — PROGRESS NOTES
ANTICOAGULATION MANAGEMENT     Unique Ward 83 year old female is on warfarin with therapeutic INR result. (Goal INR 2.0-3.0)    Recent labs: (last 7 days)     02/19/24  0000   INR 2.1       ASSESSMENT     Source(s): Chart Review  Previous INR was Therapeutic last visit; previously outside of goal range  Medication, diet, health changes since last INR chart reviewed; none identified         PLAN     Recommended plan for no diet, medication or health factor changes affecting INR     Dosing Instructions: Continue your current warfarin dose with next INR in 1 week       Summary  As of 2/19/2024      Full warfarin instructions:  5 mg every Sat; 10 mg all other days   Next INR check:  2/26/2024               Detailed voice message left for Aaliyah with dosing instructions and follow up date.   Sent [employer] message with dosing and follow up instructions    Patient to recheck with home meter    Education provided:   Please call back if any changes to your diet, medications or how you've been taking warfarin  Contact [phone]  with any changes, questions or concerns.     Plan made per [facility] anticoagulation protocol    Leanna Bone RN  Anticoagulation Clinic  2/19/2024    _______________________________________________________________________     Anticoagulation Episode Summary       Current INR goal:  2.0-3.0   TTR:  59.3% (1 y)   Target end date:  Indefinite   Send INR reminders to:  [facility]    Indications    Atrial fibrillation (H) [I48.91]  Long term current use of anticoagulant therapy [Z79.01]  Atrial fibrillation  unspecified type (H) [I48.91]  Permanent atrial fibrillation (H) [I48.21]             Comments:  [employer] home meter- MBE             Anticoagulation Care Providers       Provider Role Specialty Phone number    Tyler Lee MD Referring Family Medicine [phone]    Araceli Crowley NP Referring Nurse Practitioner - Family [phone]

## 2024-02-21 ENCOUNTER — ANTICOAGULATION THERAPY VISIT (OUTPATIENT)
Dept: ANTICOAGULATION | Facility: CLINIC | Age: 84
End: 2024-02-21
Payer: COMMERCIAL

## 2024-02-21 DIAGNOSIS — I48.91 ATRIAL FIBRILLATION, UNSPECIFIED TYPE (H): Primary | ICD-10-CM

## 2024-02-21 DIAGNOSIS — I48.21 PERMANENT ATRIAL FIBRILLATION (H): ICD-10-CM

## 2024-02-21 DIAGNOSIS — Z79.01 LONG TERM CURRENT USE OF ANTICOAGULANT THERAPY: ICD-10-CM

## 2024-02-21 LAB — INR HOME MONITORING: 2 (ref 2–3)

## 2024-02-21 NOTE — PROGRESS NOTES
ANTICOAGULATION MANAGEMENT     Unique Ward 83 year old female is on warfarin with therapeutic INR result. (Goal INR 2.0-3.0)    Recent labs: (last 7 days)     02/21/24  0000   INR 2.0       ASSESSMENT     Source(s): Chart Review  Previous INR was Therapeutic last 2(+) visits  Medication, diet, health changes since last INR chart reviewed; none identified         PLAN     Recommended plan for no diet, medication or health factor changes affecting INR     Dosing Instructions: Continue your current warfarin dose with next INR in 1 week       Summary  As of 2/21/2024      Full warfarin instructions:  5 mg every Sat; 10 mg all other days   Next INR check:  2/28/2024               Detailed voice message left for Aaliyah with dosing instructions and follow up date.   Sent [employer] message with dosing and follow up instructions    Patient to recheck with home meter    Education provided:   Call back if you have changed any supplements (noted patient restarted vitamin K supplement 2 /15)    Plan made per [facility] anticoagulation protocol    Valery Ramirez RN  Anticoagulation Clinic  2/21/2024    _______________________________________________________________________     Anticoagulation Episode Summary       Current INR goal:  2.0-3.0   TTR:  59.3% (1 y)   Target end date:  Indefinite   Send INR reminders to:  [facility]    Indications    Atrial fibrillation (H) [I48.91]  Long term current use of anticoagulant therapy [Z79.01]  Atrial fibrillation  unspecified type (H) [I48.91]  Permanent atrial fibrillation (H) [I48.21]             Comments:  [employer] home meter- MBE             Anticoagulation Care Providers       Provider Role Specialty Phone number    Tyler Lee MD Referring Family Medicine [phone]    Araceli Crowley NP Referring Nurse Practitioner - Family [phone]

## 2024-02-24 ENCOUNTER — HEALTH MAINTENANCE LETTER (OUTPATIENT)
Age: 84
End: 2024-02-24

## 2024-02-28 ENCOUNTER — ANCILLARY PROCEDURE (OUTPATIENT)
Dept: CARDIOLOGY | Facility: CLINIC | Age: 84
End: 2024-02-28
Attending: INTERNAL MEDICINE
Payer: COMMERCIAL

## 2024-02-28 ENCOUNTER — ANTICOAGULATION THERAPY VISIT (OUTPATIENT)
Dept: ANTICOAGULATION | Facility: CLINIC | Age: 84
End: 2024-02-28

## 2024-02-28 DIAGNOSIS — I44.2 CHB (COMPLETE HEART BLOCK) (H): ICD-10-CM

## 2024-02-28 DIAGNOSIS — I48.91 ATRIAL FIBRILLATION (H): Primary | ICD-10-CM

## 2024-02-28 DIAGNOSIS — Z95.0 CARDIAC PACEMAKER IN SITU: ICD-10-CM

## 2024-02-28 DIAGNOSIS — Z79.01 LONG TERM CURRENT USE OF ANTICOAGULANT THERAPY: ICD-10-CM

## 2024-02-28 DIAGNOSIS — I48.21 PERMANENT ATRIAL FIBRILLATION (H): ICD-10-CM

## 2024-02-28 DIAGNOSIS — I48.91 ATRIAL FIBRILLATION, UNSPECIFIED TYPE (H): ICD-10-CM

## 2024-02-28 LAB — INR HOME MONITORING: 2.1 (ref 2–3)

## 2024-02-28 PROCEDURE — 93294 REM INTERROG EVL PM/LDLS PM: CPT | Performed by: INTERNAL MEDICINE

## 2024-02-28 PROCEDURE — 93296 REM INTERROG EVL PM/IDS: CPT | Performed by: INTERNAL MEDICINE

## 2024-02-29 LAB
MDC_IDC_EPISODE_DTM: NORMAL
MDC_IDC_EPISODE_DURATION: 10 S
MDC_IDC_EPISODE_DURATION: 1278 S
MDC_IDC_EPISODE_DURATION: 173 S
MDC_IDC_EPISODE_DURATION: 22 S
MDC_IDC_EPISODE_DURATION: 36 S
MDC_IDC_EPISODE_DURATION: 4013 S
MDC_IDC_EPISODE_DURATION: 61 S
MDC_IDC_EPISODE_DURATION: 708 S
MDC_IDC_EPISODE_ID: 266
MDC_IDC_EPISODE_ID: 267
MDC_IDC_EPISODE_ID: 268
MDC_IDC_EPISODE_ID: 269
MDC_IDC_EPISODE_ID: 270
MDC_IDC_EPISODE_ID: 271
MDC_IDC_EPISODE_ID: 272
MDC_IDC_EPISODE_ID: 273
MDC_IDC_EPISODE_TYPE: NORMAL
MDC_IDC_LEAD_CONNECTION_STATUS: NORMAL
MDC_IDC_LEAD_CONNECTION_STATUS: NORMAL
MDC_IDC_LEAD_IMPLANT_DT: NORMAL
MDC_IDC_LEAD_IMPLANT_DT: NORMAL
MDC_IDC_LEAD_LOCATION: NORMAL
MDC_IDC_LEAD_LOCATION: NORMAL
MDC_IDC_LEAD_LOCATION_DETAIL_1: NORMAL
MDC_IDC_LEAD_LOCATION_DETAIL_1: NORMAL
MDC_IDC_LEAD_MFG: NORMAL
MDC_IDC_LEAD_MFG: NORMAL
MDC_IDC_LEAD_MODEL: NORMAL
MDC_IDC_LEAD_MODEL: NORMAL
MDC_IDC_LEAD_POLARITY_TYPE: NORMAL
MDC_IDC_LEAD_POLARITY_TYPE: NORMAL
MDC_IDC_LEAD_SERIAL: NORMAL
MDC_IDC_LEAD_SERIAL: NORMAL
MDC_IDC_MSMT_BATTERY_DTM: NORMAL
MDC_IDC_MSMT_BATTERY_REMAINING_LONGEVITY: 91 MO
MDC_IDC_MSMT_BATTERY_RRT_TRIGGER: 2.62
MDC_IDC_MSMT_BATTERY_STATUS: NORMAL
MDC_IDC_MSMT_BATTERY_VOLTAGE: 2.98 V
MDC_IDC_MSMT_LEADCHNL_RA_IMPEDANCE_VALUE: 285 OHM
MDC_IDC_MSMT_LEADCHNL_RA_IMPEDANCE_VALUE: 323 OHM
MDC_IDC_MSMT_LEADCHNL_RA_PACING_THRESHOLD_AMPLITUDE: 0.75 V
MDC_IDC_MSMT_LEADCHNL_RA_PACING_THRESHOLD_PULSEWIDTH: 0.4 MS
MDC_IDC_MSMT_LEADCHNL_RA_SENSING_INTR_AMPL: 1.12 MV
MDC_IDC_MSMT_LEADCHNL_RA_SENSING_INTR_AMPL: 1.12 MV
MDC_IDC_MSMT_LEADCHNL_RV_IMPEDANCE_VALUE: 342 OHM
MDC_IDC_MSMT_LEADCHNL_RV_IMPEDANCE_VALUE: 418 OHM
MDC_IDC_MSMT_LEADCHNL_RV_PACING_THRESHOLD_AMPLITUDE: 1 V
MDC_IDC_MSMT_LEADCHNL_RV_PACING_THRESHOLD_PULSEWIDTH: 0.4 MS
MDC_IDC_MSMT_LEADCHNL_RV_SENSING_INTR_AMPL: 7.88 MV
MDC_IDC_MSMT_LEADCHNL_RV_SENSING_INTR_AMPL: 7.88 MV
MDC_IDC_PG_IMPLANT_DTM: NORMAL
MDC_IDC_PG_MFG: NORMAL
MDC_IDC_PG_MODEL: NORMAL
MDC_IDC_PG_SERIAL: NORMAL
MDC_IDC_PG_TYPE: NORMAL
MDC_IDC_SESS_CLINIC_NAME: NORMAL
MDC_IDC_SESS_DTM: NORMAL
MDC_IDC_SESS_TYPE: NORMAL
MDC_IDC_SET_BRADY_AT_MODE_SWITCH_RATE: 171 {BEATS}/MIN
MDC_IDC_SET_BRADY_HYSTRATE: NORMAL
MDC_IDC_SET_BRADY_LOWRATE: 60 {BEATS}/MIN
MDC_IDC_SET_BRADY_MAX_SENSOR_RATE: 130 {BEATS}/MIN
MDC_IDC_SET_BRADY_MAX_TRACKING_RATE: 130 {BEATS}/MIN
MDC_IDC_SET_BRADY_MODE: NORMAL
MDC_IDC_SET_BRADY_PAV_DELAY_HIGH: 140 MS
MDC_IDC_SET_BRADY_PAV_DELAY_LOW: 300 MS
MDC_IDC_SET_BRADY_SAV_DELAY_HIGH: 110 MS
MDC_IDC_SET_BRADY_SAV_DELAY_LOW: 230 MS
MDC_IDC_SET_LEADCHNL_RA_PACING_AMPLITUDE: 1.5 V
MDC_IDC_SET_LEADCHNL_RA_PACING_ANODE_ELECTRODE_1: NORMAL
MDC_IDC_SET_LEADCHNL_RA_PACING_ANODE_LOCATION_1: NORMAL
MDC_IDC_SET_LEADCHNL_RA_PACING_CAPTURE_MODE: NORMAL
MDC_IDC_SET_LEADCHNL_RA_PACING_CATHODE_ELECTRODE_1: NORMAL
MDC_IDC_SET_LEADCHNL_RA_PACING_CATHODE_LOCATION_1: NORMAL
MDC_IDC_SET_LEADCHNL_RA_PACING_POLARITY: NORMAL
MDC_IDC_SET_LEADCHNL_RA_PACING_PULSEWIDTH: 0.4 MS
MDC_IDC_SET_LEADCHNL_RA_SENSING_ANODE_ELECTRODE_1: NORMAL
MDC_IDC_SET_LEADCHNL_RA_SENSING_ANODE_LOCATION_1: NORMAL
MDC_IDC_SET_LEADCHNL_RA_SENSING_CATHODE_ELECTRODE_1: NORMAL
MDC_IDC_SET_LEADCHNL_RA_SENSING_CATHODE_LOCATION_1: NORMAL
MDC_IDC_SET_LEADCHNL_RA_SENSING_POLARITY: NORMAL
MDC_IDC_SET_LEADCHNL_RA_SENSING_SENSITIVITY: 0.3 MV
MDC_IDC_SET_LEADCHNL_RV_PACING_AMPLITUDE: 2 V
MDC_IDC_SET_LEADCHNL_RV_PACING_ANODE_ELECTRODE_1: NORMAL
MDC_IDC_SET_LEADCHNL_RV_PACING_ANODE_LOCATION_1: NORMAL
MDC_IDC_SET_LEADCHNL_RV_PACING_CAPTURE_MODE: NORMAL
MDC_IDC_SET_LEADCHNL_RV_PACING_CATHODE_ELECTRODE_1: NORMAL
MDC_IDC_SET_LEADCHNL_RV_PACING_CATHODE_LOCATION_1: NORMAL
MDC_IDC_SET_LEADCHNL_RV_PACING_POLARITY: NORMAL
MDC_IDC_SET_LEADCHNL_RV_PACING_PULSEWIDTH: 0.4 MS
MDC_IDC_SET_LEADCHNL_RV_SENSING_ANODE_ELECTRODE_1: NORMAL
MDC_IDC_SET_LEADCHNL_RV_SENSING_ANODE_LOCATION_1: NORMAL
MDC_IDC_SET_LEADCHNL_RV_SENSING_CATHODE_ELECTRODE_1: NORMAL
MDC_IDC_SET_LEADCHNL_RV_SENSING_CATHODE_LOCATION_1: NORMAL
MDC_IDC_SET_LEADCHNL_RV_SENSING_POLARITY: NORMAL
MDC_IDC_SET_LEADCHNL_RV_SENSING_SENSITIVITY: 0.9 MV
MDC_IDC_SET_ZONE_DETECTION_INTERVAL: 350 MS
MDC_IDC_SET_ZONE_DETECTION_INTERVAL: 400 MS
MDC_IDC_SET_ZONE_STATUS: NORMAL
MDC_IDC_SET_ZONE_STATUS: NORMAL
MDC_IDC_SET_ZONE_TYPE: NORMAL
MDC_IDC_SET_ZONE_VENDOR_TYPE: NORMAL
MDC_IDC_STAT_AT_BURDEN_PERCENT: 0.1 %
MDC_IDC_STAT_AT_DTM_END: NORMAL
MDC_IDC_STAT_AT_DTM_START: NORMAL
MDC_IDC_STAT_BRADY_AP_VP_PERCENT: 65.47 %
MDC_IDC_STAT_BRADY_AP_VS_PERCENT: 0.09 %
MDC_IDC_STAT_BRADY_AS_VP_PERCENT: 33.86 %
MDC_IDC_STAT_BRADY_AS_VS_PERCENT: 0.57 %
MDC_IDC_STAT_BRADY_DTM_END: NORMAL
MDC_IDC_STAT_BRADY_DTM_START: NORMAL
MDC_IDC_STAT_BRADY_RA_PERCENT_PACED: 64.32 %
MDC_IDC_STAT_BRADY_RV_PERCENT_PACED: 99.34 %
MDC_IDC_STAT_EPISODE_RECENT_COUNT: 0
MDC_IDC_STAT_EPISODE_RECENT_COUNT: 8
MDC_IDC_STAT_EPISODE_RECENT_COUNT_DTM_END: NORMAL
MDC_IDC_STAT_EPISODE_RECENT_COUNT_DTM_START: NORMAL
MDC_IDC_STAT_EPISODE_TOTAL_COUNT: 0
MDC_IDC_STAT_EPISODE_TOTAL_COUNT: 269
MDC_IDC_STAT_EPISODE_TOTAL_COUNT: 4
MDC_IDC_STAT_EPISODE_TOTAL_COUNT_DTM_END: NORMAL
MDC_IDC_STAT_EPISODE_TOTAL_COUNT_DTM_START: NORMAL
MDC_IDC_STAT_EPISODE_TYPE: NORMAL
MDC_IDC_STAT_TACHYTHERAPY_RECENT_DTM_END: NORMAL
MDC_IDC_STAT_TACHYTHERAPY_RECENT_DTM_START: NORMAL
MDC_IDC_STAT_TACHYTHERAPY_TOTAL_DTM_END: NORMAL
MDC_IDC_STAT_TACHYTHERAPY_TOTAL_DTM_START: NORMAL

## 2024-03-04 ENCOUNTER — DOCUMENTATION ONLY (OUTPATIENT)
Dept: ANTICOAGULATION | Facility: CLINIC | Age: 84
End: 2024-03-04
Payer: COMMERCIAL

## 2024-03-04 DIAGNOSIS — I48.0 PAROXYSMAL ATRIAL FIBRILLATION (H): ICD-10-CM

## 2024-03-04 NOTE — PROGRESS NOTES
ANTICOAGULATION MANAGEMENT:  Medication Refill    Anticoagulation Summary  As of 2/28/2024      Warfarin maintenance plan:  10 mg (5 mg x 2) every day   Next INR check:  3/6/2024   Target end date:  Indefinite    Indications    Atrial fibrillation (H) [I48.91]  Long term current use of anticoagulant therapy [Z79.01]  Atrial fibrillation  unspecified type (H) [I48.91]  Permanent atrial fibrillation (H) [I48.21]                 Anticoagulation Care Providers       Provider Role Specialty Phone number    Tyler Lee MD Referring Family Medicine [phone]    Araceli Crowley NP Referring Nurse Practitioner - Family [phone]            Refill Criteria    Visit with referring provider/group: Meets criteria: office visit within referring provider group in the last 1 year on 9/14/23    [facility] referral last signed: 04/20/2023; within last year: Yes    Lab monitoring not exceeding 2 weeks overdue: Yes    Unique meets all criteria for refill. Rx instructions and quantity supplied updated to match patient's current dosing plan. Warfarin 90 day supply with 1 refill granted per [facility] protocol     Georgia Aguilar RN  Anticoagulation Clinic

## 2024-03-05 RX ORDER — WARFARIN SODIUM 5 MG/1
TABLET ORAL
Qty: 200 TABLET | Refills: 0 | Status: SHIPPED | OUTPATIENT
Start: 2024-03-05

## 2024-03-06 ENCOUNTER — ANTICOAGULATION THERAPY VISIT (OUTPATIENT)
Dept: ANTICOAGULATION | Facility: CLINIC | Age: 84
End: 2024-03-06
Payer: COMMERCIAL

## 2024-03-06 DIAGNOSIS — I48.91 ATRIAL FIBRILLATION, UNSPECIFIED TYPE (H): ICD-10-CM

## 2024-03-06 DIAGNOSIS — I48.21 PERMANENT ATRIAL FIBRILLATION (H): ICD-10-CM

## 2024-03-06 DIAGNOSIS — Z79.01 LONG TERM CURRENT USE OF ANTICOAGULANT THERAPY: Primary | ICD-10-CM

## 2024-03-06 LAB — INR HOME MONITORING: 2.1 (ref 2–3)

## 2024-03-06 NOTE — PROGRESS NOTES
ANTICOAGULATION MANAGEMENT     Unique Ward 83 year old female is on warfarin with therapeutic INR result. (Goal INR 2.0-3.0)    Recent labs: (last 7 days)     03/06/24  0000   INR 2.1       ASSESSMENT     Source(s): Chart Review and Patient/Caregiver Call     Warfarin doses taken: Warfarin taken as instructed  Diet:  Patient is going to decrease greens intake slightly as she is concerned at low end of goal range. Prefers less greens over warfarin increase.  Medication/supplement changes: None noted  New illness, injury, or hospitalization: No  Signs or symptoms of bleeding or clotting: No  Previous result: Therapeutic last 2(+) visits  Additional findings:  Previous result 7.7% increase due to Temporary factor/greens intake       PLAN     Recommended plan for temporary change(s) affecting INR ( slight decrease in greens intake)    Dosing Instructions: Continue your current warfarin dose with next INR in 1 week       Summary  As of 3/6/2024      Full warfarin instructions:  10 mg every day   Next INR check:  3/13/2024               Telephone call with Aaliyah who verbalizes understanding and agrees to plan    Patient to recheck with home meter    Education provided:   Please call back if any changes to your diet, medications or how you've been taking warfarin  Goal range and lab monitoring: goal range and significance of current result    Plan made per [facility] anticoagulation protocol    Christine Olivares RN  Anticoagulation Clinic  3/6/2024    _______________________________________________________________________     Anticoagulation Episode Summary       Current INR goal:  2.0-3.0   TTR:  59.3% (1 y)   Target end date:  Indefinite   Send INR reminders to:  [facility]    Indications    Atrial fibrillation (H) [I48.91]  Long term current use of anticoagulant therapy [Z79.01]  Atrial fibrillation  unspecified type (H) [I48.91]  Permanent atrial fibrillation (H) [I48.21]             Comments:  [employer] home  meter- MBE             Anticoagulation Care Providers       Provider Role Specialty Phone number    Tyler Lee MD Referring Family Medicine [phone]    Araceli Crowley NP Referring Nurse Practitioner - Family [phone]

## 2024-03-13 ENCOUNTER — DOCUMENTATION ONLY (OUTPATIENT)
Dept: ANTICOAGULATION | Facility: CLINIC | Age: 84
End: 2024-03-13
Payer: COMMERCIAL

## 2024-03-13 ENCOUNTER — ANTICOAGULATION THERAPY VISIT (OUTPATIENT)
Dept: ANTICOAGULATION | Facility: CLINIC | Age: 84
End: 2024-03-13
Payer: COMMERCIAL

## 2024-03-13 DIAGNOSIS — Z79.01 LONG TERM CURRENT USE OF ANTICOAGULANT THERAPY: ICD-10-CM

## 2024-03-13 DIAGNOSIS — I48.91 ATRIAL FIBRILLATION, UNSPECIFIED TYPE (H): Primary | ICD-10-CM

## 2024-03-13 DIAGNOSIS — I48.21 PERMANENT ATRIAL FIBRILLATION (H): ICD-10-CM

## 2024-03-13 LAB — INR HOME MONITORING: 1.9 (ref 2–3)

## 2024-03-13 NOTE — PROGRESS NOTES
ANTICOAGULATION MANAGEMENT     Unique Ward 83 year old female is on warfarin with subtherapeutic INR result. (Goal INR 2.0-3.0)    Recent labs: (last 7 days)     03/13/24  0000   INR 1.9*       ASSESSMENT     Source(s): Chart Review and Patient/Caregiver Call     Warfarin doses taken: Warfarin taken as instructed  Diet: Increased greens/vitamin K in diet; plans to resume previous intake  Medication/supplement changes: None noted  New illness, injury, or hospitalization: No  Signs or symptoms of bleeding or clotting: No  Previous result: Therapeutic last 2(+) visits  Additional findings: None       PLAN     Recommended plan for temporary change(s) affecting INR     Dosing Instructions: Continue your current warfarin dose with next INR in 1 week and will eat less greens as pt preference    Summary  As of 3/13/2024      Full warfarin instructions:  10 mg every day   Next INR check:  3/20/2024               Telephone call with Aaliyah who verbalizes understanding and agrees to plan    Patient to recheck with home meter    Education provided:   Please call back if any changes to your diet, medications or how you've been taking warfarin  Dietary considerations: importance of consistent vitamin K intake  Contact [phone]  with any changes, questions or concerns.     Plan made per [facility] anticoagulation protocol    Leanna Bone RN  Anticoagulation Clinic  3/13/2024    _______________________________________________________________________     Anticoagulation Episode Summary       Current INR goal:  2.0-3.0   TTR:  58.3% (1 y)   Target end date:  Indefinite   Send INR reminders to:  [facility]    Indications    Atrial fibrillation (H) [I48.91]  Long term current use of anticoagulant therapy [Z79.01]  Atrial fibrillation  unspecified type (H) [I48.91]  Permanent atrial fibrillation (H) [I48.21]             Comments:  [employer] home meter- MBE             Anticoagulation Care Providers       Provider Role Specialty  Phone number    Tyler Lee MD Referring Family Medicine [phone]    Araceli Crowley NP Referring Nurse Practitioner - Family [phone]

## 2024-03-13 NOTE — PROGRESS NOTES
ANTICOAGULATION CLINIC REFERRAL RENEWAL REQUEST       An annual renewal order is required for all patients referred to [facility].?  Please review and sign the pended referral order for Unique Ward.       ANTICOAGULATION SUMMARY      Warfarin indication(s)   Atrial Fibrillation    Mechanical heart valve present?  NO       Current goal range   INR: 2.0-3.0     Goal appropriate for indication? Goal INR 2-3, standard for indication(s) above     Time in Therapeutic Range (TTR)  (Goal > 60%) 58.3%       Office visit with referring provider's group within last year yes on 9/14/23       Leanna Bone RN  [facility]

## 2024-03-18 DIAGNOSIS — I10 ESSENTIAL HYPERTENSION: ICD-10-CM

## 2024-03-20 ENCOUNTER — ANTICOAGULATION THERAPY VISIT (OUTPATIENT)
Dept: ANTICOAGULATION | Facility: CLINIC | Age: 84
End: 2024-03-20
Payer: COMMERCIAL

## 2024-03-20 DIAGNOSIS — I48.91 ATRIAL FIBRILLATION, UNSPECIFIED TYPE (H): Primary | ICD-10-CM

## 2024-03-20 DIAGNOSIS — Z79.01 LONG TERM CURRENT USE OF ANTICOAGULANT THERAPY: ICD-10-CM

## 2024-03-20 DIAGNOSIS — I48.21 PERMANENT ATRIAL FIBRILLATION (H): ICD-10-CM

## 2024-03-20 LAB — INR HOME MONITORING: 2.3 (ref 2–3)

## 2024-03-20 NOTE — PROGRESS NOTES
ANTICOAGULATION MANAGEMENT     Unique Ward 83 year old female is on warfarin with therapeutic INR result. (Goal INR 2.0-3.0)    Recent labs: (last 7 days)     03/20/24  0000   INR 2.3       ASSESSMENT     Source(s): Chart Review     Warfarin doses taken: Reviewed in chart    Previous result: Subtherapeutic  Additional findings: None       PLAN     Recommended plan for no diet, medication or health factor changes affecting INR     Dosing Instructions: Continue your current warfarin dose with next INR in 1 week       Summary  As of 3/20/2024      Full warfarin instructions:  10 mg every day   Next INR check:  3/27/2024               Detailed voice message left for Aaliyah with dosing instructions and follow up date.     Patient to recheck with home meter    Education provided:   Please call back if any changes to your diet, medications or how you've been taking warfarin  Resume manage by exception with home monitor. Continue to submit INR results to home monitor company.You will only be called when your result is out of range. Please call and notify [facility] if new medication started, dose missed, signs or symptoms of bleeding or clotting, or a surgery/procedure is scheduled. Due for next call no later than: 6/18/24.  Contact [phone]  with any changes, questions or concerns.     Plan made per [facility] anticoagulation protocol    Georgia Aguilar RN  Anticoagulation Clinic  3/20/2024    _______________________________________________________________________     Anticoagulation Episode Summary       Current INR goal:  2.0-3.0   TTR:  57.8% (1 y)   Target end date:  Indefinite   Send INR reminders to:  [facility]    Indications    Atrial fibrillation (H) [I48.91]  Long term current use of anticoagulant therapy [Z79.01]  Atrial fibrillation  unspecified type (H) [I48.91]  Permanent atrial fibrillation (H) [I48.21]             Comments:  [employer] home meter- MBE             Anticoagulation Care Providers       Provider  Role Specialty Phone number    Tyler Lee MD Referring Family Medicine [phone]    Araceli Crowley NP Referring Nurse Practitioner - Family [phone]    Fernando Ogden MD Referring [facility] Medicine [phone]

## 2024-03-21 ENCOUNTER — DOCUMENTATION ONLY (OUTPATIENT)
Dept: ANTICOAGULATION | Facility: CLINIC | Age: 84
End: 2024-03-21

## 2024-03-21 RX ORDER — AMLODIPINE BESYLATE 5 MG/1
5 TABLET ORAL DAILY
Qty: 90 TABLET | Refills: 0 | Status: SHIPPED | OUTPATIENT
Start: 2024-03-21 | End: 2024-06-19

## 2024-03-21 NOTE — PROGRESS NOTES
Anticoagulation Management    Updated home monitor orders needed due to Renewal requested by home monitor company    Current home monitor company: [employer] referring provider: Dr. Fernando Ogden     Request made by: Home monitor company    Bev Mancilla RN          Topical Sulfur Applications Counseling: Topical Sulfur Counseling: Patient counseled that this medication may cause skin irritation or allergic reactions.  In the event of skin irritation, the patient was advised to reduce the amount of the drug applied or use it less frequently.   The patient verbalized understanding of the proper use and possible adverse effects of topical sulfur application.  All of the patient's questions and concerns were addressed.

## 2024-03-27 ENCOUNTER — ANTICOAGULATION THERAPY VISIT (OUTPATIENT)
Dept: ANTICOAGULATION | Facility: CLINIC | Age: 84
End: 2024-03-27
Payer: COMMERCIAL

## 2024-03-27 DIAGNOSIS — I48.21 PERMANENT ATRIAL FIBRILLATION (H): ICD-10-CM

## 2024-03-27 DIAGNOSIS — Z79.01 LONG TERM CURRENT USE OF ANTICOAGULANT THERAPY: ICD-10-CM

## 2024-03-27 DIAGNOSIS — I48.91 ATRIAL FIBRILLATION, UNSPECIFIED TYPE (H): Primary | ICD-10-CM

## 2024-03-27 LAB — INR HOME MONITORING: 1.9 (ref 2–3)

## 2024-03-27 NOTE — PROGRESS NOTES
ANTICOAGULATION MANAGEMENT     Unique GUZMAN Ed 83 year old female is on warfarin with subtherapeutic INR result. (Goal INR 2.0-3.0)    Recent labs: (last 7 days)     03/27/24  0000   INR 1.9*       ASSESSMENT     Source(s): Chart Review and Patient/Caregiver Call     Warfarin doses taken: Missed dose(s) may be affecting INR  Diet: Decreased greens/vitamin K in diet; plans to resume previous intake - when pt missed dose of warfarin she decided to decrease green intake to offset.   Pt states that she was interested in starting flax seed and wondering if any interaction with warfarin. Per [facility] potential for moderate interaction with use of flax seed and warfarin resulting in increased risk of bleeding.   Medication/supplement changes: None noted  New illness, injury, or hospitalization: No  Signs or symptoms of bleeding or clotting: No  Previous result: Therapeutic last visit; previously outside of goal range  Additional findings: None       PLAN     Recommended plan for temporary change(s) affecting INR     Dosing Instructions: Continue your current warfarin dose with next INR in 1 week       Summary  As of 3/27/2024      Full warfarin instructions:  10 mg every day   Next INR check:  4/3/2024               Telephone call with Aaliyah who verbalizes understanding and agrees to plan    Patient to recheck with home meter    Education provided:   Goal range and lab monitoring: goal range and significance of current result and Importance of therapeutic range  Dietary considerations: importance of consistent vitamin K intake and impact of vitamin K foods on INR  Symptom monitoring: monitoring for bleeding signs and symptoms  Moderate potential for interaction between warfarin and flax seed resulting in increased risk of bleeding    Plan made per [facility] anticoagulation protocol    Yan Jang RN  Anticoagulation Clinic  3/27/2024    _______________________________________________________________________     Anticoagulation  Episode Summary       Current INR goal:  2.0-3.0   TTR:  59.0% (1 y)   Target end date:  Indefinite   Send INR reminders to:  [facility]    Indications    Atrial fibrillation (H) [I48.91]  Long term current use of anticoagulant therapy [Z79.01]  Atrial fibrillation  unspecified type (H) [I48.91]  Permanent atrial fibrillation (H) [I48.21]             Comments:  [employer] home meter- [employer]BE             Anticoagulation Care Providers       Provider Role Specialty Phone number    Tyler Lee MD Referring Family Medicine [phone]    Araceli Crowley NP Referring Nurse Practitioner - Family [phone]    Fernando Ogden MD Referring [facility] Medicine [phone]

## 2024-03-28 ENCOUNTER — DOCUMENTATION ONLY (OUTPATIENT)
Dept: ANTICOAGULATION | Facility: CLINIC | Age: 84
End: 2024-03-28
Payer: COMMERCIAL

## 2024-03-28 NOTE — PROGRESS NOTES
Anticoagulation Management    Updated home monitor orders needed due to Q 1 week frequency required by insurance    Current home monitor company: [employer] referring provider: Fernando Ogden      Request made by: Home monitor company    Georgia Aguilar RN

## 2024-04-03 ENCOUNTER — ANTICOAGULATION THERAPY VISIT (OUTPATIENT)
Dept: ANTICOAGULATION | Facility: CLINIC | Age: 84
End: 2024-04-03
Payer: COMMERCIAL

## 2024-04-03 DIAGNOSIS — I48.91 ATRIAL FIBRILLATION, UNSPECIFIED TYPE (H): Primary | ICD-10-CM

## 2024-04-03 DIAGNOSIS — I48.21 PERMANENT ATRIAL FIBRILLATION (H): ICD-10-CM

## 2024-04-03 DIAGNOSIS — Z79.01 LONG TERM CURRENT USE OF ANTICOAGULANT THERAPY: ICD-10-CM

## 2024-04-03 LAB — INR HOME MONITORING: 2.9 (ref 2–3)

## 2024-04-03 NOTE — PROGRESS NOTES
ANTICOAGULATION MANAGEMENT     Unique Ward 83 year old female is on warfarin with therapeutic INR result. (Goal INR 2.0-3.0)    Recent labs: (last 7 days)     04/03/24  0000   INR 2.9       ASSESSMENT     Source(s): Chart Review and Patient/Caregiver Call     Warfarin doses taken: Warfarin taken as instructed  Diet: No new diet changes identified. Patient did not start eating flax seed yet as discussed last week, but may start in the future.   Medication/supplement changes: None noted.   New illness, injury, or hospitalization: No  Signs or symptoms of bleeding or clotting: No  Previous result: Subtherapeutic  Additional findings: None       PLAN     Recommended plan for no diet, medication or health factor changes affecting INR     Dosing Instructions: Continue your current warfarin dose with next INR in 1 week       Summary  As of 4/3/2024      Full warfarin instructions:  10 mg every day   Next INR check:  4/10/2024               Telephone call with Aaliyah who agrees to plan and repeated back plan correctly    Patient to recheck with home meter    Education provided:   Please call back if any changes to your diet, medications or how you've been taking warfarin  Symptom monitoring: monitoring for bleeding signs and symptoms, monitoring for clotting signs and symptoms, and monitoring for stroke signs and symptoms  Resume manage by exception with home monitor. Continue to submit INR results to home monitor company.You will only be called when your result is out of range. Please call and notify [facility] if new medication started, dose missed, signs or symptoms of bleeding or clotting, or a surgery/procedure is scheduled. Due for next call no later than: 7/2/24.  Contact [phone]  with any changes, questions or concerns.     Plan made per [facility] anticoagulation protocol    Georgia Card RN  Anticoagulation Clinic  4/3/2024    _______________________________________________________________________     Anticoagulation  Episode Summary       Current INR goal:  2.0-3.0   TTR:  60.7% (1 y)   Target end date:  Indefinite   Send INR reminders to:  [facility]    Indications    Atrial fibrillation (H) [I48.91]  Long term current use of anticoagulant therapy [Z79.01]  Atrial fibrillation  unspecified type (H) [I48.91]  Permanent atrial fibrillation (H) [I48.21]             Comments:  [employer] home meter- [employer]             Anticoagulation Care Providers       Provider Role Specialty Phone number    Tyler Lee MD Referring Family Medicine [phone]    Araceli Crowley NP Referring Nurse Practitioner - Family [phone]    Fernando Ogden MD Referring Family Medicine [phone]

## 2024-04-09 NOTE — PROGRESS NOTES
Anticoagulation Management    Updated home monitor orders needed due to Renewal requested by home monitor company - [employer] states in fax they need a completed INR prescription     Current home monitor company: [employer]    [facility] referring provider: Dr. Fernando Ogden    Request made by: Home monitor company    Yan Jang RN

## 2024-04-10 ENCOUNTER — ANTICOAGULATION THERAPY VISIT (OUTPATIENT)
Dept: ANTICOAGULATION | Facility: CLINIC | Age: 84
End: 2024-04-10
Payer: COMMERCIAL

## 2024-04-10 ENCOUNTER — TELEPHONE (OUTPATIENT)
Dept: FAMILY MEDICINE | Facility: CLINIC | Age: 84
End: 2024-04-10
Payer: COMMERCIAL

## 2024-04-10 DIAGNOSIS — I48.91 ATRIAL FIBRILLATION, UNSPECIFIED TYPE (H): Primary | ICD-10-CM

## 2024-04-10 DIAGNOSIS — I48.21 PERMANENT ATRIAL FIBRILLATION (H): ICD-10-CM

## 2024-04-10 DIAGNOSIS — Z79.01 LONG TERM CURRENT USE OF ANTICOAGULANT THERAPY: ICD-10-CM

## 2024-04-10 LAB — INR HOME MONITORING: 2 (ref 2–3)

## 2024-04-10 NOTE — TELEPHONE ENCOUNTER
Please see 4/10/2024 Anticoagulation Therapy encounter for further information    Georgia Aguilar RN [facility]ticoagulation [facility]

## 2024-04-10 NOTE — TELEPHONE ENCOUNTER
Patient Returning Call    Reason for call:  Returning INR call, no answer at phone# left    Information relayed to patient:  Willl leave TE for call back    Patient has additional questions:  No      Could we send this information to you in [facility] or would you prefer to receive a phone call?:   Patient would prefer a phone call   Okay to leave a detailed message?: No at Home number on file [phone] (home)

## 2024-04-10 NOTE — PROGRESS NOTES
ANTICOAGULATION MANAGEMENT     Unique Ward 83 year old female is on warfarin with therapeutic INR result. (Goal INR 2.0-3.0)    Recent labs: (last 7 days)     04/10/24  0000   INR 2.0       ASSESSMENT     Source(s): Chart Review and Patient/Caregiver Call     Warfarin doses taken: Warfarin taken as instructed  Diet:  Per patient she ate  her greens 3 days in a row ( Sat, Sun, Mon) this past week , stated that she will continue eating her greens in consecutive days but will plan is cutting portion in half. She is trying to balance her intake based on inr result.  Note: patient did not start taking flax seed yet as previously planned.  Medication/supplement changes: None noted  New illness, injury, or hospitalization: No  Signs or symptoms of bleeding or clotting: No  Previous result: Therapeutic last visit; previously outside of goal range  Additional findings:  Patient is aware that she will get a call again next week regarding INR  once result received by [facility]       PLAN     Recommended plan for ongoing change(s) affecting INR     Dosing Instructions: Continue your current warfarin dose with next INR in 1 week       Summary  As of 4/10/2024      Full warfarin instructions:  10 mg every day   Next INR check:  4/17/2024               Telephone call with Aaliyah who verbalizes understanding and agrees to plan    Patient to recheck with home meter    Education provided:   Dietary considerations: importance of consistent vitamin K intake and impact of vitamin K foods on INR    Plan made per [facility] anticoagulation protocol    Georgia Aguilar RN  Anticoagulation Clinic  4/10/2024    _______________________________________________________________________     Anticoagulation Episode Summary       Current INR goal:  2.0-3.0   TTR:  62.6% (1 y)   Target end date:  Indefinite   Send INR reminders to:  [facility]    Indications    Atrial fibrillation (H) [I48.91]  Long term current use of anticoagulant therapy  [Z79.01]  Atrial fibrillation  unspecified type (H) [I48.91]  Permanent atrial fibrillation (H) [I48.21]             Comments:  [employer] home meter- [employer]             Anticoagulation Care Providers       Provider Role Specialty Phone number    Tyler Lee MD Referring Family Medicine [phone]    Araceli Crowley NP Referring Nurse Practitioner - [facility] [phone]    Fernando Ogden MD Referring [facility] Medicine [phone]

## 2024-04-17 ENCOUNTER — ANTICOAGULATION THERAPY VISIT (OUTPATIENT)
Dept: ANTICOAGULATION | Facility: CLINIC | Age: 84
End: 2024-04-17
Payer: COMMERCIAL

## 2024-04-17 DIAGNOSIS — I48.91 ATRIAL FIBRILLATION, UNSPECIFIED TYPE (H): Primary | ICD-10-CM

## 2024-04-17 DIAGNOSIS — I48.21 PERMANENT ATRIAL FIBRILLATION (H): ICD-10-CM

## 2024-04-17 DIAGNOSIS — Z79.01 LONG TERM CURRENT USE OF ANTICOAGULANT THERAPY: ICD-10-CM

## 2024-04-17 LAB — INR HOME MONITORING: 2 (ref 2–3)

## 2024-04-17 NOTE — PROGRESS NOTES
ANTICOAGULATION MANAGEMENT     Unique Ward 83 year old female is on warfarin with therapeutic INR result. (Goal INR 2.0-3.0)    Recent labs: (last 7 days)     04/17/24  0000   INR 2.0       ASSESSMENT     Source(s): Chart Review and Patient/Caregiver Call     Warfarin doses taken: Warfarin taken as instructed  Diet:  started 1/2TBS of flax seed, no change to vitamin K intake at this time and does not want to change intake at this time  Medication/supplement changes: None noted  New illness, injury, or hospitalization: No  Signs or symptoms of bleeding or clotting: No  Previous result: Therapeutic last 2(+) visits 2.0 & 2.9 (recent diet change)   Additional findings: would like to take tumeric powder, noted this will increase INR - instead of taking tumeric powder [facility] RN advised slight increase in warfarin dose, 3.6% as patient does not want her INR to continue to be this low in her goal range, educated this would be easier to control then tumeric intake.        PLAN     Recommended plan for ongoing diet changes affecting INR     Dosing Instructions: Increase your warfarin dose by 3.6% with next INR in 1 week       Summary  As of 4/17/2024      Full warfarin instructions:  12.5 mg every Wed; 10 mg all other days   Next INR check:  4/24/2024               Telephone call with Aaliyah who verbalizes understanding and agrees to plan and who agrees to plan and repeated back plan correctly    Patient to recheck with home meter    Education provided:   Contact [phone]  with any changes, questions or concerns.     Plan made per [facility] anticoagulation protocol    Valery Ramirez RN  Anticoagulation Clinic  4/17/2024    _______________________________________________________________________     Anticoagulation Episode Summary       Current INR goal:  2.0-3.0   TTR:  63.7% (1 y)   Target end date:  Indefinite   Send INR reminders to:  [facility]    Indications    Atrial fibrillation (H) [I48.91]  Long term current  use of anticoagulant therapy [Z79.01]  Atrial fibrillation  unspecified type (H) [I48.91]  Permanent atrial fibrillation (H) [I48.21]             Comments:  [employer] home meter- MBE             Anticoagulation Care Providers       Provider Role Specialty Phone number    Tyler Lee MD Referring Family Medicine [phone]    Araceli Crowley NP Referring Nurse Practitioner - [facility] [phone]    Fernando Ogden MD Referring [facility] Medicine [phone]

## 2024-04-24 ENCOUNTER — ANTICOAGULATION THERAPY VISIT (OUTPATIENT)
Dept: ANTICOAGULATION | Facility: CLINIC | Age: 84
End: 2024-04-24
Payer: COMMERCIAL

## 2024-04-24 LAB — INR HOME MONITORING: 3.2 (ref 2–3)

## 2024-04-24 NOTE — PROGRESS NOTES
ANTICOAGULATION MANAGEMENT     Unique Ward 83 year old female is on warfarin with supratherapeutic INR result. (Goal INR 2.0-3.0)    Recent labs: (last 7 days)     04/24/24  0000   INR 3.2*       ASSESSMENT     Source(s): Chart Review and Patient/Caregiver Call     Warfarin doses taken: Warfarin taken as instructed  Diet: Decreased greens/vitamin K in diet; plans to resume previous intake  Medication/supplement changes: None noted  New illness, injury, or hospitalization: No  Signs or symptoms of bleeding or clotting: No  Previous result: Therapeutic last 2(+) visits  Additional findings: MD was increased on 4/17/24. Pt decreased her greens. Now INR is elevated. Pt will go back to eating greens every other day. Moved the 12.5 mg day from Wednesday to Saturdays.     PLAN     Recommended plan for ongoing change(s) affecting INR     Dosing Instructions: partial hold then take 12.5 mg on Saturdays and 10 mg all other days with next INR in 1 week       Summary  As of 4/24/2024      Full warfarin instructions:  4/24: 7.5 mg; Otherwise 12.5 mg every Sat; 10 mg all other days   Next INR check:  5/1/2024               Telephone call with Aaliyah who agrees to plan and repeated back plan correctly (Mychart sent)    Patient to recheck with home meter    Education provided:   Dietary considerations: importance of consistent vitamin K intake, impact of vitamin K foods on INR, and vitamin K content of foods  Contact [phone]  with any changes, questions or concerns.     Plan made per [facility] anticoagulation protocol    Kirstin Cooper RN  Anticoagulation Clinic  4/24/2024    _______________________________________________________________________     Anticoagulation Episode Summary       Current INR goal:  2.0-3.0   TTR:  63.4% (1 y)   Target end date:  Indefinite   Send INR reminders to:  [facility]    Indications    Atrial fibrillation (H) [I48.91]  Long term current use of anticoagulant therapy [Z79.01]  Atrial  fibrillation  unspecified type (H) [I48.91]  Permanent atrial fibrillation (H) [I48.21]             Comments:  [employer] home meter- MBE             Anticoagulation Care Providers       Provider Role Specialty Phone number    Tyler Lee MD Referring Family Medicine [phone]    Araceli Crowley NP Referring Nurse Practitioner - Family [phone]    Fernando Ogden MD Referring [facility] Medicine [phone]

## 2024-05-01 ENCOUNTER — ANTICOAGULATION THERAPY VISIT (OUTPATIENT)
Dept: ANTICOAGULATION | Facility: CLINIC | Age: 84
End: 2024-05-01
Payer: COMMERCIAL

## 2024-05-01 DIAGNOSIS — Z79.01 LONG TERM CURRENT USE OF ANTICOAGULANT THERAPY: ICD-10-CM

## 2024-05-01 DIAGNOSIS — I48.21 PERMANENT ATRIAL FIBRILLATION (H): ICD-10-CM

## 2024-05-01 DIAGNOSIS — I48.91 ATRIAL FIBRILLATION, UNSPECIFIED TYPE (H): Primary | ICD-10-CM

## 2024-05-01 LAB — INR HOME MONITORING: 2.9 (ref 2–3)

## 2024-05-01 NOTE — PROGRESS NOTES
ANTICOAGULATION MANAGEMENT     Unique Ward 83 year old female is on warfarin with therapeutic INR result. (Goal INR 2.0-3.0)    Recent labs: (last 7 days)     05/01/24  0000   INR 2.9       ASSESSMENT     Source(s): Chart Review and Patient/Caregiver Call     Warfarin doses taken: Warfarin taken as instructed  Diet:  Did not eat greens as planned but will start eating moving forward  Medication/supplement changes: None noted  New illness, injury, or hospitalization: No  Signs or symptoms of bleeding or clotting: No  Previous result: Supratherapeutic  Additional findings:  Never started taking tumeric  Patient made aware that she will get a call with next INR result and then will go back to [facility] if continues to be within range.       PLAN     Recommended plan for temporary change(s) affecting INR     Dosing Instructions: Continue your current warfarin dose with next INR in 1 week       Summary  As of 5/1/2024      Full warfarin instructions:  12.5 mg every Sat; 10 mg all other days   Next INR check:  5/8/2024               Telephone call with Aaliyah who verbalizes understanding and agrees to plan and who agrees to plan and repeated back plan correctly    Patient to recheck with home meter    Education provided:   Dietary considerations: importance of consistent vitamin K intake and impact of vitamin K foods on INR    Plan made per [facility] anticoagulation protocol    Georgia Aguilar RN  Anticoagulation Clinic  5/1/2024    _______________________________________________________________________     Anticoagulation Episode Summary       Current INR goal:  2.0-3.0   TTR:  64.0% (1 y)   Target end date:  Indefinite   Send INR reminders to:  [facility]    Indications    Atrial fibrillation (H) [I48.91]  Long term current use of anticoagulant therapy [Z79.01]  Atrial fibrillation  unspecified type (H) [I48.91]  Permanent atrial fibrillation (H) [I48.21]             Comments:  [employer] home meter- [facility]BE              Anticoagulation Care Providers       Provider Role Specialty Phone number    Tyler Lee MD Referring Family Medicine [phone]    Araceli Crowley NP Referring Nurse Practitioner - Family [phone]    Fernando Ogden MD Referring Family Medicine [phone]

## 2024-05-20 ENCOUNTER — TELEPHONE (OUTPATIENT)
Dept: ANTICOAGULATION | Facility: CLINIC | Age: 84
End: 2024-05-20
Payer: COMMERCIAL

## 2024-05-20 ENCOUNTER — TELEPHONE (OUTPATIENT)
Dept: FAMILY MEDICINE | Facility: CLINIC | Age: 84
End: 2024-05-20
Payer: COMMERCIAL

## 2024-05-20 NOTE — TELEPHONE ENCOUNTER
ANTICOAGULATION     Unique GUZMAN Ed is overdue for an INR check.     Per chart, it appears patient has established care elsewhere. Left a detailed message for patient asking her to call back to confirm.     Georgia Card RN

## 2024-05-20 NOTE — TELEPHONE ENCOUNTER
Called and spoke to Aaliyah. She reports that she did transfer her care to a clinic in [place], [place] and is happy there but she is not happy with the anticoagulation clinic there. She understands that she may need to continue down this road with the new [facility] clinic but wondering if there is another way to be able to stay with [facility] ACC clinic? Will route to [facility] to review.

## 2024-05-20 NOTE — TELEPHONE ENCOUNTER
General Call      Reason for Call:  Returning INR nurses call    What are your questions or concerns:  Returning INR nurses call.    Date of last appointment with provider: N/A    Could we send this information to you in [employer] [facility] or would you prefer to receive a phone call?:   Patient would prefer a phone call   Okay to leave a detailed message?: No at Home number on file [phone] (home)

## 2024-05-21 NOTE — TELEPHONE ENCOUNTER
To be managed by [facility], we would need patient to be seen annually a provider, either PCP or a specialist within [facility] such as cardiologist that is willing and managing a condition treated with warfarin.    For continuity of care, it is recommended the [facility] system managing the majority of patient care should also manage anticoagulation to optimize provider communications, alerts about drug interactions, etc    Deonna Banks PharmD BCACP  Anticoagulation Clinical Pharmacist

## 2024-05-21 NOTE — TELEPHONE ENCOUNTER
Left a voicemail asking patient to call the clinic back. Please advised patient of message below.

## 2024-05-28 ENCOUNTER — TELEPHONE (OUTPATIENT)
Dept: ANTICOAGULATION | Facility: CLINIC | Age: 84
End: 2024-05-28
Payer: COMMERCIAL

## 2024-05-28 DIAGNOSIS — I48.91 ATRIAL FIBRILLATION, UNSPECIFIED TYPE (H): Primary | ICD-10-CM

## 2024-05-28 DIAGNOSIS — Z79.01 LONG TERM CURRENT USE OF ANTICOAGULANT THERAPY: ICD-10-CM

## 2024-05-28 DIAGNOSIS — I48.21 PERMANENT ATRIAL FIBRILLATION (H): ICD-10-CM

## 2024-05-28 NOTE — TELEPHONE ENCOUNTER
See TE from 05/28/24     Patient requesting to only speak with Georgia GOODRICH RN.   Will advised this nurse when she is available.     Valery Espinoza, RN, BSN, PHN  Anticoagulation Nurse  [phone]

## 2024-05-28 NOTE — TELEPHONE ENCOUNTER
Left a voicemail asking patient to call the clinic back.    If patient does not return call in the next day or so, may want to consider closing her [facility] episode since she has transferred clinics.

## 2024-05-28 NOTE — TELEPHONE ENCOUNTER
Patient Returning Call    Reason for call:  Pt calling to question if she can keep anticoagulation services with [facility] - Pt only wants to speak to Georgia.     Information relayed to patient:  N/A    Patient has additional questions:  No      Could we send this information to you in [employer] or would you prefer to receive a phone call?:   Patient would prefer a phone call   Okay to leave a detailed message?: Yes at Cell number on file:    Telephone Information:   Mobile [phone]

## 2024-05-30 NOTE — TELEPHONE ENCOUNTER
Spoke with patient. She states she has transferred her care to [facility]. She states she plans to transfer her cardiologist to [facility] as well. Writer explained patient would need to see a [facility] provider in order to continue having warfarin dosed through [facility]. Patient verbalized understanding, she will call back if anything changes.    Will discharge patient from [facility].    ANTICOAGULATION  MANAGEMENT    Unique Ward is being discharged from the [facility] ([facility]).    Reason for discharge: care has been transferred to [facility]    Anticoagulation episode resolved, ACC referral closed, and Standing order discontinued    If patient needs warfarin management in the future, please send a new referral    Georgia Card RN

## 2024-06-12 ENCOUNTER — ANCILLARY PROCEDURE (OUTPATIENT)
Dept: CARDIOLOGY | Facility: CLINIC | Age: 84
End: 2024-06-12
Attending: INTERNAL MEDICINE
Payer: COMMERCIAL

## 2024-06-12 DIAGNOSIS — I44.2 CHB (COMPLETE HEART BLOCK) (H): ICD-10-CM

## 2024-06-12 DIAGNOSIS — Z95.0 CARDIAC PACEMAKER IN SITU: ICD-10-CM

## 2024-06-12 DIAGNOSIS — Z95.0 CARDIAC PACEMAKER IN SITU: Primary | ICD-10-CM

## 2024-06-12 LAB
MDC_IDC_EPISODE_DTM: NORMAL
MDC_IDC_EPISODE_DURATION: 1 S
MDC_IDC_EPISODE_DURATION: 155 S
MDC_IDC_EPISODE_DURATION: 33 S
MDC_IDC_EPISODE_DURATION: 37 S
MDC_IDC_EPISODE_DURATION: 44 S
MDC_IDC_EPISODE_DURATION: 57 S
MDC_IDC_EPISODE_DURATION: 63 S
MDC_IDC_EPISODE_DURATION: 65 S
MDC_IDC_EPISODE_DURATION: 82 S
MDC_IDC_EPISODE_DURATION: 9 S
MDC_IDC_EPISODE_ID: 274
MDC_IDC_EPISODE_ID: 275
MDC_IDC_EPISODE_ID: 276
MDC_IDC_EPISODE_ID: 277
MDC_IDC_EPISODE_ID: 278
MDC_IDC_EPISODE_ID: 279
MDC_IDC_EPISODE_ID: 280
MDC_IDC_EPISODE_ID: 281
MDC_IDC_EPISODE_ID: 282
MDC_IDC_EPISODE_ID: 283
MDC_IDC_EPISODE_TYPE: NORMAL
MDC_IDC_LEAD_CONNECTION_STATUS: NORMAL
MDC_IDC_LEAD_CONNECTION_STATUS: NORMAL
MDC_IDC_LEAD_IMPLANT_DT: NORMAL
MDC_IDC_LEAD_IMPLANT_DT: NORMAL
MDC_IDC_LEAD_LOCATION: NORMAL
MDC_IDC_LEAD_LOCATION: NORMAL
MDC_IDC_LEAD_LOCATION_DETAIL_1: NORMAL
MDC_IDC_LEAD_LOCATION_DETAIL_1: NORMAL
MDC_IDC_LEAD_MFG: NORMAL
MDC_IDC_LEAD_MFG: NORMAL
MDC_IDC_LEAD_MODEL: NORMAL
MDC_IDC_LEAD_MODEL: NORMAL
MDC_IDC_LEAD_POLARITY_TYPE: NORMAL
MDC_IDC_LEAD_POLARITY_TYPE: NORMAL
MDC_IDC_LEAD_SERIAL: NORMAL
MDC_IDC_LEAD_SERIAL: NORMAL
MDC_IDC_MSMT_BATTERY_DTM: NORMAL
MDC_IDC_MSMT_BATTERY_REMAINING_LONGEVITY: 85 MO
MDC_IDC_MSMT_BATTERY_RRT_TRIGGER: 2.62
MDC_IDC_MSMT_BATTERY_STATUS: NORMAL
MDC_IDC_MSMT_BATTERY_VOLTAGE: 2.98 V
MDC_IDC_MSMT_LEADCHNL_RA_IMPEDANCE_VALUE: 285 OHM
MDC_IDC_MSMT_LEADCHNL_RA_IMPEDANCE_VALUE: 342 OHM
MDC_IDC_MSMT_LEADCHNL_RA_PACING_THRESHOLD_AMPLITUDE: 0.75 V
MDC_IDC_MSMT_LEADCHNL_RA_PACING_THRESHOLD_PULSEWIDTH: 0.4 MS
MDC_IDC_MSMT_LEADCHNL_RA_SENSING_INTR_AMPL: 0.62 MV
MDC_IDC_MSMT_LEADCHNL_RA_SENSING_INTR_AMPL: 0.62 MV
MDC_IDC_MSMT_LEADCHNL_RV_IMPEDANCE_VALUE: 342 OHM
MDC_IDC_MSMT_LEADCHNL_RV_IMPEDANCE_VALUE: 399 OHM
MDC_IDC_MSMT_LEADCHNL_RV_PACING_THRESHOLD_AMPLITUDE: 0.88 V
MDC_IDC_MSMT_LEADCHNL_RV_PACING_THRESHOLD_PULSEWIDTH: 0.4 MS
MDC_IDC_MSMT_LEADCHNL_RV_SENSING_INTR_AMPL: 6.5 MV
MDC_IDC_MSMT_LEADCHNL_RV_SENSING_INTR_AMPL: 6.5 MV
MDC_IDC_PG_IMPLANT_DTM: NORMAL
MDC_IDC_PG_MFG: NORMAL
MDC_IDC_PG_MODEL: NORMAL
MDC_IDC_PG_SERIAL: NORMAL
MDC_IDC_PG_TYPE: NORMAL
MDC_IDC_SESS_CLINIC_NAME: NORMAL
MDC_IDC_SESS_DTM: NORMAL
MDC_IDC_SESS_TYPE: NORMAL
MDC_IDC_SET_BRADY_AT_MODE_SWITCH_RATE: 171 {BEATS}/MIN
MDC_IDC_SET_BRADY_HYSTRATE: NORMAL
MDC_IDC_SET_BRADY_LOWRATE: 60 {BEATS}/MIN
MDC_IDC_SET_BRADY_MAX_SENSOR_RATE: 130 {BEATS}/MIN
MDC_IDC_SET_BRADY_MAX_TRACKING_RATE: 130 {BEATS}/MIN
MDC_IDC_SET_BRADY_MODE: NORMAL
MDC_IDC_SET_BRADY_PAV_DELAY_HIGH: 140 MS
MDC_IDC_SET_BRADY_PAV_DELAY_LOW: 300 MS
MDC_IDC_SET_BRADY_SAV_DELAY_HIGH: 110 MS
MDC_IDC_SET_BRADY_SAV_DELAY_LOW: 230 MS
MDC_IDC_SET_LEADCHNL_RA_PACING_AMPLITUDE: 1.5 V
MDC_IDC_SET_LEADCHNL_RA_PACING_ANODE_ELECTRODE_1: NORMAL
MDC_IDC_SET_LEADCHNL_RA_PACING_ANODE_LOCATION_1: NORMAL
MDC_IDC_SET_LEADCHNL_RA_PACING_CAPTURE_MODE: NORMAL
MDC_IDC_SET_LEADCHNL_RA_PACING_CATHODE_ELECTRODE_1: NORMAL
MDC_IDC_SET_LEADCHNL_RA_PACING_CATHODE_LOCATION_1: NORMAL
MDC_IDC_SET_LEADCHNL_RA_PACING_POLARITY: NORMAL
MDC_IDC_SET_LEADCHNL_RA_PACING_PULSEWIDTH: 0.4 MS
MDC_IDC_SET_LEADCHNL_RA_SENSING_ANODE_ELECTRODE_1: NORMAL
MDC_IDC_SET_LEADCHNL_RA_SENSING_ANODE_LOCATION_1: NORMAL
MDC_IDC_SET_LEADCHNL_RA_SENSING_CATHODE_ELECTRODE_1: NORMAL
MDC_IDC_SET_LEADCHNL_RA_SENSING_CATHODE_LOCATION_1: NORMAL
MDC_IDC_SET_LEADCHNL_RA_SENSING_POLARITY: NORMAL
MDC_IDC_SET_LEADCHNL_RA_SENSING_SENSITIVITY: 0.3 MV
MDC_IDC_SET_LEADCHNL_RV_PACING_AMPLITUDE: 2 V
MDC_IDC_SET_LEADCHNL_RV_PACING_ANODE_ELECTRODE_1: NORMAL
MDC_IDC_SET_LEADCHNL_RV_PACING_ANODE_LOCATION_1: NORMAL
MDC_IDC_SET_LEADCHNL_RV_PACING_CAPTURE_MODE: NORMAL
MDC_IDC_SET_LEADCHNL_RV_PACING_CATHODE_ELECTRODE_1: NORMAL
MDC_IDC_SET_LEADCHNL_RV_PACING_CATHODE_LOCATION_1: NORMAL
MDC_IDC_SET_LEADCHNL_RV_PACING_POLARITY: NORMAL
MDC_IDC_SET_LEADCHNL_RV_PACING_PULSEWIDTH: 0.4 MS
MDC_IDC_SET_LEADCHNL_RV_SENSING_ANODE_ELECTRODE_1: NORMAL
MDC_IDC_SET_LEADCHNL_RV_SENSING_ANODE_LOCATION_1: NORMAL
MDC_IDC_SET_LEADCHNL_RV_SENSING_CATHODE_ELECTRODE_1: NORMAL
MDC_IDC_SET_LEADCHNL_RV_SENSING_CATHODE_LOCATION_1: NORMAL
MDC_IDC_SET_LEADCHNL_RV_SENSING_POLARITY: NORMAL
MDC_IDC_SET_LEADCHNL_RV_SENSING_SENSITIVITY: 0.9 MV
MDC_IDC_SET_ZONE_DETECTION_INTERVAL: 350 MS
MDC_IDC_SET_ZONE_DETECTION_INTERVAL: 400 MS
MDC_IDC_SET_ZONE_STATUS: NORMAL
MDC_IDC_SET_ZONE_STATUS: NORMAL
MDC_IDC_SET_ZONE_TYPE: NORMAL
MDC_IDC_SET_ZONE_VENDOR_TYPE: NORMAL
MDC_IDC_STAT_AT_BURDEN_PERCENT: 0 %
MDC_IDC_STAT_AT_DTM_END: NORMAL
MDC_IDC_STAT_AT_DTM_START: NORMAL
MDC_IDC_STAT_BRADY_AP_VP_PERCENT: 68.93 %
MDC_IDC_STAT_BRADY_AP_VS_PERCENT: 0.04 %
MDC_IDC_STAT_BRADY_AS_VP_PERCENT: 30.83 %
MDC_IDC_STAT_BRADY_AS_VS_PERCENT: 0.2 %
MDC_IDC_STAT_BRADY_DTM_END: NORMAL
MDC_IDC_STAT_BRADY_DTM_START: NORMAL
MDC_IDC_STAT_BRADY_RA_PERCENT_PACED: 67.79 %
MDC_IDC_STAT_BRADY_RV_PERCENT_PACED: 99.76 %
MDC_IDC_STAT_EPISODE_RECENT_COUNT: 0
MDC_IDC_STAT_EPISODE_RECENT_COUNT: 1
MDC_IDC_STAT_EPISODE_RECENT_COUNT: 9
MDC_IDC_STAT_EPISODE_RECENT_COUNT_DTM_END: NORMAL
MDC_IDC_STAT_EPISODE_RECENT_COUNT_DTM_START: NORMAL
MDC_IDC_STAT_EPISODE_TOTAL_COUNT: 0
MDC_IDC_STAT_EPISODE_TOTAL_COUNT: 278
MDC_IDC_STAT_EPISODE_TOTAL_COUNT: 5
MDC_IDC_STAT_EPISODE_TOTAL_COUNT_DTM_END: NORMAL
MDC_IDC_STAT_EPISODE_TOTAL_COUNT_DTM_START: NORMAL
MDC_IDC_STAT_EPISODE_TYPE: NORMAL
MDC_IDC_STAT_TACHYTHERAPY_RECENT_DTM_END: NORMAL
MDC_IDC_STAT_TACHYTHERAPY_RECENT_DTM_START: NORMAL
MDC_IDC_STAT_TACHYTHERAPY_TOTAL_DTM_END: NORMAL
MDC_IDC_STAT_TACHYTHERAPY_TOTAL_DTM_START: NORMAL

## 2024-06-12 PROCEDURE — 93294 REM INTERROG EVL PM/LDLS PM: CPT | Performed by: INTERNAL MEDICINE

## 2024-06-12 PROCEDURE — 93296 REM INTERROG EVL PM/IDS: CPT | Performed by: INTERNAL MEDICINE

## 2024-06-17 DIAGNOSIS — I10 ESSENTIAL HYPERTENSION: ICD-10-CM

## 2024-06-17 PROBLEM — Z71.89 ADVANCED DIRECTIVES, COUNSELING/DISCUSSION: Status: RESOLVED | Noted: 2017-01-06 | Resolved: 2024-06-17

## 2024-06-19 RX ORDER — AMLODIPINE BESYLATE 5 MG/1
5 TABLET ORAL DAILY
Qty: 90 TABLET | Refills: 0 | Status: SHIPPED | OUTPATIENT
Start: 2024-06-19 | End: 2024-09-18

## 2024-08-28 NOTE — TELEPHONE ENCOUNTER
Darin Joseph presents today for physical.              1. \"Have you been to the ER, urgent care clinic since your last visit?  Hospitalized since your last visit?\" no    2. \"Have you seen or consulted any other health care providers outside of the [facility] since your last visit?\" no     3. For patients aged 45-75: Has the patient had a colonoscopy / FIT/ Cologuard? No      If the patient is female:    4. For patients aged 40-74: Has the patient had a mammogram within the past 2 years? NA - based on age or sex      5. For patients aged 21-65: Has the patient had a pap smear? NA - based on age or sex   Reason for Call:  INR    Who is calling?  Patient     Phone number:  [phone]    Fax number:      Name of caller: Aaliyah     INR Value:       Are there any other concerns:  Yes: Aaliyah took her medication yesterday and thought she missed it and took her medication again this morning. Please call to discuss.     Can we leave a detailed message on this number? YES    Phone number patient can be reached at: Home number on file [phone] (home)      Call taken on 10/16/2020 at 7:10 AM by Cass Banks

## 2024-09-16 DIAGNOSIS — I10 ESSENTIAL HYPERTENSION: ICD-10-CM

## 2024-09-18 RX ORDER — AMLODIPINE BESYLATE 5 MG/1
5 TABLET ORAL DAILY
Qty: 90 TABLET | Refills: 0 | Status: SHIPPED | OUTPATIENT
Start: 2024-09-18

## 2024-09-25 ENCOUNTER — ANCILLARY PROCEDURE (OUTPATIENT)
Dept: CARDIOLOGY | Facility: CLINIC | Age: 84
End: 2024-09-25
Attending: INTERNAL MEDICINE
Payer: COMMERCIAL

## 2024-09-25 DIAGNOSIS — Z95.0 CARDIAC PACEMAKER IN SITU: ICD-10-CM

## 2024-09-25 DIAGNOSIS — I44.2 CHB (COMPLETE HEART BLOCK) (H): ICD-10-CM

## 2024-09-25 LAB
MDC_IDC_EPISODE_DTM: NORMAL
MDC_IDC_EPISODE_DURATION: 101 S
MDC_IDC_EPISODE_DURATION: 103 S
MDC_IDC_EPISODE_DURATION: 105 S
MDC_IDC_EPISODE_DURATION: 106 S
MDC_IDC_EPISODE_DURATION: 107 S
MDC_IDC_EPISODE_DURATION: 108 S
MDC_IDC_EPISODE_DURATION: 112 S
MDC_IDC_EPISODE_DURATION: 114 S
MDC_IDC_EPISODE_DURATION: 114 S
MDC_IDC_EPISODE_DURATION: 115 S
MDC_IDC_EPISODE_DURATION: 118 S
MDC_IDC_EPISODE_DURATION: 120 S
MDC_IDC_EPISODE_DURATION: 123 S
MDC_IDC_EPISODE_DURATION: 124 S
MDC_IDC_EPISODE_DURATION: 125 S
MDC_IDC_EPISODE_DURATION: 129 S
MDC_IDC_EPISODE_DURATION: 144 S
MDC_IDC_EPISODE_DURATION: 187 S
MDC_IDC_EPISODE_DURATION: 198 S
MDC_IDC_EPISODE_DURATION: 209 S
MDC_IDC_EPISODE_DURATION: 210 S
MDC_IDC_EPISODE_DURATION: 222 S
MDC_IDC_EPISODE_DURATION: 222 S
MDC_IDC_EPISODE_DURATION: 238 S
MDC_IDC_EPISODE_DURATION: 242 S
MDC_IDC_EPISODE_DURATION: 250 S
MDC_IDC_EPISODE_DURATION: 266 S
MDC_IDC_EPISODE_DURATION: 350 S
MDC_IDC_EPISODE_DURATION: 36 S
MDC_IDC_EPISODE_DURATION: 39 S
MDC_IDC_EPISODE_DURATION: 48 S
MDC_IDC_EPISODE_DURATION: 51 S
MDC_IDC_EPISODE_DURATION: 5920 S
MDC_IDC_EPISODE_DURATION: 69 S
MDC_IDC_EPISODE_DURATION: 74 S
MDC_IDC_EPISODE_DURATION: 74 S
MDC_IDC_EPISODE_DURATION: 76 S
MDC_IDC_EPISODE_DURATION: 86 S
MDC_IDC_EPISODE_DURATION: 91 S
MDC_IDC_EPISODE_DURATION: 93 S
MDC_IDC_EPISODE_DURATION: 95 S
MDC_IDC_EPISODE_ID: 284
MDC_IDC_EPISODE_ID: 285
MDC_IDC_EPISODE_ID: 286
MDC_IDC_EPISODE_ID: 287
MDC_IDC_EPISODE_ID: 288
MDC_IDC_EPISODE_ID: 289
MDC_IDC_EPISODE_ID: 290
MDC_IDC_EPISODE_ID: 291
MDC_IDC_EPISODE_ID: 292
MDC_IDC_EPISODE_ID: 293
MDC_IDC_EPISODE_ID: 294
MDC_IDC_EPISODE_ID: 295
MDC_IDC_EPISODE_ID: 296
MDC_IDC_EPISODE_ID: 297
MDC_IDC_EPISODE_ID: 298
MDC_IDC_EPISODE_ID: 299
MDC_IDC_EPISODE_ID: 300
MDC_IDC_EPISODE_ID: 301
MDC_IDC_EPISODE_ID: 302
MDC_IDC_EPISODE_ID: 303
MDC_IDC_EPISODE_ID: 304
MDC_IDC_EPISODE_ID: 305
MDC_IDC_EPISODE_ID: 306
MDC_IDC_EPISODE_ID: 307
MDC_IDC_EPISODE_ID: 308
MDC_IDC_EPISODE_ID: 309
MDC_IDC_EPISODE_ID: 310
MDC_IDC_EPISODE_ID: 311
MDC_IDC_EPISODE_ID: 312
MDC_IDC_EPISODE_ID: 313
MDC_IDC_EPISODE_ID: 314
MDC_IDC_EPISODE_ID: 315
MDC_IDC_EPISODE_ID: 316
MDC_IDC_EPISODE_ID: 317
MDC_IDC_EPISODE_ID: 318
MDC_IDC_EPISODE_ID: 319
MDC_IDC_EPISODE_ID: 320
MDC_IDC_EPISODE_ID: 321
MDC_IDC_EPISODE_ID: 322
MDC_IDC_EPISODE_ID: 323
MDC_IDC_EPISODE_ID: 324
MDC_IDC_EPISODE_TYPE: NORMAL
MDC_IDC_LEAD_CONNECTION_STATUS: NORMAL
MDC_IDC_LEAD_CONNECTION_STATUS: NORMAL
MDC_IDC_LEAD_IMPLANT_DT: NORMAL
MDC_IDC_LEAD_IMPLANT_DT: NORMAL
MDC_IDC_LEAD_LOCATION: NORMAL
MDC_IDC_LEAD_LOCATION: NORMAL
MDC_IDC_LEAD_LOCATION_DETAIL_1: NORMAL
MDC_IDC_LEAD_LOCATION_DETAIL_1: NORMAL
MDC_IDC_LEAD_MFG: NORMAL
MDC_IDC_LEAD_MFG: NORMAL
MDC_IDC_LEAD_MODEL: NORMAL
MDC_IDC_LEAD_MODEL: NORMAL
MDC_IDC_LEAD_POLARITY_TYPE: NORMAL
MDC_IDC_LEAD_POLARITY_TYPE: NORMAL
MDC_IDC_LEAD_SERIAL: NORMAL
MDC_IDC_LEAD_SERIAL: NORMAL
MDC_IDC_MSMT_BATTERY_DTM: NORMAL
MDC_IDC_MSMT_BATTERY_REMAINING_LONGEVITY: 80 MO
MDC_IDC_MSMT_BATTERY_RRT_TRIGGER: 2.62
MDC_IDC_MSMT_BATTERY_STATUS: NORMAL
MDC_IDC_MSMT_BATTERY_VOLTAGE: 2.97 V
MDC_IDC_MSMT_LEADCHNL_RA_IMPEDANCE_VALUE: 266 OHM
MDC_IDC_MSMT_LEADCHNL_RA_IMPEDANCE_VALUE: 323 OHM
MDC_IDC_MSMT_LEADCHNL_RA_PACING_THRESHOLD_AMPLITUDE: 0.75 V
MDC_IDC_MSMT_LEADCHNL_RA_PACING_THRESHOLD_PULSEWIDTH: 0.4 MS
MDC_IDC_MSMT_LEADCHNL_RA_SENSING_INTR_AMPL: 0.5 MV
MDC_IDC_MSMT_LEADCHNL_RA_SENSING_INTR_AMPL: 0.5 MV
MDC_IDC_MSMT_LEADCHNL_RV_IMPEDANCE_VALUE: 342 OHM
MDC_IDC_MSMT_LEADCHNL_RV_IMPEDANCE_VALUE: 399 OHM
MDC_IDC_MSMT_LEADCHNL_RV_PACING_THRESHOLD_AMPLITUDE: 0.88 V
MDC_IDC_MSMT_LEADCHNL_RV_PACING_THRESHOLD_PULSEWIDTH: 0.4 MS
MDC_IDC_MSMT_LEADCHNL_RV_SENSING_INTR_AMPL: 6.5 MV
MDC_IDC_MSMT_LEADCHNL_RV_SENSING_INTR_AMPL: 6.5 MV
MDC_IDC_PG_IMPLANT_DTM: NORMAL
MDC_IDC_PG_MFG: NORMAL
MDC_IDC_PG_MODEL: NORMAL
MDC_IDC_PG_SERIAL: NORMAL
MDC_IDC_PG_TYPE: NORMAL
MDC_IDC_SESS_CLINIC_NAME: NORMAL
MDC_IDC_SESS_DTM: NORMAL
MDC_IDC_SESS_TYPE: NORMAL
MDC_IDC_SET_BRADY_AT_MODE_SWITCH_RATE: 171 {BEATS}/MIN
MDC_IDC_SET_BRADY_HYSTRATE: NORMAL
MDC_IDC_SET_BRADY_LOWRATE: 60 {BEATS}/MIN
MDC_IDC_SET_BRADY_MAX_SENSOR_RATE: 130 {BEATS}/MIN
MDC_IDC_SET_BRADY_MAX_TRACKING_RATE: 130 {BEATS}/MIN
MDC_IDC_SET_BRADY_MODE: NORMAL
MDC_IDC_SET_BRADY_PAV_DELAY_HIGH: 140 MS
MDC_IDC_SET_BRADY_PAV_DELAY_LOW: 300 MS
MDC_IDC_SET_BRADY_SAV_DELAY_HIGH: 110 MS
MDC_IDC_SET_BRADY_SAV_DELAY_LOW: 230 MS
MDC_IDC_SET_LEADCHNL_RA_PACING_AMPLITUDE: 1.5 V
MDC_IDC_SET_LEADCHNL_RA_PACING_ANODE_ELECTRODE_1: NORMAL
MDC_IDC_SET_LEADCHNL_RA_PACING_ANODE_LOCATION_1: NORMAL
MDC_IDC_SET_LEADCHNL_RA_PACING_CAPTURE_MODE: NORMAL
MDC_IDC_SET_LEADCHNL_RA_PACING_CATHODE_ELECTRODE_1: NORMAL
MDC_IDC_SET_LEADCHNL_RA_PACING_CATHODE_LOCATION_1: NORMAL
MDC_IDC_SET_LEADCHNL_RA_PACING_POLARITY: NORMAL
MDC_IDC_SET_LEADCHNL_RA_PACING_PULSEWIDTH: 0.4 MS
MDC_IDC_SET_LEADCHNL_RA_SENSING_ANODE_ELECTRODE_1: NORMAL
MDC_IDC_SET_LEADCHNL_RA_SENSING_ANODE_LOCATION_1: NORMAL
MDC_IDC_SET_LEADCHNL_RA_SENSING_CATHODE_ELECTRODE_1: NORMAL
MDC_IDC_SET_LEADCHNL_RA_SENSING_CATHODE_LOCATION_1: NORMAL
MDC_IDC_SET_LEADCHNL_RA_SENSING_POLARITY: NORMAL
MDC_IDC_SET_LEADCHNL_RA_SENSING_SENSITIVITY: 0.3 MV
MDC_IDC_SET_LEADCHNL_RV_PACING_AMPLITUDE: 2 V
MDC_IDC_SET_LEADCHNL_RV_PACING_ANODE_ELECTRODE_1: NORMAL
MDC_IDC_SET_LEADCHNL_RV_PACING_ANODE_LOCATION_1: NORMAL
MDC_IDC_SET_LEADCHNL_RV_PACING_CAPTURE_MODE: NORMAL
MDC_IDC_SET_LEADCHNL_RV_PACING_CATHODE_ELECTRODE_1: NORMAL
MDC_IDC_SET_LEADCHNL_RV_PACING_CATHODE_LOCATION_1: NORMAL
MDC_IDC_SET_LEADCHNL_RV_PACING_POLARITY: NORMAL
MDC_IDC_SET_LEADCHNL_RV_PACING_PULSEWIDTH: 0.4 MS
MDC_IDC_SET_LEADCHNL_RV_SENSING_ANODE_ELECTRODE_1: NORMAL
MDC_IDC_SET_LEADCHNL_RV_SENSING_ANODE_LOCATION_1: NORMAL
MDC_IDC_SET_LEADCHNL_RV_SENSING_CATHODE_ELECTRODE_1: NORMAL
MDC_IDC_SET_LEADCHNL_RV_SENSING_CATHODE_LOCATION_1: NORMAL
MDC_IDC_SET_LEADCHNL_RV_SENSING_POLARITY: NORMAL
MDC_IDC_SET_LEADCHNL_RV_SENSING_SENSITIVITY: 0.9 MV
MDC_IDC_SET_ZONE_DETECTION_INTERVAL: 350 MS
MDC_IDC_SET_ZONE_DETECTION_INTERVAL: 400 MS
MDC_IDC_SET_ZONE_STATUS: NORMAL
MDC_IDC_SET_ZONE_STATUS: NORMAL
MDC_IDC_SET_ZONE_TYPE: NORMAL
MDC_IDC_SET_ZONE_VENDOR_TYPE: NORMAL
MDC_IDC_STAT_AT_BURDEN_PERCENT: 0.2 %
MDC_IDC_STAT_AT_DTM_END: NORMAL
MDC_IDC_STAT_AT_DTM_START: NORMAL
MDC_IDC_STAT_BRADY_AP_VP_PERCENT: 68.73 %
MDC_IDC_STAT_BRADY_AP_VS_PERCENT: 0.06 %
MDC_IDC_STAT_BRADY_AS_VP_PERCENT: 30.85 %
MDC_IDC_STAT_BRADY_AS_VS_PERCENT: 0.37 %
MDC_IDC_STAT_BRADY_DTM_END: NORMAL
MDC_IDC_STAT_BRADY_DTM_START: NORMAL
MDC_IDC_STAT_BRADY_RA_PERCENT_PACED: 67.57 %
MDC_IDC_STAT_BRADY_RV_PERCENT_PACED: 99.57 %
MDC_IDC_STAT_EPISODE_RECENT_COUNT: 0
MDC_IDC_STAT_EPISODE_RECENT_COUNT: 41
MDC_IDC_STAT_EPISODE_RECENT_COUNT_DTM_END: NORMAL
MDC_IDC_STAT_EPISODE_RECENT_COUNT_DTM_START: NORMAL
MDC_IDC_STAT_EPISODE_TOTAL_COUNT: 0
MDC_IDC_STAT_EPISODE_TOTAL_COUNT: 319
MDC_IDC_STAT_EPISODE_TOTAL_COUNT: 5
MDC_IDC_STAT_EPISODE_TOTAL_COUNT_DTM_END: NORMAL
MDC_IDC_STAT_EPISODE_TOTAL_COUNT_DTM_START: NORMAL
MDC_IDC_STAT_EPISODE_TYPE: NORMAL
MDC_IDC_STAT_TACHYTHERAPY_RECENT_DTM_END: NORMAL
MDC_IDC_STAT_TACHYTHERAPY_RECENT_DTM_START: NORMAL
MDC_IDC_STAT_TACHYTHERAPY_TOTAL_DTM_END: NORMAL
MDC_IDC_STAT_TACHYTHERAPY_TOTAL_DTM_START: NORMAL

## 2024-09-25 PROCEDURE — 93296 REM INTERROG EVL PM/IDS: CPT | Performed by: INTERNAL MEDICINE

## 2024-09-25 PROCEDURE — 93294 REM INTERROG EVL PM/LDLS PM: CPT | Performed by: INTERNAL MEDICINE

## 2024-11-07 ENCOUNTER — ANESTHESIA EVENT (OUTPATIENT)
Dept: SURGERY | Facility: CLINIC | Age: 84
DRG: 351 | End: 2024-11-07
Payer: MEDICARE

## 2024-11-07 ENCOUNTER — TELEPHONE (OUTPATIENT)
Dept: SURGERY | Facility: CLINIC | Age: 84
End: 2024-11-07

## 2024-11-07 ENCOUNTER — HOSPITAL ENCOUNTER (INPATIENT)
Facility: CLINIC | Age: 84
LOS: 5 days | Discharge: HOME | DRG: 351 | End: 2024-11-12
Attending: EMERGENCY MEDICINE | Admitting: INTERNAL MEDICINE
Payer: MEDICARE

## 2024-11-07 ENCOUNTER — APPOINTMENT (OUTPATIENT)
Dept: CT IMAGING | Facility: CLINIC | Age: 84
DRG: 351 | End: 2024-11-07
Attending: EMERGENCY MEDICINE
Payer: MEDICARE

## 2024-11-07 ENCOUNTER — APPOINTMENT (OUTPATIENT)
Dept: GENERAL RADIOLOGY | Facility: CLINIC | Age: 84
DRG: 351 | End: 2024-11-07
Attending: EMERGENCY MEDICINE
Payer: MEDICARE

## 2024-11-07 DIAGNOSIS — I48.91 ATRIAL FIBRILLATION, UNSPECIFIED TYPE (H): ICD-10-CM

## 2024-11-07 DIAGNOSIS — T78.2XXA ANAPHYLAXIS, INITIAL ENCOUNTER: ICD-10-CM

## 2024-11-07 DIAGNOSIS — Z79.01 LONG TERM CURRENT USE OF ANTICOAGULANT THERAPY: ICD-10-CM

## 2024-11-07 DIAGNOSIS — Z88.9 DRUG ALLERGY: ICD-10-CM

## 2024-11-07 DIAGNOSIS — D68.32 WARFARIN-INDUCED COAGULOPATHY (H): ICD-10-CM

## 2024-11-07 DIAGNOSIS — K45.8: ICD-10-CM

## 2024-11-07 DIAGNOSIS — K45.8 OBTURATOR HERNIA: ICD-10-CM

## 2024-11-07 DIAGNOSIS — Z87.891 PERSONAL HISTORY OF TOBACCO USE, PRESENTING HAZARDS TO HEALTH: ICD-10-CM

## 2024-11-07 DIAGNOSIS — I10 BENIGN HYPERTENSION: Primary | ICD-10-CM

## 2024-11-07 DIAGNOSIS — T45.515A WARFARIN-INDUCED COAGULOPATHY (H): ICD-10-CM

## 2024-11-07 DIAGNOSIS — E87.1 HYPONATREMIA: ICD-10-CM

## 2024-11-07 DIAGNOSIS — K56.609 SBO (SMALL BOWEL OBSTRUCTION) (H): ICD-10-CM

## 2024-11-07 LAB
ALBUMIN SERPL BCG-MCNC: 4.2 G/DL (ref 3.5–5.2)
ALBUMIN UR-MCNC: NEGATIVE MG/DL
ALP SERPL-CCNC: 37 U/L (ref 40–150)
ALT SERPL W P-5'-P-CCNC: 19 U/L (ref 0–50)
ANION GAP SERPL CALCULATED.3IONS-SCNC: 10 MMOL/L (ref 7–15)
APPEARANCE UR: CLEAR
AST SERPL W P-5'-P-CCNC: 41 U/L (ref 0–45)
BASOPHILS # BLD AUTO: 0 10E3/UL (ref 0–0.2)
BASOPHILS NFR BLD AUTO: 1 %
BILIRUB SERPL-MCNC: 0.4 MG/DL
BILIRUB UR QL STRIP: NEGATIVE
BUN SERPL-MCNC: 18 MG/DL (ref 8–23)
CALCIUM SERPL-MCNC: 9.1 MG/DL (ref 8.8–10.4)
CHLORIDE SERPL-SCNC: 89 MMOL/L (ref 98–107)
COLOR UR AUTO: YELLOW
CREAT SERPL-MCNC: 0.64 MG/DL (ref 0.51–0.95)
EGFRCR SERPLBLD CKD-EPI 2021: 86 ML/MIN/1.73M2
EOSINOPHIL # BLD AUTO: 0 10E3/UL (ref 0–0.7)
EOSINOPHIL NFR BLD AUTO: 0 %
ERYTHROCYTE [DISTWIDTH] IN BLOOD BY AUTOMATED COUNT: 13.9 % (ref 10–15)
GLUCOSE BLDC GLUCOMTR-MCNC: 112 MG/DL (ref 70–99)
GLUCOSE BLDC GLUCOMTR-MCNC: 118 MG/DL (ref 70–99)
GLUCOSE BLDC GLUCOMTR-MCNC: 152 MG/DL (ref 70–99)
GLUCOSE SERPL-MCNC: 146 MG/DL (ref 70–99)
GLUCOSE UR STRIP-MCNC: NEGATIVE MG/DL
HCO3 SERPL-SCNC: 26 MMOL/L (ref 22–29)
HCT VFR BLD AUTO: 34.4 % (ref 35–47)
HGB BLD-MCNC: 11.8 G/DL (ref 11.7–15.7)
HGB UR QL STRIP: NEGATIVE
IMM GRANULOCYTES # BLD: 0 10E3/UL
IMM GRANULOCYTES NFR BLD: 0 %
INR PPP: 2.05 (ref 0.85–1.15)
KETONES UR STRIP-MCNC: 20 MG/DL
LEUKOCYTE ESTERASE UR QL STRIP: NEGATIVE
LIPASE SERPL-CCNC: 41 U/L (ref 13–60)
LYMPHOCYTES # BLD AUTO: 0.5 10E3/UL (ref 0.8–5.3)
LYMPHOCYTES NFR BLD AUTO: 6 %
MCH RBC QN AUTO: 29.3 PG (ref 26.5–33)
MCHC RBC AUTO-ENTMCNC: 34.3 G/DL (ref 31.5–36.5)
MCV RBC AUTO: 85 FL (ref 78–100)
MONOCYTES # BLD AUTO: 0.4 10E3/UL (ref 0–1.3)
MONOCYTES NFR BLD AUTO: 5 %
MUCOUS THREADS #/AREA URNS LPF: PRESENT /LPF
NEUTROPHILS # BLD AUTO: 7 10E3/UL (ref 1.6–8.3)
NEUTROPHILS NFR BLD AUTO: 89 %
NITRATE UR QL: NEGATIVE
NRBC # BLD AUTO: 0 10E3/UL
NRBC BLD AUTO-RTO: 0 /100
PH UR STRIP: 7 [PH] (ref 5–7)
PLATELET # BLD AUTO: 316 10E3/UL (ref 150–450)
POTASSIUM SERPL-SCNC: 3.5 MMOL/L (ref 3.4–5.3)
PROT SERPL-MCNC: 6.5 G/DL (ref 6.4–8.3)
RBC # BLD AUTO: 4.03 10E6/UL (ref 3.8–5.2)
RBC URINE: 0 /HPF
SODIUM SERPL-SCNC: 125 MMOL/L (ref 135–145)
SODIUM SERPL-SCNC: 125 MMOL/L (ref 135–145)
SODIUM SERPL-SCNC: 128 MMOL/L (ref 135–145)
SP GR UR STRIP: 1.03 (ref 1–1.03)
SQUAMOUS EPITHELIAL: 1 /HPF
UROBILINOGEN UR STRIP-MCNC: NORMAL MG/DL
WBC # BLD AUTO: 7.9 10E3/UL (ref 4–11)
WBC URINE: 0 /HPF

## 2024-11-07 PROCEDURE — 250N000011 HC RX IP 250 OP 636: Performed by: EMERGENCY MEDICINE

## 2024-11-07 PROCEDURE — 250N000013 HC RX MED GY IP 250 OP 250 PS 637: Performed by: PEDIATRICS

## 2024-11-07 PROCEDURE — 82947 ASSAY GLUCOSE BLOOD QUANT: CPT | Performed by: EMERGENCY MEDICINE

## 2024-11-07 PROCEDURE — 120N000001 HC R&B MED SURG/OB

## 2024-11-07 PROCEDURE — 99221 1ST HOSP IP/OBS SF/LOW 40: CPT | Performed by: STUDENT IN AN ORGANIZED HEALTH CARE EDUCATION/TRAINING PROGRAM

## 2024-11-07 PROCEDURE — 36415 COLL VENOUS BLD VENIPUNCTURE: CPT | Performed by: STUDENT IN AN ORGANIZED HEALTH CARE EDUCATION/TRAINING PROGRAM

## 2024-11-07 PROCEDURE — 81001 URINALYSIS AUTO W/SCOPE: CPT | Performed by: EMERGENCY MEDICINE

## 2024-11-07 PROCEDURE — 258N000003 HC RX IP 258 OP 636: Performed by: EMERGENCY MEDICINE

## 2024-11-07 PROCEDURE — 99222 1ST HOSP IP/OBS MODERATE 55: CPT | Performed by: SURGERY

## 2024-11-07 PROCEDURE — 250N000009 HC RX 250: Performed by: EMERGENCY MEDICINE

## 2024-11-07 PROCEDURE — 96361 HYDRATE IV INFUSION ADD-ON: CPT | Performed by: EMERGENCY MEDICINE

## 2024-11-07 PROCEDURE — 99285 EMERGENCY DEPT VISIT HI MDM: CPT | Mod: 25 | Performed by: EMERGENCY MEDICINE

## 2024-11-07 PROCEDURE — 84295 ASSAY OF SERUM SODIUM: CPT | Performed by: STUDENT IN AN ORGANIZED HEALTH CARE EDUCATION/TRAINING PROGRAM

## 2024-11-07 PROCEDURE — 96375 TX/PRO/DX INJ NEW DRUG ADDON: CPT | Performed by: EMERGENCY MEDICINE

## 2024-11-07 PROCEDURE — 85025 COMPLETE CBC W/AUTO DIFF WBC: CPT | Performed by: EMERGENCY MEDICINE

## 2024-11-07 PROCEDURE — 74177 CT ABD & PELVIS W/CONTRAST: CPT | Mod: MG

## 2024-11-07 PROCEDURE — 83690 ASSAY OF LIPASE: CPT | Performed by: EMERGENCY MEDICINE

## 2024-11-07 PROCEDURE — 93010 ELECTROCARDIOGRAM REPORT: CPT | Performed by: EMERGENCY MEDICINE

## 2024-11-07 PROCEDURE — 85610 PROTHROMBIN TIME: CPT | Performed by: EMERGENCY MEDICINE

## 2024-11-07 PROCEDURE — 93005 ELECTROCARDIOGRAM TRACING: CPT | Performed by: EMERGENCY MEDICINE

## 2024-11-07 PROCEDURE — 96374 THER/PROPH/DIAG INJ IV PUSH: CPT | Mod: 59 | Performed by: EMERGENCY MEDICINE

## 2024-11-07 PROCEDURE — 36415 COLL VENOUS BLD VENIPUNCTURE: CPT | Performed by: EMERGENCY MEDICINE

## 2024-11-07 PROCEDURE — 999N000065 XR ABDOMEN PORT 1 VIEW

## 2024-11-07 RX ORDER — FENTANYL CITRATE 50 UG/ML
0.5 INJECTION, SOLUTION INTRAMUSCULAR; INTRAVENOUS ONCE
Status: COMPLETED | OUTPATIENT
Start: 2024-11-07 | End: 2024-11-07

## 2024-11-07 RX ORDER — HYDRALAZINE HYDROCHLORIDE 20 MG/ML
10 INJECTION INTRAMUSCULAR; INTRAVENOUS EVERY 6 HOURS PRN
Status: DISCONTINUED | OUTPATIENT
Start: 2024-11-07 | End: 2024-11-10

## 2024-11-07 RX ORDER — PROCHLORPERAZINE 25 MG
12.5 SUPPOSITORY, RECTAL RECTAL EVERY 12 HOURS PRN
Status: DISCONTINUED | OUTPATIENT
Start: 2024-11-07 | End: 2024-11-08

## 2024-11-07 RX ORDER — DEXTROSE MONOHYDRATE 25 G/50ML
25-50 INJECTION, SOLUTION INTRAVENOUS
Status: DISCONTINUED | OUTPATIENT
Start: 2024-11-07 | End: 2024-11-12 | Stop reason: HOSPADM

## 2024-11-07 RX ORDER — ONDANSETRON 4 MG/1
4 TABLET, ORALLY DISINTEGRATING ORAL EVERY 6 HOURS PRN
Status: DISCONTINUED | OUTPATIENT
Start: 2024-11-07 | End: 2024-11-08

## 2024-11-07 RX ORDER — ONDANSETRON 2 MG/ML
4 INJECTION INTRAMUSCULAR; INTRAVENOUS EVERY 6 HOURS PRN
Status: DISCONTINUED | OUTPATIENT
Start: 2024-11-07 | End: 2024-11-08

## 2024-11-07 RX ORDER — PROCHLORPERAZINE MALEATE 5 MG/1
5 TABLET ORAL EVERY 6 HOURS PRN
Status: DISCONTINUED | OUTPATIENT
Start: 2024-11-07 | End: 2024-11-08

## 2024-11-07 RX ORDER — ONDANSETRON 2 MG/ML
4 INJECTION INTRAMUSCULAR; INTRAVENOUS EVERY 30 MIN PRN
Status: DISCONTINUED | OUTPATIENT
Start: 2024-11-07 | End: 2024-11-07

## 2024-11-07 RX ORDER — LIDOCAINE 40 MG/G
CREAM TOPICAL
Status: DISCONTINUED | OUTPATIENT
Start: 2024-11-07 | End: 2024-11-11

## 2024-11-07 RX ORDER — NICOTINE POLACRILEX 4 MG
15-30 LOZENGE BUCCAL
Status: DISCONTINUED | OUTPATIENT
Start: 2024-11-07 | End: 2024-11-12 | Stop reason: HOSPADM

## 2024-11-07 RX ORDER — WARFARIN SODIUM 5 MG/1
7.5 TABLET ORAL
Status: ON HOLD | COMMUNITY
End: 2024-11-12

## 2024-11-07 RX ORDER — IOPAMIDOL 755 MG/ML
500 INJECTION, SOLUTION INTRAVASCULAR ONCE
Status: COMPLETED | OUTPATIENT
Start: 2024-11-07 | End: 2024-11-07

## 2024-11-07 RX ORDER — NALOXONE HYDROCHLORIDE 0.4 MG/ML
0.2 INJECTION, SOLUTION INTRAMUSCULAR; INTRAVENOUS; SUBCUTANEOUS
Status: DISCONTINUED | OUTPATIENT
Start: 2024-11-07 | End: 2024-11-12 | Stop reason: HOSPADM

## 2024-11-07 RX ORDER — HYDROMORPHONE HCL IN WATER/PF 6 MG/30 ML
0.4 PATIENT CONTROLLED ANALGESIA SYRINGE INTRAVENOUS
Status: DISCONTINUED | OUTPATIENT
Start: 2024-11-07 | End: 2024-11-10

## 2024-11-07 RX ORDER — NALOXONE HYDROCHLORIDE 0.4 MG/ML
0.4 INJECTION, SOLUTION INTRAMUSCULAR; INTRAVENOUS; SUBCUTANEOUS
Status: DISCONTINUED | OUTPATIENT
Start: 2024-11-07 | End: 2024-11-12 | Stop reason: HOSPADM

## 2024-11-07 RX ORDER — SODIUM CHLORIDE 9 MG/ML
INJECTION, SOLUTION INTRAVENOUS CONTINUOUS
Status: DISCONTINUED | OUTPATIENT
Start: 2024-11-07 | End: 2024-11-11

## 2024-11-07 RX ORDER — HYDROMORPHONE HCL IN WATER/PF 6 MG/30 ML
0.2 PATIENT CONTROLLED ANALGESIA SYRINGE INTRAVENOUS
Status: DISCONTINUED | OUTPATIENT
Start: 2024-11-07 | End: 2024-11-12 | Stop reason: HOSPADM

## 2024-11-07 RX ADMIN — SODIUM CHLORIDE 60 ML: 9 INJECTION, SOLUTION INTRAVENOUS at 09:05

## 2024-11-07 RX ADMIN — SODIUM CHLORIDE 1000 ML: 9 INJECTION, SOLUTION INTRAVENOUS at 08:53

## 2024-11-07 RX ADMIN — IOPAMIDOL 65 ML: 755 INJECTION, SOLUTION INTRAVENOUS at 09:05

## 2024-11-07 RX ADMIN — PROCHLORPERAZINE EDISYLATE 5 MG: 5 INJECTION INTRAMUSCULAR; INTRAVENOUS at 11:38

## 2024-11-07 RX ADMIN — SODIUM CHLORIDE: 9 INJECTION, SOLUTION INTRAVENOUS at 19:49

## 2024-11-07 RX ADMIN — ONDANSETRON 4 MG: 2 INJECTION INTRAMUSCULAR; INTRAVENOUS at 08:18

## 2024-11-07 RX ADMIN — ONDANSETRON 4 MG: 2 INJECTION INTRAMUSCULAR; INTRAVENOUS at 10:49

## 2024-11-07 RX ADMIN — FAMOTIDINE 20 MG: 10 INJECTION, SOLUTION INTRAVENOUS at 11:47

## 2024-11-07 RX ADMIN — SODIUM CHLORIDE: 9 INJECTION, SOLUTION INTRAVENOUS at 11:48

## 2024-11-07 RX ADMIN — FAMOTIDINE 20 MG: 10 INJECTION, SOLUTION INTRAVENOUS at 22:34

## 2024-11-07 RX ADMIN — PHENOL 1 ML: 1.5 LIQUID ORAL at 17:43

## 2024-11-07 RX ADMIN — FENTANYL CITRATE 29 MCG: 50 INJECTION, SOLUTION INTRAMUSCULAR; INTRAVENOUS at 09:01

## 2024-11-07 ASSESSMENT — COLUMBIA-SUICIDE SEVERITY RATING SCALE - C-SSRS
6. HAVE YOU EVER DONE ANYTHING, STARTED TO DO ANYTHING, OR PREPARED TO DO ANYTHING TO END YOUR LIFE?: NO
1. IN THE PAST MONTH, HAVE YOU WISHED YOU WERE DEAD OR WISHED YOU COULD GO TO SLEEP AND NOT WAKE UP?: NO
2. HAVE YOU ACTUALLY HAD ANY THOUGHTS OF KILLING YOURSELF IN THE PAST MONTH?: NO

## 2024-11-07 ASSESSMENT — LIFESTYLE VARIABLES: TOBACCO_USE: 1

## 2024-11-07 ASSESSMENT — ACTIVITIES OF DAILY LIVING (ADL)
ADLS_ACUITY_SCORE: 0

## 2024-11-07 ASSESSMENT — ENCOUNTER SYMPTOMS: DYSRHYTHMIAS: 1

## 2024-11-07 NOTE — ED NOTES
ED Nursing criteria listed below was addressed during verbal handoff:     Abnormal vitals: No  Abnormal results: Yes  Med Reconciliation completed: Yes  Meds given in ED: Yes  Any Overdue Meds: No  Core Measures: No  Isolation: No  Special needs: No  Skin assessment: Yes    Observation Patient  Education provided: N/A

## 2024-11-07 NOTE — TELEPHONE ENCOUNTER
Type of surgery: Robot assisted laparoscopic right obturator hernia repair, possible bowel resection   Location of surgery: [facility] OR  Date and time of surgery: 11/08/2024  Surgeon: PEDRO  Pre-Op Appt Date: in patient  Post-Op Appt Date: tbd   Packet sent out: No  Pre-cert/Authorization completed:  No  Date:     good balance

## 2024-11-07 NOTE — ED PROVIDER NOTES
History     Chief Complaint   Patient presents with    Abdominal Pain     HPI  Unique Ward is a 84 year old female who presents with concern of abdominal pain.  Symptoms started around 2 PM yesterday.  No known cause.  Notes pain on the right side of her abdomen.  Has been feeling nauseated but has not vomited.  She said she is still passing gas and passing stool.  Her last bowel movement was yesterday and was very hard marble sized stool.  Denies any blood.  Has not been running a fever.  Said she has never had any abdominal surgeries.  Has not taken any medication for her symptoms.  Her daughter says that she has not had anything to eat or drink since yesterday due to the nausea.  And states that she even tried to put her dentures in and felt nauseated and gagged.    Allergies:  Allergies   Allergen Reactions    Keflex [Cephalexin] Anaphylaxis    No Clinical Screening - See Comments      Eye drop antibiotic.    Codeine Other (See Comments)     Comment: , Description:     Gentamicin Hives     Per Unique this medication gives hives on arms and legs. Bev Lo LSXO,  ....................  7/15/2014   1:15 PM    Influenza Vaccines Other (See Comments)     Comment: , Description:     Influenza Virus Vaccine H5n1     Mct Oil [Medium Chain Triglycerides]     Paxil [Paroxetine] Other (See Comments)     Comment: , Description:     Typhoid Vaccine, Live Other (See Comments)     Comment: , Description:   Comment: , Description:     Typhoid Vaccines        Problem List:    Patient Active Problem List    Diagnosis Date Noted    Thrombocytopenia (H) 08/29/2023     Priority: Medium    Permanent atrial fibrillation (H) 05/16/2022     Priority: Medium    Long term current use of anticoagulant therapy 06/30/2020     Priority: Medium    Atrial fibrillation, unspecified type (H) 06/30/2020     Priority: Medium    CHB (complete heart block) (H) 03/31/2020     Priority: Medium     Added automatically from request for  surgery [id number]      Pacemaker 03/31/2020     Priority: Medium    Atrial fibrillation (H) 06/07/2019     Priority: Medium    History of basal cell carcinoma- face and scalp 02/28/2017     Priority: Medium    Fibromyalgia 02/28/2017     Priority: Medium    Personal history of urinary tract infection 06/18/2016     Priority: Medium    Essential hypertension 03/23/2016     Priority: Medium    Vitamin D deficiency 03/30/2013     Priority: Medium    Dysthymia 04/01/2005     Priority: Medium        Past Medical History:    Past Medical History:   Diagnosis Date    Arthritis 01.01.1980    Atrial fibrillation (H) 6/7/2019    Cancer (H) 01.01.1993     scalp    Depressive disorder 01.01.1977    FH: melanoma- son 2/28/2017    Hypertension 03.16.2016       Past Surgical History:    Past Surgical History:   Procedure Laterality Date    BIOPSY  11/01/2014    COLONOSCOPY  01.01/1979    Two routine,  one for chronic diarrhea    EP PACEMAKER N/A 3/31/2020    Procedure: EP Pacemaker;  Surgeon: Erich Clark MD;  Location:  [facility]    EYE SURGERY  1/1/2014    first    cataracts both eyes.  second a few months later    GENITOURINARY SURGERY      GYN SURGERY  01.01.1977    TL    HEAD & NECK SURGERY  01.01.1997    basal cell X2   Moh's on scalp.  other on bridge of nose    LASER YAG CAPSULOTOMY Bilateral 4/1/2021    Procedure: CAPSULOTOMY, LENS, USING YAG LASER;  Surgeon: Bernardino Chu MD;  Location: [facility]    SOFT TISSUE SURGERY  01.01.1990    Ganglion Cyst   Left inner wrist       Family History:    Family History   Problem Relation Age of Onset    Diabetes Maternal Grandmother         Na    Coronary Artery Disease Sister         error    Coronary Artery Disease Brother         error    Hypertension Brother     Hyperlipidemia Brother     Cerebrovascular Disease Brother     Hypertension Sister     Hypertension Sister     Hyperlipidemia Sister     Other Cancer Sister         cervical    Breast Cancer Sister      Other Cancer Sister         kidney    Mental Illness Mother         paranoid/schizophrenic/suicide    Mental Illness Sister         ???  Had shock treatments    Hyperlipidemia Sister     Other Cancer Sister         Kidney       Social History:  Marital Status:  Legally  [3]  Social History     Tobacco Use    Smoking status: Former     Current packs/day: 0.00     Average packs/day: 0.8 packs/day for 10.0 years (7.5 ttl pk-yrs)     Types: Cigarettes     Start date: 1955     Quit date: 1965     Years since quittin.8    Smokeless tobacco: Never   Vaping Use    Vaping status: Never Used   Substance Use Topics    Alcohol use: No     Alcohol/week: 0.0 standard drinks of alcohol    Drug use: No        Medications:    amLODIPine (NORVASC) 5 MG tablet  EPINEPHrine (ANY BX GENERIC EQUIV) 0.3 MG/0.3ML injection 2-pack  meclizine (ANTIVERT) 25 MG tablet  nitroFURantoin macrocrystal-monohydrate (MACROBID) 100 MG capsule  warfarin ANTICOAGULANT (COUMADIN) 5 MG tablet  Zinc Sulfate (ZINC 15 PO)          Review of Systems   All other systems reviewed and are negative.      Physical Exam   BP: (!) 171/83  Pulse: 88  Temp: 98.1  F (36.7  C)  Resp: 18  Weight: 57.5 kg (126 lb 11.2 oz)  SpO2: 98 %      Physical Exam  Vitals and nursing note reviewed.   Constitutional:       General: She is not in acute distress.     Appearance: She is not toxic-appearing.   HENT:      Mouth/Throat:      Mouth: Mucous membranes are moist.      Pharynx: No oropharyngeal exudate.   Cardiovascular:      Rate and Rhythm: Normal rate and regular rhythm.   Pulmonary:      Effort: Pulmonary effort is normal.      Breath sounds: Normal breath sounds.   Abdominal:      General: Bowel sounds are normal.      Palpations: Abdomen is soft. There is no mass.      Tenderness: There is abdominal tenderness in the right upper quadrant, right lower quadrant and periumbilical area. There is no guarding or rebound.   Skin:     General: Skin is warm  and dry.   Neurological:      Mental Status: She is alert.         ED Course        Procedures              Critical Care time:  none              Results for orders placed or performed during the hospital encounter of 11/07/24 (from the past 24 hours)   CBC with platelets differential    Narrative    The following orders were created for panel order CBC with platelets differential.  Procedure                               Abnormality         Status                     ---------                               -----------         ------                     CBC with platelets and d...[[id number]]  Abnormal            Final result                 Please view results for these tests on the individual orders.   INR   Result Value Ref Range    INR 2.05 (H) 0.85 - 1.15   Comprehensive metabolic panel   Result Value Ref Range    Sodium 125 (L) 135 - 145 mmol/L    Potassium 3.5 3.4 - 5.3 mmol/L    Carbon Dioxide (CO2) 26 22 - 29 mmol/L    Anion Gap 10 7 - 15 mmol/L    Urea Nitrogen 18.0 8.0 - 23.0 mg/dL    Creatinine 0.64 0.51 - 0.95 mg/dL    GFR Estimate 86 >60 mL/min/1.73m2    Calcium 9.1 8.8 - 10.4 mg/dL    Chloride 89 (L) 98 - 107 mmol/L    Glucose 146 (H) 70 - 99 mg/dL    Alkaline Phosphatase 37 (L) 40 - 150 U/L    AST 41 0 - 45 U/L    ALT 19 0 - 50 U/L    Protein Total 6.5 6.4 - 8.3 g/dL    Albumin 4.2 3.5 - 5.2 g/dL    Bilirubin Total 0.4 <=1.2 mg/dL   Lipase   Result Value Ref Range    Lipase 41 13 - 60 U/L   CBC with platelets and differential   Result Value Ref Range    WBC Count 7.9 4.0 - 11.0 10e3/uL    RBC Count 4.03 3.80 - 5.20 10e6/uL    Hemoglobin 11.8 11.7 - 15.7 g/dL    Hematocrit 34.4 (L) 35.0 - 47.0 %    MCV 85 78 - 100 fL    MCH 29.3 26.5 - 33.0 pg    MCHC 34.3 31.5 - 36.5 g/dL    RDW 13.9 10.0 - 15.0 %    Platelet Count 316 150 - 450 10e3/uL    % Neutrophils 89 %    % Lymphocytes 6 %    % Monocytes 5 %    % Eosinophils 0 %    % Basophils 1 %    % Immature Granulocytes 0 %    NRBCs per 100 WBC 0 <1 /100     Absolute Neutrophils 7.0 1.6 - 8.3 10e3/uL    Absolute Lymphocytes 0.5 (L) 0.8 - 5.3 10e3/uL    Absolute Monocytes 0.4 0.0 - 1.3 10e3/uL    Absolute Eosinophils 0.0 0.0 - 0.7 10e3/uL    Absolute Basophils 0.0 0.0 - 0.2 10e3/uL    Absolute Immature Granulocytes 0.0 <=0.4 10e3/uL    Absolute NRBCs 0.0 10e3/uL   CT Abdomen Pelvis w Contrast    Narrative    CT ABDOMEN PELVIS W CONTRAST 11/7/2024 9:15 AM    CLINICAL HISTORY: Abdominal pain. Right sided abdominal pain, vomiting    TECHNIQUE: CT scan of the abdomen and pelvis was performed following  injection of IV contrast. Multiplanar reformats were obtained. Dose  reduction techniques were used.  CONTRAST: Isovue-370, 65mL    COMPARISON: CT abdomen and pelvis 12/22/2020.    FINDINGS:   LOWER CHEST: Partially imaged cardiac pacemaker leads and small  pericardial effusion. Mild diffuse wall thickening of the lower  esophagus is nonspecific but can be seen with esophagitis.    HEPATOBILIARY: Hepatic cysts and too small to characterize  hypoattenuating lesions, not requiring specific follow-up. No  radiopaque gallstones. No significant biliary ductal dilatation. Mild  prominence of the intrahepatic bile ducts in the left hepatic lobe.    PANCREAS: No significant mass, duct dilatation, or inflammatory  change.    SPLEEN: Normal size.    ADRENAL GLANDS: No significant nodules.    KIDNEYS/BLADDER: Symmetric renal enhancement. No hydronephrosis.  Similar mild fullness of the bilateral collecting systems without  significant hydronephrosis. Unremarkable urinary bladder.    BOWEL: Multiple fluid-filled dilated loops of small bowel in the lower  abdomen which are upstream to a small bowel containing right obturator  hernia (series 3, image 190). The bowel distal to the right obturator  hernia is decompressed. Normal caliber appendix.    PELVIC ORGANS: No pelvic masses.    ADDITIONAL FINDINGS: Small amount of abdominal pelvic free fluid is  likely reactive. No pneumoperitoneum.  Moderate burden of  atherosclerotic disease without abdominal aortic aneurysm.    MUSCULOSKELETAL: Multilevel degenerative changes of the spine.  Partially imaged thoracolumbar scoliosis.      Impression    IMPRESSION:   1.  Small bowel obstruction secondary to a small bowel containing  right obturator hernia. Recommend surgical consultation.  2.  Small volume abdominal and pelvic free fluid, likely reactive.  3.  Additional chronic findings as above.    Findings discussed with Dr. De Dios on 11/7/2024 at 9:37 AM CDT.    THIAGO PIMENTEL MD         SYSTEM ID:  [id number]       Medications   ondansetron (ZOFRAN) injection 4 mg (4 mg Intravenous $Given 11/7/24 0818)   fentaNYL (PF) (SUBLIMAZE) injection 29 mcg (29 mcg Intravenous $Given 11/7/24 0901)   sodium chloride 0.9% BOLUS 1,000 mL (1,000 mLs Intravenous $New Bag 11/7/24 0853)   sodium chloride 0.9 % bag 100ml for CT scan flush use (60 mLs As instructed $Given 11/7/24 0905)   iopamidol (ISOVUE-370) solution 500 mL (65 mLs Intravenous $Given 11/7/24 0905)       Assessments & Plan (with Medical Decision Making)  Aaliyah is an 84-year-old female presenting with concern for abdominal pain and nausea.  See history and physical exam as above  Pleasant 84-year-old female in no acute distress, is hypertensive with blood pressure of 171/83 but otherwise vitally stable and afebrile.  She does have some tenderness in the right side of her abdomen, right upper quadrant, right lower quadrant, and periumbilical region.  There is no mass appreciated.  No rebound or guarding.  Bowel sounds are detected.  Will plan to check blood work and get a CT scan, would like to give some fluids to the patient but due to national IV fluid shortage will hold off until we get lab work  Labs and imaging as above.  She does have a low sodium at 125.  Normally runs around 132.  1 L of IV normal saline was ordered to replace this.  White blood cell count is within normal limits.  INR of  2.05.  Received call from radiology regarding findings of bowel obstruction secondary to an obturator hernia.  After that, called to speak with general surgery.  Dr. Garcia, general surgeon on-call, agreed with plan for insertion of NG tube.  He does not believe that this is an immediately surgical procedure.  Likely will need to have her blood thinner reversed, and if bowel obstruction resolves then can go in and plan to repair hernia during this hospitalization.  This may be done tomorrow or next day depending on her improvement.  When updating and and her daughter, Dr. Garcia arrived at bedside to assess the patient.  Spoke with RN, ordered NG tube placement.  Spoke with hospitalist, Dr. Menendez, who accepted the patient for admission.  Will write transition orders.  Admitted in stable condition.     I have reviewed the nursing notes.    I have reviewed the findings, diagnosis, plan and need for follow up with the patient.       ED to Inpatient Handoff:    Discussed with Dr. Montana at 1115  Patient accepted for Inpatient Stay  Pending studies include N/A  Code Status: Not Addressed             Medical Decision Making  The patient's presentation was of high complexity (an acute health issue posing potential threat to life or bodily function).    The patient's evaluation involved:  ordering and/or review of 3+ test(s) in this encounter (see separate area of note for details)  discussion of management or test interpretation with another health professional (general surgery)    The patient's management necessitated high risk (a decision regarding hospitalization).        New Prescriptions    No medications on file       Final diagnoses:   SBO (small bowel obstruction) (H)   Obturator hernia   Long term current use of anticoagulant therapy       11/7/2024   [facility] EMERGENCY DEPT       Daylin De Dios DO  11/07/24 [id number]

## 2024-11-07 NOTE — CONSULTS
"[facility] Surgery Consultation    Unique Ward MRN# [id number]   Age: 84 year old YOB: 1940     Date of Admission:  11/7/2024    Reason for consult: Small bowel obstruction       Requesting physician: Daylin [facility]       Level of consult: Consult, follow and place orders           Assessment and Recommendation:   Assessment:   Small bowel obstruction secondary to right obturator hernia  No signs of bowel ischemia.  Patient stable and nonacute abdomen.  Anticoagulation secondary to A-fib with therapeutic INR  Hyponatremia      Recommendations:   NG tube for small bowel decompression  Hold anticoagulation to try to normalize INR and work on correcting hyponatremia prior to any surgical intervention, maybe tomorrow  Unlikely that conservative measures alone would resolve the obstruction as obturator hernia would be 1 with narrow neck though given her previous symptoms every few months is possible that she has had transient obstructions that did resolve on their own before.             Chief Complaint:   Abdominal pain     History is obtained from the patient    Patient presented with some abdominal pain associate with nausea and vomiting that started yesterday.  She reports some similar episodes every few months that feels like \"trapped gas\" that does end up improving though has had this a few times.  Last bowel movement was yesterday though was small.  Had been passing gas until yesterday as well.  Pain ended up worsening yesterday though improved somewhat later.  Still not feeling well early this morning she presented to the emergency room for further evaluation.  No prior known obstructions.  No previous abdominal surgery.          Past Medical History:     Past Medical History:   Diagnosis Date    Arthritis 01.01.1980    Atrial fibrillation (H) 6/7/2019    Cancer (H) 01.01.1993     scalp    Basal Cell X2  head      nose bridge 1996    Depressive disorder 01.01.1977    " anxiety    FH: melanoma- son 2017    Hypertension 2016             Past Surgical History:     Past Surgical History:   Procedure Laterality Date    BIOPSY  2014    COLONOSCOPY      Two routine,  one for chronic diarrhea    EP PACEMAKER N/A 3/31/2020    Procedure: EP Pacemaker;  Surgeon: Erich Clark MD;  Location:  [facility]    EYE SURGERY  2014    first    cataracts both eyes.  second a few months later    GENITOURINARY SURGERY      GYN SURGERY  1977    TL    HEAD & NECK SURGERY  1997    basal cell X2   Moh's on scalp.  other on bridge of nose    LASER YAG CAPSULOTOMY Bilateral 2021    Procedure: CAPSULOTOMY, LENS, USING YAG LASER;  Surgeon: Bernardino Chu MD;  Location: [facility]    SOFT TISSUE SURGERY  1990    Ganglion Cyst   Left inner wrist             Social History:     Social History     Tobacco Use    Smoking status: Former     Current packs/day: 0.00     Average packs/day: 0.8 packs/day for 10.0 years (7.5 ttl pk-yrs)     Types: Cigarettes     Start date: 1955     Quit date: 1965     Years since quittin.8    Smokeless tobacco: Never   Substance Use Topics    Alcohol use: No     Alcohol/week: 0.0 standard drinks of alcohol             Family History:     Family History   Problem Relation Age of Onset    Diabetes Maternal Grandmother         Na    Coronary Artery Disease Sister         error    Coronary Artery Disease Brother         error    Hypertension Brother     Hyperlipidemia Brother     Cerebrovascular Disease Brother     Hypertension Sister     Hypertension Sister     Hyperlipidemia Sister     Other Cancer Sister         cervical    Breast Cancer Sister     Other Cancer Sister         kidney    Mental Illness Mother         paranoid/schizophrenic/suicide    Mental Illness Sister         ???  Had shock treatments    Hyperlipidemia Sister     Other Cancer Sister         Kidney     Family history reviewed and  updated in EPIC and not discussed             Allergies:     Allergies   Allergen Reactions    Keflex [Cephalexin] Anaphylaxis    No Clinical Screening - See Comments      Eye drop antibiotic.    Codeine Other (See Comments)     Comment: , Description:     Gentamicin Hives     Per [facility] this medication gives hives on arms and legs. Bev Lo LSXO,  ....................  7/15/2014   1:15 PM    Influenza Vaccines Other (See Comments)     Comment: , Description:     Influenza Virus Vaccine H5n1     Mct Oil [Medium Chain Triglycerides]     Paxil [Paroxetine] Other (See Comments)     Comment: , Description:     Typhoid Vaccine, Live Other (See Comments)     Comment: , Description:   Comment: , Description:     Typhoid Vaccines              Medications:     Current Facility-Administered Medications   Medication Dose Route Frequency Provider Last Rate Last Admin    cephALEXin (KEFLEX) capsule 250 mg  250 mg Oral Once Carl Horta MD        gentamicin (PF) (GARAMYCIN) injection 1 mg  1 mg Intramuscular Once Carl Horta MD        ondansetron (ZOFRAN) injection 4 mg  4 mg Intravenous Q30 Min PRN Daylin De Dios, DO   4 mg at 11/07/24 0818    penicillin g potassium [id number] unit vial INTRADERMAL  5,000,000 Units Intradermal Once Carl Horta MD         Current Outpatient Medications   Medication Sig Dispense Refill    amLODIPine (NORVASC) 5 MG tablet TAKE 1 TABLET BY MOUTH ONCE DAILY 90 tablet 0    EPINEPHrine (ANY BX GENERIC EQUIV) 0.3 MG/0.3ML injection 2-pack Inject 0.3 mLs (0.3 mg) into the muscle once as needed for anaphylaxis (Patient not taking: Reported on 8/29/2023) 0.6 mL 0    meclizine (ANTIVERT) 25 MG tablet Take 1 tablet (25 mg) by mouth 3 times daily as needed for dizziness (Patient not taking: Reported on 10/25/2023) 21 tablet 0    nitroFURantoin macrocrystal-monohydrate (MACROBID) 100 MG capsule Take 1 capsule (100 mg) by mouth 2 times daily 14 capsule 0    warfarin  ANTICOAGULANT (COUMADIN) 5 MG tablet 10 mg (5 mg x 2) every day or as directed. 200 tablet 0    Zinc Sulfate (ZINC 15 PO)  (Patient not taking: Reported on 10/25/2023)               Review of Systems:   The Review of Systems is negative other than noted in the HPI          Physical Exam:   Vitals were reviewed  Temp: 98.1  F (36.7  C) Temp src: Temporal BP: (!) 171/83 Pulse: 88   Resp: 18 SpO2: 98 % O2 Device: None (Room air)    Abdomen:   Soft, some tenderness right lower quadrant without guarding or rebound.  Unable to palpate any obvious groin/obturator hernia though does have tenderness in that area as well.   General: Awake and alert in bed.          Data:     Lab Results   Component Value Date    WBC 7.9 11/07/2024    HGB 11.8 11/07/2024    HCT 34.4 (L) 11/07/2024     11/07/2024     (L) 11/07/2024    POTASSIUM 3.5 11/07/2024    CHLORIDE 89 (L) 11/07/2024    CO2 26 11/07/2024    BUN 18.0 11/07/2024    CR 0.64 11/07/2024     (H) 11/07/2024    DD 0.4 04/14/2019    NTBNPI 293 09/11/2020    TROPI <0.015 04/24/2021    AST 41 11/07/2024    ALT 19 11/07/2024    ALKPHOS 37 (L) 11/07/2024    BILITOTAL 0.4 11/07/2024    INR 2.05 (H) 11/07/2024     All imaging studies reviewed by me.    Narrative & Impression   CT ABDOMEN PELVIS W CONTRAST 11/7/2024 9:15 AM     CLINICAL HISTORY: Abdominal pain. Right sided abdominal pain, vomiting     TECHNIQUE: CT scan of the abdomen and pelvis was performed following  injection of IV contrast. Multiplanar reformats were obtained. Dose  reduction techniques were used.  CONTRAST: Isovue-370, 65mL     COMPARISON: CT abdomen and pelvis 12/22/2020.     FINDINGS:   LOWER CHEST: Partially imaged cardiac pacemaker leads and small  pericardial effusion. Mild diffuse wall thickening of the lower  esophagus is nonspecific but can be seen with esophagitis.     HEPATOBILIARY: Hepatic cysts and too small to characterize  hypoattenuating lesions, not requiring specific follow-up.  No  radiopaque gallstones. No significant biliary ductal dilatation. Mild  prominence of the intrahepatic bile ducts in the left hepatic lobe.     PANCREAS: No significant mass, duct dilatation, or inflammatory  change.     SPLEEN: Normal size.     ADRENAL GLANDS: No significant nodules.     KIDNEYS/BLADDER: Symmetric renal enhancement. No hydronephrosis.  Similar mild fullness of the bilateral collecting systems without  significant hydronephrosis. Unremarkable urinary bladder.     BOWEL: Multiple fluid-filled dilated loops of small bowel in the lower  abdomen which are upstream to a small bowel containing right obturator  hernia (series 3, image 190). The bowel distal to the right obturator  hernia is decompressed. Normal caliber appendix.     PELVIC ORGANS: No pelvic masses.     ADDITIONAL FINDINGS: Small amount of abdominal pelvic free fluid is  likely reactive. No pneumoperitoneum. Moderate burden of  atherosclerotic disease without abdominal aortic aneurysm.     MUSCULOSKELETAL: Multilevel degenerative changes of the spine.  Partially imaged thoracolumbar scoliosis.                                                                      IMPRESSION:   1.  Small bowel obstruction secondary to a small bowel containing  right obturator hernia. Recommend surgical consultation.  2.  Small volume abdominal and pelvic free fluid, likely reactive.  3.  Additional chronic findings as above.        Attestation:  Amount of time performed on this consult: 30 minutes.    Winston Garcia MD

## 2024-11-07 NOTE — MEDICATION SCRIBE - ADMISSION MEDICATION HISTORY
Medication Scribe Admission Medication History    Admission medication history is complete. The information provided in this note is only as accurate as the sources available at the time of the update.    Information Source(s): Clinic chart, Patient, Family member, and CareEverywhere/SureScripts via in-person    Pertinent Information: daughter Valery present, patient knows her meds    Changes made to PTA medication list:  Added: None  Deleted: meclizine, zinc  Changed: amlodipine to suppertime, updated warfarin per clinic chart and verified with patient    Allergies reviewed with patient and updates made in EHR: yes    Medication History Completed By: LONNIE GRIFFIN 11/7/2024 11:06 AM    Current Facility-Administered Medications for the 11/7/24 encounter (Hospital Encounter)   Medication    cephALEXin (KEFLEX) capsule 250 mg    gentamicin (PF) (GARAMYCIN) injection 1 mg    penicillin g potassium [id number] unit vial INTRADERMAL     PTA Med List   Medication Sig Note Last Dose/Taking    amLODIPine (NORVASC) 5 MG tablet TAKE 1 TABLET BY MOUTH ONCE DAILY (Patient taking differently: Take 5 mg by mouth daily (with dinner).)  11/6/2024 at  5:00 PM    EPINEPHrine (ANY BX GENERIC EQUIV) 0.3 MG/0.3ML injection 2-pack Inject 0.3 mLs (0.3 mg) into the muscle once as needed for anaphylaxis 11/7/2024: On hand, never used Taking As Needed    nitroFURantoin macrocrystal-monohydrate (MACROBID) 100 MG capsule Take 1 capsule (100 mg) by mouth 2 times daily  11/6/2024 at  7:00 PM    warfarin ANTICOAGULANT (COUMADIN) 5 MG tablet Take 7.5 mg by mouth. AFTER SUPPER all days of the week EXCEPT MONDAYS, or as directed  11/6/2024 at  6:00 PM    warfarin ANTICOAGULANT (COUMADIN) 5 MG tablet 10 mg (5 mg x 2) every day or as directed. (Patient taking differently: Take 10 mg by mouth. After supper on Mondays, take 7.5mg all other days of the week or as directed)  11/4/2024 at  6:00 PM

## 2024-11-07 NOTE — H&P
"[facility]    History and Physical - Hospitalist Service       Date of Admission:  11/7/2024    Assessment & Plan      Unique Ward is a 84 year old female admitted on 11/7/2024 for a small bowel obstruction secondary to a right obturator hernia.    Small bowel obstruction  Right obturator hernia, no signs of bowel ischemia  -- CT showing \"Small bowel obstruction secondary to a small bowel containing right obturator hernia\"  -- Surgery consulted and following, appreciate recommendations  -- NG tube for small bowel decompression  -- Holding anticoagulation, possible surgery tomorrow  -- NS at 125 mL an hour   -- NPO     Hyponatremia, sodium 125  Hypochloremia, chloride 89  -- NS at 125 mL an hour  -- q6 hr Na checks     Atrial fibrillation, rates controlled  On anticoagulation with warfarin, therapeutic INR, INR goal 2-3  Pacemaker in place, placed in March 2020 for a history of complete heart block  Essential hypertension, hypertensive   Mild to moderate tricuspid regurgitation  -- Pacemaker interrogated on 3/23/2023  -- Normal LV size last echocardiogram 9/9/2024 -normal global systolic function with an estimated EF of 60 to 65%  -- EKG tomorrow am prior to possible surgery   -- Patient denies any chest pain or chest pressure  -- holding home amlodipine 5 mg daily   -- hydralazine 10 mg IV q6 hr prn for systolic BP >170    Prediabetes, hemoglobin A1c 5.8  -- poc checks while NPO   -- hypoglycemia protocol     Drug Induced Coagulation Defect: home medication list includes an anticoagulant medication  -- no signs or symptoms of bleeding at this time      Diet: NPO for Medical/Clinical Reasons Except for: Meds    DVT Prophylaxis: Pneumatic Compression Devices  Velázquez Catheter: Not present  Lines: None     Cardiac Monitoring: None  Code Status: Full Code    Clinically Significant Risk Factors Present on Admission         # Hyponatremia: Lowest Na = 125 mmol/L in last 2 days, will " monitor as appropriate  # Hypochloremia: Lowest Cl = 89 mmol/L in last 2 days, will monitor as appropriate         # Drug Induced Coagulation Defect: home medication list includes an anticoagulant medication    # Hypertension: Noted on problem list                # Pacemaker present       Disposition Plan     Medically Ready for Discharge: Anticipated in 2-4 Days           Blanquita Montana MD  Hospitalist Service  [facility]  Securely message with [employer] (more info)  Text page via [employer] Paging/Directory     ______________________________________________________________________    Chief Complaint   Abdominal pain    History is obtained from the patient    History of Present Illness   Unique Ward is a 84 year old female, with a medical history of atrial fibrillation anticoagulated with warfarin, pacemaker in place for history of complete heart block, essential hypertension, prediabetes, who presents to the emergency room with abdominal pain starting at 2 PM yesterday, located over the right side of abdomen, associated with nausea.  Patient denies any chest pain or shortness of breath.  Had a bowel movement yesterday, very hard, moderate ball sized stool.       Past Medical History    Past Medical History:   Diagnosis Date    Arthritis 01.01.1980    Atrial fibrillation (H) 6/7/2019    Cancer (H) 01.01.1993     scalp    Basal Cell X2  head      nose bridge 1996    Depressive disorder 01.01.1977    anxiety    FH: melanoma- son 2/28/2017    Hypertension 03.16.2016       Past Surgical History   Past Surgical History:   Procedure Laterality Date    BIOPSY  11/01/2014    COLONOSCOPY  01.01/1979    Two routine,  one for chronic diarrhea    EP PACEMAKER N/A 3/31/2020    Procedure: EP Pacemaker;  Surgeon: Erich Clark MD;  Location:  [facility]    EYE SURGERY  1/1/2014    first    cataracts both eyes.  second a few months later    GENITOURINARY SURGERY      GYN  SURGERY  01.01.1977    TL    HEAD & NECK SURGERY  01.01.1997    basal cell X2   Moh's on scalp.  other on bridge of nose    LASER YAG CAPSULOTOMY Bilateral 4/1/2021    Procedure: CAPSULOTOMY, LENS, USING YAG LASER;  Surgeon: Bernardino Chu MD;  Location: [facility]    SOFT TISSUE SURGERY  01.01.1990    Ganglion Cyst   Left inner wrist       Prior to Admission Medications   Prior to Admission Medications   Prescriptions Last Dose Informant Patient Reported? Taking?   EPINEPHrine (ANY BX GENERIC EQUIV) 0.3 MG/0.3ML injection 2-pack   No Yes   Sig: Inject 0.3 mLs (0.3 mg) into the muscle once as needed for anaphylaxis   amLODIPine (NORVASC) 5 MG tablet 11/6/2024 at  5:00 PM  No Yes   Sig: TAKE 1 TABLET BY MOUTH ONCE DAILY   Patient taking differently: Take 5 mg by mouth daily (with dinner).   nitroFURantoin macrocrystal-monohydrate (MACROBID) 100 MG capsule 11/6/2024 at  7:00 PM  No Yes   Sig: Take 1 capsule (100 mg) by mouth 2 times daily   warfarin ANTICOAGULANT (COUMADIN) 5 MG tablet 11/4/2024 at  6:00 PM  No Yes   Sig: 10 mg (5 mg x 2) every day or as directed.   Patient taking differently: Take 10 mg by mouth. After supper on Mondays, take 7.5mg all other days of the week or as directed   warfarin ANTICOAGULANT (COUMADIN) 5 MG tablet 11/6/2024 at  6:00 PM  Yes Yes   Sig: Take 7.5 mg by mouth. AFTER SUPPER all days of the week EXCEPT MONDAYS, or as directed      Facility-Administered Medications Last Administration Doses Remaining   cephALEXin (KEFLEX) capsule 250 mg None recorded 1   gentamicin (PF) (GARAMYCIN) injection 1 mg None recorded 1   penicillin g potassium [id number] unit vial INTRADERMAL None recorded 1           Review of Systems    CONSTITUTIONAL: NEGATIVE for fever, chills, change in weight  ENT/MOUTH: NEGATIVE for ear, mouth and throat problems  RESP: NEGATIVE for significant cough or SOB  CV: NEGATIVE for chest pain, palpitations or peripheral edema    Social History   I have reviewed this patient's  "social history and updated it with pertinent information if needed.  Social History     Tobacco Use    Smoking status: Former     Current packs/day: 0.00     Average packs/day: 0.8 packs/day for 10.0 years (7.5 ttl pk-yrs)     Types: Cigarettes     Start date: 1955     Quit date: 1965     Years since quittin.8    Smokeless tobacco: Never   Vaping Use    Vaping status: Never Used   Substance Use Topics    Alcohol use: No     Alcohol/week: 0.0 standard drinks of alcohol    Drug use: No         Family History   I have reviewed this patient's family history and updated it with pertinent information if needed.  Family History   Problem Relation Age of Onset    Diabetes Maternal Grandmother         Na    Coronary Artery Disease Sister         error    Coronary Artery Disease Brother         error    Hypertension Brother     Hyperlipidemia Brother     Cerebrovascular Disease Brother     Hypertension Sister     Hypertension Sister     Hyperlipidemia Sister     Other Cancer Sister         cervical    Breast Cancer Sister     Other Cancer Sister         kidney    Mental Illness Mother         paranoid/schizophrenic/suicide    Mental Illness Sister         ???  Had shock treatments    Hyperlipidemia Sister     Other Cancer Sister         Kidney         Allergies   Allergies   Allergen Reactions    Keflex [Cephalexin] Anaphylaxis    Codeine Dizziness    Gentamicin Hives     Per Unique this medication gives hives on arms and legs. Bev Lo LSXO,  ....................  7/15/2014   1:15 PM    Mct Oil [Medium Chain Triglycerides] Hives    No Clinical Screening - See Comments      Eye drop antibiotic.    Paxil [Paroxetine] Other (See Comments)     \"Felt sick and weird in the head\"    Influenza Virus Vaccine [id number]     Typhoid Vaccines         Physical Exam   Vital Signs: Temp: 98.1  F (36.7  C) Temp src: Oral BP: (!) 166/89 Pulse: 69   Resp: 20 SpO2: 98 % O2 Device: None (Room air)    Weight: 122 lbs 11.2 " oz    Physical Exam  Constitutional:       General: She is not in acute distress.     Appearance: She is not toxic-appearing.   HENT:      Head: Normocephalic and atraumatic.      Mouth/Throat:      Mouth: Mucous membranes are moist.      Comments: NG tube in place   Eyes:      Extraocular Movements: Extraocular movements intact.      Pupils: Pupils are equal, round, and reactive to light.   Cardiovascular:      Rate and Rhythm: Normal rate and regular rhythm.      Heart sounds: No murmur heard.  Pulmonary:      Effort: Pulmonary effort is normal. No respiratory distress.      Breath sounds: Normal breath sounds. No wheezing or rales.   Abdominal:      Palpations: Abdomen is soft.      Tenderness: There is abdominal tenderness.   Musculoskeletal:         General: Normal range of motion.      Cervical back: Normal range of motion and neck supple.   Skin:     General: Skin is warm and dry.      Capillary Refill: Capillary refill takes less than 2 seconds.      Coloration: Skin is not jaundiced.   Neurological:      General: No focal deficit present.      Mental Status: She is alert and oriented to person, place, and time.   Psychiatric:         Mood and Affect: Mood normal.          Medical Decision Making       50 MINUTES SPENT BY ME on the date of service doing chart review, history, exam, documentation & further activities per the note.      Data     I have personally reviewed the following data over the past 24 hrs:    7.9  \   11.8   / 316     125 (L) 89 (L) 18.0 /  146 (H)   3.5 26 0.64 \     ALT: 19 AST: 41 AP: 37 (L) TBILI: 0.4   ALB: 4.2 TOT PROTEIN: 6.5 LIPASE: 41     INR:  2.05 (H) PTT:  N/A   D-dimer:  N/A Fibrinogen:  N/A       Imaging results reviewed over the past 24 hrs:   Recent Results (from the past 24 hours)   CT Abdomen Pelvis w Contrast    Narrative    CT ABDOMEN PELVIS W CONTRAST 11/7/2024 9:15 AM    CLINICAL HISTORY: Abdominal pain. Right sided abdominal pain, vomiting    TECHNIQUE: CT scan of  the abdomen and pelvis was performed following  injection of IV contrast. Multiplanar reformats were obtained. Dose  reduction techniques were used.  CONTRAST: Isovue-370, 65mL    COMPARISON: CT abdomen and pelvis 12/22/2020.    FINDINGS:   LOWER CHEST: Partially imaged cardiac pacemaker leads and small  pericardial effusion. Mild diffuse wall thickening of the lower  esophagus is nonspecific but can be seen with esophagitis.    HEPATOBILIARY: Hepatic cysts and too small to characterize  hypoattenuating lesions, not requiring specific follow-up. No  radiopaque gallstones. No significant biliary ductal dilatation. Mild  prominence of the intrahepatic bile ducts in the left hepatic lobe.    PANCREAS: No significant mass, duct dilatation, or inflammatory  change.    SPLEEN: Normal size.    ADRENAL GLANDS: No significant nodules.    KIDNEYS/BLADDER: Symmetric renal enhancement. No hydronephrosis.  Similar mild fullness of the bilateral collecting systems without  significant hydronephrosis. Unremarkable urinary bladder.    BOWEL: Multiple fluid-filled dilated loops of small bowel in the lower  abdomen which are upstream to a small bowel containing right obturator  hernia (series 3, image 190). The bowel distal to the right obturator  hernia is decompressed. Normal caliber appendix.    PELVIC ORGANS: No pelvic masses.    ADDITIONAL FINDINGS: Small amount of abdominal pelvic free fluid is  likely reactive. No pneumoperitoneum. Moderate burden of  atherosclerotic disease without abdominal aortic aneurysm.    MUSCULOSKELETAL: Multilevel degenerative changes of the spine.  Partially imaged thoracolumbar scoliosis.      Impression    IMPRESSION:   1.  Small bowel obstruction secondary to a small bowel containing  right obturator hernia. Recommend surgical consultation.  2.  Small volume abdominal and pelvic free fluid, likely reactive.  3.  Additional chronic findings as above.    Findings discussed with Dr. De Dios on  11/7/2024 at 9:37 AM CDT.    THIAGO PIMENTEL MD         SYSTEM ID:  [id number]   XR Abdomen Port 1 View    Narrative    ABDOMEN PORTABLE ONE VIEW  11/7/2024 10:24 AM     HISTORY: NG tube placement.    COMPARISON: CT 11/7/2024.      Impression    IMPRESSION: Nasogastric tube within the stomach. Partially visualized  upper abdomen demonstrates several dilated bowel loops.

## 2024-11-07 NOTE — ED TRIAGE NOTES
Patient presents with upper abdominal pain since yesterday at 2 pm. Patient has been nauseous and decreased oral intake since.

## 2024-11-08 ENCOUNTER — ANESTHESIA (OUTPATIENT)
Dept: SURGERY | Facility: CLINIC | Age: 84
DRG: 351 | End: 2024-11-08
Payer: MEDICARE

## 2024-11-08 LAB
ALBUMIN SERPL BCG-MCNC: 3.4 G/DL (ref 3.5–5.2)
ALP SERPL-CCNC: 33 U/L (ref 40–150)
ALT SERPL W P-5'-P-CCNC: 13 U/L (ref 0–50)
ANION GAP SERPL CALCULATED.3IONS-SCNC: 11 MMOL/L (ref 7–15)
AST SERPL W P-5'-P-CCNC: 33 U/L (ref 0–45)
BILIRUB SERPL-MCNC: 0.6 MG/DL
BUN SERPL-MCNC: 10.5 MG/DL (ref 8–23)
CALCIUM SERPL-MCNC: 8 MG/DL (ref 8.8–10.4)
CHLORIDE SERPL-SCNC: 95 MMOL/L (ref 98–107)
CREAT SERPL-MCNC: 0.56 MG/DL (ref 0.51–0.95)
EGFRCR SERPLBLD CKD-EPI 2021: 89 ML/MIN/1.73M2
ERYTHROCYTE [DISTWIDTH] IN BLOOD BY AUTOMATED COUNT: 13.8 % (ref 10–15)
GLUCOSE BLDC GLUCOMTR-MCNC: 100 MG/DL (ref 70–99)
GLUCOSE BLDC GLUCOMTR-MCNC: 101 MG/DL (ref 70–99)
GLUCOSE BLDC GLUCOMTR-MCNC: 102 MG/DL (ref 70–99)
GLUCOSE BLDC GLUCOMTR-MCNC: 121 MG/DL (ref 70–99)
GLUCOSE BLDC GLUCOMTR-MCNC: 130 MG/DL (ref 70–99)
GLUCOSE BLDC GLUCOMTR-MCNC: 94 MG/DL (ref 70–99)
GLUCOSE SERPL-MCNC: 99 MG/DL (ref 70–99)
HCO3 SERPL-SCNC: 22 MMOL/L (ref 22–29)
HCT VFR BLD AUTO: 32.2 % (ref 35–47)
HGB BLD-MCNC: 11 G/DL (ref 11.7–15.7)
INR PPP: 1.29 (ref 0.85–1.15)
INR PPP: 1.71 (ref 0.85–1.15)
INR PPP: 2.52 (ref 0.85–1.15)
MAGNESIUM SERPL-MCNC: 1.7 MG/DL (ref 1.7–2.3)
MCH RBC QN AUTO: 29.3 PG (ref 26.5–33)
MCHC RBC AUTO-ENTMCNC: 34.2 G/DL (ref 31.5–36.5)
MCV RBC AUTO: 86 FL (ref 78–100)
PLATELET # BLD AUTO: 299 10E3/UL (ref 150–450)
POTASSIUM SERPL-SCNC: 3.2 MMOL/L (ref 3.4–5.3)
POTASSIUM SERPL-SCNC: 3.4 MMOL/L (ref 3.4–5.3)
PROT SERPL-MCNC: 5.5 G/DL (ref 6.4–8.3)
RBC # BLD AUTO: 3.75 10E6/UL (ref 3.8–5.2)
SODIUM SERPL-SCNC: 125 MMOL/L (ref 135–145)
SODIUM SERPL-SCNC: 128 MMOL/L (ref 135–145)
SODIUM SERPL-SCNC: 130 MMOL/L (ref 135–145)
WBC # BLD AUTO: 6.1 10E3/UL (ref 4–11)

## 2024-11-08 PROCEDURE — 250N000011 HC RX IP 250 OP 636: Performed by: SURGERY

## 2024-11-08 PROCEDURE — 250N000011 HC RX IP 250 OP 636: Performed by: NURSE ANESTHETIST, CERTIFIED REGISTERED

## 2024-11-08 PROCEDURE — 370N000017 HC ANESTHESIA TECHNICAL FEE, PER MIN: Performed by: SURGERY

## 2024-11-08 PROCEDURE — 120N000001 HC R&B MED SURG/OB

## 2024-11-08 PROCEDURE — 360N000080 HC SURGERY LEVEL 7, PER MIN: Performed by: SURGERY

## 2024-11-08 PROCEDURE — 85018 HEMOGLOBIN: CPT | Performed by: STUDENT IN AN ORGANIZED HEALTH CARE EDUCATION/TRAINING PROGRAM

## 2024-11-08 PROCEDURE — 258N000003 HC RX IP 258 OP 636: Performed by: SURGERY

## 2024-11-08 PROCEDURE — 710N000011 HC RECOVERY PHASE 1, LEVEL 3, PER MIN: Performed by: SURGERY

## 2024-11-08 PROCEDURE — 83735 ASSAY OF MAGNESIUM: CPT | Performed by: STUDENT IN AN ORGANIZED HEALTH CARE EDUCATION/TRAINING PROGRAM

## 2024-11-08 PROCEDURE — 84132 ASSAY OF SERUM POTASSIUM: CPT | Performed by: NURSE PRACTITIONER

## 2024-11-08 PROCEDURE — 250N000009 HC RX 250: Performed by: SURGERY

## 2024-11-08 PROCEDURE — 36415 COLL VENOUS BLD VENIPUNCTURE: CPT | Performed by: SURGERY

## 2024-11-08 PROCEDURE — 8E0W4CZ ROBOTIC ASSISTED PROCEDURE OF TRUNK REGION, PERCUTANEOUS ENDOSCOPIC APPROACH: ICD-10-PCS | Performed by: SURGERY

## 2024-11-08 PROCEDURE — 272N000001 HC OR GENERAL SUPPLY STERILE: Performed by: SURGERY

## 2024-11-08 PROCEDURE — 84295 ASSAY OF SERUM SODIUM: CPT | Performed by: STUDENT IN AN ORGANIZED HEALTH CARE EDUCATION/TRAINING PROGRAM

## 2024-11-08 PROCEDURE — 250N000025 HC SEVOFLURANE, PER MIN: Performed by: SURGERY

## 2024-11-08 PROCEDURE — 84295 ASSAY OF SERUM SODIUM: CPT | Performed by: SURGERY

## 2024-11-08 PROCEDURE — 250N000011 HC RX IP 250 OP 636: Performed by: NURSE PRACTITIONER

## 2024-11-08 PROCEDURE — 99232 SBSQ HOSP IP/OBS MODERATE 35: CPT | Performed by: NURSE PRACTITIONER

## 2024-11-08 PROCEDURE — 250N000013 HC RX MED GY IP 250 OP 250 PS 637: Performed by: SURGERY

## 2024-11-08 PROCEDURE — C1781 MESH (IMPLANTABLE): HCPCS | Performed by: SURGERY

## 2024-11-08 PROCEDURE — 258N000003 HC RX IP 258 OP 636: Performed by: EMERGENCY MEDICINE

## 2024-11-08 PROCEDURE — 250N000009 HC RX 250: Performed by: NURSE ANESTHETIST, CERTIFIED REGISTERED

## 2024-11-08 PROCEDURE — 85610 PROTHROMBIN TIME: CPT | Performed by: SURGERY

## 2024-11-08 PROCEDURE — 36415 COLL VENOUS BLD VENIPUNCTURE: CPT | Performed by: STUDENT IN AN ORGANIZED HEALTH CARE EDUCATION/TRAINING PROGRAM

## 2024-11-08 PROCEDURE — 0YUA4JZ SUPPLEMENT BILATERAL INGUINAL REGION WITH SYNTHETIC SUBSTITUTE, PERCUTANEOUS ENDOSCOPIC APPROACH: ICD-10-PCS | Performed by: SURGERY

## 2024-11-08 PROCEDURE — 250N000009 HC RX 250: Performed by: NURSE PRACTITIONER

## 2024-11-08 PROCEDURE — 258N000003 HC RX IP 258 OP 636: Performed by: NURSE ANESTHETIST, CERTIFIED REGISTERED

## 2024-11-08 PROCEDURE — 250N000011 HC RX IP 250 OP 636: Performed by: EMERGENCY MEDICINE

## 2024-11-08 PROCEDURE — 250N000013 HC RX MED GY IP 250 OP 250 PS 637: Performed by: NURSE PRACTITIONER

## 2024-11-08 PROCEDURE — 36415 COLL VENOUS BLD VENIPUNCTURE: CPT | Performed by: NURSE PRACTITIONER

## 2024-11-08 PROCEDURE — 85610 PROTHROMBIN TIME: CPT | Performed by: STUDENT IN AN ORGANIZED HEALTH CARE EDUCATION/TRAINING PROGRAM

## 2024-11-08 PROCEDURE — 93005 ELECTROCARDIOGRAM TRACING: CPT

## 2024-11-08 PROCEDURE — 82040 ASSAY OF SERUM ALBUMIN: CPT | Performed by: STUDENT IN AN ORGANIZED HEALTH CARE EDUCATION/TRAINING PROGRAM

## 2024-11-08 DEVICE — MESH PROGRIP LAPAROSCOPIC 5.9X3.9" PARIETEX SELF-FIX LPG1510: Type: IMPLANTABLE DEVICE | Site: ABDOMEN | Status: FUNCTIONAL

## 2024-11-08 RX ORDER — NALOXONE HYDROCHLORIDE 0.4 MG/ML
0.1 INJECTION, SOLUTION INTRAMUSCULAR; INTRAVENOUS; SUBCUTANEOUS
Status: DISCONTINUED | OUTPATIENT
Start: 2024-11-08 | End: 2024-11-08 | Stop reason: HOSPADM

## 2024-11-08 RX ORDER — ONDANSETRON 4 MG/1
4 TABLET, ORALLY DISINTEGRATING ORAL EVERY 30 MIN PRN
Status: DISCONTINUED | OUTPATIENT
Start: 2024-11-08 | End: 2024-11-08 | Stop reason: HOSPADM

## 2024-11-08 RX ORDER — MEPERIDINE HYDROCHLORIDE 25 MG/ML
12.5 INJECTION INTRAMUSCULAR; INTRAVENOUS; SUBCUTANEOUS EVERY 5 MIN PRN
Status: DISCONTINUED | OUTPATIENT
Start: 2024-11-08 | End: 2024-11-08 | Stop reason: HOSPADM

## 2024-11-08 RX ORDER — BUPIVACAINE HYDROCHLORIDE AND EPINEPHRINE 5; 5 MG/ML; UG/ML
INJECTION, SOLUTION PERINEURAL PRN
Status: DISCONTINUED | OUTPATIENT
Start: 2024-11-08 | End: 2024-11-08 | Stop reason: HOSPADM

## 2024-11-08 RX ORDER — OXYCODONE HYDROCHLORIDE 5 MG/1
10 TABLET ORAL EVERY 4 HOURS PRN
Status: DISCONTINUED | OUTPATIENT
Start: 2024-11-08 | End: 2024-11-12 | Stop reason: HOSPADM

## 2024-11-08 RX ORDER — POTASSIUM CHLORIDE 20MEQ/15ML
40 LIQUID (ML) ORAL ONCE
Status: COMPLETED | OUTPATIENT
Start: 2024-11-08 | End: 2024-11-08

## 2024-11-08 RX ORDER — BISACODYL 10 MG
10 SUPPOSITORY, RECTAL RECTAL DAILY PRN
Status: DISCONTINUED | OUTPATIENT
Start: 2024-11-11 | End: 2024-11-12 | Stop reason: HOSPADM

## 2024-11-08 RX ORDER — ONDANSETRON 2 MG/ML
INJECTION INTRAMUSCULAR; INTRAVENOUS PRN
Status: DISCONTINUED | OUTPATIENT
Start: 2024-11-08 | End: 2024-11-08

## 2024-11-08 RX ORDER — HYDROMORPHONE HCL IN WATER/PF 6 MG/30 ML
0.4 PATIENT CONTROLLED ANALGESIA SYRINGE INTRAVENOUS
Status: DISCONTINUED | OUTPATIENT
Start: 2024-11-08 | End: 2024-11-12 | Stop reason: HOSPADM

## 2024-11-08 RX ORDER — HYDROMORPHONE HCL IN WATER/PF 6 MG/30 ML
0.2 PATIENT CONTROLLED ANALGESIA SYRINGE INTRAVENOUS
Status: DISCONTINUED | OUTPATIENT
Start: 2024-11-08 | End: 2024-11-12 | Stop reason: HOSPADM

## 2024-11-08 RX ORDER — FENTANYL CITRATE 50 UG/ML
INJECTION, SOLUTION INTRAMUSCULAR; INTRAVENOUS PRN
Status: DISCONTINUED | OUTPATIENT
Start: 2024-11-08 | End: 2024-11-08

## 2024-11-08 RX ORDER — ONDANSETRON 2 MG/ML
4 INJECTION INTRAMUSCULAR; INTRAVENOUS EVERY 30 MIN PRN
Status: DISCONTINUED | OUTPATIENT
Start: 2024-11-08 | End: 2024-11-08 | Stop reason: HOSPADM

## 2024-11-08 RX ORDER — PROPOFOL 10 MG/ML
INJECTION, EMULSION INTRAVENOUS PRN
Status: DISCONTINUED | OUTPATIENT
Start: 2024-11-08 | End: 2024-11-08

## 2024-11-08 RX ORDER — HYDRALAZINE HYDROCHLORIDE 20 MG/ML
2.5-5 INJECTION INTRAMUSCULAR; INTRAVENOUS EVERY 10 MIN PRN
Status: DISCONTINUED | OUTPATIENT
Start: 2024-11-08 | End: 2024-11-08 | Stop reason: HOSPADM

## 2024-11-08 RX ORDER — AMOXICILLIN 250 MG
1 CAPSULE ORAL 2 TIMES DAILY
Status: DISCONTINUED | OUTPATIENT
Start: 2024-11-08 | End: 2024-11-12 | Stop reason: HOSPADM

## 2024-11-08 RX ORDER — ACETAMINOPHEN 325 MG/1
650 TABLET ORAL EVERY 4 HOURS PRN
Status: DISCONTINUED | OUTPATIENT
Start: 2024-11-11 | End: 2024-11-12 | Stop reason: HOSPADM

## 2024-11-08 RX ORDER — METOPROLOL TARTRATE 1 MG/ML
1-2 INJECTION, SOLUTION INTRAVENOUS EVERY 5 MIN PRN
Status: DISCONTINUED | OUTPATIENT
Start: 2024-11-08 | End: 2024-11-08 | Stop reason: HOSPADM

## 2024-11-08 RX ORDER — FENTANYL CITRATE 50 UG/ML
50 INJECTION, SOLUTION INTRAMUSCULAR; INTRAVENOUS EVERY 5 MIN PRN
Status: DISCONTINUED | OUTPATIENT
Start: 2024-11-08 | End: 2024-11-08 | Stop reason: HOSPADM

## 2024-11-08 RX ORDER — IBUPROFEN 200 MG
600 TABLET ORAL EVERY 6 HOURS PRN
Status: DISCONTINUED | OUTPATIENT
Start: 2024-11-08 | End: 2024-11-12 | Stop reason: HOSPADM

## 2024-11-08 RX ORDER — HYDROMORPHONE HYDROCHLORIDE 1 MG/ML
0.5 INJECTION, SOLUTION INTRAMUSCULAR; INTRAVENOUS; SUBCUTANEOUS EVERY 5 MIN PRN
Status: DISCONTINUED | OUTPATIENT
Start: 2024-11-08 | End: 2024-11-08 | Stop reason: HOSPADM

## 2024-11-08 RX ORDER — SODIUM CHLORIDE, SODIUM LACTATE, POTASSIUM CHLORIDE, CALCIUM CHLORIDE 600; 310; 30; 20 MG/100ML; MG/100ML; MG/100ML; MG/100ML
INJECTION, SOLUTION INTRAVENOUS CONTINUOUS
Status: DISCONTINUED | OUTPATIENT
Start: 2024-11-08 | End: 2024-11-08 | Stop reason: HOSPADM

## 2024-11-08 RX ORDER — FENTANYL CITRATE 50 UG/ML
25 INJECTION, SOLUTION INTRAMUSCULAR; INTRAVENOUS EVERY 5 MIN PRN
Status: DISCONTINUED | OUTPATIENT
Start: 2024-11-08 | End: 2024-11-08 | Stop reason: HOSPADM

## 2024-11-08 RX ORDER — ONDANSETRON 2 MG/ML
4 INJECTION INTRAMUSCULAR; INTRAVENOUS EVERY 6 HOURS PRN
Status: DISCONTINUED | OUTPATIENT
Start: 2024-11-08 | End: 2024-11-12 | Stop reason: HOSPADM

## 2024-11-08 RX ORDER — HYDROXYZINE HYDROCHLORIDE 10 MG/1
10 TABLET, FILM COATED ORAL EVERY 6 HOURS PRN
Status: DISCONTINUED | OUTPATIENT
Start: 2024-11-08 | End: 2024-11-08 | Stop reason: HOSPADM

## 2024-11-08 RX ORDER — ONDANSETRON 4 MG/1
4 TABLET, ORALLY DISINTEGRATING ORAL EVERY 6 HOURS PRN
Status: DISCONTINUED | OUTPATIENT
Start: 2024-11-08 | End: 2024-11-12 | Stop reason: HOSPADM

## 2024-11-08 RX ORDER — SODIUM CHLORIDE, SODIUM LACTATE, POTASSIUM CHLORIDE, CALCIUM CHLORIDE 600; 310; 30; 20 MG/100ML; MG/100ML; MG/100ML; MG/100ML
INJECTION, SOLUTION INTRAVENOUS CONTINUOUS PRN
Status: DISCONTINUED | OUTPATIENT
Start: 2024-11-08 | End: 2024-11-08

## 2024-11-08 RX ORDER — DEXAMETHASONE SODIUM PHOSPHATE 10 MG/ML
4 INJECTION, SOLUTION INTRAMUSCULAR; INTRAVENOUS
Status: DISCONTINUED | OUTPATIENT
Start: 2024-11-08 | End: 2024-11-08 | Stop reason: HOSPADM

## 2024-11-08 RX ORDER — LIDOCAINE HYDROCHLORIDE 10 MG/ML
INJECTION, SOLUTION INFILTRATION; PERINEURAL PRN
Status: DISCONTINUED | OUTPATIENT
Start: 2024-11-08 | End: 2024-11-08

## 2024-11-08 RX ORDER — LIDOCAINE 40 MG/G
CREAM TOPICAL
Status: DISCONTINUED | OUTPATIENT
Start: 2024-11-08 | End: 2024-11-12 | Stop reason: HOSPADM

## 2024-11-08 RX ORDER — DEXAMETHASONE SODIUM PHOSPHATE 10 MG/ML
INJECTION, SOLUTION INTRAMUSCULAR; INTRAVENOUS PRN
Status: DISCONTINUED | OUTPATIENT
Start: 2024-11-08 | End: 2024-11-08

## 2024-11-08 RX ORDER — POTASSIUM CHLORIDE 7.45 MG/ML
10 INJECTION INTRAVENOUS
Status: COMPLETED | OUTPATIENT
Start: 2024-11-08 | End: 2024-11-09

## 2024-11-08 RX ORDER — ACETAMINOPHEN 325 MG/1
975 TABLET ORAL EVERY 8 HOURS
Status: COMPLETED | OUTPATIENT
Start: 2024-11-08 | End: 2024-11-11

## 2024-11-08 RX ORDER — POLYETHYLENE GLYCOL 3350 17 G/17G
17 POWDER, FOR SOLUTION ORAL DAILY
Status: DISCONTINUED | OUTPATIENT
Start: 2024-11-09 | End: 2024-11-12 | Stop reason: HOSPADM

## 2024-11-08 RX ORDER — KETOROLAC TROMETHAMINE 30 MG/ML
INJECTION, SOLUTION INTRAMUSCULAR; INTRAVENOUS PRN
Status: DISCONTINUED | OUTPATIENT
Start: 2024-11-08 | End: 2024-11-08

## 2024-11-08 RX ORDER — PROCHLORPERAZINE MALEATE 5 MG/1
5 TABLET ORAL EVERY 6 HOURS PRN
Status: DISCONTINUED | OUTPATIENT
Start: 2024-11-08 | End: 2024-11-12 | Stop reason: HOSPADM

## 2024-11-08 RX ORDER — POTASSIUM CHLORIDE 1500 MG/1
40 TABLET, EXTENDED RELEASE ORAL ONCE
Status: COMPLETED | OUTPATIENT
Start: 2024-11-08 | End: 2024-11-08

## 2024-11-08 RX ORDER — CLINDAMYCIN PHOSPHATE 900 MG/50ML
900 INJECTION, SOLUTION INTRAVENOUS
Status: COMPLETED | OUTPATIENT
Start: 2024-11-08 | End: 2024-11-08

## 2024-11-08 RX ORDER — OXYCODONE HYDROCHLORIDE 5 MG/1
5 TABLET ORAL EVERY 4 HOURS PRN
Status: DISCONTINUED | OUTPATIENT
Start: 2024-11-08 | End: 2024-11-12 | Stop reason: HOSPADM

## 2024-11-08 RX ORDER — CLINDAMYCIN PHOSPHATE 900 MG/50ML
900 INJECTION, SOLUTION INTRAVENOUS SEE ADMIN INSTRUCTIONS
Status: DISCONTINUED | OUTPATIENT
Start: 2024-11-08 | End: 2024-11-11

## 2024-11-08 RX ORDER — HYDROMORPHONE HCL IN WATER/PF 6 MG/30 ML
0.2 PATIENT CONTROLLED ANALGESIA SYRINGE INTRAVENOUS EVERY 5 MIN PRN
Status: DISCONTINUED | OUTPATIENT
Start: 2024-11-08 | End: 2024-11-08 | Stop reason: HOSPADM

## 2024-11-08 RX ADMIN — HYDROMORPHONE HYDROCHLORIDE 0.5 MG: 1 INJECTION, SOLUTION INTRAMUSCULAR; INTRAVENOUS; SUBCUTANEOUS at 18:23

## 2024-11-08 RX ADMIN — ONDANSETRON 4 MG: 2 INJECTION INTRAMUSCULAR; INTRAVENOUS at 18:08

## 2024-11-08 RX ADMIN — ACETAMINOPHEN 975 MG: 325 TABLET, FILM COATED ORAL at 21:29

## 2024-11-08 RX ADMIN — POTASSIUM CHLORIDE 40 MEQ: 1500 TABLET, EXTENDED RELEASE ORAL at 08:51

## 2024-11-08 RX ADMIN — SODIUM CHLORIDE: 9 INJECTION, SOLUTION INTRAVENOUS at 03:59

## 2024-11-08 RX ADMIN — FENTANYL CITRATE 25 MCG: 50 INJECTION INTRAMUSCULAR; INTRAVENOUS at 17:59

## 2024-11-08 RX ADMIN — FENTANYL CITRATE 25 MCG: 50 INJECTION INTRAMUSCULAR; INTRAVENOUS at 16:18

## 2024-11-08 RX ADMIN — POTASSIUM CHLORIDE 10 MEQ: 7.46 INJECTION, SOLUTION INTRAVENOUS at 22:46

## 2024-11-08 RX ADMIN — ROCURONIUM BROMIDE 20 MG: 50 INJECTION, SOLUTION INTRAVENOUS at 17:45

## 2024-11-08 RX ADMIN — Medication 200 MG: at 18:15

## 2024-11-08 RX ADMIN — ROCURONIUM BROMIDE 10 MG: 50 INJECTION, SOLUTION INTRAVENOUS at 17:10

## 2024-11-08 RX ADMIN — DEXAMETHASONE SODIUM PHOSPHATE 4 MG: 10 INJECTION, SOLUTION INTRAMUSCULAR; INTRAVENOUS at 16:39

## 2024-11-08 RX ADMIN — SODIUM CHLORIDE, POTASSIUM CHLORIDE, SODIUM LACTATE AND CALCIUM CHLORIDE: 600; 310; 30; 20 INJECTION, SOLUTION INTRAVENOUS at 16:20

## 2024-11-08 RX ADMIN — SODIUM CHLORIDE: 9 INJECTION, SOLUTION INTRAVENOUS at 13:05

## 2024-11-08 RX ADMIN — ROCURONIUM BROMIDE 40 MG: 50 INJECTION, SOLUTION INTRAVENOUS at 16:22

## 2024-11-08 RX ADMIN — FAMOTIDINE 20 MG: 10 INJECTION, SOLUTION INTRAVENOUS at 12:21

## 2024-11-08 RX ADMIN — CLINDAMYCIN PHOSPHATE 900 MG: 900 INJECTION, SOLUTION INTRAVENOUS at 16:20

## 2024-11-08 RX ADMIN — PROPOFOL 120 MG: 10 INJECTION, EMULSION INTRAVENOUS at 16:22

## 2024-11-08 RX ADMIN — TOPICAL ANESTHETIC 0.5 ML: 200 SPRAY DENTAL; PERIODONTAL at 11:54

## 2024-11-08 RX ADMIN — POTASSIUM CHLORIDE 10 MEQ: 7.46 INJECTION, SOLUTION INTRAVENOUS at 20:41

## 2024-11-08 RX ADMIN — SODIUM CHLORIDE: 9 INJECTION, SOLUTION INTRAVENOUS at 20:11

## 2024-11-08 RX ADMIN — POTASSIUM CHLORIDE 10 MEQ: 7.46 INJECTION, SOLUTION INTRAVENOUS at 23:57

## 2024-11-08 RX ADMIN — KETOROLAC TROMETHAMINE 15 MG: 30 INJECTION, SOLUTION INTRAMUSCULAR at 18:07

## 2024-11-08 RX ADMIN — PHYTONADIONE 2 MG: 2 INJECTION, EMULSION INTRAMUSCULAR; INTRAVENOUS; SUBCUTANEOUS at 09:07

## 2024-11-08 RX ADMIN — SODIUM CHLORIDE, POTASSIUM CHLORIDE, SODIUM LACTATE AND CALCIUM CHLORIDE: 600; 310; 30; 20 INJECTION, SOLUTION INTRAVENOUS at 18:22

## 2024-11-08 RX ADMIN — FENTANYL CITRATE 25 MCG: 50 INJECTION INTRAMUSCULAR; INTRAVENOUS at 18:09

## 2024-11-08 RX ADMIN — SENNOSIDES AND DOCUSATE SODIUM 1 TABLET: 8.6; 5 TABLET ORAL at 21:29

## 2024-11-08 RX ADMIN — LIDOCAINE HYDROCHLORIDE 30 MG: 10 INJECTION, SOLUTION INFILTRATION; PERINEURAL at 16:22

## 2024-11-08 RX ADMIN — FENTANYL CITRATE 25 MCG: 50 INJECTION INTRAMUSCULAR; INTRAVENOUS at 16:22

## 2024-11-08 RX ADMIN — FAMOTIDINE 20 MG: 10 INJECTION, SOLUTION INTRAVENOUS at 23:22

## 2024-11-08 RX ADMIN — POTASSIUM CHLORIDE 10 MEQ: 7.46 INJECTION, SOLUTION INTRAVENOUS at 21:41

## 2024-11-08 NOTE — PROGRESS NOTES
"[facility]    Medicine Progress Note - Hospitalist Service    Date of Admission:  11/7/2024    Assessment & Plan      Unique Ward is a 84 year old female admitted on 11/7/2024 for a small bowel obstruction secondary to a right obturator hernia.    Small bowel obstruction  Right obturator hernia, no signs of bowel ischemia  -- CT showing \"Small bowel obstruction secondary to a small bowel containing right obturator hernia\"  -- Surgery consulted, planning surgery this afternoon at 4:30 PM and following, appreciate recommendations  -- NG tube for small bowel decompression, patient complains of pain in the back of her throat, Chloraseptic spray not helpful.  Spoke to pharmacist recommending HurriCaine spray this was ordered  -- Holding anticoagulation, INR 2.52 this morning, surgeon placed order for vitamin K 2 mg with a recheck of INR this afternoon  -- NS at 125 mL an hour   -- NPO     Hyponatremia, sodium 125  Hypochloremia, chloride 89  --Sodium has remained stable at 125-128 overnight  -- NS at 125 mL an hour  -- q6 hr Na checks     Hypokalemia  -Potassium 3.2 this morning  -Will replace with potassium replacement protocol    Atrial fibrillation, rates controlled  On anticoagulation with warfarin, therapeutic INR, INR goal 2-3  Pacemaker in place, placed in March 2020 for a history of complete heart block  Essential hypertension, hypertensive   Mild to moderate tricuspid regurgitation  -- Pacemaker interrogated on 3/23/2023  -- Normal LV size last echocardiogram 9/9/2024 -normal global systolic function with an estimated EF of 60 to 65%  -- EKG this morning shows underlying atrial fibrillation with a paced rhythm.  Read by Dr. Montana  -- Patient denies any chest pain or chest pressure  -- holding home amlodipine 5 mg daily   -- hydralazine 10 mg IV q6 hr prn for systolic BP >170    Prediabetes, hemoglobin A1c 5.8  -- poc checks while NPO   --Blood glucose range 100-152  -- " hypoglycemia protocol     Drug Induced Coagulation Defect: home medication list includes an anticoagulant medication  -- no signs or symptoms of bleeding at this time  --INR 2.5 to  -- Warfarin being held and reversed with vitamin K as stated above      Diet: NPO for Medical/Clinical Reasons Except for: Meds    DVT Prophylaxis: Pneumatic Compression Devices  Velázquez Catheter: Not present  Lines: None     Cardiac Monitoring: None  Code Status: Full Code          Diet: NPO for Medical/Clinical Reasons Except for: Meds, Ice Chips    DVT Prophylaxis: Pneumatic Compression Devices  Velázquez Catheter: Not present  Lines: None     Cardiac Monitoring: None  Code Status: Full Code      Clinically Significant Risk Factors        # Hypokalemia: Lowest K = 3.2 mmol/L in last 2 days, will replace as needed  # Hyponatremia: Lowest Na = 125 mmol/L in last 2 days, will monitor as appropriate  # Hypochloremia: Lowest Cl = 89 mmol/L in last 2 days, will monitor as appropriate  # Hypocalcemia: Lowest Ca = 8 mg/dL in last 2 days, will monitor and replace as appropriate     # Hypoalbuminemia: Lowest albumin = 3.4 g/dL at 11/8/2024  5:50 AM, will monitor as appropriate  # Coagulation Defect: INR = 2.52 (Ref range: 0.85 - 1.15) and/or PTT = N/A, will monitor for bleeding    # Hypertension: Noted on problem list                 # Pacemaker present       Disposition Plan     Medically Ready for Discharge: Anticipated in 2-4 Days           The patient's care was discussed with the Patient.    Merly Abreu CNP  Hospitalist Service  [facility]  Securely message with [employer] (more info)  Text page via [employer] Paging/Directory   ______________________________________________________________________    Interval History   No acute events overnight, nursing notes reviewed.  Patient complaining of pain in the back of her throat, Chloraseptic spray not helpful.  He offers no other complaints or concerns    Physical Exam   Vital  Signs: Temp: 97.5  F (36.4  C) Temp src: Oral BP: (!) 145/64 Pulse: 72   Resp: 18 SpO2: 97 % O2 Device: None (Room air)    Weight: 122 lbs 11.2 oz    General Appearance: Sitting up in bed, no acute distress  H EENT: NG tube in place  Respiratory: Respiratory effort easy at rest, lung sounds clear  Cardiovascular: Regular rate and rhythm  GI: Abdomen is soft with mild tenderness  Skin: Warm and dry  Neuro: She is alert and oriented to person place and time    Medical Decision Making       40 MINUTES SPENT BY ME on the date of service doing chart review, history, exam, documentation & further activities per the note.      Data     I have personally reviewed the following data over the past 24 hrs:    6.1  \   11.0 (L)   / 299     128 (L); 128 (L) 95 (L) 10.5 /  102 (H)   3.2 (L) 22 0.56 \     ALT: 13 AST: 33 AP: 33 (L) TBILI: 0.6   ALB: 3.4 (L) TOT PROTEIN: 5.5 (L) LIPASE: N/A     INR:  2.52 (H) PTT:  N/A   D-dimer:  N/A Fibrinogen:  N/A       Imaging results reviewed over the past 24 hrs:   Recent Results (from the past 24 hours)   XR Abdomen Port 1 View    Narrative    ABDOMEN PORTABLE ONE VIEW  11/7/2024 10:24 AM     HISTORY: NG tube placement.    COMPARISON: CT 11/7/2024.      Impression    IMPRESSION: Nasogastric tube within the stomach. Partially visualized  upper abdomen demonstrates several dilated bowel loops.    SEFERINO COLLIER MD         SYSTEM ID:  [id number]

## 2024-11-08 NOTE — PLAN OF CARE
"Goal Outcome Evaluation:      Plan of Care Reviewed With: patient    Overall Patient Progress: no changeOverall Patient Progress: no change    Outcome Evaluation: A&Ox4. LS clear. VSS on RA. NG to LIS. Dark brown/red output, Around 40 mL output this shift. q6hr Na checks 125 and 128. NS infusing @ 125 mL/hr. Nauseous in beginning of shift, relief with compazine x1, no more episodes of nausea this shift. BG checks 152, 118. Pt refused insulin. NPO except meds and ice chips. Chloraseptic spray utilized for sore throat.    BP (!) 148/71 (BP Location: Left arm)   Pulse 69   Temp 98  F (36.7  C) (Oral)   Resp 16   Ht 1.727 m (5' 8\")   Wt 55.7 kg (122 lb 11.2 oz)   LMP  (LMP Unknown)   SpO2 97%   BMI 18.66 kg/m     "

## 2024-11-08 NOTE — ANESTHESIA PREPROCEDURE EVALUATION
"Anesthesia Pre-Procedure Evaluation    Patient: Unique Ward   MRN: [id number] : 1940        Procedure : Procedure(s):  Robot assisted laparoscopic right obturator hernia repair  possible bowel resection          Past Medical History:   Diagnosis Date     Arthritis 1980     Atrial fibrillation (H) 2019     Cancer (H) 1993     scalp    Basal Cell X2  head      nose bridge 1996     Depressive disorder 1977    anxiety     FH: melanoma- son 2017     Hypertension 2016      Past Surgical History:   Procedure Laterality Date     BIOPSY  2014     COLONOSCOPY      Two routine,  one for chronic diarrhea     EP PACEMAKER N/A 3/31/2020    Procedure: EP Pacemaker;  Surgeon: Erich Clark MD;  Location:  [facility]     EYE SURGERY  2014    first    cataracts both eyes.  second a few months later     GENITOURINARY SURGERY       GYN SURGERY  1977    TL     HEAD & NECK SURGERY  1997    basal cell X2   Moh's on scalp.  other on bridge of nose     LASER YAG CAPSULOTOMY Bilateral 2021    Procedure: CAPSULOTOMY, LENS, USING YAG LASER;  Surgeon: Bernardino Chu MD;  Location: [facility]     SOFT TISSUE SURGERY  1990    Ganglion Cyst   Left inner wrist      Allergies   Allergen Reactions     Keflex [Cephalexin] Anaphylaxis     Codeine Dizziness     Gentamicin Hives     Per Unique this medication gives hives on arms and legs. Bev Lo LSXO,  ....................  7/15/2014   1:15 PM     Mct Oil [Medium Chain Triglycerides] Hives     No Clinical Screening - See Comments      Eye drop antibiotic.     Paxil [Paroxetine] Other (See Comments)     \"Felt sick and weird in the head\"     Influenza Virus Vaccine [id number]      Typhoid Vaccines       Social History     Tobacco Use     Smoking status: Former     Current packs/day: 0.00     Average packs/day: 0.8 packs/day for 10.0 years (7.5 ttl pk-yrs)     Types: Cigarettes     Start date: " 1955     Quit date: 1965     Years since quittin.8     Smokeless tobacco: Never   Substance Use Topics     Alcohol use: No     Alcohol/week: 0.0 standard drinks of alcohol      Wt Readings from Last 1 Encounters:   24 55.7 kg (122 lb 11.2 oz)        Anesthesia Evaluation   Pt has had prior anesthetic. Type: General and MAC.        ROS/MED HX  ENT/Pulmonary:     (+)                tobacco use, Past use,                       Neurologic:       Cardiovascular: Comment: H/O CHB (complete heart block)    DDDR pacemaker rate     (+)  hypertension- -   -  - -   Taking blood thinners           pacemaker,          dysrhythmias, a-fib,        Previous cardiac testing   Echo: Date:  Results:  The rhythm was paced.  Mildly decreased left ventricular systolic function  Septal wall motion abnormality may reflect pacemaker activation.  The visual ejection fraction is 45-50%.  Moderately dilated right ventricle with mildly decreased systolic function.  Moderate to severe right atrial enlargement.  Moderately severe tricuspid valve regurgitation in the presence of pacemaker  leads in the right atrium and right ventricle.  Normal inferior vena cava.  Trace mitral valve regurgitation.  Stable, small circumferential pericardial effusion.     Compared to the previous study dated 10/13/2022, the right ventricle is  dilated with reduced systolic function, TR remains moderately severe.     ______________________________________________________________________________  Left Ventricle  The left ventricle is normal in size. There is mild concentric left  ventricular hypertrophy. Mildly decreased left ventricular systolic function.  The visual ejection fraction is 45-50%. Grade I or early diastolic  dysfunction. Septal wall motion abnormality may reflect pacemaker activation.     Right Ventricle  The right ventricle is moderately dilated. Mildly decreased right ventricular  systolic function. There is a  pacemaker lead in the right ventricle.     Atria  The left atrium is mildly dilated. The right atrium is moderate to severely  dilated. Pacer wire in right atrium. There is no color Doppler evidence of an  atrial shunt.     Mitral Valve  The mitral valve leaflets appear normal. There is no evidence of stenosis,  fluttering, or prolapse. There is trace mitral regurgitation. There is no  mitral valve stenosis.     Tricuspid Valve  Normal tricuspid valve. There is moderately severe (3+) tricuspid  regurgitation.     Aortic Valve  There is mild trileaflet aortic sclerosis. No aortic regurgitation is present.  No aortic stenosis is present.     Pulmonic Valve  The pulmonic valve is not well visualized. There is trace pulmonic valvular  regurgitation.     Vessels  The aortic root is normal size. Normal size ascending aorta. The inferior vena  cava is normal.     Pericardium  There is no pericardial effusion.     Rhythm  The rhythm was paced.    Stress Test:  Date: Results:    ECG Reviewed:  Date: 10/23/23 Results:  paced  Cath:  Date: Results:      METS/Exercise Tolerance:     Hematologic: Comments: H/O thrombocytopenia       Musculoskeletal: Comment: Fibromyalgia  (+)  arthritis,             GI/Hepatic: Comment: H/O SBO      Renal/Genitourinary:       Endo:       Psychiatric/Substance Use:  - neg psychiatric ROS     Infectious Disease:       Malignancy: Comment: History of basal cell carcinoma- face and scalp  (+) Malignancy, History of Skin.Skin CA status post Surgery.      Other:            Physical Exam    Airway  airway exam normal      Mallampati: II   TM distance: > 3 FB   Neck ROM: full   Mouth opening: > 3 cm    Respiratory Devices and Support         Dental  no notable dental history         Cardiovascular   cardiovascular exam normal       Rhythm and rate: regular and normal     Pulmonary   pulmonary exam normal        breath sounds clear to auscultation       OUTSIDE LABS:  CBC:   Lab Results   Component Value  "Date    WBC 7.9 11/07/2024    WBC 5.1 11/29/2023    HGB 11.8 11/07/2024    HGB 11.1 (L) 11/29/2023    HCT 34.4 (L) 11/07/2024    HCT 33.2 (L) 11/29/2023     11/07/2024     11/29/2023     BMP:   Lab Results   Component Value Date     (L) 11/07/2024     (L) 11/07/2024    POTASSIUM 3.5 11/07/2024    POTASSIUM 3.9 11/29/2023    CHLORIDE 89 (L) 11/07/2024    CHLORIDE 94 (L) 11/29/2023    CO2 26 11/07/2024    CO2 27 11/29/2023    BUN 18.0 11/07/2024    BUN 26.5 (H) 11/29/2023    CR 0.64 11/07/2024    CR 0.79 11/29/2023     (H) 11/07/2024     (H) 11/07/2024     COAGS:   Lab Results   Component Value Date    INR 2.05 (H) 11/07/2024     POC: No results found for: \"BGM\", \"HCG\", \"HCGS\"  HEPATIC:   Lab Results   Component Value Date    ALBUMIN 4.2 11/07/2024    PROTTOTAL 6.5 11/07/2024    ALT 19 11/07/2024    AST 41 11/07/2024    ALKPHOS 37 (L) 11/07/2024    BILITOTAL 0.4 11/07/2024     OTHER:   Lab Results   Component Value Date    A1C 5.8 (H) 08/16/2023    GLORIA 9.1 11/07/2024    MAG 2.1 03/30/2020    LIPASE 41 11/07/2024    TSH 1.93 09/06/2023       Anesthesia Plan    ASA Status:  3    NPO Status:  NPO Appropriate    Anesthesia Type: General.   Induction: Intravenous, Propofol.   Maintenance: Balanced.        Consents    Anesthesia Plan(s) and associated risks, benefits, and realistic alternatives discussed. Questions answered and patient/representative(s) expressed understanding.     - Discussed:     - Discussed with:  Patient      - Extended Intubation/Ventilatory Support Discussed: No.      - Patient is DNR/DNI Status: No     Use of blood products discussed: No .     Postoperative Care    Pain management: Oral pain medications, IV analgesics.   PONV prophylaxis: Ondansetron (or other 5HT-3), Dexamethasone or Solumedrol, Background Propofol Infusion     Comments:    Other Comments: The risks and benefits of anesthesia, and the alternatives where applicable, have been discussed with the " patient, and they wish to proceed.            MAYRA Garduno CRNA    I have reviewed the pertinent notes and labs in the chart from the past 30 days and (re)examined the patient.  Any updates or changes from those notes are reflected in this note.     # Hyponatremia: Lowest Na = 125 mmol/L in last 2 days, will monitor as appropriate  # Hypochloremia: Lowest Cl = 89 mmol/L in last 2 days, will monitor as appropriate       # Drug Induced Coagulation Defect: home medication list includes an anticoagulant medication    # Hypertension: Noted on problem list                # Pacemaker present

## 2024-11-08 NOTE — PLAN OF CARE
"Goal Outcome Evaluation:       Overall Patient Progress: no change    Outcome Evaluation: A & O x4, able to make needs known.  Takes pills whole.  VSS.  On RA w SpO2 > 90%, lungs clear.  RAC dolores IV patent, infusing continuous NS at 125mL/hr.  Continent BB, +1 assist w walker and GB to chair/bathroom.  Pt's NG remains to low-intermittent suction and patent with dark red output.  C/o 2/10 throat pain, encouraged to use prn throat spray, partially effective.  Pt's Na+ 125 at 0000, per MD will continue to monitor with Q6 checks.    /61 (BP Location: Left arm)   Pulse 62   Temp 98.2  F (36.8  C) (Oral)   Resp 16   Ht 1.727 m (5' 8\")   Wt 55.7 kg (122 lb 11.2 oz)   LMP  (LMP Unknown)   SpO2 95%   BMI 18.66 kg/m      Corina Bryan RN on 11/8/2024 at 4:48 AM    "

## 2024-11-09 PROBLEM — T45.515A WARFARIN-INDUCED COAGULOPATHY (H): Status: ACTIVE | Noted: 2020-06-30

## 2024-11-09 PROBLEM — E83.42 HYPOMAGNESEMIA: Status: ACTIVE | Noted: 2024-11-09

## 2024-11-09 PROBLEM — K45.0 OBTURATOR HERNIA WITH OBSTRUCTION: Status: ACTIVE | Noted: 2024-11-07

## 2024-11-09 PROBLEM — D68.32 WARFARIN-INDUCED COAGULOPATHY (H): Status: ACTIVE | Noted: 2020-06-30

## 2024-11-09 PROBLEM — E88.09 HYPOALBUMINEMIA: Status: ACTIVE | Noted: 2024-11-09

## 2024-11-09 PROBLEM — E87.8 HYPOCHLOREMIA: Status: ACTIVE | Noted: 2024-11-09

## 2024-11-09 LAB
GLUCOSE BLDC GLUCOMTR-MCNC: 102 MG/DL (ref 70–99)
GLUCOSE BLDC GLUCOMTR-MCNC: 105 MG/DL (ref 70–99)
GLUCOSE BLDC GLUCOMTR-MCNC: 114 MG/DL (ref 70–99)
GLUCOSE BLDC GLUCOMTR-MCNC: 123 MG/DL (ref 70–99)
GLUCOSE BLDC GLUCOMTR-MCNC: 128 MG/DL (ref 70–99)
GLUCOSE BLDC GLUCOMTR-MCNC: 134 MG/DL (ref 70–99)
GLUCOSE BLDC GLUCOMTR-MCNC: 95 MG/DL (ref 70–99)
INR PPP: 1.16 (ref 0.85–1.15)
MAGNESIUM SERPL-MCNC: 1.5 MG/DL (ref 1.7–2.3)
MAGNESIUM SERPL-MCNC: 2.6 MG/DL (ref 1.7–2.3)
OSMOLALITY SERPL: 272 MMOL/KG (ref 280–301)
POTASSIUM SERPL-SCNC: 4 MMOL/L (ref 3.4–5.3)
SODIUM SERPL-SCNC: 126 MMOL/L (ref 135–145)
SODIUM SERPL-SCNC: 128 MMOL/L (ref 135–145)
SODIUM SERPL-SCNC: 128 MMOL/L (ref 135–145)

## 2024-11-09 PROCEDURE — 258N000003 HC RX IP 258 OP 636: Performed by: SURGERY

## 2024-11-09 PROCEDURE — 250N000011 HC RX IP 250 OP 636: Performed by: SURGERY

## 2024-11-09 PROCEDURE — 99232 SBSQ HOSP IP/OBS MODERATE 35: CPT | Performed by: PEDIATRICS

## 2024-11-09 PROCEDURE — 84132 ASSAY OF SERUM POTASSIUM: CPT | Performed by: NURSE PRACTITIONER

## 2024-11-09 PROCEDURE — 250N000013 HC RX MED GY IP 250 OP 250 PS 637: Performed by: SURGERY

## 2024-11-09 PROCEDURE — 36415 COLL VENOUS BLD VENIPUNCTURE: CPT | Performed by: NURSE PRACTITIONER

## 2024-11-09 PROCEDURE — 36415 COLL VENOUS BLD VENIPUNCTURE: CPT | Performed by: SURGERY

## 2024-11-09 PROCEDURE — 83735 ASSAY OF MAGNESIUM: CPT | Performed by: SURGERY

## 2024-11-09 PROCEDURE — 84295 ASSAY OF SERUM SODIUM: CPT | Performed by: SURGERY

## 2024-11-09 PROCEDURE — 83930 ASSAY OF BLOOD OSMOLALITY: CPT | Performed by: PEDIATRICS

## 2024-11-09 PROCEDURE — 85610 PROTHROMBIN TIME: CPT | Performed by: SURGERY

## 2024-11-09 PROCEDURE — 120N000001 HC R&B MED SURG/OB

## 2024-11-09 RX ORDER — MAGNESIUM SULFATE HEPTAHYDRATE 40 MG/ML
4 INJECTION, SOLUTION INTRAVENOUS ONCE
Status: COMPLETED | OUTPATIENT
Start: 2024-11-09 | End: 2024-11-09

## 2024-11-09 RX ORDER — WARFARIN SODIUM 5 MG/1
10 TABLET ORAL
Status: COMPLETED | OUTPATIENT
Start: 2024-11-09 | End: 2024-11-09

## 2024-11-09 RX ADMIN — FAMOTIDINE 20 MG: 10 INJECTION, SOLUTION INTRAVENOUS at 23:30

## 2024-11-09 RX ADMIN — MAGNESIUM SULFATE HEPTAHYDRATE 4 G: 40 INJECTION, SOLUTION INTRAVENOUS at 08:10

## 2024-11-09 RX ADMIN — ACETAMINOPHEN 975 MG: 325 TABLET, FILM COATED ORAL at 05:01

## 2024-11-09 RX ADMIN — SENNOSIDES AND DOCUSATE SODIUM 1 TABLET: 8.6; 5 TABLET ORAL at 20:15

## 2024-11-09 RX ADMIN — IBUPROFEN 600 MG: 200 TABLET, FILM COATED ORAL at 09:10

## 2024-11-09 RX ADMIN — ACETAMINOPHEN 975 MG: 325 TABLET, FILM COATED ORAL at 21:09

## 2024-11-09 RX ADMIN — SENNOSIDES AND DOCUSATE SODIUM 1 TABLET: 8.6; 5 TABLET ORAL at 08:10

## 2024-11-09 RX ADMIN — ACETAMINOPHEN 975 MG: 325 TABLET, FILM COATED ORAL at 12:39

## 2024-11-09 RX ADMIN — FAMOTIDINE 20 MG: 10 INJECTION, SOLUTION INTRAVENOUS at 10:46

## 2024-11-09 RX ADMIN — POLYETHYLENE GLYCOL 3350 17 G: 17 POWDER, FOR SOLUTION ORAL at 08:10

## 2024-11-09 RX ADMIN — WARFARIN SODIUM 10 MG: 5 TABLET ORAL at 17:42

## 2024-11-09 RX ADMIN — SODIUM CHLORIDE: 9 INJECTION, SOLUTION INTRAVENOUS at 01:55

## 2024-11-09 RX ADMIN — SODIUM CHLORIDE: 9 INJECTION, SOLUTION INTRAVENOUS at 09:57

## 2024-11-09 NOTE — PROGRESS NOTES
[facility]    Medicine Progress Note - Hospitalist Service    Date of Admission:  11/7/2024    Assessment & Plan   Unique Ward is a 84 year old female admitted on 11/7/2024 for a small bowel obstruction secondary to a right obturator hernia.    Principal Problem:    Obturator hernia with obstruction  Active Problems:    Essential hypertension    Warfarin-induced coagulopathy (H)    Permanent atrial fibrillation (H)    Hyponatremia    Hypomagnesemia    Hypochloremia    Hypoalbuminemia    Personal history of urinary tract infection    Pacemaker    Obturator hernia with obstruction  Clinical presentation and radiographic findings were suspicious for small bowel obstruction due to obturator hernia.  She underwent operative repair on 11/8 and is recovering.  She is not yet advancing diet.  -Routine postoperative care deferred to surgeon  -Nasogastric decompression deferred to surgeon  -Perioperative antibiotic therapy deferred to surgeon    Hyponatremia  Hypochloremia  Chronic hyponatremia but admitted with worsening hyponatremia sodium 125 (compared with 131 in November 2023).  Suspect hyponatremia is exacerbated by bowel obstruction and change in oral intake.  She has been euvolemic after initial treatment with IV fluids and continues to be hyponatremic with stable sodium 125-128 postoperatively.  She also presented with hypochloremia which has also been chronic.  -Continue IV fluid infusion with NS at 125 mL an hour  -Continue to monitor serial sodium and adjust IV fluid rate if indicated    Hypokalemia  Hypomagnesemia  Presented with normal potassium subsequently decreasing to zoie of 3.2.  Hypokalemia has resolved with supplementation.  Serum magnesium was initially normal but subsequently decreased.  Magnesium has also normalized after supplementation.  -Continue potassium and magnesium supplementation per respective protocols and monitor as indicated    Hypoalbuminemia  Serum  albumin was normal at admission but is now low probably due to inadequate nutritional intake.  -Monitor albumin as indicated    Permanent atrial fibrillation with CVR  Warfarin induced coagulopathy  Pacemaker in place  Known permanent atrial fibrillation and treated chronically with warfarin which causes coagulopathy.  Presented with therapeutic INR.  Preoperatively was treated with vitamin K after which INR became subtherapeutic.  Heart rate has been stable perioperatively.  Has chronic pacemaker placed due to complete heart block in March 2020 that had been interrogated in March 2023.  Preoperative EKG confirmed atrial fibrillation.  -Resume warfarin today, discussed with surgeon Dr. Ramesh, pharmacy consulted to assist with warfarin dosing    Essential hypertension  Chronic hypertension treated with amlodipine 5 mg daily which has been held preoperatively.  She has been hypertensive perioperatively although not severely hypertensive.  -Resume amlodipine, may clamp NG tube for 30 minutes after medication administration, discussed with surgeon today  -Continue hydralazine 10 mg IV q6 hr prn for systolic  or higher    Prediabetes  Carries diagnosis of prediabetes with hemoglobin A1c 5.8 in August 2023.  Blood sugars have been normal to mildly elevated perioperatively.  -Discontinue routine monitoring of blood sugars    Personal history of UTI  Has history of recurring UTI and had been diagnosed with UTI on November 4 and prescribed antibiotic therapy at that time.  UA at admission was benign without concern for urinary infection and there have been no clinical concerns for UTI during hospitalization.  -Not recommending any additional antibiotic therapy other than the perioperative antibiotics she is receiving per recommendations of surgeon          Diet: NPO for Medical/Clinical Reasons Except for: Meds, Ice Chips    DVT Prophylaxis: Pneumatic Compression Devices  Velázquez Catheter: Not present  Lines: None      Cardiac Monitoring: None  Code Status: Full Code      Clinically Significant Risk Factors        # Hypokalemia: Lowest K = 3.2 mmol/L in last 2 days, will replace as needed  # Hyponatremia: Lowest Na = 125 mmol/L in last 2 days, will monitor as appropriate  # Hypochloremia: Lowest Cl = 95 mmol/L in last 2 days, will monitor as appropriate  # Hypocalcemia: Lowest Ca = 8 mg/dL in last 2 days, will monitor and replace as appropriate   # Hypomagnesemia: Lowest Mg = 1.5 mg/dL in last 2 days, will replace as needed   # Hypoalbuminemia: Lowest albumin = 3.4 g/dL at 11/8/2024  5:50 AM, will monitor as appropriate  # Coagulation Defect: INR = 1.29 (Ref range: 0.85 - 1.15) and/or PTT = N/A, will monitor for bleeding    # Hypertension: Noted on problem list                 # Pacemaker present       Disposition Plan     Medically Ready for Discharge: Anticipated in 2-4 Days             Freedom Russo MD  Hospitalist Service  [facility]  Securely message with [employer] (more info)  Text page via [email or web address] Paging/Directory   ______________________________________________________________________    Interval History   Patient had surgery yesterday and generally recovered uneventfully overnight.  She says she feels better overall but continues to have some intermittent abdominal pain.  She has been afebrile.  Blood pressure remains elevated as high as 166/72 postoperatively.  Other vital signs have been stable.  Urine output has been good.    Physical Exam   Vital Signs: Temp: 98.3  F (36.8  C) Temp src: Oral BP: (!) 154/70 Pulse: 70   Resp: 18 SpO2: 95 % O2 Device: None (Room air)   Patient Vitals for the past 24 hrs:   BP Temp Temp src Pulse Resp SpO2   11/09/24 0748 (!) 154/70 98.3  F (36.8  C) Oral 70 18 95 %   11/09/24 0635 (!) 145/68 97.8  F (36.6  C) Oral 74 18 96 %   11/09/24 0339 (!) 166/72 97.8  F (36.6  C) Oral 72 16 97 %   11/08/24 2313 (!) 153/72 97.8  F (36.6  C) Oral 64 18 95 %   11/08/24  2139 (!) 166/68 97.7  F (36.5  C) Oral 67 16 96 %   11/08/24 2049 (!) 159/71 97.5  F (36.4  C) Oral 64 12 --   11/08/24 2017 (!) 149/64 97.4  F (36.3  C) Oral 59 12 96 %   11/08/24 1944 (!) 150/65 97.2  F (36.2  C) Oral 62 12 98 %   11/08/24 1930 -- 97.7  F (36.5  C) Core 61 10 97 %   11/08/24 1925 134/62 97.7  F (36.5  C) Core 58 11 97 %   11/08/24 1920 135/58 97.7  F (36.5  C) Core 61 (!) 9 97 %   11/08/24 1915 136/61 97.5  F (36.4  C) Core 59 12 97 %   11/08/24 1910 133/63 97.5  F (36.4  C) Core 61 10 93 %   11/08/24 1905 129/60 97.2  F (36.2  C) -- 60 (!) 9 92 %   11/08/24 1900 136/66 97  F (36.1  C) Temporal 59 10 92 %   11/08/24 1855 (!) 141/62 -- -- 59 11 92 %   11/08/24 1850 136/64 98.2  F (36.8  C) Temporal 62 10 94 %   11/08/24 1845 (!) 140/62 -- -- 66 13 96 %   11/08/24 1840 (!) 147/66 -- -- 69 (!) 9 --   11/08/24 1835 138/69 -- -- 85 -- 92 %   11/08/24 1546 (!) 160/77 98.5  F (36.9  C) Oral 88 18 97 %     Weight: 122 lbs 11.2 oz  Vitals:    11/07/24 0717 11/07/24 1116   Weight: 57.5 kg (126 lb 11.2 oz) 55.7 kg (122 lb 11.2 oz)       Intake/Output Summary (Last 24 hours) at 11/9/2024 1329  Last data filed at 11/9/2024 0900  Gross per 24 hour   Intake 2484 ml   Output 3605 ml   Net -1121 ml       General Appearance: Elderly woman with NG tube present in the naris, no signs of acute distress  Respiratory: Normal respiratory effort, clear lungs  Cardiovascular: Somewhat irregularly irregular heart rate and rhythm, good radial pulse, normal capillary refill  GI: Bowel sounds present, soft abdomen, mildly tender throughout     Medical Decision Making             Data     I have personally reviewed the following data over the past 24 hrs:    N/A  \   N/A   / N/A     126 (L) N/A N/A /  114 (H)   4.0 N/A N/A \     INR:  1.29 (H) PTT:  N/A   D-dimer:  N/A Fibrinogen:  N/A       Magnesium 1.5 earlier today, now improved to 2.6  Blood sugars 121-130 over the last day    Recent Labs   Lab 11/09/24  1252  11/09/24  1050 11/09/24  0745 11/09/24  0519 11/09/24  0317 11/09/24  0255 11/08/24  2322 11/08/24  2307 11/08/24  1539 11/08/24  1314 11/08/24  0800 11/08/24  0550 11/07/24  1341 11/07/24  0815   WBC  --   --   --   --   --   --   --   --   --   --   --  6.1  --  7.9   HGB  --   --   --   --   --   --   --   --   --   --   --  11.0*  --  11.8   MCV  --   --   --   --   --   --   --   --   --   --   --  86  --  85   PLT  --   --   --   --   --   --   --   --   --   --   --  299  --  316   INR  --   --   --   --   --   --   --  1.29*  --  1.71*  --  2.52*  --  2.05*   *  --   --  128*  --   --   --  128*  --  130*  --  128*  128*   < > 125*   POTASSIUM  --   --   --   --  4.0  --   --   --   --  3.4  --  3.2*  --  3.5   CHLORIDE  --   --   --   --   --   --   --   --   --   --   --  95*  --  89*   CO2  --   --   --   --   --   --   --   --   --   --   --  22  --  26   BUN  --   --   --   --   --   --   --   --   --   --   --  10.5  --  18.0   CR  --   --   --   --   --   --   --   --   --   --   --  0.56  --  0.64   ANIONGAP  --   --   --   --   --   --   --   --   --   --   --  11  --  10   GLORIA  --   --   --   --   --   --   --   --   --   --   --  8.0*  --  9.1   GLC  --  114* 123*  --   --  128*   < >  --    < >  --    < > 99   < > 146*   ALBUMIN  --   --   --   --   --   --   --   --   --   --   --  3.4*  --  4.2   PROTTOTAL  --   --   --   --   --   --   --   --   --   --   --  5.5*  --  6.5   BILITOTAL  --   --   --   --   --   --   --   --   --   --   --  0.6  --  0.4   ALKPHOS  --   --   --   --   --   --   --   --   --   --   --  33*  --  37*   ALT  --   --   --   --   --   --   --   --   --   --   --  13  --  19   AST  --   --   --   --   --   --   --   --   --   --   --  33  --  41   LIPASE  --   --   --   --   --   --   --   --   --   --   --   --   --  41    < > = values in this interval not displayed.

## 2024-11-09 NOTE — PROGRESS NOTES
Pt arrived to med/surg from PACU via cart. S/P obturator hernia repair. Pt is sleepy, arouses to voice. Shakes head back and forth when asked name and orientation questions.   Abdominal laparoscopic sites are WNL. See assessment.   Daughter here now at bedside.

## 2024-11-09 NOTE — OP NOTE
"Date of Service: 11/8/2024    STAFF SURGEON:  Winston Garcia MD     ASSISTANT:  Alisson Pack PA-C assistance was required for port placement, bedside robotic instrument exchanges, bedside needs for retraction and suction as needed, introduction/removal of sutures or mesh, and closure  Cesar Delgado MS3     PREOPERATIVE DIAGNOSIS: Incarcerated right obturator hernia with obstruction     POSTOPERATIVE DIAGNOSIS: Bilateral obturator hernia     NAME OF PROCEDURE(S):  Robotic Xi assisted laparoscopic bilateral obturator hernia repair     INDICATIONS FOR PROCEDURE:  The patient is a 84-year-old female who is admitted with bowel obstruction secondary to a right obturator hernia containing loop of small bowel.  This was not reducible but there is no evidence of strangulation or otherwise threatened bowel.  She was admitted for reversal of anticoagulation and correction of electrolyte abnormalities and NG tube decompression.  Risks, benefits and alternatives discussed preoperatively and consent obtained.     EBL: 5 cc    ANESTHESIA:  General    COMPLICATIONS: None     DRAINS:  None.     SPECIMENS: None     IMPLANTS:   Implant Name Type Inv. Item Serial No.  Lot No. LRB No. Used Action   MESH PROGRIP LAPAROSCOPIC 5.9X3.9\" PARIETEX SELF-FIX [id number] - [id number] Mesh MESH PROGRIP LAPAROSCOPIC 5.9X3.9\" PARIETEX SELF-FIX [id number]  COVIDIEN [id number] Left 1 Implanted   MESH PROGRIP LAPAROSCOPIC 5.9X3.9\" PARIETEX SELF-FIX [id number] - [id number] Mesh MESH PROGRIP LAPAROSCOPIC 5.9X3.9\" PARIETEX SELF-FIX [id number]  COVIDIEN [id number] Right 1 Implanted       OPERATIVE FINDINGS: Initial examination showed relatively decompressed small bowel.  I was able to note the obturator hernia on the right which no longer appeared to contain any loops of bowel.  A left-sided obturator hernia was also noted leading to the bilateral repair.     PROCEDURE DETAIL:     Following consent, the patient was brought from the preoperative holding area to the " operating suite and laid in supine position. The patient underwent general anesthesia. Abdomen was prepped and draped in usual fashion. Time out performed. Patient received antibiotics and had sequentials for DVT prophylaxis.    Following the time out I made a 2cm supraumbilical incision and dissected to the abdominal fascia which was elevated between two Kocher graspers and divided with curved encarnacion scissors. I placed 2 0 vicryl stay sutures on either side of the fascial defect then inserted the Brandy cannula. This was secured and attached to gas insufflation. I then inserted the camera into the abdomen and under direct vision placed the robotic 8mm ports in the right and left abdomen. Patient was positioned Trendelenberg and I examed into the pelvis revealing the above hernia findings.   The Oyokey robot was then brought in and docked. I moved to the surgeon console. I began with creating a peritoneal flap on the right side and dissected in the preperitoneal space working first medially to the level of the pubis and Jose Miguel's ligament and then worked laterally to expand the space.  I did end up dividing the round ligament in order to create a large enough pocket for mesh placement for the hernia repair.  I then returned medially to continue dissecting in this preperitoneal space further inferiorly and around the obturator hernia.  This contained incarcerated peritoneal fatty tissue as there was no longer any bowel within.  This tissue was reduced.  I then extended my peritoneal flap towards the left and continued dissecting from the space I created on the right working towards the left.  I expanded this pocket as I did on the right, again dividing the left sided round ligament to create a large enough preperitoneal space.  The left sided obturator hernia again contained just peritoneum and fatty tissue and not as much as on the right.  This tissue on the left was not incarcerated.  I then placed 10 x 15 cm  ProGrip mesh on the right and left positioned such that I could bring the medial aspect low enough to cover the obturator space.  The peritoneal flap was closed with running 3-0 Stratafix suture. Needles were removed and the robot undocked. I moved back to the patient bedside.   The ports were removed under direct vision and the abdomen desufflated. The supraumbilical fascial defect was closed with a figure of eight suture of 0 vicryl and the previously placed stay sutures. Skin incisions closed with 4-0 monocryl and covered with dermbond. Total of 30cc of 0.5% marcaine with epinephrine 1:200,000 used for the local. Final counts complete. Patient tolerated the procedure well and will return to her hospital bed following initial recovery in PACU.        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 no history of dvt or pe or hypercoagulable state. no leg swelling no vaginal bleeding . denies fever. denies HA or neck pain. no sob. no abd pain. no n/v/d. no urinary f/u/d. no back pain. no motor or sensory deficits. denies illicit drug use. no recent high risk  travel. no rash. no other acute issues symptoms or concerns

## 2024-11-09 NOTE — PHARMACY-ANTICOAGULATION SERVICE
Clinical Pharmacy - Warfarin Dosing Consult     Pharmacy has been consulted to manage this patient s warfarin therapy.  Indication: Atrial Fibrillation  Therapy Goal: INR 2-3  Provider/Team: [facility]  OP Anticocandace Clinic: [facility]; home monitor via [employer]  Warfarin Prior to Admission: Yes  Warfarin PTA Regimen: 7.5 mg Monday; 10 mg all other days of the week    INR   Date Value Ref Range Status   11/08/2024 1.29 (H) 0.85 - 1.15 Final   11/08/2024 1.71 (H) 0.85 - 1.15 Final     She received 2 mg phytonadione (Vitamin K) yesterday in preparation for her surgical procedure.    Will resume warfarin with her usual home dose.    Recommend warfarin 10 mg today.  Pharmacy will monitor [address] Ward daily and order warfarin doses to achieve specified goal.      Please contact pharmacy as soon as possible if the warfarin needs to be held for a procedure or if the warfarin goals change.      Juan Fritz [facility],PharmD.........November 9, 2024 2:03 PM       non-reactive

## 2024-11-09 NOTE — ANESTHESIA CARE TRANSFER NOTE
Patient: Unique Ward    Procedure: Procedure(s):  Robot assisted laparoscopic bilateral obturator hernia repairs with mesh.       Diagnosis: SBO (small bowel obstruction) (H) [K56.609]  Obturator hernia [K45.8]  Diagnosis Additional Information: No value filed.    Anesthesia Type:   General     Note:    Oropharynx: oropharynx clear of all foreign objects and spontaneously breathing  Level of Consciousness: drowsy  Oxygen Supplementation: face mask    Independent Airway: airway patency satisfactory and stable  Dentition: dentition unchanged  Vital Signs Stable: post-procedure vital signs reviewed and stable  Report to RN Given: handoff report given  Patient transferred to: PACU    Handoff Report: Identifed the Patient, Identified the Reponsible Provider, Reviewed the pertinent medical history, Discussed the surgical course, Reviewed Intra-OP anesthesia mangement and issues during anesthesia, Set expectations for post-procedure period and Allowed opportunity for questions and acknowledgement of understanding  Vitals:  Vitals Value Taken Time   BP     Temp     Pulse     Resp     SpO2 82 % 11/08/24 1836   Vitals shown include unfiled device data.    Electronically Signed By: MAYRA Garduno CRNA  November 8, 2024  6:37 PM

## 2024-11-09 NOTE — PROGRESS NOTES
"General Surgery    S: Feeling pretty well today.  Minimal NG output.  Pain as expected but controlled with current regimen.  Passing a small amount of flatus    O:  Vital signs:  Temp: 98.3  F (36.8  C) Temp src: Oral BP: (!) 154/70 Pulse: 70   Resp: 18 SpO2: 95 % O2 Device: None (Room air) Oxygen Delivery: 1 LPM Height: 172.7 cm (5' 8\") Weight: 55.7 kg (122 lb 11.2 oz)  Estimated body mass index is 18.66 kg/m  as calculated from the following:    Height as of this encounter: 1.727 m (5' 8\").    Weight as of this encounter: 55.7 kg (122 lb 11.2 oz).      Gen: AAOx3, NAD  Heart: RRR  Lungs: CTA  Abd: Soft, somewhat distended but she states this is sometimes normal for her.  Pain as expected in the lower abdomen, nonacute.  Incisions clean dry and intact    Labs/Imaging  Results for orders placed or performed during the hospital encounter of 11/07/24 (from the past 24 hours)   Glucose by meter   Result Value Ref Range    GLUCOSE BY METER POCT 94 70 - 99 mg/dL   Glucose by meter   Result Value Ref Range    GLUCOSE BY METER POCT 121 (H) 70 - 99 mg/dL   INR   Result Value Ref Range    INR 1.29 (H) 0.85 - 1.15   Sodium   Result Value Ref Range    Sodium 128 (L) 135 - 145 mmol/L   Glucose by meter   Result Value Ref Range    GLUCOSE BY METER POCT 130 (H) 70 - 99 mg/dL   Glucose by meter   Result Value Ref Range    GLUCOSE BY METER POCT 128 (H) 70 - 99 mg/dL   Potassium   Result Value Ref Range    Potassium 4.0 3.4 - 5.3 mmol/L   Sodium   Result Value Ref Range    Sodium 128 (L) 135 - 145 mmol/L   Magnesium   Result Value Ref Range    Magnesium 1.5 (L) 1.7 - 2.3 mg/dL   Glucose by meter   Result Value Ref Range    GLUCOSE BY METER POCT 123 (H) 70 - 99 mg/dL   Glucose by meter   Result Value Ref Range    GLUCOSE BY METER POCT 114 (H) 70 - 99 mg/dL   Sodium   Result Value Ref Range    Sodium 126 (L) 135 - 145 mmol/L   Magnesium   Result Value Ref Range    Magnesium 2.6 (H) 1.7 - 2.3 mg/dL           A: Postop day #1 status " post bilateral inguinal/obturator hernia repairs with mesh  Ileus  Multiple electrolyte derangements    P: Can trial NG clamping but would not be very aggressive with diet until more bowel function returns.  Electrolyte replacement  Symptom control with analgesics and antiemetics    Dr Ramesh

## 2024-11-09 NOTE — PLAN OF CARE
Goal Outcome Evaluation:      Plan of Care Reviewed With: patient    Overall Patient Progress: improvingOverall Patient Progress: improving    Outcome Evaluation: Vss. RA O2 sats 93%. Lungs are clear to auscultation. Alert, oriented x4. Has voided well overnight. Pain to abdominal trochar sites 1-2/10, has declined PRN pain medication,  only has wanted scheduled Tylenol. Bowel sounds are active. NG at LIS with 25mL brown colored output. Tolerating small amount of ice chips. BG WNL. Dr. Baltazar notified of Na and INR levels when drawn late last evening.

## 2024-11-09 NOTE — OR NURSING
Transfer from PACU to Room 269  Transferred to bed via slider sheet (Glyder Mat,Transfer Boar,Slider Sheet)      S: 85 y/o Female S/P Bilateral obturator hernia repair  Anesthesia Type: General  Surgeon: Dr. Garcia  Allergies: See Medication Reconciliation Record    B: Pertinent Medical History:   Past Medical History:   Diagnosis Date    Arthritis 01.01.1980    Atrial fibrillation (H) 6/7/2019    Cancer (H) 01.01.1993     scalp    Basal Cell X2  head      nose bridge 1996    Depressive disorder 01.01.1977    anxiety    FH: melanoma- son 2/28/2017    Hypertension 03.16.2016       Surgical History:   Past Surgical History:   Procedure Laterality Date    BIOPSY  11/01/2014    COLONOSCOPY  01.01/1979    Two routine,  one for chronic diarrhea    EP PACEMAKER N/A 3/31/2020    Procedure: EP Pacemaker;  Surgeon: Erich Clark MD;  Location:  [facility]    EYE SURGERY  1/1/2014    first    cataracts both eyes.  second a few months later    GENITOURINARY SURGERY      GYN SURGERY  01.01.1977    TL    HEAD & NECK SURGERY  01.01.1997    basal cell X2   Moh's on scalp.  other on bridge of nose    LASER YAG CAPSULOTOMY Bilateral 4/1/2021    Procedure: CAPSULOTOMY, LENS, USING YAG LASER;  Surgeon: Bernardino Chu MD;  Location: [facility] OR    SOFT TISSUE SURGERY  01.01.1990    Ganglion Cyst   Left inner wrist       DNR/DNI FULL Code     A: EBL: 5ml  IVF: 1500ml  UOP: 0ml since rueda removal of 450ml at 1815  NPO: _X__Yes ___No   Vomiting: ___Yes __X_No   Drainage: None  Skin Integrity: Scattered bruises, 3 trochar sites (Normal; Pressure Ulcer (Location)  Pain: 0/10  On PAINAD Scale  See PACU record for ongoing assessment, vital signs and pain assessment.    RFO (Retained Foreign Object) __X_Yes___No (identify item if present) - NG  Brace/sling/equipment: ___Yes_X__No (identify item if present)    Report Given to:  Sarah LUCAS RN    R: Post-Op vitals and assessments as ordered/indicated per patient's  condition.  Follow Post-Op orders and notify Physician prn.  Continue to involve patient/family in plan of care and discharge planning.  Reinforce Pre-Operative education.  Implement skin safety interventions as appropriate.

## 2024-11-09 NOTE — PLAN OF CARE
Goal Outcome Evaluation:      Plan of Care Reviewed With: patient    Overall Patient Progress: no changeOverall Patient Progress: no change    Outcome Evaluation: VSS on RA, pt A&OX4, AX1 to bathroom with walker and gaitbelt, voiding spontaneously, passing adrian, no stool this shift. 3 surgical lap incisions across abdomen - clear glue - C/D/I, NG decompressed on low intermittent suction until 1430; now clamped and able to have water as tolerated per provider order. Vitamin K was given prior to surgery and INR is 1.16, restarted coumadin. no insulin needed. K & mag replacements, both will be rechecked in am. Pacemaker; Hx Afib.

## 2024-11-09 NOTE — PROVIDER NOTIFICATION
Hortencia Baltazar, Sarah Franco    DAMON  Read - 12:00 am  [facility] - [phone], [address], Pt just returned from SBO surgery earlier this evening. At 2300 her Na was 128, previous Na was 130 at 1300, and at 0600 was 128. Pt has NS at 125 running. At 2300 her INR was 1.29, her previous INR before surgery was 1.71 at 1300, and 2.52 at 0600. She had Vit K to bring it down before surgery.

## 2024-11-10 LAB
ANION GAP SERPL CALCULATED.3IONS-SCNC: 12 MMOL/L (ref 7–15)
BUN SERPL-MCNC: 7.2 MG/DL (ref 8–23)
CALCIUM SERPL-MCNC: 8.3 MG/DL (ref 8.8–10.4)
CHLORIDE SERPL-SCNC: 97 MMOL/L (ref 98–107)
CREAT SERPL-MCNC: 0.53 MG/DL (ref 0.51–0.95)
EGFRCR SERPLBLD CKD-EPI 2021: 90 ML/MIN/1.73M2
GLUCOSE BLDC GLUCOMTR-MCNC: 110 MG/DL (ref 70–99)
GLUCOSE BLDC GLUCOMTR-MCNC: 96 MG/DL (ref 70–99)
GLUCOSE BLDC GLUCOMTR-MCNC: 97 MG/DL (ref 70–99)
GLUCOSE BLDC GLUCOMTR-MCNC: 97 MG/DL (ref 70–99)
GLUCOSE BLDC GLUCOMTR-MCNC: 99 MG/DL (ref 70–99)
GLUCOSE SERPL-MCNC: 96 MG/DL (ref 70–99)
HCO3 SERPL-SCNC: 24 MMOL/L (ref 22–29)
INR PPP: 1.2 (ref 0.85–1.15)
MAGNESIUM SERPL-MCNC: 1.9 MG/DL (ref 1.7–2.3)
OSMOLALITY SERPL: 278 MMOL/KG (ref 280–301)
POTASSIUM SERPL-SCNC: 3.1 MMOL/L (ref 3.4–5.3)
POTASSIUM SERPL-SCNC: 3.6 MMOL/L (ref 3.4–5.3)
SODIUM SERPL-SCNC: 128 MMOL/L (ref 135–145)
SODIUM SERPL-SCNC: 133 MMOL/L (ref 135–145)
SODIUM SERPL-SCNC: 133 MMOL/L (ref 135–145)

## 2024-11-10 PROCEDURE — 250N000013 HC RX MED GY IP 250 OP 250 PS 637: Performed by: PEDIATRICS

## 2024-11-10 PROCEDURE — 83735 ASSAY OF MAGNESIUM: CPT | Performed by: SURGERY

## 2024-11-10 PROCEDURE — 84295 ASSAY OF SERUM SODIUM: CPT | Performed by: SURGERY

## 2024-11-10 PROCEDURE — 36415 COLL VENOUS BLD VENIPUNCTURE: CPT | Performed by: SURGERY

## 2024-11-10 PROCEDURE — 84132 ASSAY OF SERUM POTASSIUM: CPT | Performed by: SURGERY

## 2024-11-10 PROCEDURE — 120N000001 HC R&B MED SURG/OB

## 2024-11-10 PROCEDURE — 250N000013 HC RX MED GY IP 250 OP 250 PS 637: Performed by: SURGERY

## 2024-11-10 PROCEDURE — 84295 ASSAY OF SERUM SODIUM: CPT | Performed by: PEDIATRICS

## 2024-11-10 PROCEDURE — 258N000003 HC RX IP 258 OP 636: Performed by: SURGERY

## 2024-11-10 PROCEDURE — 250N000011 HC RX IP 250 OP 636: Performed by: SURGERY

## 2024-11-10 PROCEDURE — 99231 SBSQ HOSP IP/OBS SF/LOW 25: CPT | Performed by: PEDIATRICS

## 2024-11-10 PROCEDURE — 36415 COLL VENOUS BLD VENIPUNCTURE: CPT | Performed by: PEDIATRICS

## 2024-11-10 PROCEDURE — 85610 PROTHROMBIN TIME: CPT | Performed by: PEDIATRICS

## 2024-11-10 PROCEDURE — 80048 BASIC METABOLIC PNL TOTAL CA: CPT | Performed by: SURGERY

## 2024-11-10 PROCEDURE — 83930 ASSAY OF BLOOD OSMOLALITY: CPT | Performed by: PEDIATRICS

## 2024-11-10 RX ORDER — HYDRALAZINE HYDROCHLORIDE 20 MG/ML
10 INJECTION INTRAMUSCULAR; INTRAVENOUS EVERY 6 HOURS PRN
Status: DISCONTINUED | OUTPATIENT
Start: 2024-11-10 | End: 2024-11-12 | Stop reason: HOSPADM

## 2024-11-10 RX ORDER — POTASSIUM CHLORIDE 1.5 G/1.58G
40 POWDER, FOR SOLUTION ORAL ONCE
Status: COMPLETED | OUTPATIENT
Start: 2024-11-10 | End: 2024-11-10

## 2024-11-10 RX ORDER — WARFARIN SODIUM 5 MG/1
10 TABLET ORAL
Status: COMPLETED | OUTPATIENT
Start: 2024-11-10 | End: 2024-11-10

## 2024-11-10 RX ORDER — AMLODIPINE BESYLATE 5 MG/1
5 TABLET ORAL
Status: DISCONTINUED | OUTPATIENT
Start: 2024-11-10 | End: 2024-11-12 | Stop reason: HOSPADM

## 2024-11-10 RX ADMIN — POLYETHYLENE GLYCOL 3350 17 G: 17 POWDER, FOR SOLUTION ORAL at 08:55

## 2024-11-10 RX ADMIN — SENNOSIDES AND DOCUSATE SODIUM 1 TABLET: 8.6; 5 TABLET ORAL at 08:55

## 2024-11-10 RX ADMIN — ACETAMINOPHEN 975 MG: 325 TABLET, FILM COATED ORAL at 04:30

## 2024-11-10 RX ADMIN — ACETAMINOPHEN 975 MG: 325 TABLET, FILM COATED ORAL at 14:07

## 2024-11-10 RX ADMIN — SODIUM CHLORIDE: 9 INJECTION, SOLUTION INTRAVENOUS at 02:15

## 2024-11-10 RX ADMIN — FAMOTIDINE 20 MG: 10 INJECTION, SOLUTION INTRAVENOUS at 10:35

## 2024-11-10 RX ADMIN — ACETAMINOPHEN 975 MG: 325 TABLET, FILM COATED ORAL at 20:49

## 2024-11-10 RX ADMIN — SENNOSIDES AND DOCUSATE SODIUM 1 TABLET: 8.6; 5 TABLET ORAL at 20:49

## 2024-11-10 RX ADMIN — AMLODIPINE BESYLATE 5 MG: 5 TABLET ORAL at 16:42

## 2024-11-10 RX ADMIN — FAMOTIDINE 20 MG: 10 INJECTION, SOLUTION INTRAVENOUS at 23:49

## 2024-11-10 RX ADMIN — WARFARIN SODIUM 10 MG: 5 TABLET ORAL at 17:37

## 2024-11-10 RX ADMIN — POTASSIUM CHLORIDE 40 MEQ: 1.5 POWDER, FOR SOLUTION ORAL at 07:04

## 2024-11-10 NOTE — PHARMACY-ANTICOAGULATION SERVICE
Clinical Pharmacy - Warfarin Dosing Consult     Pharmacy has been consulted to manage this patient s warfarin therapy.  Indication: Atrial Fibrillation  Therapy Goal: INR 2-3  Provider/Team: [facility]  OP Clif Clinic: [facility]; home monitor via [employer]  Warfarin Prior to Admission: Yes  Warfarin PTA Regimen: 7.5 mg Monday; 10 mg all other days of the week    INR   Date Value Ref Range Status   11/10/2024 1.20 (H) 0.85 - 1.15 Final   11/09/2024 1.16 (H) 0.85 - 1.15 Final     Noting that NG tube is clamped and tolerating sips of water (per other notes). Will continue with usual home dose for today.  Monitor diet status.    Recommend warfarin 10 mg today per usual home routine.  Pharmacy will monitor Unique Ward daily and order warfarin doses to achieve specified goal.      Please contact pharmacy as soon as possible if the warfarin needs to be held for a procedure or if the warfarin goals change.      Juan Fritz RPH on 11/10/2024 at 8:46 AM

## 2024-11-10 NOTE — ANESTHESIA POSTPROCEDURE EVALUATION
Patient: Unique Ward    Procedure: Procedure(s):  Robot assisted laparoscopic bilateral obturator hernia repairs with mesh.       Anesthesia Type:  General    Note:  Disposition: Outpatient   Postop Pain Control: Uneventful            Sign Out: Well controlled pain   PONV: No   Neuro/Psych: Uneventful            Sign Out: Acceptable/Baseline neuro status   Airway/Respiratory: Uneventful            Sign Out: Acceptable/Baseline resp. status   CV/Hemodynamics: Uneventful            Sign Out: Acceptable CV status   Other NRE: NONE   DID A NON-ROUTINE EVENT OCCUR? No    Event details/Postop Comments:  Pt was happy with anesthesia care.  No complications.  I will follow up with the pt if needed.       Last vitals:  Vitals Value Taken Time   /62 11/08/24 1926   Temp 97.7  F (36.5  C) 11/08/24 1927   Pulse 61 11/08/24 1927   Resp 7 11/08/24 1927   SpO2 97 % 11/08/24 1927   Vitals shown include unfiled device data.    Electronically Signed By: MAYRA Garduno CRNA  November 10, 2024  10:18 AM

## 2024-11-10 NOTE — PROGRESS NOTES
[facility]    Medicine Progress Note - Hospitalist Service    Date of Admission:  11/7/2024    Assessment & Plan   Unique Ward is a 84 year old female admitted on 11/7/2024 for a small bowel obstruction secondary to a right obturator hernia.    Obturator hernia with obstruction  Clinical presentation and radiographic findings were suspicious for small bowel obstruction due to obturator hernia.  She underwent operative repair on 11/8 and is recovering.  She tolerated clamping NG tube postoperatively in the evening 11/9 and now on 11/10 is starting clear liquid diet per recommendations of surgeon.  -Routine postoperative care deferred to surgeon  -Anticipate removal of NG tube for nasogastric decompression once tolerating oral intake per surgeon  -Perioperative antibiotic therapy deferred to surgeon    Hyponatremia  Hypochloremia  Chronic hyponatremia but admitted with worsening hyponatremia sodium 125 (compared with 131 in November 2023).  Suspect hyponatremia is exacerbated by bowel obstruction and change in oral intake.  She has been euvolemic after initial treatment with IV fluids and continues to be hyponatremic which has now trended upward to 133 postoperatively while continuing to be n.p.o and receiving isotonic IV fluid treatment.  She also presented with hypochloremia which has also been chronic.  -Continue IV fluid infusion with NS but decrease rate to 50 mL an hour  -Ordered recheck sodium in a.m.    Hypokalemia  Hypomagnesemia  Presented with normal potassium subsequently decreasing to zoie of 3.2.  Hypokalemia has resolved with supplementation.  Serum magnesium was initially normal but subsequently decreased.  Magnesium has also normalized after supplementation.  -Continue potassium and magnesium supplementation per respective protocols and monitor as indicated    Hypoalbuminemia  Serum albumin was normal at admission but is now low probably due to inadequate  nutritional intake.  -Monitor albumin as indicated    Permanent atrial fibrillation with CVR  Warfarin induced coagulopathy  Pacemaker in place  Known permanent atrial fibrillation and treated chronically with warfarin which causes coagulopathy.  Presented with therapeutic INR.  Preoperatively was treated with vitamin K after which INR became subtherapeutic and remains subtherapeutic postoperatively thus far.  Heart rate has been stable perioperatively.  Has chronic pacemaker placed due to complete heart block in March 2020 that had been interrogated in March 2023.  Preoperative EKG confirmed atrial fibrillation.  -Continue warfarin, pharmacy assisting with warfarin dosing    Essential hypertension  Chronic hypertension treated with amlodipine 5 mg daily which has been held perioperatively.  She has been hypertensive perioperatively although not severely hypertensive.  -Resume amlodipine today  -Continue hydralazine 10 mg IV q6 hr prn for systolic  or higher    Prediabetes  Carries diagnosis of prediabetes with hemoglobin A1c 5.8 in August 2023.  Blood sugars have been normal to mildly elevated perioperatively.  -No longer routinely monitoring blood sugars    Personal history of UTI  Has history of recurring UTI and had been diagnosed with UTI on November 4 and prescribed antibiotic therapy at that time.  UA at admission was benign without concern for urinary infection and there have been no clinical concerns for UTI during hospitalization.  -Not recommending any additional antibiotic therapy other than the perioperative antibiotics that she has received per recommendations of surgeon          Diet: Clear Liquid Diet    DVT Prophylaxis: Pneumatic Compression Devices  Velázquez Catheter: Not present  Lines: None     Cardiac Monitoring: None  Code Status: Full Code      Clinically Significant Risk Factors        # Hypokalemia: Lowest K = 3.1 mmol/L in last 2 days, will replace as needed  # Hyponatremia: Lowest Na =  126 mmol/L in last 2 days, will monitor as appropriate  # Hypochloremia: Lowest Cl = 97 mmol/L in last 2 days, will monitor as appropriate    # Hypomagnesemia: Lowest Mg = 1.5 mg/dL in last 2 days, will replace as needed   # Hypoalbuminemia: Lowest albumin = 3.4 g/dL at 11/8/2024  5:50 AM, will monitor as appropriate     # Hypertension: Noted on problem list                 # Pacemaker present       Disposition Plan     Medically Ready for Discharge: anticipated 1-2 days             Freedom Russo MD  Hospitalist Service  [facility]  Securely message with [employer] (more info)  Text page via [facility] Paging/Directory   ______________________________________________________________________    Interval History   There were no significant overnight events.  She feels better overall.  She denies any nausea.  She has been afebrile and hemodynamically stable.  Blood pressure continues to be moderately elevated, but she has not had any severely elevated blood pressure readings in the last day.  Oxygenation is normal.  She has had good urine output.  She did have a bowel movement overnight.  Surgeon today has advised initiation of clear liquid diet and potentially removal of NG tube later.    Physical Exam   Vital Signs: Temp: 98.2  F (36.8  C) Temp src: Oral BP: (!) 172/76 Pulse: 63   Resp: 18 SpO2: 98 % O2 Device: None (Room air)    Patient Vitals for the past 24 hrs:   BP Temp Temp src Pulse Resp SpO2   11/10/24 1524 (!) 154/70 98.1  F (36.7  C) Oral 69 18 96 %   11/10/24 1416 (!) 172/76 -- -- 63 -- 98 %   11/10/24 0757 (!) 160/71 98.2  F (36.8  C) Oral 65 18 97 %   11/09/24 2326 (!) 159/73 97.6  F (36.4  C) Oral 67 -- 96 %   11/09/24 1945 (!) 160/67 98.5  F (36.9  C) -- 62 -- 96 %       Intake/Output Summary (Last 24 hours) at 11/10/2024 1531  Last data filed at 11/10/2024 1150  Gross per 24 hour   Intake 2671 ml   Output 5675 ml   Net -3004 ml     General Appearance: Pale elderly woman  without signs of acute distress sitting at the edge of her bed, NG tube present  Respiratory: Normal respiratory effort  GI: Nondistended abdomen     Medical Decision Making             Data     I have personally reviewed the following data over the past 24 hrs:    N/A  \   N/A   / N/A     133 (L) 97 (L) 7.2 (L) /  96   3.6 24 0.53 \     INR:  1.20 (H) PTT:  N/A   D-dimer:  N/A Fibrinogen:  N/A       Blood sugars  over the last day      Recent Labs   Lab 11/10/24  1203 11/10/24  0755 11/10/24  0551 11/10/24  0408 11/10/24  0151 11/10/24  0006 11/09/24  1515 11/09/24  1418 11/09/24  0519 11/09/24  0317 11/08/24  2322 11/08/24  2307 11/08/24  0800 11/08/24  0550 11/07/24  1341 11/07/24  0815   WBC  --   --   --   --   --   --   --   --   --   --   --   --   --  6.1  --  7.9   HGB  --   --   --   --   --   --   --   --   --   --   --   --   --  11.0*  --  11.8   MCV  --   --   --   --   --   --   --   --   --   --   --   --   --  86  --  85   PLT  --   --   --   --   --   --   --   --   --   --   --   --   --  299  --  316   INR  --   --  1.20*  --   --   --   --  1.16*  --   --   --  1.29*   < > 2.52*  --  2.05*   *  --  133*  --   --  128*   < >  --    < >  --   --  128*   < > 128*  128*   < > 125*   POTASSIUM 3.6  --  3.1*  --   --   --   --   --   --  4.0  --   --    < > 3.2*  --  3.5   CHLORIDE  --   --  97*  --   --   --   --   --   --   --   --   --   --  95*  --  89*   CO2  --   --  24  --   --   --   --   --   --   --   --   --   --  22  --  26   BUN  --   --  7.2*  --   --   --   --   --   --   --   --   --   --  10.5  --  18.0   CR  --   --  0.53  --   --   --   --   --   --   --   --   --   --  0.56  --  0.64   ANIONGAP  --   --  12  --   --   --   --   --   --   --   --   --   --  11  --  10   GLORIA  --   --  8.3*  --   --   --   --   --   --   --   --   --   --  8.0*  --  9.1   GLC  --  96 96 97   < >  --    < >  --    < >  --    < >  --    < > 99   < > 146*   ALBUMIN  --   --   --   --   --    --   --   --   --   --   --   --   --  3.4*  --  4.2   PROTTOTAL  --   --   --   --   --   --   --   --   --   --   --   --   --  5.5*  --  6.5   BILITOTAL  --   --   --   --   --   --   --   --   --   --   --   --   --  0.6  --  0.4   ALKPHOS  --   --   --   --   --   --   --   --   --   --   --   --   --  33*  --  37*   ALT  --   --   --   --   --   --   --   --   --   --   --   --   --  13  --  19   AST  --   --   --   --   --   --   --   --   --   --   --   --   --  33  --  41   LIPASE  --   --   --   --   --   --   --   --   --   --   --   --   --   --   --  41    < > = values in this interval not displayed.

## 2024-11-10 NOTE — PROGRESS NOTES
"General Surgery    S: Doing well.  Tolerating sips of water while NG clamped.  Vital stable.  Passing flatus and had a small BM.    O:  Vital signs:  Temp: 98.2  F (36.8  C) Temp src: Oral BP: (!) 172/76 Pulse: 63   Resp: 18 SpO2: 98 % O2 Device: None (Room air) Oxygen Delivery: 1 LPM Height: 172.7 cm (5' 8\") Weight: 55.7 kg (122 lb 11.2 oz)  Estimated body mass index is 18.66 kg/m  as calculated from the following:    Height as of this encounter: 1.727 m (5' 8\").    Weight as of this encounter: 55.7 kg (122 lb 11.2 oz).      Gen: AAOx3, NAD  Heart: RRR  Lungs: CTA  Abd: Soft, much less distended today.  Appropriate tenderness to palpation    Labs/Imaging  Results for orders placed or performed during the hospital encounter of 11/07/24 (from the past 24 hours)   Glucose by meter   Result Value Ref Range    GLUCOSE BY METER POCT 105 (H) 70 - 99 mg/dL   Sodium   Result Value Ref Range    Sodium 128 (L) 135 - 145 mmol/L   Glucose by meter   Result Value Ref Range    GLUCOSE BY METER POCT 95 70 - 99 mg/dL   Glucose by meter   Result Value Ref Range    GLUCOSE BY METER POCT 134 (H) 70 - 99 mg/dL   Glucose by meter   Result Value Ref Range    GLUCOSE BY METER POCT 102 (H) 70 - 99 mg/dL   Sodium   Result Value Ref Range    Sodium 128 (L) 135 - 145 mmol/L   Glucose by meter   Result Value Ref Range    GLUCOSE BY METER POCT 99 70 - 99 mg/dL   Glucose by meter   Result Value Ref Range    GLUCOSE BY METER POCT 97 70 - 99 mg/dL   INR   Result Value Ref Range    INR 1.20 (H) 0.85 - 1.15   Basic metabolic panel   Result Value Ref Range    Sodium 133 (L) 135 - 145 mmol/L    Potassium 3.1 (L) 3.4 - 5.3 mmol/L    Chloride 97 (L) 98 - 107 mmol/L    Carbon Dioxide (CO2) 24 22 - 29 mmol/L    Anion Gap 12 7 - 15 mmol/L    Urea Nitrogen 7.2 (L) 8.0 - 23.0 mg/dL    Creatinine 0.53 0.51 - 0.95 mg/dL    GFR Estimate 90 >60 mL/min/1.73m2    Calcium 8.3 (L) 8.8 - 10.4 mg/dL    Glucose 96 70 - 99 mg/dL   Osmolality   Result Value Ref Range    " Osmolality Blood 278 (L) 280 - 301 mmol/kg    Narrative    Greater than 385 mmol/kg relates to stupor in hyperglycemia   Greater than 400 mmol/kg can relate to seizures   Greater than 420 mmol/kg can be lethal    Serum Osmalar Gap:   Normal <10   Larger suggest unmeasured substances present in serum (ethanol, methanol, isopropanol, mannitol, ethylene glycol).   Extra Tube    Narrative    The following orders were created for panel order Extra Tube.  Procedure                               Abnormality         Status                     ---------                               -----------         ------                     Extra Purple Top Tube[[id number]]                                                         Please view results for these tests on the individual orders.   Glucose by meter   Result Value Ref Range    GLUCOSE BY METER POCT 96 70 - 99 mg/dL   Sodium   Result Value Ref Range    Sodium 133 (L) 135 - 145 mmol/L   Potassium   Result Value Ref Range    Potassium 3.6 3.4 - 5.3 mmol/L           A: Postop day #2 status post bilateral inguinal/obturator hernia repairs with mesh  Ileus  Multiple electrolyte derangements    P: Okay for clear liquid diet and if tolerates can remove NG tube.  Encourage ambulation and incentive spirometry.    Dr Ramesh

## 2024-11-10 NOTE — PLAN OF CARE
"Goal Outcome Evaluation:      Plan of Care Reviewed With: patient    Overall Patient Progress: improvingOverall Patient Progress: improving    Outcome Evaluation: Pt is A&Ox4, calm and cooperative with cares. VSS on RA. Assist of one to bathroom w/walker and GB. Voiding spontaneously, had a small BM this shift. 3 surgical lap incisions across abdomen CDI. NG-tube clamped. Tolerated sips of water and oral medications well. NaCl infusing 125mL/hr  B, 102, 99, 97 and 96  Na: 128, 133  K=3.1, protocol run, orally replaced. Recheck at 1159  Mg level not in yet.  Blood pressure (!) 159/73, pulse 67, temperature 97.6  F (36.4  C), temperature source Oral, resp. rate 18, height 1.727 m (5' 8\"), weight 55.7 kg (122 lb 11.2 oz), SpO2 96%, not currently breastfeeding.      "

## 2024-11-11 LAB
ANION GAP SERPL CALCULATED.3IONS-SCNC: 15 MMOL/L (ref 7–15)
BUN SERPL-MCNC: 5.6 MG/DL (ref 8–23)
CALCIUM SERPL-MCNC: 8.6 MG/DL (ref 8.8–10.4)
CHLORIDE SERPL-SCNC: 95 MMOL/L (ref 98–107)
CREAT SERPL-MCNC: 0.52 MG/DL (ref 0.51–0.95)
EGFRCR SERPLBLD CKD-EPI 2021: >90 ML/MIN/1.73M2
GLUCOSE BLDC GLUCOMTR-MCNC: 101 MG/DL (ref 70–99)
GLUCOSE BLDC GLUCOMTR-MCNC: 85 MG/DL (ref 70–99)
GLUCOSE SERPL-MCNC: 94 MG/DL (ref 70–99)
HCO3 SERPL-SCNC: 23 MMOL/L (ref 22–29)
HOLD SPECIMEN: NORMAL
INR PPP: 1.56 (ref 0.85–1.15)
MAGNESIUM SERPL-MCNC: 1.6 MG/DL (ref 1.7–2.3)
POTASSIUM SERPL-SCNC: 3.2 MMOL/L (ref 3.4–5.3)
POTASSIUM SERPL-SCNC: 3.6 MMOL/L (ref 3.4–5.3)
SODIUM SERPL-SCNC: 133 MMOL/L (ref 135–145)

## 2024-11-11 PROCEDURE — 250N000013 HC RX MED GY IP 250 OP 250 PS 637: Performed by: PEDIATRICS

## 2024-11-11 PROCEDURE — 83735 ASSAY OF MAGNESIUM: CPT | Performed by: SURGERY

## 2024-11-11 PROCEDURE — 258N000003 HC RX IP 258 OP 636: Performed by: PEDIATRICS

## 2024-11-11 PROCEDURE — 36415 COLL VENOUS BLD VENIPUNCTURE: CPT | Performed by: PEDIATRICS

## 2024-11-11 PROCEDURE — 84132 ASSAY OF SERUM POTASSIUM: CPT | Performed by: SURGERY

## 2024-11-11 PROCEDURE — 36415 COLL VENOUS BLD VENIPUNCTURE: CPT | Performed by: SURGERY

## 2024-11-11 PROCEDURE — 120N000001 HC R&B MED SURG/OB

## 2024-11-11 PROCEDURE — 99231 SBSQ HOSP IP/OBS SF/LOW 25: CPT | Performed by: PEDIATRICS

## 2024-11-11 PROCEDURE — 250N000013 HC RX MED GY IP 250 OP 250 PS 637: Performed by: SURGERY

## 2024-11-11 PROCEDURE — 82947 ASSAY GLUCOSE BLOOD QUANT: CPT | Performed by: PEDIATRICS

## 2024-11-11 PROCEDURE — 85610 PROTHROMBIN TIME: CPT | Performed by: PEDIATRICS

## 2024-11-11 RX ORDER — POTASSIUM CHLORIDE 1500 MG/1
40 TABLET, EXTENDED RELEASE ORAL ONCE
Status: COMPLETED | OUTPATIENT
Start: 2024-11-11 | End: 2024-11-11

## 2024-11-11 RX ADMIN — ACETAMINOPHEN 975 MG: 325 TABLET, FILM COATED ORAL at 14:11

## 2024-11-11 RX ADMIN — ACETAMINOPHEN 975 MG: 325 TABLET, FILM COATED ORAL at 04:57

## 2024-11-11 RX ADMIN — POTASSIUM CHLORIDE 40 MEQ: 1500 TABLET, EXTENDED RELEASE ORAL at 08:09

## 2024-11-11 RX ADMIN — POLYETHYLENE GLYCOL 3350 17 G: 17 POWDER, FOR SOLUTION ORAL at 08:53

## 2024-11-11 RX ADMIN — SENNOSIDES AND DOCUSATE SODIUM 1 TABLET: 8.6; 5 TABLET ORAL at 08:54

## 2024-11-11 RX ADMIN — AMLODIPINE BESYLATE 5 MG: 5 TABLET ORAL at 18:22

## 2024-11-11 RX ADMIN — SODIUM CHLORIDE: 9 INJECTION, SOLUTION INTRAVENOUS at 05:02

## 2024-11-11 RX ADMIN — WARFARIN SODIUM 7.5 MG: 5 TABLET ORAL at 18:29

## 2024-11-11 NOTE — PROGRESS NOTES
[facility]    Medicine Progress Note - Hospitalist Service    Date of Admission:  11/7/2024    Assessment & Plan   Unique Ward is a 84 year old female admitted on 11/7/2024 for a small bowel obstruction secondary to a right obturator hernia.    Obturator hernia with obstruction  Clinical presentation and radiographic findings were suspicious for small bowel obstruction due to obturator hernia.  She underwent operative repair on 11/8 and is recovering.  She tolerated clamping NG tube postoperatively in the evening 11/9 and on 11/10 tolerated clear liquid diet per recommendations of surgeon. On 11/11, NG tube was removed and so far she continues to tolerate advancing diet.  -Routine postoperative care deferred to surgeon  -Continue diet advancement per surgeon  -Perioperative antibiotic therapy deferred to surgeon    Hyponatremia  Hypochloremia  Chronic hyponatremia but admitted with worsening hyponatremia sodium 125 (compared with 131 in November 2023).  Suspect hyponatremia is exacerbated by bowel obstruction and change in oral intake.  She has been euvolemic after initial treatment with IV fluids and continues to be hyponatremic which has now trended upward to 133 postoperatively while continuing to be n.p.o and receiving isotonic IV fluid treatment.  She also presented with hypochloremia which has also been chronic.  -discontinue IV fluids  -Ordered recheck sodium in a.m.    Hypokalemia  Hypomagnesemia  Presented with normal potassium subsequently decreasing to zoie of 3.2.  Hypokalemia continues to recur requiring supplementation.  Serum magnesium was initially normal but subsequently decreased.  Magnesium also improved after supplementation.  -Continue potassium and magnesium supplementation per respective protocols and monitor as indicated    Hypoalbuminemia  Serum albumin was normal at admission but is now low probably due to inadequate nutritional intake.  -Monitor albumin  as indicated    Permanent atrial fibrillation with CVR  Warfarin induced coagulopathy  Pacemaker in place  Known permanent atrial fibrillation and treated chronically with warfarin which causes coagulopathy.  Presented with therapeutic INR.  Preoperatively was treated with vitamin K after which INR became subtherapeutic and remains subtherapeutic postoperatively although is trending up after warfarin was restarted.  Heart rate has been stable perioperatively.  Has chronic pacemaker placed due to complete heart block in March 2020 that had been interrogated in March 2023.  Preoperative EKG confirmed atrial fibrillation.  -Continue warfarin, pharmacy assisting with warfarin dosing    Essential hypertension  Chronic hypertension treated with amlodipine 5 mg daily which has been held perioperatively.  She was hypertensive perioperatively.blood pressure has improved after restarting chronic amlodipine on 11/10.  Although she continues to have intermittent severe hypertension, she has not required additional antihypertensive medication.  -Continue amlodipine   -Continue hydralazine 10 mg IV q6 hr prn for systolic  or higher    Prediabetes  Carries diagnosis of prediabetes with hemoglobin A1c 5.8 in August 2023.  Blood sugars have been normal to mildly elevated perioperatively.  -No longer routinely monitoring blood sugars    Personal history of UTI  Has history of recurring UTI and had been diagnosed with UTI on November 4 and prescribed antibiotic therapy at that time.  UA at admission was benign without concern for urinary infection and there have been no clinical concerns for UTI during hospitalization.  -Not recommending any additional antibiotic therapy other than the perioperative antibiotics that she received per recommendations of surgeon          Diet: Advance Diet as Tolerated: Full Liquid Diet; Regular Diet Adult    DVT Prophylaxis: Pneumatic Compression Devices  Velázquez Catheter: Not present  Lines: None      Cardiac Monitoring: None  Code Status: Full Code      Clinically Significant Risk Factors        # Hypokalemia: Lowest K = 3.1 mmol/L in last 2 days, will replace as needed  # Hyponatremia: Lowest Na = 126 mmol/L in last 2 days, will monitor as appropriate  # Hypochloremia: Lowest Cl = 95 mmol/L in last 2 days, will monitor as appropriate    # Hypomagnesemia: Lowest Mg = 1.6 mg/dL in last 2 days, will replace as needed   # Hypoalbuminemia: Lowest albumin = 3.4 g/dL at 11/8/2024  5:50 AM, will monitor as appropriate     # Hypertension: Noted on problem list                 # Pacemaker present       Disposition Plan     Medically Ready for Discharge: Anticipated Tomorrow             Freedom Russo MD  Hospitalist Service  [facility]  Securely message with [employer] (more info)  Text page via [facility] Paging/Directory   ______________________________________________________________________    Interval History   There were no significant overnight events.  She tolerated clear liquid diet yesterday without worsening abdominal pain, abdominal bloating, or nausea.  She remains afebrile and hemodynamically stable.  Blood pressure has been mostly mildly to moderately hypertensive with only intermittent severe hypertension, but she has not required additional doses of antihypertensive medication.  Oxygenation has been normal.  Urine output has been good.  She has not had a bowel movement for about 24 hours.  Nasogastric tube was removed this morning at recommendation of surgeon.    Physical Exam   Vital Signs: Temp: 98  F (36.7  C) Temp src: Oral BP: (!) 153/87 Pulse: 70   Resp: 18 SpO2: 98 % O2 Device: None (Room air)    Patient Vitals for the past 24 hrs:   BP Temp Temp src Pulse Resp SpO2   11/11/24 0814 (!) 153/87 -- -- -- -- --   11/11/24 0642 (!) 183/81 98  F (36.7  C) Oral 70 18 98 %   11/10/24 2222 (!) 140/72 97.5  F (36.4  C) Oral 60 18 98 %   11/10/24 2048 (!) 163/67 -- -- -- -- --    11/10/24 1642 (!) 178/81 -- -- -- -- --   11/10/24 1524 (!) 154/70 98.1  F (36.7  C) Oral 69 18 96 %   11/10/24 1416 (!) 172/76 -- -- 63 -- 98 %       Intake/Output Summary (Last 24 hours) at 11/11/2024 1203  Last data filed at 11/11/2024 0906  Gross per 24 hour   Intake 2920 ml   Output 3400 ml   Net -480 ml       General Appearance: No acute distress  Respiratory: Normal respiratory effort  GI: Nondistended abdomen     Medical Decision Making             Data     I have personally reviewed the following data over the past 24 hrs:    N/A  \   N/A   / N/A     133 (L) 95 (L) 5.6 (L) /  85   3.2 (L) 23 0.52 \     INR:  1.56 (H) PTT:  N/A   D-dimer:  N/A Fibrinogen:  N/A         Recent Labs   Lab 11/11/24  0815 11/11/24  0555 11/11/24  0501 11/10/24  2042 11/10/24  1203 11/10/24  0755 11/10/24  0551 11/09/24  1515 11/09/24  1418 11/08/24  0800 11/08/24  0550 11/07/24  1341 11/07/24  0815   WBC  --   --   --   --   --   --   --   --   --   --  6.1  --  7.9   HGB  --   --   --   --   --   --   --   --   --   --  11.0*  --  11.8   MCV  --   --   --   --   --   --   --   --   --   --  86  --  85   PLT  --   --   --   --   --   --   --   --   --   --  299  --  316   INR  --  1.56*  --   --   --   --  1.20*  --  1.16*   < > 2.52*  --  2.05*   NA  --  133*  --   --  133*  --  133*   < >  --    < > 128*  128*   < > 125*   POTASSIUM  --  3.2*  --   --  3.6  --  3.1*  --   --    < > 3.2*  --  3.5   CHLORIDE  --  95*  --   --   --   --  97*  --   --   --  95*  --  89*   CO2  --  23  --   --   --   --  24  --   --   --  22  --  26   BUN  --  5.6*  --   --   --   --  7.2*  --   --   --  10.5  --  18.0   CR  --  0.52  --   --   --   --  0.53  --   --   --  0.56  --  0.64   ANIONGAP  --  15  --   --   --   --  12  --   --   --  11  --  10   GLORIA  --  8.6*  --   --   --   --  8.3*  --   --   --  8.0*  --  9.1   GLC 85 94 101*   < >  --    < > 96   < >  --    < > 99   < > 146*   ALBUMIN  --   --   --   --   --   --   --   --   --    --  3.4*  --  4.2   PROTTOTAL  --   --   --   --   --   --   --   --   --   --  5.5*  --  6.5   BILITOTAL  --   --   --   --   --   --   --   --   --   --  0.6  --  0.4   ALKPHOS  --   --   --   --   --   --   --   --   --   --  33*  --  37*   ALT  --   --   --   --   --   --   --   --   --   --  13  --  19   AST  --   --   --   --   --   --   --   --   --   --  33  --  41   LIPASE  --   --   --   --   --   --   --   --   --   --   --   --  41    < > = values in this interval not displayed.

## 2024-11-11 NOTE — PLAN OF CARE
Goal Outcome Evaluation:      Plan of Care Reviewed With: patient    Overall Patient Progress: no changeOverall Patient Progress: no change    Outcome Evaluation: VSS on RA, pt A&OX4, AX1 to bathroom with walker and gaitbelt, voiding spontaneously, passing adrian, no stool this shift. 3 surgical lap incisions across abdomen - clear glue - C/D/I, NG clamped and on clear liquid diet.  K & mag replacements, both will be rechecked in am. Pacemaker; Hx Afib.

## 2024-11-11 NOTE — PROGRESS NOTES
"General Surgery    S: doing well. Passing flatus. Tolerated broth. VSS    O:  Vital signs:  Temp: 98  F (36.7  C) Temp src: Oral BP: (!) 183/81 Pulse: 70   Resp: 18 SpO2: 98 % O2 Device: None (Room air) Oxygen Delivery: 1 LPM Height: 172.7 cm (5' 8\") Weight: 55.7 kg (122 lb 11.2 oz)  Estimated body mass index is 18.66 kg/m  as calculated from the following:    Height as of this encounter: 1.727 m (5' 8\").    Weight as of this encounter: 55.7 kg (122 lb 11.2 oz).      Gen: AAOx3, NAD  Heart: RRR  Lungs: CTA  Abd: Soft, non-distended.  Appropriate tenderness to palpation    Labs/Imaging  Results for orders placed or performed during the hospital encounter of 11/07/24 (from the past 24 hours)   Glucose by meter   Result Value Ref Range    GLUCOSE BY METER POCT 96 70 - 99 mg/dL   Sodium   Result Value Ref Range    Sodium 133 (L) 135 - 145 mmol/L   Potassium   Result Value Ref Range    Potassium 3.6 3.4 - 5.3 mmol/L   Magnesium   Result Value Ref Range    Magnesium 1.9 1.7 - 2.3 mg/dL   Glucose by meter   Result Value Ref Range    GLUCOSE BY METER POCT 110 (H) 70 - 99 mg/dL   Glucose by meter   Result Value Ref Range    GLUCOSE BY METER POCT 97 70 - 99 mg/dL   Glucose by meter   Result Value Ref Range    GLUCOSE BY METER POCT 101 (H) 70 - 99 mg/dL   INR   Result Value Ref Range    INR 1.56 (H) 0.85 - 1.15   Basic metabolic panel   Result Value Ref Range    Sodium 133 (L) 135 - 145 mmol/L    Potassium 3.2 (L) 3.4 - 5.3 mmol/L    Chloride 95 (L) 98 - 107 mmol/L    Carbon Dioxide (CO2) 23 22 - 29 mmol/L    Anion Gap 15 7 - 15 mmol/L    Urea Nitrogen 5.6 (L) 8.0 - 23.0 mg/dL    Creatinine 0.52 0.51 - 0.95 mg/dL    GFR Estimate >90 >60 mL/min/1.73m2    Calcium 8.6 (L) 8.8 - 10.4 mg/dL    Glucose 94 70 - 99 mg/dL   Extra Tube    Narrative    The following orders were created for panel order Extra Tube.  Procedure                               Abnormality         Status                     ---------                              "  -----------         ------                     Extra Purple Top Tube[[id number]]                            Final result                 Please view results for these tests on the individual orders.   Extra Purple Top Tube   Result Value Ref Range    Hold Specimen JI            A: Postop day #3 status post bilateral inguinal/obturator hernia repairs with mesh  Ileus-improving  Multiple electrolyte derangements    P: Remove NG tube. If tolerated CLD this morning can ADAT.  Encourage ambulation and incentive spirometry.    Dr Ramesh

## 2024-11-11 NOTE — PHARMACY-ANTICOAGULATION SERVICE
Clinical Pharmacy - Warfarin Dosing Consult     Pharmacy has been consulted to manage this patient s warfarin therapy.  Indication: Atrial Fibrillation  Therapy Goal: INR 2-3  Provider/Team: [facility]  OP Clif Clinic: [facility] at [facility]; home monitor via [employer]  Warfarin Prior to Admission: Yes  Warfarin PTA Regimen: 7.5 mg Monday; 10 mg all other days of the week    INR   Date Value Ref Range Status   11/11/2024 1.56 (H) 0.85 - 1.15 Final   11/10/2024 1.20 (H) 0.85 - 1.15 Final       Recommend warfarin 7.5 mg today.  Pharmacy will monitor Unique Ward daily and order warfarin doses to achieve specified goal.      Please contact pharmacy as soon as possible if the warfarin needs to be held for a procedure or if the warfarin goals change.

## 2024-11-11 NOTE — PROGRESS NOTES
5 mg dose coumadin was dropped when administered and the med was wasted in trash. Pharmacy was notified and they sent an additional dose to the floor to be administed and patient recieved a total of 10 mg of coumadin. Situation was explained to iam oncartur Eisenberg

## 2024-11-11 NOTE — PLAN OF CARE
"Goal Outcome Evaluation:      Plan of Care Reviewed With: patient    Overall Patient Progress: improvingOverall Patient Progress: improving    Outcome Evaluation: Patient is doing well. NGT removed as ordered. Progressed to independent and doing great. Patient is ADAT to regular diet this dinner and tolerated well. Passing gas and had a \"giganteus\" bowel movement tonight. Trochar sites CDI. Potassium was 3.2, replaced, and came back 3.6, am draw. . Mg was 1.6, am draw.    BP (!) 160/85 (BP Location: Left arm, Patient Position: Semi-Shin's, Cuff Size: Adult Regular)   Pulse 70   Temp 98.2  F (36.8  C) (Oral)   Resp 18   Ht 1.727 m (5' 8\")   Wt 55.7 kg (122 lb 11.2 oz)   LMP  (LMP Unknown)   SpO2 98%   BMI 18.66 kg/m      TRENA HudsonN, RN, MS-BC    "

## 2024-11-12 ENCOUNTER — TELEPHONE (OUTPATIENT)
Dept: ANTICOAGULATION | Facility: CLINIC | Age: 84
End: 2024-11-12
Payer: COMMERCIAL

## 2024-11-12 VITALS
SYSTOLIC BLOOD PRESSURE: 144 MMHG | HEART RATE: 82 BPM | OXYGEN SATURATION: 98 % | HEIGHT: 68 IN | TEMPERATURE: 98.8 F | WEIGHT: 122.7 LBS | DIASTOLIC BLOOD PRESSURE: 63 MMHG | RESPIRATION RATE: 20 BRPM | BODY MASS INDEX: 18.6 KG/M2

## 2024-11-12 DIAGNOSIS — I48.91 ATRIAL FIBRILLATION, UNSPECIFIED TYPE (H): ICD-10-CM

## 2024-11-12 DIAGNOSIS — T45.515A WARFARIN-INDUCED COAGULOPATHY (H): Primary | ICD-10-CM

## 2024-11-12 DIAGNOSIS — D68.32 WARFARIN-INDUCED COAGULOPATHY (H): Primary | ICD-10-CM

## 2024-11-12 LAB
ANION GAP SERPL CALCULATED.3IONS-SCNC: 15 MMOL/L (ref 7–15)
CHLORIDE SERPL-SCNC: 93 MMOL/L (ref 98–107)
HCO3 SERPL-SCNC: 22 MMOL/L (ref 22–29)
HOLD SPECIMEN: NORMAL
INR PPP: 2.14 (ref 0.85–1.15)
MAGNESIUM SERPL-MCNC: 1.6 MG/DL (ref 1.7–2.3)
POTASSIUM SERPL-SCNC: 3.4 MMOL/L (ref 3.4–5.3)
POTASSIUM SERPL-SCNC: 3.6 MMOL/L (ref 3.4–5.3)
SODIUM SERPL-SCNC: 129 MMOL/L (ref 135–145)
SODIUM SERPL-SCNC: 130 MMOL/L (ref 135–145)

## 2024-11-12 PROCEDURE — 36415 COLL VENOUS BLD VENIPUNCTURE: CPT | Performed by: SURGERY

## 2024-11-12 PROCEDURE — 99239 HOSP IP/OBS DSCHRG MGMT >30: CPT | Performed by: NURSE PRACTITIONER

## 2024-11-12 PROCEDURE — 36415 COLL VENOUS BLD VENIPUNCTURE: CPT | Performed by: NURSE PRACTITIONER

## 2024-11-12 PROCEDURE — 250N000013 HC RX MED GY IP 250 OP 250 PS 637: Performed by: SURGERY

## 2024-11-12 PROCEDURE — 83735 ASSAY OF MAGNESIUM: CPT | Performed by: SURGERY

## 2024-11-12 PROCEDURE — 80051 ELECTROLYTE PANEL: CPT | Performed by: PEDIATRICS

## 2024-11-12 PROCEDURE — 36415 COLL VENOUS BLD VENIPUNCTURE: CPT | Performed by: PEDIATRICS

## 2024-11-12 PROCEDURE — 84132 ASSAY OF SERUM POTASSIUM: CPT | Performed by: SURGERY

## 2024-11-12 PROCEDURE — 84295 ASSAY OF SERUM SODIUM: CPT | Performed by: NURSE PRACTITIONER

## 2024-11-12 PROCEDURE — 85610 PROTHROMBIN TIME: CPT | Performed by: PEDIATRICS

## 2024-11-12 RX ORDER — POTASSIUM CHLORIDE 1500 MG/1
40 TABLET, EXTENDED RELEASE ORAL ONCE
Status: COMPLETED | OUTPATIENT
Start: 2024-11-12 | End: 2024-11-12

## 2024-11-12 RX ORDER — AMOXICILLIN 250 MG
1 CAPSULE ORAL 2 TIMES DAILY
Qty: 30 TABLET | Refills: 0 | Status: SHIPPED | OUTPATIENT
Start: 2024-11-12

## 2024-11-12 RX ORDER — ACETAMINOPHEN 325 MG/1
650 TABLET ORAL EVERY 4 HOURS PRN
Status: SHIPPED
Start: 2024-11-12

## 2024-11-12 RX ORDER — WARFARIN SODIUM 5 MG/1
7.5 TABLET ORAL DAILY
Status: SHIPPED
Start: 2024-11-12

## 2024-11-12 RX ADMIN — POLYETHYLENE GLYCOL 3350 17 G: 17 POWDER, FOR SOLUTION ORAL at 08:43

## 2024-11-12 RX ADMIN — POTASSIUM CHLORIDE 40 MEQ: 1500 TABLET, EXTENDED RELEASE ORAL at 06:55

## 2024-11-12 RX ADMIN — SENNOSIDES AND DOCUSATE SODIUM 1 TABLET: 8.6; 5 TABLET ORAL at 08:48

## 2024-11-12 ASSESSMENT — ACTIVITIES OF DAILY LIVING (ADL)
ADLS_ACUITY_SCORE: 0

## 2024-11-12 NOTE — PLAN OF CARE
"Goal Outcome Evaluation:    S-(situation): Patient discharged to home via private vehicle with family.    B-(background): Obturator hernia with obstruction     A-(assessment): Independent. Tolerating oral intake. Passing gas and had a bowel movement last night. Trochar sites CDI. Denies pain. Recent Na is 129 and provider BELGICA Moreland, is aware.  BP (!) 144/63 (BP Location: Left arm, Patient Position: Semi-Shin's, Cuff Size: Adult Regular)   Pulse 82   Temp 98.8  F (37.1  C) (Oral)   Resp 20   Ht 1.727 m (5' 8\")   Wt 55.7 kg (122 lb 11.2 oz)   LMP  (LMP Unknown)   SpO2 98%   BMI 18.66 kg/m        R-(recommendations): Discharge instructions reviewed with patient. Listed belongings gathered and returned to patient.         Discharge Nursing Criteria:   Care Plan and Patient education resolved: Yes    New Medications- pt has been educated about purpose and side effects: Yes    MISC  Prescriptions if needed, hard copies sent with patient  Yes  Home medications returned to patient: Yes  Medication Bin checked and emptied on discharge Yes  Patient reports post-discharge pain management plan is effective: Yes            "

## 2024-11-12 NOTE — DISCHARGE SUMMARY
[facility]  Hospitalist Discharge Summary      Date of Admission:  11/7/2024  Date of Discharge:  11/12/2024  Discharging Provider: Merly Abreu CNP  Discharge Service: Hospitalist Service    Discharge Diagnoses   Obturator hernia with obstruction  Hyponatremia  Hypochloremia  Hypokalemia  Hypomagnesemia  Hypoalbuminemia  Permanent atrial fibrillation with CVR  Warfarin induced coagulopathy  Pacemaker in place  Prediabetes  Hypertension  Personal history of UTI    Clinically Significant Risk Factors          Follow-ups Needed After Discharge   Follow-up Appointments       Follow-up and recommended labs and tests       1.  Follow up with primary care provider, Fernando Ogden, within 7 days for hospital follow- up.  The following labs/tests are recommended: Basic metabolic panel and magnesium.  Follow up re: BP  2.  Follow-up with Alisson Sam [facility] Surgery in 1 week  .                Unresulted Labs Ordered in the Past 30 Days of this Admission       No orders found from 10/8/2024 to 11/8/2024.        These results will be followed up by     Discharge Disposition   Discharged to home  Condition at discharge: Stable    Hospital Course   Unique Ward is a 84 year old female admitted on 11/7/2024 for a small bowel obstruction secondary to a right obturator hernia.    Obturator hernia with obstruction  Clinical presentation and radiographic findings were suspicious for small bowel obstruction due to obturator hernia.  She underwent operative repair on 11/8 and is recovering.  She tolerated clamping NG tube postoperatively in the evening 11/9 and on 11/10 tolerated clear liquid diet per recommendations of surgeon. On 11/11, NG tube was removed and she has tolerated advancing diet to regular.  -Okay to discharge today  -Follow-up with general surgery within a week    Hyponatremia  Hypochloremia  Chronic hyponatremia but admitted with worsening hyponatremia sodium 125 (compared with 131  in November 2023).  Suspect hyponatremia is exacerbated by bowel obstruction and change in oral intake.  She has been euvolemic after initial treatment with IV fluids and continues to be hyponatremic which has now trended upward to 133 postoperatively while continuing to be n.p.o and receiving isotonic IV fluid treatment.  She also presented with hypochloremia which has also been chronic.  11/11 IV fluids discontinued.  This morning sodium 130, recheck was 129.    -Follow-up sodium level at primary care provider visit    Hypokalemia  Hypomagnesemia  Presented with normal potassium subsequently decreasing to zoie of 3.2.  Hypokalemia continues to recur requiring supplementation.  Serum magnesium was initially normal but subsequently decreased.  Magnesium also improved after supplementation.  She has not needed potassium supplementation for the last 2 days.  Potassium and magnesium level stable  -Follow-up potassium and magnesium level at primary care follow-up    Hypoalbuminemia  Serum albumin was normal at admission but is now low probably due to inadequate nutritional intake.    Permanent atrial fibrillation with CVR  Warfarin induced coagulopathy  Pacemaker in place  Known permanent atrial fibrillation and treated chronically with warfarin which causes coagulopathy.  Presented with therapeutic INR.  Preoperatively was treated with vitamin K after which INR became subtherapeutic and remains subtherapeutic postoperatively although is trending up after warfarin was restarted.  Heart rate has been stable perioperatively.  Has chronic pacemaker placed due to complete heart block in March 2020 that had been interrogated in March 2023.  Preoperative EKG confirmed atrial fibrillation.  -Today INR 2.14  -Recommend continued warfarin at 7.5 mg daily with a recheck in 2 days at the INR clinic    Essential hypertension  Chronic hypertension treated with amlodipine 5 mg daily which has been held perioperatively.  She was  hypertensive perioperatively.blood pressure has improved after restarting chronic amlodipine on 11/10.  Although she continues to have intermittent severe hypertension, she has not required additional antihypertensive medication.  -Continue amlodipine   -Follow-up with primary care provider    Prediabetes  Carries diagnosis of prediabetes with hemoglobin A1c 5.8 in August 2023.  Blood sugars have been normal to mildly elevated perioperatively.      Personal history of UTI  Has history of recurring UTI and had been diagnosed with UTI on November 4 and prescribed antibiotic therapy at that time.  UA at admission was benign without concern for urinary infection and there have been no clinical concerns for UTI during hospitalization.  -Not recommending any additional antibiotic therapy other than the perioperative antibiotics that she received per recommendations of surgeon    Consultations This Hospital Stay   SURGERY GENERAL IP CONSULT  PHARMACY TO DOSE WARFARIN    Code Status   Full Code    Time Spent on this Encounter   I, Merly Abreu CNP, personally saw the patient today and spent greater than 30 minutes discharging this patient.       Merly Abreu CNP  [facility] 2A MEDICAL SURGICAL  [address] [place] [address]  Phone: [phone]  ______________________________________________________________________    Physical Exam   Vital Signs: Temp: 97.9  F (36.6  C) Temp src: Oral BP: (!) 173/84 Pulse: 80   Resp: 16 SpO2: 98 % O2 Device: None (Room air)    Weight: 122 lbs 11.2 oz  General Appearance: Sitting up in bed, no acute distress  Respiratory: Respiratory effort easy, lung sounds clear  Cardiovascular: Irregular regular rhythm,  GI: Active bowel sounds, mild tenderness with palpation  Skin: No rashes         Primary Care Physician   Fernando Ogden    Discharge Orders      ANTICOAGULATION CLINIC REFERRAL      Reason for your hospital stay    You were admitted to the hospital due to a  small bowel obstruction from a hernia     Follow-up and recommended labs and tests     1.  Follow up with primary care provider, Fernando Ogden, within 7 days for hospital follow- up.  The following labs/tests are recommended: Basic metabolic panel and magnesium.  Follow up re: BP  2.  Follow-up with Alisson Sam General Surgery in 1 week  .     Activity    Your activity upon discharge: No lifting greater than 20 pounds for 6 weeks     Diet    Follow this diet upon discharge: Regular       Significant Results and Procedures   Most Recent 3 CBC's:  Recent Labs   Lab Test 11/08/24  0550 11/07/24  0815 11/29/23  1346   WBC 6.1 7.9 5.1   HGB 11.0* 11.8 11.1*   MCV 86 85 86    316 292     Most Recent 3 BMP's:  Recent Labs   Lab Test 11/12/24  0939 11/12/24  0555 11/11/24  1216 11/11/24  0815 11/11/24  0555 11/11/24  0501 11/10/24  0755 11/10/24  0551 11/08/24  0800 11/08/24  0550   * 130*  --   --  133*  --    < > 133*   < > 128*  128*   POTASSIUM  --  3.4 3.6  --  3.2*  --    < > 3.1*   < > 3.2*   CHLORIDE  --  93*  --   --  95*  --   --  97*  --  95*   CO2  --  22  --   --  23  --   --  24  --  22   BUN  --   --   --   --  5.6*  --   --  7.2*  --  10.5   CR  --   --   --   --  0.52  --   --  0.53  --  0.56   ANIONGAP  --  15  --   --  15  --   --  12  --  11   GLORIA  --   --   --   --  8.6*  --   --  8.3*  --  8.0*   GLC  --   --   --  85 94 101*   < > 96   < > 99    < > = values in this interval not displayed.   ,   Results for orders placed or performed during the hospital encounter of 11/07/24   CT Abdomen Pelvis w Contrast    Narrative    CT ABDOMEN PELVIS W CONTRAST 11/7/2024 9:15 AM    CLINICAL HISTORY: Abdominal pain. Right sided abdominal pain, vomiting    TECHNIQUE: CT scan of the abdomen and pelvis was performed following  injection of IV contrast. Multiplanar reformats were obtained. Dose  reduction techniques were used.  CONTRAST: Isovue-370, 65mL    COMPARISON: CT abdomen and pelvis  12/22/2020.    FINDINGS:   LOWER CHEST: Partially imaged cardiac pacemaker leads and small  pericardial effusion. Mild diffuse wall thickening of the lower  esophagus is nonspecific but can be seen with esophagitis.    HEPATOBILIARY: Hepatic cysts and too small to characterize  hypoattenuating lesions, not requiring specific follow-up. No  radiopaque gallstones. No significant biliary ductal dilatation. Mild  prominence of the intrahepatic bile ducts in the left hepatic lobe.    PANCREAS: No significant mass, duct dilatation, or inflammatory  change.    SPLEEN: Normal size.    ADRENAL GLANDS: No significant nodules.    KIDNEYS/BLADDER: Symmetric renal enhancement. No hydronephrosis.  Similar mild fullness of the bilateral collecting systems without  significant hydronephrosis. Unremarkable urinary bladder.    BOWEL: Multiple fluid-filled dilated loops of small bowel in the lower  abdomen which are upstream to a small bowel containing right obturator  hernia (series 3, image 190). The bowel distal to the right obturator  hernia is decompressed. Normal caliber appendix.    PELVIC ORGANS: No pelvic masses.    ADDITIONAL FINDINGS: Small amount of abdominal pelvic free fluid is  likely reactive. No pneumoperitoneum. Moderate burden of  atherosclerotic disease without abdominal aortic aneurysm.    MUSCULOSKELETAL: Multilevel degenerative changes of the spine.  Partially imaged thoracolumbar scoliosis.      Impression    IMPRESSION:   1.  Small bowel obstruction secondary to a small bowel containing  right obturator hernia. Recommend surgical consultation.  2.  Small volume abdominal and pelvic free fluid, likely reactive.  3.  Additional chronic findings as above.    Findings discussed with Dr. De Dios on 11/7/2024 at 9:37 AM CDT.    THIAGO PIMENTEL MD         SYSTEM ID:  [id number]   XR Abdomen Port 1 View    Narrative    ABDOMEN PORTABLE ONE VIEW  11/7/2024 10:24 AM     HISTORY: NG tube placement.    COMPARISON: CT  11/7/2024.      Impression    IMPRESSION: Nasogastric tube within the stomach. Partially visualized  upper abdomen demonstrates several dilated bowel loops.    SEFERINO COLLIER MD         SYSTEM ID:  [id number]       Discharge Medications   Current Discharge Medication List        START taking these medications    Details   acetaminophen (TYLENOL) 325 MG tablet Take 2 tablets (650 mg) by mouth every 4 hours as needed for other (For optimal non-opioid multimodal pain management to improve pain control.).    Associated Diagnoses: Jose Miguel's hernia      senna-docusate (SENOKOT-S/PERICOLACE) 8.6-50 MG tablet Take 1 tablet by mouth 2 times daily.  Qty: 30 tablet, Refills: 0    Associated Diagnoses: Jose Miguel's hernia           CONTINUE these medications which have CHANGED    Details   warfarin ANTICOAGULANT (COUMADIN) 5 MG tablet Take 1.5 tablets (7.5 mg) by mouth daily.    Associated Diagnoses: Atrial fibrillation, unspecified type (H); Warfarin-induced coagulopathy (H)           CONTINUE these medications which have NOT CHANGED    Details   amLODIPine (NORVASC) 5 MG tablet TAKE 1 TABLET BY MOUTH ONCE DAILY  Qty: 90 tablet, Refills: 0    Associated Diagnoses: Essential hypertension      EPINEPHrine (ANY BX GENERIC EQUIV) 0.3 MG/0.3ML injection 2-pack Inject 0.3 mLs (0.3 mg) into the muscle once as needed for anaphylaxis  Qty: 0.6 mL, Refills: 0    Associated Diagnoses: Other allergy, initial encounter           STOP taking these medications       nitroFURantoin macrocrystal-monohydrate (MACROBID) 100 MG capsule Comments:   Reason for Stopping:             Allergies   Allergies   Allergen Reactions    Keflex [Cephalexin] Anaphylaxis    Codeine Dizziness    Gentamicin Hives     Per Unique this medication gives hives on arms and legs. Bev Lo LSXO,  ....................  7/15/2014   1:15 PM    Mct Oil [Medium Chain Triglycerides] Hives    No Clinical Screening - See Comments      Eye drop antibiotic.    Paxil [Paroxetine]  "Other (See Comments)     \"Felt sick and weird in the head\"    Influenza Virus Vaccine H5n1     Typhoid Vaccines      "

## 2024-11-12 NOTE — PHARMACY-ANTICOAGULATION SERVICE
Clinical Pharmacy - Warfarin Dosing Consult     Pharmacy has been consulted to manage this patient s warfarin therapy.  Indication: Atrial Fibrillation  Therapy Goal: INR 2-3  Provider/Team: [facility]  OP Anticoag Clinic: [facility]; home monitor via [employer]  Warfarin Prior to Admission: Yes  Warfarin PTA Regimen: Correction per Med Rec: 10 mg on Mondays, 7.5 mg the rest of the week  Recent documented change in oral intake/nutrition: Unknown    INR   Date Value Ref Range Status   11/12/2024 2.14 (H) 0.85 - 1.15 Final   11/11/2024 1.56 (H) 0.85 - 1.15 Final       Recommend warfarin 7.5 mg today.  Pharmacy will monitor Unique Ward daily and order warfarin doses to achieve specified goal.      Please contact pharmacy as soon as possible if the warfarin needs to be held for a procedure or if the warfarin goals change.       Thank you,    Zuly Capellan, PharmD

## 2024-11-12 NOTE — PHARMACY-ANTICOAGULATION SERVICE
Clinical Pharmacy- Warfarin Discharge Note  This patient is currently on warfarin for the treatment of Atrial fibrillation.  INR Goal= 2-2.5  Expected length of therapy lifetime.    Warfarin PTA Regimen: Correction per Med Rec: 10 mg on Mondays, 7.5 mg the rest of the week      Anticoagulation Dose History  More data exists         Latest Ref Rng & Units 5/1/2024 11/7/2024 11/8/2024 11/9/2024 11/10/2024 11/11/2024 11/12/2024   Recent Dosing and Labs   warfarin ANTICOAGULANT (COUMADIN) 0.25 mg TABS quarter-tab - - - - - - 7.5 mg, $Given -   warfarin ANTICOAGULANT (COUMADIN) 5 MG tablet - - - - 10 mg, $Given 10 mg, $Given       5 mg dose was dropped when administered and the med was wasted in trash. Pharmacy was notfied and they sent an additional dose to the floor to be administed and patient recieved a total of 10 mg of coumadin. Situation was explained to iam Eisenberg - -   INR 0.85 - 1.15 2.9  2.05  1.29  1.71  2.52  1.16  1.20  1.56  2.14       Details          Multiple values from one day are sorted in reverse-chronological order               Vitamin K doses administered during the last 7 days: n/a  FFP administered during the last 7 days: n/a  Agree with plan to discharge the patient on previous home warfarin regimen of 7.5 mg everyday except 10 mg on Mondays.  Next INR per outpatient INR clinic due 11/14/24.     Thanks,    Zuly Capellan, PharmD

## 2024-11-12 NOTE — PROGRESS NOTES
[facility] Post-Op Note         Assessment and Plan:    Assessment:   Post-operative day #4  NG has been removed, tolerating regular diet without N/V, doing well  Procedure(s):  Robot assisted laparoscopic bilateral obturator hernia repairs with mesh.         Plan:   Ambulate/Advance activity as tolerated  Pain control measures - is not using narcotic pain medications.  Tylenol PRN recommended  Include fiber in diet.   May need senna as outpatient as describes firm stools prior to admit and gas.    Advance diet as tolerated   Skin glue will wear off in about 7-10 days.  Do not immerse in tub or scrub until seen in clinic.  Showers are okay.   No lift greater than 20 pounds over the next 4 weeks  Follow up Alisson Pack PA-C   1-2 weeks             Interval History:     Doing well.  Continues to improve.  Pain is well-controlled.  No fevers.  No N/V            Physical Exam:   All vitals have been reviewed  Patient Vitals for the past 8 hrs:   BP Temp Temp src Pulse Resp SpO2   11/12/24 0708 (!) 173/84 97.9  F (36.6  C) Oral 80 16 98 %     I/O last 3 completed shifts:  In: 720 [P.O.:720]  Out: 1570 [Urine:1570]  # Pain Assessment:      11/12/2024     8:59 AM   Current Pain Score   Patient currently in pain? denies   - Unique is experiencing pain due to post operative state. Pain management was discussed and the plan was created in a collaborative fashion.  Unique's response to the current recommendations: compliant  - Plans to avert gas pains with using high fiber, ambulation, senna for stool softener.  - tylenol prn - states no narcotics needed.     Dressing dry and intact.  Abdomen: incisions clean and dry with minimal or no drainage.  Glue intact.  Surrounding skin with minimal erythema.  Some subcutaneous crepitus is present.  Abdomen mildly distended.  No pain with palpation.           Data:   All laboratory/imaging data related to this surgery reviewed  Results for orders placed or performed  during the hospital encounter of 11/07/24 (from the past 24 hours)   Potassium   Result Value Ref Range    Potassium 3.6 3.4 - 5.3 mmol/L   INR   Result Value Ref Range    INR 2.14 (H) 0.85 - 1.15   Extra Tube    Narrative    The following orders were created for panel order Extra Tube.  Procedure                               Abnormality         Status                     ---------                               -----------         ------                     Extra Purple Top Tube[[id number]]                            Final result                 Please view results for these tests on the individual orders.   Extra Purple Top Tube   Result Value Ref Range    Hold Specimen [facility]    Magnesium   Result Value Ref Range    Magnesium 1.6 (L) 1.7 - 2.3 mg/dL   Electrolyte panel   Result Value Ref Range    Sodium 130 (L) 135 - 145 mmol/L    Potassium 3.4 3.4 - 5.3 mmol/L    Chloride 93 (L) 98 - 107 mmol/L    Carbon Dioxide (CO2) 22 22 - 29 mmol/L    Anion Gap 15 7 - 15 mmol/L        Alisson Pack PA-C

## 2024-11-12 NOTE — TELEPHONE ENCOUNTER
Attempted to reach patient to discuss warfarin management. Need to verify if she is returning to [facility]. Has been going to [facility]. Also, patient has had a home meter. Left message asking her to call back.    TRENA HuffmanN, RN

## 2024-11-12 NOTE — PLAN OF CARE
Goal Outcome Evaluation:      Plan of Care Reviewed With: patient    Overall Patient Progress: improvingOverall Patient Progress: improving    Outcome Evaluation: A&O x 4. On RA. BP remains elevated. Denies any HA, blurry vision, or dizziness. Denies any pain. Incisions CDI.Had large BM yesterday. Independent in room. A&O x 4. Denies any pain. Incisions CDI. Independent, ambulates to bathroom. Mag in goal, recheck tomorrow am. K = 3.4, replaced, recheck at 1141      Problem: Fall Injury Risk  Goal: Absence of Fall and Fall-Related Injury  Outcome: Met  Intervention: Identify and Manage Contributors  Recent Flowsheet Documentation  Taken 11/12/2024 0003 by Thomas Arboleda, RN  Medication Review/Management: medications reviewed    Intervention: Promote Injury-Free Environment  Recent Flowsheet Documentation  Taken 11/12/2024 0003 by Thomas Arboleda, RN  Safety Promotion/Fall Prevention:   activity supervised   assistive device/personal items within reach   clutter free environment maintained   increased rounding and observation   increase visualization of patient   lighting adjusted   mobility aid in reach   nonskid shoes/slippers when out of bed

## 2024-11-13 ENCOUNTER — PATIENT OUTREACH (OUTPATIENT)
Dept: FAMILY MEDICINE | Facility: CLINIC | Age: 84
End: 2024-11-13
Payer: COMMERCIAL

## 2024-11-13 NOTE — TELEPHONE ENCOUNTER
Transitions of Care Outreach  Chief Complaint   Patient presents with    Hospital F/U       Most Recent Admission Date: 11/7/2024   Most Recent Admission Diagnosis: Obturator hernia - K45.8  Hyponatremia - E87.1  SBO (small bowel obstruction) (H) - K56.609  Long term current use of anticoagulant therapy - Z79.01     Most Recent Discharge Date: 11/12/2024   Most Recent Discharge Diagnosis: SBO (small bowel obstruction) (H) - K56.609  Obturator hernia - K45.8  Long term current use of anticoagulant therapy - Z79.01  Hyponatremia - E87.1  Anaphylaxis, initial encounter - T78.2XXA  Drug allergy - Z88.9  Benign hypertension - I10  Jose Miguel's hernia - K45.8  Personal history of tobacco use, presenting hazards to health - Z87.891  Atrial fibrillation, unspecified type (H) - I48.91  Warfarin-induced coagulopathy (H) - D68.32, T45.515A     Transitions of Care Assessment    Discharge Assessment  How are you doing now that you are home?: better  How are your symptoms? (Red Flag symptoms escalate to triage hotline per guidelines): Improved  Do you know how to contact your clinic care team if you have future questions or changes to your health status? : No  Does the patient have their discharge instructions? : Yes  Does the patient have questions regarding their discharge instructions? : No  Were you started on any new medications or were there changes to any of your previous medications? : Yes  Does the patient have all of their medications?: Yes  Do you have questions regarding any of your medications? : No  Do you have all of your needed medical supplies or equipment (DME)?  (i.e. oxygen tank, CPAP, cane, etc.): Yes    Follow up Plan     Discharge Follow-Up  Discharge follow up appointment scheduled in alignment with recommended follow up timeframe or Transitions of Risk Category? (Low = within 30 days; Moderate= within 14 days; High= within 7 days): Yes  Discharge Follow Up Appointment Scheduled with?: Primary Care  Provider    Future Appointments   Date Time Provider Department Center   11/21/2024  9:40 AM Chico Quiroz,  [facility]   11/27/2024 11:15 AM Winston Garcia MD [facility]RIC[facility]       Outpatient Plan as outlined on AVS reviewed with patient.    For any urgent concerns, please contact our 24 hour nurse triage line: [phone] ([phone])       Saray Greer RN

## 2024-11-14 NOTE — TELEPHONE ENCOUNTER
Appears patient has continued to be managed via [facility].Attempted to contact again to verify plan going forward.     TRENA HuffmanN, RN

## 2024-11-15 NOTE — TELEPHONE ENCOUNTER
I spoke with Pratima this morning and she does in fact have a home meter from [employer] that is managed from [facility]. She will keep things how they are at this time. I will discharge the referral we received this week at this time.     Veronika Campbell RN, BSN  [facility] Anticoagulation Team

## 2024-11-21 ENCOUNTER — OFFICE VISIT (OUTPATIENT)
Dept: INTERNAL MEDICINE | Facility: CLINIC | Age: 84
End: 2024-11-21
Payer: COMMERCIAL

## 2024-11-21 VITALS
OXYGEN SATURATION: 98 % | SYSTOLIC BLOOD PRESSURE: 138 MMHG | WEIGHT: 124.8 LBS | RESPIRATION RATE: 20 BRPM | DIASTOLIC BLOOD PRESSURE: 78 MMHG | TEMPERATURE: 97 F | BODY MASS INDEX: 18.98 KG/M2 | HEART RATE: 97 BPM

## 2024-11-21 DIAGNOSIS — E87.6 HYPOKALEMIA: ICD-10-CM

## 2024-11-21 DIAGNOSIS — Z95.0 PACEMAKER: ICD-10-CM

## 2024-11-21 DIAGNOSIS — E88.09 HYPOALBUMINEMIA: ICD-10-CM

## 2024-11-21 DIAGNOSIS — D68.32 WARFARIN-INDUCED COAGULOPATHY (H): ICD-10-CM

## 2024-11-21 DIAGNOSIS — R73.03 PREDIABETES: ICD-10-CM

## 2024-11-21 DIAGNOSIS — E87.1 HYPONATREMIA: ICD-10-CM

## 2024-11-21 DIAGNOSIS — E83.42 HYPOMAGNESEMIA: ICD-10-CM

## 2024-11-21 DIAGNOSIS — E87.8 HYPOCHLOREMIA: ICD-10-CM

## 2024-11-21 DIAGNOSIS — I10 ESSENTIAL HYPERTENSION: ICD-10-CM

## 2024-11-21 DIAGNOSIS — T45.515A WARFARIN-INDUCED COAGULOPATHY (H): ICD-10-CM

## 2024-11-21 DIAGNOSIS — K45.0 OBTURATOR HERNIA WITH OBSTRUCTION: ICD-10-CM

## 2024-11-21 DIAGNOSIS — K56.609 SBO (SMALL BOWEL OBSTRUCTION) (H): Primary | ICD-10-CM

## 2024-11-21 DIAGNOSIS — R73.09 ELEVATED GLUCOSE: ICD-10-CM

## 2024-11-21 DIAGNOSIS — I48.21 PERMANENT ATRIAL FIBRILLATION (H): ICD-10-CM

## 2024-11-21 LAB
ANION GAP SERPL CALCULATED.3IONS-SCNC: 12 MMOL/L (ref 7–15)
BUN SERPL-MCNC: 20.9 MG/DL (ref 8–23)
CALCIUM SERPL-MCNC: 9.4 MG/DL (ref 8.8–10.4)
CHLORIDE SERPL-SCNC: 100 MMOL/L (ref 98–107)
CREAT SERPL-MCNC: 0.75 MG/DL (ref 0.51–0.95)
EGFRCR SERPLBLD CKD-EPI 2021: 78 ML/MIN/1.73M2
ERYTHROCYTE [DISTWIDTH] IN BLOOD BY AUTOMATED COUNT: 14.6 % (ref 10–15)
EST. AVERAGE GLUCOSE BLD GHB EST-MCNC: 117 MG/DL
GLUCOSE SERPL-MCNC: 100 MG/DL (ref 70–99)
HBA1C MFR BLD: 5.7 %
HCO3 SERPL-SCNC: 24 MMOL/L (ref 22–29)
HCT VFR BLD AUTO: 33.1 % (ref 35–47)
HGB BLD-MCNC: 10.8 G/DL (ref 11.7–15.7)
MCH RBC QN AUTO: 28.6 PG (ref 26.5–33)
MCHC RBC AUTO-ENTMCNC: 32.6 G/DL (ref 31.5–36.5)
MCV RBC AUTO: 88 FL (ref 78–100)
PLATELET # BLD AUTO: 354 10E3/UL (ref 150–450)
POTASSIUM SERPL-SCNC: 4.4 MMOL/L (ref 3.4–5.3)
RBC # BLD AUTO: 3.78 10E6/UL (ref 3.8–5.2)
SODIUM SERPL-SCNC: 136 MMOL/L (ref 135–145)
WBC # BLD AUTO: 6.5 10E3/UL (ref 4–11)

## 2024-11-21 ASSESSMENT — PATIENT HEALTH QUESTIONNAIRE - PHQ9
SUM OF ALL RESPONSES TO PHQ QUESTIONS 1-9: 7
SUM OF ALL RESPONSES TO PHQ QUESTIONS 1-9: 7
10. IF YOU CHECKED OFF ANY PROBLEMS, HOW DIFFICULT HAVE THESE PROBLEMS MADE IT FOR YOU TO DO YOUR WORK, TAKE CARE OF THINGS AT HOME, OR GET ALONG WITH OTHER PEOPLE: SOMEWHAT DIFFICULT

## 2024-11-21 ASSESSMENT — PAIN SCALES - GENERAL: PAINLEVEL_OUTOF10: NO PAIN (0)

## 2024-11-21 NOTE — PROGRESS NOTES
Assessment & Plan     SBO (small bowel obstruction) (H)  Resolved with surgery    Obturator hernia with obstruction  Patient had significant electrolyte imbalance secondary to splanchnic pooling.  Will recheck electrolytes.  - Basic metabolic panel  (Ca, Cl, CO2, Creat, Gluc, K, Na, BUN); Future  - CBC with platelets; Future  - Basic metabolic panel  (Ca, Cl, CO2, Creat, Gluc, K, Na, BUN)  - CBC with platelets    Elevated glucose  Set up A1c.  Blood sugar was slightly elevated  - Hemoglobin A1c; Future  - Hemoglobin A1c    Hyponatremia  Patient has chronic mild hyponatremia which predates her admission back several years.  Has been asymptomatic    Hypochloremia  Secondary to pulling, will recheck    Hypokalemia  As above      Hypomagnesemia  As above      Hypoalbuminemia  Recommend appropriate diet with dietary supplements    Permanent atrial fibrillation (H)  Continue anticoagulation    Warfarin-induced coagulopathy (H)  Follow-up with warfarin clinic and maintain medication compliance    Pacemaker  Regular pacemaker interrogation recommended    Prediabetes  As above      Essential hypertension  Currently controlled.  Patient will continue amlodipine.  Does not require pulse rate management at this time        MED REC REQUIRED  Post Medication Reconciliation Status: discharge medications reconciled, continue medications without change    MEDICATIONS:  Continue current medications without change    Joseph Fischer is a 84 year old, presenting for the following health issues:  Hospital F/U      11/21/2024     9:24 AM   Additional Questions   Roomed by Patric   Accompanied by Daughter, Valery     HPI   Obturator hernia with obstruction  Hyponatremia  Hypochloremia  Hypokalemia  Hypomagnesemia  Hypoalbuminemia  Permanent atrial fibrillation with CVR  Warfarin induced coagulopathy  Pacemaker in place  Prediabetes  Hypertension  Personal history of UTI        Hospital Follow-up Visit:    Hospital/Nursing Home/IP  Rehab Facility: [facility]  Date of Admission: 11/07/2024  Date of Discharge: 11/12/2024  Reason(s) for Admission: Obturator hernia with obstruction   Was the patient in the ICU or did the patient experience delirium during hospitalization?  No  Do you have any other stressors you would like to discuss with your provider? No    Problems taking medications regularly:  None  Medication changes since discharge: None  Problems adhering to non-medication therapy:  None    Summary of hospitalization:  [facility] discharge summary reviewed  Diagnostic Tests/Treatments reviewed.  Follow up needed: none  Other Healthcare Providers Involved in Patient s Care:         None  Update since discharge: improved.         Plan of care communicated with patient and family                 Review of Systems  CONSTITUTIONAL: Patient notes occasional and intermittent fatigue.  This predates her obturator hernia.  INTEGUMENTARY/SKIN: NEGATIVE for worrisome rashes, moles or lesions  EYES: NEGATIVE for vision changes or irritation  ENT/MOUTH: NEGATIVE for ear, mouth and throat problems  RESP: NEGATIVE for significant cough or SOB  BREAST: NEGATIVE for masses, tenderness or discharge  CV: Patient has chronic atrial fibrillation with a pacemaker in place.  Is on chronic anticoagulation.  Denies any syncope or near syncope.  Denies uncontrolled tachyarrhythmia.  GI: NEGATIVE for nausea, abdominal pain, heartburn, or change in bowel habits  : NEGATIVE for frequency, dysuria, or hematuria  MUSCULOSKELETAL: NEGATIVE for significant arthralgias or myalgia  NEURO: NEGATIVE for weakness, dizziness or paresthesias  ENDOCRINE: NEGATIVE for temperature intolerance, skin/hair changes  HEME: NEGATIVE for bleeding problems  PSYCHIATRIC: Patient notes occasional episodes of memory loss.  These usually involve small and trivial incidents.  She is able to maintain her normal ADLs with minimal assistance.       Objective    /78 (BP Location: Right arm, Patient Position: Sitting, Cuff Size: Adult Regular)   Pulse 97   Temp 97  F (36.1  C) (Temporal)   Resp 20   Wt 56.6 kg (124 lb 12.8 oz)   LMP  (LMP Unknown)   SpO2 98%   BMI 18.98 kg/m    Body mass index is 18.98 kg/m .  Physical Exam   GENERAL: alert and no distress  EYES: Eyes grossly normal to inspection, PERRL and conjunctivae and sclerae normal  HENT: ear canals and TM's normal, nose and mouth without ulcers or lesions  NECK: no adenopathy, no asymmetry, masses, or scars  RESP: lungs clear to auscultation - no rales, rhonchi or wheezes  CV: regular rate and rhythm, normal S1 S2, no S3 or S4, no murmur, click or rub, no peripheral edema  ABDOMEN: soft, nontender, without hepatosplenomegaly or masses, bowel sounds normal, and laparoscopic incision sites are well-healed with good approximation and no evidence of infection  MS: no gross musculoskeletal defects noted, no edema  SKIN: no suspicious lesions or rashes  NEURO: Normal strength and tone, mentation intact and speech normal  PSYCH: mentation appears normal, affect normal/bright    Lab work and radiographs performed in the hospitalization are discussed with the patient and her daughter in detail.        Signed Electronically by: Chico Quiroz DO    Answers submitted by the patient for this visit:  Patient Health Questionnaire (Submitted on 11/21/2024)  If you checked off any problems, how difficult have these problems made it for you to do your work, take care of things at home, or get along with other people?: Somewhat difficult  PHQ9 TOTAL SCORE: 7

## 2024-11-27 ENCOUNTER — OFFICE VISIT (OUTPATIENT)
Dept: SURGERY | Facility: CLINIC | Age: 84
End: 2024-11-27
Payer: COMMERCIAL

## 2024-11-27 VITALS — DIASTOLIC BLOOD PRESSURE: 81 MMHG | HEART RATE: 86 BPM | SYSTOLIC BLOOD PRESSURE: 161 MMHG | OXYGEN SATURATION: 97 %

## 2024-11-27 DIAGNOSIS — Z87.19 S/P BILATERAL INGUINAL HERNIA REPAIR, FOLLOW-UP EXAM: Primary | ICD-10-CM

## 2024-11-27 DIAGNOSIS — Z09 S/P BILATERAL INGUINAL HERNIA REPAIR, FOLLOW-UP EXAM: Primary | ICD-10-CM

## 2024-11-27 DIAGNOSIS — K45.0 OBTURATOR HERNIA WITH OBSTRUCTION: ICD-10-CM

## 2024-11-27 NOTE — NURSING NOTE
Unique Ward's goals for this visit include:   Chief Complaint   Patient presents with    Surgical Followup     DOS 11/08/24 - Robot assisted laparoscopic bilateral obturator hernia repairs with mesh.       She requests these members of her care team be copied on today's visit information:     PCP: Fernando Ogden    Referring Provider:  Winston Garcia MD  [id number] [place] [address]    BP (!) 161/81 (BP Location: Left arm, Patient Position: Sitting, Cuff Size: Adult Regular)   Pulse 86   LMP  (LMP Unknown)   SpO2 97%     Do you need any medication refills at today's visit? No  Keke Angeles, [facility]

## 2024-11-27 NOTE — LETTER
11/27/2024      Unique Ward  [address] [address] [place] [id number]      Dear Colleague,    Thank you for referring your patient, Unique Ward, to the [facility]. Please see a copy of my visit note below.    General Surgery Post Op    Pt returns for follow up visit s/p robotic bilateral obturator hernia repair for bowel obstruction on 11/8/2024.    Patient has been doing well, tolerating diet. Bowels moving well. Pain controlled. No issues with wound healing/redness/drainage. No fevers.  No concerns.  Feeling well.  Back on her anticoagulation.    Physical exam: Vitals: BP (!) 161/81 (BP Location: Left arm, Patient Position: Sitting, Cuff Size: Adult Regular)   Pulse 86   LMP  (LMP Unknown)   SpO2 97%   BMI= There is no height or weight on file to calculate BMI.    Exam:  Constitutional: healthy, alert, and no distress  Gastrointestinal: Abdomen soft, non-tender. BS normal. No masses, organomegaly  Incisions healing well      Assessment:     ICD-10-CM    1. S/P bilateral inguinal hernia repair, follow-up exam  Z09     Z87.19       2. Obturator hernia with obstruction  K45.0         Plan: Recovering well from her hernia repair.  Bowel function seems to be at baseline for her.  No further concerns.  Okay to start increasing activities back up to regular for her.  Follow-up in clinic as needed.    Winston Garcia MD      Again, thank you for allowing me to participate in the care of your patient.        Sincerely,        Winston Garcia MD

## 2024-11-27 NOTE — PROGRESS NOTES
General Surgery Post Op    Pt returns for follow up visit s/p robotic bilateral obturator hernia repair for bowel obstruction on 11/8/2024.    Patient has been doing well, tolerating diet. Bowels moving well. Pain controlled. No issues with wound healing/redness/drainage. No fevers.  No concerns.  Feeling well.  Back on her anticoagulation.    Physical exam: Vitals: BP (!) 161/81 (BP Location: Left arm, Patient Position: Sitting, Cuff Size: Adult Regular)   Pulse 86   LMP  (LMP Unknown)   SpO2 97%   BMI= There is no height or weight on file to calculate BMI.    Exam:  Constitutional: healthy, alert, and no distress  Gastrointestinal: Abdomen soft, non-tender. BS normal. No masses, organomegaly  Incisions healing well      Assessment:     ICD-10-CM    1. S/P bilateral inguinal hernia repair, follow-up exam  Z09     Z87.19       2. Obturator hernia with obstruction  K45.0         Plan: Recovering well from her hernia repair.  Bowel function seems to be at baseline for her.  No further concerns.  Okay to start increasing activities back up to regular for her.  Follow-up in clinic as needed.    Winston Garcia MD

## 2024-12-18 DIAGNOSIS — I10 ESSENTIAL HYPERTENSION: ICD-10-CM

## 2024-12-20 RX ORDER — AMLODIPINE BESYLATE 5 MG/1
5 TABLET ORAL DAILY
Qty: 90 TABLET | Refills: 0 | Status: SHIPPED | OUTPATIENT
Start: 2024-12-20

## 2024-12-24 NOTE — TELEPHONE ENCOUNTER
Called and spoke with patient. Relayed message below. Pt verablized understanding. Pt did not want to schedule at this time and stated will call back.       Lisa Gonzalez, VF

## 2024-12-26 DIAGNOSIS — I10 ESSENTIAL HYPERTENSION: ICD-10-CM

## 2024-12-26 RX ORDER — AMLODIPINE BESYLATE 5 MG/1
5 TABLET ORAL DAILY
Qty: 90 TABLET | Refills: 0 | OUTPATIENT
Start: 2024-12-26

## 2025-01-23 ENCOUNTER — DOCUMENTATION ONLY (OUTPATIENT)
Dept: CARDIOLOGY | Facility: CLINIC | Age: 85
End: 2025-01-23
Payer: COMMERCIAL

## 2025-01-23 NOTE — PROGRESS NOTES
Multiple attempts made to contact patient to schedule a device check. After failed attempts, patient has been inactivated from our device clinic. Patient can call the device clinic at any time to re-establish care.      [facility]  [phone]

## 2025-02-13 ENCOUNTER — APPOINTMENT (OUTPATIENT)
Dept: ULTRASOUND IMAGING | Facility: CLINIC | Age: 85
End: 2025-02-13
Attending: EMERGENCY MEDICINE
Payer: MEDICARE

## 2025-02-13 ENCOUNTER — HOSPITAL ENCOUNTER (EMERGENCY)
Facility: CLINIC | Age: 85
Discharge: HOME | End: 2025-02-13
Attending: EMERGENCY MEDICINE | Admitting: EMERGENCY MEDICINE
Payer: MEDICARE

## 2025-02-13 VITALS
HEART RATE: 59 BPM | BODY MASS INDEX: 19.01 KG/M2 | TEMPERATURE: 98.1 F | WEIGHT: 125 LBS | DIASTOLIC BLOOD PRESSURE: 76 MMHG | RESPIRATION RATE: 18 BRPM | OXYGEN SATURATION: 98 % | SYSTOLIC BLOOD PRESSURE: 149 MMHG

## 2025-02-13 DIAGNOSIS — M79.662 PAIN OF LEFT LOWER LEG: ICD-10-CM

## 2025-02-13 PROCEDURE — 99283 EMERGENCY DEPT VISIT LOW MDM: CPT | Performed by: EMERGENCY MEDICINE

## 2025-02-13 PROCEDURE — 93971 EXTREMITY STUDY: CPT | Mod: LT

## 2025-02-13 PROCEDURE — 99284 EMERGENCY DEPT VISIT MOD MDM: CPT | Mod: 25 | Performed by: EMERGENCY MEDICINE

## 2025-02-13 ASSESSMENT — COLUMBIA-SUICIDE SEVERITY RATING SCALE - C-SSRS
2. HAVE YOU ACTUALLY HAD ANY THOUGHTS OF KILLING YOURSELF IN THE PAST MONTH?: NO
1. IN THE PAST MONTH, HAVE YOU WISHED YOU WERE DEAD OR WISHED YOU COULD GO TO SLEEP AND NOT WAKE UP?: NO
6. HAVE YOU EVER DONE ANYTHING, STARTED TO DO ANYTHING, OR PREPARED TO DO ANYTHING TO END YOUR LIFE?: NO

## 2025-02-13 ASSESSMENT — ACTIVITIES OF DAILY LIVING (ADL)
ADLS_ACUITY_SCORE: 52
ADLS_ACUITY_SCORE: 52

## 2025-02-13 NOTE — DISCHARGE INSTRUCTIONS
The ultrasound was negative.    You can take over-the-counter Tylenol or use a topical product such as Biofreeze.    If you continue have symptoms, please follow-up with your primary care provider.    Return at anytime for worsening, changes or concerns.    I hope that this improves very quickly!!

## 2025-02-13 NOTE — ED PROVIDER NOTES
"  History     Chief Complaint   Patient presents with    Leg Pain     HPI  History per patient, medical records    This is an 84-year-old female, history of hypertension, atrial fibrillation and pacemaker on Coumadin, other history as below presenting with leg pain.  Patient started having pain in her left lower leg early this morning.  Pain is on the anterior lateral portion of the leg.  No swelling or redness.  No obvious injury.  Pain worse with ambulation.  She had a very slightly low INR on her last check, 1.9.  Her Coumadin was just slightly increased.  No chest pain, shortness of breath.  No rash or skin changes.  No numbness or tingling    Allergies:  Allergies   Allergen Reactions    Keflex [Cephalexin] Anaphylaxis    Codeine Dizziness    Gentamicin Hives     Per Unique this medication gives hives on arms and legs. Bev Lo LSXO,  ....................  7/15/2014   1:15 PM    Mct Oil [Medium Chain Triglycerides] Hives    No Clinical Screening - See Comments      Eye drop antibiotic.    Paxil [Paroxetine] Other (See Comments)     \"Felt sick and weird in the head\"    Influenza Virus Vaccine H5n1     Typhoid Vaccines        Problem List:    Patient Active Problem List    Diagnosis Date Noted    Hypomagnesemia 11/09/2024     Priority: Medium    Hypochloremia 11/09/2024     Priority: Medium    Hypoalbuminemia 11/09/2024     Priority: Medium    Obturator hernia with obstruction 11/07/2024     Priority: Medium    Hyponatremia 11/07/2024     Priority: Medium    SBO (small bowel obstruction) (H) 11/07/2024     Priority: Medium    Thrombocytopenia 08/29/2023     Priority: Medium    Permanent atrial fibrillation (H) 05/16/2022     Priority: Medium    Warfarin-induced coagulopathy 06/30/2020     Priority: Medium    Atrial fibrillation, unspecified type (H) 06/30/2020     Priority: Medium    CHB (complete heart block) (H) 03/31/2020     Priority: Medium     Added automatically from request for surgery [id number]   "    Pacemaker 03/31/2020     Priority: Medium    Atrial fibrillation (H) 06/07/2019     Priority: Medium    History of basal cell carcinoma- face and scalp 02/28/2017     Priority: Medium    Fibromyalgia 02/28/2017     Priority: Medium    Personal history of urinary tract infection 06/18/2016     Priority: Medium    Essential hypertension 03/23/2016     Priority: Medium    Vitamin D deficiency 03/30/2013     Priority: Medium    Dysthymia 04/01/2005     Priority: Medium        Past Medical History:    Past Medical History:   Diagnosis Date    Arthritis 01.01.1980    Atrial fibrillation (H) 6/7/2019    Cancer (H) 01.01.1993     scalp    Depressive disorder 01.01.1977    FH: melanoma- son 2/28/2017    Hypertension 03.16.2016       Past Surgical History:    Past Surgical History:   Procedure Laterality Date    BIOPSY  11/01/2014    COLONOSCOPY  01.01/1979    Two routine,  one for chronic diarrhea    DAVINCI XI HERNIORRHAPHY INGUINAL Bilateral 11/8/2024    Procedure: Robot assisted laparoscopic bilateral obturator hernia repairs with mesh.;  Surgeon: Winston Garcia MD;  Location: [facility] OR    EP PACEMAKER N/A 3/31/2020    Procedure: EP Pacemaker;  Surgeon: Erich Clark MD;  Location:  HEART CARDI[facility] CATH LAB    EYE SURGERY  1/1/2014    first    cataracts both eyes.  second a few months later    GENITOURINARY SURGERY      GYN SURGERY  01.01.1977    TL    HEAD & NECK SURGERY  01.01.1997    basal cell X2   Moh's on scalp.  other on bridge of nose    LASER YAG CAPSULOTOMY Bilateral 4/1/2021    Procedure: CAPSULOTOMY, LENS, USING YAG LASER;  Surgeon: Bernardino Chu MD;  Location: [facility] OR    SOFT TISSUE SURGERY  01.01.1990    Ganglion Cyst   Left inner wrist       Family History:    Family History   Problem Relation Age of Onset    Diabetes Maternal Grandmother         Na    Coronary Artery Disease Sister         error    Coronary Artery Disease Brother         error    Hypertension Brother     Hyperlipidemia  Brother     Cerebrovascular Disease Brother     Hypertension Sister     Hypertension Sister     Hyperlipidemia Sister     Other Cancer Sister         cervical    Breast Cancer Sister     Other Cancer Sister         kidney    Mental Illness Mother         paranoid/schizophrenic/suicide    Mental Illness Sister         ???  Had shock treatments    Hyperlipidemia Sister     Other Cancer Sister         Kidney       Social History:  Marital Status:  Legally  [3]  Social History     Tobacco Use    Smoking status: Former     Current packs/day: 0.00     Average packs/day: 0.8 packs/day for 10.0 years (7.5 ttl pk-yrs)     Types: Cigarettes     Start date: 1955     Quit date: 1965     Years since quittin.1    Smokeless tobacco: Never   Vaping Use    Vaping status: Never Used   Substance Use Topics    Alcohol use: No     Alcohol/week: 0.0 standard drinks of alcohol    Drug use: No        Medications:    acetaminophen (TYLENOL) 325 MG tablet  amLODIPine (NORVASC) 5 MG tablet  EPINEPHrine (ANY BX GENERIC EQUIV) 0.3 MG/0.3ML injection 2-pack  senna-docusate (SENOKOT-S/PERICOLACE) 8.6-50 MG tablet  warfarin ANTICOAGULANT (COUMADIN) 5 MG tablet          Review of Systems  All other ROS reviewed and are negative or non-contributory except as stated in HPI.     Physical Exam   BP: (!) 156/112  Pulse: 105  Temp: 98.1  F (36.7  C)  Resp: 18  Weight: 56.7 kg (125 lb)  SpO2: 99 %      Physical Exam  Vitals and nursing note reviewed.   Constitutional:       Appearance: Normal appearance. She is normal weight.      Comments: Very healthy-appearing older female sitting in the bed   HENT:      Head: Normocephalic.   Eyes:      Extraocular Movements: Extraocular movements intact.   Cardiovascular:      Rate and Rhythm: Normal rate and regular rhythm.      Pulses: Normal pulses.      Heart sounds: Normal heart sounds.   Pulmonary:      Effort: Pulmonary effort is normal.      Breath sounds: Normal breath sounds.    Musculoskeletal:         General: Normal range of motion.      Cervical back: Normal range of motion.        Legs:    Skin:     General: Skin is warm and dry.      Findings: No bruising or rash.   Neurological:      General: No focal deficit present.      Mental Status: She is alert and oriented to person, place, and time.   Psychiatric:         Mood and Affect: Mood normal.         Behavior: Behavior normal.         ED Course (with Medical Decision Making)    Pt seen and examined by me.  RN and EPIC notes reviewed.       Patient with lower extremity pain, left leg.  She is on blood thinners so I highly doubt she has a clot but there is always a possibility of breakthrough.  Otherwise this most likely is musculoskeletal pain in someway.  Ultrasound was done.    Ultrasound shows no evidence for DVT.  She does have a Baker's cyst.    It is possible patient's pain is from the Baker's cyst and actually could be knee related.  Recommend over-the-counter Tylenol.  Could use Voltaren cream.  Elevate as needed.  Follow-up with clinic if not improving.  Return for significant worsening, changes or concerns.        Procedures    Results for orders placed or performed during the hospital encounter of 02/13/25 (from the past 24 hours)   US Lower Extremity Venous Duplex Left    Narrative    EXAM: US LOWER EXTREMITY VENOUS DUPLEX LEFT  LOCATION: [facility]  DATE: 2/13/2025    INDICATION: Left lower leg pain  COMPARISON: None.  TECHNIQUE: Venous Duplex ultrasound of the left lower extremity with and without compression, augmentation and duplex. Color flow and spectral Doppler with waveform analysis performed.    FINDINGS: Exam includes the common femoral, femoral, popliteal, and contralateral common femoral veins as well as segmentally visualized deep calf veins and greater saphenous vein.     LEFT: No deep vein thrombosis. No superficial thrombophlebitis. 1.9 x 3.5 x 1.1 cm Baker's cyst.    For  comparison the right common femoral vein was evaluated and was unremarkable.      Impression    IMPRESSION:  1.  No deep venous thrombosis in the left lower extremity.         Medications - No data to display    Assessments & Plan     I have reviewed the findings, diagnosis, plan and need for follow up with the patient.    Discharge Medication List as of 2/13/2025 12:35 PM          Final diagnoses:   Pain of left lower leg     Disposition: Patient discharged home in stable condition.  Plan as above.  Return for concerns.     Note: Chart documentation done in part with Dragon Voice Recognition software. Although reviewed after completion, some word and grammatical errors may remain.     2/13/2025   [facility] EMERGENCY DEPT       Peggy Ko MD  02/13/25 1144

## 2025-02-13 NOTE — ED TRIAGE NOTES
"Patient with L lower leg pain since 0630 this AM, no injury noted. States she \"went online and it said it could be a blood clot\".     Triage Assessment (Adult)       Row Name 02/13/25 1035          Triage Assessment    Airway WDL WDL        Respiratory WDL    Respiratory WDL WDL        Skin Circulation/Temperature WDL    Skin Circulation/Temperature WDL WDL        Cardiac WDL    Cardiac WDL WDL                     "

## 2025-03-04 ENCOUNTER — HOSPITAL ENCOUNTER (EMERGENCY)
Facility: CLINIC | Age: 85
Discharge: HOME | End: 2025-03-04
Attending: EMERGENCY MEDICINE | Admitting: EMERGENCY MEDICINE
Payer: MEDICARE

## 2025-03-04 ENCOUNTER — APPOINTMENT (OUTPATIENT)
Dept: CT IMAGING | Facility: CLINIC | Age: 85
End: 2025-03-04
Attending: EMERGENCY MEDICINE
Payer: MEDICARE

## 2025-03-04 VITALS
SYSTOLIC BLOOD PRESSURE: 181 MMHG | BODY MASS INDEX: 18.81 KG/M2 | HEART RATE: 61 BPM | WEIGHT: 123.7 LBS | TEMPERATURE: 98.2 F | RESPIRATION RATE: 17 BRPM | OXYGEN SATURATION: 99 % | DIASTOLIC BLOOD PRESSURE: 86 MMHG

## 2025-03-04 DIAGNOSIS — R42 DIZZINESS: ICD-10-CM

## 2025-03-04 LAB
ALBUMIN SERPL BCG-MCNC: 4.2 G/DL (ref 3.5–5.2)
ALBUMIN UR-MCNC: NEGATIVE MG/DL
ALP SERPL-CCNC: 40 U/L (ref 40–150)
ALT SERPL W P-5'-P-CCNC: 16 U/L (ref 0–50)
ANION GAP SERPL CALCULATED.3IONS-SCNC: 14 MMOL/L (ref 7–15)
APPEARANCE UR: CLEAR
AST SERPL W P-5'-P-CCNC: 39 U/L (ref 0–45)
BASOPHILS # BLD AUTO: 0 10E3/UL (ref 0–0.2)
BASOPHILS NFR BLD AUTO: 1 %
BILIRUB SERPL-MCNC: 0.4 MG/DL
BILIRUB UR QL STRIP: NEGATIVE
BUN SERPL-MCNC: 37.8 MG/DL (ref 8–23)
CALCIUM SERPL-MCNC: 9.1 MG/DL (ref 8.8–10.4)
CHLORIDE SERPL-SCNC: 96 MMOL/L (ref 98–107)
COLOR UR AUTO: NORMAL
CREAT SERPL-MCNC: 0.69 MG/DL (ref 0.51–0.95)
EGFRCR SERPLBLD CKD-EPI 2021: 85 ML/MIN/1.73M2
EOSINOPHIL # BLD AUTO: 0 10E3/UL (ref 0–0.7)
EOSINOPHIL NFR BLD AUTO: 0 %
ERYTHROCYTE [DISTWIDTH] IN BLOOD BY AUTOMATED COUNT: 14.2 % (ref 10–15)
GLUCOSE SERPL-MCNC: 115 MG/DL (ref 70–99)
GLUCOSE UR STRIP-MCNC: NEGATIVE MG/DL
HCO3 SERPL-SCNC: 21 MMOL/L (ref 22–29)
HCT VFR BLD AUTO: 34.1 % (ref 35–47)
HGB BLD-MCNC: 11.6 G/DL (ref 11.7–15.7)
HGB UR QL STRIP: NEGATIVE
HOLD SPECIMEN: NORMAL
IMM GRANULOCYTES # BLD: 0 10E3/UL
IMM GRANULOCYTES NFR BLD: 0 %
KETONES UR STRIP-MCNC: NEGATIVE MG/DL
LEUKOCYTE ESTERASE UR QL STRIP: NEGATIVE
LYMPHOCYTES # BLD AUTO: 0.9 10E3/UL (ref 0.8–5.3)
LYMPHOCYTES NFR BLD AUTO: 19 %
MAGNESIUM SERPL-MCNC: 2 MG/DL (ref 1.7–2.3)
MCH RBC QN AUTO: 29.1 PG (ref 26.5–33)
MCHC RBC AUTO-ENTMCNC: 34 G/DL (ref 31.5–36.5)
MCV RBC AUTO: 86 FL (ref 78–100)
MONOCYTES # BLD AUTO: 0.3 10E3/UL (ref 0–1.3)
MONOCYTES NFR BLD AUTO: 7 %
NEUTROPHILS # BLD AUTO: 3.4 10E3/UL (ref 1.6–8.3)
NEUTROPHILS NFR BLD AUTO: 73 %
NITRATE UR QL: NEGATIVE
NRBC # BLD AUTO: 0 10E3/UL
NRBC BLD AUTO-RTO: 0 /100
PH UR STRIP: 6 [PH] (ref 5–7)
PLATELET # BLD AUTO: 298 10E3/UL (ref 150–450)
POTASSIUM SERPL-SCNC: 4.1 MMOL/L (ref 3.4–5.3)
PROT SERPL-MCNC: 6.5 G/DL (ref 6.4–8.3)
RBC # BLD AUTO: 3.98 10E6/UL (ref 3.8–5.2)
RBC URINE: <1 /HPF
SODIUM SERPL-SCNC: 131 MMOL/L (ref 135–145)
SP GR UR STRIP: 1.01 (ref 1–1.03)
TROPONIN T SERPL HS-MCNC: 16 NG/L
TROPONIN T SERPL HS-MCNC: 18 NG/L
TSH SERPL DL<=0.005 MIU/L-ACNC: 1.7 UIU/ML (ref 0.3–4.2)
UROBILINOGEN UR STRIP-MCNC: NORMAL MG/DL
WBC # BLD AUTO: 4.7 10E3/UL (ref 4–11)
WBC URINE: 2 /HPF

## 2025-03-04 PROCEDURE — 36415 COLL VENOUS BLD VENIPUNCTURE: CPT | Performed by: EMERGENCY MEDICINE

## 2025-03-04 PROCEDURE — 99283 EMERGENCY DEPT VISIT LOW MDM: CPT | Performed by: EMERGENCY MEDICINE

## 2025-03-04 PROCEDURE — 99284 EMERGENCY DEPT VISIT MOD MDM: CPT | Mod: 25 | Performed by: EMERGENCY MEDICINE

## 2025-03-04 PROCEDURE — 85025 COMPLETE CBC W/AUTO DIFF WBC: CPT | Performed by: EMERGENCY MEDICINE

## 2025-03-04 PROCEDURE — 82310 ASSAY OF CALCIUM: CPT | Performed by: EMERGENCY MEDICINE

## 2025-03-04 PROCEDURE — 81001 URINALYSIS AUTO W/SCOPE: CPT | Performed by: STUDENT IN AN ORGANIZED HEALTH CARE EDUCATION/TRAINING PROGRAM

## 2025-03-04 PROCEDURE — 84484 ASSAY OF TROPONIN QUANT: CPT | Performed by: EMERGENCY MEDICINE

## 2025-03-04 PROCEDURE — 83735 ASSAY OF MAGNESIUM: CPT | Performed by: EMERGENCY MEDICINE

## 2025-03-04 PROCEDURE — 84443 ASSAY THYROID STIM HORMONE: CPT | Performed by: EMERGENCY MEDICINE

## 2025-03-04 PROCEDURE — 82374 ASSAY BLOOD CARBON DIOXIDE: CPT | Performed by: EMERGENCY MEDICINE

## 2025-03-04 PROCEDURE — 70450 CT HEAD/BRAIN W/O DYE: CPT

## 2025-03-04 ASSESSMENT — ACTIVITIES OF DAILY LIVING (ADL)
ADLS_ACUITY_SCORE: 52

## 2025-03-04 NOTE — ED PROVIDER NOTES
History     Chief Complaint   Patient presents with    Dizziness     HPI  Unique Ward is a 84 year old female with a history of hypertension, fibromyalgia, atrial fibrillation, complete heart block and pacemaker, Coumadin therapy, hyponatremia, small bowel obstruction, hypomagnesemia who presents to the emergency department secondary to dizziness.  She states that she has dizziness all the time but today she almost fell so decided to come in for evaluation.  She has had dizziness for quite some time and had a negative CT scan previously for this.  She has a pacemaker and therefore cannot have an MRI here or in the other hospital she was evaluated at for this.  She has not had any melena or hematochezia.  Symptoms came on last night when she got up to go to the bathroom.  It was not the active standing up that caused her to be dizzy or lightheaded and there was no room spinning dizziness or vertigo.  She has had that in the past but none recently.  She described it more of a haziness.  She denied feeling any heart palpitations or skipping a beat and for she does have the pacemaker.  She did not have any nausea vomiting diarrhea constipation.  She has not been ill lately.  Last bowel movement yesterday.  No abdominal bloating or distention.  She has a female urinal by her bedside that she uses in the night and this happened while she was using the urinal standing up in the middle of the night and she felt she was going to fall forward.  She then sat back down.  When she has dizziness that seems to be pretty persistent throughout the day and can be provoked by change in position but does not necessarily go away with rest.    Allergies:  Allergies   Allergen Reactions    Keflex [Cephalexin] Anaphylaxis    Codeine Dizziness    Gentamicin Hives     Per Unique this medication gives hives on arms and legs. Bev Lo LSXO,  ....................  7/15/2014   1:15 PM    Mct Oil [Medium Chain Triglycerides] Hives  "   No Clinical Screening - See Comments      Eye drop antibiotic.    Paxil [Paroxetine] Other (See Comments)     \"Felt sick and weird in the head\"    Influenza Virus Vaccine [id number]     Typhoid Vaccines        Problem List:    Patient Active Problem List    Diagnosis Date Noted    Hypomagnesemia 11/09/2024     Priority: Medium    Hypochloremia 11/09/2024     Priority: Medium    Hypoalbuminemia 11/09/2024     Priority: Medium    Obturator hernia with obstruction 11/07/2024     Priority: Medium    Hyponatremia 11/07/2024     Priority: Medium    SBO (small bowel obstruction) (H) 11/07/2024     Priority: Medium    Thrombocytopenia 08/29/2023     Priority: Medium    Permanent atrial fibrillation (H) 05/16/2022     Priority: Medium    Warfarin-induced coagulopathy 06/30/2020     Priority: Medium    Atrial fibrillation, unspecified type (H) 06/30/2020     Priority: Medium    CHB (complete heart block) (H) 03/31/2020     Priority: Medium     Added automatically from request for surgery [id number]      Pacemaker 03/31/2020     Priority: Medium    Atrial fibrillation (H) 06/07/2019     Priority: Medium    History of basal cell carcinoma- face and scalp 02/28/2017     Priority: Medium    Fibromyalgia 02/28/2017     Priority: Medium    Personal history of urinary tract infection 06/18/2016     Priority: Medium    Essential hypertension 03/23/2016     Priority: Medium    Vitamin D deficiency 03/30/2013     Priority: Medium    Dysthymia 04/01/2005     Priority: Medium        Past Medical History:    Past Medical History:   Diagnosis Date    Arthritis 01.01.1980    Atrial fibrillation (H) 6/7/2019    Cancer (H) 01.01.1993     scalp    Depressive disorder 01.01.1977    FH: melanoma- son 2/28/2017    Hypertension 03.16.2016       Past Surgical History:    Past Surgical History:   Procedure Laterality Date    BIOPSY  11/01/2014    COLONOSCOPY  01.01/1979    Two routine,  one for chronic diarrhea    DAVINCI XI HERNIORRHAPHY INGUINAL " Bilateral 2024    Procedure: Robot assisted laparoscopic bilateral obturator hernia repairs with mesh.;  Surgeon: Winston Garcia MD;  Location: [facility] OR    EP PACEMAKER N/A 3/31/2020    Procedure: EP Pacemaker;  Surgeon: Erich Clark MD;  Location:  [facility]    EYE SURGERY  2014    first    cataracts both eyes.  second a few months later    GENITOURINARY SURGERY      GYN SURGERY  1977    TL    HEAD & NECK SURGERY  1997    basal cell X2   Moh's on scalp.  other on bridge of nose    LASER YAG CAPSULOTOMY Bilateral 2021    Procedure: CAPSULOTOMY, LENS, USING YAG LASER;  Surgeon: Bernardino Chu MD;  Location: [facility] OR    SOFT TISSUE SURGERY  1990    Ganglion Cyst   Left inner wrist       Family History:    Family History   Problem Relation Age of Onset    Diabetes Maternal Grandmother         Na    Coronary Artery Disease Sister         error    Coronary Artery Disease Brother         error    Hypertension Brother     Hyperlipidemia Brother     Cerebrovascular Disease Brother     Hypertension Sister     Hypertension Sister     Hyperlipidemia Sister     Other Cancer Sister         cervical    Breast Cancer Sister     Other Cancer Sister         kidney    Mental Illness Mother         paranoid/schizophrenic/suicide    Mental Illness Sister         ???  Had shock treatments    Hyperlipidemia Sister     Other Cancer Sister         Kidney       Social History:  Marital Status:  Legally  [3]  Social History     Tobacco Use    Smoking status: Former     Current packs/day: 0.00     Average packs/day: 0.8 packs/day for 10.0 years (7.5 ttl pk-yrs)     Types: Cigarettes     Start date: 1955     Quit date: 1965     Years since quittin.2    Smokeless tobacco: Never   Vaping Use    Vaping status: Never Used   Substance Use Topics    Alcohol use: No     Alcohol/week: 0.0 standard drinks of alcohol    Drug use: No        Medications:    acetaminophen  (TYLENOL) 325 MG tablet  amLODIPine (NORVASC) 5 MG tablet  EPINEPHrine (ANY BX GENERIC EQUIV) 0.3 MG/0.3ML injection 2-pack  senna-docusate (SENOKOT-S/PERICOLACE) 8.6-50 MG tablet  warfarin ANTICOAGULANT (COUMADIN) 5 MG tablet          Review of Systems   All other systems reviewed and are negative.      Physical Exam   BP: (!) 141/90  Pulse: 107  Temp: 98.2  F (36.8  C)  Resp: 18  Weight: 56.1 kg (123 lb 11.2 oz)  SpO2: 97 %  Lying Orthostatic BP: 144/79  Lying Orthostatic Pulse: 97 bpm  Sitting Orthostatic BP: 133/89  Sitting Orthostatic Pulse: 112 bpm  Standing Orthostatic BP: 143/101  Standing Orthostatic Pulse: 123 bpm      Physical Exam  Vitals and nursing note reviewed.   Constitutional:       General: She is not in acute distress.     Appearance: Normal appearance. She is well-developed.   HENT:      Head: Normocephalic and atraumatic.      Right Ear: External ear normal.      Left Ear: External ear normal.      Mouth/Throat:      Mouth: Mucous membranes are moist.   Eyes:      General: No scleral icterus.     Extraocular Movements: Extraocular movements intact.      Conjunctiva/sclera: Conjunctivae normal.      Pupils: Pupils are equal, round, and reactive to light.      Comments: No nystagmus   Cardiovascular:      Rate and Rhythm: Normal rate and regular rhythm.   Pulmonary:      Effort: Pulmonary effort is normal. No respiratory distress.   Abdominal:      General: Abdomen is flat.   Musculoskeletal:      Cervical back: Normal range of motion and neck supple.      Right lower leg: No edema.      Left lower leg: No edema.   Skin:     General: Skin is warm and dry.      Findings: No rash.   Neurological:      General: No focal deficit present.      Mental Status: She is alert and oriented to person, place, and time.   Psychiatric:         Mood and Affect: Mood normal.         Behavior: Behavior normal.         ED Course        Procedures                    Results for orders placed or performed during the  hospital encounter of 03/04/25 (from the past 24 hours)   UA with Microscopic reflex to Culture    Specimen: Urine, Midstream   Result Value Ref Range    Color Urine Straw Colorless, Straw, Light Yellow, Yellow    Appearance Urine Clear Clear    Glucose Urine Negative Negative mg/dL    Bilirubin Urine Negative Negative    Ketones Urine Negative Negative mg/dL    Specific Gravity Urine 1.012 1.003 - 1.035    Blood Urine Negative Negative    pH Urine 6.0 5.0 - 7.0    Protein Albumin Urine Negative Negative mg/dL    Urobilinogen Urine Normal Normal, 2.0 mg/dL    Nitrite Urine Negative Negative    Leukocyte Esterase Urine Negative Negative    RBC Urine <1 <=2 /HPF    WBC Urine 2 <=5 /HPF    Narrative    Urine Culture not indicated   Comprehensive metabolic panel   Result Value Ref Range    Sodium 131 (L) 135 - 145 mmol/L    Potassium 4.1 3.4 - 5.3 mmol/L    Carbon Dioxide (CO2) 21 (L) 22 - 29 mmol/L    Anion Gap 14 7 - 15 mmol/L    Urea Nitrogen 37.8 (H) 8.0 - 23.0 mg/dL    Creatinine 0.69 0.51 - 0.95 mg/dL    GFR Estimate 85 >60 mL/min/1.73m2    Calcium 9.1 8.8 - 10.4 mg/dL    Chloride 96 (L) 98 - 107 mmol/L    Glucose 115 (H) 70 - 99 mg/dL    Alkaline Phosphatase 40 40 - 150 U/L    AST 39 0 - 45 U/L    ALT 16 0 - 50 U/L    Protein Total 6.5 6.4 - 8.3 g/dL    Albumin 4.2 3.5 - 5.2 g/dL    Bilirubin Total 0.4 <=1.2 mg/dL   TSH with free T4 reflex   Result Value Ref Range    TSH 1.70 0.30 - 4.20 uIU/mL   Magnesium   Result Value Ref Range    Magnesium 2.0 1.7 - 2.3 mg/dL   Troponin T, High Sensitivity   Result Value Ref Range    Troponin T, High Sensitivity 18 (H) <=14 ng/L   CBC with platelets differential    Narrative    The following orders were created for panel order CBC with platelets differential.  Procedure                               Abnormality         Status                     ---------                               -----------         ------                     CBC with platelets and d...[[id number]]   Abnormal            Final result                 Please view results for these tests on the individual orders.   [place] Draw    Narrative    The following orders were created for panel order [place] Draw.  Procedure                               Abnormality         Status                     ---------                               -----------         ------                     Extra Green Top (Lithium...[[id number]]                      Final result                 Please view results for these tests on the individual orders.   CBC with platelets and differential   Result Value Ref Range    WBC Count 4.7 4.0 - 11.0 10e3/uL    RBC Count 3.98 3.80 - 5.20 10e6/uL    Hemoglobin 11.6 (L) 11.7 - 15.7 g/dL    Hematocrit 34.1 (L) 35.0 - 47.0 %    MCV 86 78 - 100 fL    MCH 29.1 26.5 - 33.0 pg    MCHC 34.0 31.5 - 36.5 g/dL    RDW 14.2 10.0 - 15.0 %    Platelet Count 298 150 - 450 10e3/uL    % Neutrophils 73 %    % Lymphocytes 19 %    % Monocytes 7 %    % Eosinophils 0 %    % Basophils 1 %    % Immature Granulocytes 0 %    NRBCs per 100 WBC 0 <1 /100    Absolute Neutrophils 3.4 1.6 - 8.3 10e3/uL    Absolute Lymphocytes 0.9 0.8 - 5.3 10e3/uL    Absolute Monocytes 0.3 0.0 - 1.3 10e3/uL    Absolute Eosinophils 0.0 0.0 - 0.7 10e3/uL    Absolute Basophils 0.0 0.0 - 0.2 10e3/uL    Absolute Immature Granulocytes 0.0 <=0.4 10e3/uL    Absolute NRBCs 0.0 10e3/uL   Extra Green Top (Lithium Heparin) Tube   Result Value Ref Range    Hold Specimen JIC    Head CT w/o contrast    Narrative    EXAM: CT HEAD W/O CONTRAST  LOCATION: [facility]  DATE: 3/4/2025    INDICATION: dizziness  COMPARISON: Head CT 5/27/2023  TECHNIQUE: Routine CT Head without IV contrast. Multiplanar reformats. Dose reduction techniques were used.    FINDINGS:  INTRACRANIAL CONTENTS: No intracranial hemorrhage, extraaxial collection, or mass effect.  No CT evidence of acute infarct. Mild to moderate presumed chronic small vessel ischemic  changes have progressed since 2023. Mild generalized volume loss. No   hydrocephalus.     VISUALIZED ORBITS/SINUSES/MASTOIDS: No intraorbital abnormality. No paranasal sinus mucosal disease. No middle ear or mastoid effusion.    BONES/SOFT TISSUES: No acute abnormality.      Impression    IMPRESSION:  1.  Mild to moderate presumed chronic small vessel ischemic changes have progressed since 2023. MRI would be more sensitive for small recent infarct.  2.  No acute intracranial injury, hemorrhage, mass, or definite recent ischemia.   Troponin T, High Sensitivity   Result Value Ref Range    Troponin T, High Sensitivity 16 (H) <=14 ng/L       Medications - No data to display    Assessments & Plan (with Medical Decision Making)  84-year-old female with dizziness  No history of nystagmus.  This is subacute as the patient has been having this issue for quite some time.  No acute findings on head CT.  Troponin was reassuring as well as repeat troponin.  I did not do an ECG.  Patient has a pacemaker.  Labs and urinalysis are unremarkable.  Patient felt a little bit better at the time of discharge.  This is a chronic problem that can be followed up in the clinic.  She uses a walker at home.  She feels comfortable going home.  Patient was reassured.  Return to ER precautions and follow-up precautions discussed.  All questions answered prior to discharge.     I have reviewed the nursing notes.    I have reviewed the findings, diagnosis, plan and need for follow up with the patient.        Discharge Medication List as of 3/4/2025  1:52 PM          Final diagnoses:   Dizziness       3/4/2025   [facility] EMERGENCY DEPT       Jesús Ruiz MD  03/04/25 2035

## 2025-03-04 NOTE — ED TRIAGE NOTES
She has dizziness all of the time at different severities but this morning when she got up she almost fell. So is coming in for an eval     Triage Assessment (Adult)       Row Name 03/04/25 0922          Triage Assessment    Airway WDL WDL        Respiratory WDL    Respiratory WDL WDL        Skin Circulation/Temperature WDL    Skin Circulation/Temperature WDL WDL        Cardiac WDL    Cardiac WDL WDL        Peripheral/Neurovascular WDL    Peripheral Neurovascular WDL WDL        Cognitive/Neuro/Behavioral WDL    Cognitive/Neuro/Behavioral WDL WDL

## 2025-03-04 NOTE — DISCHARGE INSTRUCTIONS
Return to the emergency department if you develop new or worsening symptoms.  Follow-up with your doctor regarding this ongoing dizziness issue.  Your labs and CT scan were reassuring here today.

## 2025-03-09 ENCOUNTER — HEALTH MAINTENANCE LETTER (OUTPATIENT)
Age: 85
End: 2025-03-09

## 2025-03-09 ENCOUNTER — NURSE TRIAGE (OUTPATIENT)
Dept: NURSING | Facility: CLINIC | Age: 85
End: 2025-03-09
Payer: COMMERCIAL

## 2025-03-10 NOTE — TELEPHONE ENCOUNTER
[facility] Transfer    Nurse Triage SBAR    Is this a 2nd Level Triage? NO    Situation: Palpitations all day; Pacemaker pt.    Background:  Aaliyah reports feeling like her Heart has been racing all day.    When she called, her heart rate was ~104.    While we are on the phone talking, she has a Pulse Oximeter on her finger.    Her HR gradually decreased into the 70's and then as low as 58, then back to the 60's - she states these lower numbers are her norm.    - Pacemaker - Low = 60 - High = 130  - HR - 104 to 58   - SpO2 = 97%  - Feels anxious    When she was feeling this during the day, she tried relaxing, breathing, laying down but the HR was not coming down.    She also reports that she was seen in ED on Tue, 3/4/25 for Dizziness.    Assessment: as noted above    Protocol Recommended Disposition:   Call PCP Now - [facility] ([facility]) provider in [place];   Cardiology w/ [facility] [place] - Dr. Pedro Kumar    10:03 pm - Warm tx'd to [facility] with [facility] [place]    Does the patient meet one of the following criteria for ADS visit consideration? No      Reason for Disposition   [1] Caller has URGENT question AND [2] triager unable to answer question    Additional Information   Negative: Received 2 or more ICD SHOCKS within a 4-hour period   Negative: [1] Received an ICD SHOCK AND [2] chest pain before or after  (Exception: The momentary ICD shock sensation that the patient feels in their chest.)   Negative: [1] Received an ICD SHOCK AND [2] any concerning symptoms (e.g., extreme fatigue, lightheadedness, palpitations, shortness of breath)   Negative: [1] Received an ICD SHOCK AND [2] feels unwell afterwards   Negative: Difficult to awaken or acting confused (e.g., disoriented, slurred speech)   Negative: Passed out (i.e., lost consciousness, collapsed and was not responding)   Negative: Sounds like a life-threatening emergency to the triager   Negative: Heart beating < 60 beats per minute OR > 140 beats per minute   (Exception: Heart rate is normal for patient, such as in a well-trained athlete or during vigorous exercise.)   Negative: Incision gaping open   Negative: [1] Received 2 or more ICD SHOCKS in a 24-hour period AND [2] feels well   Negative: Patient sounds very sick or weak to the triager   Negative: Incision looks infected (spreading redness, pain)   Negative: [1] [place] or felt device ALARM (e.g., beeping or buzzing) more than once today  (Exception: Caused by a magnet being near the device) AND [2] feels unwell aftewards   Negative: Passed out (i.e., lost consciousness, collapsed and was not responding)   Negative: Shock suspected (e.g., cold/pale/clammy skin, too weak to stand, low BP, rapid pulse)   Negative: Difficult to awaken or acting confused (e.g., disoriented, slurred speech)   Negative: Visible sweat on face or sweat dripping down face   Negative: Unable to walk, or can only walk with assistance (e.g., requires support)   Negative: [1] Received SHOCK from implantable cardiac defibrillator AND [2] persisting symptoms (i.e., palpitations, lightheadedness)   Negative: [1] Dizziness, lightheadedness, or weakness AND [2] heart beating very rapidly (e.g., > 140 / minute)   Negative: [1] Dizziness, lightheadedness, or weakness AND [2] heart beating very slowly (e.g., < 50 / minute)   Negative: Sounds like a life-threatening emergency to the triager   Negative: Difficulty breathing   Negative: Dizziness, lightheadedness, or weakness   Negative: [1] Heart beating very rapidly (e.g., > 140 / minute) AND [2] present now  (Exception: During exercise.)   Negative: Heart beating very slowly (e.g., < 50 / minute)  (Exception: Athlete and heart rate normal for caller.)   Negative: New or worsened shortness of breath with activity (dyspnea on exertion)   Negative: Patient sounds very sick or weak to the triager    Protocols used: ICD and Pacemaker Symptoms and [id number], Heart Rate and Heartbeat [id number]

## 2025-04-13 ENCOUNTER — NURSE TRIAGE (OUTPATIENT)
Dept: NURSING | Facility: CLINIC | Age: 85
End: 2025-04-13
Payer: COMMERCIAL

## 2025-04-13 NOTE — TELEPHONE ENCOUNTER
Nurse Triage SBAR    Is this a 2nd Level Triage? NO    Situation: Vision changes    Background: Noticed this a little later this a.m.    Protocol Recommended Disposition:   Caller stated understanding and agreement.      Recommendation: Nothing needed from provider.       Reason for Disposition   [1] Blurred vision or visual changes AND [2] present now AND [3] sudden onset or new (e.g., minutes, hours, days)  (Exception: Seeing floaters / black specks OR previously diagnosed migraine headaches with same symptoms.)    Additional Information   Negative: Weakness of the face, arm or leg on one side of the body   Negative: Followed getting substance in the eye   Negative: Foreign body stuck in the eye   Negative: Followed an eye injury   Negative: Followed sun lamp or sun exposure (UV keratitis)   Negative: Yellow or green discharge (pus) in the eye   Negative: Pregnant   Negative: Postpartum (from 0 to 6 weeks after delivery)   Negative: Complete loss of vision in one or both eyes   Negative: SEVERE eye pain   Negative: SEVERE headache   Negative: Double vision    Protocols used: Vision Loss or Change-REMY DA SILVA RN [facility]

## 2025-04-15 ENCOUNTER — NURSE TRIAGE (OUTPATIENT)
Dept: NURSING | Facility: CLINIC | Age: 85
End: 2025-04-15
Payer: COMMERCIAL

## 2025-04-16 NOTE — TELEPHONE ENCOUNTER
Nurse Triage SBAR    Is this a 2nd Level Triage? NO    Situation: blurry vision, but also several medical issuesd including puffy legs.    Background: Did go to ED for not being able to urinate, but not having that problem now.    Assessment: Blurred vision started on Sunday, cleared up, but came back today. Also reports leg edema, which isn't new.  She had two doses of Lasix in the ED on Sunday, but edema is coming back. Denies chest pain, dyspnea.    Protocol Recommended Disposition:   No disposition on file.    Recommendation: Patient verbalizes understanding of care advice and agrees with plan.    Arabella Sheth RN  [facility]         Reason for Disposition   [1] Blurred vision or visual changes AND [2] present now AND [3] sudden onset or new (e.g., minutes, hours, days)  (Exception: Seeing floaters / black specks OR previously diagnosed migraine headaches with same symptoms.)    Additional Information   Negative: Weakness of the face, arm or leg on one side of the body   Negative: Followed getting substance in the eye   Negative: Foreign body stuck in the eye   Negative: Followed an eye injury   Negative: Complete loss of vision in one or both eyes   Negative: SEVERE eye pain   Negative: SEVERE headache   Negative: Double vision   Negative: SEVERE difficulty breathing (e.g., struggling for each breath, speaks in single words)   Negative: Looks like a broken bone or dislocated joint (e.g., crooked or deformed)   Negative: Sounds like a life-threatening emergency to the triager   Negative: Chest pain   Negative: Followed a leg injury    Protocols used: Vision Loss or Change-A-AH, Leg Swelling and Edema-A-AH

## (undated) DEVICE — SU VICRYL 0 UR-6 27" J603H

## (undated) DEVICE — PACK PCMKR PERM SRG PROC LF SAN32PC573

## (undated) DEVICE — SU STRATAFIX PDS PLUS 3-0 SPIRAL SH 15CM SXPP1B420

## (undated) DEVICE — PACK GENERAL LAPAOSCOPY

## (undated) DEVICE — GLOVE BIOGEL PI ULTRATOUCH G SZ 7.5 42175

## (undated) DEVICE — ENDO TROCAR BLUNT TIP KII BALLOON 12X100MM C0R47

## (undated) DEVICE — SUCTION MANIFOLD NEPTUNE 2 SYS 4 PORT 0702-020-000

## (undated) DEVICE — SU DERMABOND ADVANCED .7ML DNX12

## (undated) DEVICE — DAVINCI XI DRAPE ARM 470015

## (undated) DEVICE — DAVINCI XI SEAL UNIVERSAL 5-12MM 470500

## (undated) DEVICE — CABLE PACING ALLIGATOR CLIP 12FT 5833SL

## (undated) DEVICE — ESU PENCIL SMOKE EVAC W/ROCKER SWITCH 0703-047-000

## (undated) DEVICE — DAVINCI XI DRAPE COLUMN 470341

## (undated) DEVICE — RAD INTRODUCER KIT MICRO 5FRX10CM .018 NITINOL G/W

## (undated) DEVICE — SYR 50ML SLIP TIP W/O NDL 309654

## (undated) DEVICE — DRAPE U SPLIT 74X120" 29440

## (undated) DEVICE — GLOVE BIOGEL INDICATOR 7.5 LF 41675

## (undated) DEVICE — DEFIB PRO-PADZ LVP LQD GEL ADULT 8900-2105-01

## (undated) DEVICE — CATH TRAY FOLEY SURESTEP 16FR W/URINE MTR STATLK LF A303416A

## (undated) DEVICE — DAVINCI XI OBTURATOR BLADELESS 8MM 470359

## (undated) DEVICE — SHEATH PRELUDE SNAP 7FRX13CM PLS-1007

## (undated) DEVICE — SOL WATER IRRIG 1000ML BOTTLE 2F7114

## (undated) DEVICE — SU VICRYL 3-0 SH 27" UND J416H

## (undated) DEVICE — SU MONOCRYL 4-0 PS-2 18" UND Y496G

## (undated) DEVICE — DRAPE IOBAN INCISE 23X17" 6650EZ

## (undated) DEVICE — ESU GROUND PAD UNIVERSAL W/O CORD

## (undated) DEVICE — DAVINCI HOT SHEARS TIP COVER  400180

## (undated) RX ORDER — CEFAZOLIN SODIUM 2 G/100ML
INJECTION, SOLUTION INTRAVENOUS
Status: DISPENSED
Start: 2020-03-31

## (undated) RX ORDER — FENTANYL CITRATE 50 UG/ML
INJECTION, SOLUTION INTRAMUSCULAR; INTRAVENOUS
Status: DISPENSED
Start: 2024-11-08

## (undated) RX ORDER — FENTANYL CITRATE 50 UG/ML
INJECTION, SOLUTION INTRAMUSCULAR; INTRAVENOUS
Status: DISPENSED
Start: 2020-03-31

## (undated) RX ORDER — KETOROLAC TROMETHAMINE 30 MG/ML
INJECTION, SOLUTION INTRAMUSCULAR; INTRAVENOUS
Status: DISPENSED
Start: 2024-11-08

## (undated) RX ORDER — BUPIVACAINE HYDROCHLORIDE AND EPINEPHRINE 5; 5 MG/ML; UG/ML
INJECTION, SOLUTION EPIDURAL; INTRACAUDAL; PERINEURAL
Status: DISPENSED
Start: 2024-11-08

## (undated) RX ORDER — ONDANSETRON 2 MG/ML
INJECTION INTRAMUSCULAR; INTRAVENOUS
Status: DISPENSED
Start: 2024-11-08

## (undated) RX ORDER — BUPIVACAINE HYDROCHLORIDE AND EPINEPHRINE 2.5; 5 MG/ML; UG/ML
INJECTION, SOLUTION EPIDURAL; INFILTRATION; INTRACAUDAL; PERINEURAL
Status: DISPENSED
Start: 2024-11-08

## (undated) RX ORDER — LIDOCAINE HYDROCHLORIDE 10 MG/ML
INJECTION, SOLUTION EPIDURAL; INFILTRATION; INTRACAUDAL; PERINEURAL
Status: DISPENSED
Start: 2020-03-31

## (undated) RX ORDER — PROPOFOL 10 MG/ML
INJECTION, EMULSION INTRAVENOUS
Status: DISPENSED
Start: 2024-11-08

## (undated) RX ORDER — DEXAMETHASONE SODIUM PHOSPHATE 10 MG/ML
INJECTION, SOLUTION INTRAMUSCULAR; INTRAVENOUS
Status: DISPENSED
Start: 2024-11-08

## (undated) RX ORDER — CEFTRIAXONE SODIUM 250 MG/1
INJECTION, POWDER, FOR SOLUTION INTRAMUSCULAR; INTRAVENOUS
Status: DISPENSED
Start: 2022-08-09

## (undated) RX ORDER — CEFAZOLIN SODIUM 500 MG/2.2ML
INJECTION, POWDER, FOR SOLUTION INTRAMUSCULAR; INTRAVENOUS
Status: DISPENSED
Start: 2022-08-09

## (undated) RX ORDER — HYDROMORPHONE HYDROCHLORIDE 1 MG/ML
INJECTION, SOLUTION INTRAMUSCULAR; INTRAVENOUS; SUBCUTANEOUS
Status: DISPENSED
Start: 2024-11-08

## (undated) RX ORDER — LIDOCAINE HYDROCHLORIDE 10 MG/ML
INJECTION, SOLUTION EPIDURAL; INFILTRATION; INTRACAUDAL; PERINEURAL
Status: DISPENSED
Start: 2024-11-08

## (undated) RX ORDER — BUPIVACAINE HYDROCHLORIDE 2.5 MG/ML
INJECTION, SOLUTION EPIDURAL; INFILTRATION; INTRACAUDAL
Status: DISPENSED
Start: 2020-03-31